# Patient Record
Sex: MALE | Race: WHITE | HISPANIC OR LATINO | URBAN - METROPOLITAN AREA
[De-identification: names, ages, dates, MRNs, and addresses within clinical notes are randomized per-mention and may not be internally consistent; named-entity substitution may affect disease eponyms.]

---

## 2018-01-26 ENCOUNTER — INPATIENT (INPATIENT)
Facility: HOSPITAL | Age: 68
LOS: 5 days | Discharge: HOME HEALTH | DRG: 25 | End: 2018-02-01
Attending: STUDENT IN AN ORGANIZED HEALTH CARE EDUCATION/TRAINING PROGRAM | Admitting: PSYCHIATRY & NEUROLOGY
Payer: MEDICAID

## 2018-01-26 VITALS
TEMPERATURE: 98 F | DIASTOLIC BLOOD PRESSURE: 140 MMHG | HEART RATE: 90 BPM | RESPIRATION RATE: 18 BRPM | SYSTOLIC BLOOD PRESSURE: 255 MMHG | OXYGEN SATURATION: 98 %

## 2018-01-26 DIAGNOSIS — Z90.49 ACQUIRED ABSENCE OF OTHER SPECIFIED PARTS OF DIGESTIVE TRACT: Chronic | ICD-10-CM

## 2018-01-26 LAB
ALBUMIN SERPL ELPH-MCNC: 4.6 G/DL — SIGNIFICANT CHANGE UP (ref 3.3–5)
ALP SERPL-CCNC: 77 U/L — SIGNIFICANT CHANGE UP (ref 40–120)
ALT FLD-CCNC: 12 U/L RC — SIGNIFICANT CHANGE UP (ref 10–45)
ANION GAP SERPL CALC-SCNC: 13 MMOL/L — SIGNIFICANT CHANGE UP (ref 5–17)
APTT BLD: 38.5 SEC — HIGH (ref 27.5–37.4)
AST SERPL-CCNC: 16 U/L — SIGNIFICANT CHANGE UP (ref 10–40)
BASE EXCESS BLDV CALC-SCNC: 2.2 MMOL/L — HIGH (ref -2–2)
BASOPHILS # BLD AUTO: 0 K/UL — SIGNIFICANT CHANGE UP (ref 0–0.2)
BASOPHILS NFR BLD AUTO: 0.2 % — SIGNIFICANT CHANGE UP (ref 0–2)
BILIRUB SERPL-MCNC: 0.7 MG/DL — SIGNIFICANT CHANGE UP (ref 0.2–1.2)
BUN SERPL-MCNC: 11 MG/DL — SIGNIFICANT CHANGE UP (ref 7–23)
CA-I SERPL-SCNC: 1.22 MMOL/L — SIGNIFICANT CHANGE UP (ref 1.12–1.3)
CALCIUM SERPL-MCNC: 9.9 MG/DL — SIGNIFICANT CHANGE UP (ref 8.4–10.5)
CHLORIDE BLDV-SCNC: 104 MMOL/L — SIGNIFICANT CHANGE UP (ref 96–108)
CHLORIDE SERPL-SCNC: 99 MMOL/L — SIGNIFICANT CHANGE UP (ref 96–108)
CO2 BLDV-SCNC: 29 MMOL/L — SIGNIFICANT CHANGE UP (ref 22–30)
CO2 SERPL-SCNC: 25 MMOL/L — SIGNIFICANT CHANGE UP (ref 22–31)
CREAT SERPL-MCNC: 1.03 MG/DL — SIGNIFICANT CHANGE UP (ref 0.5–1.3)
EOSINOPHIL # BLD AUTO: 0.1 K/UL — SIGNIFICANT CHANGE UP (ref 0–0.5)
EOSINOPHIL NFR BLD AUTO: 1 % — SIGNIFICANT CHANGE UP (ref 0–6)
GAS PNL BLDV: 138 MMOL/L — SIGNIFICANT CHANGE UP (ref 136–145)
GAS PNL BLDV: SIGNIFICANT CHANGE UP
GAS PNL BLDV: SIGNIFICANT CHANGE UP
GLUCOSE BLDV-MCNC: 123 MG/DL — HIGH (ref 70–99)
GLUCOSE SERPL-MCNC: 120 MG/DL — HIGH (ref 70–99)
HCO3 BLDV-SCNC: 28 MMOL/L — SIGNIFICANT CHANGE UP (ref 21–29)
HCT VFR BLD CALC: 44 % — SIGNIFICANT CHANGE UP (ref 39–50)
HCT VFR BLDA CALC: 49 % — SIGNIFICANT CHANGE UP (ref 39–50)
HGB BLD CALC-MCNC: 15.9 G/DL — SIGNIFICANT CHANGE UP (ref 13–17)
HGB BLD-MCNC: 15.4 G/DL — SIGNIFICANT CHANGE UP (ref 13–17)
INR BLD: 1.03 RATIO — SIGNIFICANT CHANGE UP (ref 0.88–1.16)
LACTATE BLDV-MCNC: 1.5 MMOL/L — SIGNIFICANT CHANGE UP (ref 0.7–2)
LYMPHOCYTES # BLD AUTO: 2.7 K/UL — SIGNIFICANT CHANGE UP (ref 1–3.3)
LYMPHOCYTES # BLD AUTO: 25.7 % — SIGNIFICANT CHANGE UP (ref 13–44)
MAGNESIUM SERPL-MCNC: 1.9 MG/DL — SIGNIFICANT CHANGE UP (ref 1.6–2.6)
MCHC RBC-ENTMCNC: 31.1 PG — SIGNIFICANT CHANGE UP (ref 27–34)
MCHC RBC-ENTMCNC: 35 GM/DL — SIGNIFICANT CHANGE UP (ref 32–36)
MCV RBC AUTO: 88.8 FL — SIGNIFICANT CHANGE UP (ref 80–100)
MONOCYTES # BLD AUTO: 0.8 K/UL — SIGNIFICANT CHANGE UP (ref 0–0.9)
MONOCYTES NFR BLD AUTO: 7.8 % — SIGNIFICANT CHANGE UP (ref 2–14)
NEUTROPHILS # BLD AUTO: 6.8 K/UL — SIGNIFICANT CHANGE UP (ref 1.8–7.4)
NEUTROPHILS NFR BLD AUTO: 65.2 % — SIGNIFICANT CHANGE UP (ref 43–77)
PCO2 BLDV: 49 MMHG — SIGNIFICANT CHANGE UP (ref 35–50)
PH BLDV: 7.37 — SIGNIFICANT CHANGE UP (ref 7.35–7.45)
PLATELET # BLD AUTO: 374 K/UL — SIGNIFICANT CHANGE UP (ref 150–400)
PO2 BLDV: 39 MMHG — SIGNIFICANT CHANGE UP (ref 25–45)
POTASSIUM BLDV-SCNC: 3.3 MMOL/L — LOW (ref 3.5–5)
POTASSIUM SERPL-MCNC: 3.4 MMOL/L — LOW (ref 3.5–5.3)
POTASSIUM SERPL-SCNC: 3.4 MMOL/L — LOW (ref 3.5–5.3)
PROT SERPL-MCNC: 8 G/DL — SIGNIFICANT CHANGE UP (ref 6–8.3)
PROTHROM AB SERPL-ACNC: 11.2 SEC — SIGNIFICANT CHANGE UP (ref 9.8–12.7)
RBC # BLD: 4.96 M/UL — SIGNIFICANT CHANGE UP (ref 4.2–5.8)
RBC # FLD: 12.6 % — SIGNIFICANT CHANGE UP (ref 10.3–14.5)
SAO2 % BLDV: 71 % — SIGNIFICANT CHANGE UP (ref 67–88)
SODIUM SERPL-SCNC: 137 MMOL/L — SIGNIFICANT CHANGE UP (ref 135–145)
TROPONIN T SERPL-MCNC: <0.01 NG/ML — SIGNIFICANT CHANGE UP (ref 0–0.06)
WBC # BLD: 10.5 K/UL — SIGNIFICANT CHANGE UP (ref 3.8–10.5)
WBC # FLD AUTO: 10.5 K/UL — SIGNIFICANT CHANGE UP (ref 3.8–10.5)

## 2018-01-26 PROCEDURE — 99291 CRITICAL CARE FIRST HOUR: CPT | Mod: 25

## 2018-01-26 PROCEDURE — 93010 ELECTROCARDIOGRAM REPORT: CPT

## 2018-01-26 PROCEDURE — 70450 CT HEAD/BRAIN W/O DYE: CPT | Mod: 26

## 2018-01-26 RX ORDER — NICARDIPINE HYDROCHLORIDE 30 MG/1
2.5 CAPSULE, EXTENDED RELEASE ORAL
Qty: 40 | Refills: 0 | Status: DISCONTINUED | OUTPATIENT
Start: 2018-01-26 | End: 2018-01-26

## 2018-01-26 RX ORDER — NICARDIPINE HYDROCHLORIDE 30 MG/1
2.5 CAPSULE, EXTENDED RELEASE ORAL
Qty: 40 | Refills: 0 | Status: DISCONTINUED | OUTPATIENT
Start: 2018-01-26 | End: 2018-01-27

## 2018-01-26 RX ORDER — ACETAMINOPHEN 500 MG
1000 TABLET ORAL ONCE
Qty: 0 | Refills: 0 | Status: COMPLETED | OUTPATIENT
Start: 2018-01-26 | End: 2018-01-26

## 2018-01-26 RX ADMIN — NICARDIPINE HYDROCHLORIDE 12.5 MG/HR: 30 CAPSULE, EXTENDED RELEASE ORAL at 22:16

## 2018-01-26 RX ADMIN — Medication 400 MILLIGRAM(S): at 23:41

## 2018-01-26 RX ADMIN — NICARDIPINE HYDROCHLORIDE 12.5 MG/HR: 30 CAPSULE, EXTENDED RELEASE ORAL at 22:29

## 2018-01-26 NOTE — ED PROVIDER NOTE - OBJECTIVE STATEMENT
67 year old male with pmhx of Hypertension and pshx of appendectomy presents with high blood pressure reading in /149. Associated symptoms of headache, double vision, and slight confusion. Per family, patient confuses words and seems forgetful at his workplace. Patient had self discontinued his BP medications x3 years ago. Denies chest pain. Denies numbness or tingling in LE. Denies back or abdominal pain. NKDA. Non smoker.

## 2018-01-26 NOTE — ED PROVIDER NOTE - CRITICAL CARE PROVIDED
consult w/ pt's family directly relating to pts condition/additional history taking/interpretation of diagnostic studies/consultation with other physicians/direct patient care (not related to procedure)/documentation

## 2018-01-26 NOTE — ED PROVIDER NOTE - PROGRESS NOTE DETAILS
CT head reviewed and discussed briefly with radiology. Mets with vasogenic edema vs. possible spontaneous hemorrhages with vasogenic edema. Resident still unclear. Possible neurosurgery consult for evaluation. Final radiology read: most likely a ischemic foci with hemorrhagic bleed. Radiology recommends consulting stroke neuro. Stroke neuro consulted and discussed and agrees with plan for more gradually BP control given degree of initial elevation. Final radiology read: most likely a ischemic foci with hemorrhagic bleed.  Neurosurgery evaluated patient, recommending consulting stroke neuro. Stroke neuro consulted and discussed and agrees with plan for more gradually BP control given degree of initial elevation, will admit patient to their service pending further workup.

## 2018-01-26 NOTE — CONSULT NOTE ADULT - SUBJECTIVE AND OBJECTIVE BOX
Pager: [phone]  CHIEF COMPLAINT/ REASON FOR CONSULTATION:    Headache     HPI:  67M PMH HTN, has been nonompliant with medication, had a headache today and noticed his BP to be >200 so came into the hospital. CTH was performed which showed L temporal vasogenic edema for which neurosurgery was called. Patient states his headache has improved, he is currently on a cardene drip for BP control. Otherwise no weakness/paresthesias. No other medical problems or oncologic history.     PAST MEDICAL HISTORY   HTN (hypertension)    PAST SURGICAL HISTORY   History of appendectomy        MEDICATIONS:  Antibiotics:    Neuro:    Anticoagulation:    Other:  niCARdipine Infusion 2.5 mG/Hr IV Continuous <Continuous>      SOCIAL HISTORY:   Occupation:   Marital Status:     FAMILY HISTORY:  No pertinent family history     REVIEW OF SYSTEMS:  Check here if all are normal other than Neurological []  Negative except as above.     PHYSICAL EXAMINATION:   T(C): 36.5 (01-26-18 @ 22:20), Max: 36.9 (01-26-18 @ 22:18)  HR: 74 (01-26-18 @ 22:30) (73 - 90)  BP: 211/122 (01-26-18 @ 22:30) (211/122 - 255/140)  RR: 18 (01-26-18 @ 22:30) (18 - 20)  SpO2: 98% (01-26-18 @ 22:30) (97% - 98%)  Wt(kg): --    Exam:  Awake, Alert, AOX3  PERRL, EOMI, Face equal, Tongue m/l  5/5 throughout, no drift  SILT      LABS:                        15.4   10.5  )-----------( 374      ( 26 Jan 2018 22:01 )             44.0     01-26    137  |  99  |  11  ----------------------------<  120<H>  3.4<L>   |  25  |  1.03    Ca    9.9      26 Jan 2018 22:01  Mg     1.9     01-26    TPro  8.0  /  Alb  4.6  /  TBili  0.7  /  DBili  x   /  AST  16  /  ALT  12  /  AlkPhos  77  01-26    PT/INR - ( 26 Jan 2018 22:01 )   PT: 11.2 sec;   INR: 1.03 ratio         PTT - ( 26 Jan 2018 22:01 )  PTT:38.5 sec      RADIOLOGY & ADDITIONAL STUDIES:    < from: CT Head No Cont (01.26.18 @ 22:06) >  Two small hemorrhagic foci in the left temporal lobe with extensive edema   involving the left temporal and posterior frontal lobes. Edema appears to   be predominantly vasogenic in nature. Although the findings most likely   reflect a somewhat atypical appearance of ischemic infarct with small   foci of hemorrhagic transformation, an underlying mass lesion is not   excluded. Recommend contrast enhanced brain MRI for further evaluation.     < end of copied text >

## 2018-01-26 NOTE — ED PROVIDER NOTE - CRITICAL CARE INDICATION, MLM
patient was critically ill... Patient was critically ill with a high probability of imminent or life threatening deterioration due to hypertensive emergency requiring emergent reduction as well as ischemic stroke with secondary hemorrhagic conversion and edema.

## 2018-01-26 NOTE — ED ADULT NURSE NOTE - OBJECTIVE STATEMENT
67y male with hx of htn presents to the ER c/o high blood pressure. pt is alert and oriented x 4 and speaking coherently. pt primarily Czech speaking- denies translation services, family translating. pt states he has had "memory problems" x 10 days. Pt denies cp, sob, fevers, chills, n/v/d, abdominal pain, back pain. Pt hypertensive in ER. EKG completed. pt on CM. NEuro and sensory exam intact. MD chow completed. will reassess.

## 2018-01-26 NOTE — ED PROVIDER NOTE - MEDICAL DECISION MAKING DETAILS
67 year old male presenting with intermittment memory issue and critical elevate BP, uncontrolled at home for 3 years. Symptoms concerning for bleed vs. hypertensive encephalopathy vs. PRES. Plan: labs, CT head, began to control BP no less than 25% drop, admission for BP, -/+ neuro or neurosurgery consult based off CT findings. 67 year old male presenting with intermittent memory issue and critical elevated BP, uncontrolled at home for 3 years. Symptoms concerning for bleed vs. hypertensive encephalopathy vs. PRES. Plan: labs, CT head, began to control BP no less than 25% drop, admission for BP, -/+ neuro or neurosurgery consult based off CT findings.

## 2018-01-26 NOTE — CONSULT NOTE ADULT - ASSESSMENT
67M with elevated SBP, and vasogenic edema on CTH.   -No neurosurgical intervention needed at this time, with BP control patients symptoms have resolved and he is currently neurologically intact.   - Possible stroke neuro consult, hemorrhage is likely hypertensive in nature  - MRI w/wo, if suspicious for underlying lesion, reconsult PRN.

## 2018-01-27 ENCOUNTER — TRANSCRIPTION ENCOUNTER (OUTPATIENT)
Age: 68
End: 2018-01-27

## 2018-01-27 DIAGNOSIS — I16.1 HYPERTENSIVE EMERGENCY: ICD-10-CM

## 2018-01-27 LAB
APPEARANCE UR: CLEAR — SIGNIFICANT CHANGE UP
BILIRUB UR-MCNC: NEGATIVE — SIGNIFICANT CHANGE UP
CHOLEST SERPL-MCNC: 215 MG/DL — HIGH (ref 10–199)
CK MB BLD-MCNC: 1.8 % — SIGNIFICANT CHANGE UP (ref 0–3.5)
CK MB CFR SERPL CALC: 2.1 NG/ML — SIGNIFICANT CHANGE UP (ref 0–6.7)
CK SERPL-CCNC: 119 U/L — SIGNIFICANT CHANGE UP (ref 30–200)
COLOR SPEC: COLORLESS — SIGNIFICANT CHANGE UP
DIFF PNL FLD: NEGATIVE — SIGNIFICANT CHANGE UP
GLUCOSE UR QL: NEGATIVE — SIGNIFICANT CHANGE UP
HBA1C BLD-MCNC: 5.7 % — HIGH (ref 4–5.6)
HCT VFR BLD CALC: 47.8 % — SIGNIFICANT CHANGE UP (ref 39–50)
HDLC SERPL-MCNC: 35 MG/DL — LOW (ref 40–125)
HGB BLD-MCNC: 16.2 G/DL — SIGNIFICANT CHANGE UP (ref 13–17)
KETONES UR-MCNC: NEGATIVE — SIGNIFICANT CHANGE UP
LEUKOCYTE ESTERASE UR-ACNC: NEGATIVE — SIGNIFICANT CHANGE UP
LIPID PNL WITH DIRECT LDL SERPL: 149 MG/DL — HIGH
MAGNESIUM SERPL-MCNC: 1.9 MG/DL — SIGNIFICANT CHANGE UP (ref 1.6–2.6)
MCHC RBC-ENTMCNC: 29.4 PG — SIGNIFICANT CHANGE UP (ref 27–34)
MCHC RBC-ENTMCNC: 33.9 GM/DL — SIGNIFICANT CHANGE UP (ref 32–36)
MCV RBC AUTO: 86.8 FL — SIGNIFICANT CHANGE UP (ref 80–100)
NITRITE UR-MCNC: NEGATIVE — SIGNIFICANT CHANGE UP
PH UR: 7 — SIGNIFICANT CHANGE UP (ref 5–8)
PHOSPHATE SERPL-MCNC: 2.5 MG/DL — SIGNIFICANT CHANGE UP (ref 2.5–4.5)
PLATELET # BLD AUTO: 341 K/UL — SIGNIFICANT CHANGE UP (ref 150–400)
PROT UR-MCNC: NEGATIVE — SIGNIFICANT CHANGE UP
RBC # BLD: 5.51 M/UL — SIGNIFICANT CHANGE UP (ref 4.2–5.8)
RBC # FLD: 13.6 % — SIGNIFICANT CHANGE UP (ref 10.3–14.5)
SP GR SPEC: 1.01 — LOW (ref 1.01–1.02)
TOTAL CHOLESTEROL/HDL RATIO MEASUREMENT: 6.1 RATIO — SIGNIFICANT CHANGE UP (ref 3.4–9.6)
TRIGL SERPL-MCNC: 157 MG/DL — HIGH (ref 10–149)
TROPONIN T SERPL-MCNC: <0.01 NG/ML — SIGNIFICANT CHANGE UP (ref 0–0.06)
UROBILINOGEN FLD QL: NEGATIVE — SIGNIFICANT CHANGE UP
WBC # BLD: 10.39 K/UL — SIGNIFICANT CHANGE UP (ref 3.8–10.5)
WBC # FLD AUTO: 10.39 K/UL — SIGNIFICANT CHANGE UP (ref 3.8–10.5)

## 2018-01-27 PROCEDURE — 70498 CT ANGIOGRAPHY NECK: CPT | Mod: 26

## 2018-01-27 PROCEDURE — 70544 MR ANGIOGRAPHY HEAD W/O DYE: CPT | Mod: 26,59

## 2018-01-27 PROCEDURE — 70450 CT HEAD/BRAIN W/O DYE: CPT | Mod: 26

## 2018-01-27 PROCEDURE — 70551 MRI BRAIN STEM W/O DYE: CPT | Mod: 26

## 2018-01-27 PROCEDURE — 93010 ELECTROCARDIOGRAM REPORT: CPT

## 2018-01-27 PROCEDURE — 70496 CT ANGIOGRAPHY HEAD: CPT | Mod: 26

## 2018-01-27 PROCEDURE — 99222 1ST HOSP IP/OBS MODERATE 55: CPT

## 2018-01-27 RX ORDER — SODIUM CHLORIDE 9 MG/ML
500 INJECTION INTRAMUSCULAR; INTRAVENOUS; SUBCUTANEOUS
Qty: 0 | Refills: 0 | Status: DISCONTINUED | OUTPATIENT
Start: 2018-01-27 | End: 2018-01-28

## 2018-01-27 RX ORDER — POTASSIUM CHLORIDE 20 MEQ
40 PACKET (EA) ORAL ONCE
Qty: 0 | Refills: 0 | Status: COMPLETED | OUTPATIENT
Start: 2018-01-27 | End: 2018-01-27

## 2018-01-27 RX ORDER — NICARDIPINE HYDROCHLORIDE 30 MG/1
5 CAPSULE, EXTENDED RELEASE ORAL
Qty: 40 | Refills: 0 | Status: DISCONTINUED | OUTPATIENT
Start: 2018-01-27 | End: 2018-01-27

## 2018-01-27 RX ORDER — ATROPINE SULFATE 0.1 MG/ML
0.5 SYRINGE (ML) INJECTION ONCE
Qty: 0 | Refills: 0 | Status: DISCONTINUED | OUTPATIENT
Start: 2018-01-27 | End: 2018-01-29

## 2018-01-27 RX ORDER — NICARDIPINE HYDROCHLORIDE 30 MG/1
7.5 CAPSULE, EXTENDED RELEASE ORAL
Qty: 40 | Refills: 0 | Status: DISCONTINUED | OUTPATIENT
Start: 2018-01-27 | End: 2018-01-27

## 2018-01-27 RX ORDER — HYDRALAZINE HCL 50 MG
10 TABLET ORAL ONCE
Qty: 0 | Refills: 0 | Status: COMPLETED | OUTPATIENT
Start: 2018-01-27 | End: 2018-01-27

## 2018-01-27 RX ORDER — HYDRALAZINE HCL 50 MG
10 TABLET ORAL EVERY 6 HOURS
Qty: 0 | Refills: 0 | Status: DISCONTINUED | OUTPATIENT
Start: 2018-01-27 | End: 2018-01-27

## 2018-01-27 RX ORDER — AMLODIPINE BESYLATE 2.5 MG/1
10 TABLET ORAL ONCE
Qty: 0 | Refills: 0 | Status: DISCONTINUED | OUTPATIENT
Start: 2018-01-27 | End: 2018-01-28

## 2018-01-27 RX ORDER — HYDRALAZINE HCL 50 MG
10 TABLET ORAL EVERY 6 HOURS
Qty: 0 | Refills: 0 | Status: DISCONTINUED | OUTPATIENT
Start: 2018-01-27 | End: 2018-01-29

## 2018-01-27 RX ORDER — NICARDIPINE HYDROCHLORIDE 30 MG/1
10 CAPSULE, EXTENDED RELEASE ORAL
Qty: 40 | Refills: 0 | Status: DISCONTINUED | OUTPATIENT
Start: 2018-01-27 | End: 2018-01-28

## 2018-01-27 RX ORDER — ACETAMINOPHEN 500 MG
650 TABLET ORAL EVERY 6 HOURS
Qty: 0 | Refills: 0 | Status: DISCONTINUED | OUTPATIENT
Start: 2018-01-27 | End: 2018-01-29

## 2018-01-27 RX ORDER — ACETAMINOPHEN 500 MG
1000 TABLET ORAL ONCE
Qty: 0 | Refills: 0 | Status: COMPLETED | OUTPATIENT
Start: 2018-01-27 | End: 2018-01-27

## 2018-01-27 RX ORDER — ENOXAPARIN SODIUM 100 MG/ML
40 INJECTION SUBCUTANEOUS EVERY 24 HOURS
Qty: 0 | Refills: 0 | Status: DISCONTINUED | OUTPATIENT
Start: 2018-01-27 | End: 2018-01-28

## 2018-01-27 RX ADMIN — Medication 1000 MILLIGRAM(S): at 01:49

## 2018-01-27 RX ADMIN — ENOXAPARIN SODIUM 40 MILLIGRAM(S): 100 INJECTION SUBCUTANEOUS at 08:24

## 2018-01-27 RX ADMIN — Medication 10 MILLIGRAM(S): at 16:49

## 2018-01-27 RX ADMIN — Medication 10 MILLIGRAM(S): at 08:19

## 2018-01-27 RX ADMIN — Medication 1000 MILLIGRAM(S): at 10:00

## 2018-01-27 RX ADMIN — Medication 650 MILLIGRAM(S): at 23:00

## 2018-01-27 RX ADMIN — Medication 400 MILLIGRAM(S): at 09:30

## 2018-01-27 RX ADMIN — NICARDIPINE HYDROCHLORIDE 12.5 MG/HR: 30 CAPSULE, EXTENDED RELEASE ORAL at 03:27

## 2018-01-27 RX ADMIN — Medication 10 MILLIGRAM(S): at 03:20

## 2018-01-27 RX ADMIN — Medication 40 MILLIEQUIVALENT(S): at 08:20

## 2018-01-27 NOTE — H&P ADULT - ASSESSMENT
68YO male with untreated hypertension presents with 3 days of bifrontal headache with normal neurological exam. CT Head reveals 2 areas of hemorrhagic foci in L temporal lobe w/ surrounding vasogenic edema. Unclear if these are primary hemorrhages due to hypertension vs primary ischemic infarcts with HT vs underlying mass lesion. NeuroSx consulted - no intervention recommended. Admit to stroke unit for further management and workup.      [] MRI Brain w/ w/o w/o underlying lesion  [] MRA Head/Neck r/o vascular malformation/vascular disease   [] BP goal SBP ~180, ok to allow some permissive HTN given this may be primary ischemic infarct, monitor closely  [] Rp CT Head in 6 hours --4am   [] lipid panel, A1C   [] DVT ppx

## 2018-01-27 NOTE — H&P ADULT - NSHPPHYSICALEXAM_GEN_ALL_CORE
Vital Signs Last 24 Hrs  T(C): 36.5 (26 Jan 2018 22:20), Max: 36.9 (26 Jan 2018 22:18)  T(F): 97.7 (26 Jan 2018 22:20), Max: 98.5 (26 Jan 2018 22:18)  HR: 68 (27 Jan 2018 00:37) (68 - 90)  BP: 186/102 (27 Jan 2018 00:37) (184/114 - 255/140)  BP(mean): 125 (27 Jan 2018 00:37) (125 - 135)  RR: 17 (27 Jan 2018 00:37) (16 - 20)  SpO2: 97% (27 Jan 2018 00:37) (96% - 99%)    PHYSICAL EXAM:   General appearance: No acute distress                 Mental Status: AAOx3, fluent speech, follows simple commands, able to name/repeat  Cranial Nerves: Injected conjunctiva R eye, EOMI, PERRL, VFF, V1-V3 intact, face symmetric,  tongue midline  Motor: strength 5/5 throughout. No drift x4  Sensation: Grossly intact to LT throughout  Coordination: FTN intact b/l, no dysmetria   Reflexes: 1+ bilateral biceps, brachioradialis, patellar and ankle  Gait: normal and stable.

## 2018-01-27 NOTE — DISCHARGE NOTE ADULT - PATIENT PORTAL LINK FT
“You can access the FollowHealth Patient Portal, offered by [facility], by registering with the following website: [email or web address]”

## 2018-01-27 NOTE — DISCHARGE NOTE ADULT - CARE PLAN
Principal Discharge DX:	Brain tumor, glioma  Goal:	s/p left craniotomy for brain tumor biopsy on 1/30/18. Frozen section  GBM.  Assessment and plan of treatment:	Keep Incision Clean and Dry. May shower and get incision wet 2/4/18.  pat dry after. no creams or lotions on incision. No immersion baths..  No strenous activity. No heavy lifting. Do not return to work until cleared by physician. No driving until cleared by physician.  Incision evaluation and staple removal in 1 week at Dr Herrera office. Follow up pathology results on follow up with Dr Tate  Secondary Diagnosis:	HTN (hypertension)  Assessment and plan of treatment:	Follow up at medical clinic on  Continue Labetalol and Norvasc Principal Discharge DX:	Brain tumor, glioma  Goal:	s/p left craniotomy for brain tumor biopsy on 1/30/18. Frozen section  GBM.  Assessment and plan of treatment:	Keep Incision Clean and Dry. May shower and get incision wet 2/4/18.  pat dry after. no creams or lotions on incision. No immersion baths..  No strenous activity. No heavy lifting. Do not return to work until cleared by physician. No driving until cleared by physician.  Incision evaluation and staple removal in 1 week at Dr Herrera office. Follow up pathology results on follow up with Dr Tate  Secondary Diagnosis:	HTN (hypertension)  Assessment and plan of treatment:	Follow up at medical clinic on 2/6/18 2 2pm at medical clinic [address] [place] [address]. [facility] [phone]  Continue Labetalol and Norvasc Principal Discharge DX:	Brain tumor, glioma  Goal:	s/p left craniotomy for brain tumor biopsy on 1/30/18. Frozen section  GBM.  Assessment and plan of treatment:	Keep Incision Clean and Dry. May shower and get incision wet 2/4/18.  pat dry after. no creams or lotions on incision. No immersion baths..  No strenous activity. No heavy lifting. Do not return to work until cleared by physician. No driving until cleared by physician.  Incision evaluation and staple removal in 1 week at Dr Herrera office. Follow up pathology results on follow up with Dr Tate. Dr Herrera  to call with appointment time.  Secondary Diagnosis:	HTN (hypertension)  Assessment and plan of treatment:	Follow up at medical clinic on 2/6/18 2 2pm at medical clinic [address] [place] [address]. [facility] [phone]  Continue Labetalol and Norvasc

## 2018-01-27 NOTE — H&P ADULT - HISTORY OF PRESENT ILLNESS
NEUROLOGY CONSULT     Patient is a 67y old male who presents with a chief complaint of headache.    HPI:  66YO  M with h/o untreated hypertension who presents with headache x3-4d and some minor memory lapses per family. Pt notes frontal HA, pressure like. He took his BP at home - 250s/150s so came to ED. In Ed no deficits found on exam but CT head revealed areas of hemorrhage in L temporal lobe. Neurosx called and no intervention recommended. Pt accompanied by daughter and wife at bedside who help to translate. Pt lives alone at baseline and performs all ADLs. Not working currently.   [place] is improving on its own. Denies any visual changes, no lightheadedness/dizziness, no unsteady gait or imbalance. Denies focal weakness/numbness. Denies dysarthria, dysphagia, diplopia. Denies N/V/D, fevers/chills or weight loss. No hx malignancy or AVM/hemorrhage in family.     NIHSS 0 ICH score 0

## 2018-01-27 NOTE — ED ADULT NURSE REASSESSMENT NOTE - NS ED NURSE REASSESS COMMENT FT1
Pt taken to CT scan with out notification to RN. Charge nurse notified.
drip paused due to MAP.
receiving RN aware nicardipine is paused. Pt in NAD. pending transport to floor. Receiving RN aware of BP.
Neuro at the bedside

## 2018-01-27 NOTE — DISCHARGE NOTE ADULT - NS AS DC STROKE ED MATERIALS
Stroke Education Booklet/Risk Factors for Stroke/Need for Followup After Discharge/Call 911 for Stroke/Prescribed Medications/Stroke Warning Signs and Symptoms Prescribed Medications/Need for Followup After Discharge/Stroke Education Booklet/Stroke Warning Signs and Symptoms/Call 911 for Stroke/Risk Factors for Stroke

## 2018-01-27 NOTE — DISCHARGE NOTE ADULT - HOSPITAL COURSE
67y old  Male with uncontrolled hypertension presents with a chief complaint of headaches found to have a left temporal lobe enhancing lesion/mass with mass effect and MLS, concerning for neoplasm . Taken to OR after medical and cardiology clearance s/p left craniotomy for brain tumor biopsy on 1/30/18. Frozen section  GBM.   Final pathology results pending. Evaluated by PT and recommended for Home PT. Discharged home in stable condition 67y old  Male with uncontrolled hypertension presents with a chief complaint of headaches found to have a left temporal lobe enhancing lesion/mass with mass effect and MLS, concerning for neoplasm . Taken to OR after medical and cardiology clearance s/p left craniotomy for brain tumor biopsy/ partial resection on 1/30/18.   Final pathology results pending. Evaluated by PT and recommended for Home PT. Discharged home in stable condition

## 2018-01-27 NOTE — DISCHARGE NOTE ADULT - CARE PROVIDER_API CALL
Jesus Tate), Neurosurgery  General  [address] [address]  Phone: [phone]  Fax: [phone]    Paco Omer), [facility] Medicine; Internal Medicine; Medical Oncology  [address] [place] [address]  Phone: [phone]  Fax: [phone]    Cecilio Curtis), Radiation Oncology  [address] [place] [address]  Phone: [phone]  Fax: [phone] Jesus Tate), Neurosurgery  General  [address] [place] [address]  Phone: [phone]  Fax: [phone]    Cecilio Curtis), Radiation Oncology  [address] [place] [address]  Phone: [phone]  Fax: [phone]    Zina Cuba), Neurology  [facility] [place] [facility]  [address] [place] [address]  Phone: [phone]  Fax: [phone] Jesus Tate), Neurosurgery  General  [address] [place] [address]  Phone: [phone]  Fax: [phone]    Cecilio Curtis), Radiation Oncology  [address] [place] [address]  Phone: [phone]  Fax: [phone]    Zina Cuba), Neurology  [facility]  [address] [place] [address]  Phone: [phone]  Fax: [phone]

## 2018-01-27 NOTE — DISCHARGE NOTE ADULT - NS AS ACTIVITY OBS
Do not drive or operate machinery/Walking-Outdoors allowed/Walking-Indoors allowed/No Heavy lifting/straining/Stairs allowed/Showering allowed

## 2018-01-27 NOTE — H&P ADULT - NSHPLABSRESULTS_GEN_ALL_CORE
LABS:                          15.4   10.5  )-----------( 374      ( 26 Jan 2018 22:01 )             44.0     01-26    137  |  99  |  11  ----------------------------<  120<H>  3.4<L>   |  25  |  1.03    Ca    9.9      26 Jan 2018 22:01  Mg     1.9     01-26    TPro  8.0  /  Alb  4.6  /  TBili  0.7  /  DBili  x   /  AST  16  /  ALT  12  /  AlkPhos  77  01-26      IMAGING:    CT Head:  Two small hemorrhagic foci in the left temporal lobe with extensive edema involving the left temporal and posterior frontal lobes. Edema appears to   be predominantly vasogenic in nature. Although the findings most likely reflect a somewhat atypical appearance of ischemic infarct with small   foci of hemorrhagic transformation, an underlying mass lesion is not excluded. There is less than 2 mm of rightward midline shift.

## 2018-01-27 NOTE — DISCHARGE NOTE ADULT - CARE PROVIDERS DIRECT ADDRESSES
[email or web address],DirectAddress_Unknown

## 2018-01-27 NOTE — H&P ADULT - NSHPREVIEWOFSYSTEMS_GEN_ALL_CORE
REVIEW OF SYSTEMS:  CONSTITUTIONAL:  No weight loss, fever, chills, weakness or fatigue.  HEENT:  Eyes:  No visual loss, blurred vision, double vision or yellow sclerae. Ears, Nose, Throat:  No hearing loss, sneezing, congestion, runny nose or sore throat.  SKIN:  No rash or itching.  CARDIOVASCULAR:  No chest pain, chest pressure or chest discomfort. No palpitations or edema.  RESPIRATORY:  No shortness of breath, cough or sputum.  GASTROINTESTINAL:  No anorexia, nausea, vomiting or diarrhea. No abdominal pain or blood.  GENITOURINARY:  denies incontinence/retention   NEUROLOGICAL:  see hpi   MUSCULOSKELETAL:  No muscle, back pain, joint pain or stiffness.  HEMATOLOGIC:  No anemia, bleeding or bruising.  LYMPHATICS:  No enlarged nodes. No history of splenectomy.  PSYCHIATRIC:  No history of depression or anxiety.  ENDOCRINOLOGIC:  No reports of sweating, cold or heat intolerance. No polyuria or polydipsia.

## 2018-01-27 NOTE — DISCHARGE NOTE ADULT - PLAN OF CARE
s/p left craniotomy for brain tumor biopsy on 1/30/18. Frozen section  GBM. Keep Incision Clean and Dry. May shower and get incision wet 2/4/18.  pat dry after. no creams or lotions on incision. No immersion baths..  No strenous activity. No heavy lifting. Do not return to work until cleared by physician. No driving until cleared by physician.  Incision evaluation and staple removal in 1 week at Dr Herrera office. Follow up pathology results on follow up with Dr Tate Follow up at medical clinic on  Continue Labetalol and Norvasc Follow up at medical clinic on 2/6/18 2 2pm at [address]. [facility] [phone]  Continue Labetalol and Norvasc Keep Incision Clean and Dry. May shower and get incision wet 2/4/18.  pat dry after. no creams or lotions on incision. No immersion baths..  No strenous activity. No heavy lifting. Do not return to work until cleared by physician. No driving until cleared by physician.  Incision evaluation and staple removal in 1 week at Dr Herrera office. Follow up pathology results on follow up with Dr Tate. Dr Herrera  to call with appointment time.

## 2018-01-27 NOTE — H&P ADULT - ATTENDING COMMENTS
Mr. Barber is a 68y/o  man with vascular risk factors of age and untreated hypertension who presents with headache x3-4d and some minor memory lapses per family. He took his BP at home - 250s/150s so came to ED. As per record reviewed no deficits found on exam at ED but CT head revealed areas of hemorrhage in L temporal lobe. MRS=0  Neurologic examination revealed patient awake, alert, oriented only to self, difficult to asses due to lack of fluency, impaired comprehension, naming, and repetition. Mild right facial droop, and subtle right upper extremity drift.  Neuroimaging head CT reviewed and area of hypodensity at left temporal lobe with focus of hemorrhage near hippocampal area.    History and neurological exam pertinent for acute onset of confusion and headaches with global aphasia localizes to dysfunction of left MCA territory. At the moment differential diagnosis remains ample stroke vs seizures and further assessment with Brain MRI for underlying brain lesion.    Patient will benefit from:  1. Brain MRI w/wo contrast  2. Brain and Neck MRA/ Head and Neck CTA  3. EEG  4. BP control <140/90   5. Lipid Panel and HgbA1C  6. Speech and swallowing evaluation  7. Patient complaining of chest pain cardiac enzymes negative, EKG negative  8. GI prophylaxis with PPI started  9. Start ASA 81mg  10. High dose statin  11. PT/OT eval  12. Upon reviewing MRI results will proceed with further work up

## 2018-01-27 NOTE — DISCHARGE NOTE ADULT - MEDICATION SUMMARY - MEDICATIONS TO TAKE
I will START or STAY ON the medications listed below when I get home from the hospital:    dexamethasone 4 mg oral tablet  -- 1 tab(s) by mouth q 12 hours x 2days then   1 tab am 1/2 tab pm x 3 days then   1/2 tab q 12 hrs until follow up  -- Indication: For decrease cerebral swelling    acetaminophen 325 mg oral tablet  -- 2 tab(s) by mouth every 6 hours, As needed, Mild Pain (1 - 3)  -- Indication: For Headaches    levETIRAcetam 500 mg oral tablet  -- 1 tab(s) by mouth 2 times a day  -- Indication: For seizure prevention    labetalol 200 mg oral tablet  -- 1 tab(s) by mouth every 8 hours  -- Indication: For BP control    amLODIPine 10 mg oral tablet  -- 1 tab(s) by mouth once a day  -- Indication: For BP control    Dulcolax Laxative 5 mg oral delayed release tablet  -- 1 tab(s) by mouth once a day, As Needed - for constipation  -- Indication: For Constipation I will START or STAY ON the medications listed below when I get home from the hospital:    dexamethasone 4 mg oral tablet  -- 1 tab(s) by mouth q 12 hours x 2days then   1 tab am 1/2 tab pm x 3 days then   1/2 tab q 12 hrs until follow up  -- Indication: For decrease cerebral swelling    acetaminophen 325 mg oral tablet  -- 2 tab(s) by mouth every 6 hours, As needed, Mild Pain (1 - 3)  -- Indication: For Headaches    levETIRAcetam 500 mg oral tablet  -- 1 tab(s) by mouth 2 times a day  -- Indication: For seizure prevention    labetalol 200 mg oral tablet  -- 1 tab(s) by mouth every 8 hours  -- Indication: For BP control    amLODIPine 10 mg oral tablet  -- 1 tab(s) by mouth once a day  -- Indication: For BP control    Pepcid 20 mg oral tablet  -- 1 tab(s) by mouth once a day while on steroids  -- Indication: For prevent stomach ulcers    Dulcolax Laxative 5 mg oral delayed release tablet  -- 1 tab(s) by mouth once a day, As Needed - for constipation  -- Indication: For Constipation

## 2018-01-27 NOTE — DISCHARGE NOTE ADULT - PROVIDER TOKENS
TOKEN:'[id number]',TOKEN:'[id number]',TOKEN:'[id number]' TOKEN:'[id number]:'[id number]',TOKEN:'[id number]'

## 2018-01-27 NOTE — DISCHARGE NOTE ADULT - ADDITIONAL INSTRUCTIONS
Radiation medicine appointment with Dr. Curtis on 02/07/18 at 12pm at [facility], [address] [place] [address], [address].  [phone].  Oncology appointment at [address], on 2/13/18 at 1pm. Call [phone]  Return to ER if develops seizures, fevers, bleeding , wound drainage, uncontrolled pain, weakness of extremities, lethargy or sluggishness.. Radiation medicine appointment with Dr. Curtis on 02/07/18 at 12pm at [facility], [address] [place] [address], [address].  [phone].  Oncology appointment at [address], on 2/8/18 at 1pm. with Dr Cuba Call [phone] if any changes  Return to ER if develops seizures, fevers, bleeding , wound drainage, uncontrolled pain, weakness of extremities, lethargy or sluggishness.. Radiation medicine appointment with Dr. Curtis on 02/07/18 at 12pm at [facility], [address] [place] [address], [address].  [phone].  Oncology appointment at [address], on 2/8/18 at 1pm. with Dr Cuba Call [phone] if any changes  Follow up at medical clinic on 2/6/18 2 2pm at medical clinic [address] [place] [address] [phone]. bring photo id. 2pieces of mail, and pay stub or letter of support notarized  check blood sugar daily prior to meals. Keep record and if consistently > 150 mg/dl , discuss with physician at medical clinic   Check blood pressure daily. Keep record and take to appointment at medical clinic    Return to ER if develops seizures, fevers, bleeding , wound drainage, uncontrolled pain, weakness of extremities, lethargy or sluggishness..

## 2018-01-28 DIAGNOSIS — R07.9 CHEST PAIN, UNSPECIFIED: ICD-10-CM

## 2018-01-28 DIAGNOSIS — I16.9 HYPERTENSIVE CRISIS, UNSPECIFIED: ICD-10-CM

## 2018-01-28 DIAGNOSIS — G93.5 COMPRESSION OF BRAIN: ICD-10-CM

## 2018-01-28 LAB
ANION GAP SERPL CALC-SCNC: 14 MMOL/L — SIGNIFICANT CHANGE UP (ref 5–17)
APTT BLD: 33.7 SEC — SIGNIFICANT CHANGE UP (ref 27.5–37.4)
BLD GP AB SCN SERPL QL: NEGATIVE — SIGNIFICANT CHANGE UP
BUN SERPL-MCNC: 16 MG/DL — SIGNIFICANT CHANGE UP (ref 7–23)
CALCIUM SERPL-MCNC: 9.5 MG/DL — SIGNIFICANT CHANGE UP (ref 8.4–10.5)
CHLORIDE SERPL-SCNC: 100 MMOL/L — SIGNIFICANT CHANGE UP (ref 96–108)
CK MB CFR SERPL CALC: 1.2 NG/ML — SIGNIFICANT CHANGE UP (ref 0–6.7)
CK MB CFR SERPL CALC: 1.5 NG/ML — SIGNIFICANT CHANGE UP (ref 0–6.7)
CK SERPL-CCNC: 60 U/L — SIGNIFICANT CHANGE UP (ref 30–200)
CK SERPL-CCNC: 65 U/L — SIGNIFICANT CHANGE UP (ref 30–200)
CK SERPL-CCNC: 70 U/L — SIGNIFICANT CHANGE UP (ref 30–200)
CO2 SERPL-SCNC: 24 MMOL/L — SIGNIFICANT CHANGE UP (ref 22–31)
CREAT SERPL-MCNC: 1.24 MG/DL — SIGNIFICANT CHANGE UP (ref 0.5–1.3)
GLUCOSE SERPL-MCNC: 126 MG/DL — HIGH (ref 70–99)
HCT VFR BLD CALC: 44.2 % — SIGNIFICANT CHANGE UP (ref 39–50)
HGB BLD-MCNC: 15.4 G/DL — SIGNIFICANT CHANGE UP (ref 13–17)
INR BLD: 1.04 RATIO — SIGNIFICANT CHANGE UP (ref 0.88–1.16)
MCHC RBC-ENTMCNC: 31.3 PG — SIGNIFICANT CHANGE UP (ref 27–34)
MCHC RBC-ENTMCNC: 34.9 GM/DL — SIGNIFICANT CHANGE UP (ref 32–36)
MCV RBC AUTO: 89.5 FL — SIGNIFICANT CHANGE UP (ref 80–100)
PLATELET # BLD AUTO: 370 K/UL — SIGNIFICANT CHANGE UP (ref 150–400)
POTASSIUM SERPL-MCNC: 3.4 MMOL/L — LOW (ref 3.5–5.3)
POTASSIUM SERPL-SCNC: 3.4 MMOL/L — LOW (ref 3.5–5.3)
PROTHROM AB SERPL-ACNC: 11.4 SEC — SIGNIFICANT CHANGE UP (ref 9.8–12.7)
RBC # BLD: 4.94 M/UL — SIGNIFICANT CHANGE UP (ref 4.2–5.8)
RBC # FLD: 12.8 % — SIGNIFICANT CHANGE UP (ref 10.3–14.5)
RH IG SCN BLD-IMP: POSITIVE — SIGNIFICANT CHANGE UP
SODIUM SERPL-SCNC: 138 MMOL/L — SIGNIFICANT CHANGE UP (ref 135–145)
TROPONIN T SERPL-MCNC: <0.01 NG/ML — SIGNIFICANT CHANGE UP (ref 0–0.06)
WBC # BLD: 11.9 K/UL — HIGH (ref 3.8–10.5)
WBC # FLD AUTO: 11.9 K/UL — HIGH (ref 3.8–10.5)

## 2018-01-28 PROCEDURE — 95819 EEG AWAKE AND ASLEEP: CPT | Mod: 26

## 2018-01-28 PROCEDURE — 99223 1ST HOSP IP/OBS HIGH 75: CPT

## 2018-01-28 PROCEDURE — 70553 MRI BRAIN STEM W/O & W/DYE: CPT | Mod: 26

## 2018-01-28 PROCEDURE — 93010 ELECTROCARDIOGRAM REPORT: CPT

## 2018-01-28 PROCEDURE — 71045 X-RAY EXAM CHEST 1 VIEW: CPT | Mod: 26

## 2018-01-28 RX ORDER — POTASSIUM CHLORIDE 20 MEQ
20 PACKET (EA) ORAL ONCE
Qty: 0 | Refills: 0 | Status: COMPLETED | OUTPATIENT
Start: 2018-01-28 | End: 2018-01-28

## 2018-01-28 RX ORDER — ACYCLOVIR SODIUM 500 MG
1000 VIAL (EA) INTRAVENOUS EVERY 8 HOURS
Qty: 0 | Refills: 0 | Status: DISCONTINUED | OUTPATIENT
Start: 2018-01-28 | End: 2018-01-28

## 2018-01-28 RX ORDER — OXYCODONE AND ACETAMINOPHEN 5; 325 MG/1; MG/1
2 TABLET ORAL EVERY 4 HOURS
Qty: 0 | Refills: 0 | Status: DISCONTINUED | OUTPATIENT
Start: 2018-01-28 | End: 2018-01-29

## 2018-01-28 RX ORDER — OXYCODONE AND ACETAMINOPHEN 5; 325 MG/1; MG/1
1 TABLET ORAL EVERY 4 HOURS
Qty: 0 | Refills: 0 | Status: DISCONTINUED | OUTPATIENT
Start: 2018-01-28 | End: 2018-01-29

## 2018-01-28 RX ORDER — ACYCLOVIR SODIUM 500 MG
1050 VIAL (EA) INTRAVENOUS EVERY 8 HOURS
Qty: 0 | Refills: 0 | Status: DISCONTINUED | OUTPATIENT
Start: 2018-01-28 | End: 2018-01-28

## 2018-01-28 RX ORDER — DIAZEPAM 5 MG
5 TABLET ORAL ONCE
Qty: 0 | Refills: 0 | Status: DISCONTINUED | OUTPATIENT
Start: 2018-01-28 | End: 2018-01-28

## 2018-01-28 RX ORDER — AMLODIPINE BESYLATE 2.5 MG/1
10 TABLET ORAL DAILY
Qty: 0 | Refills: 0 | Status: DISCONTINUED | OUTPATIENT
Start: 2018-01-28 | End: 2018-01-29

## 2018-01-28 RX ORDER — METOPROLOL TARTRATE 50 MG
25 TABLET ORAL
Qty: 0 | Refills: 0 | Status: DISCONTINUED | OUTPATIENT
Start: 2018-01-28 | End: 2018-01-29

## 2018-01-28 RX ADMIN — Medication 10 MILLIGRAM(S): at 16:20

## 2018-01-28 RX ADMIN — OXYCODONE AND ACETAMINOPHEN 2 TABLET(S): 5; 325 TABLET ORAL at 15:48

## 2018-01-28 RX ADMIN — Medication 20 MILLIEQUIVALENT(S): at 06:41

## 2018-01-28 RX ADMIN — AMLODIPINE BESYLATE 10 MILLIGRAM(S): 2.5 TABLET ORAL at 10:06

## 2018-01-28 RX ADMIN — Medication 5 MILLIGRAM(S): at 10:32

## 2018-01-28 RX ADMIN — AMLODIPINE BESYLATE 10 MILLIGRAM(S): 2.5 TABLET ORAL at 23:39

## 2018-01-28 RX ADMIN — OXYCODONE AND ACETAMINOPHEN 2 TABLET(S): 5; 325 TABLET ORAL at 15:46

## 2018-01-28 RX ADMIN — Medication 10 MILLIGRAM(S): at 08:29

## 2018-01-28 RX ADMIN — Medication 270 MILLIGRAM(S): at 14:40

## 2018-01-28 RX ADMIN — Medication 650 MILLIGRAM(S): at 00:00

## 2018-01-28 RX ADMIN — Medication 25 MILLIGRAM(S): at 15:51

## 2018-01-28 NOTE — CONSULT NOTE ADULT - ASSESSMENT
68yo Man with uncontrolled hypertension, presents with a headache for 3-4 days and some minor memory lapses per family, is found to have a left temporal lobe mass concerning for glioblastoma. Medicine consulted for pre-op medical optimization. 68yo Man with uncontrolled hypertension, presents with a headache for a week and some minor memory lapses per family, is found to have a left temporal lobe mass concerning for glioblastoma. Medicine consulted for pre-op medical optimization.

## 2018-01-28 NOTE — PHYSICAL THERAPY INITIAL EVALUATION ADULT - ADDITIONAL COMMENTS
Lives in an apartment by himself. Patient's family is close by and actively involved. Patient was independent in ADL's and ambulation prior to hospitalization. Lives in an apartment with elevator access by himself. Patient's family is close by and actively involved and states he can stay with family upon d/c. Patient was independent in ADL's and ambulation prior to hospitalization, did not use an AD.

## 2018-01-28 NOTE — PHYSICAL THERAPY INITIAL EVALUATION ADULT - PRECAUTIONS/LIMITATIONS, REHAB EVAL
fall precautions/MRI brain without contrast 1/27: Redemonstration of large area of L occipitotemporal vasogenic edema with associated small foci of L temporal parenchymal  hemorrhage. Findings are concerning for potential underlying neoplasm. Associated L MCA infarction is a consideration given given restricted diffusion in this region. In addition, on the susceptibility suspicion of potential clot in the peripheral L MCA at the level of the M2/M3 segment is questioned. Associated mass effect on the L lateral ventricle & minimal rightward midline shift.  MRI head 1/27 with contrast: Predominantly peripherally enhancing mass centered in the L temporal lobe with a few foci of associated hemorrhage, demonstrating heterogeneous mild restricted diffusion & moderate associated vasogenic edema. 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 This causes mass effect upon the L lateral ventricle & rightward midline shift.

## 2018-01-28 NOTE — PHYSICAL THERAPY INITIAL EVALUATION ADULT - PERTINENT HX OF CURRENT PROBLEM, REHAB EVAL
68YO with h/o untreated HTN who p/w frontal pressure like headache x3-4day & some minor memory lapses per family. Pt took his BP at home - 250s/150s & came to ED. CTH 1/26: 2 small hemorrhagic foci in the L temporal lobe with extensive edema involving the L temporal & posterior frontal lobes, edema appears to be predominantly vasogenic in nature. NIHSS 0 ICH score 0

## 2018-01-28 NOTE — CONSULT NOTE ADULT - PROBLEM SELECTOR RECOMMENDATION 3
BP currently 170s/80s which is reasonable for now given his presenting BP of 200s/100s on admission.  Currently off the nicardipine IV drip.  Continue Norvasc 10mg daily, Metoprolol 25mg PO BID.  Hydralazine 10mg IV q6h PRN SBP >180/105 BP currently 170s/80s which is reasonable for now given his presenting BP of 200s/100s on admission.  Given mild hypokalemia, Conn syndrome is on the differential. Would obtain renin and aldosterone levels.   Currently off the nicardipine IV drip.  Continue Norvasc 10mg daily, Metoprolol 25mg PO BID.  Hydralazine 10mg IV q6h PRN SBP >180/105

## 2018-01-28 NOTE — CHART NOTE - NSCHARTNOTEFT_GEN_A_CORE
Mr. Barber is a 68 y/o  man with vascular risk factors of age and untreated hypertension who presents with headache x3-4d and some minor memory lapses per family. He took his BP at home - 250s/150s so came to ED. As per record reviewed no deficits found on exam at ED but CT head revealed areas of hemorrhage in L temporal lobe. MRS=0  Neurologic examination revealed patient awake, alert, oriented only to self, difficult to asses due to lack of fluency, impaired comprehension, naming, and repetition. Mild right facial droop, and subtle right upper extremity drift.   Neuroimaging head CT reviewed and area of hypodensity at left temporal lobe with focus of hemorrhage near hippocampal area.    History and neurological exam pertinent for acute onset of confusion and headaches with global aphasia localizes to dysfunction of left MCA territory.     Hospital Course:   Patient was admitted to stroke service for further evaluation.  MRI brain w/o contrast and CTA head/neck performed. MRI reveals L occipitotemporal vasogenic edema with small foci L temporal parenchymal hemorrhage.   CTA head/neck reveals patent vasculature.   Patient's exam is improved compared to admission exam.     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 López. Mr. Barber is a 66 y/o  man with vascular risk factors of age and untreated hypertension who presents with headache x3-4d and some minor memory lapses per family. He took his BP at home - 250s/150s so came to ED. As per record reviewed no deficits found on exam at ED but CT head revealed areas of hemorrhage in L temporal lobe. MRS=0  Neurologic examination revealed patient awake, alert, oriented only to self, difficult to asses due to lack of fluency, impaired comprehension, naming, and repetition. Mild right facial droop, and subtle right upper extremity drift.   Neuroimaging head CT reviewed and area of hypodensity at left temporal lobe with focus of hemorrhage near hippocampal area.    History and neurological exam pertinent for acute onset of confusion and headaches with global aphasia localizes to dysfunction of left MCA territory.     Hospital Course:   Patient was admitted to stroke service for further evaluation.  MRI brain w/o contrast and CTA head/neck performed. MRI reveals L occipitotemporal vasogenic edema with small foci L temporal parenchymal hemorrhage.   CTA head/neck reveals patent vasculature.   Patient's exam is improved compared to admission exam.     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 López, who agrees with this management.

## 2018-01-28 NOTE — CONSULT NOTE ADULT - PROBLEM SELECTOR RECOMMENDATION 2
The epigastric/ lower sternum discomfort began sometime yesterday and is still present right now.   Concerning for acute coronary syndrome given hypertensive crisis and new T wave inversion in lateral leads.   Checking STAT cardiac enzymes now. Will also add CE to the 5am labs.   Cannot start heparin drip or other antiplatelet agents safely given brain mass with hemorrhagic areas.  Continue metoprolol 25mg PO BID  Neurosurgery resident made aware. Cardiology consult called. The epigastric/ lower sternum discomfort began sometime yesterday and is still present right now.   Concerning for acute coronary syndrome (unstable angina) given hypertensive crisis and new T wave inversion in lateral leads.   Checking STAT cardiac enzymes now. Trend CE q8h.   Will also add CE to the 5am labs.   Cannot start heparin drip or other antiplatelet agents safely given brain mass with hemorrhagic areas.  Continue metoprolol 25mg PO BID  Neurosurgery resident made aware. Cardiology consult called.

## 2018-01-28 NOTE — CONSULT NOTE ADULT - SUBJECTIVE AND OBJECTIVE BOX
Azael Baird MD  ([facility] Hospitalist)  Page [phone] ([id number])    HPI:  68yo Man with uncontrolled hypertension, presents with a headache for 3-4 days and some minor memory lapses per family. Pt notes frontal HA, pressure like. He took his BP at home - 250s/150s so came to ED. In Ed no deficits found on exam but CT head revealed areas of hemorrhage in L temporal lobe. Neurosx called and no intervention recommended. Pt accompanied by daughter and wife at bedside who help to translate. Pt lives alone at baseline and performs all ADLs. Not working currently.   [place] is improving on its own. Denies any visual changes, no lightheadedness/dizziness, no unsteady gait or imbalance. Denies focal weakness/numbness. Denies dysarthria, dysphagia, diplopia. Denies N/V/D, fevers/chills or weight loss. No hx malignancy or AVM/hemorrhage in family.     PAST MEDICAL & SURGICAL HISTORY:  HTN (hypertension)  History of appendectomy    REVIEW OF SYMPTOMS:  CONSTITUTIONAL: No fever, weight loss, or fatigue  EYES: No eye pain, visual disturbances, or discharge  ENMT:  No difficulty hearing, tinnitus, vertigo; No sinus or throat pain  NECK: No pain or stiffness  BREASTS: No pain, masses, or nipple discharge  RESPIRATORY: No cough, wheezing, chills or hemoptysis; No shortness of breath  CARDIOVASCULAR: No chest pain, palpitations, dizziness, or leg swelling  GASTROINTESTINAL: No abdominal or epigastric pain. No nausea, vomiting, or hematemesis; No diarrhea or constipation. No melena or hematochezia.  GENITOURINARY: No dysuria, frequency, hematuria, or incontinence  NEUROLOGICAL: No headaches, memory loss, loss of strength, numbness, or tremors  SKIN: No itching, burning, rashes, or lesions   LYMPH NODES: No enlarged glands  ENDOCRINE: No heat or cold intolerance; No hair loss  MUSCULOSKELETAL: No joint pain or swelling; No muscle, back, or extremity pain  PSYCHIATRIC: No depression, anxiety, mood swings, or difficulty sleeping  HEME/LYMPH: No easy bruising, or bleeding gums  ALLERY AND IMMUNOLOGIC: No hives or eczema    ALLERGIES: No Known Allergies    SOCIAL HISTORY: Remote tobacco use over 40 yrs ago. No alcohol or illicit drug use. Lives alone.    FAMILY HISTORY:    HOME MEDICATIONS:      INPATIENT MEDICATIONS  (STANDING):  acyclovir IVPB 1000 milliGRAM(s) IV Intermittent every 8 hours  amLODIPine   Tablet 10 milliGRAM(s) Oral daily  atropine Injectable 0.5 milliGRAM(s) IV Push once  metoprolol     tartrate 25 milliGRAM(s) Oral two times a day    MEDICATIONS  (PRN):  acetaminophen   Tablet. 650 milliGRAM(s) Oral every 6 hours PRN Mild Pain (1 - 3)  hydrALAZINE Injectable 10 milliGRAM(s) IV Push every 6 hours PRN BP > 180/105      Vital Signs Last 24 Hrs  T(C): 36.7 (2018 02:00), Max: 36.9 (2018 20:00)  T(F): 98.1 (2018 02:00), Max: 98.4 (2018 20:00)  HR: 69 (2018 10:00) (57 - 97)  BP: 158/81 (2018 10:00) (146/87 - 192/106)  BP(mean): 103 (2018 10:00) (103 - 129)  RR: 21 (2018 10:00) (13 - 25)  SpO2: 96% (2018 10:00) (95% - 98%)  CAPILLARY BLOOD GLUCOSE      I&O's Summary  2018 07:01  -  2018 07:00  --------------------------------------------------------  IN: 900 mL / OUT: 0 mL / NET: 900 mL      PHYSICAL EXAM:  GENERAL: NAD, well-developed  HEAD:  Atraumatic, Normocephalic  EYES: EOMI, PERRLA, conjunctiva and sclera clear  NECK: Supple, No JVD  CHEST/LUNG: Clear to auscultation bilaterally; No wheeze  HEART: Regular rate and rhythm; No murmurs, rubs, or gallops  ABDOMEN: Soft, Nontender, Nondistended; Bowel sounds present  EXTREMITIES:  2+ Peripheral Pulses, No clubbing, cyanosis, or edema  PSYCH: AAOx3  NEUROLOGY: non-focal  SKIN: No rashes or lesions    LABS:                        15.4   11.9  )-----------( 370      ( 2018 05:17 )             44.2         138  |  100  |  16  ----------------------------<  126<H>  3.4<L>   |  24  |  1.24    Ca    9.5      2018 05:17  Phos  2.5       Mg     1.9         TPro  8.0  /  Alb  4.6  /  TBili  0.7  /  DBili  x   /  AST  16  /  ALT  12  /  AlkPhos  77      PT/INR - ( 2018 13:25 )   PT: 11.4 sec;   INR: 1.04 ratio         PTT - ( 2018 13:25 )  PTT:33.7 sec  CARDIAC MARKERS ( 2018 06:44 )  x     / <0.01 ng/mL / 119 U/L / x     / 2.1 ng/mL  CARDIAC MARKERS ( 2018 22:01 )  x     / <0.01 ng/mL / x     / x     / x          Urinalysis Basic - ( 2018 00:30 )    Color: Colorless / Appearance: Clear / S.007 / pH: x  Gluc: x / Ketone: Negative  / Bili: Negative / Urobili: Negative   Blood: x / Protein: Negative / Nitrite: Negative   Leuk Esterase: Negative / RBC: x / WBC x   Sq Epi: x / Non Sq Epi: x / Bacteria: x        RADIOLOGY & ADDITIONAL TESTS:    Imaging Personally Reviewed:   CT Head : Small amount of hemorrhage in the left temporal lobe with surrounding vasogenic edema, unchanged. Appearance suggests that this is a neoplasm and not an infarct.  MRI Head : Predominantly peripherally enhancing 8.7 x 3.3 x 4.0 cm mass centered in the left temporal lobe with a few foci of associated hemorrhage, demonstrating heterogeneous mild restricted diffusion and moderate associated vasogenic edema. 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 This causes mass effect upon the left lateral ventricle and 2 mm rightward midline shift. No herniation or hydrocephalus.      ECG reviewed and interpreted: NSR at 72 bpm, bifascicular block, LVH, QTc 532 ms    Consultant(s) Notes Reviewed:  Neurosurgery    Care Discussed with Consultants/Other Providers: Neurosurgery re pre-op medical optimization Azael Baird MD  ([facility] Hospitalist)  Page [phone])    HPI:  68yo Man with known hypertension, no PMD follow up in over 3 years and has not been on any medications, presents with a headache for about 1 week. The patient was accompanied by his daughter who helped to provide some of the information. She states that he's been forgetful recently. The patient states that the headache can be severe, mainly frontal location, requiring him to take tylenol at home. The daughter states that she visited him on Friday (), saw him in distress from the headache, took a blood pressure which showed a systolic BP of 255. The patient denies nausea, vomiting, visual changes, lightheadedness, dizziness, unsteady gait, weakness, numbness, dysarthria, dysphagia, diplopia. Here, brain imaging revealed left temporal brain mass with vasogenic edema, 2mm midline shift, and small areas of hemorrhage. Medicine was consulted for pre-op medical optimization.  Upon my visit this afternoon, the patient states that since yesterday he's been having mild epigastric discomfort - almost pointing to the lower sternum. No SOB, N/V, or diaphoresis. STAT ECG revealed new T wave inversion in lateral leads.     PAST MEDICAL & SURGICAL HISTORY:  HTN (hypertension)  History of appendectomy    REVIEW OF SYMPTOMS:  CONSTITUTIONAL: No fever, weight loss, or fatigue  EYES: No eye pain, visual disturbances.  ENMT:  No difficulty hearing, tinnitus, vertigo; No sinus or throat pain  NECK: No pain or stiffness  RESPIRATORY: No cough, wheezing, chills or hemoptysis; No shortness of breath  CARDIOVASCULAR: No chest pain, palpitations, dizziness, or leg swelling  GASTROINTESTINAL:  +epigastric pain. No nausea, vomiting, or hematemesis; No diarrhea or constipation. No melena or hematochezia.  GENITOURINARY: No dysuria, frequency, hematuria, or incontinence  NEUROLOGICAL: + headaches, + memory loss. No loss of strength, numbness, or tremors  SKIN: No itching, burning, rashes, or lesions   MUSCULOSKELETAL: No joint pain or swelling; No muscle, back, or extremity pain  PSYCHIATRIC: No depression, anxiety, mood swings, or difficulty sleeping  HEME/LYMPH: No easy bruising, or bleeding gums  ALLERGY AND IMMUNOLOGIC: No hives or eczema    ALLERGIES:   No Known Allergies    SOCIAL HISTORY:   Remote tobacco use over 40 yrs ago. No alcohol or illicit drug use.  Pt lives alone at baseline and performs all ADLs. Not working currently.     FAMILY HISTORY:  Mother had hypertension.    HOME MEDICATIONS:  None    INPATIENT MEDICATIONS  (STANDING):  acyclovir IVPB 1000 milliGRAM(s) IV Intermittent every 8 hours  amLODIPine   Tablet 10 milliGRAM(s) Oral daily  atropine Injectable 0.5 milliGRAM(s) IV Push once  metoprolol     tartrate 25 milliGRAM(s) Oral two times a day    MEDICATIONS  (PRN):  acetaminophen   Tablet. 650 milliGRAM(s) Oral every 6 hours PRN Mild Pain (1 - 3)  hydrALAZINE Injectable 10 milliGRAM(s) IV Push every 6 hours PRN BP > 180/105      Vital Signs Last 24 Hrs  T(C): 36.7 (2018 02:00), Max: 36.9 (2018 20:00)  T(F): 98.1 (2018 02:00), Max: 98.4 (2018 20:00)  HR: 69 (2018 10:00) (57 - 97)  BP: 158/81 (2018 10:00) (146/87 - 192/106)  BP(mean): 103 (2018 10:00) (103 - 129)  RR: 21 (2018 10:00) (13 - 25)  SpO2: 96% (2018 10:00) (95% - 98%)  CAPILLARY BLOOD GLUCOSE    I&O's Summary  2018 07:01  -  2018 07:00  --------------------------------------------------------  IN: 900 mL / OUT: 0 mL / NET: 900 mL      PHYSICAL EXAM:  GENERAL: NAD, well-developed  HEAD:  Atraumatic, Normocephalic  EYES: EOMI, PERRLA, conjunctiva and sclera clear  NECK: Supple, No JVD  CHEST/LUNG: Clear to auscultation bilaterally; No wheeze  HEART: Regular rate and rhythm; No murmurs, rubs, or gallops  ABDOMEN: Soft, Nontender, Nondistended; Bowel sounds present  EXTREMITIES:  2+ Peripheral Pulses, No clubbing, cyanosis, or edema  PSYCH: AAOx3  NEUROLOGY: non-focal  SKIN: No rashes or lesions    LABS:             15.4   11.9  )-----------( 370      ( 2018 05:17 )             44.2         138  |  100  |  16  ----------------------------<  126<H>  3.4<L>   |  24  |  1.24    Ca    9.5      2018 05:17  Phos  2.5       Mg     1.9         TPro  8.0  /  Alb  4.6  /  TBili  0.7  /  DBili  x   /  AST  16  /  ALT  12  /  AlkPhos  77      PT/INR - ( 2018 13:25 )   PT: 11.4 sec;   INR: 1.04 ratio         PTT - ( 2018 13:25 )  PTT:33.7 sec  CARDIAC MARKERS ( 2018 06:44 )  x     / <0.01 ng/mL / 119 U/L / x     / 2.1 ng/mL  CARDIAC MARKERS ( 2018 22:01 )  x     / <0.01 ng/mL / x     / x     / x          Urinalysis Basic - ( 2018 00:30 )    Color: Colorless / Appearance: Clear / S.007 / pH: x  Gluc: x / Ketone: Negative  / Bili: Negative / Urobili: Negative   Blood: x / Protein: Negative / Nitrite: Negative   Leuk Esterase: Negative / RBC: x / WBC x   Sq Epi: x / Non Sq Epi: x / Bacteria: x    RADIOLOGY & ADDITIONAL TESTS:    Imaging Personally Reviewed:   CT Head : Small amount of hemorrhage in the left temporal lobe with surrounding vasogenic edema, unchanged. Appearance suggests that this is a neoplasm and not an infarct.  MRI Head : Predominantly peripherally enhancing 8.7 x 3.3 x 4.0 cm mass centered in the left temporal lobe with a few foci of associated hemorrhage, demonstrating heterogeneous mild restricted diffusion and moderate associated vasogenic edema. 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 This causes mass effect upon the left lateral ventricle and 2 mm rightward midline shift. No herniation or hydrocephalus.    ECG () reviewed and interpreted: NSR at 72 bpm, bifascicular block, LVH, QTc 532 ms  ECG () reviewed and interpreted: NSR at 72 bpm, RBBB, LVH, and T wave inversions (new) in leads I and aVL.     Consultant(s) Notes Reviewed:  Neurosurgery    Care Discussed with Consultants/Other Providers: Neurosurgery re pre-op medical optimization Azael Baird MD  ([facility] Hospitalist)  Page [phone])    HPI:  66yo Man with known hypertension, no PMD follow up in over 3 years and has not been on any medications, presents with a headache for about 1 week. The patient was accompanied by his daughter who helped to provide some of the information. She states that he's been forgetful recently. The patient states that the headache can be severe, mainly frontal location, requiring him to take tylenol at home. The daughter states that she visited him on Friday (), saw him in distress from the headache, took a blood pressure which showed a systolic BP of 255. The patient denies nausea, vomiting, visual changes, lightheadedness, dizziness, unsteady gait, weakness, numbness, dysarthria, dysphagia, diplopia. Here, brain imaging revealed left temporal brain mass with vasogenic edema, 2mm midline shift, and small areas of hemorrhage. Medicine was consulted for pre-op medical optimization.  Upon my visit this afternoon, the patient states that since yesterday he's been having mild epigastric discomfort - almost pointing to the lower sternum. No SOB, N/V, or diaphoresis. STAT ECG revealed new T wave inversion in lateral leads.     PAST MEDICAL & SURGICAL HISTORY:  HTN (hypertension)  History of appendectomy    REVIEW OF SYMPTOMS:  CONSTITUTIONAL: No fever, weight loss, or fatigue  EYES: No eye pain, visual disturbances.  ENMT:  No difficulty hearing, tinnitus, vertigo; No sinus or throat pain  NECK: No pain or stiffness  RESPIRATORY: No cough, wheezing, chills or hemoptysis; No shortness of breath  CARDIOVASCULAR: No chest pain, palpitations, dizziness, or leg swelling  GASTROINTESTINAL:  +epigastric pain. No nausea, vomiting, or hematemesis; No diarrhea or constipation. No melena or hematochezia.  GENITOURINARY: No dysuria, frequency, hematuria, or incontinence  NEUROLOGICAL: + headaches, + memory loss. No loss of strength, numbness, or tremors  SKIN: No itching, burning, rashes, or lesions   MUSCULOSKELETAL: No joint pain or swelling; No muscle, back, or extremity pain  PSYCHIATRIC: No depression, anxiety, mood swings, or difficulty sleeping  HEME/LYMPH: No easy bruising, or bleeding gums  ALLERGY AND IMMUNOLOGIC: No hives or eczema    ALLERGIES:   No Known Allergies    SOCIAL HISTORY:   Remote tobacco use over 40 yrs ago. No alcohol or illicit drug use.  Pt lives alone at baseline and performs all ADLs. Not working currently.     FAMILY HISTORY:  Mother had hypertension.    HOME MEDICATIONS:  None    INPATIENT MEDICATIONS  (STANDING):  acyclovir IVPB 1000 milliGRAM(s) IV Intermittent every 8 hours  amLODIPine   Tablet 10 milliGRAM(s) Oral daily  atropine Injectable 0.5 milliGRAM(s) IV Push once  metoprolol     tartrate 25 milliGRAM(s) Oral two times a day    MEDICATIONS  (PRN):  acetaminophen   Tablet. 650 milliGRAM(s) Oral every 6 hours PRN Mild Pain (1 - 3)  hydrALAZINE Injectable 10 milliGRAM(s) IV Push every 6 hours PRN BP > 180/105      Vital Signs Last 24 Hrs  T(C): 36.7 (2018 02:00), Max: 36.9 (2018 20:00)  T(F): 98.1 (2018 02:00), Max: 98.4 (2018 20:00)  HR: 69 (2018 10:00) (57 - 97)  BP: 158/81 (2018 10:00) (146/87 - 192/106)  BP(mean): 103 (2018 10:00) (103 - 129)  RR: 21 (2018 10:00) (13 - 25)  SpO2: 96% (2018 10:00) (95% - 98%)  CAPILLARY BLOOD GLUCOSE    I&O's Summary  2018 07:01  -  2018 07:00  --------------------------------------------------------  IN: 900 mL / OUT: 0 mL / NET: 900 mL      PHYSICAL EXAM:  GENERAL: distress due to headache, and epigastric discomfort.  HEAD:  Atraumatic, Normocephalic  EYES: EOMI, PERRLA, conjunctiva and sclera clear  NECK: Supple, No JVD  CHEST/LUNG: Clear to auscultation bilaterally; No wheeze  HEART: Regular rate and rhythm; No murmurs, rubs, or gallops  ABDOMEN: Soft, Nontender, Nondistended; Bowel sounds present  EXTREMITIES:  2+ Peripheral Pulses, No clubbing, cyanosis, or edema  PSYCH: calm  NEUROLOGY: non-focal, AOx3  SKIN: No rashes or lesions    LABS:             15.4   11.9  )-----------( 370      ( 2018 05:17 )             44.2         138  |  100  |  16  ----------------------------<  126<H>  3.4<L>   |  24  |  1.24    Ca    9.5      2018 05:17  Phos  2.5       Mg     1.9         TPro  8.0  /  Alb  4.6  /  TBili  0.7  /  DBili  x   /  AST  16  /  ALT  12  /  AlkPhos  77      PT/INR - ( 2018 13:25 )   PT: 11.4 sec;   INR: 1.04 ratio         PTT - ( 2018 13:25 )  PTT:33.7 sec  CARDIAC MARKERS ( 2018 06:44 )  x     / <0.01 ng/mL / 119 U/L / x     / 2.1 ng/mL  CARDIAC MARKERS ( 2018 22:01 )  x     / <0.01 ng/mL / x     / x     / x          Urinalysis Basic - ( 2018 00:30 )    Color: Colorless / Appearance: Clear / S.007 / pH: x  Gluc: x / Ketone: Negative  / Bili: Negative / Urobili: Negative   Blood: x / Protein: Negative / Nitrite: Negative   Leuk Esterase: Negative / RBC: x / WBC x   Sq Epi: x / Non Sq Epi: x / Bacteria: x    RADIOLOGY & ADDITIONAL TESTS:    Imaging Personally Reviewed:   CT Head : Small amount of hemorrhage in the left temporal lobe with surrounding vasogenic edema, unchanged. Appearance suggests that this is a neoplasm and not an infarct.  MRI Head : Predominantly peripherally enhancing 8.7 x 3.3 x 4.0 cm mass centered in the left temporal lobe with a few foci of associated hemorrhage, demonstrating heterogeneous mild restricted diffusion and moderate associated vasogenic edema. 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 This causes mass effect upon the left lateral ventricle and 2 mm rightward midline shift. No herniation or hydrocephalus.    ECG () reviewed and interpreted: NSR at 72 bpm, bifascicular block, LVH, QTc 532 ms  ECG () reviewed and interpreted: NSR at 72 bpm, RBBB, LVH, and T wave inversions (new) in leads I and aVL.     Consultant(s) Notes Reviewed:  Neurosurgery    Care Discussed with Consultants/Other Providers: Neurosurgery re pre-op medical optimization

## 2018-01-28 NOTE — PROGRESS NOTE ADULT - SUBJECTIVE AND OBJECTIVE BOX
THE PATIENT WAS SEEN AND EXAMINED BY ME WITH THE HOUSESTAFF AND STROKE TEAM DURING MORNING ROUNDS.   HPI:  NEUROLOGY CONSULT     HPI:  68YO  M with h/o untreated hypertension who presents with headache x3-4d and some minor memory lapses per family. Pt notes frontal HA, pressure like. He took his BP at home - 250s/150s so came to ED. In Ed no deficits found on exam but CT head revealed areas of hemorrhage in L temporal lobe. Neurosx called and no intervention recommended. Pt accompanied by daughter and wife at bedside who help to translate. Pt lives alone at baseline and performs all ADLs. Not working currently. [place] is improving on its own. Denies any visual changes, no lightheadedness/dizziness, no unsteady gait or imbalance. Denies focal weakness/numbness. Denies dysarthria, dysphagia, diplopia. Denies N/V/D, fevers/chills or weight loss. No hx malignancy or AVM/hemorrhage in family. NIHSS 0 ICH score 0    SUBJECTIVE: No events overnight.  No new neurologic complaints.      acetaminophen   Tablet. 650 milliGRAM(s) Oral every 6 hours PRN  acyclovir IVPB 1000 milliGRAM(s) IV Intermittent every 8 hours  amLODIPine   Tablet 10 milliGRAM(s) Oral daily  atropine Injectable 0.5 milliGRAM(s) IV Push once  hydrALAZINE Injectable 10 milliGRAM(s) IV Push every 6 hours PRN  metoprolol     tartrate 25 milliGRAM(s) Oral two times a day  oxyCODONE    5 mG/acetaminophen 325 mG 1 Tablet(s) Oral every 4 hours PRN  oxyCODONE    5 mG/acetaminophen 325 mG 2 Tablet(s) Oral every 4 hours PRN      PHYSICAL EXAM:   Vital Signs Last 24 Hrs  T(C): 36.7 (28 Jan 2018 13:00), Max: 36.9 (27 Jan 2018 20:00)  T(F): 98.1 (28 Jan 2018 13:00), Max: 98.4 (27 Jan 2018 20:00)  HR: 70 (28 Jan 2018 14:00) (57 - 97)  BP: 146/81 (28 Jan 2018 14:00) (146/81 - 192/106)  BP(mean): 99 (28 Jan 2018 14:00) (96 - 129)  RR: 23 (28 Jan 2018 14:00) (13 - 28)  SpO2: 95% (28 Jan 2018 14:00) (95% - 98%)    General: No acute distress  HEENT: EOM intact, visual fields full  Abdomen: Soft, nontender, nondistended   Extremities: No edema    NEUROLOGICAL EXAM:  Mental status: AAOx3, fluent speech in Sami follows simple commands, able to name/repeat  Cranial Nerves: No facial asymmetry, no nystagmus, no dysarthria,  tongue midline  Motor exam: Normal tone, no drift, 5/5 RUE, 5/5 RLE, 5/5 LUE, 5/5 LLE, normal fine finger movements.  Sensation: Intact to light touch   Coordination/ Gait: No dysmetria, gait not assessed    LABS:                        15.4   11.9  )-----------( 370      ( 28 Jan 2018 05:17 )             44.2    01-28    138  |  100  |  16  ----------------------------<  126<H>  3.4<L>   |  24  |  1.24    Ca    9.5      28 Jan 2018 05:17  Phos  2.5     01-27  Mg     1.9     01-27    TPro  8.0  /  Alb  4.6  /  TBili  0.7  /  DBili  x   /  AST  16  /  ALT  12  /  AlkPhos  77  01-26  PT/INR - ( 28 Jan 2018 13:25 )   PT: 11.4 sec;   INR: 1.04 ratio         PTT - ( 28 Jan 2018 13:25 )  PTT:33.7 sec  Hemoglobin A1C, Whole Blood: 5.7 % (01-27 @ 09:02)      IMAGING: Reviewed by me.     MR Head w/wo IV Cont (01.28.18 @ 11:29)  Predominantly peripherally enhancing 8.7 x 3.3 x 4.0 cm mass centered in   the left temporal lobe with a few foci of associated hemorrhage,   demonstrating heterogeneous mild restricted diffusion and moderate   associated vasogenic edema. 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 This causes mass effect upon the left lateral   ventricle and 2 mm rightward midline shift. No herniation or   hydrocephalus.    CT Head No Cont (01.27.18 @ 15:58)   There is questionable compromise to the temporal branch of theleft MCA.   Recommend correlation with MRA when the patient is able    MR Head No Cont (01.27.18 @ 11:30)   BRAIN MRI:  Redemonstration of large area of left occipitotemporal vasogenic edema   with associated small foci of left temporal parenchymal  hemorrhage.   Findings are concerning for potential underlying neoplasm. Associated   left middle cerebral artery infarction is a consideration given given   restricted diffusion in this region. In addition, on the susceptibility   suspicion of potential clot in the peripheral left MCA at the level of   the M2/M3 segment is questioned. This is not clearly identified on the   CTA though in retrospect on the CTA, some compromise to the temporal   branch of the left MCA is questioned. Associated mass effect on the left   lateral ventricle and minimal rightward midline shift. No herniation.   Recommend brain MRI with and without IV contrast for further   characterization.    BRAIN MRA:  Motion degraded nondiagnostic imaging.     CT Head No Cont (01.26.18 @ 22:06)  Two small hemorrhagic foci in the left temporal lobe with extensive edema   involving the left temporal and posterior frontal lobes. Edema appears to   be predominantly vasogenic in nature. Although the findings most likely   reflect a somewhat atypical appearance of ischemic infarct with small   foci of hemorrhagic transformation, an underlying mass lesion is not   excluded. Recommend contrast enhanced brain MRI for further evaluation.

## 2018-01-28 NOTE — EEG REPORT - NS EEG TEXT BOX
Study Date: 	1-28-18	    ROUTINE EEG    Technical Information:			  		  Placement and Labeling of Electrodes:  The EEG was performed utilizing 20 channels referential EEG connections (coronal over temporal over parasagittal montage) using all standard 10-20 electrode placements with EKG.  Recording was at a sampling rate of 256 samples per second per channel.  Time synchronized digital video recording was done simultaneously with EEG recording.  A low light infrared camera was used for low light recording.  Delio and seizure detection algorithms were utilized.    CSA Technical Component:  Quantitative EEG analysis using a separate Compressed Spectral Array (CSA) software package was conducted in real-time and run at bedside after set up by the technician, digitally displaying the power of electrographic frequencies included in the 1-30Hz band using a graded color map.  This data was reviewed and interpreted independently, and is reported in a separate section below.    --------------------------------------------------------------------------------------------------    --------------------------------------------------------------------------------------------------  Study Interpretation:    FINDINGS:  The background was continuous, spontaneously variable and reactive.  During wakefulness, the posteriorly dominant rhythm consisted of symmetric, well modulated 8-8.5 Hz activity, with an amplitude to 30 uV, that attenuated to eye opening.  Low amplitude central beta was noted in wakefulness.    Background Slowing:  Generalized slowing: diffuse intermittent irregular delta was present.  Focal slowing: irregular left temporal delta and associated relative attenuation of fast activity was present.    Sleep Background:  Stage II sleep transients were not recorded.    Epileptiform Activity:   No epileptiform discharges were present.    Events:  No clinical events were recorded.  No seizures were recorded.    Activation Procedures:   -Hyperventilation was not performed.    -Photic stimulation was not performed.    Artifacts:  Intermittent myogenic and movement artifacts were noted.    ECG:  The heart rate on single channel ECG was predominantly between 60-70BPM.  -----------------------------------------------------------------------------------------------------    EEG Classification / Summary:  Abnormal EEG Study   -Generalized slowing: diffuse intermittent irregular delta was present.  -Focal slowing: irregular left temporal delta and associated relative attenuation of fast activity was present.    Clinical Impression:  Mild to moderate multifocal cerebral dysfunction evident, more severe and persistent in the left temporal region (with evidence of both white and grey matter involvement).  There were no epileptiform abnormalities recorded.      -------------------------------------------------------------------------------------------------------  Yunior Corea M.D.   of Neurology, [facility] [place]

## 2018-01-28 NOTE — PROGRESS NOTE ADULT - ASSESSMENT
67M with elevated SBP, and vasogenic edema on CTH. CTH and MRI w wo show large left temporal lesion enhancing, possible lymphoma vs GBM.    -OR tomorrow  -requires medical clearance (called hospitalist)

## 2018-01-28 NOTE — PROGRESS NOTE ADULT - SUBJECTIVE AND OBJECTIVE BOX
Pager: [phone]  CHIEF COMPLAINT/ REASON FOR CONSULTATION:    Headache     HPI:  67M PMH HTN, has been nonompliant with medication, had a headache today and noticed his BP to be >200 so came into the hospital. CTH was performed which showed L temporal vasogenic edema for which neurosurgery was called. Patient states his headache has improved, he is currently on a cardene drip for BP control. Otherwise no weakness/paresthesias. No other medical problems or oncologic history.  CTH and MRI w wo show large left temporal lesion enhancing, possible lymphoma vs GBM.      PHYSICAL EXAMINATION:   Vital Signs Last 24 Hrs  T(C): 36.7 (28 Jan 2018 02:00), Max: 36.9 (27 Jan 2018 20:00)  T(F): 98.1 (28 Jan 2018 02:00), Max: 98.4 (27 Jan 2018 20:00)  HR: 69 (28 Jan 2018 10:00) (57 - 97)  BP: 158/81 (28 Jan 2018 10:00) (146/87 - 192/106)  BP(mean): 103 (28 Jan 2018 10:00) (103 - 129)  RR: 21 (28 Jan 2018 10:00) (13 - 25)  SpO2: 96% (28 Jan 2018 10:00) (95% - 98%)    Exam:  Awake, Alert, AOX3  PERRL, EOMI, Face equal, Tongue m/l  5/5 throughout, no drift  SILT

## 2018-01-28 NOTE — CONSULT NOTE ADULT - SUBJECTIVE AND OBJECTIVE BOX
Cardiology Attending Consult Note      CHIEF COMPLAINT/REASON FOR CONSULT: Chest Pain  Date of Admission: 01-26-18    Refused translation service daughter stacy provided translation    HISTORY OF PRESENT ILLNESS:    67y.o. Male with HTN (noncompliant with meds) admitted 01/26/2018 after he developed a headache, took BP at home and measured SBP >200, on admission CT-Head found to have left temporal brain mass with vasogenic edema, 2mm midline shift, and small areas of hemorrhage. Patient managed for hypertensive emergency, now planned for crantiotomy for brain mass. Today patient c/o chest pain, for which cardiology was consulted. Per his daughter, he would complain of chest pain when his blood pressure was high, and this pain would resolve once his BP improved. At the time of my visit () he denies any chest pain, SOB, palpitations, HA, dizziness.     Cardiac enzymes negative x 2. 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  ET: At baseline he is able to walk >4 blocks, walk up several flights of stairs without difficulty.    ALLERGIES:  NKDA    MEDICATIONS:  amLODIPine   Tablet 10 milliGRAM(s) Oral daily  metoprolol     tartrate 25 milliGRAM(s) Oral two times a day  atropine Injectable 0.5 milliGRAM(s) IV Push once  PAST MEDICAL & SURGICAL HISTORY:  HTN (hypertension)  History of appendectomy    FAMILY HISTORY:    SOCIAL HISTORY:    Remote former smoker, no ETOH, no IVDU    REVIEW OF SYSTEMS:    CONSTITUTIONAL: No weakness, fevers or chills  EYES/ENT: No visual changes;  No vertigo or throat pain   NECK: No pain or stiffness  RESPIRATORY: No cough, wheezing, hemoptysis; No shortness of breath  CARDIOVASCULAR: +chest pain or palpitations  GASTROINTESTINAL: No abdominal or epigastric pain. No nausea, vomiting, or hematemesis; No diarrhea or constipation. No melena or hematochezia.  GENITOURINARY: No dysuria, frequency or hematuria  NEUROLOGICAL: No numbness or weakness  SKIN: No itching, burning, rashes, or lesions   All other review of systems is negative unless indicated above.    PHYSICAL EXAM:  T(C): 36.7 (01-28-18 @ 13:00), Max: 36.7 (01-28-18 @ 02:00)  HR: 69 (01-28-18 @ 20:00) (57 - 87)  BP: 161/92 (01-28-18 @ 20:00) (146/81 - 192/106)  RR: 21 (01-28-18 @ 20:00) (9 - 28)  SpO2: 97% (01-28-18 @ 20:00) (95% - 99%)  Wt(kg): --    Drug Dosing Weight  Height (cm): 170.18 (27 Jan 2018 03:05)  Weight (kg): 103.3 (27 Jan 2018 03:05)  BMI (kg/m2): 35.7 (27 Jan 2018 03:05)  BSA (m2): 2.14 (27 Jan 2018 03:05)    I&O's Summary    27 Jan 2018 07:01  -  28 Jan 2018 07:00  --------------------------------------------------------  IN: 900 mL / OUT: 0 mL / NET: 900 mL        Appearance: Normal	  HEENT:   Normal oral mucosa, PERRL, EOMI	  Lymphatic: No lymphadenopathy  Cardiovascular: Normal S1 S2, No JVD, 1/6 [place]  Respiratory: Lungs clear to auscultation	  Psychiatry: A & O x 3, Mood & affect appropriate  Gastrointestinal:  Soft, Non-tender, + BS	  Skin: No rashes, No ecchymoses, No cyanosis	  Neurologic: Non-focal  Extremities: Normal range of motion, No clubbing, cyanosis or edema  Vascular: Peripheral pulses palpable 2+ bilaterally    LABS:	 	    CBC Full  -  ( 28 Jan 2018 05:17 )  WBC Count : 11.9 K/uL  Hemoglobin : 15.4 g/dL  Hematocrit : 44.2 %  Platelet Count - Automated : 370 K/uL  Mean Cell Volume : 89.5 fl  Mean Cell Hemoglobin : 31.3 pg  Mean Cell Hemoglobin Concentration : 34.9 gm/dL  Auto Neutrophil # : x  Auto Lymphocyte # : x  Auto Monocyte # : x  Auto Eosinophil # : x  Auto Basophil # : x  Auto Neutrophil % : x  Auto Lymphocyte % : x  Auto Monocyte % : x  Auto Eosinophil % : x  Auto Basophil % : x    01-28    138  |  100  |  16  ----------------------------<  126<H>  3.4<L>   |  24  |  1.24  01-27    136  |  99  |  10  ----------------------------<  126<H>  3.3<L>   |  25  |  0.91    Ca    9.5      28 Jan 2018 05:17  Ca    9.7      27 Jan 2018 06:44  Phos  2.5     01-27  Mg     1.9     01-27  Mg     1.9     01-26    TPro  8.0  /  Alb  4.6  /  TBili  0.7  /  DBili  x   /  AST  16  /  ALT  12  /  AlkPhos  77  01-26    PT/INR - ( 28 Jan 2018 13:25 )   PT: 11.4 sec;   INR: 1.04 ratio         PTT - ( 28 Jan 2018 13:25 )  PTT:33.7 sec  LIVER FUNCTIONS - ( 26 Jan 2018 22:01 )  Alb: 4.6 g/dL / Pro: 8.0 g/dL / ALK PHOS: 77 U/L / ALT: 12 U/L RC / AST: 16 U/L / GGT: x           EKG: NSR, 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 Male with HTN (noncompliant with meds) admitted 01/26/2018 after he developed a headache, took BP at home and measured SBP >200, on admission CT-Head found to have left temporal brain mass with vasogenic edema, 2mm midline shift, and small areas of hemorrhage. Patient managed for hypertensive emergency, now planned for craniotomy for brain mass. Today patient c/o chest pain, for which cardiology was consulted.    1. Chest Pain - Suspect Hypertensive heart syndrome in the setting of Hypertensive urgency/emergency.   -At the time of my visit he was chest pain free. Troponin <0.01 -> <0.01  -Severe hypertension can cause chest pain and HA, which is consistent with this patients symptoms.  -Cardiac enzymes negative x 2  -Cont to treat Hypertension with goal -160's for 24 hours, followed by normotensive goal  -TTE pending    2. HTN - /89 this AM, most recent 161/92  -Aim for -180's today, -160's tomorrow  -Cont Norvasc 10 mg po QD  -Tomorrow AM would Switch from Metoprolol to Labetalol 200 mg po Q8, uptitrate for goal SBP as noted above  -Agree with plan for secondary hypertension workup, although given his age I suspect essential HTN.   -Would check Renal Ultrasound to evaluate for PAPO.     3.. Cardiovascular Risk Stratification - RCRI = 1  (CVA).  -No prior cardiac history, denies prior MI. At baseline he has good exercise tolerance, can do >4 Mets without difficulty.  - Overall this patient is as low risk ( 0.9%) for cardiac death, nonfatal myocardial infarction, and nonfatal cardiac arrest perioperatively for this moderate risk procedure.  -No further diagnostic or interventional procedures are required prior to the planned procedure.    Thank you for this interesting consult. My team will continue to follow along with you.      Eliecer Bae MD  Cardiology Attending  [facility]   Cell: [phone]  (Cardiology Nocturnist cell number available 7 pm - 7 am every night; available daytime week days for follow-up only; daytime weekends covered by general cardiology consult service)

## 2018-01-28 NOTE — PROGRESS NOTE ADULT - ASSESSMENT
ASSESSMENT: Mr. Barber is a 68y/o  man with vascular risk factors of age and untreated hypertension who presents with headache x3-4d and some minor memory lapses per family. He took his BP at home - 250s/150s so came to ED. As per record reviewed no deficits found on exam at ED but CT head revealed areas of hemorrhage in L temporal lobe. MRS=0 Neurologic examination revealed patient awake, alert, oriented only to self, difficult to asses due to lack of fluency, impaired comprehension, naming, and repetition. Mild right facial droop, and subtle right upper extremity drift. Neuroimaging head CT reviewed and area of hypodensity at left temporal lobe with focus of hemorrhage near hippocampal area.  History and neurological exam pertinent for acute onset of confusion and headaches with global aphasia localizes to dysfunction of left MCA territory. At the moment differential diagnosis remains ample stroke vs seizures and further assessment with Brain MRI for underlying brain lesion.    NEURO: Continue close monitoring for neurologic deterioration, permissive HTN, titrate statin to LDL goal less than 70,  At the moment differential diagnosis is seizure 2/2 HSV encephalitis (given temporal lesion) vs other underlying lesion such as neoplasm( lymphoma vs. GBM ), Acyclovir started today given differential, MRI from this morning reviewed     ANTITHROMBOTIC THERAPY: not indicated at the moment given etiology, low suspicion for stroke     PULMONARY: CXR clear, protecting airway, saturating well     CARDIOVASCULAR: check TTE, cardiac monitoring, no longer on nicardipine drip, controlled on norvasc and metoprolol                               SBP goal: gradual to normotension <140/90    GASTROINTESTINAL:  dysphagia screen- pass     Diet: pureed    RENAL: BUN/Cr stable, good urine output, K was 3.4- supplemented, will continue to follow     Na Goal: Greater than 135     Mclean: no    HEMATOLOGY: H/H stable, Platelets normal      DVT ppx: intermittent pneumatic compression bilateral     ID: afebrile, WBC slightly elevated to 11.9- will continue to follow    OTHER: Patient transferred to neurosurgerical team, Dr. Tate, plan for surgery tomorrow for craniotomy and resection of lesion     DISPOSITION: Rehab or home once workup is complete      CORE MEASURES:        Admission NIHSS: 0     TPA: [] YES [x] NO      LDL/HDL: 149/35     Depression Screen: no     Statin Therapy: yes     Dysphagia Screen: [x] PASS [] FAIL     Smoking [] YES [x] NO      Afib [] YES [x] NO     Stroke Education [x] YES [] NO ASSESSMENT: Mr. Barber is a 68y/o  man with vascular risk factors of age and untreated hypertension who presents with headache x3-4d and some minor memory lapses per family. He took his BP at home - 250s/150s so came to ED. As per record reviewed no deficits found on exam at ED but CT head revealed areas of hemorrhage in L temporal lobe. MRS=0 Neurologic examination revealed patient awake, alert, oriented only to self, difficult to asses due to lack of fluency, impaired comprehension, naming, and repetition. Mild right facial droop, and subtle right upper extremity drift. Neuroimaging head CT reviewed and area of hypodensity at left temporal lobe with focus of hemorrhage near hippocampal area.  History and neurological exam pertinent for acute onset of confusion and headaches with global aphasia localizes to dysfunction of left MCA territory. At the moment differential diagnosis remains ample stroke vs seizures and further assessment with Brain MRI for underlying brain lesion.    NEURO: Continue close monitoring for neurologic deterioration, permissive HTN, titrate statin to LDL goal less than 70,  At the moment differential diagnosis is seizure 2/2 HSV encephalitis (given temporal lesion) vs other underlying lesion such as neoplasm( lymphoma vs. GBM ), Acyclovir started today given differential, MRI stereotatic ordered as per Nsx. Acyclovir therapy initiated prophylactically. Nsx re-evaluating patient for possible intervention.     ANTITHROMBOTIC THERAPY: Not indicated at the moment given etiology, low suspicion for stroke.    PULMONARY: CXR clear, protecting airway, saturating well     CARDIOVASCULAR: check TTE, cardiac monitoring, no longer on nicardipine drip, controlled on norvasc and metoprolol                               SBP goal: gradual to normotension <140/90    GASTROINTESTINAL:  dysphagia screen- pass     Diet: pureed    RENAL: BUN/Cr stable, good urine output, K was 3.4- supplemented, will continue to follow     Na Goal: Greater than 135     Mclean: no    HEMATOLOGY: H/H stable, Platelets normal      DVT ppx: intermittent pneumatic compression bilateral     ID: afebrile, WBC slightly elevated to 11.9- will continue to follow    OTHER: Patient transferred to neurosurgerical team, Dr. Tate, plan for surgery tomorrow for craniotomy and resection of lesion     DISPOSITION: Rehab or home once workup is complete      CORE MEASURES:        Admission NIHSS: 0     TPA: [] YES [x] NO      LDL/HDL: 149/35     Depression Screen: no     Statin Therapy: yes     Dysphagia Screen: [x] PASS [] FAIL     Smoking [] YES [x] NO      Afib [] YES [x] NO     Stroke Education [x] YES [] NO

## 2018-01-29 ENCOUNTER — RESULT REVIEW (OUTPATIENT)
Age: 68
End: 2018-01-29

## 2018-01-29 ENCOUNTER — TRANSCRIPTION ENCOUNTER (OUTPATIENT)
Age: 68
End: 2018-01-29

## 2018-01-29 DIAGNOSIS — K59.00 CONSTIPATION, UNSPECIFIED: ICD-10-CM

## 2018-01-29 LAB
ANION GAP SERPL CALC-SCNC: 15 MMOL/L — SIGNIFICANT CHANGE UP (ref 5–17)
ANION GAP SERPL CALC-SCNC: 16 MMOL/L — SIGNIFICANT CHANGE UP (ref 5–17)
BUN SERPL-MCNC: 22 MG/DL — SIGNIFICANT CHANGE UP (ref 7–23)
BUN SERPL-MCNC: 23 MG/DL — SIGNIFICANT CHANGE UP (ref 7–23)
CALCIUM SERPL-MCNC: 9.1 MG/DL — SIGNIFICANT CHANGE UP (ref 8.4–10.5)
CALCIUM SERPL-MCNC: 9.7 MG/DL — SIGNIFICANT CHANGE UP (ref 8.4–10.5)
CHLORIDE SERPL-SCNC: 100 MMOL/L — SIGNIFICANT CHANGE UP (ref 96–108)
CHLORIDE SERPL-SCNC: 98 MMOL/L — SIGNIFICANT CHANGE UP (ref 96–108)
CO2 SERPL-SCNC: 22 MMOL/L — SIGNIFICANT CHANGE UP (ref 22–31)
CO2 SERPL-SCNC: 23 MMOL/L — SIGNIFICANT CHANGE UP (ref 22–31)
CREAT SERPL-MCNC: 1.19 MG/DL — SIGNIFICANT CHANGE UP (ref 0.5–1.3)
CREAT SERPL-MCNC: 1.39 MG/DL — HIGH (ref 0.5–1.3)
GLUCOSE SERPL-MCNC: 114 MG/DL — HIGH (ref 70–99)
GLUCOSE SERPL-MCNC: 178 MG/DL — HIGH (ref 70–99)
HCT VFR BLD CALC: 41.9 % — SIGNIFICANT CHANGE UP (ref 39–50)
HCT VFR BLD CALC: 46.1 % — SIGNIFICANT CHANGE UP (ref 39–50)
HGB BLD-MCNC: 14.6 G/DL — SIGNIFICANT CHANGE UP (ref 13–17)
HGB BLD-MCNC: 15.1 G/DL — SIGNIFICANT CHANGE UP (ref 13–17)
HSV1 IGG SER-ACNC: 54.2 INDEX — HIGH
HSV1 IGG SERPL QL IA: POSITIVE
HSV2 IGG FLD-ACNC: 0.09 INDEX — SIGNIFICANT CHANGE UP
HSV2 IGG SERPL QL IA: NEGATIVE — SIGNIFICANT CHANGE UP
MAGNESIUM SERPL-MCNC: 1.8 MG/DL — SIGNIFICANT CHANGE UP (ref 1.6–2.6)
MCHC RBC-ENTMCNC: 29.3 PG — SIGNIFICANT CHANGE UP (ref 27–34)
MCHC RBC-ENTMCNC: 31.7 PG — SIGNIFICANT CHANGE UP (ref 27–34)
MCHC RBC-ENTMCNC: 32.8 GM/DL — SIGNIFICANT CHANGE UP (ref 32–36)
MCHC RBC-ENTMCNC: 34.9 GM/DL — SIGNIFICANT CHANGE UP (ref 32–36)
MCV RBC AUTO: 89.3 FL — SIGNIFICANT CHANGE UP (ref 80–100)
MCV RBC AUTO: 90.8 FL — SIGNIFICANT CHANGE UP (ref 80–100)
PHOSPHATE SERPL-MCNC: 3.4 MG/DL — SIGNIFICANT CHANGE UP (ref 2.5–4.5)
PLATELET # BLD AUTO: 375 K/UL — SIGNIFICANT CHANGE UP (ref 150–400)
PLATELET # BLD AUTO: 399 K/UL — SIGNIFICANT CHANGE UP (ref 150–400)
POTASSIUM SERPL-MCNC: 3.9 MMOL/L — SIGNIFICANT CHANGE UP (ref 3.5–5.3)
POTASSIUM SERPL-MCNC: 3.9 MMOL/L — SIGNIFICANT CHANGE UP (ref 3.5–5.3)
POTASSIUM SERPL-SCNC: 3.9 MMOL/L — SIGNIFICANT CHANGE UP (ref 3.5–5.3)
POTASSIUM SERPL-SCNC: 3.9 MMOL/L — SIGNIFICANT CHANGE UP (ref 3.5–5.3)
RBC # BLD: 4.62 M/UL — SIGNIFICANT CHANGE UP (ref 4.2–5.8)
RBC # BLD: 5.16 M/UL — SIGNIFICANT CHANGE UP (ref 4.2–5.8)
RBC # FLD: 12.9 % — SIGNIFICANT CHANGE UP (ref 10.3–14.5)
RBC # FLD: 14.1 % — SIGNIFICANT CHANGE UP (ref 10.3–14.5)
SODIUM SERPL-SCNC: 137 MMOL/L — SIGNIFICANT CHANGE UP (ref 135–145)
SODIUM SERPL-SCNC: 137 MMOL/L — SIGNIFICANT CHANGE UP (ref 135–145)
WBC # BLD: 12.11 K/UL — HIGH (ref 3.8–10.5)
WBC # BLD: 15.1 K/UL — HIGH (ref 3.8–10.5)
WBC # FLD AUTO: 12.11 K/UL — HIGH (ref 3.8–10.5)
WBC # FLD AUTO: 15.1 K/UL — HIGH (ref 3.8–10.5)

## 2018-01-29 PROCEDURE — 88342 IMHCHEM/IMCYTCHM 1ST ANTB: CPT | Mod: 26,59

## 2018-01-29 PROCEDURE — 99232 SBSQ HOSP IP/OBS MODERATE 35: CPT

## 2018-01-29 PROCEDURE — 88331 PATH CONSLTJ SURG 1 BLK 1SPC: CPT | Mod: 26

## 2018-01-29 PROCEDURE — 88341 IMHCHEM/IMCYTCHM EA ADD ANTB: CPT | Mod: 26,59

## 2018-01-29 PROCEDURE — 61510 CRNEC TREPH EXC BRN TUM STTL: CPT

## 2018-01-29 PROCEDURE — 99291 CRITICAL CARE FIRST HOUR: CPT

## 2018-01-29 PROCEDURE — 61781 SCAN PROC CRANIAL INTRA: CPT

## 2018-01-29 PROCEDURE — 88307 TISSUE EXAM BY PATHOLOGIST: CPT | Mod: 26

## 2018-01-29 PROCEDURE — 88360 TUMOR IMMUNOHISTOCHEM/MANUAL: CPT | Mod: 26

## 2018-01-29 RX ORDER — METOPROLOL TARTRATE 50 MG
25 TABLET ORAL
Qty: 0 | Refills: 0 | Status: DISCONTINUED | OUTPATIENT
Start: 2018-01-29 | End: 2018-01-29

## 2018-01-29 RX ORDER — MAGNESIUM SULFATE 500 MG/ML
1 VIAL (ML) INJECTION ONCE
Qty: 0 | Refills: 0 | Status: COMPLETED | OUTPATIENT
Start: 2018-01-29 | End: 2018-01-30

## 2018-01-29 RX ORDER — AMLODIPINE BESYLATE 2.5 MG/1
10 TABLET ORAL DAILY
Qty: 0 | Refills: 0 | Status: DISCONTINUED | OUTPATIENT
Start: 2018-01-29 | End: 2018-02-01

## 2018-01-29 RX ORDER — OXYCODONE AND ACETAMINOPHEN 5; 325 MG/1; MG/1
2 TABLET ORAL EVERY 4 HOURS
Qty: 0 | Refills: 0 | Status: DISCONTINUED | OUTPATIENT
Start: 2018-01-29 | End: 2018-02-01

## 2018-01-29 RX ORDER — DEXTROSE MONOHYDRATE, SODIUM CHLORIDE, AND POTASSIUM CHLORIDE 50; .745; 4.5 G/1000ML; G/1000ML; G/1000ML
1000 INJECTION, SOLUTION INTRAVENOUS
Qty: 0 | Refills: 0 | Status: DISCONTINUED | OUTPATIENT
Start: 2018-01-29 | End: 2018-01-30

## 2018-01-29 RX ORDER — HYDRALAZINE HCL 50 MG
10 TABLET ORAL EVERY 6 HOURS
Qty: 0 | Refills: 0 | Status: DISCONTINUED | OUTPATIENT
Start: 2018-01-29 | End: 2018-02-01

## 2018-01-29 RX ORDER — LABETALOL HCL 100 MG
200 TABLET ORAL EVERY 8 HOURS
Qty: 0 | Refills: 0 | Status: DISCONTINUED | OUTPATIENT
Start: 2018-01-29 | End: 2018-02-01

## 2018-01-29 RX ORDER — POLYETHYLENE GLYCOL 3350 17 G/17G
17 POWDER, FOR SOLUTION ORAL DAILY
Qty: 0 | Refills: 0 | Status: DISCONTINUED | OUTPATIENT
Start: 2018-01-29 | End: 2018-01-29

## 2018-01-29 RX ORDER — DOCUSATE SODIUM 100 MG
100 CAPSULE ORAL
Qty: 0 | Refills: 0 | Status: DISCONTINUED | OUTPATIENT
Start: 2018-01-29 | End: 2018-02-01

## 2018-01-29 RX ORDER — DEXTROSE MONOHYDRATE, SODIUM CHLORIDE, AND POTASSIUM CHLORIDE 50; .745; 4.5 G/1000ML; G/1000ML; G/1000ML
1000 INJECTION, SOLUTION INTRAVENOUS
Qty: 0 | Refills: 0 | Status: DISCONTINUED | OUTPATIENT
Start: 2018-01-29 | End: 2018-01-29

## 2018-01-29 RX ORDER — NICARDIPINE HYDROCHLORIDE 30 MG/1
3 CAPSULE, EXTENDED RELEASE ORAL
Qty: 40 | Refills: 0 | Status: DISCONTINUED | OUTPATIENT
Start: 2018-01-29 | End: 2018-01-30

## 2018-01-29 RX ORDER — INSULIN LISPRO 100/ML
VIAL (ML) SUBCUTANEOUS EVERY 6 HOURS
Qty: 0 | Refills: 0 | Status: DISCONTINUED | OUTPATIENT
Start: 2018-01-29 | End: 2018-02-01

## 2018-01-29 RX ORDER — CEFAZOLIN SODIUM 1 G
1000 VIAL (EA) INJECTION EVERY 8 HOURS
Qty: 0 | Refills: 0 | Status: COMPLETED | OUTPATIENT
Start: 2018-01-29 | End: 2018-01-30

## 2018-01-29 RX ORDER — POLYETHYLENE GLYCOL 3350 17 G/17G
17 POWDER, FOR SOLUTION ORAL DAILY
Qty: 0 | Refills: 0 | Status: DISCONTINUED | OUTPATIENT
Start: 2018-01-29 | End: 2018-02-01

## 2018-01-29 RX ORDER — DEXAMETHASONE 0.5 MG/5ML
4 ELIXIR ORAL EVERY 6 HOURS
Qty: 0 | Refills: 0 | Status: DISCONTINUED | OUTPATIENT
Start: 2018-01-29 | End: 2018-01-30

## 2018-01-29 RX ORDER — SENNA PLUS 8.6 MG/1
1 TABLET ORAL DAILY
Qty: 0 | Refills: 0 | Status: DISCONTINUED | OUTPATIENT
Start: 2018-01-29 | End: 2018-02-01

## 2018-01-29 RX ORDER — OXYCODONE AND ACETAMINOPHEN 5; 325 MG/1; MG/1
1 TABLET ORAL EVERY 4 HOURS
Qty: 0 | Refills: 0 | Status: DISCONTINUED | OUTPATIENT
Start: 2018-01-29 | End: 2018-02-01

## 2018-01-29 RX ORDER — ACETAMINOPHEN 500 MG
650 TABLET ORAL EVERY 6 HOURS
Qty: 0 | Refills: 0 | Status: DISCONTINUED | OUTPATIENT
Start: 2018-01-29 | End: 2018-02-01

## 2018-01-29 RX ORDER — POTASSIUM CHLORIDE 20 MEQ
20 PACKET (EA) ORAL
Qty: 0 | Refills: 0 | Status: COMPLETED | OUTPATIENT
Start: 2018-01-29 | End: 2018-01-29

## 2018-01-29 RX ADMIN — Medication 20 MILLIEQUIVALENT(S): at 09:11

## 2018-01-29 RX ADMIN — Medication 20 MILLIEQUIVALENT(S): at 06:52

## 2018-01-29 RX ADMIN — Medication 25 MILLIGRAM(S): at 22:11

## 2018-01-29 RX ADMIN — AMLODIPINE BESYLATE 10 MILLIGRAM(S): 2.5 TABLET ORAL at 22:48

## 2018-01-29 RX ADMIN — Medication 100 MILLIGRAM(S): at 22:11

## 2018-01-29 NOTE — PROGRESS NOTE ADULT - SUBJECTIVE AND OBJECTIVE BOX
SUBJ: no cp, no SOB,   for OR today    MEDICATIONS  this AM  MEDICATIONS  (STANDING):  amLODIPine   Tablet 10 milliGRAM(s) Oral daily  atropine Injectable 0.5 milliGRAM(s) IV Push once  metoprolol     tartrate 25 milliGRAM(s) Oral two times a day  sodium chloride 0.9% with potassium chloride 20 mEq/L 1000 milliLiter(s) (75 mL/Hr) IV Continuous <Continuous>    MEDICATIONS  (PRN):  acetaminophen   Tablet. 650 milliGRAM(s) Oral every 6 hours PRN Mild Pain (1 - 3)  hydrALAZINE Injectable 10 milliGRAM(s) IV Push every 6 hours PRN BP > 180/105  oxyCODONE    5 mG/acetaminophen 325 mG 1 Tablet(s) Oral every 4 hours PRN Moderate Pain (4 - 6)  oxyCODONE    5 mG/acetaminophen 325 mG 2 Tablet(s) Oral every 4 hours PRN Severe Pain (7 - 10)      Vital Signs Last 24 Hrs  T(C): 36.8 (29 Jan 2018 07:21), Max: 36.8 (29 Jan 2018 07:21)  T(F): 98.2 (29 Jan 2018 07:21), Max: 98.2 (29 Jan 2018 07:21)  HR: 62 (29 Jan 2018 12:00) (55 - 87)  BP: 152/89 (29 Jan 2018 12:00) (150/74 - 186/89)  BP(mean): 103 (29 Jan 2018 12:00) (98 - 120)  RR: 18 (29 Jan 2018 12:00) (0 - 22)  SpO2: 96% (29 Jan 2018 12:00) (95% - 98%)    REVIEW OF SYSTEMS:  CONSTITUTIONAL: No fever,   RESPIRATORY: No cough, wheezing, chills or hemoptysis; No shortness of breath  CARDIOVASCULAR: No chest pain, palpitations, dizziness, or leg swelling  GASTROINTESTINAL: No abdominal or epigastric pain. No nausea, vomiting,     PHYSICAL EXAM:  Appearance: M lying flat in BED, NAD	  HEENT:   Normal oral mucosa, no JVD	  Cardiovascular: Normal S1 S2, No JVD, 1/6 [place]  Respiratory: Lungs clear to auscultation	  Psychiatry: A & O x 3, Mood & affect appropriate  Gastrointestinal:  Soft, Non-tender, + BS	  Skin: No rashes, No ecchymoses, No cyanosis	  Neurologic: Non-focal  Extremities: Normal range of motion, No clubbing, cyanosis or edema  Vascular: Peripheral pulses palpable 2+ bilaterally      TELEMETRY: SR 60, no events    TTE:pending    LABS:   CBC Full  -  ( 29 Jan 2018 07:33 )  WBC Count : 12.11 K/uL  Hemoglobin : 15.1 g/dL  Hematocrit : 46.1 %  Platelet Count - Automated : 399 K/uL  Mean Cell Volume : 89.3 fl  Mean Cell Hemoglobin : 29.3 pg  Mean Cell Hemoglobin Concentration : 32.8 gm/dL  Auto Neutrophil # : x  Auto Lymphocyte # : x  Auto Monocyte # : x  Auto Eosinophil # : x  Auto Basophil # : x  Auto Neutrophil % : x  Auto Lymphocyte % : x  Auto Monocyte % : x  Auto Eosinophil % : x  Auto Basophil % : x    01-29    137  |  98  |  22  ----------------------------<  114<H>  3.9   |  23  |  1.19    Ca    9.7      29 Jan 2018 07:39      CARDIAC MARKERS ( 28 Jan 2018 21:56 )  x     / <0.01 ng/mL / 60 U/L / x     / 1.2 ng/mL  CARDIAC MARKERS ( 28 Jan 2018 15:45 )  x     / <0.01 ng/mL / 65 U/L / x     / 1.5 ng/mL  CARDIAC MARKERS ( 28 Jan 2018 05:17 )  x     / <0.01 ng/mL / 70 U/L / x     / x            IMPRESSION AND PLAN:    67y.o. Male with HTN (noncompliant with meds) admitted 01/26/2018 after he developed a headache, took BP at home and measured SBP >200, on admission CT-Head found to have left temporal brain mass with vasogenic edema, 2mm midline shift, and small areas of hemorrhage. Patient managed for hypertensive emergency, now planned for craniotomy for brain mass- for OR today. no current chest pain, BP improved    1. Chest Pain - Suspect Hypertensive heart syndrome in the setting of Hypertensive urgency/emergency.   -continues to be chest pain free. Troponin neg x2  -Severe hypertension can cause chest pain and HA, which is consistent with this patients symptoms.  -Cont to treat Hypertension with goal -160's for 24 hours, followed by normotensive goal  -TTE pending    2. HTN - /89 this AM, most recent 161/92  -Aim for -180's today, -160's tomorrow  -Cont Norvasc 10 mg po QD  -Switch from Metoprolol to Labetalol 200 mg po Q8, uptitrate for goal SBP as noted above  -Agree with plan for secondary hypertension workup, although given his age I suspect essential HTN.   -Would check Renal Ultrasound to evaluate for PAPO.     cardiology will follow    Javier Odell MD, PhD  Cardiology Attending  [phone]

## 2018-01-29 NOTE — PROGRESS NOTE ADULT - PROBLEM SELECTOR PLAN 1
left temporal lobe enhancing lesion/mass with mass effect and MLS, concerning for neoplasm  - plan for OR today per neurosurgery  - EEG negative left temporal lobe enhancing lesion/mass with mass effect and MLS, concerning for neoplasm  - plan for OR today per neurosurgery  - check LDH  - EEG negative

## 2018-01-29 NOTE — OCCUPATIONAL THERAPY INITIAL EVALUATION ADULT - PERTINENT HX OF CURRENT PROBLEM, REHAB EVAL
68YO M with h/o untreated hypertension who presents with headache x3-4d and some minor memory lapses per family. Pt notes frontal HA, pressure like. In Ed no deficits found on exam but CT head revealed areas of hemorrhage in L temporal lobe. HA is improving on its own. Denies any visual changes, no lightheadedness/dizziness, no unsteady gait or imbalance. Denies focal weakness/numbness. Denies dysarthria, dysphagia, diplopia. Denies N/V/D, fevers/chills or weight loss.

## 2018-01-29 NOTE — PROGRESS NOTE ADULT - ASSESSMENT
66yo Man with uncontrolled hypertension not on meds, presents with a headache for a week and some minor memory lapses per family, is found to have a left temporal lobe mass concerning for neoplasm 68yo Man with uncontrolled hypertension not on meds, presents with a headache for a week and some minor memory lapses per family, is found to have a left temporal lobe enhancing lesion/mass with mass effect and MLS, concerning for neoplasm.

## 2018-01-29 NOTE — PROGRESS NOTE ADULT - SUBJECTIVE AND OBJECTIVE BOX
HPI:  NEUROLOGY CONSULT     Patient is a 67y old male who presents with a chief complaint of headache.    HPI:  66YO  M with h/o untreated hypertension who presents with headache x3-4d and some minor memory lapses per family. Pt notes frontal HA, pressure like. He took his BP at home - 250s/150s so came to ED. In Ed no deficits found on exam but CT head revealed areas of hemorrhage in L temporal lobe. Neurosx called and no intervention recommended. Pt accompanied by daughter and wife at bedside who help to translate. Pt lives alone at baseline and performs all ADLs. Not working currently.   [place] is improving on its own. Denies any visual changes, no lightheadedness/dizziness, no unsteady gait or imbalance. Denies focal weakness/numbness. Denies dysarthria, dysphagia, diplopia. Denies N/V/D, fevers/chills or weight loss. No hx malignancy or AVM/hemorrhage in family.     NIHSS 0 ICH score 0 (27 Jan 2018 01:16)    SURGERY/events:  Left craniotomy for left temporal mass           ICU Vital Signs Last 24 Hrs  T(C): 36.8 (29 Jan 2018 07:21), Max: 36.8 (29 Jan 2018 07:21)  T(F): 98.2 (29 Jan 2018 07:21), Max: 98.2 (29 Jan 2018 07:21)  HR: 89 (29 Jan 2018 18:00) (55 - 96)  BP: 152/89 (29 Jan 2018 12:00) (150/74 - 166/89)  BP(mean): 103 (29 Jan 2018 12:00) (98 - 112)  ABP: 143/71 (29 Jan 2018 18:00) (143/71 - 202/89)  ABP(mean): 96 (29 Jan 2018 18:00) (96 - 130)  RR: 18 (29 Jan 2018 18:00) (0 - 24)  SpO2: 96% (29 Jan 2018 18:00) (95% - 98%)     01-28 @ 07:01  -  01-29 @ 07:00  --------------------------------------------------------  IN: 75 mL / OUT: 0 mL / NET: 75 mL    01-29 @ 07:01  -  01-29 @ 19:18  --------------------------------------------------------  IN: 375 mL / OUT: 0 mL / NET: 375 mL                             15.1   12.11 )-----------( 399      ( 29 Jan 2018 07:33 )             46.1    01-29    137  |  98  |  22  ----------------------------<  114<H>  3.9   |  23  |  1.19    Ca    9.7      29 Jan 2018 07:39              PHYSICAL EXAM:    General: No Acute Distress     Neurological: Awake, alert oriented to person, place and time, Following Commands, PERRL, EOMI, Face Symmetrical, Speech Fluent, Moving all extremities, Muscle Strength normal in all four extremities, No Drift, Sensation to Light Touch Intact    Pulmonary: Clear to Auscultation, No Rales, No Rhonchi, No Wheezes     Cardiovascular: S1, S2, Regular Rate and Rhythm     Gastrointestinal: Soft, Nontender, Nondistended     Extremities: No calf tenderness     Incision:       MEDICATIONS:  Antibiotics:   ceFAZolin   IVPB 1000 milliGRAM(s) IV Intermittent every 8 hours     Neurological:   acetaminophen   Tablet. 650 milliGRAM(s) Oral every 6 hours PRN  oxyCODONE    5 mG/acetaminophen 325 mG 1 Tablet(s) Oral every 4 hours PRN  oxyCODONE    5 mG/acetaminophen 325 mG 2 Tablet(s) Oral every 4 hours PRN    Cardiac:   amLODIPine   Tablet 10 milliGRAM(s) Oral daily  hydrALAZINE Injectable 10 milliGRAM(s) IV Push every 6 hours PRN BP > 180/105  metoprolol     tartrate 25 milliGRAM(s) Oral two times a day  niCARdipine Infusion 3 mG/Hr IV Continuous <Continuous>    Pulm:    Heme:     Other:   dexamethasone  Injectable 4 milliGRAM(s) IV Push every 6 hours  docusate sodium 100 milliGRAM(s) Oral two times a day  insulin lispro (HumaLOG) corrective regimen sliding scale   SubCutaneous every 6 hours  polyethylene glycol 3350 17 Gram(s) Oral daily  senna 1 Tablet(s) Oral daily  sodium chloride 0.9% with potassium chloride 20 mEq/L 1000 milliLiter(s) IV Continuous <Continuous>       DEVICES: [] Restraints [] YUKI/HMV []LD [] ET tube [] Trach [] Chest Tube [] A-line [] Mclean [] NGT [] Rectal Tube       A/P:  67y old male with 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     Neuro: neuro checsk q1hr, CT head tomorrow morning, check pathology report  Respiratory: RA, incentive spirometer  CV: Keep -150 mmhg, change metoprolol to labetolol per cardiology, continue amlodipine and wean nicardipine   Endocrine: finger sticks q 6 hrs, target sugras of 120-180   Heme/Onc:  get post-op cbc           DVT ppx: SCD, no lovenox as he is post-op day 0  Renal: NS 75 ml/hr  ID: periop ancef   GI: advance diet as tolerated  Social/Family: updated at bedside  Discharge planning: ICU    Code Status: [x] Full Code [] DNR [] DNI [] Goals of Care:   Disposition: [x] ICU [] Stroke Unit [] RCU []PCU []Floor [] Discharge Home     Patient at high risk for neurologic deterioration, critical care time, excluding procedures: 45 minutes HPI:    Patient is a 67y old male who presents with a chief complaint of headache.    HPI:  66YO  M with h/o untreated hypertension who presents with headache x3-4d and some minor memory lapses per family. Pt notes frontal HA, pressure like. He took his BP at home - 250s/150s so came to ED. In Ed no deficits found on exam but CT head revealed areas of hemorrhage in L temporal lobe. Neurosx called and no intervention recommended. Pt accompanied by daughter and wife at bedside who help to translate. Pt lives alone at baseline and performs all ADLs. Not working currently.   [place] is improving on its own. Denies any visual changes, no lightheadedness/dizziness, no unsteady gait or imbalance. Denies focal weakness/numbness. Denies dysarthria, dysphagia, diplopia. Denies N/V/D, fevers/chills or weight loss. No hx malignancy or AVM/hemorrhage in family.     NIHSS 0 ICH score 0 (27 Jan 2018 01:16)    SURGERY/events:  Left craniotomy for left temporal mass           ICU Vital Signs Last 24 Hrs  T(C): 36.8 (29 Jan 2018 07:21), Max: 36.8 (29 Jan 2018 07:21)  T(F): 98.2 (29 Jan 2018 07:21), Max: 98.2 (29 Jan 2018 07:21)  HR: 89 (29 Jan 2018 18:00) (55 - 96)  BP: 152/89 (29 Jan 2018 12:00) (150/74 - 166/89)  BP(mean): 103 (29 Jan 2018 12:00) (98 - 112)  ABP: 143/71 (29 Jan 2018 18:00) (143/71 - 202/89)  ABP(mean): 96 (29 Jan 2018 18:00) (96 - 130)  RR: 18 (29 Jan 2018 18:00) (0 - 24)  SpO2: 96% (29 Jan 2018 18:00) (95% - 98%)     01-28 @ 07:01  -  01-29 @ 07:00  --------------------------------------------------------  IN: 75 mL / OUT: 0 mL / NET: 75 mL    01-29 @ 07:01  -  01-29 @ 19:18  --------------------------------------------------------  IN: 375 mL / OUT: 0 mL / NET: 375 mL                             15.1   12.11 )-----------( 399      ( 29 Jan 2018 07:33 )             46.1    01-29    137  |  98  |  22  ----------------------------<  114<H>  3.9   |  23  |  1.19    Ca    9.7      29 Jan 2018 07:39              PHYSICAL EXAM:    General: No Acute Distress     Neurological: Awake, alert oriented to person, place  and time, Following Commands, PERRL, EOMI, Face Symmetrical, Speech slow,  Moving all extremities, Muscle Strength normal in all four extremities, No Drift, Sensation to Light Touch Intact    Pulmonary: Clear to Auscultation, No Rales, No Rhonchi, No Wheezes     Cardiovascular: S1, S2, Regular Rate and Rhythm     Gastrointestinal: Soft, Nontender, Nondistended     Extremities: No calf tenderness     Incision:       MEDICATIONS:  Antibiotics:   ceFAZolin   IVPB 1000 milliGRAM(s) IV Intermittent every 8 hours     Neurological:   acetaminophen   Tablet. 650 milliGRAM(s) Oral every 6 hours PRN  oxyCODONE    5 mG/acetaminophen 325 mG 1 Tablet(s) Oral every 4 hours PRN  oxyCODONE    5 mG/acetaminophen 325 mG 2 Tablet(s) Oral every 4 hours PRN    Cardiac:   amLODIPine   Tablet 10 milliGRAM(s) Oral daily  hydrALAZINE Injectable 10 milliGRAM(s) IV Push every 6 hours PRN BP > 180/105  metoprolol     tartrate 25 milliGRAM(s) Oral two times a day  niCARdipine Infusion 3 mG/Hr IV Continuous <Continuous>    Pulm:    Heme:     Other:   dexamethasone  Injectable 4 milliGRAM(s) IV Push every 6 hours  docusate sodium 100 milliGRAM(s) Oral two times a day  insulin lispro (HumaLOG) corrective regimen sliding scale   SubCutaneous every 6 hours  polyethylene glycol 3350 17 Gram(s) Oral daily  senna 1 Tablet(s) Oral daily  sodium chloride 0.9% with potassium chloride 20 mEq/L 1000 milliLiter(s) IV Continuous <Continuous>       DEVICES: [] Restraints [] YUKI/HMV []LD [] ET tube [] Trach [] Chest Tube [] A-line [] Mclean [] NGT [] Rectal Tube     Complete Blood Count (01.29.18 @ 21:00)    WBC Count: 15.1 K/uL    RBC Count: 4.62 M/uL    Hemoglobin: 14.6 g/dL    Hematocrit: 41.9 %    Mean Cell Volume: 90.8 fl    Mean Cell Hemoglobin: 31.7 pg    Mean Cell Hemoglobin Conc: 34.9 gm/dL    Red Cell Distrib Width: 12.9 %    Platelet Count - Automated: 375 K/uL      Basic Metabolic Panel - STAT (01.29.18 @ 21:00)    Sodium, Serum: 137 mmol/L    Potassium, Serum: 3.9 mmol/L    Chloride, Serum: 100 mmol/L    Carbon Dioxide, Serum: 22 mmol/L    Anion Gap, Serum: 15 mmol/L    Blood Urea Nitrogen, Serum: 23 mg/dL    Creatinine, Serum: 1.39 mg/dL    Glucose, Serum: 178 mg/dL    Calcium, Total Serum: 9.1 mg/dL    eGFR if Non : 51: Interpretative comment          A/P:  67y old male with 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     Neuro: neuro checsk q1hr, CT head tomorrow morning, check pathology report, decadron 4 mg q 6 hrs  Respiratory: RA, incentive spirometer  CV: Keep -150 mmhg, change metoprolol to labetolol 200 mg q 8 hrs per cardiology, continue amlodipine and wean nicardipine   Endocrine: finger sticks q 6 hrs, target sugars of 120-180   Heme/Onc:  get post-op cbc           DVT ppx: SCD, no lovenox as he is post-op day 0  Renal: NS 75 ml/hr, creat slightly elevated 1.39   ID: periop ancef   GI: advance diet as tolerated  Social/Family: updated at bedside  Discharge planning: ICU    Code Status: [x] Full Code [] DNR [] DNI [] Goals of Care:   Disposition: [x] ICU [] Stroke Unit [] RCU []PCU []Floor [] Discharge Home     Patient at high risk for neurologic deterioration, critical care time, excluding procedures: 45 minutes HPI:    Patient is a 67y old male who presents with a chief complaint of headache.    HPI:  66YO  M with h/o untreated hypertension who presents with headache x3-4d and some minor memory lapses per family. Pt notes frontal HA, pressure like. He took his BP at home - 250s/150s so came to ED. In Ed no deficits found on exam but CT head revealed areas of hemorrhage in L temporal lobe. Neurosx called and no intervention recommended. Pt accompanied by daughter and wife at bedside who help to translate. Pt lives alone at baseline and performs all ADLs. Not working currently.   [place] is improving on its own. Denies any visual changes, no lightheadedness/dizziness, no unsteady gait or imbalance. Denies focal weakness/numbness. Denies dysarthria, dysphagia, diplopia. Denies N/V/D, fevers/chills or weight loss. No hx malignancy or AVM/hemorrhage in family.     NIHSS 0 ICH score 0 (27 Jan 2018 01:16)    SURGERY/events:  Left craniotomy for left temporal mass           ICU Vital Signs Last 24 Hrs  T(C): 36.8 (29 Jan 2018 07:21), Max: 36.8 (29 Jan 2018 07:21)  T(F): 98.2 (29 Jan 2018 07:21), Max: 98.2 (29 Jan 2018 07:21)  HR: 89 (29 Jan 2018 18:00) (55 - 96)  BP: 152/89 (29 Jan 2018 12:00) (150/74 - 166/89)  BP(mean): 103 (29 Jan 2018 12:00) (98 - 112)  ABP: 143/71 (29 Jan 2018 18:00) (143/71 - 202/89)  ABP(mean): 96 (29 Jan 2018 18:00) (96 - 130)  RR: 18 (29 Jan 2018 18:00) (0 - 24)  SpO2: 96% (29 Jan 2018 18:00) (95% - 98%)     01-28 @ 07:01  -  01-29 @ 07:00  --------------------------------------------------------  IN: 75 mL / OUT: 0 mL / NET: 75 mL    01-29 @ 07:01  -  01-29 @ 19:18  --------------------------------------------------------  IN: 375 mL / OUT: 0 mL / NET: 375 mL                             15.1   12.11 )-----------( 399      ( 29 Jan 2018 07:33 )             46.1    01-29    137  |  98  |  22  ----------------------------<  114<H>  3.9   |  23  |  1.19    Ca    9.7      29 Jan 2018 07:39              PHYSICAL EXAM:    General: No Acute Distress     Neurological: Awake, alert oriented to person, place  and time, Following Commands, PERRL, EOMI, Face Symmetrical, Speech slow,  Moving all extremities, Muscle Strength normal in all four extremities, No Drift, Sensation to Light Touch Intact    Pulmonary: Clear to Auscultation, No Rales, No Rhonchi, No Wheezes     Cardiovascular: S1, S2, Regular Rate and Rhythm     Gastrointestinal: Soft, Nontender, Nondistended     Extremities: No calf tenderness     Incision:       MEDICATIONS:  Antibiotics:   ceFAZolin   IVPB 1000 milliGRAM(s) IV Intermittent every 8 hours     Neurological:   acetaminophen   Tablet. 650 milliGRAM(s) Oral every 6 hours PRN  oxyCODONE    5 mG/acetaminophen 325 mG 1 Tablet(s) Oral every 4 hours PRN  oxyCODONE    5 mG/acetaminophen 325 mG 2 Tablet(s) Oral every 4 hours PRN    Cardiac:   amLODIPine   Tablet 10 milliGRAM(s) Oral daily  hydrALAZINE Injectable 10 milliGRAM(s) IV Push every 6 hours PRN BP > 180/105  metoprolol     tartrate 25 milliGRAM(s) Oral two times a day  niCARdipine Infusion 3 mG/Hr IV Continuous <Continuous>    Pulm:    Heme:     Other:   dexamethasone  Injectable 4 milliGRAM(s) IV Push every 6 hours  docusate sodium 100 milliGRAM(s) Oral two times a day  insulin lispro (HumaLOG) corrective regimen sliding scale   SubCutaneous every 6 hours  polyethylene glycol 3350 17 Gram(s) Oral daily  senna 1 Tablet(s) Oral daily  sodium chloride 0.9% with potassium chloride 20 mEq/L 1000 milliLiter(s) IV Continuous <Continuous>       DEVICES: [] Restraints [] YUKI/HMV []LD [] ET tube [] Trach [] Chest Tube [] A-line [] Sainz [] NGT [] Rectal Tube     Complete Blood Count (01.29.18 @ 21:00)    WBC Count: 15.1 K/uL    RBC Count: 4.62 M/uL    Hemoglobin: 14.6 g/dL    Hematocrit: 41.9 %    Mean Cell Volume: 90.8 fl    Mean Cell Hemoglobin: 31.7 pg    Mean Cell Hemoglobin Conc: 34.9 gm/dL    Red Cell Distrib Width: 12.9 %    Platelet Count - Automated: 375 K/uL      Basic Metabolic Panel - STAT (01.29.18 @ 21:00)    Sodium, Serum: 137 mmol/L    Potassium, Serum: 3.9 mmol/L    Chloride, Serum: 100 mmol/L    Carbon Dioxide, Serum: 22 mmol/L    Anion Gap, Serum: 15 mmol/L    Blood Urea Nitrogen, Serum: 23 mg/dL    Creatinine, Serum: 1.39 mg/dL    Glucose, Serum: 178 mg/dL    Calcium, Total Serum: 9.1 mg/dL    eGFR if Non : 51: Interpretative comment          A/P:  67y old male with 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     Neuro: neuro checsk q1hr, CT head tomorrow morning, check pathology report, decadron 4 mg q 6 hrs  Respiratory: RA, incentive spirometer  CV: Keep -150 mmhg, change metoprolol to labetolol 200 mg q 8 hrs per cardiology, continue amlodipine and wean nicardipine , D/C migdalia   Endocrine: finger sticks q 6 hrs, target sugars of 120-180   Heme/Onc:  get post-op cbc           DVT ppx: SCD, no lovenox as he is post-op day 0  Renal: NS 75 ml/hr, creat slightly elevated 1.39 , could be due to drop in BP, will monitor carefully   ID: periop ancef   GI: advance diet as tolerated, D/C sainz  Social/Family: updated at bedside  Discharge planning: ICU    Code Status: [x] Full Code [] DNR [] DNI [] Goals of Care:   Disposition: [x] ICU [] Stroke Unit [] RCU []PCU []Floor [] Discharge Home     Patient at high risk for neurologic deterioration, critical care time, excluding procedures: 45 minutes

## 2018-01-29 NOTE — OCCUPATIONAL THERAPY INITIAL EVALUATION ADULT - DIAGNOSIS, OT EVAL
Patient is functionally independent, no skilled OT intervention is needed. Decreased balance impacting ability to perform ADLs and functional mobility.

## 2018-01-29 NOTE — PROGRESS NOTE ADULT - SUBJECTIVE AND OBJECTIVE BOX
Patient is a 67y old  Male who presents with a chief complaint of headache (27 Jan 2018 07:09)      SUBJECTIVE / OVERNIGHT EVENTS: no HA, blurry vision, chest pain, SOB. ? mid-abdominal discomfort. last BM was 3 days ago.     MEDICATIONS  (STANDING):  amLODIPine   Tablet 10 milliGRAM(s) Oral daily  atropine Injectable 0.5 milliGRAM(s) IV Push once  metoprolol     tartrate 25 milliGRAM(s) Oral two times a day  sodium chloride 0.9% with potassium chloride 20 mEq/L 1000 milliLiter(s) (75 mL/Hr) IV Continuous <Continuous>    MEDICATIONS  (PRN):  acetaminophen   Tablet. 650 milliGRAM(s) Oral every 6 hours PRN Mild Pain (1 - 3)  hydrALAZINE Injectable 10 milliGRAM(s) IV Push every 6 hours PRN BP > 180/105  oxyCODONE    5 mG/acetaminophen 325 mG 1 Tablet(s) Oral every 4 hours PRN Moderate Pain (4 - 6)  oxyCODONE    5 mG/acetaminophen 325 mG 2 Tablet(s) Oral every 4 hours PRN Severe Pain (7 - 10)      Vital Signs Last 24 Hrs  T(C): 36.8 (29 Jan 2018 07:21), Max: 36.8 (29 Jan 2018 07:21)  T(F): 98.2 (29 Jan 2018 07:21), Max: 98.2 (29 Jan 2018 07:21)  HR: 62 (29 Jan 2018 12:00) (55 - 87)  BP: 152/89 (29 Jan 2018 12:00) (146/81 - 186/89)  BP(mean): 103 (29 Jan 2018 12:00) (98 - 120)  RR: 18 (29 Jan 2018 12:00) (0 - 24)  SpO2: 96% (29 Jan 2018 12:00) (95% - 99%)  CAPILLARY BLOOD GLUCOSE        I&O's Summary    28 Jan 2018 07:01  -  29 Jan 2018 07:00  --------------------------------------------------------  IN: 75 mL / OUT: 0 mL / NET: 75 mL    29 Jan 2018 07:01  -  29 Jan 2018 12:42  --------------------------------------------------------  IN: 375 mL / OUT: 0 mL / NET: 375 mL        PHYSICAL EXAM:  GENERAL: NAD  HEENT: neck supple  LUNG: Clear to auscultation bilaterally; No wheeze  HEART: S1, S2  ABDOMEN: Soft, Nontender, Nondistended; Bowel sounds present  EXTREMITIES: No leg edema  PSYCH: normal affect, calm  NEUROLOGY: AAO x3,moves all extremities  SKIN: No rashes      LABS:                        15.1   12.11 )-----------( 399      ( 29 Jan 2018 07:33 )             46.1     01-29    137  |  98  |  22  ----------------------------<  114<H>  3.9   |  23  |  1.19    Ca    9.7      29 Jan 2018 07:39      PT/INR - ( 28 Jan 2018 13:25 )   PT: 11.4 sec;   INR: 1.04 ratio         PTT - ( 28 Jan 2018 13:25 )  PTT:33.7 sec  CARDIAC MARKERS ( 28 Jan 2018 21:56 )  x     / <0.01 ng/mL / 60 U/L / x     / 1.2 ng/mL  CARDIAC MARKERS ( 28 Jan 2018 15:45 )  x     / <0.01 ng/mL / 65 U/L / x     / 1.5 ng/mL  CARDIAC MARKERS ( 28 Jan 2018 05:17 )  x     / <0.01 ng/mL / 70 U/L / x     / x              RADIOLOGY & ADDITIONAL TESTS:    Imaging Personally Reviewed:    < from: MR Head w/wo IV Cont (01.28.18 @ 11:29) >  IMPRESSION:     Predominantly peripherally enhancing 8.7 x 3.3 x 4.0 cm mass centered in   the left temporal lobe with a few foci of associated hemorrhage,   demonstrating heterogeneous mild restricted diffusion and moderate   associated vasogenic edema. 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 This causes mass effect upon the left lateral   ventricle and 2 mm rightward midline shift. No herniation or   hydrocephalus.    < end of copied text >    < from: MR Angio Head No Cont (01.27.18 @ 11:31) >  IMPRESSION:    BRAIN MRI:  Redemonstration of large area of left occipitotemporal vasogenic edema   with associated small foci of left temporal parenchymal  hemorrhage.   Findings are concerning for potential underlying neoplasm. Associated   left middle cerebral artery infarction is a consideration given given   restricted diffusion in this region. In addition, on the susceptibility   suspicion of potential clot in the peripheral left MCA at the level of   the M2/M3 segment is questioned. This is not clearly identified on the   CTA though in retrospect on the CTA, some compromise to the temporal   branch of the left MCA is questioned. Associated mass effect on the left   lateral ventricle and minimal rightward midline shift. No herniation.   Recommend brain MRI with and without IV contrast for further   characterization.    < end of copied text >    Consultant(s) Notes Reviewed:  cardiology, neurology    Care Discussed with Consultants/Other Providers: neurosurgery PA Patient is a 67y old  Male who presents with a chief complaint of headache (27 Jan 2018 07:09)      SUBJECTIVE / OVERNIGHT EVENTS: no HA, blurry vision, chest pain, SOB. ? mid-abdominal discomfort. last BM was 3 days ago.     No PMD  No Home meds    MEDICATIONS  (STANDING):  amLODIPine   Tablet 10 milliGRAM(s) Oral daily  atropine Injectable 0.5 milliGRAM(s) IV Push once  metoprolol     tartrate 25 milliGRAM(s) Oral two times a day  sodium chloride 0.9% with potassium chloride 20 mEq/L 1000 milliLiter(s) (75 mL/Hr) IV Continuous <Continuous>    MEDICATIONS  (PRN):  acetaminophen   Tablet. 650 milliGRAM(s) Oral every 6 hours PRN Mild Pain (1 - 3)  hydrALAZINE Injectable 10 milliGRAM(s) IV Push every 6 hours PRN BP > 180/105  oxyCODONE    5 mG/acetaminophen 325 mG 1 Tablet(s) Oral every 4 hours PRN Moderate Pain (4 - 6)  oxyCODONE    5 mG/acetaminophen 325 mG 2 Tablet(s) Oral every 4 hours PRN Severe Pain (7 - 10)      Vital Signs Last 24 Hrs  T(C): 36.8 (29 Jan 2018 07:21), Max: 36.8 (29 Jan 2018 07:21)  T(F): 98.2 (29 Jan 2018 07:21), Max: 98.2 (29 Jan 2018 07:21)  HR: 62 (29 Jan 2018 12:00) (55 - 87)  BP: 152/89 (29 Jan 2018 12:00) (146/81 - 186/89)  BP(mean): 103 (29 Jan 2018 12:00) (98 - 120)  RR: 18 (29 Jan 2018 12:00) (0 - 24)  SpO2: 96% (29 Jan 2018 12:00) (95% - 99%)  CAPILLARY BLOOD GLUCOSE        I&O's Summary    28 Jan 2018 07:01  -  29 Jan 2018 07:00  --------------------------------------------------------  IN: 75 mL / OUT: 0 mL / NET: 75 mL    29 Jan 2018 07:01 - 29 Jan 2018 12:42  --------------------------------------------------------  IN: 375 mL / OUT: 0 mL / NET: 375 mL        PHYSICAL EXAM:  GENERAL: NAD  HEENT: neck supple  LUNG: Clear to auscultation bilaterally; No wheeze  HEART: S1, S2  ABDOMEN: Soft, Nontender, Nondistended; Bowel sounds present  EXTREMITIES: No leg edema  PSYCH: normal affect, calm  NEUROLOGY: AAO x3,moves all extremities  SKIN: No rashes      LABS:                        15.1   12.11 )-----------( 399      ( 29 Jan 2018 07:33 )             46.1     01-29    137  |  98  |  22  ----------------------------<  114<H>  3.9   |  23  |  1.19    Ca    9.7      29 Jan 2018 07:39      PT/INR - ( 28 Jan 2018 13:25 )   PT: 11.4 sec;   INR: 1.04 ratio         PTT - ( 28 Jan 2018 13:25 )  PTT:33.7 sec  CARDIAC MARKERS ( 28 Jan 2018 21:56 )  x     / <0.01 ng/mL / 60 U/L / x     / 1.2 ng/mL  CARDIAC MARKERS ( 28 Jan 2018 15:45 )  x     / <0.01 ng/mL / 65 U/L / x     / 1.5 ng/mL  CARDIAC MARKERS ( 28 Jan 2018 05:17 )  x     / <0.01 ng/mL / 70 U/L / x     / x              RADIOLOGY & ADDITIONAL TESTS:    Imaging Personally Reviewed:    < from: MR Head w/wo IV Cont (01.28.18 @ 11:29) >  IMPRESSION:     Predominantly peripherally enhancing 8.7 x 3.3 x 4.0 cm mass centered in   the left temporal lobe with a few foci of associated hemorrhage,   demonstrating heterogeneous mild restricted diffusion and moderate   associated vasogenic edema. 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 This causes mass effect upon the left lateral   ventricle and 2 mm rightward midline shift. No herniation or   hydrocephalus.    < end of copied text >    < from: MR Angio Head No Cont (01.27.18 @ 11:31) >  IMPRESSION:    BRAIN MRI:  Redemonstration of large area of left occipitotemporal vasogenic edema   with associated small foci of left temporal parenchymal  hemorrhage.   Findings are concerning for potential underlying neoplasm. Associated   left middle cerebral artery infarction is a consideration given given   restricted diffusion in this region. In addition, on the susceptibility   suspicion of potential clot in the peripheral left MCA at the level of   the M2/M3 segment is questioned. This is not clearly identified on the   CTA though in retrospect on the CTA, some compromise to the temporal   branch of the left MCA is questioned. Associated mass effect on the left   lateral ventricle and minimal rightward midline shift. No herniation.   Recommend brain MRI with and without IV contrast for further   characterization.    < end of copied text >    EEG Classification / Summary:  Abnormal EEG Study   -Generalized slowing: diffuse intermittent irregular delta was present.  -Focal slowing: irregular left temporal delta and associated relative attenuation of fast activity was present.    Clinical Impression:  Mild to moderate multifocal cerebral dysfunction evident, more severe and persistent in the left temporal region (with evidence of both white and grey matter involvement).  There were no epileptiform abnormalities recorded.    Consultant(s) Notes Reviewed:  cardiology, neurology    Care Discussed with Consultants/Other Providers: neurosurgery PA

## 2018-01-29 NOTE — OCCUPATIONAL THERAPY INITIAL EVALUATION ADULT - NS ASR FOLLOW COMMAND OT EVAL
100% of the time/able to follow single-step instructions/Daughter present at bedside to provide translation of commands.

## 2018-01-29 NOTE — OCCUPATIONAL THERAPY INITIAL EVALUATION ADULT - ADDITIONAL COMMENTS
1/28/18 MR head: Predominantly peripherally enhancing 8.7 x 3.3 x 4.0 cm mass centered in the left temporal lobe with a few foci of associated hemorrhage, demonstrating heterogeneous mild restricted diffusion and moderate associated vasogenic edema. 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 This causes mass effect upon the left lateral ventricle and 2 mm rightward midline shift. No herniation or hydrocephalus.

## 2018-01-30 LAB
ALDOST SERPL-MCNC: 11.4 NG/DL — SIGNIFICANT CHANGE UP
HSV1 AB FLD QL: SIGNIFICANT CHANGE UP TITER
HSV2 AB FLD-ACNC: SIGNIFICANT CHANGE UP TITER

## 2018-01-30 PROCEDURE — 93306 TTE W/DOPPLER COMPLETE: CPT | Mod: 26

## 2018-01-30 PROCEDURE — 99233 SBSQ HOSP IP/OBS HIGH 50: CPT

## 2018-01-30 PROCEDURE — 70450 CT HEAD/BRAIN W/O DYE: CPT | Mod: 26

## 2018-01-30 PROCEDURE — 99232 SBSQ HOSP IP/OBS MODERATE 35: CPT

## 2018-01-30 RX ORDER — ENOXAPARIN SODIUM 100 MG/ML
40 INJECTION SUBCUTANEOUS
Qty: 0 | Refills: 0 | Status: DISCONTINUED | OUTPATIENT
Start: 2018-01-30 | End: 2018-02-01

## 2018-01-30 RX ORDER — DEXAMETHASONE 0.5 MG/5ML
4 ELIXIR ORAL EVERY 8 HOURS
Qty: 0 | Refills: 0 | Status: DISCONTINUED | OUTPATIENT
Start: 2018-01-30 | End: 2018-02-01

## 2018-01-30 RX ORDER — DEXAMETHASONE 0.5 MG/5ML
3 ELIXIR ORAL EVERY 6 HOURS
Qty: 0 | Refills: 0 | Status: DISCONTINUED | OUTPATIENT
Start: 2018-01-30 | End: 2018-01-30

## 2018-01-30 RX ORDER — SODIUM CHLORIDE 9 MG/ML
1000 INJECTION INTRAMUSCULAR; INTRAVENOUS; SUBCUTANEOUS
Qty: 0 | Refills: 0 | Status: DISCONTINUED | OUTPATIENT
Start: 2018-01-30 | End: 2018-01-31

## 2018-01-30 RX ADMIN — Medication 650 MILLIGRAM(S): at 01:15

## 2018-01-30 RX ADMIN — Medication 200 MILLIGRAM(S): at 18:02

## 2018-01-30 RX ADMIN — Medication 4 MILLIGRAM(S): at 00:39

## 2018-01-30 RX ADMIN — Medication 650 MILLIGRAM(S): at 08:20

## 2018-01-30 RX ADMIN — Medication 3 MILLIGRAM(S): at 11:19

## 2018-01-30 RX ADMIN — Medication 100 MILLIGRAM(S): at 05:14

## 2018-01-30 RX ADMIN — Medication 200 MILLIGRAM(S): at 21:17

## 2018-01-30 RX ADMIN — Medication 650 MILLIGRAM(S): at 07:45

## 2018-01-30 RX ADMIN — Medication 100 GRAM(S): at 00:38

## 2018-01-30 RX ADMIN — ENOXAPARIN SODIUM 40 MILLIGRAM(S): 100 INJECTION SUBCUTANEOUS at 18:02

## 2018-01-30 RX ADMIN — Medication 650 MILLIGRAM(S): at 00:46

## 2018-01-30 RX ADMIN — AMLODIPINE BESYLATE 10 MILLIGRAM(S): 2.5 TABLET ORAL at 05:14

## 2018-01-30 RX ADMIN — Medication 4 MILLIGRAM(S): at 05:14

## 2018-01-30 RX ADMIN — POLYETHYLENE GLYCOL 3350 17 GRAM(S): 17 POWDER, FOR SOLUTION ORAL at 18:02

## 2018-01-30 RX ADMIN — Medication 1: at 17:56

## 2018-01-30 RX ADMIN — SENNA PLUS 1 TABLET(S): 8.6 TABLET ORAL at 21:17

## 2018-01-30 RX ADMIN — Medication 100 MILLIGRAM(S): at 18:02

## 2018-01-30 RX ADMIN — Medication 4 MILLIGRAM(S): at 21:17

## 2018-01-30 RX ADMIN — Medication 200 MILLIGRAM(S): at 11:20

## 2018-01-30 NOTE — PROGRESS NOTE ADULT - SUBJECTIVE AND OBJECTIVE BOX
Neurology Progress Note    Name  ARSENIO DARNELL    HPI:  NEUROLOGY CONSULT     Patient is a 67y old male who presents with a chief complaint of headache.    HPI:  68YO  M with h/o untreated hypertension who presents with headache x3-4d and some minor memory lapses per family. Pt notes frontal HA, pressure like. He took his BP at home - 250s/150s so came to ED. In Ed no deficits found on exam but CT head revealed areas of hemorrhage in L temporal lobe. Neurosx called and no intervention recommended. Pt accompanied by daughter and wife at bedside who help to translate. Pt lives alone at baseline and performs all ADLs. Not working currently.   [place] is improving on its own. Denies any visual changes, no lightheadedness/dizziness, no unsteady gait or imbalance. Denies focal weakness/numbness. Denies dysarthria, dysphagia, diplopia. Denies N/V/D, fevers/chills or weight loss. No hx malignancy or AVM/hemorrhage in family.     NIHSS 0 ICH score 0 (27 Jan 2018 01:16)          Subjective:      MEDICATIONS  (STANDING):  amLODIPine   Tablet 10 milliGRAM(s) Oral daily  dexamethasone  Injectable 4 milliGRAM(s) IV Push every 6 hours  docusate sodium 100 milliGRAM(s) Oral two times a day  insulin lispro (HumaLOG) corrective regimen sliding scale   SubCutaneous every 6 hours  labetalol 200 milliGRAM(s) Oral every 8 hours  niCARdipine Infusion 3 mG/Hr (15 mL/Hr) IV Continuous <Continuous>  polyethylene glycol 3350 17 Gram(s) Oral daily  senna 1 Tablet(s) Oral daily    MEDICATIONS  (PRN):  acetaminophen   Tablet. 650 milliGRAM(s) Oral every 6 hours PRN Mild Pain (1 - 3)  hydrALAZINE Injectable 10 milliGRAM(s) IV Push every 6 hours PRN BP > 180/105  oxyCODONE    5 mG/acetaminophen 325 mG 1 Tablet(s) Oral every 4 hours PRN Moderate Pain (4 - 6)  oxyCODONE    5 mG/acetaminophen 325 mG 2 Tablet(s) Oral every 4 hours PRN Severe Pain (7 - 10)      Objective:   Vital Signs Last 24 Hrs  T(C): 37.2 (30 Jan 2018 07:00), Max: 37.2 (30 Jan 2018 07:00)  T(F): 98.9 (30 Jan 2018 07:00), Max: 98.9 (30 Jan 2018 07:00)  HR: 82 (30 Jan 2018 08:00) (60 - 104)  BP: 156/91 (30 Jan 2018 08:00) (115/96 - 166/89)  BP(mean): 111 (30 Jan 2018 08:00) (84 - 111)  RR: 16 (30 Jan 2018 08:00) (13 - 25)  SpO2: 98% (30 Jan 2018 08:00) (91% - 98%)    General Exam:     Neurological Exam:  Mental Status: Orientated to self, date and place.  Attention intact.  No dysarthria, aphasia or neglect.  Knowledge intact.  Registration intact.  Short and long term memory grossly intact.      Cranial Nerves: CN I - not tested.  PERRL, EOMI, VFF, no nystagmus or diplopia.  No APD.  Fundi not visualized bilaterally.  CN V1-3 intact to light touch and pinprick.  No facial asymmetry.  Hearing intact to finger rub bilaterally.  Tongue, uvula and palate midline.  Sternocleidomastoid and Trapezius intact bilaterally.    Motor:   Tone: normal.                  Strength:     [] Upper extremity                      Delt       Bicep    Tricep                                                  R         5/5        5/5        5/5       5/5                                               L          5/5        5/5        5/5       5/5  [] Lower extremity                       HF          KE          KF        DF         PF                                               R        5/5        5/5        5/5       5/5       5/5                                               L         5/5        5/5       5/5       5/5        5/5  Pronator drift: none                 Dysmetria: None to finger-nose-finger or heel-shin-heel  No truncal ataxia.    Tremor: No resting, postural or action tremor.  No myoclonus.    Sensation: intact to light touch, pinprick, vibration and proprioception    Deep Tendon Reflexes: 1+ bilateral biceps, triceps, brachioradialis, knee and ankle  Toes flexor bilaterally    Gait: normal and stable.        Other:                        14.6   15.1  )-----------( 375      ( 29 Jan 2018 21:00 )             41.9     01-29    137  |  100  |  23  ----------------------------<  178<H>  3.9   |  22  |  1.39<H>    Ca    9.1      29 Jan 2018 21:00  Phos  3.4     01-29  Mg     1.8     01-29        PT/INR - ( 28 Jan 2018 13:25 )   PT: 11.4 sec;   INR: 1.04 ratio         PTT - ( 28 Jan 2018 13:25 )  PTT:33.7 sec    Radiology    MRI:  Predominantly peripherally enhancing 8.7 x 3.3 x 4.0 cm mass centered in   the left temporal lobe with a few foci of associated hemorrhage,   demonstrating heterogeneous mild restricted diffusion and moderate   associated vasogenic edema. 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 This causes mass effect upon the left lateral   ventricle and 2 mm rightward midline shift. No herniation or   hydrocephalus.

## 2018-01-30 NOTE — PROGRESS NOTE ADULT - SUBJECTIVE AND OBJECTIVE BOX
HPI:  68YO  M with h/o untreated hypertension who presents with headache x3-4d and some minor memory lapses per family. Pt notes frontal HA, pressure like. He took his BP at home - 250s/150s so came to ED. In Ed no deficits found on exam but CT head revealed areas of hemorrhage in L temporal lobe. Neurosx called and no intervention recommended. Pt accompanied by daughter and wife at bedside who help to translate. Pt lives alone at baseline and performs all ADLs. Not working currently.   [place] is improving on its own. Denies any visual changes, no lightheadedness/dizziness, no unsteady gait or imbalance. Denies focal weakness/numbness. Denies dysarthria, dysphagia, diplopia. Denies N/V/D, fevers/chills or weight loss. No hx malignancy or AVM/hemorrhage in family.     SURGERY/events:  Left craniotomy for left temporal mass     Overnight Events: none reported.    ROS: negative [x] unable to obtain as patient is comatose/intubated/aphasic []     VITALS:   T(C): 37.1 (01-30-18 @ 03:00), Max: 37.1 (01-30-18 @ 03:00)  HR: 73 (01-30-18 @ 06:00) (60 - 104)  BP: 155/87 (01-30-18 @ 06:00) (115/96 - 166/89)  RR: 21 (01-30-18 @ 06:00) (13 - 25)  SpO2: 98% (01-30-18 @ 06:00) (91% - 98%)    01-29-18 @ 07:01  -  01-30-18 @ 07:00  --------------------------------------------------------  IN: 1490 mL / OUT: 1250 mL / NET: 240 mL      LABS:                          14.6   15.1  )-----------( 375      ( 29 Jan 2018 21:00 )             41.9     01-29    137  |  100  |  23  ----------------------------<  178<H>  3.9   |  22  |  1.39<H>    Ca    9.1      29 Jan 2018 21:00  Phos  3.4     01-29  Mg     1.8     01-29      PT/INR - ( 28 Jan 2018 13:25 )   PT: 11.4 sec;   INR: 1.04 ratio         PTT - ( 28 Jan 2018 13:25 )  PTT:33.7 sec  MEDS:  MEDICATIONS  (STANDING):  amLODIPine   Tablet 10 milliGRAM(s) Oral daily  dexamethasone  Injectable 4 milliGRAM(s) IV Push every 6 hours  docusate sodium 100 milliGRAM(s) Oral two times a day  insulin lispro (HumaLOG) corrective regimen sliding scale   SubCutaneous every 6 hours  labetalol 200 milliGRAM(s) Oral every 8 hours  niCARdipine Infusion 3 mG/Hr (15 mL/Hr) IV Continuous <Continuous>  polyethylene glycol 3350 17 Gram(s) Oral daily  senna 1 Tablet(s) Oral daily      [All pertinent recent Imaging/Reports reviewed]    DEVICES: [] Restraints [] YUKI/HMV []LD [] ET tube [] Trach [] A-line [] Mclean [] NGT [] Rectal Tube       PHYSICAL EXAM:    General: No Acute Distress     Neurological: Awake, alert oriented to person, place  and time, Following Commands, PERRL, EOMI, Face Symmetrical, Speech slow,  Moving all extremities, Muscle Strength normal in all four extremities, No Drift, Sensation to Light Touch Intact    Pulmonary: Clear to Auscultation, No Rales, No Rhonchi, No Wheezes     Cardiovascular: S1, S2, Regular Rate and Rhythm     Gastrointestinal: Soft, Nontender, Nondistended     Extremities: No calf tenderness HPI:  68YO  M with h/o untreated hypertension who presents with headache x3-4d and some minor memory lapses per family. Pt notes frontal HA, pressure like. He took his BP at home - 250s/150s so came to ED. In Ed no deficits found on exam but CT head revealed areas of hemorrhage in L temporal lobe. Neurosx called and no intervention recommended. Pt accompanied by daughter and wife at bedside who help to translate. Pt lives alone at baseline and performs all ADLs. Not working currently.   [place] is improving on its own. Denies any visual changes, no lightheadedness/dizziness, no unsteady gait or imbalance. Denies focal weakness/numbness. Denies dysarthria, dysphagia, diplopia. Denies N/V/D, fevers/chills or weight loss. No hx malignancy or AVM/hemorrhage in family.     SURGERY/events:  Left craniotomy for left temporal mass     Overnight Events: none reported.    ROS: negative [x] unable to obtain as patient is comatose/intubated/aphasic []     VITALS:   T(C): 37.1 (01-30-18 @ 03:00), Max: 37.1 (01-30-18 @ 03:00)  HR: 73 (01-30-18 @ 06:00) (60 - 104)  BP: 155/87 (01-30-18 @ 06:00) (115/96 - 166/89)  RR: 21 (01-30-18 @ 06:00) (13 - 25)  SpO2: 98% (01-30-18 @ 06:00) (91% - 98%)    01-29-18 @ 07:01  -  01-30-18 @ 07:00  --------------------------------------------------------  IN: 1490 mL / OUT: 1250 mL / NET: 240 mL      LABS:                          14.6   15.1  )-----------( 375      ( 29 Jan 2018 21:00 )             41.9     01-29    137  |  100  |  23  ----------------------------<  178<H>  3.9   |  22  |  1.39<H>    Ca    9.1      29 Jan 2018 21:00  Phos  3.4     01-29  Mg     1.8     01-29      PT/INR - ( 28 Jan 2018 13:25 )   PT: 11.4 sec;   INR: 1.04 ratio         PTT - ( 28 Jan 2018 13:25 )  PTT:33.7 sec  MEDS:  MEDICATIONS  (STANDING):  amLODIPine   Tablet 10 milliGRAM(s) Oral daily  dexamethasone  Injectable 4 milliGRAM(s) IV Push every 6 hours  docusate sodium 100 milliGRAM(s) Oral two times a day  insulin lispro (HumaLOG) corrective regimen sliding scale   SubCutaneous every 6 hours  labetalol 200 milliGRAM(s) Oral every 8 hours  niCARdipine Infusion 3 mG/Hr (15 mL/Hr) IV Continuous <Continuous>  polyethylene glycol 3350 17 Gram(s) Oral daily  senna 1 Tablet(s) Oral daily      [All pertinent recent Imaging/Reports reviewed]    DEVICES: [] Restraints [] YUKI/HMV []LD [] ET tube [] Trach [] A-line [] Mclean [] NGT [] Rectal Tube       PHYSICAL EXAM:    General: No Acute Distress     Neurological: Awake, alert oriented to person, place  and time, Following Commands, PERRL, EOMI, Face Symmetrical, Moving all extremities, Muscle Strength normal in all four extremities, No Drift, Sensation to Light Touch Intact    Pulmonary: Clear to Auscultation, No Rales, No Rhonchi, No Wheezes     Cardiovascular: S1, S2, Regular Rate and Rhythm     Gastrointestinal: Soft, Nontender, Nondistended     Extremities: No calf tenderness

## 2018-01-30 NOTE — PROGRESS NOTE ADULT - ASSESSMENT
A/P:  67y old male with 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, POD1.     Neuro:   neuro checsk q1hr,   CT head AM, f/up pathology report,   decadron 4 mg q 6 hrs    Respiratory: RA, incentive spirometer    CV: Keep -150 mmhg,   On labetolol 200 mg q 8 hrs per cardiology, continue amlodipine     Endocrine: finger sticks q 6 hrs, target sugars of 120-180     Heme/Onc:    f/up path report  DVT ppx: SCD, start Lovenox if stable CTH    Renal: NS 75 ml/hr, continue as Cr slightly elevated 1.39 , monitor    ID: periop ancef completed    GI: advance diet as tolerated    Social/Family: updated at bedside  Discharge planning: possible transfer to floors after CTH.  Code Status: [x] Full Code [] DNR [] DNI [] Goals of Care:     Patient was at high risk for neurologic deterioration, critical care time, excluding procedures: 35 minutes A/P:  67y old male with 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, POD1.     Neuro:   neuro checsk q4hr,   CT head with normal post-op changes  decadron 3 mg q 6 hrs    Respiratory: RA, incentive spirometer    CV: Keep -160 mmhg,   On labetolol 200 mg q 8 hrs per cardiology, continue amlodipine     Endocrine: finger sticks q 6 hrs, target sugars of 120-180     Heme/Onc:    f/up path report  DVT ppx:  SQL tonight    Renal: NS 75 ml/hr, continue as Cr slightly elevated 1.39 , monitor    ID: periop ancef completed    GI: advance diet as tolerated    Social/Family: updated at bedside  Discharge planning: possible transfer to floors after CTH.  Code Status: [x] Full Code [] DNR [] DNI [] Goals of Care:     Patient was not critically ill, but was medically complex.  30 minutes spent.

## 2018-01-30 NOTE — PROGRESS NOTE ADULT - ASSESSMENT
66yo Man with uncontrolled hypertension not on meds, presents with a headache for a week and some minor memory lapses per family, is found to have a left temporal lobe enhancing lesion/mass with mass effect and MLS, concerning for neoplasm, s/p brain biopsy    Plan:  - f/u biopsy results  - c/w steroids as per neurosurgery team  - c/w supportive care as per neurosurgery team

## 2018-01-30 NOTE — PROGRESS NOTE ADULT - SUBJECTIVE AND OBJECTIVE BOX
SUBJ:  Feeling well this morning with no specific complaints. Denies chest pain and shortness of breath.  Pending biopsy results of brain mass. Blood pressure systolics ranging 130-145.     MEDICATIONS  (STANDING):  amLODIPine   Tablet 10 milliGRAM(s) Oral daily  dexamethasone     Tablet 4 milliGRAM(s) Oral every 8 hours  docusate sodium 100 milliGRAM(s) Oral two times a day  enoxaparin Injectable 40 milliGRAM(s) SubCutaneous <User Schedule>  insulin lispro (HumaLOG) corrective regimen sliding scale   SubCutaneous every 6 hours  labetalol 200 milliGRAM(s) Oral every 8 hours  polyethylene glycol 3350 17 Gram(s) Oral daily  senna 1 Tablet(s) Oral daily  sodium chloride 0.9%. 1000 milliLiter(s) (90 mL/Hr) IV Continuous <Continuous>    MEDICATIONS  (PRN):  acetaminophen   Tablet. 650 milliGRAM(s) Oral every 6 hours PRN Mild Pain (1 - 3)  hydrALAZINE Injectable 10 milliGRAM(s) IV Push every 6 hours PRN BP > 180/105  oxyCODONE    5 mG/acetaminophen 325 mG 1 Tablet(s) Oral every 4 hours PRN Moderate Pain (4 - 6)  oxyCODONE    5 mG/acetaminophen 325 mG 2 Tablet(s) Oral every 4 hours PRN Severe Pain (7 - 10)    Vital Signs Last 24 Hrs  T(C): 37 (30 Jan 2018 17:09), Max: 37.2 (30 Jan 2018 07:00)  T(F): 98.6 (30 Jan 2018 17:09), Max: 98.9 (30 Jan 2018 07:00)  HR: 66 (30 Jan 2018 17:09) (60 - 103)  BP: 142/68 (30 Jan 2018 17:09) (115/96 - 156/91)  BP(mean): 87 (30 Jan 2018 11:00) (81 - 111)  RR: 18 (30 Jan 2018 17:09) (14 - 25)  SpO2: 94% (30 Jan 2018 17:09) (91% - 98%)    REVIEW OF SYSTEMS:  As per HPI, otherwise unremarkable     PHYSICAL EXAM:  · CONSTITUTIONAL: Well-developed  · EYES: EOMI  · NECK: Supple  ·RESPIRATORY:   airway patent; breath sounds equal; good air movement; respirations non-labored; clear to auscultation bilaterally  · CARDIOVASCULAR: regular rate and rhythm   . GASTROINTESTINAL:  no distention; no masses palpable  · EXTREMITIES: No cyanosis, clubbing or edema  · VASCULAR:  Equal and normal pulses (radial)  ·NEUROLOGICAL:  Alert and oriented x 3  · SKIN: No lesions; no rash  . LYMPH NODES: No lymphadedenopathy  · MUSCULOSKELETAL:  No calf tenderness  no joint swelling	    TTE 1/30/2018  Conclusions:  1. Mitral annular calcification. Mild mitral regurgitation.  2. Calcified trileaflet aortic valve with normal opening.  No aortic valve regurgitation seen.  3. Moderately dilated left atrium.  LA volume index = 46  cc/m2.  4. Moderate concentric left ventricular hypertrophy.  5. Normal left ventricular systolic function. No segmental  wall motion abnormalities.  6. Mild diastolic dysfunction (Stage I).  7. Normal right ventricular size and function.  8. Color Doppler demonstrates no evidence of a patent  foramen ovale.  *** No previous Echo exam.    LABS:   CBC Full  -  ( 29 Jan 2018 21:00 )  WBC Count : 15.1 K/uL  Hemoglobin : 14.6 g/dL  Hematocrit : 41.9 %  Platelet Count - Automated : 375 K/uL  Mean Cell Volume : 90.8 fl  Mean Cell Hemoglobin : 31.7 pg  Mean Cell Hemoglobin Concentration : 34.9 gm/dL    01-29    137  |  100  |  23  ----------------------------<  178<H>  3.9   |  22  |  1.39<H>    Ca    9.1      29 Jan 2018 21:00  Phos  3.4     01-29  Mg     1.8     01-29      CARDIAC MARKERS ( 28 Jan 2018 21:56 )  x     / <0.01 ng/mL / 60 U/L / x     / 1.2 ng/mL    IMPRESSION AND PLAN:  67 y.o. Male with HTN (noncompliant with meds) admitted 01/26/2018 after he developed a headache, took BP at home and measured SBP >200, on admission CT-Head found to have left temporal brain mass with vasogenic edema, 2mm midline shift, and small areas of hemorrhage s/p craniotomy for brain mass 1/29/2018.     1) HTN  Suspect Hypertensive heart syndrome in the setting of Hypertensive urgency/emergency.   Continues to be chest pain free.   Troponin neg x2  ECHO with preserved EF and LVH    ·	Continue to treat Hypertension with normotensive goal systolic <140/90 at this time. Recommend starting any diuretic such as HCTZ 25 mg as well as an ACEi such as lisinopril 5 mg daily when his renal function improved.   ·	Repeat labs.  ·	Pending brain biopsy results, pending neurosurgery recommendations.     Will follow remotely, call with questions [phone].    Beatriz Henderson MD, MPH, JUANY  Cardiovascular Specialist Attending  [facility]  C: [phone]  E: [email or web address]  (Cardiology nocturnist available every night 7 pm - 7 am; available week days for follow-up; general cardiology consult coverage weekend days) SUBJ:  Feeling well this morning with no specific complaints. Denies chest pain and shortness of breath.  Pending biopsy results of brain mass. Blood pressure systolics ranging 130-145.     MEDICATIONS  (STANDING):  amLODIPine   Tablet 10 milliGRAM(s) Oral daily  dexamethasone     Tablet 4 milliGRAM(s) Oral every 8 hours  docusate sodium 100 milliGRAM(s) Oral two times a day  enoxaparin Injectable 40 milliGRAM(s) SubCutaneous <User Schedule>  insulin lispro (HumaLOG) corrective regimen sliding scale   SubCutaneous every 6 hours  labetalol 200 milliGRAM(s) Oral every 8 hours  polyethylene glycol 3350 17 Gram(s) Oral daily  senna 1 Tablet(s) Oral daily  sodium chloride 0.9%. 1000 milliLiter(s) (90 mL/Hr) IV Continuous <Continuous>    MEDICATIONS  (PRN):  acetaminophen   Tablet. 650 milliGRAM(s) Oral every 6 hours PRN Mild Pain (1 - 3)  hydrALAZINE Injectable 10 milliGRAM(s) IV Push every 6 hours PRN BP > 180/105  oxyCODONE    5 mG/acetaminophen 325 mG 1 Tablet(s) Oral every 4 hours PRN Moderate Pain (4 - 6)  oxyCODONE    5 mG/acetaminophen 325 mG 2 Tablet(s) Oral every 4 hours PRN Severe Pain (7 - 10)    Vital Signs Last 24 Hrs  T(C): 37 (30 Jan 2018 17:09), Max: 37.2 (30 Jan 2018 07:00)  T(F): 98.6 (30 Jan 2018 17:09), Max: 98.9 (30 Jan 2018 07:00)  HR: 66 (30 Jan 2018 17:09) (60 - 103)  BP: 142/68 (30 Jan 2018 17:09) (115/96 - 156/91)  BP(mean): 87 (30 Jan 2018 11:00) (81 - 111)  RR: 18 (30 Jan 2018 17:09) (14 - 25)  SpO2: 94% (30 Jan 2018 17:09) (91% - 98%)    REVIEW OF SYSTEMS:  As per HPI, otherwise unremarkable     PHYSICAL EXAM:  · CONSTITUTIONAL: Well-developed  · EYES: EOMI  · NECK: Supple  ·RESPIRATORY:   airway patent; breath sounds equal; good air movement; respirations non-labored; clear to auscultation bilaterally  · CARDIOVASCULAR: regular rate and rhythm   . GASTROINTESTINAL:  no distention; no masses palpable  · EXTREMITIES: No cyanosis, clubbing or edema  · VASCULAR:  Equal and normal pulses (radial)  ·NEUROLOGICAL:  Alert and oriented x 3  · SKIN: No lesions; no rash  . LYMPH NODES: No lymphadedenopathy  · MUSCULOSKELETAL:  No calf tenderness  no joint swelling	    TTE 1/30/2018  Conclusions:  1. Mitral annular calcification. Mild mitral regurgitation.  2. Calcified trileaflet aortic valve with normal opening.  No aortic valve regurgitation seen.  3. Moderately dilated left atrium.  LA volume index = 46  cc/m2.  4. Moderate concentric left ventricular hypertrophy.  5. Normal left ventricular systolic function. No segmental  wall motion abnormalities.  6. Mild diastolic dysfunction (Stage I).  7. Normal right ventricular size and function.  8. Color Doppler demonstrates no evidence of a patent  foramen ovale.  *** No previous Echo exam.    LABS:   CBC Full  -  ( 29 Jan 2018 21:00 )  WBC Count : 15.1 K/uL  Hemoglobin : 14.6 g/dL  Hematocrit : 41.9 %  Platelet Count - Automated : 375 K/uL  Mean Cell Volume : 90.8 fl  Mean Cell Hemoglobin : 31.7 pg  Mean Cell Hemoglobin Concentration : 34.9 gm/dL    01-29    137  |  100  |  23  ----------------------------<  178<H>  3.9   |  22  |  1.39<H>    Ca    9.1      29 Jan 2018 21:00  Phos  3.4     01-29  Mg     1.8     01-29      CARDIAC MARKERS ( 28 Jan 2018 21:56 )  x     / <0.01 ng/mL / 60 U/L / x     / 1.2 ng/mL    IMPRESSION AND PLAN:  67 y.o. Male with HTN (noncompliant with meds) admitted 01/26/2018 after he developed a headache, took BP at home and measured SBP >200, on admission CT-Head found to have left temporal brain mass with vasogenic edema, 2mm midline shift, and small areas of hemorrhage s/p craniotomy for brain mass 1/29/2018.     1) HTN  Suspect Hypertensive heart syndrome in the setting of Hypertensive urgency/emergency.   Continues to be chest pain free.   Troponin neg x2  ECHO with preserved EF and LVH    ·	Continue to treat Hypertension with normotensive goal systolic <140/90 at this time. Recommend starting any diuretic such as HCTZ 25 mg as well as an ACEi such as lisinopril 5 mg daily when his renal function improved. Continue labetalol 200 mg TID but can be downtrated outpatient and use other agents that are more effective such as CCB/ACEi/diuretics, etc.   ·	Repeat labs.  ·	Pending brain biopsy results, pending neurosurgery recommendations.     Will follow remotely, call with questions [phone].    Beatriz Henderson MD, MPH, JUANY  Cardiovascular Specialist Attending  [facility]  C: [phone]  E: [email or web address]  (Cardiology nocturnist available every night 7 pm - 7 am; available week days for follow-up; general cardiology consult coverage weekend days)

## 2018-01-31 DIAGNOSIS — D72.829 ELEVATED WHITE BLOOD CELL COUNT, UNSPECIFIED: ICD-10-CM

## 2018-01-31 DIAGNOSIS — G93.9 DISORDER OF BRAIN, UNSPECIFIED: ICD-10-CM

## 2018-01-31 PROBLEM — I10 ESSENTIAL (PRIMARY) HYPERTENSION: Chronic | Status: ACTIVE | Noted: 2018-01-26

## 2018-01-31 LAB
ANION GAP SERPL CALC-SCNC: 15 MMOL/L — SIGNIFICANT CHANGE UP (ref 5–17)
BUN SERPL-MCNC: 38 MG/DL — HIGH (ref 7–23)
CALCIUM SERPL-MCNC: 9.3 MG/DL — SIGNIFICANT CHANGE UP (ref 8.4–10.5)
CHLORIDE SERPL-SCNC: 102 MMOL/L — SIGNIFICANT CHANGE UP (ref 96–108)
CO2 SERPL-SCNC: 21 MMOL/L — LOW (ref 22–31)
CREAT SERPL-MCNC: 1 MG/DL — SIGNIFICANT CHANGE UP (ref 0.5–1.3)
GLUCOSE SERPL-MCNC: 146 MG/DL — HIGH (ref 70–99)
HCT VFR BLD CALC: 41.1 % — SIGNIFICANT CHANGE UP (ref 39–50)
HGB BLD-MCNC: 13.1 G/DL — SIGNIFICANT CHANGE UP (ref 13–17)
MCHC RBC-ENTMCNC: 29.2 PG — SIGNIFICANT CHANGE UP (ref 27–34)
MCHC RBC-ENTMCNC: 31.9 GM/DL — LOW (ref 32–36)
MCV RBC AUTO: 91.5 FL — SIGNIFICANT CHANGE UP (ref 80–100)
PLATELET # BLD AUTO: 333 K/UL — SIGNIFICANT CHANGE UP (ref 150–400)
POTASSIUM SERPL-MCNC: 4.4 MMOL/L — SIGNIFICANT CHANGE UP (ref 3.5–5.3)
POTASSIUM SERPL-SCNC: 4.4 MMOL/L — SIGNIFICANT CHANGE UP (ref 3.5–5.3)
RBC # BLD: 4.49 M/UL — SIGNIFICANT CHANGE UP (ref 4.2–5.8)
RBC # FLD: 14.4 % — SIGNIFICANT CHANGE UP (ref 10.3–14.5)
SODIUM SERPL-SCNC: 138 MMOL/L — SIGNIFICANT CHANGE UP (ref 135–145)
WBC # BLD: 17.39 K/UL — HIGH (ref 3.8–10.5)
WBC # FLD AUTO: 17.39 K/UL — HIGH (ref 3.8–10.5)

## 2018-01-31 PROCEDURE — 99233 SBSQ HOSP IP/OBS HIGH 50: CPT

## 2018-01-31 RX ORDER — MULTIVIT WITH MIN/MFOLATE/K2 340-15/3 G
300 POWDER (GRAM) ORAL ONCE
Qty: 0 | Refills: 0 | Status: DISCONTINUED | OUTPATIENT
Start: 2018-01-31 | End: 2018-02-01

## 2018-01-31 RX ORDER — LEVETIRACETAM 250 MG/1
500 TABLET, FILM COATED ORAL
Qty: 0 | Refills: 0 | Status: DISCONTINUED | OUTPATIENT
Start: 2018-01-31 | End: 2018-02-01

## 2018-01-31 RX ADMIN — Medication 200 MILLIGRAM(S): at 14:40

## 2018-01-31 RX ADMIN — SODIUM CHLORIDE 90 MILLILITER(S): 9 INJECTION INTRAMUSCULAR; INTRAVENOUS; SUBCUTANEOUS at 05:49

## 2018-01-31 RX ADMIN — POLYETHYLENE GLYCOL 3350 17 GRAM(S): 17 POWDER, FOR SOLUTION ORAL at 12:40

## 2018-01-31 RX ADMIN — Medication 4 MILLIGRAM(S): at 05:49

## 2018-01-31 RX ADMIN — Medication 10 MILLIGRAM(S): at 17:24

## 2018-01-31 RX ADMIN — Medication 200 MILLIGRAM(S): at 21:13

## 2018-01-31 RX ADMIN — AMLODIPINE BESYLATE 10 MILLIGRAM(S): 2.5 TABLET ORAL at 05:49

## 2018-01-31 RX ADMIN — Medication 200 MILLIGRAM(S): at 05:49

## 2018-01-31 RX ADMIN — ENOXAPARIN SODIUM 40 MILLIGRAM(S): 100 INJECTION SUBCUTANEOUS at 17:23

## 2018-01-31 RX ADMIN — SENNA PLUS 1 TABLET(S): 8.6 TABLET ORAL at 12:40

## 2018-01-31 RX ADMIN — Medication 4 MILLIGRAM(S): at 14:40

## 2018-01-31 RX ADMIN — Medication 100 MILLIGRAM(S): at 05:49

## 2018-01-31 RX ADMIN — Medication 4 MILLIGRAM(S): at 21:13

## 2018-01-31 NOTE — PROGRESS NOTE ADULT - SUBJECTIVE AND OBJECTIVE BOX
Patient is a 67y old  Male who presents with a chief complaint of headache (27 Jan 2018 07:09)      SUBJECTIVE / OVERNIGHT EVENTS: transferred out of NSCU. + mild HA, no BM yet, + flatus. no chest pain or SOB.     MEDICATIONS  (STANDING):  amLODIPine   Tablet 10 milliGRAM(s) Oral daily  dexamethasone     Tablet 4 milliGRAM(s) Oral every 8 hours  docusate sodium 100 milliGRAM(s) Oral two times a day  enoxaparin Injectable 40 milliGRAM(s) SubCutaneous <User Schedule>  insulin lispro (HumaLOG) corrective regimen sliding scale   SubCutaneous every 6 hours  labetalol 200 milliGRAM(s) Oral every 8 hours  magnesium citrate Solution 300 milliLiter(s) Oral once  polyethylene glycol 3350 17 Gram(s) Oral daily  senna 1 Tablet(s) Oral daily  sodium chloride 0.9%. 1000 milliLiter(s) (90 mL/Hr) IV Continuous <Continuous>    MEDICATIONS  (PRN):  acetaminophen   Tablet. 650 milliGRAM(s) Oral every 6 hours PRN Mild Pain (1 - 3)  hydrALAZINE Injectable 10 milliGRAM(s) IV Push every 6 hours PRN BP > 180/105  oxyCODONE    5 mG/acetaminophen 325 mG 1 Tablet(s) Oral every 4 hours PRN Moderate Pain (4 - 6)  oxyCODONE    5 mG/acetaminophen 325 mG 2 Tablet(s) Oral every 4 hours PRN Severe Pain (7 - 10)      Vital Signs Last 24 Hrs  T(C): 36.6 (31 Jan 2018 07:00), Max: 37 (30 Jan 2018 17:09)  T(F): 97.9 (31 Jan 2018 07:00), Max: 98.6 (30 Jan 2018 17:09)  HR: 64 (31 Jan 2018 07:00) (58 - 73)  BP: 140/83 (31 Jan 2018 07:00) (130/68 - 159/76)  BP(mean): --  RR: 18 (31 Jan 2018 07:00) (16 - 18)  SpO2: 97% (31 Jan 2018 07:00) (93% - 98%)  CAPILLARY BLOOD GLUCOSE      POCT Blood Glucose.: 131 mg/dL (31 Jan 2018 05:32)  POCT Blood Glucose.: 119 mg/dL (30 Jan 2018 23:34)  POCT Blood Glucose.: 190 mg/dL (30 Jan 2018 17:13)  POCT Blood Glucose.: 160 mg/dL (30 Jan 2018 12:38)    I&O's Summary    30 Jan 2018 07:01  -  31 Jan 2018 07:00  --------------------------------------------------------  IN: 1320 mL / OUT: 200 mL / NET: 1120 mL        PHYSICAL EXAM:  GENERAL: NAD  HEENT: neck supple  LUNG: Clear to auscultation bilaterally; No wheeze  HEART: S1, S2  ABDOMEN: Soft, Nontender, Bowel sounds present  EXTREMITIES: No leg edema  PSYCH: normal affect, calm  NEUROLOGY: AAO x 2-3, moves all extremities  SKIN: No rashes       LABS:                        13.1   17.39 )-----------( 333      ( 31 Jan 2018 07:18 )             41.1     01-31    138  |  102  |  38<H>  ----------------------------<  146<H>  4.4   |  21<L>  |  1.00    Ca    9.3      31 Jan 2018 07:20  Phos  3.4     01-29  Mg     1.8     01-29                RADIOLOGY & ADDITIONAL TESTS:    Imaging Personally Reviewed:    < from: Transthoracic Echocardiogram (01.30.18 @ 07:50) >  Conclusions:  1. Mitral annular calcification. Mild mitral regurgitation.    2. Calcified trileaflet aortic valve with normal opening.  No aortic valve regurgitation seen.  3. Moderately dilated left atrium.  LA volume index = 46  cc/m2.  4. Moderate concentric left ventricular hypertrophy.  5. Normal left ventricular systolic function. No segmental  wall motion abnormalities.  6. Mild diastolic dysfunction (Stage I).  7. Normal right ventricular size and function.  8. Color Doppler demonstrates no evidence of a patent  foramen ovale.    < end of copied text >    < from: CT Head No Cont (01.30.18 @ 08:15) >  Impression: Postoperative changes left temporal lobe. No significant   postoperative clot.    < end of copied text >    Consultant(s) Notes Reviewed:  neuro, cardiology    Care Discussed with Consultants/Other Providers: neurosurgery PA

## 2018-01-31 NOTE — PROGRESS NOTE ADULT - SUBJECTIVE AND OBJECTIVE BOX
We were consulted on Mr. Barber, a 67 year old male who presented with severe hypertension noted to be 249/149, has been noncompliant with medications, and his symptoms have also included forgetfulness and problems with word finding at times.   His brain MRI on 1/28/18 showed a peripherally enhancing 8.7x3.3x4cm mass centered in the left temporal lobe.  the findings were suspicious for the diagnosis of GBM.   He is now s/p left craniotomy on 1/30/18 with frozen section consistent with GBM while final pathology report is pending.      We have arranged for Mr. Barber to have an outpatient radiation medicine appointment with Dr. Curtis on 02/07/18 at 12pm at [facility], [address] [place] [address].   If you have any further inquiries please call us at: [phone].  We are also awaiting his final pathology report.

## 2018-01-31 NOTE — PROGRESS NOTE ADULT - ASSESSMENT
66yo Man with uncontrolled hypertension not on meds, presents with a headache for a week and some minor memory lapses per family, is found to have a left temporal lobe enhancing lesion/mass with mass effect and MLS, concerning for neoplasm s/p left craniotomy for brain tumor biopsy on 1/30/18.

## 2018-01-31 NOTE — PROGRESS NOTE ADULT - SUBJECTIVE AND OBJECTIVE BOX
SUBJECTIVE:     OVERNIGHT EVENTS: none    Vital Signs Last 24 Hrs  T(C): 36.6 (31 Jan 2018 07:00), Max: 37 (30 Jan 2018 17:09)  T(F): 97.9 (31 Jan 2018 07:00), Max: 98.6 (30 Jan 2018 17:09)  HR: 64 (31 Jan 2018 07:00) (58 - 73)  BP: 140/83 (31 Jan 2018 07:00) (131/73 - 159/76)  BP(mean): --  RR: 18 (31 Jan 2018 07:00) (18 - 18)  SpO2: 97% (31 Jan 2018 07:00) (94% - 98%)    PHYSICAL EXAM:    Constitutional: No Acute Distress     Neurological: AOx3, Following Commands, Moving all Extremities     Motor exam:          Upper extremity                         Delt     Bicep     Tricep    HG                                                 R         5/5        5/5        5/5       5/5                                               L          5/5        5/5        5/5       5/5          Lower extremity                        HF         KF        KE       DF         PF                                                  R        5/5        5/5        5/5       5/5         5/5                                               L         5/5        5/5       5/5       5/5          5/5                                                 Sensation: [] intact to light touch  [] decreased:     Pulmonary: Clear to Auscultation, No rales, No rhonchi, No wheezes     Cardiovascular: S1, S2, Regular rate and rhythm     Gastrointestinal: Soft, Non-tender, Non-distended     Extremities: No calf tenderness     Incision:   DRAINS:     LABS:                        13.1   17.39 )-----------( 333      ( 31 Jan 2018 07:18 )             41.1    01-31    138  |  102  |  38<H>  ----------------------------<  146<H>  4.4   |  21<L>  |  1.00    Ca    9.3      31 Jan 2018 07:20  Phos  3.4     01-29  Mg     1.8     01-29            IMAGING:         MEDICATIONS:    acetaminophen   Tablet. 650 milliGRAM(s) Oral every 6 hours PRN Mild Pain (1 - 3)  oxyCODONE    5 mG/acetaminophen 325 mG 1 Tablet(s) Oral every 4 hours PRN Moderate Pain (4 - 6)  oxyCODONE    5 mG/acetaminophen 325 mG 2 Tablet(s) Oral every 4 hours PRN Severe Pain (7 - 10)  amLODIPine   Tablet 10 milliGRAM(s) Oral daily  hydrALAZINE Injectable 10 milliGRAM(s) IV Push every 6 hours PRN BP > 180/105  labetalol 200 milliGRAM(s) Oral every 8 hours  docusate sodium 100 milliGRAM(s) Oral two times a day  magnesium citrate Solution 300 milliLiter(s) Oral once  polyethylene glycol 3350 17 Gram(s) Oral daily  senna 1 Tablet(s) Oral daily  dexamethasone     Tablet 4 milliGRAM(s) Oral every 8 hours  enoxaparin Injectable 40 milliGRAM(s) SubCutaneous <User Schedule>  insulin lispro (HumaLOG) corrective regimen sliding scale   SubCutaneous every 6 hours  sodium chloride 0.9%. 1000 milliLiter(s) IV Continuous <Continuous>      DIET: SUBJECTIVE:   No complaints.   OVERNIGHT EVENTS: none    Vital Signs Last 24 Hrs  T(C): 36.6 (31 Jan 2018 07:00), Max: 37 (30 Jan 2018 17:09)  T(F): 97.9 (31 Jan 2018 07:00), Max: 98.6 (30 Jan 2018 17:09)  HR: 64 (31 Jan 2018 07:00) (58 - 73)  BP: 140/83 (31 Jan 2018 07:00) (131/73 - 159/76)  RR: 18 (31 Jan 2018 07:00) (18 - 18)  SpO2: 97% (31 Jan 2018 07:00) (94% - 98%)    PHYSICAL EXAM:    Constitutional: No Acute Distress     Neurological: AOx3, Following Commands, Moving all Extremities 5/5. No drift ALEC EOMI. Left temporal incision staples C/D/I    Pulmonary: Clear to Auscultation,     Cardiovascular: S1, S2, Regular rate and rhythm     Gastrointestinal: Soft, Non-tender, Non-distended     Extremities: No calf tenderness       LABS:                        13.1   17.39 )-----------( 333      ( 31 Jan 2018 07:18 )             41.1    01-31    138  |  102  |  38<H>  ----------------------------<  146<H>  4.4   |  21<L>  |  1.00    Ca    9.3      31 Jan 2018 07:20  Phos  3.4     01-29  Mg     1.8     01-29            IMAGING:         MEDICATIONS:    acetaminophen   Tablet. 650 milliGRAM(s) Oral every 6 hours PRN Mild Pain (1 - 3)  oxyCODONE    5 mG/acetaminophen 325 mG 1 Tablet(s) Oral every 4 hours PRN Moderate Pain (4 - 6)  oxyCODONE    5 mG/acetaminophen 325 mG 2 Tablet(s) Oral every 4 hours PRN Severe Pain (7 - 10)  amLODIPine   Tablet 10 milliGRAM(s) Oral daily  hydrALAZINE Injectable 10 milliGRAM(s) IV Push every 6 hours PRN BP > 180/105  labetalol 200 milliGRAM(s) Oral every 8 hours  docusate sodium 100 milliGRAM(s) Oral two times a day  magnesium citrate Solution 300 milliLiter(s) Oral once  polyethylene glycol 3350 17 Gram(s) Oral daily  senna 1 Tablet(s) Oral daily  dexamethasone     Tablet 4 milliGRAM(s) Oral every 8 hours  enoxaparin Injectable 40 milliGRAM(s) SubCutaneous <User Schedule>  insulin lispro (HumaLOG) corrective regimen sliding scale   SubCutaneous every 6 hours  sodium chloride 0.9%. 1000 milliLiter(s) IV Continuous <Continuous>      DIET:

## 2018-01-31 NOTE — PROGRESS NOTE ADULT - ASSESSMENT
67y old  Male with uncontrolled hypertension presents with a chief complaint of headaches found to have a left temporal lobe enhancing lesion/mass with mass effect and MLS, concerning for neoplasm . Taken to OR after medical and cardiology clearance s/p left craniotomy for brain tumor biopsy on 1/30/18. Frozen GBM.     Plan:  Neuro stable. On decadron 4 q8. F/U final pathology. nOt on any AED  Vitals. BP controlled on Norvasc and Labetalol. Outpatient medical clinic follow up  Oncology/ rad onc consult- Uninsured and undocumented.   DVT ppx  Leucocytosis- Likely related to steroids.  Mild SHAWNA- Creatinine improving. Continue IVF.   DVT ppx.  Possible d/c home in am. PT - Home PT

## 2018-01-31 NOTE — CONSULT NOTE ADULT - PROBLEM SELECTOR RECOMMENDATION 9
prelim diagnosis shows GBM  d/w family no official path reported  will arrange for follow up at [facility]  consider CT chest/abd/pelvis to confirm no additional tumors
MRI imaging with left temporal lobe mass concerning for glioblastoma.  Neurosurgery plans on taking the patient to the OR tomorrow.  I recommended a cardiology consult for optimization recommendations.   Initial ECG shows bifascicular block with prolonged QTc and LVH. Today's ECG with new lateral T wave inversions.   TTE ordered, pending.

## 2018-01-31 NOTE — CONSULT NOTE ADULT - SUBJECTIVE AND OBJECTIVE BOX
HPI:  NEUROLOGY CONSULT     Patient is a 67y old male who presents with a chief complaint of headache.    HPI:  66YO  M with h/o untreated hypertension who presents with headache x3-4d and some minor memory lapses per family. Pt notes frontal HA, pressure like. He took his BP at home - 250s/150s so came to ED. In Ed no deficits found on exam but CT head revealed areas of hemorrhage in L temporal lobe. Neurosx called and no intervention recommended. Pt accompanied by daughter and wife at bedside who help to translate. Pt lives alone at baseline and performs all ADLs. Not working currently.   [place] is improving on its own. Denies any visual changes, no lightheadedness/dizziness, no unsteady gait or imbalance. Denies focal weakness/numbness. Denies dysarthria, dysphagia, diplopia. Denies N/V/D, fevers/chills or weight loss. No hx malignancy or AVM/hemorrhage in family.     NIHSS 0 ICH score 0 (27 Jan 2018 01:16)    Hospital course    Patient admitted to neurosurgery. Found to have 8cm left temporal lobe mass with hemorrhaage, vasogenic edema, and 2mm midline shift. Patient went to the OR on 1/30, frozen section shows GBM. No official path reported.     Allergies    No Known Allergies    Intolerances        MEDICATIONS  (STANDING):  amLODIPine   Tablet 10 milliGRAM(s) Oral daily  dexamethasone     Tablet 4 milliGRAM(s) Oral every 8 hours  docusate sodium 100 milliGRAM(s) Oral two times a day  enoxaparin Injectable 40 milliGRAM(s) SubCutaneous <User Schedule>  insulin lispro (HumaLOG) corrective regimen sliding scale   SubCutaneous every 6 hours  labetalol 200 milliGRAM(s) Oral every 8 hours  magnesium citrate Solution 300 milliLiter(s) Oral once  polyethylene glycol 3350 17 Gram(s) Oral daily  senna 1 Tablet(s) Oral daily  sodium chloride 0.9%. 1000 milliLiter(s) (90 mL/Hr) IV Continuous <Continuous>    MEDICATIONS  (PRN):  acetaminophen   Tablet. 650 milliGRAM(s) Oral every 6 hours PRN Mild Pain (1 - 3)  hydrALAZINE Injectable 10 milliGRAM(s) IV Push every 6 hours PRN BP > 180/105  oxyCODONE    5 mG/acetaminophen 325 mG 1 Tablet(s) Oral every 4 hours PRN Moderate Pain (4 - 6)  oxyCODONE    5 mG/acetaminophen 325 mG 2 Tablet(s) Oral every 4 hours PRN Severe Pain (7 - 10)      PAST MEDICAL & SURGICAL HISTORY:  HTN (hypertension)  History of appendectomy      FAMILY HISTORY:      SOCIAL HISTORY: No EtOH, no tobacco    REVIEW OF SYSTEMS:    CONSTITUTIONAL: No weakness, fevers or chills  EYES/ENT: No visual changes;  No vertigo or throat pain   NECK: No pain or stiffness  RESPIRATORY: No cough, wheezing, hemoptysis; No shortness of breath  CARDIOVASCULAR: No chest pain or palpitations  GASTROINTESTINAL: No abdominal or epigastric pain. No nausea, vomiting, or hematemesis; No diarrhea or constipation. No melena or hematochezia.  GENITOURINARY: No dysuria, frequency or hematuria  NEUROLOGICAL: No numbness or weakness  SKIN: No itching, burning, rashes, or lesions   All other review of systems is negative unless indicated above.        T(F): 98.4 (01-31-18 @ 11:00), Max: 98.6 (01-30-18 @ 17:09)  HR: 60 (01-31-18 @ 11:00)  BP: 160/63 (01-31-18 @ 11:00)  RR: 18 (01-31-18 @ 11:00)  SpO2: 97% (01-31-18 @ 11:00)  Wt(kg): --    GENERAL: NAD, well-developed  HEAD:  Atraumatic, Normocephalic  EYES: EOMI, PERRLA, conjunctiva and sclera clear  NECK: Supple, No JVD  CHEST/LUNG: Clear to auscultation bilaterally; No wheeze  HEART: Regular rate and rhythm; No murmurs, rubs, or gallops  ABDOMEN: Soft, Nontender, Nondistended; Bowel sounds present  EXTREMITIES:  2+ Peripheral Pulses, No clubbing, cyanosis, or edema  NEUROLOGY: non-focal  SKIN: No rashes or lesions                          13.1   17.39 )-----------( 333      ( 31 Jan 2018 07:18 )             41.1       01-31    138  |  102  |  38<H>  ----------------------------<  146<H>  4.4   |  21<L>  |  1.00    Ca    9.3      31 Jan 2018 07:20  Phos  3.4     01-29  Mg     1.8     01-29          < from: MR Head w/wo IV Cont (01.28.18 @ 11:29) >  IMPRESSION:     Predominantly peripherally enhancing 8.7 x 3.3 x 4.0 cm mass centered in   the left temporal lobe with a few foci of associated hemorrhage,   demonstrating heterogeneous mild restricted diffusion and moderate   associated vasogenic edema. 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 This causes mass effect upon the left lateral   ventricle and 2 mm rightward midline shift. No herniation or   hydrocephalus.    < end of copied text >

## 2018-01-31 NOTE — CONSULT NOTE ADULT - ATTENDING COMMENTS
Left temporal mass  MRI with contrast pending  May consider biopsy/resection if indicated
Pt seen and exmined.I agree w initial A/P POC.Most likle Dx is GBM which will require further Rx ( already had partial resection) usual Rx is RT and Temozolamide.Will expedite referral to Neuro-onc.Pt shouyld continie steroid Rx in the interim.
*** INCOMPLETE. NOTE IN PROGRESS. ***

## 2018-01-31 NOTE — PROGRESS NOTE ADULT - PROBLEM SELECTOR PLAN 1
left temporal lobe enhancing lesion/mass with mass effect and MLS, concerning for neoplasm s/p left crani with biopsy of tumor on 1/30/18  - continue decadron  - EEG negative  - follow up biopsy result  [facility] ? need for CT C/A/P for further evaluation.

## 2018-02-01 VITALS
SYSTOLIC BLOOD PRESSURE: 164 MMHG | OXYGEN SATURATION: 97 % | RESPIRATION RATE: 18 BRPM | TEMPERATURE: 98 F | DIASTOLIC BLOOD PRESSURE: 90 MMHG | HEART RATE: 72 BPM

## 2018-02-01 LAB
BASOPHILS # BLD AUTO: 0 K/UL — SIGNIFICANT CHANGE UP (ref 0–0.2)
BASOPHILS NFR BLD AUTO: 0 % — SIGNIFICANT CHANGE UP (ref 0–2)
BUN SERPL-MCNC: 36 MG/DL — HIGH (ref 7–23)
CALCIUM SERPL-MCNC: 9.5 MG/DL — SIGNIFICANT CHANGE UP (ref 8.4–10.5)
CHLORIDE SERPL-SCNC: 101 MMOL/L — SIGNIFICANT CHANGE UP (ref 96–108)
CO2 SERPL-SCNC: 21 MMOL/L — LOW (ref 22–31)
CREAT SERPL-MCNC: 1.1 MG/DL — SIGNIFICANT CHANGE UP (ref 0.5–1.3)
EOSINOPHIL # BLD AUTO: 0 K/UL — SIGNIFICANT CHANGE UP (ref 0–0.5)
EOSINOPHIL NFR BLD AUTO: 0 % — SIGNIFICANT CHANGE UP (ref 0–6)
GLUCOSE SERPL-MCNC: 146 MG/DL — HIGH (ref 70–99)
HCT VFR BLD CALC: 41.1 % — SIGNIFICANT CHANGE UP (ref 39–50)
HGB BLD-MCNC: 13.2 G/DL — SIGNIFICANT CHANGE UP (ref 13–17)
IMM GRANULOCYTES NFR BLD AUTO: 0.2 % — SIGNIFICANT CHANGE UP (ref 0–1.5)
LYMPHOCYTES # BLD AUTO: 0.87 K/UL — LOW (ref 1–3.3)
LYMPHOCYTES # BLD AUTO: 6.8 % — LOW (ref 13–44)
MCHC RBC-ENTMCNC: 29 PG — SIGNIFICANT CHANGE UP (ref 27–34)
MCHC RBC-ENTMCNC: 32.1 GM/DL — SIGNIFICANT CHANGE UP (ref 32–36)
MCV RBC AUTO: 90.3 FL — SIGNIFICANT CHANGE UP (ref 80–100)
MONOCYTES # BLD AUTO: 0.75 K/UL — SIGNIFICANT CHANGE UP (ref 0–0.9)
MONOCYTES NFR BLD AUTO: 5.8 % — SIGNIFICANT CHANGE UP (ref 2–14)
NEUTROPHILS # BLD AUTO: 11.19 K/UL — HIGH (ref 1.8–7.4)
NEUTROPHILS NFR BLD AUTO: 87.2 % — HIGH (ref 43–77)
PLATELET # BLD AUTO: 318 K/UL — SIGNIFICANT CHANGE UP (ref 150–400)
POTASSIUM SERPL-MCNC: 4.6 MMOL/L — SIGNIFICANT CHANGE UP (ref 3.5–5.3)
POTASSIUM SERPL-SCNC: 4.6 MMOL/L — SIGNIFICANT CHANGE UP (ref 3.5–5.3)
RBC # BLD: 4.55 M/UL — SIGNIFICANT CHANGE UP (ref 4.2–5.8)
RBC # FLD: 14.1 % — SIGNIFICANT CHANGE UP (ref 10.3–14.5)
SODIUM SERPL-SCNC: 137 MMOL/L — SIGNIFICANT CHANGE UP (ref 135–145)
WBC # BLD: 12.83 K/UL — HIGH (ref 3.8–10.5)
WBC # FLD AUTO: 12.83 K/UL — HIGH (ref 3.8–10.5)

## 2018-02-01 PROCEDURE — 82962 GLUCOSE BLOOD TEST: CPT

## 2018-02-01 PROCEDURE — 84244 ASSAY OF RENIN: CPT

## 2018-02-01 PROCEDURE — 83735 ASSAY OF MAGNESIUM: CPT

## 2018-02-01 PROCEDURE — 85730 THROMBOPLASTIN TIME PARTIAL: CPT

## 2018-02-01 PROCEDURE — 82330 ASSAY OF CALCIUM: CPT

## 2018-02-01 PROCEDURE — 70498 CT ANGIOGRAPHY NECK: CPT

## 2018-02-01 PROCEDURE — 70551 MRI BRAIN STEM W/O DYE: CPT

## 2018-02-01 PROCEDURE — C1769: CPT

## 2018-02-01 PROCEDURE — 96375 TX/PRO/DX INJ NEW DRUG ADDON: CPT

## 2018-02-01 PROCEDURE — 88360 TUMOR IMMUNOHISTOCHEM/MANUAL: CPT

## 2018-02-01 PROCEDURE — 88341 IMHCHEM/IMCYTCHM EA ADD ANTB: CPT

## 2018-02-01 PROCEDURE — 86900 BLOOD TYPING SEROLOGIC ABO: CPT

## 2018-02-01 PROCEDURE — 82550 ASSAY OF CK (CPK): CPT

## 2018-02-01 PROCEDURE — A9585: CPT

## 2018-02-01 PROCEDURE — 84484 ASSAY OF TROPONIN QUANT: CPT

## 2018-02-01 PROCEDURE — 99291 CRITICAL CARE FIRST HOUR: CPT | Mod: 25

## 2018-02-01 PROCEDURE — 95819 EEG AWAKE AND ASLEEP: CPT

## 2018-02-01 PROCEDURE — 82435 ASSAY OF BLOOD CHLORIDE: CPT

## 2018-02-01 PROCEDURE — 85610 PROTHROMBIN TIME: CPT

## 2018-02-01 PROCEDURE — 99233 SBSQ HOSP IP/OBS HIGH 50: CPT

## 2018-02-01 PROCEDURE — 70496 CT ANGIOGRAPHY HEAD: CPT

## 2018-02-01 PROCEDURE — 88331 PATH CONSLTJ SURG 1 BLK 1SPC: CPT

## 2018-02-01 PROCEDURE — 85014 HEMATOCRIT: CPT

## 2018-02-01 PROCEDURE — 82803 BLOOD GASES ANY COMBINATION: CPT

## 2018-02-01 PROCEDURE — 84132 ASSAY OF SERUM POTASSIUM: CPT

## 2018-02-01 PROCEDURE — 86901 BLOOD TYPING SEROLOGIC RH(D): CPT

## 2018-02-01 PROCEDURE — 70553 MRI BRAIN STEM W/O & W/DYE: CPT

## 2018-02-01 PROCEDURE — 88307 TISSUE EXAM BY PATHOLOGIST: CPT

## 2018-02-01 PROCEDURE — 97162 PT EVAL MOD COMPLEX 30 MIN: CPT

## 2018-02-01 PROCEDURE — 88342 IMHCHEM/IMCYTCHM 1ST ANTB: CPT

## 2018-02-01 PROCEDURE — 83605 ASSAY OF LACTIC ACID: CPT

## 2018-02-01 PROCEDURE — 86850 RBC ANTIBODY SCREEN: CPT

## 2018-02-01 PROCEDURE — 84295 ASSAY OF SERUM SODIUM: CPT

## 2018-02-01 PROCEDURE — 82088 ASSAY OF ALDOSTERONE: CPT

## 2018-02-01 PROCEDURE — 83036 HEMOGLOBIN GLYCOSYLATED A1C: CPT

## 2018-02-01 PROCEDURE — 70544 MR ANGIOGRAPHY HEAD W/O DYE: CPT

## 2018-02-01 PROCEDURE — 93306 TTE W/DOPPLER COMPLETE: CPT

## 2018-02-01 PROCEDURE — 70450 CT HEAD/BRAIN W/O DYE: CPT

## 2018-02-01 PROCEDURE — 86695 HERPES SIMPLEX TYPE 1 TEST: CPT

## 2018-02-01 PROCEDURE — 86696 HERPES SIMPLEX TYPE 2 TEST: CPT

## 2018-02-01 PROCEDURE — 83615 LACTATE (LD) (LDH) ENZYME: CPT

## 2018-02-01 PROCEDURE — 84100 ASSAY OF PHOSPHORUS: CPT

## 2018-02-01 PROCEDURE — 80048 BASIC METABOLIC PNL TOTAL CA: CPT

## 2018-02-01 PROCEDURE — 71045 X-RAY EXAM CHEST 1 VIEW: CPT

## 2018-02-01 PROCEDURE — 82553 CREATINE MB FRACTION: CPT

## 2018-02-01 PROCEDURE — 93005 ELECTROCARDIOGRAM TRACING: CPT

## 2018-02-01 PROCEDURE — C1889: CPT

## 2018-02-01 PROCEDURE — C1713: CPT

## 2018-02-01 PROCEDURE — 85027 COMPLETE CBC AUTOMATED: CPT

## 2018-02-01 PROCEDURE — 81003 URINALYSIS AUTO W/O SCOPE: CPT

## 2018-02-01 PROCEDURE — 80053 COMPREHEN METABOLIC PANEL: CPT

## 2018-02-01 PROCEDURE — 97165 OT EVAL LOW COMPLEX 30 MIN: CPT

## 2018-02-01 PROCEDURE — 80061 LIPID PANEL: CPT

## 2018-02-01 PROCEDURE — 96374 THER/PROPH/DIAG INJ IV PUSH: CPT

## 2018-02-01 PROCEDURE — 82947 ASSAY GLUCOSE BLOOD QUANT: CPT

## 2018-02-01 RX ORDER — DEXAMETHASONE 0.5 MG/5ML
1 ELIXIR ORAL
Qty: 30 | Refills: 0
Start: 2018-02-01

## 2018-02-01 RX ORDER — AMLODIPINE BESYLATE 2.5 MG/1
1 TABLET ORAL
Qty: 0 | Refills: 0 | COMMUNITY
Start: 2018-02-01

## 2018-02-01 RX ORDER — PANTOPRAZOLE SODIUM 20 MG/1
40 TABLET, DELAYED RELEASE ORAL DAILY
Qty: 0 | Refills: 0 | Status: DISCONTINUED | OUTPATIENT
Start: 2018-02-01 | End: 2018-02-01

## 2018-02-01 RX ORDER — AMLODIPINE BESYLATE 2.5 MG/1
1 TABLET ORAL
Qty: 30 | Refills: 0
Start: 2018-02-01 | End: 2018-03-02

## 2018-02-01 RX ORDER — LABETALOL HCL 100 MG
1 TABLET ORAL
Qty: 90 | Refills: 0
Start: 2018-02-01 | End: 2018-03-02

## 2018-02-01 RX ORDER — LEVETIRACETAM 250 MG/1
1 TABLET, FILM COATED ORAL
Qty: 60 | Refills: 0 | OUTPATIENT
Start: 2018-02-01

## 2018-02-01 RX ORDER — ACETAMINOPHEN 500 MG
2 TABLET ORAL
Qty: 0 | Refills: 0 | DISCHARGE
Start: 2018-02-01

## 2018-02-01 RX ORDER — DEXAMETHASONE 0.5 MG/5ML
4 ELIXIR ORAL EVERY 12 HOURS
Qty: 0 | Refills: 0 | Status: DISCONTINUED | OUTPATIENT
Start: 2018-02-01 | End: 2018-02-01

## 2018-02-01 RX ADMIN — Medication 200 MILLIGRAM(S): at 05:18

## 2018-02-01 RX ADMIN — Medication 200 MILLIGRAM(S): at 12:51

## 2018-02-01 RX ADMIN — Medication 1: at 12:47

## 2018-02-01 RX ADMIN — LEVETIRACETAM 500 MILLIGRAM(S): 250 TABLET, FILM COATED ORAL at 05:18

## 2018-02-01 RX ADMIN — Medication 30 MILLILITER(S): at 11:55

## 2018-02-01 RX ADMIN — AMLODIPINE BESYLATE 10 MILLIGRAM(S): 2.5 TABLET ORAL at 05:18

## 2018-02-01 RX ADMIN — PANTOPRAZOLE SODIUM 40 MILLIGRAM(S): 20 TABLET, DELAYED RELEASE ORAL at 12:48

## 2018-02-01 RX ADMIN — Medication 1: at 01:08

## 2018-02-01 RX ADMIN — Medication 100 MILLIGRAM(S): at 05:18

## 2018-02-01 RX ADMIN — Medication 4 MILLIGRAM(S): at 05:18

## 2018-02-01 NOTE — PROGRESS NOTE ADULT - PROBLEM SELECTOR PLAN 3
- BP overall improved  - continue with norvasc 10mg QD, labetalol 200mg TID. Can eventually consider starting HCTZ and/or ACI when renal function remains stable but will hold off at this time.  - suspect underlying long standing uncontrolled HTN.
- BP overall improved  - continue with norvasc 10mg QD, labetalol 200mg TID. Can eventually consider starting HCTZ and/or ACI when renal function remains stable but will hold off at this time.  - suspect underlying long standing uncontrolled HTN.   - check FLP
- BP overall improved with SBP in 150's now  - continue with norvasc 10mg QD, metoprolol 25mg BID. consider switching metoprolol to labetalol 200mg PO TID if needed to optimize BP.   - if BP difficult to control on multiple meds, would consider secondary HTN work up. suspect underlying long standing uncontrolled HTN.   - check FLP

## 2018-02-01 NOTE — PROGRESS NOTE ADULT - PROVIDER SPECIALTY LIST ADULT
Cardiology
Internal Medicine
Internal Medicine
NSICU
Neurology
Neurology
Neurosurgery
Rad Onc
Cardiology
Internal Medicine
NSICU

## 2018-02-01 NOTE — PROGRESS NOTE ADULT - ASSESSMENT
67y old  Male with uncontrolled hypertension presents with a chief complaint of headaches found to have a left temporal lobe enhancing lesion/mass with mass effect and MLS, concerning for neoplasm . Taken to OR after medical and cardiology clearance s/p left craniotomy for brain tumor biopsy on 1/30/18. Frozen GBM.     Plan:  Neuro stable. On decadron 4 q8. F/U final pathology.Kedeshawn d/c as per Dr Tate. Outpatient neuroonc and radonc appointments set.  Vitals. BP controlled on Norvasc and Labetalol. Outpatient medical clinic follow up  DVT ppx  Leucocytosis resolving. likely steroids  Mild SHAWNA- resolving   DVT ppx.   d/c home. PT - Home PT

## 2018-02-01 NOTE — PROGRESS NOTE ADULT - PROBLEM SELECTOR PLAN 5
- suspect stress induced from recent surgery and steroid  - monitor CBC - suspect stress induced from recent surgery and steroid, overall improving

## 2018-02-01 NOTE — PROGRESS NOTE ADULT - SUBJECTIVE AND OBJECTIVE BOX
SUBJECTIVE:     OVERNIGHT EVENTS: none    Vital Signs Last 24 Hrs  T(C): 36.3 (01 Feb 2018 08:00), Max: 36.7 (31 Jan 2018 21:00)  T(F): 97.3 (01 Feb 2018 08:00), Max: 98.1 (01 Feb 2018 05:13)  HR: 63 (01 Feb 2018 08:00) (63 - 73)  BP: 139/72 (01 Feb 2018 08:00) (123/70 - 170/86)  BP(mean): --  RR: 18 (01 Feb 2018 08:00) (17 - 18)  SpO2: 96% (01 Feb 2018 08:00) (94% - 97%)    PHYSICAL EXAM:  Constitutional: No Acute Distress     Neurological: AOx3, Following Commands, Moving all Extremities 5/5. No drift ALEC EOMI. Left temporal incision staples C/D/I    Pulmonary: Clear to Auscultation,     Cardiovascular: S1, S2, Regular rate and rhythm     Gastrointestinal: Soft, Non-tender, Non-distended     Extremities: No calf tenderness       LABS:                        13.2   12.83 )-----------( 318      ( 01 Feb 2018 07:37 )             41.1    02-01    137  |  101  |  36<H>  ----------------------------<  146<H>  4.6   |  21<L>  |  1.10    Ca    9.5      01 Feb 2018 06:21          IMAGING:         MEDICATIONS:    acetaminophen   Tablet. 650 milliGRAM(s) Oral every 6 hours PRN Mild Pain (1 - 3)  levETIRAcetam 500 milliGRAM(s) Oral two times a day  amLODIPine   Tablet 10 milliGRAM(s) Oral daily  hydrALAZINE Injectable 10 milliGRAM(s) IV Push every 6 hours PRN BP > 180/105  labetalol 200 milliGRAM(s) Oral every 8 hours  aluminum hydroxide/magnesium hydroxide/simethicone Suspension 30 milliLiter(s) Oral once  docusate sodium 100 milliGRAM(s) Oral two times a day  magnesium citrate Solution 300 milliLiter(s) Oral once  pantoprazole    Tablet 40 milliGRAM(s) Oral daily  polyethylene glycol 3350 17 Gram(s) Oral daily  senna 1 Tablet(s) Oral daily  dexamethasone     Tablet 4 milliGRAM(s) Oral every 12 hours  enoxaparin Injectable 40 milliGRAM(s) SubCutaneous <User Schedule>  insulin lispro (HumaLOG) corrective regimen sliding scale   SubCutaneous every 6 hours      DIET:

## 2018-02-01 NOTE — PROGRESS NOTE ADULT - PROBLEM SELECTOR PLAN 4
- continue miralax, colace, senna  - trial of mg citrate
- resolved  - continue with bowel regimen
- start miralax

## 2018-02-01 NOTE — PROGRESS NOTE ADULT - SUBJECTIVE AND OBJECTIVE BOX
Patient is a 67y old  Male who presents with a chief complaint of headache (27 Jan 2018 07:09)      SUBJECTIVE / OVERNIGHT EVENTS: no HA, + BM yesterday, tolerating diet    MEDICATIONS  (STANDING):  amLODIPine   Tablet 10 milliGRAM(s) Oral daily  dexamethasone     Tablet 4 milliGRAM(s) Oral every 12 hours  docusate sodium 100 milliGRAM(s) Oral two times a day  enoxaparin Injectable 40 milliGRAM(s) SubCutaneous <User Schedule>  insulin lispro (HumaLOG) corrective regimen sliding scale   SubCutaneous every 6 hours  labetalol 200 milliGRAM(s) Oral every 8 hours  levETIRAcetam 500 milliGRAM(s) Oral two times a day  magnesium citrate Solution 300 milliLiter(s) Oral once  polyethylene glycol 3350 17 Gram(s) Oral daily  senna 1 Tablet(s) Oral daily    MEDICATIONS  (PRN):  acetaminophen   Tablet. 650 milliGRAM(s) Oral every 6 hours PRN Mild Pain (1 - 3)  hydrALAZINE Injectable 10 milliGRAM(s) IV Push every 6 hours PRN BP > 180/105  oxyCODONE    5 mG/acetaminophen 325 mG 1 Tablet(s) Oral every 4 hours PRN Moderate Pain (4 - 6)  oxyCODONE    5 mG/acetaminophen 325 mG 2 Tablet(s) Oral every 4 hours PRN Severe Pain (7 - 10)      Vital Signs Last 24 Hrs  T(C): 36.3 (01 Feb 2018 08:00), Max: 36.9 (31 Jan 2018 11:00)  T(F): 97.3 (01 Feb 2018 08:00), Max: 98.4 (31 Jan 2018 11:00)  HR: 63 (01 Feb 2018 08:00) (60 - 73)  BP: 139/72 (01 Feb 2018 08:00) (123/70 - 170/86)  BP(mean): --  RR: 18 (01 Feb 2018 08:00) (17 - 18)  SpO2: 96% (01 Feb 2018 08:00) (94% - 97%)  CAPILLARY BLOOD GLUCOSE      POCT Blood Glucose.: 209 mg/dL (01 Feb 2018 08:27)  POCT Blood Glucose.: 131 mg/dL (01 Feb 2018 05:08)  POCT Blood Glucose.: 171 mg/dL (01 Feb 2018 00:56)  POCT Blood Glucose.: 141 mg/dL (31 Jan 2018 17:59)  POCT Blood Glucose.: 142 mg/dL (31 Jan 2018 12:36)    I&O's Summary    31 Jan 2018 07:01  -  01 Feb 2018 07:00  --------------------------------------------------------  IN: 1380 mL / OUT: 0 mL / NET: 1380 mL        PHYSICAL EXAM:  GENERAL: NAD  HEENT: neck supple  LUNG: Clear to auscultation bilaterally; No wheeze  HEART: S1, S2  ABDOMEN: Soft, Nontender, Nondistended; Bowel sounds present  EXTREMITIES: No leg edema  PSYCH: normal affect, calm  NEUROLOGY: AAO x3, moves all extremities  SKIN: No rashes or lesions      LABS:                        13.2   12.83 )-----------( 318      ( 01 Feb 2018 07:37 )             41.1     02-01    137  |  101  |  36<H>  ----------------------------<  146<H>  4.6   |  21<L>  |  1.10    Ca    9.5      01 Feb 2018 06:21                RADIOLOGY & ADDITIONAL TESTS:    Imaging Personally Reviewed:    Consultant(s) Notes Reviewed:  ara yuen    Care Discussed with Consultants/Other Providers: neurosurgery PA

## 2018-02-01 NOTE — PROGRESS NOTE ADULT - PROBLEM SELECTOR PLAN 2
- patient denies any chest pain  - serial CE negative  - cardiology input appreciated  - TTE with normal LVSF, LVH, mild diastolic dysfunction
- patient denies any chest pain  - serial CE negative  - cardiology input appreciated  - TTE with normal LVSF, LVH, mild diastolic dysfunction
- patient denies any chest pain  - serial CE negative  - cardiology input appreciated  - follow up TTE

## 2018-02-01 NOTE — PROGRESS NOTE ADULT - PROBLEM SELECTOR PLAN 1
left temporal lobe enhancing lesion/mass with mass effect and MLS, concerning for neoplasm(frozen c/w GBM) s/p left crani with biopsy of tumor on 1/30/18  - continue decadron  - EEG negative  - follow up official biopsy result  - appreciate [facility] and [facility] input. patient has follow up appointment with [facility](Dr. Curtis) on 2/7/18 at 12pm and [facility] to arrange for follow up appointment. will also try to make appointment at medicine clinic at [address] [place] [address] [phone]. left temporal lobe enhancing lesion/mass with mass effect and MLS, concerning for neoplasm(frozen c/w GBM) s/p left crani with biopsy of tumor on 1/30/18  - continue decadron  - EEG negative  - follow up official biopsy result  - appreciate [facility] and [facility] input.   - patient has follow up appointment with [facility](Dr. Curtis) on 2/7/18 at 12pm and [facility] to arrange for follow up appointment. will also try to make appointment at medicine clinic at [address] [place] [address] [phone]. left temporal lobe enhancing lesion/mass with mass effect and MLS, concerning for neoplasm(frozen c/w GBM) s/p left crani with biopsy of tumor on 1/30/18  - continue decadron  - EEG negative  - follow up official biopsy result  - appreciate [facility] and [facility] input.   - spoke to oncology fellow, CT c/a/p can be done as outpatient.  - patient has follow up appointment with [facility](Dr. Curtis) on 2/7/18 at 12pm and [facility] appointment on 2/8/18 at 1pm and appointment at medicine clinic on 2/6/18 at 2pm at [address] [place] [address] [phone].

## 2018-02-01 NOTE — PROGRESS NOTE ADULT - ATTENDING COMMENTS
As above.  Post-op from left craniotomy and doing well.  Awake and alert and conversing.  No overt motor deficits.  Will follow as necessary.
d/c planning home per neurosurgery

## 2018-02-02 ENCOUNTER — TRANSCRIPTION ENCOUNTER (OUTPATIENT)
Age: 68
End: 2018-02-02

## 2018-02-06 ENCOUNTER — TRANSCRIPTION ENCOUNTER (OUTPATIENT)
Age: 68
End: 2018-02-06

## 2018-02-06 ENCOUNTER — APPOINTMENT (OUTPATIENT)
Dept: INTERNAL MEDICINE | Facility: CLINIC | Age: 68
End: 2018-02-06

## 2018-02-06 ENCOUNTER — OUTPATIENT (OUTPATIENT)
Dept: OUTPATIENT SERVICES | Facility: HOSPITAL | Age: 68
LOS: 1 days | End: 2018-02-06
Payer: SELF-PAY

## 2018-02-06 ENCOUNTER — APPOINTMENT (OUTPATIENT)
Dept: NEUROSURGERY | Facility: CLINIC | Age: 68
End: 2018-02-06
Payer: MEDICAID

## 2018-02-06 VITALS
HEART RATE: 61 BPM | BODY MASS INDEX: 36.68 KG/M2 | OXYGEN SATURATION: 98 % | DIASTOLIC BLOOD PRESSURE: 86 MMHG | WEIGHT: 242 LBS | SYSTOLIC BLOOD PRESSURE: 148 MMHG | HEIGHT: 68 IN

## 2018-02-06 VITALS
DIASTOLIC BLOOD PRESSURE: 76 MMHG | HEIGHT: 68 IN | SYSTOLIC BLOOD PRESSURE: 162 MMHG | HEART RATE: 59 BPM | WEIGHT: 236 LBS | BODY MASS INDEX: 35.77 KG/M2

## 2018-02-06 DIAGNOSIS — I10 ESSENTIAL (PRIMARY) HYPERTENSION: ICD-10-CM

## 2018-02-06 DIAGNOSIS — Z78.9 OTHER SPECIFIED HEALTH STATUS: ICD-10-CM

## 2018-02-06 DIAGNOSIS — Z80.1 FAMILY HISTORY OF MALIGNANT NEOPLASM OF TRACHEA, BRONCHUS AND LUNG: ICD-10-CM

## 2018-02-06 DIAGNOSIS — Z90.49 ACQUIRED ABSENCE OF OTHER SPECIFIED PARTS OF DIGESTIVE TRACT: Chronic | ICD-10-CM

## 2018-02-06 PROCEDURE — 99024 POSTOP FOLLOW-UP VISIT: CPT

## 2018-02-06 PROCEDURE — 90688 IIV4 VACCINE SPLT 0.5 ML IM: CPT

## 2018-02-06 PROCEDURE — G0008: CPT

## 2018-02-06 PROCEDURE — G0463: CPT

## 2018-02-07 ENCOUNTER — APPOINTMENT (OUTPATIENT)
Dept: RADIATION ONCOLOGY | Facility: CLINIC | Age: 68
End: 2018-02-07
Payer: MEDICAID

## 2018-02-07 ENCOUNTER — OUTPATIENT (OUTPATIENT)
Dept: OUTPATIENT SERVICES | Facility: HOSPITAL | Age: 68
LOS: 1 days | Discharge: HOME | End: 2018-02-07

## 2018-02-07 VITALS
WEIGHT: 237.43 LBS | HEART RATE: 53 BPM | TEMPERATURE: 98.2 F | BODY MASS INDEX: 35.99 KG/M2 | HEIGHT: 68 IN | DIASTOLIC BLOOD PRESSURE: 71 MMHG | RESPIRATION RATE: 16 BRPM | SYSTOLIC BLOOD PRESSURE: 150 MMHG | OXYGEN SATURATION: 100 %

## 2018-02-07 DIAGNOSIS — Z90.49 ACQUIRED ABSENCE OF OTHER SPECIFIED PARTS OF DIGESTIVE TRACT: Chronic | ICD-10-CM

## 2018-02-07 PROBLEM — Z78.9 NON-SMOKER: Status: ACTIVE | Noted: 2018-02-06

## 2018-02-07 PROBLEM — Z80.1 FAMILY HISTORY OF LUNG CANCER: Status: ACTIVE | Noted: 2018-02-06

## 2018-02-07 PROCEDURE — 77263 THER RADIOLOGY TX PLNG CPLX: CPT

## 2018-02-07 PROCEDURE — 99205 OFFICE O/P NEW HI 60 MIN: CPT | Mod: 25,GC

## 2018-02-08 ENCOUNTER — RESULT REVIEW (OUTPATIENT)
Age: 68
End: 2018-02-08

## 2018-02-08 ENCOUNTER — APPOINTMENT (OUTPATIENT)
Dept: NEUROSURGERY | Facility: CLINIC | Age: 68
End: 2018-02-08
Payer: MEDICAID

## 2018-02-08 ENCOUNTER — APPOINTMENT (OUTPATIENT)
Dept: HEMATOLOGY ONCOLOGY | Facility: CLINIC | Age: 68
End: 2018-02-08

## 2018-02-08 ENCOUNTER — OUTPATIENT (OUTPATIENT)
Dept: OUTPATIENT SERVICES | Facility: HOSPITAL | Age: 68
LOS: 1 days | Discharge: HOME | End: 2018-02-08

## 2018-02-08 ENCOUNTER — APPOINTMENT (OUTPATIENT)
Dept: NEUROLOGY | Facility: CLINIC | Age: 68
End: 2018-02-08
Payer: MEDICAID

## 2018-02-08 VITALS
HEART RATE: 54 BPM | WEIGHT: 237 LBS | BODY MASS INDEX: 35.92 KG/M2 | RESPIRATION RATE: 16 BRPM | DIASTOLIC BLOOD PRESSURE: 81 MMHG | OXYGEN SATURATION: 97 % | HEIGHT: 68 IN | SYSTOLIC BLOOD PRESSURE: 147 MMHG

## 2018-02-08 VITALS
HEART RATE: 52 BPM | BODY MASS INDEX: 35.61 KG/M2 | HEIGHT: 68 IN | DIASTOLIC BLOOD PRESSURE: 89 MMHG | WEIGHT: 235 LBS | SYSTOLIC BLOOD PRESSURE: 157 MMHG

## 2018-02-08 DIAGNOSIS — Z90.49 ACQUIRED ABSENCE OF OTHER SPECIFIED PARTS OF DIGESTIVE TRACT: Chronic | ICD-10-CM

## 2018-02-08 DIAGNOSIS — C71.9 MALIGNANT NEOPLASM OF BRAIN, UNSPECIFIED: ICD-10-CM

## 2018-02-08 PROCEDURE — 99024 POSTOP FOLLOW-UP VISIT: CPT

## 2018-02-08 PROCEDURE — 99204 OFFICE O/P NEW MOD 45 MIN: CPT

## 2018-02-09 LAB
ALBUMIN SERPL ELPH-MCNC: 3.7 G/DL
ALP BLD-CCNC: 65 U/L
ALT SERPL-CCNC: 30 U/L
ANION GAP SERPL CALC-SCNC: 13 MMOL/L
AST SERPL-CCNC: 17 U/L
BILIRUB SERPL-MCNC: 0.3 MG/DL
BUN SERPL-MCNC: 27 MG/DL
CALCIUM SERPL-MCNC: 9.2 MG/DL
CHLORIDE SERPL-SCNC: 98 MMOL/L
CO2 SERPL-SCNC: 27 MMOL/L
CREAT SERPL-MCNC: 1 MG/DL
GLUCOSE SERPL-MCNC: 161 MG/DL
POTASSIUM SERPL-SCNC: 5 MMOL/L
PROT SERPL-MCNC: 6 G/DL
SODIUM SERPL-SCNC: 138 MMOL/L

## 2018-02-15 ENCOUNTER — APPOINTMENT (OUTPATIENT)
Dept: NEUROSURGERY | Facility: CLINIC | Age: 68
End: 2018-02-15

## 2018-02-16 ENCOUNTER — FORM ENCOUNTER (OUTPATIENT)
Age: 68
End: 2018-02-16

## 2018-02-17 ENCOUNTER — APPOINTMENT (OUTPATIENT)
Dept: MRI IMAGING | Facility: IMAGING CENTER | Age: 68
End: 2018-02-17
Payer: COMMERCIAL

## 2018-02-17 ENCOUNTER — OUTPATIENT (OUTPATIENT)
Dept: OUTPATIENT SERVICES | Facility: HOSPITAL | Age: 68
LOS: 1 days | End: 2018-02-17
Payer: SELF-PAY

## 2018-02-17 DIAGNOSIS — C71.9 MALIGNANT NEOPLASM OF BRAIN, UNSPECIFIED: ICD-10-CM

## 2018-02-17 DIAGNOSIS — Z90.49 ACQUIRED ABSENCE OF OTHER SPECIFIED PARTS OF DIGESTIVE TRACT: Chronic | ICD-10-CM

## 2018-02-17 PROCEDURE — A9585: CPT

## 2018-02-17 PROCEDURE — 70553 MRI BRAIN STEM W/O & W/DYE: CPT

## 2018-02-17 PROCEDURE — 70553 MRI BRAIN STEM W/O & W/DYE: CPT | Mod: 26

## 2018-02-20 DIAGNOSIS — Z23 ENCOUNTER FOR IMMUNIZATION: ICD-10-CM

## 2018-02-20 DIAGNOSIS — K59.00 CONSTIPATION, UNSPECIFIED: ICD-10-CM

## 2018-02-20 DIAGNOSIS — C71.9 MALIGNANT NEOPLASM OF BRAIN, UNSPECIFIED: ICD-10-CM

## 2018-02-21 ENCOUNTER — APPOINTMENT (OUTPATIENT)
Dept: INTERNAL MEDICINE | Facility: CLINIC | Age: 68
End: 2018-02-21
Payer: MEDICAID

## 2018-02-21 ENCOUNTER — OUTPATIENT (OUTPATIENT)
Dept: OUTPATIENT SERVICES | Facility: HOSPITAL | Age: 68
LOS: 1 days | End: 2018-02-21
Payer: SELF-PAY

## 2018-02-21 VITALS
OXYGEN SATURATION: 98 % | WEIGHT: 227 LBS | HEIGHT: 68 IN | HEART RATE: 56 BPM | DIASTOLIC BLOOD PRESSURE: 76 MMHG | BODY MASS INDEX: 34.4 KG/M2 | SYSTOLIC BLOOD PRESSURE: 142 MMHG

## 2018-02-21 DIAGNOSIS — I10 ESSENTIAL (PRIMARY) HYPERTENSION: ICD-10-CM

## 2018-02-21 DIAGNOSIS — R73.09 OTHER ABNORMAL GLUCOSE: ICD-10-CM

## 2018-02-21 DIAGNOSIS — C71.9 MALIGNANT NEOPLASM OF BRAIN, UNSPECIFIED: ICD-10-CM

## 2018-02-21 DIAGNOSIS — Z98.890 OTHER SPECIFIED POSTPROCEDURAL STATES: ICD-10-CM

## 2018-02-21 DIAGNOSIS — Z90.49 ACQUIRED ABSENCE OF OTHER SPECIFIED PARTS OF DIGESTIVE TRACT: Chronic | ICD-10-CM

## 2018-02-21 DIAGNOSIS — Z86.79 PERSONAL HISTORY OF OTHER DISEASES OF THE CIRCULATORY SYSTEM: ICD-10-CM

## 2018-02-21 DIAGNOSIS — Z87.19 PERSONAL HISTORY OF OTHER DISEASES OF THE DIGESTIVE SYSTEM: ICD-10-CM

## 2018-02-21 PROCEDURE — G0463: CPT

## 2018-02-21 PROCEDURE — 99213 OFFICE O/P EST LOW 20 MIN: CPT | Mod: GE

## 2018-02-23 PROCEDURE — 77301 RADIOTHERAPY DOSE PLAN IMRT: CPT | Mod: 26

## 2018-02-23 PROCEDURE — 77300 RADIATION THERAPY DOSE PLAN: CPT | Mod: 26

## 2018-02-23 PROCEDURE — 77338 DESIGN MLC DEVICE FOR IMRT: CPT | Mod: 26

## 2018-03-02 ENCOUNTER — MEDICATION RENEWAL (OUTPATIENT)
Age: 68
End: 2018-03-02

## 2018-03-05 ENCOUNTER — MEDICATION RENEWAL (OUTPATIENT)
Age: 68
End: 2018-03-05

## 2018-03-05 PROCEDURE — 77387B: CUSTOM | Mod: 26

## 2018-03-06 ENCOUNTER — RX RENEWAL (OUTPATIENT)
Age: 68
End: 2018-03-06

## 2018-03-06 PROCEDURE — 77387B: CUSTOM | Mod: 26

## 2018-03-08 PROCEDURE — 77387B: CUSTOM | Mod: 26

## 2018-03-09 ENCOUNTER — APPOINTMENT (OUTPATIENT)
Dept: HEMATOLOGY ONCOLOGY | Facility: CLINIC | Age: 68
End: 2018-03-09

## 2018-03-09 ENCOUNTER — RESULT REVIEW (OUTPATIENT)
Age: 68
End: 2018-03-09

## 2018-03-09 LAB
HCT VFR BLD CALC: 34.9 % — LOW (ref 39–50)
HGB BLD-MCNC: 12.1 G/DL — LOW (ref 13–17)
MCHC RBC-ENTMCNC: 31 PG — SIGNIFICANT CHANGE UP (ref 27–34)
MCHC RBC-ENTMCNC: 34.7 G/DL — SIGNIFICANT CHANGE UP (ref 32–36)
MCV RBC AUTO: 89.3 FL — SIGNIFICANT CHANGE UP (ref 80–100)
PLATELET # BLD AUTO: 272 K/UL — SIGNIFICANT CHANGE UP (ref 150–400)
RBC # BLD: 3.91 M/UL — LOW (ref 4.2–5.8)
RBC # FLD: 12.9 % — SIGNIFICANT CHANGE UP (ref 10.3–14.5)
WBC # BLD: 10.9 K/UL — HIGH (ref 3.8–10.5)
WBC # FLD AUTO: 10.9 K/UL — HIGH (ref 3.8–10.5)

## 2018-03-09 PROCEDURE — 77387B: CUSTOM | Mod: 26

## 2018-03-10 PROCEDURE — 77427 RADIATION TX MANAGEMENT X5: CPT

## 2018-03-10 PROCEDURE — 77387B: CUSTOM | Mod: 26

## 2018-03-12 ENCOUNTER — TRANSCRIPTION ENCOUNTER (OUTPATIENT)
Age: 68
End: 2018-03-12

## 2018-03-12 PROCEDURE — 77387B: CUSTOM | Mod: 26

## 2018-03-13 VITALS
SYSTOLIC BLOOD PRESSURE: 152 MMHG | WEIGHT: 220.35 LBS | DIASTOLIC BLOOD PRESSURE: 80 MMHG | HEART RATE: 94 BPM | TEMPERATURE: 97.8 F | OXYGEN SATURATION: 94 % | RESPIRATION RATE: 16 BRPM | HEIGHT: 68 IN | BODY MASS INDEX: 33.4 KG/M2

## 2018-03-13 PROCEDURE — 77387B: CUSTOM | Mod: 26

## 2018-03-14 ENCOUNTER — OUTPATIENT (OUTPATIENT)
Dept: OUTPATIENT SERVICES | Facility: HOSPITAL | Age: 68
LOS: 1 days | Discharge: HOME | End: 2018-03-14

## 2018-03-14 ENCOUNTER — RESULT REVIEW (OUTPATIENT)
Age: 68
End: 2018-03-14

## 2018-03-14 ENCOUNTER — APPOINTMENT (OUTPATIENT)
Dept: HEMATOLOGY ONCOLOGY | Facility: CLINIC | Age: 68
End: 2018-03-14

## 2018-03-14 DIAGNOSIS — Z90.49 ACQUIRED ABSENCE OF OTHER SPECIFIED PARTS OF DIGESTIVE TRACT: Chronic | ICD-10-CM

## 2018-03-14 DIAGNOSIS — C71.9 MALIGNANT NEOPLASM OF BRAIN, UNSPECIFIED: ICD-10-CM

## 2018-03-14 LAB
HCT VFR BLD CALC: 34.7 % — LOW (ref 39–50)
HGB BLD-MCNC: 11.9 G/DL — LOW (ref 13–17)
MCHC RBC-ENTMCNC: 30.6 PG — SIGNIFICANT CHANGE UP (ref 27–34)
MCHC RBC-ENTMCNC: 34.4 G/DL — SIGNIFICANT CHANGE UP (ref 32–36)
MCV RBC AUTO: 89 FL — SIGNIFICANT CHANGE UP (ref 80–100)
PLATELET # BLD AUTO: 373 K/UL — SIGNIFICANT CHANGE UP (ref 150–400)
RBC # BLD: 3.89 M/UL — LOW (ref 4.2–5.8)
RBC # FLD: 12.9 % — SIGNIFICANT CHANGE UP (ref 10.3–14.5)
WBC # BLD: 10.6 K/UL — HIGH (ref 3.8–10.5)
WBC # FLD AUTO: 10.6 K/UL — HIGH (ref 3.8–10.5)

## 2018-03-14 PROCEDURE — 77387B: CUSTOM | Mod: 26

## 2018-03-15 ENCOUNTER — APPOINTMENT (OUTPATIENT)
Dept: NEUROLOGY | Facility: CLINIC | Age: 68
End: 2018-03-15

## 2018-03-15 ENCOUNTER — APPOINTMENT (OUTPATIENT)
Dept: NEUROLOGY | Facility: CLINIC | Age: 68
End: 2018-03-15
Payer: MEDICAID

## 2018-03-15 VITALS
OXYGEN SATURATION: 98 % | DIASTOLIC BLOOD PRESSURE: 74 MMHG | HEART RATE: 60 BPM | HEIGHT: 68 IN | TEMPERATURE: 97.8 F | SYSTOLIC BLOOD PRESSURE: 126 MMHG

## 2018-03-15 LAB
BASOPHILS # BLD AUTO: 0.1 K/UL — SIGNIFICANT CHANGE UP (ref 0–0.2)
BASOPHILS NFR BLD AUTO: 0.7 % — SIGNIFICANT CHANGE UP (ref 0–2)
EOSINOPHIL # BLD AUTO: 0.2 K/UL — SIGNIFICANT CHANGE UP (ref 0–0.5)
EOSINOPHIL NFR BLD AUTO: 1.6 % — SIGNIFICANT CHANGE UP (ref 0–6)
LYMPHOCYTES # BLD AUTO: 1.2 K/UL — SIGNIFICANT CHANGE UP (ref 1–3.3)
LYMPHOCYTES # BLD AUTO: 11.6 % — LOW (ref 13–44)
MONOCYTES # BLD AUTO: 1 K/UL — HIGH (ref 0–0.9)
MONOCYTES NFR BLD AUTO: 9.5 % — SIGNIFICANT CHANGE UP (ref 2–14)
NEUTROPHILS # BLD AUTO: 8.1 K/UL — HIGH (ref 1.8–7.4)
NEUTROPHILS NFR BLD AUTO: 76.6 % — SIGNIFICANT CHANGE UP (ref 43–77)

## 2018-03-15 PROCEDURE — 99214 OFFICE O/P EST MOD 30 MIN: CPT

## 2018-03-15 PROCEDURE — 77387B: CUSTOM | Mod: 26

## 2018-03-15 RX ORDER — DEXAMETHASONE 4 MG/1
4 TABLET ORAL
Refills: 0 | Status: DISCONTINUED | COMMUNITY
End: 2018-03-15

## 2018-03-15 RX ORDER — LEVETIRACETAM 500 MG/1
500 TABLET, FILM COATED ORAL TWICE DAILY
Qty: 60 | Refills: 0 | Status: DISCONTINUED | OUTPATIENT
End: 2018-03-15

## 2018-03-16 PROCEDURE — 77387B: CUSTOM | Mod: 26

## 2018-03-16 PROCEDURE — 77427 RADIATION TX MANAGEMENT X5: CPT

## 2018-03-19 ENCOUNTER — RESULT REVIEW (OUTPATIENT)
Age: 68
End: 2018-03-19

## 2018-03-19 ENCOUNTER — RX RENEWAL (OUTPATIENT)
Age: 68
End: 2018-03-19

## 2018-03-19 LAB
HCT VFR BLD CALC: 34.8 % — LOW (ref 39–50)
HGB BLD-MCNC: 12.1 G/DL — LOW (ref 13–17)
MCHC RBC-ENTMCNC: 31.1 PG — SIGNIFICANT CHANGE UP (ref 27–34)
MCHC RBC-ENTMCNC: 34.8 G/DL — SIGNIFICANT CHANGE UP (ref 32–36)
MCV RBC AUTO: 89.3 FL — SIGNIFICANT CHANGE UP (ref 80–100)
PLATELET # BLD AUTO: 506 K/UL — HIGH (ref 150–400)
RBC # BLD: 3.9 M/UL — LOW (ref 4.2–5.8)
RBC # FLD: 13.3 % — SIGNIFICANT CHANGE UP (ref 10.3–14.5)
WBC # BLD: 16.9 K/UL — HIGH (ref 3.8–10.5)
WBC # FLD AUTO: 16.9 K/UL — HIGH (ref 3.8–10.5)

## 2018-03-19 PROCEDURE — 77387B: CUSTOM | Mod: 26

## 2018-03-20 LAB
BASOPHILS # BLD AUTO: 0.1 K/UL — SIGNIFICANT CHANGE UP (ref 0–0.2)
EOSINOPHIL # BLD AUTO: 0.9 K/UL — HIGH (ref 0–0.5)
LG PLATELETS BLD QL AUTO: SLIGHT — SIGNIFICANT CHANGE UP
LYMPHOCYTES # BLD AUTO: 17 % — SIGNIFICANT CHANGE UP (ref 13–44)
LYMPHOCYTES # BLD AUTO: 2.7 K/UL — SIGNIFICANT CHANGE UP (ref 1–3.3)
METAMYELOCYTES # FLD: 1 % — HIGH (ref 0–0)
MONOCYTES # BLD AUTO: 1.3 K/UL — HIGH (ref 0–0.9)
MONOCYTES NFR BLD AUTO: 6 % — SIGNIFICANT CHANGE UP (ref 2–14)
MYELOCYTES NFR BLD: 1 % — HIGH (ref 0–0)
NEUTROPHILS # BLD AUTO: 12 K/UL — HIGH (ref 1.8–7.4)
NEUTROPHILS NFR BLD AUTO: 73 % — SIGNIFICANT CHANGE UP (ref 43–77)
NEUTS BAND # BLD: 2 % — SIGNIFICANT CHANGE UP (ref 0–8)
PLAT MORPH BLD: NORMAL — SIGNIFICANT CHANGE UP
RBC BLD AUTO: SIGNIFICANT CHANGE UP

## 2018-03-20 PROCEDURE — 77387B: CUSTOM | Mod: 26

## 2018-03-21 VITALS
WEIGHT: 220.68 LBS | HEART RATE: 59 BPM | HEIGHT: 68 IN | DIASTOLIC BLOOD PRESSURE: 94 MMHG | SYSTOLIC BLOOD PRESSURE: 167 MMHG | RESPIRATION RATE: 16 BRPM | OXYGEN SATURATION: 97 % | TEMPERATURE: 98.2 F | BODY MASS INDEX: 33.45 KG/M2

## 2018-03-21 PROCEDURE — 77387B: CUSTOM | Mod: 26

## 2018-03-22 PROCEDURE — 77387B: CUSTOM | Mod: 26

## 2018-03-23 PROCEDURE — 77427 RADIATION TX MANAGEMENT X5: CPT

## 2018-03-23 PROCEDURE — 77387B: CUSTOM | Mod: 26

## 2018-03-28 ENCOUNTER — OUTPATIENT (OUTPATIENT)
Dept: OUTPATIENT SERVICES | Facility: HOSPITAL | Age: 68
LOS: 1 days | End: 2018-03-28
Payer: SELF-PAY

## 2018-03-28 ENCOUNTER — APPOINTMENT (OUTPATIENT)
Dept: INTERNAL MEDICINE | Facility: CLINIC | Age: 68
End: 2018-03-28
Payer: MEDICAID

## 2018-03-28 VITALS
HEART RATE: 64 BPM | SYSTOLIC BLOOD PRESSURE: 142 MMHG | HEIGHT: 68 IN | DIASTOLIC BLOOD PRESSURE: 80 MMHG | BODY MASS INDEX: 33.49 KG/M2 | OXYGEN SATURATION: 97 % | WEIGHT: 221 LBS

## 2018-03-28 DIAGNOSIS — I10 ESSENTIAL (PRIMARY) HYPERTENSION: ICD-10-CM

## 2018-03-28 DIAGNOSIS — R73.09 OTHER ABNORMAL GLUCOSE: ICD-10-CM

## 2018-03-28 DIAGNOSIS — Z86.79 PERSONAL HISTORY OF OTHER DISEASES OF THE CIRCULATORY SYSTEM: ICD-10-CM

## 2018-03-28 DIAGNOSIS — Z98.890 OTHER SPECIFIED POSTPROCEDURAL STATES: ICD-10-CM

## 2018-03-28 DIAGNOSIS — Z90.49 ACQUIRED ABSENCE OF OTHER SPECIFIED PARTS OF DIGESTIVE TRACT: Chronic | ICD-10-CM

## 2018-03-28 DIAGNOSIS — C71.9 MALIGNANT NEOPLASM OF BRAIN, UNSPECIFIED: ICD-10-CM

## 2018-03-28 PROCEDURE — 90670 PCV13 VACCINE IM: CPT

## 2018-03-28 PROCEDURE — G0463: CPT

## 2018-03-28 PROCEDURE — G0009: CPT

## 2018-03-28 PROCEDURE — 99213 OFFICE O/P EST LOW 20 MIN: CPT | Mod: GE

## 2018-03-28 RX ORDER — TEMOZOLOMIDE 20 MG/1
20 CAPSULE ORAL
Qty: 21 | Refills: 0 | Status: DISCONTINUED | COMMUNITY
Start: 2018-02-08 | End: 2018-03-28

## 2018-03-28 RX ORDER — ONDANSETRON 8 MG/1
8 TABLET ORAL
Qty: 30 | Refills: 1 | Status: DISCONTINUED | COMMUNITY
Start: 2018-02-08 | End: 2018-03-28

## 2018-03-28 RX ORDER — TEMOZOLOMIDE 140 MG/1
140 CAPSULE ORAL AT BEDTIME
Qty: 21 | Refills: 0 | Status: DISCONTINUED | COMMUNITY
Start: 2018-03-12 | End: 2018-03-28

## 2018-03-28 RX ORDER — TEMOZOLOMIDE 20 MG/1
20 CAPSULE ORAL AT BEDTIME
Qty: 21 | Refills: 0 | Status: DISCONTINUED | COMMUNITY
Start: 2018-03-12 | End: 2018-03-28

## 2018-03-28 RX ORDER — ACETAMINOPHEN 325 MG/1
325 TABLET ORAL
Qty: 40 | Refills: 0 | Status: DISCONTINUED | COMMUNITY
End: 2018-03-28

## 2018-03-28 RX ORDER — TEMOZOLOMIDE 140 MG/1
140 CAPSULE ORAL
Qty: 21 | Refills: 0 | Status: DISCONTINUED | COMMUNITY
Start: 2018-02-08 | End: 2018-03-28

## 2018-03-28 RX ORDER — LABETALOL HYDROCHLORIDE 100 MG/1
100 TABLET, FILM COATED ORAL 3 TIMES DAILY
Qty: 90 | Refills: 0 | Status: DISCONTINUED | COMMUNITY
End: 2018-03-28

## 2018-03-28 RX ORDER — TEMOZOLOMIDE 5 MG/1
5 CAPSULE ORAL AT BEDTIME
Qty: 21 | Refills: 0 | Status: DISCONTINUED | COMMUNITY
Start: 2018-03-12 | End: 2018-03-28

## 2018-03-28 RX ORDER — TEMOZOLOMIDE 5 MG/1
5 CAPSULE ORAL
Qty: 21 | Refills: 0 | Status: DISCONTINUED | COMMUNITY
Start: 2018-02-08 | End: 2018-03-28

## 2018-03-29 ENCOUNTER — APPOINTMENT (OUTPATIENT)
Dept: NEUROLOGY | Facility: CLINIC | Age: 68
End: 2018-03-29
Payer: MEDICAID

## 2018-04-04 ENCOUNTER — APPOINTMENT (OUTPATIENT)
Dept: NEUROLOGY | Facility: CLINIC | Age: 68
End: 2018-04-04
Payer: MEDICAID

## 2018-04-04 VITALS
TEMPERATURE: 98.5 F | RESPIRATION RATE: 16 BRPM | HEART RATE: 58 BPM | DIASTOLIC BLOOD PRESSURE: 84 MMHG | SYSTOLIC BLOOD PRESSURE: 158 MMHG

## 2018-04-04 DIAGNOSIS — Z23 ENCOUNTER FOR IMMUNIZATION: ICD-10-CM

## 2018-04-04 PROCEDURE — 99214 OFFICE O/P EST MOD 30 MIN: CPT

## 2018-04-18 ENCOUNTER — FORM ENCOUNTER (OUTPATIENT)
Age: 68
End: 2018-04-18

## 2018-04-19 ENCOUNTER — OUTPATIENT (OUTPATIENT)
Dept: OUTPATIENT SERVICES | Facility: HOSPITAL | Age: 68
LOS: 1 days | End: 2018-04-19
Payer: MEDICAID

## 2018-04-19 ENCOUNTER — APPOINTMENT (OUTPATIENT)
Dept: MRI IMAGING | Facility: IMAGING CENTER | Age: 68
End: 2018-04-19
Payer: COMMERCIAL

## 2018-04-19 DIAGNOSIS — C71.9 MALIGNANT NEOPLASM OF BRAIN, UNSPECIFIED: ICD-10-CM

## 2018-04-19 DIAGNOSIS — Z90.49 ACQUIRED ABSENCE OF OTHER SPECIFIED PARTS OF DIGESTIVE TRACT: Chronic | ICD-10-CM

## 2018-04-19 PROCEDURE — 70553 MRI BRAIN STEM W/O & W/DYE: CPT | Mod: 26

## 2018-04-19 PROCEDURE — 82565 ASSAY OF CREATININE: CPT

## 2018-04-19 PROCEDURE — 70553 MRI BRAIN STEM W/O & W/DYE: CPT

## 2018-04-19 PROCEDURE — A9585: CPT

## 2018-04-24 ENCOUNTER — RESULT REVIEW (OUTPATIENT)
Age: 68
End: 2018-04-24

## 2018-04-24 ENCOUNTER — OUTPATIENT (OUTPATIENT)
Dept: OUTPATIENT SERVICES | Facility: HOSPITAL | Age: 68
LOS: 1 days | End: 2018-04-24
Payer: COMMERCIAL

## 2018-04-24 ENCOUNTER — APPOINTMENT (OUTPATIENT)
Dept: HEMATOLOGY ONCOLOGY | Facility: CLINIC | Age: 68
End: 2018-04-24

## 2018-04-24 ENCOUNTER — APPOINTMENT (OUTPATIENT)
Dept: RADIATION ONCOLOGY | Facility: CLINIC | Age: 68
End: 2018-04-24
Payer: MEDICAID

## 2018-04-24 ENCOUNTER — APPOINTMENT (OUTPATIENT)
Dept: NEUROLOGY | Facility: CLINIC | Age: 68
End: 2018-04-24
Payer: MEDICAID

## 2018-04-24 ENCOUNTER — APPOINTMENT (OUTPATIENT)
Dept: NEUROLOGY | Facility: CLINIC | Age: 68
End: 2018-04-24

## 2018-04-24 ENCOUNTER — APPOINTMENT (OUTPATIENT)
Dept: ULTRASOUND IMAGING | Facility: IMAGING CENTER | Age: 68
End: 2018-04-24
Payer: COMMERCIAL

## 2018-04-24 ENCOUNTER — OUTPATIENT (OUTPATIENT)
Dept: OUTPATIENT SERVICES | Facility: HOSPITAL | Age: 68
LOS: 1 days | Discharge: HOME | End: 2018-04-24

## 2018-04-24 VITALS
RESPIRATION RATE: 16 BRPM | DIASTOLIC BLOOD PRESSURE: 103 MMHG | HEIGHT: 68 IN | BODY MASS INDEX: 35.97 KG/M2 | HEART RATE: 72 BPM | TEMPERATURE: 98.2 F | WEIGHT: 237.32 LBS | OXYGEN SATURATION: 97 % | SYSTOLIC BLOOD PRESSURE: 194 MMHG

## 2018-04-24 VITALS — WEIGHT: 236 LBS | HEIGHT: 68 IN | BODY MASS INDEX: 35.77 KG/M2

## 2018-04-24 DIAGNOSIS — M79.89 OTHER SPECIFIED SOFT TISSUE DISORDERS: ICD-10-CM

## 2018-04-24 DIAGNOSIS — Z90.49 ACQUIRED ABSENCE OF OTHER SPECIFIED PARTS OF DIGESTIVE TRACT: Chronic | ICD-10-CM

## 2018-04-24 DIAGNOSIS — C71.9 MALIGNANT NEOPLASM OF BRAIN, UNSPECIFIED: ICD-10-CM

## 2018-04-24 LAB
BASOPHILS # BLD AUTO: 0 K/UL — SIGNIFICANT CHANGE UP (ref 0–0.2)
BASOPHILS NFR BLD AUTO: 0.1 % — SIGNIFICANT CHANGE UP (ref 0–2)
EOSINOPHIL # BLD AUTO: 0.2 K/UL — SIGNIFICANT CHANGE UP (ref 0–0.5)
EOSINOPHIL NFR BLD AUTO: 1.7 % — SIGNIFICANT CHANGE UP (ref 0–6)
HCT VFR BLD CALC: 37.3 % — LOW (ref 39–50)
HGB BLD-MCNC: 13.3 G/DL — SIGNIFICANT CHANGE UP (ref 13–17)
LYMPHOCYTES # BLD AUTO: 1.2 K/UL — SIGNIFICANT CHANGE UP (ref 1–3.3)
LYMPHOCYTES # BLD AUTO: 11.6 % — LOW (ref 13–44)
MCHC RBC-ENTMCNC: 32.6 PG — SIGNIFICANT CHANGE UP (ref 27–34)
MCHC RBC-ENTMCNC: 35.6 G/DL — SIGNIFICANT CHANGE UP (ref 32–36)
MCV RBC AUTO: 91.6 FL — SIGNIFICANT CHANGE UP (ref 80–100)
MONOCYTES # BLD AUTO: 0.7 K/UL — SIGNIFICANT CHANGE UP (ref 0–0.9)
MONOCYTES NFR BLD AUTO: 6.7 % — SIGNIFICANT CHANGE UP (ref 2–14)
NEUTROPHILS # BLD AUTO: 8.3 K/UL — HIGH (ref 1.8–7.4)
NEUTROPHILS NFR BLD AUTO: 79.8 % — HIGH (ref 43–77)
PLATELET # BLD AUTO: 189 K/UL — SIGNIFICANT CHANGE UP (ref 150–400)
RBC # BLD: 4.07 M/UL — LOW (ref 4.2–5.8)
RBC # FLD: 15.2 % — HIGH (ref 10.3–14.5)
WBC # BLD: 10.4 K/UL — SIGNIFICANT CHANGE UP (ref 3.8–10.5)
WBC # FLD AUTO: 10.4 K/UL — SIGNIFICANT CHANGE UP (ref 3.8–10.5)

## 2018-04-24 PROCEDURE — 99214 OFFICE O/P EST MOD 30 MIN: CPT

## 2018-04-24 PROCEDURE — 99024 POSTOP FOLLOW-UP VISIT: CPT | Mod: GC

## 2018-04-24 PROCEDURE — 93970 EXTREMITY STUDY: CPT

## 2018-04-24 PROCEDURE — 93970 EXTREMITY STUDY: CPT | Mod: 26

## 2018-04-24 RX ORDER — DEXAMETHASONE 2 MG/1
2 TABLET ORAL TWICE DAILY
Qty: 60 | Refills: 1 | Status: DISCONTINUED | COMMUNITY
Start: 2018-03-19 | End: 2018-04-24

## 2018-04-25 LAB
ALBUMIN SERPL ELPH-MCNC: 4 G/DL
ALP BLD-CCNC: 57 U/L
ALT SERPL-CCNC: 33 U/L
ANION GAP SERPL CALC-SCNC: 14 MMOL/L
AST SERPL-CCNC: 15 U/L
BILIRUB SERPL-MCNC: 0.5 MG/DL
BUN SERPL-MCNC: 29 MG/DL
CALCIUM SERPL-MCNC: 9.6 MG/DL
CHLORIDE SERPL-SCNC: 95 MMOL/L
CO2 SERPL-SCNC: 27 MMOL/L
CREAT SERPL-MCNC: 1.07 MG/DL
GLUCOSE SERPL-MCNC: 322 MG/DL
POTASSIUM SERPL-SCNC: 5 MMOL/L
PROT SERPL-MCNC: 6.3 G/DL
SODIUM SERPL-SCNC: 136 MMOL/L

## 2018-04-27 ENCOUNTER — APPOINTMENT (OUTPATIENT)
Dept: INTERNAL MEDICINE | Facility: CLINIC | Age: 68
End: 2018-04-27
Payer: MEDICAID

## 2018-04-27 ENCOUNTER — OUTPATIENT (OUTPATIENT)
Dept: OUTPATIENT SERVICES | Facility: HOSPITAL | Age: 68
LOS: 1 days | End: 2018-04-27
Payer: SELF-PAY

## 2018-04-27 VITALS
HEIGHT: 68 IN | WEIGHT: 236 LBS | BODY MASS INDEX: 35.77 KG/M2 | OXYGEN SATURATION: 98 % | HEART RATE: 91 BPM | SYSTOLIC BLOOD PRESSURE: 190 MMHG | DIASTOLIC BLOOD PRESSURE: 98 MMHG

## 2018-04-27 DIAGNOSIS — Z23 ENCOUNTER FOR IMMUNIZATION: ICD-10-CM

## 2018-04-27 DIAGNOSIS — I10 ESSENTIAL (PRIMARY) HYPERTENSION: ICD-10-CM

## 2018-04-27 DIAGNOSIS — Z90.49 ACQUIRED ABSENCE OF OTHER SPECIFIED PARTS OF DIGESTIVE TRACT: Chronic | ICD-10-CM

## 2018-04-27 LAB
GLUCOSE BLDC GLUCOMTR-MCNC: 358
HBA1C MFR BLD HPLC: 7.5

## 2018-04-27 PROCEDURE — G0463: CPT

## 2018-04-27 PROCEDURE — 99213 OFFICE O/P EST LOW 20 MIN: CPT | Mod: GE

## 2018-05-01 ENCOUNTER — APPOINTMENT (OUTPATIENT)
Dept: HEMATOLOGY ONCOLOGY | Facility: CLINIC | Age: 68
End: 2018-05-01

## 2018-05-01 ENCOUNTER — RESULT REVIEW (OUTPATIENT)
Age: 68
End: 2018-05-01

## 2018-05-01 ENCOUNTER — RX RENEWAL (OUTPATIENT)
Age: 68
End: 2018-05-01

## 2018-05-01 LAB
BASOPHILS # BLD AUTO: 0 K/UL — SIGNIFICANT CHANGE UP (ref 0–0.2)
BASOPHILS NFR BLD AUTO: 0.2 % — SIGNIFICANT CHANGE UP (ref 0–2)
EOSINOPHIL # BLD AUTO: 0.4 K/UL — SIGNIFICANT CHANGE UP (ref 0–0.5)
EOSINOPHIL NFR BLD AUTO: 3.3 % — SIGNIFICANT CHANGE UP (ref 0–6)
HCT VFR BLD CALC: 37.3 % — LOW (ref 39–50)
HGB BLD-MCNC: 13.3 G/DL — SIGNIFICANT CHANGE UP (ref 13–17)
LYMPHOCYTES # BLD AUTO: 2.4 K/UL — SIGNIFICANT CHANGE UP (ref 1–3.3)
LYMPHOCYTES # BLD AUTO: 22.5 % — SIGNIFICANT CHANGE UP (ref 13–44)
MCHC RBC-ENTMCNC: 32.5 PG — SIGNIFICANT CHANGE UP (ref 27–34)
MCHC RBC-ENTMCNC: 35.7 G/DL — SIGNIFICANT CHANGE UP (ref 32–36)
MCV RBC AUTO: 91.1 FL — SIGNIFICANT CHANGE UP (ref 80–100)
MONOCYTES # BLD AUTO: 0.9 K/UL — SIGNIFICANT CHANGE UP (ref 0–0.9)
MONOCYTES NFR BLD AUTO: 8.7 % — SIGNIFICANT CHANGE UP (ref 2–14)
NEUTROPHILS # BLD AUTO: 7.1 K/UL — SIGNIFICANT CHANGE UP (ref 1.8–7.4)
NEUTROPHILS NFR BLD AUTO: 65.3 % — SIGNIFICANT CHANGE UP (ref 43–77)
PLATELET # BLD AUTO: 206 K/UL — SIGNIFICANT CHANGE UP (ref 150–400)
RBC # BLD: 4.09 M/UL — LOW (ref 4.2–5.8)
RBC # FLD: 15 % — HIGH (ref 10.3–14.5)
WBC # BLD: 10.9 K/UL — HIGH (ref 3.8–10.5)
WBC # FLD AUTO: 10.9 K/UL — HIGH (ref 3.8–10.5)

## 2018-05-02 ENCOUNTER — CHART COPY (OUTPATIENT)
Age: 68
End: 2018-05-02

## 2018-05-04 ENCOUNTER — OUTPATIENT (OUTPATIENT)
Dept: OUTPATIENT SERVICES | Facility: HOSPITAL | Age: 68
LOS: 1 days | End: 2018-05-04
Payer: SELF-PAY

## 2018-05-04 ENCOUNTER — APPOINTMENT (OUTPATIENT)
Dept: INTERNAL MEDICINE | Facility: CLINIC | Age: 68
End: 2018-05-04
Payer: MEDICAID

## 2018-05-04 VITALS
HEIGHT: 68 IN | WEIGHT: 233 LBS | SYSTOLIC BLOOD PRESSURE: 170 MMHG | BODY MASS INDEX: 35.31 KG/M2 | HEART RATE: 86 BPM | OXYGEN SATURATION: 98 % | DIASTOLIC BLOOD PRESSURE: 94 MMHG

## 2018-05-04 DIAGNOSIS — R73.09 OTHER ABNORMAL GLUCOSE: ICD-10-CM

## 2018-05-04 DIAGNOSIS — I10 ESSENTIAL (PRIMARY) HYPERTENSION: ICD-10-CM

## 2018-05-04 DIAGNOSIS — Z90.49 ACQUIRED ABSENCE OF OTHER SPECIFIED PARTS OF DIGESTIVE TRACT: Chronic | ICD-10-CM

## 2018-05-04 PROCEDURE — 99213 OFFICE O/P EST LOW 20 MIN: CPT | Mod: GE

## 2018-05-04 PROCEDURE — G0463: CPT

## 2018-05-07 DIAGNOSIS — T38.0X5A ADVERSE EFFECT OF GLUCOCORTICOIDS AND SYNTHETIC ANALOGUES, INITIAL ENCOUNTER: ICD-10-CM

## 2018-05-07 DIAGNOSIS — R73.09 OTHER ABNORMAL GLUCOSE: ICD-10-CM

## 2018-05-07 DIAGNOSIS — E09.9 DRUG OR CHEMICAL INDUCED DIABETES MELLITUS WITHOUT COMPLICATIONS: ICD-10-CM

## 2018-05-11 ENCOUNTER — RX RENEWAL (OUTPATIENT)
Age: 68
End: 2018-05-11

## 2018-05-11 ENCOUNTER — TRANSCRIPTION ENCOUNTER (OUTPATIENT)
Age: 68
End: 2018-05-11

## 2018-05-11 ENCOUNTER — APPOINTMENT (OUTPATIENT)
Dept: INTERNAL MEDICINE | Facility: CLINIC | Age: 68
End: 2018-05-11

## 2018-05-11 DIAGNOSIS — H91.90 UNSPECIFIED HEARING LOSS, UNSPECIFIED EAR: ICD-10-CM

## 2018-05-11 DIAGNOSIS — T38.0X5A ADVERSE EFFECT OF GLUCOCORTICOIDS AND SYNTHETIC ANALOGUES, INITIAL ENCOUNTER: ICD-10-CM

## 2018-05-11 DIAGNOSIS — E09.9 DRUG OR CHEMICAL INDUCED DIABETES MELLITUS WITHOUT COMPLICATIONS: ICD-10-CM

## 2018-05-11 DIAGNOSIS — E11.9 TYPE 2 DIABETES MELLITUS WITHOUT COMPLICATIONS: ICD-10-CM

## 2018-05-11 DIAGNOSIS — R73.9 HYPERGLYCEMIA, UNSPECIFIED: ICD-10-CM

## 2018-05-12 ENCOUNTER — FORM ENCOUNTER (OUTPATIENT)
Age: 68
End: 2018-05-12

## 2018-05-13 ENCOUNTER — OUTPATIENT (OUTPATIENT)
Dept: OUTPATIENT SERVICES | Facility: HOSPITAL | Age: 68
LOS: 1 days | End: 2018-05-13
Payer: MEDICAID

## 2018-05-13 ENCOUNTER — APPOINTMENT (OUTPATIENT)
Dept: MRI IMAGING | Facility: IMAGING CENTER | Age: 68
End: 2018-05-13
Payer: MEDICAID

## 2018-05-13 DIAGNOSIS — Z90.49 ACQUIRED ABSENCE OF OTHER SPECIFIED PARTS OF DIGESTIVE TRACT: Chronic | ICD-10-CM

## 2018-05-13 DIAGNOSIS — Z00.8 ENCOUNTER FOR OTHER GENERAL EXAMINATION: ICD-10-CM

## 2018-05-13 PROCEDURE — A9585: CPT

## 2018-05-13 PROCEDURE — 70553 MRI BRAIN STEM W/O & W/DYE: CPT

## 2018-05-13 PROCEDURE — 70553 MRI BRAIN STEM W/O & W/DYE: CPT | Mod: 26

## 2018-05-17 ENCOUNTER — RX RENEWAL (OUTPATIENT)
Age: 68
End: 2018-05-17

## 2018-05-17 ENCOUNTER — TRANSCRIPTION ENCOUNTER (OUTPATIENT)
Age: 68
End: 2018-05-17

## 2018-05-30 ENCOUNTER — OUTPATIENT (OUTPATIENT)
Dept: OUTPATIENT SERVICES | Facility: HOSPITAL | Age: 68
LOS: 1 days | Discharge: HOME | End: 2018-05-30

## 2018-05-30 ENCOUNTER — APPOINTMENT (OUTPATIENT)
Dept: INTERNAL MEDICINE | Facility: CLINIC | Age: 68
End: 2018-05-30

## 2018-05-30 DIAGNOSIS — C71.9 MALIGNANT NEOPLASM OF BRAIN, UNSPECIFIED: ICD-10-CM

## 2018-05-30 DIAGNOSIS — Z90.49 ACQUIRED ABSENCE OF OTHER SPECIFIED PARTS OF DIGESTIVE TRACT: Chronic | ICD-10-CM

## 2018-05-31 ENCOUNTER — APPOINTMENT (OUTPATIENT)
Dept: HEMATOLOGY ONCOLOGY | Facility: CLINIC | Age: 68
End: 2018-05-31

## 2018-05-31 VITALS
HEART RATE: 80 BPM | SYSTOLIC BLOOD PRESSURE: 168 MMHG | RESPIRATION RATE: 16 BRPM | OXYGEN SATURATION: 98 % | DIASTOLIC BLOOD PRESSURE: 63 MMHG | TEMPERATURE: 99.1 F | WEIGHT: 233.25 LBS | BODY MASS INDEX: 35.47 KG/M2

## 2018-06-08 ENCOUNTER — TRANSCRIPTION ENCOUNTER (OUTPATIENT)
Age: 68
End: 2018-06-08

## 2018-06-11 ENCOUNTER — RX RENEWAL (OUTPATIENT)
Age: 68
End: 2018-06-11

## 2018-06-12 RX ORDER — TEMOZOLOMIDE 5 MG/1
5 CAPSULE ORAL
Qty: 5 | Refills: 0 | Status: DISCONTINUED | OUTPATIENT
Start: 2018-04-24 | End: 2018-06-12

## 2018-06-12 RX ORDER — TEMOZOLOMIDE 20 MG/1
20 CAPSULE ORAL
Qty: 5 | Refills: 0 | Status: DISCONTINUED | OUTPATIENT
Start: 2018-04-24 | End: 2018-06-12

## 2018-06-12 RX ORDER — TEMOZOLOMIDE 100 MG/1
100 CAPSULE ORAL
Qty: 15 | Refills: 0 | Status: DISCONTINUED | OUTPATIENT
Start: 2018-04-24 | End: 2018-06-12

## 2018-08-03 ENCOUNTER — RX RENEWAL (OUTPATIENT)
Age: 68
End: 2018-08-03

## 2018-08-06 ENCOUNTER — APPOINTMENT (OUTPATIENT)
Dept: HEMATOLOGY ONCOLOGY | Facility: CLINIC | Age: 68
End: 2018-08-06

## 2018-08-06 ENCOUNTER — RESULT REVIEW (OUTPATIENT)
Age: 68
End: 2018-08-06

## 2018-08-06 ENCOUNTER — OUTPATIENT (OUTPATIENT)
Dept: OUTPATIENT SERVICES | Facility: HOSPITAL | Age: 68
LOS: 1 days | Discharge: HOME | End: 2018-08-06

## 2018-08-06 VITALS
TEMPERATURE: 98.1 F | WEIGHT: 242.5 LBS | RESPIRATION RATE: 16 BRPM | BODY MASS INDEX: 36.87 KG/M2 | SYSTOLIC BLOOD PRESSURE: 163 MMHG | HEART RATE: 82 BPM | DIASTOLIC BLOOD PRESSURE: 92 MMHG | OXYGEN SATURATION: 96 %

## 2018-08-06 DIAGNOSIS — C71.9 MALIGNANT NEOPLASM OF BRAIN, UNSPECIFIED: ICD-10-CM

## 2018-08-06 DIAGNOSIS — Z90.49 ACQUIRED ABSENCE OF OTHER SPECIFIED PARTS OF DIGESTIVE TRACT: Chronic | ICD-10-CM

## 2018-08-06 LAB
ALBUMIN SERPL ELPH-MCNC: 4.3 G/DL — SIGNIFICANT CHANGE UP (ref 3.3–5)
ALP SERPL-CCNC: 56 U/L — SIGNIFICANT CHANGE UP (ref 30–120)
ALT FLD-CCNC: 23 U/L — SIGNIFICANT CHANGE UP (ref 10–45)
ANION GAP SERPL CALC-SCNC: 14 MMOL/L — SIGNIFICANT CHANGE UP (ref 5–17)
AST SERPL-CCNC: 14 U/L — SIGNIFICANT CHANGE UP (ref 10–40)
BASOPHILS # BLD AUTO: 0 K/UL — SIGNIFICANT CHANGE UP (ref 0–0.2)
BASOPHILS NFR BLD AUTO: 0.2 % — SIGNIFICANT CHANGE UP (ref 0–2)
BILIRUB SERPL-MCNC: 0.3 MG/DL — SIGNIFICANT CHANGE UP (ref 0.2–1.2)
BUN SERPL-MCNC: 22 MG/DL — SIGNIFICANT CHANGE UP (ref 7–23)
CALCIUM SERPL-MCNC: 9.5 MG/DL — SIGNIFICANT CHANGE UP (ref 8.4–10.5)
CHLORIDE SERPL-SCNC: 103 MMOL/L — SIGNIFICANT CHANGE UP (ref 96–108)
CO2 SERPL-SCNC: 27 MMOL/L — SIGNIFICANT CHANGE UP (ref 22–31)
CREAT SERPL-MCNC: 1.01 MG/DL — SIGNIFICANT CHANGE UP (ref 0.5–1.3)
EOSINOPHIL # BLD AUTO: 0.3 K/UL — SIGNIFICANT CHANGE UP (ref 0–0.5)
EOSINOPHIL NFR BLD AUTO: 2 % — SIGNIFICANT CHANGE UP (ref 0–6)
GLUCOSE SERPL-MCNC: 132 MG/DL — HIGH (ref 70–99)
HCT VFR BLD CALC: 40.5 % — SIGNIFICANT CHANGE UP (ref 39–50)
HGB BLD-MCNC: 13.9 G/DL — SIGNIFICANT CHANGE UP (ref 13–17)
LYMPHOCYTES # BLD AUTO: 14.1 % — SIGNIFICANT CHANGE UP (ref 13–44)
LYMPHOCYTES # BLD AUTO: 2 K/UL — SIGNIFICANT CHANGE UP (ref 1–3.3)
MCHC RBC-ENTMCNC: 32.3 PG — SIGNIFICANT CHANGE UP (ref 27–34)
MCHC RBC-ENTMCNC: 34.4 G/DL — SIGNIFICANT CHANGE UP (ref 32–36)
MCV RBC AUTO: 93.8 FL — SIGNIFICANT CHANGE UP (ref 80–100)
MONOCYTES # BLD AUTO: 1.2 K/UL — HIGH (ref 0–0.9)
MONOCYTES NFR BLD AUTO: 8.5 % — SIGNIFICANT CHANGE UP (ref 2–14)
NEUTROPHILS # BLD AUTO: 10.7 K/UL — HIGH (ref 1.8–7.4)
NEUTROPHILS NFR BLD AUTO: 75.2 % — SIGNIFICANT CHANGE UP (ref 43–77)
PLATELET # BLD AUTO: 275 K/UL — SIGNIFICANT CHANGE UP (ref 150–400)
POTASSIUM SERPL-MCNC: 4 MMOL/L — SIGNIFICANT CHANGE UP (ref 3.5–5.3)
POTASSIUM SERPL-SCNC: 4 MMOL/L — SIGNIFICANT CHANGE UP (ref 3.5–5.3)
PROT SERPL-MCNC: 6 G/DL — SIGNIFICANT CHANGE UP (ref 6–8.3)
RBC # BLD: 4.31 M/UL — SIGNIFICANT CHANGE UP (ref 4.2–5.8)
RBC # FLD: 12.9 % — SIGNIFICANT CHANGE UP (ref 10.3–14.5)
SODIUM SERPL-SCNC: 144 MMOL/L — SIGNIFICANT CHANGE UP (ref 135–145)
WBC # BLD: 14.2 K/UL — HIGH (ref 3.8–10.5)
WBC # FLD AUTO: 14.2 K/UL — HIGH (ref 3.8–10.5)

## 2018-08-07 ENCOUNTER — APPOINTMENT (OUTPATIENT)
Dept: INTERNAL MEDICINE | Facility: CLINIC | Age: 68
End: 2018-08-07

## 2018-08-25 ENCOUNTER — OUTPATIENT (OUTPATIENT)
Dept: OUTPATIENT SERVICES | Facility: HOSPITAL | Age: 68
LOS: 1 days | End: 2018-08-25
Payer: COMMERCIAL

## 2018-08-25 ENCOUNTER — APPOINTMENT (OUTPATIENT)
Dept: MRI IMAGING | Facility: CLINIC | Age: 68
End: 2018-08-25
Payer: COMMERCIAL

## 2018-08-25 DIAGNOSIS — C71.9 MALIGNANT NEOPLASM OF BRAIN, UNSPECIFIED: ICD-10-CM

## 2018-08-25 DIAGNOSIS — Z90.49 ACQUIRED ABSENCE OF OTHER SPECIFIED PARTS OF DIGESTIVE TRACT: Chronic | ICD-10-CM

## 2018-08-25 PROCEDURE — A9585: CPT

## 2018-08-25 PROCEDURE — 70553 MRI BRAIN STEM W/O & W/DYE: CPT | Mod: 26

## 2018-08-25 PROCEDURE — 70553 MRI BRAIN STEM W/O & W/DYE: CPT

## 2018-08-31 ENCOUNTER — OUTPATIENT (OUTPATIENT)
Dept: OUTPATIENT SERVICES | Facility: HOSPITAL | Age: 68
LOS: 1 days | Discharge: HOME | End: 2018-08-31

## 2018-08-31 DIAGNOSIS — Z90.49 ACQUIRED ABSENCE OF OTHER SPECIFIED PARTS OF DIGESTIVE TRACT: Chronic | ICD-10-CM

## 2018-08-31 DIAGNOSIS — C71.9 MALIGNANT NEOPLASM OF BRAIN, UNSPECIFIED: ICD-10-CM

## 2018-09-06 ENCOUNTER — RESULT REVIEW (OUTPATIENT)
Age: 68
End: 2018-09-06

## 2018-09-06 ENCOUNTER — APPOINTMENT (OUTPATIENT)
Dept: NEUROLOGY | Facility: CLINIC | Age: 68
End: 2018-09-06
Payer: MEDICAID

## 2018-09-06 ENCOUNTER — APPOINTMENT (OUTPATIENT)
Dept: HEMATOLOGY ONCOLOGY | Facility: CLINIC | Age: 68
End: 2018-09-06

## 2018-09-06 ENCOUNTER — APPOINTMENT (OUTPATIENT)
Age: 68
End: 2018-09-06

## 2018-09-06 ENCOUNTER — LABORATORY RESULT (OUTPATIENT)
Age: 68
End: 2018-09-06

## 2018-09-06 VITALS
DIASTOLIC BLOOD PRESSURE: 93 MMHG | HEIGHT: 68 IN | RESPIRATION RATE: 18 BRPM | WEIGHT: 247 LBS | SYSTOLIC BLOOD PRESSURE: 168 MMHG | TEMPERATURE: 98 F | OXYGEN SATURATION: 96 % | HEART RATE: 84 BPM | BODY MASS INDEX: 37.44 KG/M2

## 2018-09-06 LAB
BASOPHILS # BLD AUTO: 0 K/UL — SIGNIFICANT CHANGE UP (ref 0–0.2)
BASOPHILS NFR BLD AUTO: 0.1 % — SIGNIFICANT CHANGE UP (ref 0–2)
EOSINOPHIL # BLD AUTO: 0.1 K/UL — SIGNIFICANT CHANGE UP (ref 0–0.5)
EOSINOPHIL NFR BLD AUTO: 1 % — SIGNIFICANT CHANGE UP (ref 0–6)
HCT VFR BLD CALC: 40.7 % — SIGNIFICANT CHANGE UP (ref 39–50)
HGB BLD-MCNC: 13.8 G/DL — SIGNIFICANT CHANGE UP (ref 13–17)
LYMPHOCYTES # BLD AUTO: 1.6 K/UL — SIGNIFICANT CHANGE UP (ref 1–3.3)
LYMPHOCYTES # BLD AUTO: 12.8 % — LOW (ref 13–44)
MCHC RBC-ENTMCNC: 31.4 PG — SIGNIFICANT CHANGE UP (ref 27–34)
MCHC RBC-ENTMCNC: 33.9 G/DL — SIGNIFICANT CHANGE UP (ref 32–36)
MCV RBC AUTO: 92.6 FL — SIGNIFICANT CHANGE UP (ref 80–100)
MONOCYTES # BLD AUTO: 0.6 K/UL — SIGNIFICANT CHANGE UP (ref 0–0.9)
MONOCYTES NFR BLD AUTO: 4.9 % — SIGNIFICANT CHANGE UP (ref 2–14)
NEUTROPHILS # BLD AUTO: 10 K/UL — HIGH (ref 1.8–7.4)
NEUTROPHILS NFR BLD AUTO: 81.2 % — HIGH (ref 43–77)
PLATELET # BLD AUTO: 288 K/UL — SIGNIFICANT CHANGE UP (ref 150–400)
RBC # BLD: 4.4 M/UL — SIGNIFICANT CHANGE UP (ref 4.2–5.8)
RBC # FLD: 13.3 % — SIGNIFICANT CHANGE UP (ref 10.3–14.5)
WBC # BLD: 12.3 K/UL — HIGH (ref 3.8–10.5)
WBC # FLD AUTO: 12.3 K/UL — HIGH (ref 3.8–10.5)

## 2018-09-06 PROCEDURE — 99214 OFFICE O/P EST MOD 30 MIN: CPT

## 2018-09-11 ENCOUNTER — APPOINTMENT (OUTPATIENT)
Dept: INTERNAL MEDICINE | Facility: CLINIC | Age: 68
End: 2018-09-11

## 2018-09-12 ENCOUNTER — APPOINTMENT (OUTPATIENT)
Dept: INTERNAL MEDICINE | Facility: CLINIC | Age: 68
End: 2018-09-12

## 2018-09-13 ENCOUNTER — CHART COPY (OUTPATIENT)
Age: 68
End: 2018-09-13

## 2018-09-21 ENCOUNTER — RX RENEWAL (OUTPATIENT)
Age: 68
End: 2018-09-21

## 2018-09-26 ENCOUNTER — OTHER (OUTPATIENT)
Age: 68
End: 2018-09-26

## 2018-09-27 ENCOUNTER — LABORATORY RESULT (OUTPATIENT)
Age: 68
End: 2018-09-27

## 2018-09-28 LAB
BASOPHILS # BLD AUTO: 0 K/UL
BASOPHILS NFR BLD AUTO: 0 %
EOSINOPHIL # BLD AUTO: 0 K/UL
EOSINOPHIL NFR BLD AUTO: 0 %
HCT VFR BLD CALC: 40.4 %
HGB BLD-MCNC: 13.3 G/DL
LYMPHOCYTES # BLD AUTO: 1.16 K/UL
LYMPHOCYTES NFR BLD AUTO: 12 %
MAN DIFF?: NORMAL
MCHC RBC-ENTMCNC: 31.3 PG
MCHC RBC-ENTMCNC: 32.9 GM/DL
MCV RBC AUTO: 95.1 FL
MONOCYTES # BLD AUTO: 0.77 K/UL
MONOCYTES NFR BLD AUTO: 8 %
NEUTROPHILS # BLD AUTO: 7.45 K/UL
NEUTROPHILS NFR BLD AUTO: 76 %
PLATELET # BLD AUTO: 298 K/UL
RBC # BLD: 4.25 M/UL
RBC # FLD: 14.6 %
WBC # FLD AUTO: 9.68 K/UL

## 2018-09-29 ENCOUNTER — FORM ENCOUNTER (OUTPATIENT)
Age: 68
End: 2018-09-29

## 2018-09-30 ENCOUNTER — APPOINTMENT (OUTPATIENT)
Dept: MRI IMAGING | Facility: IMAGING CENTER | Age: 68
End: 2018-09-30
Payer: MEDICAID

## 2018-09-30 ENCOUNTER — OUTPATIENT (OUTPATIENT)
Dept: OUTPATIENT SERVICES | Facility: HOSPITAL | Age: 68
LOS: 1 days | End: 2018-09-30
Payer: MEDICAID

## 2018-09-30 DIAGNOSIS — C71.9 MALIGNANT NEOPLASM OF BRAIN, UNSPECIFIED: ICD-10-CM

## 2018-09-30 DIAGNOSIS — Z90.49 ACQUIRED ABSENCE OF OTHER SPECIFIED PARTS OF DIGESTIVE TRACT: Chronic | ICD-10-CM

## 2018-09-30 PROCEDURE — 70553 MRI BRAIN STEM W/O & W/DYE: CPT

## 2018-09-30 PROCEDURE — 70553 MRI BRAIN STEM W/O & W/DYE: CPT | Mod: 26

## 2018-09-30 PROCEDURE — 82565 ASSAY OF CREATININE: CPT

## 2018-09-30 PROCEDURE — A9585: CPT

## 2018-10-02 ENCOUNTER — OUTPATIENT (OUTPATIENT)
Dept: OUTPATIENT SERVICES | Facility: HOSPITAL | Age: 68
LOS: 1 days | Discharge: HOME | End: 2018-10-02

## 2018-10-02 ENCOUNTER — RESULT REVIEW (OUTPATIENT)
Age: 68
End: 2018-10-02

## 2018-10-02 ENCOUNTER — APPOINTMENT (OUTPATIENT)
Dept: NEUROLOGY | Facility: CLINIC | Age: 68
End: 2018-10-02
Payer: MEDICAID

## 2018-10-02 ENCOUNTER — APPOINTMENT (OUTPATIENT)
Dept: NEUROLOGY | Facility: CLINIC | Age: 68
End: 2018-10-02

## 2018-10-02 ENCOUNTER — APPOINTMENT (OUTPATIENT)
Dept: HEMATOLOGY ONCOLOGY | Facility: CLINIC | Age: 68
End: 2018-10-02

## 2018-10-02 VITALS
HEIGHT: 68 IN | WEIGHT: 250 LBS | RESPIRATION RATE: 18 BRPM | BODY MASS INDEX: 37.89 KG/M2 | TEMPERATURE: 98 F | SYSTOLIC BLOOD PRESSURE: 182 MMHG | HEART RATE: 82 BPM | OXYGEN SATURATION: 98 % | DIASTOLIC BLOOD PRESSURE: 106 MMHG

## 2018-10-02 DIAGNOSIS — Z90.49 ACQUIRED ABSENCE OF OTHER SPECIFIED PARTS OF DIGESTIVE TRACT: Chronic | ICD-10-CM

## 2018-10-02 DIAGNOSIS — C71.9 MALIGNANT NEOPLASM OF BRAIN, UNSPECIFIED: ICD-10-CM

## 2018-10-02 LAB
BASOPHILS # BLD AUTO: 0 K/UL — SIGNIFICANT CHANGE UP (ref 0–0.2)
BASOPHILS NFR BLD AUTO: 0.2 % — SIGNIFICANT CHANGE UP (ref 0–2)
EOSINOPHIL # BLD AUTO: 0.3 K/UL — SIGNIFICANT CHANGE UP (ref 0–0.5)
EOSINOPHIL NFR BLD AUTO: 2.3 % — SIGNIFICANT CHANGE UP (ref 0–6)
HCT VFR BLD CALC: 39.6 % — SIGNIFICANT CHANGE UP (ref 39–50)
HGB BLD-MCNC: 13.4 G/DL — SIGNIFICANT CHANGE UP (ref 13–17)
LYMPHOCYTES # BLD AUTO: 1.3 K/UL — SIGNIFICANT CHANGE UP (ref 1–3.3)
LYMPHOCYTES # BLD AUTO: 11.8 % — LOW (ref 13–44)
MCHC RBC-ENTMCNC: 31.2 PG — SIGNIFICANT CHANGE UP (ref 27–34)
MCHC RBC-ENTMCNC: 33.9 G/DL — SIGNIFICANT CHANGE UP (ref 32–36)
MCV RBC AUTO: 92.1 FL — SIGNIFICANT CHANGE UP (ref 80–100)
MONOCYTES # BLD AUTO: 1 K/UL — HIGH (ref 0–0.9)
MONOCYTES NFR BLD AUTO: 9.2 % — SIGNIFICANT CHANGE UP (ref 2–14)
NEUTROPHILS # BLD AUTO: 8.4 K/UL — HIGH (ref 1.8–7.4)
NEUTROPHILS NFR BLD AUTO: 76.5 % — SIGNIFICANT CHANGE UP (ref 43–77)
PLATELET # BLD AUTO: 279 K/UL — SIGNIFICANT CHANGE UP (ref 150–400)
RBC # BLD: 4.3 M/UL — SIGNIFICANT CHANGE UP (ref 4.2–5.8)
RBC # FLD: 13.4 % — SIGNIFICANT CHANGE UP (ref 10.3–14.5)
WBC # BLD: 10.9 K/UL — HIGH (ref 3.8–10.5)
WBC # FLD AUTO: 10.9 K/UL — HIGH (ref 3.8–10.5)

## 2018-10-02 PROCEDURE — 99214 OFFICE O/P EST MOD 30 MIN: CPT

## 2018-10-02 RX ORDER — SENNOSIDES 8.6 MG
8.6 TABLET ORAL
Qty: 30 | Refills: 3 | Status: DISCONTINUED | COMMUNITY
End: 2018-10-02

## 2018-10-02 RX ORDER — ATORVASTATIN CALCIUM 40 MG/1
40 TABLET, FILM COATED ORAL
Qty: 1 | Refills: 3 | Status: DISCONTINUED | COMMUNITY
Start: 2018-05-04 | End: 2018-10-02

## 2018-10-02 RX ORDER — LANCETS 33 GAUGE
EACH MISCELLANEOUS
Qty: 1 | Refills: 2 | Status: DISCONTINUED | COMMUNITY
Start: 2018-04-27 | End: 2018-10-02

## 2018-10-02 RX ORDER — SENNOSIDES 8.6 MG TABLETS 8.6 MG/1
TABLET ORAL
Refills: 0 | Status: DISCONTINUED | COMMUNITY
End: 2018-10-02

## 2018-10-02 RX ORDER — DOCUSATE SODIUM 100 MG/1
100 CAPSULE ORAL TWICE DAILY
Qty: 90 | Refills: 0 | Status: DISCONTINUED | COMMUNITY
End: 2018-10-02

## 2018-10-03 LAB
ALBUMIN SERPL ELPH-MCNC: 4 G/DL
ALP BLD-CCNC: 72 U/L
ALT SERPL-CCNC: 28 U/L
ANION GAP SERPL CALC-SCNC: 16 MMOL/L
AST SERPL-CCNC: 12 U/L
BILIRUB SERPL-MCNC: 0.4 MG/DL
BUN SERPL-MCNC: 21 MG/DL
CALCIUM SERPL-MCNC: 9.6 MG/DL
CHLORIDE SERPL-SCNC: 102 MMOL/L
CO2 SERPL-SCNC: 26 MMOL/L
CREAT SERPL-MCNC: 0.95 MG/DL
GLUCOSE SERPL-MCNC: 185 MG/DL
POTASSIUM SERPL-SCNC: 4.2 MMOL/L
PROT SERPL-MCNC: 6.4 G/DL
SODIUM SERPL-SCNC: 144 MMOL/L

## 2018-10-04 ENCOUNTER — CHART COPY (OUTPATIENT)
Age: 68
End: 2018-10-04

## 2018-10-09 ENCOUNTER — APPOINTMENT (OUTPATIENT)
Dept: NEUROLOGY | Facility: CLINIC | Age: 68
End: 2018-10-09
Payer: MEDICAID

## 2018-10-12 LAB
BASOPHILS # BLD AUTO: 0.03 K/UL
BASOPHILS NFR BLD AUTO: 0.3 %
EOSINOPHIL # BLD AUTO: 0.03 K/UL
EOSINOPHIL NFR BLD AUTO: 0.3 %
HCT VFR BLD CALC: 38.8 %
HGB BLD-MCNC: 12.5 G/DL
IMM GRANULOCYTES NFR BLD AUTO: 2 %
LYMPHOCYTES # BLD AUTO: 1.48 K/UL
LYMPHOCYTES NFR BLD AUTO: 14 %
MAN DIFF?: NORMAL
MCHC RBC-ENTMCNC: 30.7 PG
MCHC RBC-ENTMCNC: 32.2 GM/DL
MCV RBC AUTO: 95.3 FL
MONOCYTES # BLD AUTO: 0.81 K/UL
MONOCYTES NFR BLD AUTO: 7.7 %
NEUTROPHILS # BLD AUTO: 7.98 K/UL
NEUTROPHILS NFR BLD AUTO: 75.7 %
PLATELET # BLD AUTO: 228 K/UL
RBC # BLD: 4.07 M/UL
RBC # FLD: 14.6 %
WBC # FLD AUTO: 10.54 K/UL

## 2018-10-16 ENCOUNTER — RESULT CHARGE (OUTPATIENT)
Age: 68
End: 2018-10-16

## 2018-10-17 ENCOUNTER — APPOINTMENT (OUTPATIENT)
Dept: INTERNAL MEDICINE | Facility: CLINIC | Age: 68
End: 2018-10-17
Payer: MEDICAID

## 2018-10-17 ENCOUNTER — OUTPATIENT (OUTPATIENT)
Dept: OUTPATIENT SERVICES | Facility: HOSPITAL | Age: 68
LOS: 1 days | End: 2018-10-17
Payer: MEDICAID

## 2018-10-17 VITALS
HEIGHT: 68 IN | SYSTOLIC BLOOD PRESSURE: 180 MMHG | WEIGHT: 250 LBS | BODY MASS INDEX: 37.89 KG/M2 | DIASTOLIC BLOOD PRESSURE: 100 MMHG

## 2018-10-17 DIAGNOSIS — C71.9 MALIGNANT NEOPLASM OF BRAIN, UNSPECIFIED: ICD-10-CM

## 2018-10-17 DIAGNOSIS — Z90.49 ACQUIRED ABSENCE OF OTHER SPECIFIED PARTS OF DIGESTIVE TRACT: Chronic | ICD-10-CM

## 2018-10-17 PROCEDURE — G0463: CPT

## 2018-10-17 PROCEDURE — 99213 OFFICE O/P EST LOW 20 MIN: CPT | Mod: GE

## 2018-10-18 ENCOUNTER — OUTPATIENT (OUTPATIENT)
Dept: OUTPATIENT SERVICES | Facility: HOSPITAL | Age: 68
LOS: 1 days | End: 2018-10-18
Payer: MEDICAID

## 2018-10-18 ENCOUNTER — RESULT REVIEW (OUTPATIENT)
Age: 68
End: 2018-10-18

## 2018-10-18 ENCOUNTER — APPOINTMENT (OUTPATIENT)
Age: 68
End: 2018-10-18

## 2018-10-18 ENCOUNTER — APPOINTMENT (OUTPATIENT)
Dept: ULTRASOUND IMAGING | Facility: IMAGING CENTER | Age: 68
End: 2018-10-18
Payer: MEDICAID

## 2018-10-18 ENCOUNTER — APPOINTMENT (OUTPATIENT)
Dept: NEUROLOGY | Facility: CLINIC | Age: 68
End: 2018-10-18
Payer: MEDICAID

## 2018-10-18 VITALS
DIASTOLIC BLOOD PRESSURE: 100 MMHG | RESPIRATION RATE: 18 BRPM | SYSTOLIC BLOOD PRESSURE: 179 MMHG | WEIGHT: 250 LBS | BODY MASS INDEX: 37.89 KG/M2 | HEIGHT: 68 IN | OXYGEN SATURATION: 97 % | HEART RATE: 84 BPM | TEMPERATURE: 97.5 F

## 2018-10-18 DIAGNOSIS — I10 ESSENTIAL (PRIMARY) HYPERTENSION: ICD-10-CM

## 2018-10-18 DIAGNOSIS — Z90.49 ACQUIRED ABSENCE OF OTHER SPECIFIED PARTS OF DIGESTIVE TRACT: Chronic | ICD-10-CM

## 2018-10-18 DIAGNOSIS — C71.9 MALIGNANT NEOPLASM OF BRAIN, UNSPECIFIED: ICD-10-CM

## 2018-10-18 LAB
BASOPHILS # BLD AUTO: 0 K/UL — SIGNIFICANT CHANGE UP (ref 0–0.2)
BASOPHILS NFR BLD AUTO: 0.1 % — SIGNIFICANT CHANGE UP (ref 0–2)
EOSINOPHIL # BLD AUTO: 0.2 K/UL — SIGNIFICANT CHANGE UP (ref 0–0.5)
EOSINOPHIL NFR BLD AUTO: 1.3 % — SIGNIFICANT CHANGE UP (ref 0–6)
HCT VFR BLD CALC: 40.5 % — SIGNIFICANT CHANGE UP (ref 39–50)
HGB BLD-MCNC: 14.1 G/DL — SIGNIFICANT CHANGE UP (ref 13–17)
LYMPHOCYTES # BLD AUTO: 1.6 K/UL — SIGNIFICANT CHANGE UP (ref 1–3.3)
LYMPHOCYTES # BLD AUTO: 13.5 % — SIGNIFICANT CHANGE UP (ref 13–44)
MCHC RBC-ENTMCNC: 31.7 PG — SIGNIFICANT CHANGE UP (ref 27–34)
MCHC RBC-ENTMCNC: 34.8 G/DL — SIGNIFICANT CHANGE UP (ref 32–36)
MCV RBC AUTO: 91.1 FL — SIGNIFICANT CHANGE UP (ref 80–100)
MONOCYTES # BLD AUTO: 0.9 K/UL — SIGNIFICANT CHANGE UP (ref 0–0.9)
MONOCYTES NFR BLD AUTO: 7.2 % — SIGNIFICANT CHANGE UP (ref 2–14)
NEUTROPHILS # BLD AUTO: 9.3 K/UL — HIGH (ref 1.8–7.4)
NEUTROPHILS NFR BLD AUTO: 77.9 % — HIGH (ref 43–77)
PLATELET # BLD AUTO: 260 K/UL — SIGNIFICANT CHANGE UP (ref 150–400)
RBC # BLD: 4.45 M/UL — SIGNIFICANT CHANGE UP (ref 4.2–5.8)
RBC # FLD: 13.4 % — SIGNIFICANT CHANGE UP (ref 10.3–14.5)
WBC # BLD: 11.9 K/UL — HIGH (ref 3.8–10.5)
WBC # FLD AUTO: 11.9 K/UL — HIGH (ref 3.8–10.5)

## 2018-10-18 PROCEDURE — 99214 OFFICE O/P EST MOD 30 MIN: CPT

## 2018-10-18 PROCEDURE — 93970 EXTREMITY STUDY: CPT | Mod: 26

## 2018-10-18 PROCEDURE — 93970 EXTREMITY STUDY: CPT

## 2018-10-23 ENCOUNTER — APPOINTMENT (OUTPATIENT)
Dept: INTERNAL MEDICINE | Facility: CLINIC | Age: 68
End: 2018-10-23

## 2018-10-24 ENCOUNTER — APPOINTMENT (OUTPATIENT)
Dept: INTERNAL MEDICINE | Facility: CLINIC | Age: 68
End: 2018-10-24

## 2018-10-25 ENCOUNTER — LABORATORY RESULT (OUTPATIENT)
Age: 68
End: 2018-10-25

## 2018-10-25 DIAGNOSIS — R21 RASH AND OTHER NONSPECIFIC SKIN ERUPTION: ICD-10-CM

## 2018-10-26 LAB
BASOPHILS # BLD AUTO: 0.02 K/UL
BASOPHILS NFR BLD AUTO: 0.2 %
EOSINOPHIL # BLD AUTO: 0.07 K/UL
EOSINOPHIL NFR BLD AUTO: 0.8 %
HCT VFR BLD CALC: 38.7 %
HGB BLD-MCNC: 12.4 G/DL
IMM GRANULOCYTES NFR BLD AUTO: 1.2 %
LYMPHOCYTES # BLD AUTO: 1.48 K/UL
LYMPHOCYTES NFR BLD AUTO: 16.6 %
MAN DIFF?: NORMAL
MCHC RBC-ENTMCNC: 30.8 PG
MCHC RBC-ENTMCNC: 32 GM/DL
MCV RBC AUTO: 96.3 FL
MONOCYTES # BLD AUTO: 0.72 K/UL
MONOCYTES NFR BLD AUTO: 8.1 %
NEUTROPHILS # BLD AUTO: 6.53 K/UL
NEUTROPHILS NFR BLD AUTO: 73.1 %
PLATELET # BLD AUTO: 244 K/UL
RBC # BLD: 4.02 M/UL
RBC # FLD: 14.3 %
WBC # FLD AUTO: 8.93 K/UL

## 2018-11-06 ENCOUNTER — RX RENEWAL (OUTPATIENT)
Age: 68
End: 2018-11-06

## 2018-11-07 PROBLEM — C71.9 GLIOBLASTOMA: Status: ACTIVE | Noted: 2018-02-06

## 2018-11-07 NOTE — REVIEW OF SYSTEMS
[Skin Rash] : skin rash [Fever] : no fever [Vision Problems] : no vision problems [Chest Pain] : no chest pain [Palpitations] : no palpitations [Shortness Of Breath] : no shortness of breath [Wheezing] : no wheezing [Abdominal Pain] : no abdominal pain [Nausea] : no nausea [Vomiting] : no vomiting [Dysuria] : no dysuria [Headache] : no headache [Dizziness] : no dizziness

## 2018-11-07 NOTE — HISTORY OF PRESENT ILLNESS
[FreeTextEntry1] : HTN  [de-identified] : 69 yo male w/ PMHx of left frontal lobe glioblastoma  who underwent subtotal resection followed by hypofractionated radiation therapy now with concurrent temozolamide who is presenting for persistent elevated BP.  As per son pt's BP has been ranging around 180/110 over the past two weeks.  Pt. denies any headache, vision changes, nausea or vomiting.  Pt. adherent to medication and is taking lisinopril 20 BID and Amlodopine 10mg daily.  Pt. also has new onset circumferential rashes that appear like psoriasis; rashes are not itchy.

## 2018-11-07 NOTE — PHYSICAL EXAM
[No Acute Distress] : no acute distress [EOMI] : extraocular movements intact [No Respiratory Distress] : no respiratory distress  [Clear to Auscultation] : lungs were clear to auscultation bilaterally [Normal Rate] : normal rate  [Regular Rhythm] : with a regular rhythm [Normal S1, S2] : normal S1 and S2 [No Murmur] : no murmur heard [Soft] : abdomen soft [Normal Bowel Sounds] : normal bowel sounds [de-identified] : b/l 2+ LE edema  [de-identified] : large circumferential rash on L arm w/ multiple excoriations on b/l forearms

## 2018-11-07 NOTE — ASSESSMENT
[FreeTextEntry1] : 67 yo male w/ PMHx of left frontal lobe glioblastoma who underwent subtotal resection followed by hypofractionated radiation therapy now with concurrent temozolamide who is presenting for persistent elevated BP.

## 2018-11-07 NOTE — PLAN
[FreeTextEntry1] : #HTN\par - Pt. has elevated /110 but not in hypertensive urgency as he is asymptomatic.  Currently adherent w/ lisinopril 20 BID and Amlodipine 10 daily.  Added HCTZ 25mg qday to regimen; pt. to return next week to make assess BP.  SBP goal b/w 140-160.\par \par #Rash\par - Pt. has multiple rashes.  On left elbow region pt. has large circumferential rash resembling psoriasis.  Also has multiple excoriations on b/l lower arms.  Gave pt. referral for dermatology\par \par #Diabetes\par - Pt. is on 1g Decadron daily b/c of glioblastoma.  Currently on 500mg Metformin daily; needs repeat HbA1C on repeat visit.  \par \par Roro Lou, PGY1

## 2018-11-09 ENCOUNTER — RESULT CHARGE (OUTPATIENT)
Age: 68
End: 2018-11-09

## 2018-11-09 ENCOUNTER — OUTPATIENT (OUTPATIENT)
Dept: OUTPATIENT SERVICES | Facility: HOSPITAL | Age: 68
LOS: 1 days | End: 2018-11-09
Payer: MEDICAID

## 2018-11-09 ENCOUNTER — APPOINTMENT (OUTPATIENT)
Dept: INTERNAL MEDICINE | Facility: CLINIC | Age: 68
End: 2018-11-09

## 2018-11-09 VITALS
BODY MASS INDEX: 37.89 KG/M2 | WEIGHT: 250 LBS | SYSTOLIC BLOOD PRESSURE: 170 MMHG | HEIGHT: 68 IN | DIASTOLIC BLOOD PRESSURE: 80 MMHG

## 2018-11-09 DIAGNOSIS — I10 ESSENTIAL (PRIMARY) HYPERTENSION: ICD-10-CM

## 2018-11-09 DIAGNOSIS — R21 RASH AND OTHER NONSPECIFIC SKIN ERUPTION: ICD-10-CM

## 2018-11-09 DIAGNOSIS — Z90.49 ACQUIRED ABSENCE OF OTHER SPECIFIED PARTS OF DIGESTIVE TRACT: Chronic | ICD-10-CM

## 2018-11-09 LAB — HBA1C MFR BLD HPLC: 6.8

## 2018-11-09 PROCEDURE — G0463: CPT

## 2018-11-09 PROCEDURE — 83036 HEMOGLOBIN GLYCOSYLATED A1C: CPT

## 2018-11-09 NOTE — ASSESSMENT
[FreeTextEntry1] : 68 YOM with GBM on TMZ, HTN, and steroid induced diabetes presents for BP follow up

## 2018-11-09 NOTE — HISTORY OF PRESENT ILLNESS
[FreeTextEntry1] : BP follow up [de-identified] : 67 yo male w/ PMHx of left frontal lobe glioblastoma (underwent subtotal resection followed by hypofractionated radiation therapy now with concurrent temozolamide), HTN, and steroid induced diabetes here for BP management. BP elevated to 170/80 in the office today. Spoke with daughter over the phone. They need to ask neuro-onc whether decadron can be further reduced as it is most likely driving the BP.

## 2018-11-09 NOTE — PHYSICAL EXAM
[Clear to Auscultation] : lungs were clear to auscultation bilaterally [Regular Rhythm] : with a regular rhythm [Normal S1, S2] : normal S1 and S2 [de-identified] : Expressive aphasia, word finding difficulties

## 2018-11-09 NOTE — PLAN
[FreeTextEntry1] : #HTN\par - most likely due to decadron. Informed patient to ask neuro-onc whether this can be reduced or if it will be d/c'ed\par - c/w lisinopril/amlodipine \par -HCTZ 25 mg 1.5 tablets daily\par \par #DM\par - steroid induced\par - A1c 6.8 today\par \par Case d/w Dr. Armstrong

## 2018-11-11 ENCOUNTER — LABORATORY RESULT (OUTPATIENT)
Age: 68
End: 2018-11-11

## 2018-11-12 ENCOUNTER — RX RENEWAL (OUTPATIENT)
Age: 68
End: 2018-11-12

## 2018-11-13 DIAGNOSIS — T38.0X5A ADVERSE EFFECT OF GLUCOCORTICOIDS AND SYNTHETIC ANALOGUES, INITIAL ENCOUNTER: ICD-10-CM

## 2018-11-13 DIAGNOSIS — E09.9 DRUG OR CHEMICAL INDUCED DIABETES MELLITUS WITHOUT COMPLICATIONS: ICD-10-CM

## 2018-11-14 ENCOUNTER — MOBILE ON CALL (OUTPATIENT)
Age: 68
End: 2018-11-14

## 2018-11-15 ENCOUNTER — MOBILE ON CALL (OUTPATIENT)
Age: 68
End: 2018-11-15

## 2018-11-18 ENCOUNTER — OUTPATIENT (OUTPATIENT)
Dept: OUTPATIENT SERVICES | Facility: HOSPITAL | Age: 68
LOS: 1 days | End: 2018-11-18
Payer: MEDICAID

## 2018-11-18 ENCOUNTER — APPOINTMENT (OUTPATIENT)
Dept: MRI IMAGING | Facility: IMAGING CENTER | Age: 68
End: 2018-11-18
Payer: MEDICAID

## 2018-11-18 DIAGNOSIS — Z90.49 ACQUIRED ABSENCE OF OTHER SPECIFIED PARTS OF DIGESTIVE TRACT: Chronic | ICD-10-CM

## 2018-11-18 DIAGNOSIS — Z00.8 ENCOUNTER FOR OTHER GENERAL EXAMINATION: ICD-10-CM

## 2018-11-18 PROCEDURE — 70553 MRI BRAIN STEM W/O & W/DYE: CPT

## 2018-11-18 PROCEDURE — A9585: CPT

## 2018-11-18 PROCEDURE — 70553 MRI BRAIN STEM W/O & W/DYE: CPT | Mod: 26

## 2018-11-19 ENCOUNTER — OUTPATIENT (OUTPATIENT)
Dept: OUTPATIENT SERVICES | Facility: HOSPITAL | Age: 68
LOS: 1 days | Discharge: HOME | End: 2018-11-19

## 2018-11-19 ENCOUNTER — RESULT REVIEW (OUTPATIENT)
Age: 68
End: 2018-11-19

## 2018-11-19 ENCOUNTER — APPOINTMENT (OUTPATIENT)
Dept: HEMATOLOGY ONCOLOGY | Facility: CLINIC | Age: 68
End: 2018-11-19

## 2018-11-19 DIAGNOSIS — C71.9 MALIGNANT NEOPLASM OF BRAIN, UNSPECIFIED: ICD-10-CM

## 2018-11-19 DIAGNOSIS — Z90.49 ACQUIRED ABSENCE OF OTHER SPECIFIED PARTS OF DIGESTIVE TRACT: Chronic | ICD-10-CM

## 2018-11-19 LAB
BASOPHILS # BLD AUTO: 0 K/UL
BASOPHILS NFR BLD AUTO: 0 %
EOSINOPHIL # BLD AUTO: 0.14 K/UL
EOSINOPHIL NFR BLD AUTO: 1.7 %
HCT VFR BLD CALC: 39.9 %
HCT VFR BLD CALC: 41.1 % — SIGNIFICANT CHANGE UP (ref 39–50)
HGB BLD-MCNC: 12.3 G/DL
HGB BLD-MCNC: 14 G/DL — SIGNIFICANT CHANGE UP (ref 13–17)
LYMPHOCYTES # BLD AUTO: 2.82 K/UL
LYMPHOCYTES NFR BLD AUTO: 34.7 %
MAN DIFF?: NORMAL
MCHC RBC-ENTMCNC: 29.8 PG
MCHC RBC-ENTMCNC: 30.8 GM/DL
MCHC RBC-ENTMCNC: 30.8 PG — SIGNIFICANT CHANGE UP (ref 27–34)
MCHC RBC-ENTMCNC: 34.1 G/DL — SIGNIFICANT CHANGE UP (ref 32–36)
MCV RBC AUTO: 90.2 FL — SIGNIFICANT CHANGE UP (ref 80–100)
MCV RBC AUTO: 96.6 FL
MONOCYTES # BLD AUTO: 0.55 K/UL
MONOCYTES NFR BLD AUTO: 6.8 %
NEUTROPHILS # BLD AUTO: 3.93 K/UL
NEUTROPHILS NFR BLD AUTO: 45.8 %
PLATELET # BLD AUTO: 263 K/UL — SIGNIFICANT CHANGE UP (ref 150–400)
PLATELET # BLD AUTO: 354 K/UL
RBC # BLD: 4.13 M/UL
RBC # BLD: 4.55 M/UL — SIGNIFICANT CHANGE UP (ref 4.2–5.8)
RBC # FLD: 13.3 % — SIGNIFICANT CHANGE UP (ref 10.3–14.5)
RBC # FLD: 15 %
WBC # BLD: 11.1 K/UL — HIGH (ref 3.8–10.5)
WBC # FLD AUTO: 11.1 K/UL — HIGH (ref 3.8–10.5)
WBC # FLD AUTO: 8.14 K/UL

## 2018-11-20 ENCOUNTER — APPOINTMENT (OUTPATIENT)
Dept: NEUROLOGY | Facility: CLINIC | Age: 68
End: 2018-11-20
Payer: MEDICAID

## 2018-11-20 VITALS
SYSTOLIC BLOOD PRESSURE: 219 MMHG | WEIGHT: 252 LBS | DIASTOLIC BLOOD PRESSURE: 115 MMHG | BODY MASS INDEX: 38.19 KG/M2 | HEIGHT: 68 IN | OXYGEN SATURATION: 98 % | HEART RATE: 88 BPM | TEMPERATURE: 97.6 F | RESPIRATION RATE: 18 BRPM

## 2018-11-20 DIAGNOSIS — R47.01 APHASIA: ICD-10-CM

## 2018-11-20 PROCEDURE — 99214 OFFICE O/P EST MOD 30 MIN: CPT

## 2018-11-21 ENCOUNTER — LABORATORY RESULT (OUTPATIENT)
Age: 68
End: 2018-11-21

## 2018-11-21 ENCOUNTER — OUTPATIENT (OUTPATIENT)
Dept: OUTPATIENT SERVICES | Facility: HOSPITAL | Age: 68
LOS: 1 days | End: 2018-11-21
Payer: MEDICAID

## 2018-11-21 ENCOUNTER — APPOINTMENT (OUTPATIENT)
Dept: INTERNAL MEDICINE | Facility: CLINIC | Age: 68
End: 2018-11-21
Payer: MEDICAID

## 2018-11-21 VITALS
SYSTOLIC BLOOD PRESSURE: 218 MMHG | HEIGHT: 68 IN | WEIGHT: 250 LBS | DIASTOLIC BLOOD PRESSURE: 134 MMHG | BODY MASS INDEX: 37.89 KG/M2

## 2018-11-21 VITALS
HEART RATE: 108 BPM | DIASTOLIC BLOOD PRESSURE: 128 MMHG | HEIGHT: 68 IN | WEIGHT: 250 LBS | BODY MASS INDEX: 37.89 KG/M2 | SYSTOLIC BLOOD PRESSURE: 189 MMHG

## 2018-11-21 DIAGNOSIS — I10 ESSENTIAL (PRIMARY) HYPERTENSION: ICD-10-CM

## 2018-11-21 DIAGNOSIS — Z90.49 ACQUIRED ABSENCE OF OTHER SPECIFIED PARTS OF DIGESTIVE TRACT: Chronic | ICD-10-CM

## 2018-11-21 PROCEDURE — G0463: CPT

## 2018-11-21 PROCEDURE — 99214 OFFICE O/P EST MOD 30 MIN: CPT | Mod: GC

## 2018-11-21 RX ORDER — TEMOZOLOMIDE 250 MG/1
250 CAPSULE ORAL
Qty: 5 | Refills: 0 | Status: DISCONTINUED | COMMUNITY
Start: 2018-09-06 | End: 2018-11-21

## 2018-11-21 RX ORDER — ONDANSETRON 8 MG/1
8 TABLET ORAL
Qty: 30 | Refills: 0 | Status: DISCONTINUED | COMMUNITY
Start: 2018-04-24 | End: 2018-11-21

## 2018-11-21 RX ORDER — AMLODIPINE BESYLATE 10 MG/1
10 TABLET ORAL DAILY
Qty: 90 | Refills: 2 | Status: DISCONTINUED | COMMUNITY
End: 2018-11-21

## 2018-11-23 PROBLEM — R47.01 EXPRESSIVE APHASIA: Status: ACTIVE | Noted: 2018-11-23

## 2018-11-25 ENCOUNTER — LABORATORY RESULT (OUTPATIENT)
Age: 68
End: 2018-11-25

## 2018-11-26 ENCOUNTER — OUTPATIENT (OUTPATIENT)
Dept: OUTPATIENT SERVICES | Facility: HOSPITAL | Age: 68
LOS: 1 days | End: 2018-11-26
Payer: MEDICAID

## 2018-11-26 ENCOUNTER — APPOINTMENT (OUTPATIENT)
Dept: INTERNAL MEDICINE | Facility: CLINIC | Age: 68
End: 2018-11-26
Payer: MEDICAID

## 2018-11-26 VITALS
DIASTOLIC BLOOD PRESSURE: 98 MMHG | OXYGEN SATURATION: 97 % | HEART RATE: 90 BPM | SYSTOLIC BLOOD PRESSURE: 160 MMHG | BODY MASS INDEX: 38.04 KG/M2 | HEIGHT: 68 IN | WEIGHT: 251 LBS

## 2018-11-26 VITALS — DIASTOLIC BLOOD PRESSURE: 100 MMHG | SYSTOLIC BLOOD PRESSURE: 160 MMHG

## 2018-11-26 DIAGNOSIS — T38.0X5A DRUG OR CHEMICAL INDUCED DIABETES MELLITUS W/OUT COMPLICATIONS: ICD-10-CM

## 2018-11-26 DIAGNOSIS — E09.9 DRUG OR CHEMICAL INDUCED DIABETES MELLITUS W/OUT COMPLICATIONS: ICD-10-CM

## 2018-11-26 DIAGNOSIS — I10 ESSENTIAL (PRIMARY) HYPERTENSION: ICD-10-CM

## 2018-11-26 DIAGNOSIS — Z29.9 ENCOUNTER FOR PROPHYLACTIC MEASURES, UNSPECIFIED: ICD-10-CM

## 2018-11-26 DIAGNOSIS — Z90.49 ACQUIRED ABSENCE OF OTHER SPECIFIED PARTS OF DIGESTIVE TRACT: Chronic | ICD-10-CM

## 2018-11-26 PROCEDURE — 99214 OFFICE O/P EST MOD 30 MIN: CPT | Mod: GC

## 2018-11-26 PROCEDURE — G0463: CPT

## 2018-11-26 NOTE — HISTORY OF PRESENT ILLNESS
[FreeTextEntry1] : bp check [de-identified] : Patient is a 68 y.o M w/ PMH left frontal glioblastoma s/p subtotal resection followed by hypofractionated radiation now w/ concurrent temozolamide, HTN, and steroid induced diabetes who presents for a follow up appointment for a blood pressure check. Patient is English speaking and sister was present to translate.  Patient was last seen on 11/21/2018 with elevated blood pressure of 220/130, recheck was 180/128. His blood pressure medication at the time was lisinopril 40 mg PO daily and hydrochlorothiazide 37.5 mg PO daily. Of note, he had also been on amlodipine 10 mg PO daily in the past but was taken off for lower extremity swelling. After the patient's last appointment, he was restarted on amlodipine 5 mg PO daily. Reported to be compliant with medications.  Today, patient's bp was 160/85 with recheck of 170/120. He denies CP, SOB, blurry vision, headaches, weakness, numbness/tingling, anxiety, difficulty urinating, or abdominal pain. He is AOx2 but according to his sister, that is his baseline. Patient does get check of his blood pressure at home but did not bring log today, sister states thinks that in the morning it is usually around 180 systolic but not certain. Regarding, the patient's diet, he tends to eat a lot of meat and salty foods.

## 2018-11-26 NOTE — PHYSICAL EXAM
[No Acute Distress] : no acute distress [Well Nourished] : well nourished [Well Developed] : well developed [Well-Appearing] : well-appearing [Normal Sclera/Conjunctiva] : normal sclera/conjunctiva [PERRL] : pupils equal round and reactive to light [EOMI] : extraocular movements intact [Normal Outer Ear/Nose] : the outer ears and nose were normal in appearance [Normal Oropharynx] : the oropharynx was normal [Supple] : supple [No Lymphadenopathy] : no lymphadenopathy [Thyroid Normal, No Nodules] : the thyroid was normal and there were no nodules present [No Respiratory Distress] : no respiratory distress  [Clear to Auscultation] : lungs were clear to auscultation bilaterally [No Accessory Muscle Use] : no accessory muscle use [Normal Rate] : normal rate  [Regular Rhythm] : with a regular rhythm [Normal S1, S2] : normal S1 and S2 [No Murmur] : no murmur heard [No Varicosities] : no varicosities [Pedal Pulses Present] : the pedal pulses are present [No Extremity Clubbing/Cyanosis] : no extremity clubbing/cyanosis [Soft] : abdomen soft [Non Tender] : non-tender [Non-distended] : non-distended [No Masses] : no abdominal mass palpated [Normal Bowel Sounds] : normal bowel sounds [Normal Anterior Cervical Nodes] : no anterior cervical lymphadenopathy [No CVA Tenderness] : no CVA  tenderness [No Joint Swelling] : no joint swelling [Grossly Normal Strength/Tone] : grossly normal strength/tone [No Rash] : no rash [Normal Gait] : normal gait [Coordination Grossly Intact] : coordination grossly intact [No Focal Deficits] : no focal deficits [de-identified] : CN II-XII intact, AOx2 (at baseline)

## 2018-11-29 ENCOUNTER — RESULT REVIEW (OUTPATIENT)
Age: 68
End: 2018-11-29

## 2018-11-29 ENCOUNTER — APPOINTMENT (OUTPATIENT)
Age: 68
End: 2018-11-29

## 2018-11-29 ENCOUNTER — CHART COPY (OUTPATIENT)
Age: 68
End: 2018-11-29

## 2018-11-29 ENCOUNTER — LABORATORY RESULT (OUTPATIENT)
Age: 68
End: 2018-11-29

## 2018-11-29 LAB
BASOPHILS # BLD AUTO: 0 K/UL — SIGNIFICANT CHANGE UP (ref 0–0.2)
BASOPHILS NFR BLD AUTO: 0.4 % — SIGNIFICANT CHANGE UP (ref 0–2)
EOSINOPHIL # BLD AUTO: 0.1 K/UL — SIGNIFICANT CHANGE UP (ref 0–0.5)
EOSINOPHIL NFR BLD AUTO: 1.1 % — SIGNIFICANT CHANGE UP (ref 0–6)
HCT VFR BLD CALC: 39.9 % — SIGNIFICANT CHANGE UP (ref 39–50)
HGB BLD-MCNC: 13.5 G/DL — SIGNIFICANT CHANGE UP (ref 13–17)
LYMPHOCYTES # BLD AUTO: 1.7 K/UL — SIGNIFICANT CHANGE UP (ref 1–3.3)
LYMPHOCYTES # BLD AUTO: 17.6 % — SIGNIFICANT CHANGE UP (ref 13–44)
MCHC RBC-ENTMCNC: 30.3 PG — SIGNIFICANT CHANGE UP (ref 27–34)
MCHC RBC-ENTMCNC: 33.9 G/DL — SIGNIFICANT CHANGE UP (ref 32–36)
MCV RBC AUTO: 89.5 FL — SIGNIFICANT CHANGE UP (ref 80–100)
MONOCYTES # BLD AUTO: 1 K/UL — HIGH (ref 0–0.9)
MONOCYTES NFR BLD AUTO: 10.3 % — SIGNIFICANT CHANGE UP (ref 2–14)
NEUTROPHILS # BLD AUTO: 6.8 K/UL — SIGNIFICANT CHANGE UP (ref 1.8–7.4)
NEUTROPHILS NFR BLD AUTO: 70.5 % — SIGNIFICANT CHANGE UP (ref 43–77)
PLATELET # BLD AUTO: 307 K/UL — SIGNIFICANT CHANGE UP (ref 150–400)
RBC # BLD: 4.46 M/UL — SIGNIFICANT CHANGE UP (ref 4.2–5.8)
RBC # FLD: 13 % — SIGNIFICANT CHANGE UP (ref 10.3–14.5)
WBC # BLD: 9.6 K/UL — SIGNIFICANT CHANGE UP (ref 3.8–10.5)
WBC # FLD AUTO: 9.6 K/UL — SIGNIFICANT CHANGE UP (ref 3.8–10.5)

## 2018-11-30 ENCOUNTER — RX RENEWAL (OUTPATIENT)
Age: 68
End: 2018-11-30

## 2018-11-30 ENCOUNTER — APPOINTMENT (OUTPATIENT)
Dept: INTERNAL MEDICINE | Facility: CLINIC | Age: 68
End: 2018-11-30
Payer: MEDICAID

## 2018-11-30 ENCOUNTER — RX CHANGE (OUTPATIENT)
Age: 68
End: 2018-11-30

## 2018-11-30 ENCOUNTER — OUTPATIENT (OUTPATIENT)
Dept: OUTPATIENT SERVICES | Facility: HOSPITAL | Age: 68
LOS: 1 days | End: 2018-11-30
Payer: MEDICAID

## 2018-11-30 ENCOUNTER — MOBILE ON CALL (OUTPATIENT)
Age: 68
End: 2018-11-30

## 2018-11-30 VITALS
BODY MASS INDEX: 37.59 KG/M2 | HEIGHT: 68 IN | WEIGHT: 248 LBS | HEART RATE: 87 BPM | DIASTOLIC BLOOD PRESSURE: 100 MMHG | OXYGEN SATURATION: 98 % | SYSTOLIC BLOOD PRESSURE: 170 MMHG

## 2018-11-30 DIAGNOSIS — Z90.49 ACQUIRED ABSENCE OF OTHER SPECIFIED PARTS OF DIGESTIVE TRACT: Chronic | ICD-10-CM

## 2018-11-30 DIAGNOSIS — I10 ESSENTIAL (PRIMARY) HYPERTENSION: ICD-10-CM

## 2018-11-30 PROCEDURE — 99213 OFFICE O/P EST LOW 20 MIN: CPT | Mod: GE

## 2018-11-30 PROCEDURE — G0463: CPT

## 2018-11-30 RX ORDER — LABETALOL HYDROCHLORIDE 100 MG/1
100 TABLET, FILM COATED ORAL
Qty: 60 | Refills: 0 | Status: DISCONTINUED | COMMUNITY
Start: 2018-11-30 | End: 2018-11-30

## 2018-11-30 RX ORDER — LISINOPRIL 20 MG/1
20 TABLET ORAL
Qty: 3 | Refills: 3 | Status: DISCONTINUED | COMMUNITY
Start: 2018-02-06 | End: 2018-11-30

## 2018-12-03 ENCOUNTER — RESULT REVIEW (OUTPATIENT)
Age: 68
End: 2018-12-03

## 2018-12-03 ENCOUNTER — APPOINTMENT (OUTPATIENT)
Dept: HEMATOLOGY ONCOLOGY | Facility: CLINIC | Age: 68
End: 2018-12-03

## 2018-12-03 LAB
BASOPHILS # BLD AUTO: 0.1 K/UL — SIGNIFICANT CHANGE UP (ref 0–0.2)
BASOPHILS NFR BLD AUTO: 0.7 % — SIGNIFICANT CHANGE UP (ref 0–2)
EOSINOPHIL # BLD AUTO: 0.2 K/UL — SIGNIFICANT CHANGE UP (ref 0–0.5)
EOSINOPHIL NFR BLD AUTO: 2.4 % — SIGNIFICANT CHANGE UP (ref 0–6)
HCT VFR BLD CALC: 38.6 % — LOW (ref 39–50)
HGB BLD-MCNC: 13 G/DL — SIGNIFICANT CHANGE UP (ref 13–17)
LYMPHOCYTES # BLD AUTO: 1.9 K/UL — SIGNIFICANT CHANGE UP (ref 1–3.3)
LYMPHOCYTES # BLD AUTO: 19.2 % — SIGNIFICANT CHANGE UP (ref 13–44)
MCHC RBC-ENTMCNC: 29.9 PG — SIGNIFICANT CHANGE UP (ref 27–34)
MCHC RBC-ENTMCNC: 33.6 G/DL — SIGNIFICANT CHANGE UP (ref 32–36)
MCV RBC AUTO: 88.8 FL — SIGNIFICANT CHANGE UP (ref 80–100)
MONOCYTES # BLD AUTO: 0.8 K/UL — SIGNIFICANT CHANGE UP (ref 0–0.9)
MONOCYTES NFR BLD AUTO: 8.1 % — SIGNIFICANT CHANGE UP (ref 2–14)
NEUTROPHILS # BLD AUTO: 6.9 K/UL — SIGNIFICANT CHANGE UP (ref 1.8–7.4)
NEUTROPHILS NFR BLD AUTO: 69.6 % — SIGNIFICANT CHANGE UP (ref 43–77)
PLATELET # BLD AUTO: 317 K/UL — SIGNIFICANT CHANGE UP (ref 150–400)
RBC # BLD: 4.34 M/UL — SIGNIFICANT CHANGE UP (ref 4.2–5.8)
RBC # FLD: 12.8 % — SIGNIFICANT CHANGE UP (ref 10.3–14.5)
WBC # BLD: 10 K/UL — SIGNIFICANT CHANGE UP (ref 3.8–10.5)
WBC # FLD AUTO: 10 K/UL — SIGNIFICANT CHANGE UP (ref 3.8–10.5)

## 2018-12-03 NOTE — ASSESSMENT
[FreeTextEntry1] : Mr. Barber is a 69 y/o M w/ L frontal glioblastoma s/p resection/RT on steroid, HTN, and DM, presenting to clinic for HTN follow up.\par \par # HTN\par - uncontrolled HTN in setting of steroid use for neurologic condition. He did not take lisinopril this AM due to running out of the med yesterday.\par * patient's daughter is coming to make sure luli is taking medicaiton, which is good, but this means if family members are occupied, patient tends to forget his BP meds.\par - developed MADELAINE in setting of increasing norvasc to 10mg during last visit, which has happened before. Will redue the amlodipine back to 5mg.\par - Given hypokalemia in setting of HCTZ use, will repeat BMP today. continue with HCTZ 37.5mg. if BMP shows continuous hypokalemia, go down HCTZ to 25mg.\par - Patient ran out of lisinopril. Switch to losratan 100mg qD.\par - Added labetolol 100mg BID for better BP control - spoke with pharmacy. Labetolol is not covered by her insurance. Switched to Coreg 6.25mg BID and prescribed for a month. \par - f/u next week for BP monitoring and optimizing medical management..\par \par \par

## 2018-12-03 NOTE — REVIEW OF SYSTEMS
[Joint Pain] : joint pain [Back Pain] : back pain [Fever] : no fever [Night Sweats] : no night sweats [Discharge] : no discharge [Vision Problems] : no vision problems [Earache] : no earache [Nasal Discharge] : no nasal discharge [Chest Pain] : no chest pain [Palpitations] : no palpitations [Orthopena] : no orthopnea [Shortness Of Breath] : no shortness of breath [Wheezing] : no wheezing [Cough] : no cough [Abdominal Pain] : no abdominal pain [Nausea] : no nausea [Vomiting] : no vomiting [Heartburn] : no heartburn [Dysuria] : no dysuria [Hematuria] : no hematuria [Muscle Pain] : no muscle pain [Itching] : no itching [Skin Rash] : no skin rash [Headache] : no headache [Memory Loss] : no memory loss [Suicidal] : not suicidal [FreeTextEntry9] : bilateral leg swelling

## 2018-12-03 NOTE — PHYSICAL EXAM
[No Acute Distress] : no acute distress [Well Nourished] : well nourished [Well Developed] : well developed [Well-Appearing] : well-appearing [Normal Sclera/Conjunctiva] : normal sclera/conjunctiva [PERRL] : pupils equal round and reactive to light [EOMI] : extraocular movements intact [Normal Outer Ear/Nose] : the outer ears and nose were normal in appearance [Normal Oropharynx] : the oropharynx was normal [No JVD] : no jugular venous distention [Supple] : supple [No Lymphadenopathy] : no lymphadenopathy [Thyroid Normal, No Nodules] : the thyroid was normal and there were no nodules present [No Respiratory Distress] : no respiratory distress  [Clear to Auscultation] : lungs were clear to auscultation bilaterally [No Accessory Muscle Use] : no accessory muscle use [Normal Rate] : normal rate  [Regular Rhythm] : with a regular rhythm [Normal S1, S2] : normal S1 and S2 [No Carotid Bruits] : no carotid bruits [No Abdominal Bruit] : a ~M bruit was not heard ~T in the abdomen [Pedal Pulses Present] : the pedal pulses are present [No Edema] : there was no peripheral edema [Soft] : abdomen soft [Non Tender] : non-tender [Non-distended] : non-distended [Normal Bowel Sounds] : normal bowel sounds [Normal Posterior Cervical Nodes] : no posterior cervical lymphadenopathy [Normal Anterior Cervical Nodes] : no anterior cervical lymphadenopathy [No CVA Tenderness] : no CVA  tenderness [No Spinal Tenderness] : no spinal tenderness [No Joint Swelling] : no joint swelling [Grossly Normal Strength/Tone] : grossly normal strength/tone [Normal Gait] : normal gait [Coordination Grossly Intact] : coordination grossly intact [No Focal Deficits] : no focal deficits [Deep Tendon Reflexes (DTR)] : deep tendon reflexes were 2+ and symmetric [Normal Affect] : the affect was normal [Normal Insight/Judgement] : insight and judgment were intact [de-identified] : Grade I/VI systolic murmur on RUSB [de-identified] : Central obesity [de-identified] : Bilateral MADELAINE 2+ (Pitting edema.) [de-identified] : Erythematous skin discoloration of bialteral shin.

## 2018-12-03 NOTE — HISTORY OF PRESENT ILLNESS
[FreeTextEntry1] : HTN follow up [de-identified] : Mr. 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 He had norvasc of 10 in the past, which caused MADELAINE. He feels heavy on the feet and is maintaining sedentary lifestyle. He does not use cane or walker as he feels there is no need to, yet reports that his legs are feeling heavier recently.\par \par Patient is measuring BP everyday at home, and SBP was 150. yesterday. He ran out of the medication and was unable to take the lisinopril today. Otherwise, he denies lightheadedness, chest pain, palpitation, dizziness, visual disturbance, headache, paresthesia, numbness.\par \par He has left ankle pain yet this is not new but more of a chronic problem for him.\par His Right vision is decreased compared to left, yet this is also a chronic issue with no recent acute changes.\par

## 2018-12-04 NOTE — PLAN
[FreeTextEntry1] : #HTN urgency\par - Asymptomatic, offered calling ambulance to refer to ED vs restarting amlodipine today and coming back to office for repeat bp check Friday -- family did not want to go to the ED and hesitant about amlodipine due to the side effects. Agreed to restart 5mg amlodipine\par - educated about alarm symptoms to visit ED\par - Cw lisinopril, HCTZ \par - RTC in 2 days for repeat BP check \par - Likely would benefit from chlorthalidone instead of HCTZ, can also try hydralazine/isordil if persistently elevated bp \par \par D/w Dr Sheth

## 2018-12-04 NOTE — HISTORY OF PRESENT ILLNESS
[FreeTextEntry1] : elevated bp  [de-identified] : 69 yo Mohawk speaking male w/ PMHx of left frontal lobe glioblastoma (underwent subtotal resection followed by hypofractionated radiation therapy now with concurrent temozolamide), HTN, and steroid induced diabetes sent by his neurologist for elevated bp. BP elevated to 220/130 in the office today, repeat 180/128.  Patient asymptomatic, denying vision changes, CP, SOB, headache, abdominal pain, nausea, vomiting, difficulty or dec urination, back pain. Per sister who is also translating, pt started on HCTZ recently and had stopped amlodipine because he was having LE edema which has resolved since he discontinued the amlodipine.

## 2018-12-04 NOTE — REVIEW OF SYSTEMS
[Fever] : no fever [Chills] : no chills [Fatigue] : no fatigue [Vision Problems] : no vision problems [Hearing Loss] : no hearing loss [Sore Throat] : no sore throat [Chest Pain] : no chest pain [Palpitations] : no palpitations [Claudication] : no  leg claudication [Shortness Of Breath] : no shortness of breath [Cough] : no cough [Dyspnea on Exertion] : not dyspnea on exertion [Abdominal Pain] : no abdominal pain [Nausea] : no nausea [Vomiting] : no vomiting [Dysuria] : no dysuria [Back Pain] : no back pain [Headache] : no headache [Dizziness] : no dizziness

## 2018-12-04 NOTE — PHYSICAL EXAM
[No Acute Distress] : no acute distress [Well-Appearing] : well-appearing [Normal Sclera/Conjunctiva] : normal sclera/conjunctiva [PERRL] : pupils equal round and reactive to light [EOMI] : extraocular movements intact [Normal Oropharynx] : the oropharynx was normal [Supple] : supple [No Respiratory Distress] : no respiratory distress  [Clear to Auscultation] : lungs were clear to auscultation bilaterally [Regular Rhythm] : with a regular rhythm [Normal S1, S2] : normal S1 and S2 [No Murmur] : no murmur heard [No Edema] : there was no peripheral edema [Soft] : abdomen soft [Non Tender] : non-tender [Non-distended] : non-distended [No HSM] : no HSM [Normal Bowel Sounds] : normal bowel sounds [No CVA Tenderness] : no CVA  tenderness [Grossly Normal Strength/Tone] : grossly normal strength/tone [de-identified] : no papilledema  [de-identified] : tachycardic

## 2018-12-05 ENCOUNTER — APPOINTMENT (OUTPATIENT)
Dept: INTERNAL MEDICINE | Facility: CLINIC | Age: 68
End: 2018-12-05
Payer: MEDICAID

## 2018-12-05 ENCOUNTER — OUTPATIENT (OUTPATIENT)
Dept: OUTPATIENT SERVICES | Facility: HOSPITAL | Age: 68
LOS: 1 days | End: 2018-12-05
Payer: MEDICAID

## 2018-12-05 VITALS
HEIGHT: 68 IN | SYSTOLIC BLOOD PRESSURE: 154 MMHG | WEIGHT: 249 LBS | DIASTOLIC BLOOD PRESSURE: 96 MMHG | HEART RATE: 86 BPM | BODY MASS INDEX: 37.74 KG/M2 | OXYGEN SATURATION: 97 %

## 2018-12-05 DIAGNOSIS — E11.9 TYPE 2 DIABETES MELLITUS WITHOUT COMPLICATIONS: ICD-10-CM

## 2018-12-05 DIAGNOSIS — Z90.49 ACQUIRED ABSENCE OF OTHER SPECIFIED PARTS OF DIGESTIVE TRACT: Chronic | ICD-10-CM

## 2018-12-05 DIAGNOSIS — I10 ESSENTIAL (PRIMARY) HYPERTENSION: ICD-10-CM

## 2018-12-05 PROCEDURE — G0463: CPT

## 2018-12-05 PROCEDURE — 99213 OFFICE O/P EST LOW 20 MIN: CPT | Mod: GE

## 2018-12-05 RX ORDER — BLOOD-GLUCOSE METER
KIT MISCELLANEOUS
Qty: 1 | Refills: 5 | Status: ACTIVE | COMMUNITY
Start: 2018-12-05 | End: 1900-01-01

## 2018-12-05 RX ORDER — BLOOD-GLUCOSE METER
W/DEVICE KIT MISCELLANEOUS
Qty: 1 | Refills: 3 | Status: ACTIVE | COMMUNITY
Start: 2018-12-05 | End: 1900-01-01

## 2018-12-05 RX ORDER — LANCETS 33 GAUGE
EACH MISCELLANEOUS
Qty: 1 | Refills: 0 | Status: ACTIVE | COMMUNITY
Start: 2018-12-05 | End: 1900-01-01

## 2018-12-05 NOTE — ASSESSMENT
[FreeTextEntry1] : 69 y/o M w/ L frontal glioblastoma s/p resection/RT on steroid, HTN, and DM2 here for BP check\par \par #HTN\par - c/w current regimen- too early to assess if any changes to be made. BP improved this visit will have pt RTC in 3-4 weeks and log his BP's at home\par - Advised regarding red flag symptoms of hypertensive urgency/emergency\par \par #DM2\par - Diabetes kit sent to pharmacy \par \par Case d/w Dr. Kennedy

## 2018-12-05 NOTE — HISTORY OF PRESENT ILLNESS
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 Pt last seen in clinic one week ago for uncontrolled HTN in the setting of his chronic steroid use. He was advised to c/w HCTZ 25mg PO QD, Losartan 100mg PO QD, Amlodipine 5mg PO QD, and Coreg 6.25mg PO BID. Today, his BP is 154/96 and HR 86. He endorses good adherence to the current regimen. He has been recording his BP's daily at home. He denies any fevers,chills, n/v, CP, palpitations, SOB, abdominal pain, dysuria, melena or hematochezia

## 2018-12-06 ENCOUNTER — RX RENEWAL (OUTPATIENT)
Age: 68
End: 2018-12-06

## 2018-12-06 DIAGNOSIS — C71.9 MALIGNANT NEOPLASM OF BRAIN, UNSPECIFIED: ICD-10-CM

## 2018-12-10 ENCOUNTER — MOBILE ON CALL (OUTPATIENT)
Age: 68
End: 2018-12-10

## 2018-12-11 ENCOUNTER — RESULT REVIEW (OUTPATIENT)
Age: 68
End: 2018-12-11

## 2018-12-11 ENCOUNTER — APPOINTMENT (OUTPATIENT)
Age: 68
End: 2018-12-11

## 2018-12-11 ENCOUNTER — APPOINTMENT (OUTPATIENT)
Dept: NEUROLOGY | Facility: CLINIC | Age: 68
End: 2018-12-11

## 2018-12-12 ENCOUNTER — OTHER (OUTPATIENT)
Age: 68
End: 2018-12-12

## 2018-12-19 ENCOUNTER — APPOINTMENT (OUTPATIENT)
Dept: INTERNAL MEDICINE | Facility: CLINIC | Age: 68
End: 2018-12-19

## 2018-12-19 LAB
BASOPHILS # BLD AUTO: 0.02 K/UL
BASOPHILS NFR BLD AUTO: 0.2 %
EOSINOPHIL # BLD AUTO: 0.13 K/UL
EOSINOPHIL NFR BLD AUTO: 1.1 %
HCT VFR BLD CALC: 41.4 %
HGB BLD-MCNC: 13.2 G/DL
IMM GRANULOCYTES NFR BLD AUTO: 1.5 %
LYMPHOCYTES # BLD AUTO: 1.91 K/UL
LYMPHOCYTES NFR BLD AUTO: 16.1 %
MAN DIFF?: NORMAL
MCHC RBC-ENTMCNC: 29.7 PG
MCHC RBC-ENTMCNC: 31.9 GM/DL
MCV RBC AUTO: 93.2 FL
MONOCYTES # BLD AUTO: 1.1 K/UL
MONOCYTES NFR BLD AUTO: 9.3 %
NEUTROPHILS # BLD AUTO: 8.49 K/UL
NEUTROPHILS NFR BLD AUTO: 71.8 %
PLATELET # BLD AUTO: 359 K/UL
RBC # BLD: 4.44 M/UL
RBC # FLD: 13.6 %
WBC # FLD AUTO: 11.83 K/UL

## 2018-12-24 ENCOUNTER — LABORATORY RESULT (OUTPATIENT)
Age: 68
End: 2018-12-24

## 2018-12-26 LAB
BASOPHILS # BLD AUTO: 0.04 K/UL
BASOPHILS NFR BLD AUTO: 0.3 %
EOSINOPHIL # BLD AUTO: 0.14 K/UL
EOSINOPHIL NFR BLD AUTO: 1.2 %
HCT VFR BLD CALC: 40 %
HGB BLD-MCNC: 13.1 G/DL
IMM GRANULOCYTES NFR BLD AUTO: 1 %
LYMPHOCYTES # BLD AUTO: 1.91 K/UL
LYMPHOCYTES NFR BLD AUTO: 16.4 %
MAN DIFF?: NORMAL
MCHC RBC-ENTMCNC: 29.8 PG
MCHC RBC-ENTMCNC: 32.8 GM/DL
MCV RBC AUTO: 90.9 FL
MONOCYTES # BLD AUTO: 1.1 K/UL
MONOCYTES NFR BLD AUTO: 9.5 %
NEUTROPHILS # BLD AUTO: 8.33 K/UL
NEUTROPHILS NFR BLD AUTO: 71.6 %
PLATELET # BLD AUTO: 303 K/UL
RBC # BLD: 4.4 M/UL
RBC # FLD: 13.8 %
WBC # FLD AUTO: 11.64 K/UL

## 2018-12-28 ENCOUNTER — RX RENEWAL (OUTPATIENT)
Age: 68
End: 2018-12-28

## 2019-01-02 ENCOUNTER — RESULT REVIEW (OUTPATIENT)
Age: 69
End: 2019-01-02

## 2019-01-02 LAB
ANION GAP SERPL CALC-SCNC: 16 MMOL/L
BASOPHILS # BLD AUTO: 0.03 K/UL
BASOPHILS NFR BLD AUTO: 0.3 %
BUN SERPL-MCNC: 23 MG/DL
CALCIUM SERPL-MCNC: 10 MG/DL
CHLORIDE SERPL-SCNC: 97 MMOL/L
CO2 SERPL-SCNC: 26 MMOL/L
CREAT SERPL-MCNC: 1.12 MG/DL
EOSINOPHIL # BLD AUTO: 0.17 K/UL
EOSINOPHIL NFR BLD AUTO: 1.6 %
GLUCOSE SERPL-MCNC: 140 MG/DL
HCT VFR BLD CALC: 41.1 %
HGB BLD-MCNC: 13.1 G/DL
IMM GRANULOCYTES NFR BLD AUTO: 1.3 %
LYMPHOCYTES # BLD AUTO: 2.31 K/UL
LYMPHOCYTES NFR BLD AUTO: 21.5 %
MAN DIFF?: NORMAL
MCHC RBC-ENTMCNC: 29.6 PG
MCHC RBC-ENTMCNC: 31.9 GM/DL
MCV RBC AUTO: 92.8 FL
MONOCYTES # BLD AUTO: 0.92 K/UL
MONOCYTES NFR BLD AUTO: 8.6 %
NEUTROPHILS # BLD AUTO: 7.16 K/UL
NEUTROPHILS NFR BLD AUTO: 66.7 %
PLATELET # BLD AUTO: 330 K/UL
POTASSIUM SERPL-SCNC: 3.5 MMOL/L
RBC # BLD: 4.43 M/UL
RBC # FLD: 13.8 %
SODIUM SERPL-SCNC: 139 MMOL/L
WBC # FLD AUTO: 10.73 K/UL

## 2019-01-03 ENCOUNTER — RX RENEWAL (OUTPATIENT)
Age: 69
End: 2019-01-03

## 2019-01-07 ENCOUNTER — LABORATORY RESULT (OUTPATIENT)
Age: 69
End: 2019-01-07

## 2019-01-14 ENCOUNTER — LABORATORY RESULT (OUTPATIENT)
Age: 69
End: 2019-01-14

## 2019-01-15 ENCOUNTER — APPOINTMENT (OUTPATIENT)
Dept: NEUROLOGY | Facility: CLINIC | Age: 69
End: 2019-01-15
Payer: MEDICAID

## 2019-01-15 VITALS
BODY MASS INDEX: 38.19 KG/M2 | WEIGHT: 252 LBS | HEIGHT: 68 IN | HEART RATE: 77 BPM | TEMPERATURE: 98.2 F | RESPIRATION RATE: 20 BRPM | DIASTOLIC BLOOD PRESSURE: 108 MMHG | SYSTOLIC BLOOD PRESSURE: 204 MMHG | OXYGEN SATURATION: 98 %

## 2019-01-15 LAB
BASOPHILS # BLD AUTO: 0.12 K/UL
BASOPHILS NFR BLD AUTO: 0.9 %
EOSINOPHIL # BLD AUTO: 0 K/UL
EOSINOPHIL NFR BLD AUTO: 0 %
HCT VFR BLD CALC: 42.3 %
HGB BLD-MCNC: 13.6 G/DL
LYMPHOCYTES # BLD AUTO: 1.24 K/UL
LYMPHOCYTES NFR BLD AUTO: 9.6 %
MAN DIFF?: NORMAL
MCHC RBC-ENTMCNC: 29.2 PG
MCHC RBC-ENTMCNC: 32.2 GM/DL
MCV RBC AUTO: 91 FL
MONOCYTES # BLD AUTO: 1.01 K/UL
MONOCYTES NFR BLD AUTO: 7.8 %
NEUTROPHILS # BLD AUTO: 9.78 K/UL
NEUTROPHILS NFR BLD AUTO: 75.6 %
PLATELET # BLD AUTO: 323 K/UL
RBC # BLD: 4.65 M/UL
RBC # FLD: 13.8 %
WBC # FLD AUTO: 12.93 K/UL

## 2019-01-15 PROCEDURE — 99214 OFFICE O/P EST MOD 30 MIN: CPT

## 2019-01-15 RX ORDER — TEMOZOLOMIDE 100 MG/1
100 CAPSULE ORAL
Qty: 10 | Refills: 0 | Status: DISCONTINUED | COMMUNITY
Start: 2018-09-06 | End: 2019-01-15

## 2019-01-15 RX ORDER — TEMOZOLOMIDE 100 MG/1
100 CAPSULE ORAL DAILY
Qty: 20 | Refills: 0 | Status: DISCONTINUED | COMMUNITY
Start: 2018-06-05 | End: 2019-01-15

## 2019-01-18 ENCOUNTER — APPOINTMENT (OUTPATIENT)
Dept: INTERNAL MEDICINE | Facility: CLINIC | Age: 69
End: 2019-01-18
Payer: MEDICAID

## 2019-01-18 ENCOUNTER — OUTPATIENT (OUTPATIENT)
Dept: OUTPATIENT SERVICES | Facility: HOSPITAL | Age: 69
LOS: 1 days | End: 2019-01-18
Payer: MEDICAID

## 2019-01-18 DIAGNOSIS — C71.9 MALIGNANT NEOPLASM OF BRAIN, UNSPECIFIED: ICD-10-CM

## 2019-01-18 DIAGNOSIS — Z90.49 ACQUIRED ABSENCE OF OTHER SPECIFIED PARTS OF DIGESTIVE TRACT: Chronic | ICD-10-CM

## 2019-01-18 DIAGNOSIS — I10 ESSENTIAL (PRIMARY) HYPERTENSION: ICD-10-CM

## 2019-01-18 PROCEDURE — 99213 OFFICE O/P EST LOW 20 MIN: CPT | Mod: GE

## 2019-01-18 PROCEDURE — G0463: CPT

## 2019-01-18 NOTE — PHYSICAL EXAM
[No Acute Distress] : no acute distress [Well Nourished] : well nourished [Well Developed] : well developed [PERRL] : pupils equal round and reactive to light [EOMI] : extraocular movements intact [Normal Oropharynx] : the oropharynx was normal [Supple] : supple [No Respiratory Distress] : no respiratory distress  [Clear to Auscultation] : lungs were clear to auscultation bilaterally [No Accessory Muscle Use] : no accessory muscle use [Regular Rhythm] : with a regular rhythm [Normal S1, S2] : normal S1 and S2 [No Edema] : there was no peripheral edema [Soft] : abdomen soft [Non Tender] : non-tender [No HSM] : no HSM [Normal Bowel Sounds] : normal bowel sounds [Normal Posterior Cervical Nodes] : no posterior cervical lymphadenopathy [Normal Anterior Cervical Nodes] : no anterior cervical lymphadenopathy [No CVA Tenderness] : no CVA  tenderness [No Rash] : no rash [Normal Gait] : normal gait

## 2019-01-18 NOTE — PLAN
[FreeTextEntry1] : 68 year old man history of left frontal glioblastoma s/p resection and chemoRT, T2DM (A1C 6.8 462784),  HTN presents to the clinic for BP check. \par \par Uncontrolled HTN\par Current BP regimen:  Carvedilol 6.25mg BID, HCTZ 37.5mg QD,  Losartan 100mg QD. Increased Amlodipine 10mg QD. \par Counseling on proper use of BP cuff given.\par \par RTC 2-5 weeks for r/p check.\par \par Discussed with Dr. Kennedy

## 2019-01-18 NOTE — HISTORY OF PRESENT ILLNESS
[FreeTextEntry8] : 68 year old man history of left frontal glioblastoma s/p resection and chemoRT, T2DM (A1C 6.8 074569),  HTN presents to the clinic for BP check. \par Current BP regimen:  Carvedilol 6.25mg BID, HCTZ 37.5mg QD,  Losartan 100mg QD, Amlodipine 5mg QD. \par Reports that he takes the medications as prescribed.\par Takes blood pressure at home daily(except for today).  Usual BPs 160-200s. \par Patient got very frustrated during the interview stating that the team did not understand him.\par Son called during interview and states patient takes medications as prescribed. \par

## 2019-01-21 ENCOUNTER — LABORATORY RESULT (OUTPATIENT)
Age: 69
End: 2019-01-21

## 2019-01-21 ENCOUNTER — FORM ENCOUNTER (OUTPATIENT)
Age: 69
End: 2019-01-21

## 2019-01-22 ENCOUNTER — OUTPATIENT (OUTPATIENT)
Dept: OUTPATIENT SERVICES | Facility: HOSPITAL | Age: 69
LOS: 1 days | End: 2019-01-22
Payer: MEDICAID

## 2019-01-22 ENCOUNTER — APPOINTMENT (OUTPATIENT)
Dept: MRI IMAGING | Facility: IMAGING CENTER | Age: 69
End: 2019-01-22
Payer: MEDICAID

## 2019-01-22 ENCOUNTER — APPOINTMENT (OUTPATIENT)
Dept: NEUROLOGY | Facility: CLINIC | Age: 69
End: 2019-01-22
Payer: MEDICAID

## 2019-01-22 VITALS
HEART RATE: 62 BPM | SYSTOLIC BLOOD PRESSURE: 183 MMHG | TEMPERATURE: 98 F | DIASTOLIC BLOOD PRESSURE: 93 MMHG | RESPIRATION RATE: 18 BRPM | OXYGEN SATURATION: 98 %

## 2019-01-22 DIAGNOSIS — Z00.8 ENCOUNTER FOR OTHER GENERAL EXAMINATION: ICD-10-CM

## 2019-01-22 DIAGNOSIS — Z90.49 ACQUIRED ABSENCE OF OTHER SPECIFIED PARTS OF DIGESTIVE TRACT: Chronic | ICD-10-CM

## 2019-01-22 LAB
BASOPHILS # BLD AUTO: 0 K/UL
BASOPHILS NFR BLD AUTO: 0 %
EOSINOPHIL # BLD AUTO: 0.19 K/UL
EOSINOPHIL NFR BLD AUTO: 1.8 %
HCT VFR BLD CALC: 39.3 %
HGB BLD-MCNC: 12.8 G/DL
LYMPHOCYTES # BLD AUTO: 2.44 K/UL
LYMPHOCYTES NFR BLD AUTO: 22.9 %
MAN DIFF?: NORMAL
MCHC RBC-ENTMCNC: 28.8 PG
MCHC RBC-ENTMCNC: 32.6 GM/DL
MCV RBC AUTO: 88.3 FL
MONOCYTES # BLD AUTO: 0.68 K/UL
MONOCYTES NFR BLD AUTO: 6.4 %
NEUTROPHILS # BLD AUTO: 6.95 K/UL
NEUTROPHILS NFR BLD AUTO: 65.2 %
PLATELET # BLD AUTO: 290 K/UL
RBC # BLD: 4.45 M/UL
RBC # FLD: 13.8 %
WBC # FLD AUTO: 10.66 K/UL

## 2019-01-22 PROCEDURE — 70553 MRI BRAIN STEM W/O & W/DYE: CPT

## 2019-01-22 PROCEDURE — 70553 MRI BRAIN STEM W/O & W/DYE: CPT | Mod: 26

## 2019-01-22 PROCEDURE — 99214 OFFICE O/P EST MOD 30 MIN: CPT

## 2019-01-22 NOTE — PHYSICAL EXAM
[General Appearance - Alert] : alert [General Appearance - In No Acute Distress] : in no acute distress [General Appearance - Well-Appearing] : healthy appearing [] : no respiratory distress [Respiration, Rhythm And Depth] : normal respiratory rhythm and effort [Exaggerated Use Of Accessory Muscles For Inspiration] : no accessory muscle use [FreeTextEntry1] : Patient seen and examined\par Alert, awake , Oriented X 3. Expressive aphasia\par Able to name objects when choices given ( Pen, Tissues)\par PERRLA, EOMI, VFF\par Face symmetrical. Tongue protrudes midline\par NOWAK x 4 with good and equal strength\par FFM, coordination equal bilaterally\par Sensation intact bilaterally\par Gait slow , Ambulating with a cane

## 2019-01-22 NOTE — REVIEW OF SYSTEMS
[Feeling Tired] : feeling tired [As Noted in HPI] : as noted in HPI [Negative] : Heme/Lymph [FreeTextEntry2] : Patient coming from MRI and feels tired

## 2019-01-22 NOTE — DISCUSSION/SUMMARY
[FreeTextEntry1] : Patient seen and examined\par MRI reviewed, appears stable to me. Official report to follow\par BP elevated at this visit as well (183/93)\par Recommended to keep a BP log and bring it to his PCP for reviewing as patient states that his BP is normal at home. Also recommended to get a new machine if possible\par Will do a clinical follow up in a month\par Will do another MRI in 2 months

## 2019-01-22 NOTE — HISTORY OF PRESENT ILLNESS
[FreeTextEntry1] : Julio Tyler is a 69 yo male who presented at this office for a neuro oncologic follow up along with his son. Patient Sami speaking and is ok for son being the . \par In brief\par Jan/ 2018 - p/w headaches and expressive speech difficulty\par MRI - large left sided lesion \par Resection on 1/29 - pathology was GBM, IDH wt.\par MRI 2/17 - mass still large - measuring 8.6 x 3.1 cm - mass effect was reduced.\par Chemoradiation completed on 3/23/2018.\par 4/24/2018- MRI reviewed and first TMZ cycle was ordered ( Dose :325 mg). However future visits was transferred to emergency clinic due to insurance restrictions\par Patient only got 2 more maintenance cycle of TMZ since then. Third dose of TMZ 8/13-8/17.\par 9/6/2018- Patient transferred care to Dr Cuba. MRI done. \par 9/10-9/14- 4th cycle of TMZ\par 10/2/2018- MRI done. No TMZ ordered as patient BP was elevated. Patient sent to PCP.\par Patient cancelled the appointment for 10/9 as they never followed up with her PCP\par 10/17/18- Patient followed up with Dr Lou ( PCP). Started him on HCTZ along with Lisinopril 20 mg bid and Amlodipine 10 mg\par 5th cycle : Oct 29-Nov3)\par 11/20/2018 - MRI showed nodular enhancement . However no Rx was initiated as patient had hypertensive urgency. Patient since then following up with PCP and is currently on HCTZ 25 mg PO,Losartan 100 mg PO QD, Amlodipine 5 mg PO QD, Coreg 6.25 mg bid.\par 1/15/2019- Patient is here for a follow up. Reports that he feels his gait is getting affected and at times he feels his legs are getting weak. Admits compliance to all the medications . Plan was made to repeat MRI and also recommended to follow up with PCP as BP was elevated at the time of visit\par 1/18/19- Follow up with PCP and Amlodipine was increased to 10 mg daily\par 1/22/2019- Patient is here for a follow up along with an MRI. Offers no new complaints\par Denies headaches, seizures, weakness, numbness, tingling \par \par  \par

## 2019-02-05 ENCOUNTER — RX RENEWAL (OUTPATIENT)
Age: 69
End: 2019-02-05

## 2019-02-15 ENCOUNTER — APPOINTMENT (OUTPATIENT)
Dept: INTERNAL MEDICINE | Facility: CLINIC | Age: 69
End: 2019-02-15

## 2019-02-20 ENCOUNTER — RX RENEWAL (OUTPATIENT)
Age: 69
End: 2019-02-20

## 2019-02-20 ENCOUNTER — TRANSCRIPTION ENCOUNTER (OUTPATIENT)
Age: 69
End: 2019-02-20

## 2019-02-26 ENCOUNTER — OTHER (OUTPATIENT)
Age: 69
End: 2019-02-26

## 2019-02-26 ENCOUNTER — APPOINTMENT (OUTPATIENT)
Dept: NEUROLOGY | Facility: CLINIC | Age: 69
End: 2019-02-26
Payer: MEDICAID

## 2019-02-26 VITALS
WEIGHT: 254 LBS | OXYGEN SATURATION: 98 % | SYSTOLIC BLOOD PRESSURE: 153 MMHG | HEART RATE: 86 BPM | BODY MASS INDEX: 38.49 KG/M2 | HEIGHT: 68 IN | DIASTOLIC BLOOD PRESSURE: 84 MMHG | RESPIRATION RATE: 18 BRPM | TEMPERATURE: 98 F

## 2019-02-26 PROCEDURE — 99214 OFFICE O/P EST MOD 30 MIN: CPT

## 2019-02-26 NOTE — HISTORY OF PRESENT ILLNESS
[FreeTextEntry1] : Julio Tyler is a 69 yo male who presented at this office for a neuro oncologic follow up along with his son. Patient Azeri speaking and is ok for son being the . \par In brief\par Jan/ 2018 - p/w headaches and expressive speech difficulty\par MRI - large left sided lesion \par Resection on 1/29 - pathology was GBM, IDH wt.\par MRI 2/17 - mass still large - measuring 8.6 x 3.1 cm - mass effect was reduced.\par Chemoradiation completed on 3/23/2018.\par 4/24/2018- MRI reviewed and first TMZ cycle was ordered ( Dose :325 mg). However future visits was transferred to emergency clinic due to insurance restrictions\par Patient only got 2 more maintenance cycle of TMZ since then. Third dose of TMZ 8/13-8/17.\par 9/6/2018- Patient transferred care to Dr Cuba. MRI done. \par 9/10-9/14- 4th cycle of TMZ\par 10/2/2018- MRI done. No TMZ ordered as patient BP was elevated. Patient sent to PCP.\par Patient cancelled the appointment for 10/9 as they never followed up with her PCP\par 10/17/18- Patient followed up with Dr Lou ( PCP). Started him on HCTZ along with Lisinopril 20 mg bid and Amlodipine 10 mg\par 5th cycle : Oct 29-Nov3)\par 11/20/2018 - MRI showed nodular enhancement . However no Rx was initiated as patient had hypertensive urgency. Patient since then following up with PCP and is currently on HCTZ 25 mg PO,Losartan 100 mg PO QD, Amlodipine 5 mg PO QD, Coreg 6.25 mg bid.\par 1/15/2019- Patient is here for a follow up. Reports that he feels his gait is getting affected and at times he feels his legs are getting weak. Admits compliance to all the medications . Plan was made to repeat MRI and also recommended to follow up with PCP as BP was elevated at the time of visit\par 1/18/19- Follow up with PCP and Amlodipine was increased to 10 mg daily\par 1/22/2019- MRI stable. Will reimage in 2 months. Recommended to follow up with PCP in the meantime for BP management\par 2/26/2019- Here for a follow up. Patient BP continues to be high. PCP managing the BP regimen and is currently on HCTZ 37.5 MG od, Coreg 6.25 mg bid, Amlodipine 10 mg od, Losartan 100 mg od.\par Denies headaches, seizures, weakness, numbness, tingling.\par \par

## 2019-02-26 NOTE — PHYSICAL EXAM
[] : no respiratory distress [Respiration, Rhythm And Depth] : normal respiratory rhythm and effort [Exaggerated Use Of Accessory Muscles For Inspiration] : no accessory muscle use [Abnormal Walk] : normal gait [FreeTextEntry1] : Patient seen and examined\par Alert, awake , Oriented X 3. Do not know the President. Expressive aphasia\par Able to name some objects when choices given\par Able to follow simple commands\par PERRLA, EOMI, VFF\par Face symmetrical. Tongue protrudes midline\par NOWAK x 4 with good and equal strength\par FFM, coordination equal bilaterally\par Sensation intact bilaterally\par Gait slow , Ambulating with a cane.

## 2019-02-26 NOTE — REASON FOR VISIT
[Follow-Up: _____] : a [unfilled] follow-up visit [Family Member] : family member [Formal Caregiver] : formal caregiver

## 2019-02-26 NOTE — DISCUSSION/SUMMARY
[FreeTextEntry1] : Patient seen and examined.\par Will repeat MRI next week along with a follow up.\par Recommended to follow up with PCP for BP management.\par In the interim, if any questions patient knows to call the office.\par

## 2019-02-27 ENCOUNTER — APPOINTMENT (OUTPATIENT)
Dept: MRI IMAGING | Facility: IMAGING CENTER | Age: 69
End: 2019-02-27

## 2019-02-28 ENCOUNTER — TRANSCRIPTION ENCOUNTER (OUTPATIENT)
Age: 69
End: 2019-02-28

## 2019-03-01 ENCOUNTER — OUTPATIENT (OUTPATIENT)
Dept: OUTPATIENT SERVICES | Facility: HOSPITAL | Age: 69
LOS: 1 days | End: 2019-03-01
Payer: MEDICAID

## 2019-03-01 ENCOUNTER — APPOINTMENT (OUTPATIENT)
Dept: INTERNAL MEDICINE | Facility: CLINIC | Age: 69
End: 2019-03-01
Payer: MEDICAID

## 2019-03-01 VITALS
SYSTOLIC BLOOD PRESSURE: 130 MMHG | BODY MASS INDEX: 38.8 KG/M2 | HEIGHT: 68 IN | WEIGHT: 256 LBS | DIASTOLIC BLOOD PRESSURE: 80 MMHG

## 2019-03-01 DIAGNOSIS — C71.9 MALIGNANT NEOPLASM OF BRAIN, UNSPECIFIED: ICD-10-CM

## 2019-03-01 DIAGNOSIS — I10 ESSENTIAL (PRIMARY) HYPERTENSION: ICD-10-CM

## 2019-03-01 DIAGNOSIS — Z90.49 ACQUIRED ABSENCE OF OTHER SPECIFIED PARTS OF DIGESTIVE TRACT: Chronic | ICD-10-CM

## 2019-03-01 LAB — HBA1C MFR BLD HPLC: 7.8

## 2019-03-01 PROCEDURE — 99213 OFFICE O/P EST LOW 20 MIN: CPT | Mod: GE

## 2019-03-01 PROCEDURE — G0463: CPT

## 2019-03-04 PROBLEM — C71.9 GBM (GLIOBLASTOMA MULTIFORME): Status: ACTIVE | Noted: 2018-02-07

## 2019-03-04 NOTE — REVIEW OF SYSTEMS
[Negative] : Psychiatric [Fever] : no fever [Chills] : no chills [Discharge] : no discharge [Vision Problems] : no vision problems [Chest Pain] : no chest pain [Palpitations] : no palpitations [Shortness Of Breath] : no shortness of breath [Wheezing] : no wheezing [Abdominal Pain] : no abdominal pain [Vomiting] : no vomiting [Headache] : no headache [Memory Loss] : no memory loss

## 2019-03-04 NOTE — PHYSICAL EXAM
[No Acute Distress] : no acute distress [Well Nourished] : well nourished [Well Developed] : well developed [PERRL] : pupils equal round and reactive to light [EOMI] : extraocular movements intact [Normal Oropharynx] : the oropharynx was normal [Supple] : supple [No Respiratory Distress] : no respiratory distress  [Clear to Auscultation] : lungs were clear to auscultation bilaterally [No Accessory Muscle Use] : no accessory muscle use [Regular Rhythm] : with a regular rhythm [Normal S1, S2] : normal S1 and S2 [No Edema] : there was no peripheral edema [Soft] : abdomen soft [Non Tender] : non-tender [No HSM] : no HSM [Normal Bowel Sounds] : normal bowel sounds [Normal Posterior Cervical Nodes] : no posterior cervical lymphadenopathy [Normal Anterior Cervical Nodes] : no anterior cervical lymphadenopathy [No CVA Tenderness] : no CVA  tenderness [No Rash] : no rash [Normal Gait] : normal gait [Speech Grossly Normal] : speech grossly normal [Normal Mood] : the mood was normal [de-identified] : CN II- XII in tact; strength 5/5 in upper and lower extremities on flexion and extension

## 2019-03-04 NOTE — ASSESSMENT
[FreeTextEntry1] : 68M with PMHx of HTN, T2DM and GBM presents to the clinic for BP check.\par \par 1. HTN\par - currently controlled at 130/80\par - continue with current regimen\par - will follow up in 10 weeks for repeat check \par \par 2. GBM\par - due for MRI on 13th of march - communicated to patient and patient's son\par - no neurlogic deficits on examination\par \par 3. DM\par - A1c elevated at 7.8\par - discussed lifestyle changes to decrease A1c- if persistently elevated at next visit- will increase metformin\par \par RTC in 10 weeks for CPE. Deferring PPSV today- will administer at next visit. \par \par \par

## 2019-03-04 NOTE — HISTORY OF PRESENT ILLNESS
[FreeTextEntry1] : [id number] [FreeTextEntry2] : Shraddha [de-identified] : 68M with PMHx of HTN, T2DM and GBM presents to the clinic for CP check.\par \par Patient followed up with neuro onc on 2/26, and was advised to obtain an MRI for evaluation of brain lesion. Patient has radiation and chemotherapy in the past. Patient has MRI appointment on the 13th and neuro appointment on the 19th. \par \par Patient likely takes Carvedilol 6.25mg BID, HCTZ 37.5mg QD, Losartan 100mg QD, Amlodipine 5mg QD- did not bring list with him. \par \par Patient does not know anything about his medical care. He does not know what the neurologist told him.  Spoke with patient's son Dave on phone during interview. Explained neurology's plan of care and PCP plan of care.

## 2019-03-05 ENCOUNTER — RX RENEWAL (OUTPATIENT)
Age: 69
End: 2019-03-05

## 2019-03-06 ENCOUNTER — APPOINTMENT (OUTPATIENT)
Dept: INTERNAL MEDICINE | Facility: CLINIC | Age: 69
End: 2019-03-06

## 2019-03-07 DIAGNOSIS — C71.9 MALIGNANT NEOPLASM OF BRAIN, UNSPECIFIED: ICD-10-CM

## 2019-03-07 DIAGNOSIS — E11.9 TYPE 2 DIABETES MELLITUS WITHOUT COMPLICATIONS: ICD-10-CM

## 2019-03-19 ENCOUNTER — APPOINTMENT (OUTPATIENT)
Dept: NEUROLOGY | Facility: CLINIC | Age: 69
End: 2019-03-19
Payer: MEDICAID

## 2019-03-19 ENCOUNTER — APPOINTMENT (OUTPATIENT)
Dept: MRI IMAGING | Facility: CLINIC | Age: 69
End: 2019-03-19
Payer: MEDICAID

## 2019-03-19 ENCOUNTER — OUTPATIENT (OUTPATIENT)
Dept: OUTPATIENT SERVICES | Facility: HOSPITAL | Age: 69
LOS: 1 days | End: 2019-03-19
Payer: MEDICAID

## 2019-03-19 VITALS
RESPIRATION RATE: 18 BRPM | HEART RATE: 82 BPM | OXYGEN SATURATION: 97 % | BODY MASS INDEX: 37.59 KG/M2 | DIASTOLIC BLOOD PRESSURE: 76 MMHG | TEMPERATURE: 98.2 F | WEIGHT: 248 LBS | SYSTOLIC BLOOD PRESSURE: 115 MMHG | HEIGHT: 68 IN

## 2019-03-19 DIAGNOSIS — Z90.49 ACQUIRED ABSENCE OF OTHER SPECIFIED PARTS OF DIGESTIVE TRACT: Chronic | ICD-10-CM

## 2019-03-19 DIAGNOSIS — Z00.8 ENCOUNTER FOR OTHER GENERAL EXAMINATION: ICD-10-CM

## 2019-03-19 PROCEDURE — 70553 MRI BRAIN STEM W/O & W/DYE: CPT

## 2019-03-19 PROCEDURE — 70553 MRI BRAIN STEM W/O & W/DYE: CPT | Mod: 26

## 2019-03-19 PROCEDURE — A9585: CPT

## 2019-03-19 PROCEDURE — 99214 OFFICE O/P EST MOD 30 MIN: CPT

## 2019-03-19 NOTE — HISTORY OF PRESENT ILLNESS
[FreeTextEntry1] : Julio Tyler is a 67 yo male who presented at this office for a neuro oncologic follow up \par In brief\par Jan/ 2018 - p/w headaches and expressive speech difficulty\par MRI - large left sided lesion \par Resection on 1/29 - pathology was GBM, IDH wt.\par MRI 2/17 - mass still large - measuring 8.6 x 3.1 cm - mass effect was reduced.\par Chemoradiation completed on 3/23/2018.\par 4/24/2018- MRI reviewed and first TMZ cycle was ordered ( Dose :325 mg). However future visits was transferred to emergency clinic due to insurance restrictions\par Patient only got 2 more maintenance cycle of TMZ since then. Third dose of TMZ 8/13-8/17.\par 9/6/2018- Patient transferred care to Dr Cuba. MRI done. \par 9/10-9/14- 4th cycle of TMZ\par 10/2/2018- MRI done. No TMZ ordered as patient BP was elevated. Patient sent to PCP.\par Patient cancelled the appointment for 10/9 as they never followed up with her PCP\par 10/17/18- Patient followed up with Dr Lou ( PCP). Started him on HCTZ along with Lisinopril 20 mg bid and Amlodipine 10 mg\par 5th cycle : Oct 29-Nov3)\par 11/20/2018 - MRI showed nodular enhancement . However no Rx was initiated as patient had hypertensive urgency. Patient since then following up with PCP and is currently on HCTZ 25 mg PO,Losartan 100 mg PO QD, Amlodipine 5 mg PO QD, Coreg 6.25 mg bid.\par 1/15/2019- Patient is here for a follow up. Reports that he feels his gait is getting affected and at times he feels his legs are getting weak. Admits compliance to all the medications . Plan was made to repeat MRI and also recommended to follow up with PCP as BP was elevated at the time of visit\par 1/18/19- Follow up with PCP and Amlodipine was increased to 10 mg daily\par 1/22/2019- MRI stable. Will reimage in 2 months. Recommended to follow up with PCP in the meantime for BP management\par 2/26/2019- Here for a follow up. Patient BP continues to be high. PCP managing the BP regimen and is currently on HCTZ 37.5 MG od, Coreg 6.25 mg bid, Amlodipine 10 mg od, Losartan 100 mg od. Plan was made with Dr Cuba to repeat MRI in a week , However family decided to postpone the MRI as there is noone to take him for appointments\par 3/19/2019- Patient here with a new MRI. Reports that for the past two - three days patient been sleeping a lot, poor appetite and is tired. Patient stopped taking dexamethasone  2 days ago\par Denies headaches, seizures, weakness, numbness, tingling.\par \par

## 2019-03-19 NOTE — DISCUSSION/SUMMARY
[FreeTextEntry1] : Patient seen and examined\par Fatigue - Will restart Dexamethasone 2 mg daily. Patient to take 4 mg today\par MRI reviewed and appears stable to me. Official report to follow\par Patient to call back with an update on how he is feeling on steroids\par Will do a clinical follow up in a month\par Will do MRI in 2 months\par In the interim, if any questions patient knows to call me

## 2019-03-19 NOTE — PHYSICAL EXAM
[General Appearance - Alert] : alert [General Appearance - In No Acute Distress] : in no acute distress [General Appearance - Well Nourished] : well nourished [] : no respiratory distress [Respiration, Rhythm And Depth] : normal respiratory rhythm and effort [Exaggerated Use Of Accessory Muscles For Inspiration] : no accessory muscle use [FreeTextEntry1] : Patient seen and examined. Patient ok with son being the \par Alert, awake , Oriented X 3. Expressive aphasia\par Able to name some objects with cues\par Able to follow simple commands\par PERRLA, EOMI, VFF\par Face symmetrical. Tongue protrudes midline\par NOWAK x 4 with good and equal strength\par FFM, coordination equal bilaterally\par Sensation intact bilaterally\par Gait slow, Ambulating with a cane.

## 2019-04-05 ENCOUNTER — APPOINTMENT (OUTPATIENT)
Dept: INTERNAL MEDICINE | Facility: CLINIC | Age: 69
End: 2019-04-05

## 2019-04-06 ENCOUNTER — MOBILE ON CALL (OUTPATIENT)
Age: 69
End: 2019-04-06

## 2019-04-08 RX ORDER — LEVETIRACETAM 500 MG/1
500 TABLET, FILM COATED ORAL TWICE DAILY
Qty: 60 | Refills: 6 | Status: ACTIVE | COMMUNITY
Start: 2019-04-08 | End: 1900-01-01

## 2019-04-09 ENCOUNTER — RX RENEWAL (OUTPATIENT)
Age: 69
End: 2019-04-09

## 2019-04-15 ENCOUNTER — FORM ENCOUNTER (OUTPATIENT)
Age: 69
End: 2019-04-15

## 2019-04-16 ENCOUNTER — OUTPATIENT (OUTPATIENT)
Dept: OUTPATIENT SERVICES | Facility: HOSPITAL | Age: 69
LOS: 1 days | End: 2019-04-16
Payer: MEDICAID

## 2019-04-16 ENCOUNTER — APPOINTMENT (OUTPATIENT)
Dept: ULTRASOUND IMAGING | Facility: IMAGING CENTER | Age: 69
End: 2019-04-16
Payer: MEDICAID

## 2019-04-16 ENCOUNTER — APPOINTMENT (OUTPATIENT)
Dept: NEUROLOGY | Facility: CLINIC | Age: 69
End: 2019-04-16
Payer: MEDICAID

## 2019-04-16 VITALS
TEMPERATURE: 98.1 F | SYSTOLIC BLOOD PRESSURE: 168 MMHG | HEART RATE: 89 BPM | WEIGHT: 254 LBS | RESPIRATION RATE: 20 BRPM | OXYGEN SATURATION: 98 % | DIASTOLIC BLOOD PRESSURE: 88 MMHG | BODY MASS INDEX: 38.49 KG/M2 | HEIGHT: 68 IN

## 2019-04-16 DIAGNOSIS — Z90.49 ACQUIRED ABSENCE OF OTHER SPECIFIED PARTS OF DIGESTIVE TRACT: Chronic | ICD-10-CM

## 2019-04-16 DIAGNOSIS — C71.9 MALIGNANT NEOPLASM OF BRAIN, UNSPECIFIED: ICD-10-CM

## 2019-04-16 PROCEDURE — 93970 EXTREMITY STUDY: CPT | Mod: 26

## 2019-04-16 PROCEDURE — 99214 OFFICE O/P EST MOD 30 MIN: CPT

## 2019-04-16 PROCEDURE — 93970 EXTREMITY STUDY: CPT

## 2019-04-16 NOTE — PHYSICAL EXAM
[General Appearance - Alert] : alert [General Appearance - Well-Appearing] : healthy appearing [General Appearance - In No Acute Distress] : in no acute distress [] : no respiratory distress [Respiration, Rhythm And Depth] : normal respiratory rhythm and effort [Exaggerated Use Of Accessory Muscles For Inspiration] : no accessory muscle use [Edema] : there was no peripheral edema [FreeTextEntry1] : Patient seen and examined. \par Alert, awake , Oriented X 3. Expressive aphasia\par Able to name some objects when choices given ( Pen, watch, hat)\par Able to follow simple commands\par PERRLA, EOMI, VFF\par Face symmetrical. Tongue protrudes midline\par NOWAK x 4 with good and equal strength\par FFM, coordination equal bilaterally\par Sensation intact bilaterally\par Gait slow, Ambulating with a cane.

## 2019-04-16 NOTE — HISTORY OF PRESENT ILLNESS
[FreeTextEntry1] : Julio Tyler is a 67 yo male who presented at this office for a neuro oncologic follow up \par In brief\par Jan/ 2018 - p/w headaches and expressive speech difficulty\par MRI - large left sided lesion \par Resection on 1/29 - pathology was GBM, IDH wt.\par MRI 2/17 - mass still large - measuring 8.6 x 3.1 cm - mass effect was reduced.\par Chemoradiation completed on 3/23/2018.\par 4/24/2018- MRI reviewed and first TMZ cycle was ordered ( Dose :325 mg). However future visits was transferred to emergency clinic due to insurance restrictions\par Patient only got 2 more maintenance cycle of TMZ since then. Third dose of TMZ 8/13-8/17.\par 9/6/2018- Patient transferred care to Dr Cuba. MRI done. \par 9/10-9/14- 4th cycle of TMZ\par 10/2/2018- MRI done. No TMZ ordered as patient BP was elevated. Patient sent to PCP.\par Patient cancelled the appointment for 10/9 as they never followed up with her PCP\par 10/17/18- Patient followed up with Dr Lou ( PCP). Started him on HCTZ along with Lisinopril 20 mg bid and Amlodipine 10 mg\par 5th cycle : Oct 29-Nov3)\par 11/20/2018 - MRI showed nodular enhancement . However no Rx was initiated as patient had hypertensive urgency. Patient since then following up with PCP and is currently on HCTZ 25 mg PO,Losartan 100 mg PO QD, Amlodipine 5 mg PO QD, Coreg 6.25 mg bid.\par 1/15/2019- Patient is here for a follow up. Reports that he feels his gait is getting affected and at times he feels his legs are getting weak. Admits compliance to all the medications . Plan was made to repeat MRI and also recommended to follow up with PCP as BP was elevated at the time of visit\par 1/18/19- Follow up with PCP and Amlodipine was increased to 10 mg daily\par 1/22/2019- MRI stable. Will reimage in 2 months. Recommended to follow up with PCP in the meantime for BP management\par 2/26/2019- Here for a follow up. Patient BP continues to be high. PCP managing the BP regimen and is currently on HCTZ 37.5 MG od, Coreg 6.25 mg bid, Amlodipine 10 mg od, Losartan 100 mg od. Plan was made with Dr Cuba to repeat MRI in a week , However family decided to postpone the MRI as there is noone to take him for appointments\par 3/19/2019- Patient here with a new MRI. Reports that for the past two - three days patient been sleeping a lot, poor appetite and is tired. Restarted dexamethasone 2 mg daily\par 4/16/2019- Here for a follow up . Offers no new complaints\par Denies headaches, seizures, weakness, numbness, tingling.\par \par \par

## 2019-04-16 NOTE — DISCUSSION/SUMMARY
[FreeTextEntry1] : Patient seen and examined\par Clinically stable\par Will do a LE ultrasound secondary to LE swelling\par BP noted to be elevated - Recommended to follow up with PCP\par Will do a clinical follow up along with an MRI in a month\par In the interim, if any questions patient knows to call me

## 2019-04-22 ENCOUNTER — RX RENEWAL (OUTPATIENT)
Age: 69
End: 2019-04-22

## 2019-05-03 ENCOUNTER — LABORATORY RESULT (OUTPATIENT)
Age: 69
End: 2019-05-03

## 2019-05-03 ENCOUNTER — APPOINTMENT (OUTPATIENT)
Dept: INTERNAL MEDICINE | Facility: CLINIC | Age: 69
End: 2019-05-03
Payer: MEDICAID

## 2019-05-03 ENCOUNTER — OUTPATIENT (OUTPATIENT)
Dept: OUTPATIENT SERVICES | Facility: HOSPITAL | Age: 69
LOS: 1 days | End: 2019-05-03
Payer: MEDICAID

## 2019-05-03 VITALS
HEIGHT: 68 IN | HEART RATE: 66 BPM | SYSTOLIC BLOOD PRESSURE: 152 MMHG | BODY MASS INDEX: 38.34 KG/M2 | DIASTOLIC BLOOD PRESSURE: 90 MMHG | OXYGEN SATURATION: 96 % | WEIGHT: 253 LBS

## 2019-05-03 DIAGNOSIS — E11.9 TYPE 2 DIABETES MELLITUS W/OUT COMPLICATIONS: ICD-10-CM

## 2019-05-03 DIAGNOSIS — Z90.49 ACQUIRED ABSENCE OF OTHER SPECIFIED PARTS OF DIGESTIVE TRACT: Chronic | ICD-10-CM

## 2019-05-03 DIAGNOSIS — Z00.00 ENCOUNTER FOR GENERAL ADULT MEDICAL EXAMINATION W/OUT ABNORMAL FINDINGS: ICD-10-CM

## 2019-05-03 PROCEDURE — 36415 COLL VENOUS BLD VENIPUNCTURE: CPT

## 2019-05-03 PROCEDURE — 83036 HEMOGLOBIN GLYCOSYLATED A1C: CPT

## 2019-05-03 PROCEDURE — G0463: CPT

## 2019-05-03 PROCEDURE — 99213 OFFICE O/P EST LOW 20 MIN: CPT | Mod: GE

## 2019-05-03 PROCEDURE — 83735 ASSAY OF MAGNESIUM: CPT

## 2019-05-03 PROCEDURE — 80053 COMPREHEN METABOLIC PANEL: CPT

## 2019-05-03 RX ORDER — FAMOTIDINE 20 MG/1
20 TABLET, FILM COATED ORAL DAILY
Qty: 60 | Refills: 3 | Status: ACTIVE | COMMUNITY
Start: 2018-02-06 | End: 1900-01-01

## 2019-05-03 RX ORDER — LEVETIRACETAM 500 MG/1
500 TABLET, FILM COATED ORAL TWICE DAILY
Qty: 60 | Refills: 6 | Status: DISCONTINUED | COMMUNITY
Start: 2018-03-12 | End: 2019-05-03

## 2019-05-06 ENCOUNTER — RX RENEWAL (OUTPATIENT)
Age: 69
End: 2019-05-06

## 2019-05-06 DIAGNOSIS — I10 ESSENTIAL (PRIMARY) HYPERTENSION: ICD-10-CM

## 2019-05-06 LAB
ALBUMIN SERPL ELPH-MCNC: 4.6 G/DL — SIGNIFICANT CHANGE UP (ref 3.3–5)
ALP SERPL-CCNC: 64 U/L — SIGNIFICANT CHANGE UP (ref 40–120)
ALT FLD-CCNC: 25 U/L — SIGNIFICANT CHANGE UP (ref 10–45)
ANION GAP SERPL CALC-SCNC: 22 MMOL/L — HIGH (ref 5–17)
AST SERPL-CCNC: 13 U/L — SIGNIFICANT CHANGE UP (ref 10–40)
BILIRUB SERPL-MCNC: 0.3 MG/DL — SIGNIFICANT CHANGE UP (ref 0.2–1.2)
BUN SERPL-MCNC: 32 MG/DL — HIGH (ref 7–23)
CALCIUM SERPL-MCNC: 10.2 MG/DL — SIGNIFICANT CHANGE UP (ref 8.4–10.5)
CHLORIDE SERPL-SCNC: 99 MMOL/L — SIGNIFICANT CHANGE UP (ref 96–108)
CHOLEST SERPL-MCNC: 230 MG/DL — HIGH (ref 10–199)
CO2 SERPL-SCNC: 22 MMOL/L — SIGNIFICANT CHANGE UP (ref 22–31)
CREAT SERPL-MCNC: 1.14 MG/DL — SIGNIFICANT CHANGE UP (ref 0.5–1.3)
ESTIMATED AVERAGE GLUCOSE: 197 MG/DL — HIGH (ref 68–114)
GLUCOSE SERPL-MCNC: 208 MG/DL — HIGH (ref 70–99)
HBA1C BLD-MCNC: 8.5 % — HIGH (ref 4–5.6)
HDLC SERPL-MCNC: 43 MG/DL — SIGNIFICANT CHANGE UP
LIPID PNL WITH DIRECT LDL SERPL: 125 MG/DL — HIGH
MAGNESIUM SERPL-MCNC: 1.8 MG/DL — SIGNIFICANT CHANGE UP (ref 1.6–2.6)
POTASSIUM SERPL-MCNC: 3.5 MMOL/L — SIGNIFICANT CHANGE UP (ref 3.5–5.3)
POTASSIUM SERPL-SCNC: 3.5 MMOL/L — SIGNIFICANT CHANGE UP (ref 3.5–5.3)
PROT SERPL-MCNC: 6.8 G/DL — SIGNIFICANT CHANGE UP (ref 6–8.3)
SODIUM SERPL-SCNC: 143 MMOL/L — SIGNIFICANT CHANGE UP (ref 135–145)
TOTAL CHOLESTEROL/HDL RATIO MEASUREMENT: 5.4 RATIO — SIGNIFICANT CHANGE UP (ref 3.4–9.6)
TRIGL SERPL-MCNC: 309 MG/DL — HIGH (ref 10–149)

## 2019-05-06 NOTE — PHYSICAL EXAM
[No Acute Distress] : no acute distress [Well-Appearing] : well-appearing [Normal Sclera/Conjunctiva] : normal sclera/conjunctiva [PERRL] : pupils equal round and reactive to light [EOMI] : extraocular movements intact [Normal Oropharynx] : the oropharynx was normal [No JVD] : no jugular venous distention [Supple] : supple [Clear to Auscultation] : lungs were clear to auscultation bilaterally [Normal Rate] : normal rate  [Regular Rhythm] : with a regular rhythm [Normal S1, S2] : normal S1 and S2 [Pedal Pulses Present] : the pedal pulses are present [No Murmur] : no murmur heard [No Edema] : there was no peripheral edema

## 2019-05-07 ENCOUNTER — RX RENEWAL (OUTPATIENT)
Age: 69
End: 2019-05-07

## 2019-05-09 ENCOUNTER — CHART COPY (OUTPATIENT)
Age: 69
End: 2019-05-09

## 2019-05-13 DIAGNOSIS — E11.9 TYPE 2 DIABETES MELLITUS WITHOUT COMPLICATIONS: ICD-10-CM

## 2019-05-13 NOTE — HEALTH RISK ASSESSMENT
[No falls in past year] : Patient reported no falls in the past year [0] : 2) Feeling down, depressed, or hopeless: Not at all (0) [] : No [THG2Halar] : 0

## 2019-05-13 NOTE — REVIEW OF SYSTEMS
[Negative] : Genitourinary [Fever] : no fever [Recent Change In Weight] : ~T no recent weight change [Vision Problems] : no vision problems [Chest Pain] : no chest pain [Palpitations] : no palpitations [Lower Ext Edema] : no lower extremity edema [Wheezing] : no wheezing [Cough] : no cough [Dyspnea on Exertion] : not dyspnea on exertion [Joint Pain] : no joint pain [Joint Swelling] : no joint swelling [Headache] : no headache [Dizziness] : no dizziness

## 2019-05-13 NOTE — HISTORY OF PRESENT ILLNESS
[FreeTextEntry1] : Blood pressure [de-identified] : Patient reports feeling well today. Accompanied by health aide who states he needs M11Q form filled out, however, does not have form with her. Aide is primarily there to do light housekeeping and keep an eye on patient. \par \par Medications are managed by his son. Aide checks BP every day and it is usually in 150s. States this is improved from before when measurements were in 180s-190s.

## 2019-05-14 ENCOUNTER — APPOINTMENT (OUTPATIENT)
Dept: MRI IMAGING | Facility: IMAGING CENTER | Age: 69
End: 2019-05-14
Payer: MEDICAID

## 2019-05-14 ENCOUNTER — OUTPATIENT (OUTPATIENT)
Dept: OUTPATIENT SERVICES | Facility: HOSPITAL | Age: 69
LOS: 1 days | End: 2019-05-14
Payer: MEDICAID

## 2019-05-14 ENCOUNTER — APPOINTMENT (OUTPATIENT)
Dept: NEUROLOGY | Facility: CLINIC | Age: 69
End: 2019-05-14
Payer: MEDICAID

## 2019-05-14 VITALS
DIASTOLIC BLOOD PRESSURE: 112 MMHG | WEIGHT: 250 LBS | SYSTOLIC BLOOD PRESSURE: 192 MMHG | TEMPERATURE: 98.1 F | OXYGEN SATURATION: 98 % | BODY MASS INDEX: 37.89 KG/M2 | RESPIRATION RATE: 20 BRPM | HEART RATE: 84 BPM | HEIGHT: 68 IN

## 2019-05-14 VITALS — DIASTOLIC BLOOD PRESSURE: 104 MMHG | SYSTOLIC BLOOD PRESSURE: 183 MMHG

## 2019-05-14 DIAGNOSIS — Z90.49 ACQUIRED ABSENCE OF OTHER SPECIFIED PARTS OF DIGESTIVE TRACT: Chronic | ICD-10-CM

## 2019-05-14 DIAGNOSIS — C71.9 MALIGNANT NEOPLASM OF BRAIN, UNSPECIFIED: ICD-10-CM

## 2019-05-14 PROCEDURE — 99214 OFFICE O/P EST MOD 30 MIN: CPT

## 2019-05-14 PROCEDURE — A9585: CPT

## 2019-05-14 PROCEDURE — 70553 MRI BRAIN STEM W/O & W/DYE: CPT | Mod: 26

## 2019-05-14 PROCEDURE — 70553 MRI BRAIN STEM W/O & W/DYE: CPT

## 2019-05-14 NOTE — DISCUSSION/SUMMARY
[FreeTextEntry1] : Patient seen and examined\par Clinically stable\par MRI reviewed and explained. Official report to follow\par BP noted to be high, LE swelling noted - Follow up with PCP\par Decrease dexamethasone to 1 mg daily. Recommended family to call the office in 2 weeks with an update.\par Will do a clinical follow up along with an MRI in 2 months\par In the interim, if any questions patient knows to call me

## 2019-05-14 NOTE — PHYSICAL EXAM
[General Appearance - In No Acute Distress] : in no acute distress [General Appearance - Alert] : alert [] : no respiratory distress [General Appearance - Well Nourished] : well nourished [General Appearance - Well-Appearing] : healthy appearing [Exaggerated Use Of Accessory Muscles For Inspiration] : no accessory muscle use [Respiration, Rhythm And Depth] : normal respiratory rhythm and effort [FreeTextEntry1] : \par Patient seen and examined. \par Alert, awake , Oriented X 3. Expressive aphasia\par Able to name objects correctly ( pen,button)\par Able to follow simple commands\par PERRLA, EOMI, VFF\par Face symmetrical. Tongue protrudes midline\par NOWAK x 4 with good and equal strength\par FFM, coordination equal bilaterally\par Sensation intact bilaterally\par Gait slow, Ambulating with a cane.

## 2019-05-14 NOTE — HISTORY OF PRESENT ILLNESS
[FreeTextEntry1] : Julio Tyler is a 67 yo male who presented at this office for a neuro oncologic follow up \par In brief\par Jan/ 2018 - p/w headaches and expressive speech difficulty\par MRI - large left sided lesion \par Resection on 1/29 - pathology was GBM, IDH wt.\par MRI 2/17 - mass still large - measuring 8.6 x 3.1 cm - mass effect was reduced.\par Chemoradiation completed on 3/23/2018.\par 4/24/2018- MRI reviewed and first TMZ cycle was ordered ( Dose :325 mg). However future visits was transferred to emergency clinic due to insurance restrictions\par Patient only got 2 more maintenance cycle of TMZ since then. Third dose of TMZ 8/13-8/17.\par 9/6/2018- Patient transferred care to Dr Cuba. MRI done. \par 9/10-9/14- 4th cycle of TMZ\par 10/2/2018- MRI done. No TMZ ordered as patient BP was elevated. Patient sent to PCP.\par Patient cancelled the appointment for 10/9 as they never followed up with her PCP\par 10/17/18- Patient followed up with Dr Lou ( PCP). Started him on HCTZ along with Lisinopril 20 mg bid and Amlodipine 10 mg\par 5th cycle : Oct 29-Nov3)\par 11/20/2018 - MRI showed nodular enhancement . However no Rx was initiated as patient had hypertensive urgency. Patient since then following up with PCP and is currently on HCTZ 25 mg PO,Losartan 100 mg PO QD, Amlodipine 5 mg PO QD, Coreg 6.25 mg bid.\par 1/15/2019- Patient is here for a follow up. Reports that he feels his gait is getting affected and at times he feels his legs are getting weak. Admits compliance to all the medications . Plan was made to repeat MRI and also recommended to follow up with PCP as BP was elevated at the time of visit\par 1/18/19- Follow up with PCP and Amlodipine was increased to 10 mg daily\par 1/22/2019- MRI stable. Will reimage in 2 months. Recommended to follow up with PCP in the meantime for BP management\par 2/26/2019- Here for a follow up. Patient BP continues to be high. PCP managing the BP regimen and is currently on HCTZ 37.5 MG od, Coreg 6.25 mg bid, Amlodipine 10 mg od, Losartan 100 mg od. Plan was made with Dr Cuba to repeat MRI in a week , However family decided to postpone the MRI as there is noone to take him for appointments\par 3/19/2019- Patient here with a new MRI. Reports that for the past two - three days patient been sleeping a lot, poor appetite and is tired. Restarted dexamethasone 2 mg daily\par 4/16/2019- Clinically stable\par 5/14/2019- Patient here for a follow up along with a new MRI. Offers no new complaints except swelling to bilateral LE .\par Denies headaches, seizures, weakness, numbness, tingling.\par \par \par

## 2019-05-16 ENCOUNTER — OTHER (OUTPATIENT)
Age: 69
End: 2019-05-16

## 2019-05-17 ENCOUNTER — APPOINTMENT (OUTPATIENT)
Dept: INTERNAL MEDICINE | Facility: CLINIC | Age: 69
End: 2019-05-17
Payer: MEDICAID

## 2019-05-17 ENCOUNTER — OUTPATIENT (OUTPATIENT)
Dept: OUTPATIENT SERVICES | Facility: HOSPITAL | Age: 69
LOS: 1 days | End: 2019-05-17
Payer: MEDICAID

## 2019-05-17 VITALS
WEIGHT: 250 LBS | DIASTOLIC BLOOD PRESSURE: 90 MMHG | HEIGHT: 68 IN | SYSTOLIC BLOOD PRESSURE: 152 MMHG | BODY MASS INDEX: 37.89 KG/M2

## 2019-05-17 DIAGNOSIS — H91.90 UNSPECIFIED HEARING LOSS, UNSPECIFIED EAR: ICD-10-CM

## 2019-05-17 DIAGNOSIS — Z90.49 ACQUIRED ABSENCE OF OTHER SPECIFIED PARTS OF DIGESTIVE TRACT: Chronic | ICD-10-CM

## 2019-05-17 DIAGNOSIS — R21 RASH AND OTHER NONSPECIFIC SKIN ERUPTION: ICD-10-CM

## 2019-05-17 DIAGNOSIS — I10 ESSENTIAL (PRIMARY) HYPERTENSION: ICD-10-CM

## 2019-05-17 PROCEDURE — 99213 OFFICE O/P EST LOW 20 MIN: CPT | Mod: GE

## 2019-05-17 PROCEDURE — G0463: CPT

## 2019-05-17 NOTE — REVIEW OF SYSTEMS
[Lower Ext Edema] : lower extremity edema [Skin Rash] : skin rash [Nail Changes] : nail changes [Negative] : Heme/Lymph

## 2019-05-23 PROBLEM — R21 SKIN RASH: Status: ACTIVE | Noted: 2018-10-17

## 2019-05-23 PROBLEM — I10 HYPERTENSION: Status: ACTIVE | Noted: 2018-02-06

## 2019-05-23 PROBLEM — H91.90 HEARING LOSS: Status: ACTIVE | Noted: 2018-03-28

## 2019-05-23 NOTE — ASSESSMENT
[FreeTextEntry1] : This is a 69M with PMH glioblastoma multiforme s/p resection, on dexamethasone, HTN, obesity, DMT2, presenting for BP check.\par \par #hcm\par podiatry referral provided\par ophtho referal for annual eye exam\par gastro for screening colonoscopy\par derm for rash/onychodystrophy\par audiology for chronic hearing loss

## 2019-05-23 NOTE — HISTORY OF PRESENT ILLNESS
[Formal Caregiver] : formal caregiver [FreeTextEntry1] : follow up of blood pressure [de-identified] : This is a 69M with PMH glioblastoma multiforme s/p resection, on dexamethasone, HTN, obesity, DMT2, presenting for BP check.  [id number], [id number]. Also spoke with patient's son Gualberto over the phone to reinforce plan.\par \par #htn\par patient is already on multiple BP medications, HCTZ was increased form 25 to 50 at the last visit\par today, pt BP is 160/90 in room, has continued to be above goal\par patient does not take his BP at home\par hx of LVH notde on last echo\par \par no other complaints.

## 2019-05-23 NOTE — PHYSICAL EXAM
[No Acute Distress] : no acute distress [Well Nourished] : well nourished [Well Developed] : well developed [Normal Sclera/Conjunctiva] : normal sclera/conjunctiva [PERRL] : pupils equal round and reactive to light [Fundoscopic Exam Performed] : fundoscopic ~T exam ~C was performed [EOMI] : extraocular movements intact [Normal Oropharynx] : the oropharynx was normal [Normal Outer Ear/Nose] : the outer ears and nose were normal in appearance [Normal TMs] : both tympanic membranes were normal [No JVD] : no jugular venous distention [No Lymphadenopathy] : no lymphadenopathy [Supple] : supple [Clear to Auscultation] : lungs were clear to auscultation bilaterally [No Respiratory Distress] : no respiratory distress  [Normal Rate] : normal rate  [Regular Rhythm] : with a regular rhythm [No Accessory Muscle Use] : no accessory muscle use [Normal S1, S2] : normal S1 and S2 [No Varicosities] : no varicosities [No Extremity Clubbing/Cyanosis] : no extremity clubbing/cyanosis [No Palpable Aorta] : no palpable aorta [Non-distended] : non-distended [Non Tender] : non-tender [Soft] : abdomen soft [Normal Bowel Sounds] : normal bowel sounds [Normal Anterior Cervical Nodes] : no anterior cervical lymphadenopathy [Normal Posterior Cervical Nodes] : no posterior cervical lymphadenopathy [No CVA Tenderness] : no CVA  tenderness [No Joint Swelling] : no joint swelling [No Spinal Tenderness] : no spinal tenderness [No Rash] : no rash [Grossly Normal Strength/Tone] : grossly normal strength/tone [Normal Gait] : normal gait [Coordination Grossly Intact] : coordination grossly intact [Normal Affect] : the affect was normal [Normal Insight/Judgement] : insight and judgment were intact [de-identified] : obese man [de-identified] : systolic murmur in the aortic area [de-identified] : 1+ pitting peripheral edema, pedal pulses difficult to palpate due to body habitus [de-identified] : protuberant

## 2019-05-24 DIAGNOSIS — H91.90 UNSPECIFIED HEARING LOSS, UNSPECIFIED EAR: ICD-10-CM

## 2019-05-24 DIAGNOSIS — R21 RASH AND OTHER NONSPECIFIC SKIN ERUPTION: ICD-10-CM

## 2019-06-10 ENCOUNTER — RX RENEWAL (OUTPATIENT)
Age: 69
End: 2019-06-10

## 2019-06-18 ENCOUNTER — RX RENEWAL (OUTPATIENT)
Age: 69
End: 2019-06-18

## 2019-06-18 RX ORDER — AMLODIPINE BESYLATE 10 MG/1
10 TABLET ORAL DAILY
Qty: 90 | Refills: 1 | Status: ACTIVE | COMMUNITY
Start: 2018-11-21 | End: 1900-01-01

## 2019-06-21 NOTE — HISTORY OF PRESENT ILLNESS
[FreeTextEntry1] : Julio Tyler is a 67 yo male who presented at this office for a neuro oncologic follow up \par In brief\par Jan/ 2018 - p/w headaches and expressive speech difficulty\par MRI - large left sided lesion \par Resection on 1/29 - pathology was GBM, IDH wt.\par MRI 2/17 - mass still large - measuring 8.6 x 3.1 cm - mass effect was reduced.\par Chemoradiation completed on 3/23/2018.\par 4/24/2018- MRI reviewed and first TMZ cycle was ordered ( Dose :325 mg). However future visits was transferred to emergency clinic due to insurance restrictions\par Patient only got 2 more maintenance cycle of TMZ since then. Third dose of TMZ 8/13-8/17.\par 9/6/2018- Patient transferred care to Dr Cuba. MRI done. \par 9/10-9/14- 4th cycle of TMZ\par 10/2/2018- MRI done. No TMZ ordered as patient BP was elevated. Patient sent to PCP.\par Patient cancelled the appointment for 10/9 as they never followed up with her PCP\par 10/17/18- Patient followed up with Dr Luo ( PCP). Started him on HCTZ along with Lisinopril 20 mg bid and Amlodipine 10 mg\par 5th cycle : Oct 29-Nov3)\par 11/20/2018 - MRI showed nodular enhancement . However no Rx was initiated as patient had hypertensive urgency. Patient since then following up with PCP and is currently on HCTZ 25 mg PO,Losartan 100 mg PO QD, Amlodipine 5 mg PO QD, Coreg 6.25 mg bid.\par 1/15/2019- Patient is here for a follow up. Reports that he feels his gait is getting affected and at times he feels his legs are getting weak. Admits compliance to all the medications . Plan was made to repeat MRI and also recommended to follow up with PCP as BP was elevated at the time of visit\par 1/18/19- Follow up with PCP and Amlodipine was increased to 10 mg daily\par 1/22/2019- MRI stable. Will reimage in 2 months. Recommended to follow up with PCP in the meantime for BP management\par 2/26/2019- Here for a follow up. Patient BP continues to be high. PCP managing the BP regimen and is currently on HCTZ 37.5 MG od, Coreg 6.25 mg bid, Amlodipine 10 mg od, Losartan 100 mg od. Plan was made with Dr Cuba to repeat MRI in a week , However family decided to postpone the MRI as there is noone to take him for appointments\par 3/19/2019- Patient here with a new MRI. Reports that for the past two - three days patient been sleeping a lot, poor appetite and is tired. Restarted dexamethasone 2 mg daily\par 4/16/2019- Clinically stable\par 5/14/2019- MRI stable. Plan was made to repeat MRI in 2 months\par 7/16/2019- Patient here for a follow up along with an MRI\par Denies headaches, seizures, weakness, numbness, tingling.\par \par

## 2019-07-01 ENCOUNTER — OUTPATIENT (OUTPATIENT)
Dept: OUTPATIENT SERVICES | Facility: HOSPITAL | Age: 69
LOS: 1 days | End: 2019-07-01
Payer: MEDICAID

## 2019-07-01 DIAGNOSIS — Z90.49 ACQUIRED ABSENCE OF OTHER SPECIFIED PARTS OF DIGESTIVE TRACT: Chronic | ICD-10-CM

## 2019-07-01 PROCEDURE — G9001: CPT

## 2019-07-09 ENCOUNTER — RX RENEWAL (OUTPATIENT)
Age: 69
End: 2019-07-09

## 2019-07-09 RX ORDER — HYDROCHLOROTHIAZIDE 25 MG/1
25 TABLET ORAL DAILY
Qty: 120 | Refills: 1 | Status: ACTIVE | COMMUNITY
Start: 2018-10-17 | End: 1900-01-01

## 2019-07-09 RX ORDER — METFORMIN HYDROCHLORIDE 850 MG/1
850 TABLET, COATED ORAL TWICE DAILY
Qty: 120 | Refills: 2 | Status: ACTIVE | COMMUNITY
Start: 2018-04-27 | End: 1900-01-01

## 2019-07-16 ENCOUNTER — OTHER (OUTPATIENT)
Age: 69
End: 2019-07-16

## 2019-07-16 ENCOUNTER — APPOINTMENT (OUTPATIENT)
Dept: NEUROLOGY | Facility: CLINIC | Age: 69
End: 2019-07-16

## 2019-07-16 ENCOUNTER — INPATIENT (INPATIENT)
Facility: HOSPITAL | Age: 69
LOS: 5 days | Discharge: HOME | DRG: 660 | End: 2019-07-22
Attending: STUDENT IN AN ORGANIZED HEALTH CARE EDUCATION/TRAINING PROGRAM | Admitting: HOSPITALIST
Payer: MEDICAID

## 2019-07-16 VITALS
SYSTOLIC BLOOD PRESSURE: 198 MMHG | WEIGHT: 250 LBS | RESPIRATION RATE: 18 BRPM | OXYGEN SATURATION: 97 % | HEIGHT: 66 IN | HEART RATE: 80 BPM | TEMPERATURE: 98 F | DIASTOLIC BLOOD PRESSURE: 108 MMHG

## 2019-07-16 DIAGNOSIS — E11.29 TYPE 2 DIABETES MELLITUS WITH OTHER DIABETIC KIDNEY COMPLICATION: ICD-10-CM

## 2019-07-16 DIAGNOSIS — N17.9 ACUTE KIDNEY FAILURE, UNSPECIFIED: ICD-10-CM

## 2019-07-16 DIAGNOSIS — R94.31 ABNORMAL ELECTROCARDIOGRAM [ECG] [EKG]: ICD-10-CM

## 2019-07-16 DIAGNOSIS — Z90.49 ACQUIRED ABSENCE OF OTHER SPECIFIED PARTS OF DIGESTIVE TRACT: Chronic | ICD-10-CM

## 2019-07-16 DIAGNOSIS — N13.2 HYDRONEPHROSIS WITH RENAL AND URETERAL CALCULOUS OBSTRUCTION: ICD-10-CM

## 2019-07-16 DIAGNOSIS — C71.9 MALIGNANT NEOPLASM OF BRAIN, UNSPECIFIED: ICD-10-CM

## 2019-07-16 DIAGNOSIS — C71.9 MALIGNANT NEOPLASM OF BRAIN, UNSPECIFIED: Chronic | ICD-10-CM

## 2019-07-16 LAB
ALBUMIN SERPL ELPH-MCNC: 4.4 G/DL — SIGNIFICANT CHANGE UP (ref 3.3–5)
ALP SERPL-CCNC: 85 U/L — SIGNIFICANT CHANGE UP (ref 40–120)
ALT FLD-CCNC: 26 U/L — SIGNIFICANT CHANGE UP (ref 10–45)
ANION GAP SERPL CALC-SCNC: 17 MMOL/L — SIGNIFICANT CHANGE UP (ref 5–17)
ANION GAP SERPL CALC-SCNC: 18 MMOL/L — HIGH (ref 5–17)
APPEARANCE UR: CLEAR — SIGNIFICANT CHANGE UP
APTT BLD: 29.2 SEC — SIGNIFICANT CHANGE UP (ref 27.5–36.3)
AST SERPL-CCNC: 15 U/L — SIGNIFICANT CHANGE UP (ref 10–40)
BACTERIA # UR AUTO: NEGATIVE — SIGNIFICANT CHANGE UP
BASOPHILS # BLD AUTO: 0.1 K/UL — SIGNIFICANT CHANGE UP (ref 0–0.2)
BASOPHILS NFR BLD AUTO: 0.4 % — SIGNIFICANT CHANGE UP (ref 0–2)
BILIRUB SERPL-MCNC: 0.5 MG/DL — SIGNIFICANT CHANGE UP (ref 0.2–1.2)
BILIRUB UR-MCNC: NEGATIVE — SIGNIFICANT CHANGE UP
BUN SERPL-MCNC: 28 MG/DL — HIGH (ref 7–23)
BUN SERPL-MCNC: 32 MG/DL — HIGH (ref 7–23)
CALCIUM SERPL-MCNC: 8 MG/DL — LOW (ref 8.4–10.5)
CALCIUM SERPL-MCNC: 9.3 MG/DL — SIGNIFICANT CHANGE UP (ref 8.4–10.5)
CHLORIDE SERPL-SCNC: 101 MMOL/L — SIGNIFICANT CHANGE UP (ref 96–108)
CHLORIDE SERPL-SCNC: 99 MMOL/L — SIGNIFICANT CHANGE UP (ref 96–108)
CO2 SERPL-SCNC: 23 MMOL/L — SIGNIFICANT CHANGE UP (ref 22–31)
CO2 SERPL-SCNC: 25 MMOL/L — SIGNIFICANT CHANGE UP (ref 22–31)
COLOR SPEC: SIGNIFICANT CHANGE UP
CREAT SERPL-MCNC: 1.95 MG/DL — HIGH (ref 0.5–1.3)
CREAT SERPL-MCNC: 1.96 MG/DL — HIGH (ref 0.5–1.3)
DIFF PNL FLD: NEGATIVE — SIGNIFICANT CHANGE UP
EOSINOPHIL # BLD AUTO: 0.1 K/UL — SIGNIFICANT CHANGE UP (ref 0–0.5)
EOSINOPHIL NFR BLD AUTO: 0.8 % — SIGNIFICANT CHANGE UP (ref 0–6)
EPI CELLS # UR: 1 /HPF — SIGNIFICANT CHANGE UP
GAS PNL BLDV: SIGNIFICANT CHANGE UP
GLUCOSE BLDC GLUCOMTR-MCNC: 191 MG/DL — HIGH (ref 70–99)
GLUCOSE SERPL-MCNC: 153 MG/DL — HIGH (ref 70–99)
GLUCOSE SERPL-MCNC: 193 MG/DL — HIGH (ref 70–99)
GLUCOSE UR QL: NEGATIVE — SIGNIFICANT CHANGE UP
HCT VFR BLD CALC: 38.9 % — LOW (ref 39–50)
HGB BLD-MCNC: 13.3 G/DL — SIGNIFICANT CHANGE UP (ref 13–17)
HYALINE CASTS # UR AUTO: 1 /LPF — SIGNIFICANT CHANGE UP (ref 0–2)
INR BLD: 1.05 RATIO — SIGNIFICANT CHANGE UP (ref 0.88–1.16)
KETONES UR-MCNC: NEGATIVE — SIGNIFICANT CHANGE UP
LACTATE SERPL-SCNC: 1.9 MMOL/L — SIGNIFICANT CHANGE UP (ref 0.7–2)
LEUKOCYTE ESTERASE UR-ACNC: NEGATIVE — SIGNIFICANT CHANGE UP
LIDOCAIN IGE QN: 46 U/L — SIGNIFICANT CHANGE UP (ref 7–60)
LYMPHOCYTES # BLD AUTO: 15 % — SIGNIFICANT CHANGE UP (ref 13–44)
LYMPHOCYTES # BLD AUTO: 2 K/UL — SIGNIFICANT CHANGE UP (ref 1–3.3)
MAGNESIUM SERPL-MCNC: 1.3 MG/DL — LOW (ref 1.6–2.6)
MCHC RBC-ENTMCNC: 31 PG — SIGNIFICANT CHANGE UP (ref 27–34)
MCHC RBC-ENTMCNC: 34.2 GM/DL — SIGNIFICANT CHANGE UP (ref 32–36)
MCV RBC AUTO: 90.7 FL — SIGNIFICANT CHANGE UP (ref 80–100)
MONOCYTES # BLD AUTO: 1.2 K/UL — HIGH (ref 0–0.9)
MONOCYTES NFR BLD AUTO: 8.8 % — SIGNIFICANT CHANGE UP (ref 2–14)
NEUTROPHILS # BLD AUTO: 10 K/UL — HIGH (ref 1.8–7.4)
NEUTROPHILS NFR BLD AUTO: 75 % — SIGNIFICANT CHANGE UP (ref 43–77)
NITRITE UR-MCNC: NEGATIVE — SIGNIFICANT CHANGE UP
PH UR: 5.5 — SIGNIFICANT CHANGE UP (ref 5–8)
PLATELET # BLD AUTO: 311 K/UL — SIGNIFICANT CHANGE UP (ref 150–400)
POTASSIUM SERPL-MCNC: 3 MMOL/L — LOW (ref 3.5–5.3)
POTASSIUM SERPL-MCNC: 3 MMOL/L — LOW (ref 3.5–5.3)
POTASSIUM SERPL-MCNC: 3.3 MMOL/L — LOW (ref 3.5–5.3)
POTASSIUM SERPL-SCNC: 3 MMOL/L — LOW (ref 3.5–5.3)
POTASSIUM SERPL-SCNC: 3 MMOL/L — LOW (ref 3.5–5.3)
POTASSIUM SERPL-SCNC: 3.3 MMOL/L — LOW (ref 3.5–5.3)
PROT SERPL-MCNC: 7.2 G/DL — SIGNIFICANT CHANGE UP (ref 6–8.3)
PROT UR-MCNC: NEGATIVE — SIGNIFICANT CHANGE UP
PROTHROM AB SERPL-ACNC: 12.1 SEC — SIGNIFICANT CHANGE UP (ref 10–12.9)
RBC # BLD: 4.29 M/UL — SIGNIFICANT CHANGE UP (ref 4.2–5.8)
RBC # FLD: 13.3 % — SIGNIFICANT CHANGE UP (ref 10.3–14.5)
RBC CASTS # UR COMP ASSIST: 3 /HPF — SIGNIFICANT CHANGE UP (ref 0–4)
SODIUM SERPL-SCNC: 141 MMOL/L — SIGNIFICANT CHANGE UP (ref 135–145)
SODIUM SERPL-SCNC: 142 MMOL/L — SIGNIFICANT CHANGE UP (ref 135–145)
SP GR SPEC: 1.02 — SIGNIFICANT CHANGE UP (ref 1.01–1.02)
TROPONIN T, HIGH SENSITIVITY RESULT: 124 NG/L — HIGH (ref 0–51)
UROBILINOGEN FLD QL: NEGATIVE — SIGNIFICANT CHANGE UP
WBC # BLD: 13.4 K/UL — HIGH (ref 3.8–10.5)
WBC # FLD AUTO: 13.4 K/UL — HIGH (ref 3.8–10.5)
WBC UR QL: 2 /HPF — SIGNIFICANT CHANGE UP (ref 0–5)

## 2019-07-16 PROCEDURE — 99285 EMERGENCY DEPT VISIT HI MDM: CPT

## 2019-07-16 PROCEDURE — 99223 1ST HOSP IP/OBS HIGH 75: CPT

## 2019-07-16 PROCEDURE — 71045 X-RAY EXAM CHEST 1 VIEW: CPT | Mod: 26

## 2019-07-16 PROCEDURE — 74177 CT ABD & PELVIS W/CONTRAST: CPT | Mod: 26

## 2019-07-16 RX ORDER — FAMOTIDINE 10 MG/ML
1 INJECTION INTRAVENOUS
Qty: 0 | Refills: 0 | DISCHARGE

## 2019-07-16 RX ORDER — POTASSIUM CHLORIDE 20 MEQ
20 PACKET (EA) ORAL ONCE
Refills: 0 | Status: COMPLETED | OUTPATIENT
Start: 2019-07-16 | End: 2019-07-16

## 2019-07-16 RX ORDER — SODIUM CHLORIDE 9 MG/ML
1000 INJECTION, SOLUTION INTRAVENOUS
Refills: 0 | Status: DISCONTINUED | OUTPATIENT
Start: 2019-07-16 | End: 2019-07-19

## 2019-07-16 RX ORDER — LEVETIRACETAM 250 MG/1
500 TABLET, FILM COATED ORAL
Refills: 0 | Status: DISCONTINUED | OUTPATIENT
Start: 2019-07-16 | End: 2019-07-19

## 2019-07-16 RX ORDER — MAGNESIUM SULFATE 500 MG/ML
1 VIAL (ML) INJECTION ONCE
Refills: 0 | Status: COMPLETED | OUTPATIENT
Start: 2019-07-16 | End: 2019-07-16

## 2019-07-16 RX ORDER — DEXTROSE 50 % IN WATER 50 %
15 SYRINGE (ML) INTRAVENOUS ONCE
Refills: 0 | Status: DISCONTINUED | OUTPATIENT
Start: 2019-07-16 | End: 2019-07-19

## 2019-07-16 RX ORDER — INSULIN LISPRO 100/ML
VIAL (ML) SUBCUTANEOUS AT BEDTIME
Refills: 0 | Status: DISCONTINUED | OUTPATIENT
Start: 2019-07-16 | End: 2019-07-18

## 2019-07-16 RX ORDER — SODIUM CHLORIDE 9 MG/ML
1000 INJECTION INTRAMUSCULAR; INTRAVENOUS; SUBCUTANEOUS ONCE
Refills: 0 | Status: COMPLETED | OUTPATIENT
Start: 2019-07-16 | End: 2019-07-16

## 2019-07-16 RX ORDER — DEXAMETHASONE 0.5 MG/5ML
1 ELIXIR ORAL DAILY
Refills: 0 | Status: DISCONTINUED | OUTPATIENT
Start: 2019-07-16 | End: 2019-07-19

## 2019-07-16 RX ORDER — GLUCAGON INJECTION, SOLUTION 0.5 MG/.1ML
1 INJECTION, SOLUTION SUBCUTANEOUS ONCE
Refills: 0 | Status: DISCONTINUED | OUTPATIENT
Start: 2019-07-16 | End: 2019-07-19

## 2019-07-16 RX ORDER — DEXTROSE 50 % IN WATER 50 %
25 SYRINGE (ML) INTRAVENOUS ONCE
Refills: 0 | Status: DISCONTINUED | OUTPATIENT
Start: 2019-07-16 | End: 2019-07-19

## 2019-07-16 RX ORDER — INSULIN LISPRO 100/ML
VIAL (ML) SUBCUTANEOUS
Refills: 0 | Status: DISCONTINUED | OUTPATIENT
Start: 2019-07-16 | End: 2019-07-18

## 2019-07-16 RX ORDER — INSULIN GLARGINE 100 [IU]/ML
10 INJECTION, SOLUTION SUBCUTANEOUS AT BEDTIME
Refills: 0 | Status: DISCONTINUED | OUTPATIENT
Start: 2019-07-16 | End: 2019-07-18

## 2019-07-16 RX ORDER — POTASSIUM CHLORIDE 20 MEQ
40 PACKET (EA) ORAL ONCE
Refills: 0 | Status: DISCONTINUED | OUTPATIENT
Start: 2019-07-16 | End: 2019-07-16

## 2019-07-16 RX ORDER — DEXTROSE 50 % IN WATER 50 %
12.5 SYRINGE (ML) INTRAVENOUS ONCE
Refills: 0 | Status: DISCONTINUED | OUTPATIENT
Start: 2019-07-16 | End: 2019-07-19

## 2019-07-16 RX ORDER — CARVEDILOL PHOSPHATE 80 MG/1
12.5 CAPSULE, EXTENDED RELEASE ORAL EVERY 12 HOURS
Refills: 0 | Status: DISCONTINUED | OUTPATIENT
Start: 2019-07-16 | End: 2019-07-17

## 2019-07-16 RX ORDER — POTASSIUM CHLORIDE 20 MEQ
40 PACKET (EA) ORAL ONCE
Refills: 0 | Status: COMPLETED | OUTPATIENT
Start: 2019-07-16 | End: 2019-07-16

## 2019-07-16 RX ORDER — TAMSULOSIN HYDROCHLORIDE 0.4 MG/1
0.4 CAPSULE ORAL AT BEDTIME
Refills: 0 | Status: DISCONTINUED | OUTPATIENT
Start: 2019-07-16 | End: 2019-07-19

## 2019-07-16 RX ORDER — AMLODIPINE BESYLATE 2.5 MG/1
10 TABLET ORAL DAILY
Refills: 0 | Status: DISCONTINUED | OUTPATIENT
Start: 2019-07-16 | End: 2019-07-19

## 2019-07-16 RX ADMIN — Medication 40 MILLIEQUIVALENT(S): at 22:50

## 2019-07-16 RX ADMIN — TAMSULOSIN HYDROCHLORIDE 0.4 MILLIGRAM(S): 0.4 CAPSULE ORAL at 23:19

## 2019-07-16 RX ADMIN — SODIUM CHLORIDE 2000 MILLILITER(S): 9 INJECTION INTRAMUSCULAR; INTRAVENOUS; SUBCUTANEOUS at 09:44

## 2019-07-16 RX ADMIN — CARVEDILOL PHOSPHATE 12.5 MILLIGRAM(S): 80 CAPSULE, EXTENDED RELEASE ORAL at 19:02

## 2019-07-16 RX ADMIN — SODIUM CHLORIDE 2000 MILLILITER(S): 9 INJECTION INTRAMUSCULAR; INTRAVENOUS; SUBCUTANEOUS at 12:49

## 2019-07-16 RX ADMIN — Medication 20 MILLIEQUIVALENT(S): at 12:49

## 2019-07-16 RX ADMIN — Medication 20 MILLIEQUIVALENT(S): at 12:17

## 2019-07-16 RX ADMIN — INSULIN GLARGINE 10 UNIT(S): 100 INJECTION, SOLUTION SUBCUTANEOUS at 22:55

## 2019-07-16 RX ADMIN — LEVETIRACETAM 500 MILLIGRAM(S): 250 TABLET, FILM COATED ORAL at 19:02

## 2019-07-16 RX ADMIN — Medication 100 GRAM(S): at 22:50

## 2019-07-16 NOTE — ED PROVIDER NOTE - ATTENDING CONTRIBUTION TO CARE
hx of GBM, DM (on metformin, no insulin)  acute onset R abdominal pain  Has more protuberant abdomen decreased PO   exam, consistent w/ fungal infection no focal scrotal or testicular tenderness (just noticed prior to arriving in the ED)    Pt will have labs, CT.   +stone + shawna  will admit for rehydration    Urology c/s for obstructive uropathy as etiology of SHAWNA vs dehydration

## 2019-07-16 NOTE — H&P ADULT - NSICDXPASTSURGICALHX_GEN_ALL_CORE_FT
PAST SURGICAL HISTORY:  History of appendectomy PAST SURGICAL HISTORY:  Glioblastoma multiforme     History of appendectomy

## 2019-07-16 NOTE — H&P ADULT - PROBLEM SELECTOR PLAN 4
Head CTT noncontrast.   Would consider formal neurooncology evaluation in the AM>>unclear if the iliac findings and 4 mm pulmonary nodule represent metastatic disease.

## 2019-07-16 NOTE — H&P ADULT - NSHPREVIEWOFSYSTEMS_GEN_ALL_CORE
NO red blood per rectum or melena.  Patient with loose but formed BM  NO chest pain/pressure.   NO palpitations.  No HA< no focal weakness.  NO dysuria, no hematuria.  Patient with weight gain as above.  NO dyspnoea, no cough.  Denies coughing with meals.    NO rash.  No thyroid symptoms.  NO node swelling.  NO SI/HI.  Daughter admits that patient occasionally needs redirection by family during conversations.

## 2019-07-16 NOTE — H&P ADULT - ATTENDING COMMENTS
NIGHT HOSPITALIST:  Patient's adult daughter and patient's sister aware of course and agree with plan/care as above.   Given patient's comorbidities, patient's long term prognosis is guarded.   Emotional support provided to patient/ family in attendance.  Family is not yet ready to discuss advance directives.  Care reviewed with covering NP/PA.  Care assumed by the DAY HOSPITALIST.    Ayan Herrera MD  [phone]

## 2019-07-16 NOTE — H&P ADULT - NSHPSOCIALHISTORY_GEN_ALL_CORE
NO ethanol.   Quit tobacco 40 years ago.  No street drug use.  Supportive sister and adult daughter.

## 2019-07-16 NOTE — ED ADULT NURSE NOTE - CHIEF COMPLAINT QUOTE
Pt has lower abdominal pain on the RLQ radiating across lower abdomen.   Family reports Hx of brain tumor treated with radiation, as per family Pt is confused at baseline.

## 2019-07-16 NOTE — CONSULT NOTE ADULT - SUBJECTIVE AND OBJECTIVE BOX
Urology PA Consult Note    HPI:  69 year old male with PMHx of glioblastoma s/p resection and chemoradiation tx, HTN, DM presents c/o intermittent lower abdominal pain since yesterday. Per daughter, patient with recurrent brain tumor so poor historian. Patient not feeling well with pain yesterday and not being able to have a BM, given laxtaives. Overnight, pain worsened and radiating to right flank. Denies fevers, chills, nausea, vomiting, SOB, chest pain, hematuria, dysuria. Patient currently having a HA, feeling cold and urinating frequently s/p 2L bolus. Denies hx of kidney stones. CT scan with 1mm right distal ureteral stone with mild hydroureteronephrosis. Initially Cr 1.14, although repeat noted worsening SHAWNA to 1.95 after hydration. UA negative. Urology consulted. Currently, patient denies pain. No pain meds given    PAST MEDICAL & SURGICAL HISTORY:  HTN (hypertension)  DM  Glioblastoma s/p resection and chemoradiation   History of appendectomy    MEDICATIONS: See List    FAMILY HISTORY: n/c    Allergies  No Known Allergies    SOCIAL HISTORY:   ETOH: denies  Tobacco: denies  Drugs: denies    REVIEW OF SYSTEMS: Pertinent positives and negatives as stated in HPI, otherwise negative    VITAL SIGNS  T(C): 36.9 (19 @ 16:49), Max: 36.9 (19 @ 16:49)  HR: 83 (19 @ 16:49)  BP: 160/97 (19 @ 16:49)  SpO2: 95% (19 @ 16:49)  Wt(kg): --    I/O's: voiding    PHYSICAL EXAM  Gen: NAD. Awake and alert  HEENT: scalp surgical scars healed  Pulm: Nonlabored breathing  CV: RRR  Abd: obese, soft, NT/ND  : No CVAT b/l    LABS:   @ 14:33    WBC --    / Hct --    / SCr 1.95      @ 09:14    WBC 13.4  / Hct 38.9  / SCr 1.96         141  |  101  |  28<H>  ----------------------------<  193<H>  3.0<L>   |  23  |  1.95<H>    Ca    8.0<L>      2019 14:33    TPro  7.2  /  Alb  4.4  /  TBili  0.5  /  DBili  x   /  AST  15  /  ALT  26  /  AlkPhos  85  07-16      Urinalysis Basic - ( 2019 12:02 )    Color: Light Yellow / Appearance: Clear / S.019 / pH: x  Gluc: x / Ketone: Negative  / Bili: Negative / Urobili: Negative   Blood: x / Protein: Negative / Nitrite: Negative   Leuk Esterase: Negative / RBC: 3 /hpf / WBC 2 /HPF   Sq Epi: x / Non Sq Epi: 1 /hpf / Bacteria: Negative    RADIOLOGY:  CT Abd/Pelvis:  RIGHT-sided obstructive uropathy to the level of a 0.1 cm stone in the   distal right ureter.    An ill-defined 1.7 cm sclerotic lesion in the RIGHT iliac bone,   indeterminate. Recommend contrast enhanced MR bony pelvis on a nonurgent   outpatient basis for further evaluation.    A 0.4 cm RIGHT lower lobe pulmonary nodule, indeterminate. Recommend CT   chest for further evaluation.    Diffuse hepatic steatosis.

## 2019-07-16 NOTE — H&P ADULT - NSHPPHYSICALEXAM_GEN_ALL_CORE
Physical exam with an elderly, obese, chronically ill appearing M  Afebrile.   HR  73   RR 14.  BP  165/85     95% on RA  HEENT< PERRL< EOMI, oropharynx clear  Neck supple  No thyromegaly.  Chest clear  Cor s1 s2  Abdomen soft obese, normal bowel sounds, nontender, no rebound.  No CVA tenderness.  Skin dry.  Ext with poor nail hygiene with dry skin.   No alice DM ulcers noted. 1+ B/L LE oedema.  Neurologic exam alert to self/place.   Speech grossly fluent.  Limited cognitive assessment with patient reliant upon sister and patient's daughter in attendance during interview.  UE/LE 5/5.   Gait grossly without ataxia.  No SI/HI.

## 2019-07-16 NOTE — H&P ADULT - ASSESSMENT
NIGHT HOSPITALIST:   Complex medical history as above in the setting of patient with glioblastoma multiforme from January of last year with chemo/RT--apparently still with residual disease from recent MRI brain from May of this year, type 2 DM, essential HTN with  abdominal pain syndrome with suspicion of RIGHT kidney stone as etiology of the pain.   Unclear if this would explain family's concern for increasing weight for the past year>>unclear to patient's recent outpatient followup since discharge from [facility] from last year>>unclear to ER's contrast study of the abdomen in the ER in patient already with acute kidney injury, presumably from either the ARB or HCTZ or both>>the nephrolithiasis on the RIGHT should not cause SHAWNA unless patient has already preexisting disease in the other kidney.    Will temporarily HOLD the ARB and the HCTZ for now>>would follow patient's BP but would consider formal evaluation by nephrology in the AM.  Will obtain a renal US.    Upgraded to telemetry due to the QTc prolongation, likely from the patient's hypokalemia.   Will check serum Mg++.   Will obtain HS troponin to exclude a cardiac equivalent.  Echo.    Will obtain a noncontrast head CTT to exclude interval CNS event that would explain patient's subtle cognitive impairment.  Will continue with Keppra but would consider formal evaluation by neurooncology in the AM.    Will assume aspiration risk for now.  Will provide a conservative 0.1 unit/kg/24H Lantus for now for better glycaemic control.

## 2019-07-16 NOTE — ED PROVIDER NOTE - PROGRESS NOTE DETAILS
Dr. Tre Pittman, PGY-2: spoke w/ Dr. Cuba to inform her of pt's pending admission. She or one of her team Dr. Tre Pittman, PGY-2: spoke w/ Dr. Cuba to inform her of pt's pending admission. She or one of her team members will see pt tomorrow. Will consult uro to see pt while he is admitted for stone causing hydro and sydnie. Informed pt and family of lung nodule on CT and sclerotic pelvic lesion on MRI. Informed them that further testing should occur for additional evaluation and they understood this. Admitted to hospitalist. Repeat BMP showed Cr did not improve yet despite 2 L of IVF.

## 2019-07-16 NOTE — DISCUSSION/SUMMARY
[PCP: _____] : PCP: [unfilled] [ED Visit] : a visit to ED [Patient] : a call from patient [FreeTextEntry1] : Not having any BMs, concern for obstruction (SBO, volvulus). Will follow up after discharge.

## 2019-07-16 NOTE — H&P ADULT - NSHPOUTPATIENTPROVIDERS_GEN_ALL_CORE
[address] [place] [address].  [facility].  Zina Cuba MD (Neuro-oncology)  [phone]

## 2019-07-16 NOTE — H&P ADULT - PROBLEM SELECTOR PLAN 3
Likely related to the hypokalemia.  Check K+ and Mg++ as above.  Will check troponin to exclude cardiac equivalent.

## 2019-07-16 NOTE — ED PROVIDER NOTE - CARE PLAN
Principal Discharge DX:	SHAWNA (acute kidney injury)  Secondary Diagnosis:	Kidney stone  Secondary Diagnosis:	Hydronephrosis with urinary obstruction due to renal calculus

## 2019-07-16 NOTE — ED PROVIDER NOTE - NS ED ROS FT
GENERAL: No fever or chills  EYES: No change in vision  HEENT: No trouble swallowing or speaking  CARDIAC: No chest pain  PULMONARY: No cough or SOB  GI: per HPI  : No changes in urination  SKIN: lower abdominal/groin rash  NEURO: No headache, no numbness  MSK: No joint pain  Otherwise as HPI or negative.

## 2019-07-16 NOTE — H&P ADULT - PROBLEM SELECTOR PLAN 2
See above.  Renal US.   Acute renal failure likely from preexisting kidney disease but will HOLD patient's ARB and HCTZ.   Would consider formal renal evaluation in the AM.

## 2019-07-16 NOTE — ED ADULT TRIAGE NOTE - CHIEF COMPLAINT QUOTE
Pt has lower abdominal pain on the RLQ radiating across lower abdomen. Pt has lower abdominal pain on the RLQ radiating across lower abdomen.   Family reports Hx of brain tumor treated with radiation, as per family Pt is confused at baseline.

## 2019-07-16 NOTE — CONSULT NOTE ADULT - ASSESSMENT
69 year old male with obstructing right 1mm distal ureteral stone and SHAWNA     - Repeat BMP  - Pain control prn  - Encourage PO hydration  - Strain urine  - Flomax 69 year old male with pain and SHAWNA 2/2 obstructing right 1mm distal ureteral stone    SHAWNA likely to respond to hydration    - Encourage po hydration  - Trend Cr  - Pain control as needed  - Initiate flomax 0.4mg qday to promote passage of stone (high likelihood of passing without intervention due to size and location)  - Strain urine for calculi

## 2019-07-16 NOTE — H&P ADULT - NSHPLABSRESULTS_GEN_ALL_CORE
WBC 13.4.  Hgb 13.3   Platelets of 311K>    K+ 3.0>>supplemented in the ER.   Random glucose of 193.      Cr 1.1 (May 2019)>> 1.9    HCO3 23.    Alb 4.4.    Lactate 2.9>>repeated.   ADEQUATE CAPILLARY REFILL.    EKG tracing reviewed with NSR at 72 with RBBB and LAFB.   QTc 501 (likely from hypokalemia).    U/A with 2 WBC with NO leukocyte esterase.    Chest radiograph reviewed with AP film with poor inspiratory effort, no infiltrate or effusion. WBC 13.4.  Hgb 13.3   Platelets of 311K>    K+ 3.0>>supplemented in the ER.   Random glucose of 193.      Cr 1.1 (May 2019)>> 1.9    HCO3 23.    Alb 4.4.    Lactate 2.9>>repeated.   ADEQUATE CAPILLARY REFILL.    EKG tracing reviewed with NSR at 72 with RBBB and LAFB.   QTc 501 (likely from hypokalemia).    U/A with 2 WBC with NO leukocyte esterase.    Chest radiograph reviewed with AP film with poor inspiratory effort, no infiltrate or effusion.    MRI head from May 2019 with previously noted postop bed and adjacent nodular area of enhancement appears to have decreased in size    CTT abdomen/pelvis (apparently ordered by the ER attending with IV contrast with coronary artery calcifications, no bowel obstruction, RIGHT obstructive uropathy with 0.1 cm stone distal RIGHT ureter, ill defined 1.7 cm RIGHT iliac bone sclerotic lesion and 4 mm RIGHT lower lobe nodule.  Diffuse hepatic steatosis.

## 2019-07-16 NOTE — ED PROVIDER NOTE - PHYSICAL EXAMINATION
Gen: NAD, AOx3, able to make needs known, non-toxic  Head: NCAT  HEENT: EOMI, oral mucosa moist, normal conjunctiva  Lung: CTAB, no respiratory distress, no wheezes/rhonchi/rales B/L, speaking in full sentences  CV: RRR, no murmurs  Abd: soft, distended, mildly tender to palpation across lower abdomen  MSK: no visible deformities  Neuro: No focal sensory or motor deficits  Skin: Warm, well perfused, lower abdominal erythema that is mildly tender to touch  Psych: normal affect      exam chaperoned by Dr. Bay: Testicle non tender, normal appearing uncircumsized penis, b/l groin erythematous, confluent macular rash that is non tender, no crepitus appreciated on exam

## 2019-07-16 NOTE — H&P ADULT - HISTORY OF PRESENT ILLNESS
NIGHT HOSPITALIST:   Patient UNKNOWN to me previously, assigned to me at this point via the ER to admit this 70 y/o M--patient seen with adult daughter and patient's sister in attendance with family declining telephone  phone--patient interviewed by examiner in patient's native Bulgarian--patient with a history of glioblastoma multiforme diagnosed January 2018 with resection and RT,  chemotherapy (unclear to present disease status), essential HTN, type 2 DM, on seizure prophylaxis, with family referring patient to the [facility] ER following one day of lower abdominal pain, worst in the RLQ, but apparently one year of progressive increase in patient's abdominal girth.  No pain at present.

## 2019-07-16 NOTE — ED PROVIDER NOTE - CLINICAL SUMMARY MEDICAL DECISION MAKING FREE TEXT BOX
68 y/o M w/ PMH of GMB (s/p resection 1/2018 and chemoradiation), HTN, DM presenting w/ abdominal pain. Initial evaluation of pt was concerning for obstructive pathology such as SBO vs. cecal volvulus vs. sigmoid volvulus w/ leak around stool given protuberant abdomen, however since abdomen has been steadily increasing in size over the course of a year this is less likely. W/ hx of appendectomy, stump appendicitis is additionally on the differential however that suspicion is low. Low suspicion for urinary source of symptoms as pt denies urinary symptoms. R sided diverticulitis vs. colitis is additionally on the differential although low suspicion as this is atypical. Higher on the differential is colonic irritation 2/2 to laxative use given overall abdominal exam was relatively benign. However given pt's age and comorbidities will check labs and CT a/p to rule out emergent processes. Additionally lower abdomen and groin rash consistent w/ superficial fungal infection (no crepitus or exquisite tenderness to be concerned for fourniere's). Will need topical tx. Reassess.

## 2019-07-16 NOTE — H&P ADULT - NSICDXPASTMEDICALHX_GEN_ALL_CORE_FT
PAST MEDICAL HISTORY:  HTN (hypertension) PAST MEDICAL HISTORY:  Glioblastoma multiforme     HTN (hypertension)     Type 2 diabetes mellitus

## 2019-07-16 NOTE — H&P ADULT - PROBLEM SELECTOR PLAN 1
See above.   Abdominal pain presumably from the kidney stone.   Appreciate urology evaluation.  Flomax as above.  Renal US>

## 2019-07-16 NOTE — ED ADULT NURSE NOTE - OBJECTIVE STATEMENT
Patient   is  alert   and  oriented  x3.   Color   is  good  and  skin   warm  to touch.   He  is  c/o  lower  abdominal pain. Patient   denies  dysuria,  nausea  or  vomiting.

## 2019-07-16 NOTE — CHART NOTE - NSCHARTNOTEFT_GEN_A_CORE
Notified by RN patient with troponin 124, patient admitted with abdominal pain, found to have obstructive kidney stone with SHAWNA, no complaints of CP, VSS. EKG reviewed NSR, will send for 3 hr f/u troponin (11:30pm) RN aware, d/w Dr. Herrera , will reach out to cardiology attending overnight.     MG and K repleted.     Alexy Bryant PA-C   Department of Medicine Notified by RN patient with troponin 124, patient admitted with abdominal pain, found to have obstructive kidney stone with SHAWNA, no complaints of CP, VSS. EKG reviewed NSR, will send for 3 hr f/u troponin (11:30pm) RN aware, d/w Dr. Herrera , will reach out to cardiology attending overnight Dr. rollins, awaiting recommendations.     MG and K repleted.     Alexy Bryant PA-C   Department of Medicine

## 2019-07-16 NOTE — ED PROVIDER NOTE - OBJECTIVE STATEMENT
68 y/o M w/ PMH of GMB (s/p resection 1/2018 and chemoradiation), HTN, DM presenting w/ abdominal pain. Pt presents with sister and daughter. Pt primarily Georgian speaking and requests daughter and sister to translate, translation services were refused. Yesterday pt complained to his daughter about not being able to have a bowel movement yesterday so his daughter gave him some laxatives. After that he had 2 loose, non bloody bowel movements. Overnight he developed lower abdominal pain, worse in RLQ. Reports not being able to sleep due to the pain. Did not take anything at home for the pain. Has had decreased appetite, but per family that happens to him from time to time. Has been having normal BMs prior to yesterday and normally has BMs daily. Additionally over the past approx 1 year pt's abdomen has been getting larger and he has been gaining weight and family is unsure as to the cause. Pt has not had pain like this in the past. Pt noted to have lower abdominal rash which neither he nor the family had noticed before. Denies urinary complaints. No fevers or chills. No additional complaints.    PCP: [facility]  Neurologist: Dr. Cuba

## 2019-07-17 ENCOUNTER — TRANSCRIPTION ENCOUNTER (OUTPATIENT)
Age: 69
End: 2019-07-17

## 2019-07-17 DIAGNOSIS — R74.8 ABNORMAL LEVELS OF OTHER SERUM ENZYMES: ICD-10-CM

## 2019-07-17 DIAGNOSIS — Z71.89 OTHER SPECIFIED COUNSELING: ICD-10-CM

## 2019-07-17 DIAGNOSIS — N20.0 CALCULUS OF KIDNEY: ICD-10-CM

## 2019-07-17 DIAGNOSIS — I10 ESSENTIAL (PRIMARY) HYPERTENSION: ICD-10-CM

## 2019-07-17 DIAGNOSIS — Z29.9 ENCOUNTER FOR PROPHYLACTIC MEASURES, UNSPECIFIED: ICD-10-CM

## 2019-07-17 DIAGNOSIS — E78.5 HYPERLIPIDEMIA, UNSPECIFIED: ICD-10-CM

## 2019-07-17 LAB
ANION GAP SERPL CALC-SCNC: 15 MMOL/L — SIGNIFICANT CHANGE UP (ref 5–17)
BASOPHILS # BLD AUTO: 0.03 K/UL — SIGNIFICANT CHANGE UP (ref 0–0.2)
BASOPHILS NFR BLD AUTO: 0.2 % — SIGNIFICANT CHANGE UP (ref 0–2)
BUN SERPL-MCNC: 22 MG/DL — SIGNIFICANT CHANGE UP (ref 7–23)
CALCIUM SERPL-MCNC: 8.5 MG/DL — SIGNIFICANT CHANGE UP (ref 8.4–10.5)
CHLORIDE SERPL-SCNC: 100 MMOL/L — SIGNIFICANT CHANGE UP (ref 96–108)
CO2 SERPL-SCNC: 23 MMOL/L — SIGNIFICANT CHANGE UP (ref 22–31)
CREAT ?TM UR-MCNC: 101 MG/DL — SIGNIFICANT CHANGE UP
CREAT ?TM UR-MCNC: 102 MG/DL — SIGNIFICANT CHANGE UP
CREAT SERPL-MCNC: 1.84 MG/DL — HIGH (ref 0.5–1.3)
CULTURE RESULTS: SIGNIFICANT CHANGE UP
EOSINOPHIL # BLD AUTO: 0.04 K/UL — SIGNIFICANT CHANGE UP (ref 0–0.5)
EOSINOPHIL NFR BLD AUTO: 0.3 % — SIGNIFICANT CHANGE UP (ref 0–6)
GLUCOSE BLDC GLUCOMTR-MCNC: 199 MG/DL — HIGH (ref 70–99)
GLUCOSE BLDC GLUCOMTR-MCNC: 201 MG/DL — HIGH (ref 70–99)
GLUCOSE BLDC GLUCOMTR-MCNC: 213 MG/DL — HIGH (ref 70–99)
GLUCOSE BLDC GLUCOMTR-MCNC: 254 MG/DL — HIGH (ref 70–99)
GLUCOSE SERPL-MCNC: 155 MG/DL — HIGH (ref 70–99)
HBA1C BLD-MCNC: 9 % — HIGH (ref 4–5.6)
HCT VFR BLD CALC: 39 % — SIGNIFICANT CHANGE UP (ref 39–50)
HCV AB S/CO SERPL IA: 0.1 S/CO — SIGNIFICANT CHANGE UP (ref 0–0.99)
HCV AB SERPL-IMP: SIGNIFICANT CHANGE UP
HGB BLD-MCNC: 13.2 G/DL — SIGNIFICANT CHANGE UP (ref 13–17)
IMM GRANULOCYTES NFR BLD AUTO: 1.2 % — SIGNIFICANT CHANGE UP (ref 0–1.5)
LYMPHOCYTES # BLD AUTO: 1.47 K/UL — SIGNIFICANT CHANGE UP (ref 1–3.3)
LYMPHOCYTES # BLD AUTO: 11.9 % — LOW (ref 13–44)
MAGNESIUM SERPL-MCNC: 1.5 MG/DL — LOW (ref 1.6–2.6)
MCHC RBC-ENTMCNC: 30.6 PG — SIGNIFICANT CHANGE UP (ref 27–34)
MCHC RBC-ENTMCNC: 33.8 GM/DL — SIGNIFICANT CHANGE UP (ref 32–36)
MCV RBC AUTO: 90.3 FL — SIGNIFICANT CHANGE UP (ref 80–100)
MICROALBUMIN UR-MCNC: 2.7 MG/DL — SIGNIFICANT CHANGE UP
MICROALBUMIN/CREAT UR-RTO: 27 MG/G — SIGNIFICANT CHANGE UP (ref 0–30)
MONOCYTES # BLD AUTO: 1.32 K/UL — HIGH (ref 0–0.9)
MONOCYTES NFR BLD AUTO: 10.7 % — SIGNIFICANT CHANGE UP (ref 2–14)
NEUTROPHILS # BLD AUTO: 9.32 K/UL — HIGH (ref 1.8–7.4)
NEUTROPHILS NFR BLD AUTO: 75.7 % — SIGNIFICANT CHANGE UP (ref 43–77)
PLATELET # BLD AUTO: 320 K/UL — SIGNIFICANT CHANGE UP (ref 150–400)
POTASSIUM SERPL-MCNC: 3.2 MMOL/L — LOW (ref 3.5–5.3)
POTASSIUM SERPL-SCNC: 3.2 MMOL/L — LOW (ref 3.5–5.3)
PROT ?TM UR-MCNC: 14 MG/DL — HIGH (ref 0–12)
PROT/CREAT UR-RTO: 0.1 RATIO — SIGNIFICANT CHANGE UP (ref 0–0.2)
RBC # BLD: 4.32 M/UL — SIGNIFICANT CHANGE UP (ref 4.2–5.8)
RBC # FLD: 13.5 % — SIGNIFICANT CHANGE UP (ref 10.3–14.5)
SODIUM SERPL-SCNC: 138 MMOL/L — SIGNIFICANT CHANGE UP (ref 135–145)
SPECIMEN SOURCE: SIGNIFICANT CHANGE UP
TROPONIN T, HIGH SENSITIVITY RESULT: 119 NG/L — HIGH (ref 0–51)
WBC # BLD: 12.33 K/UL — HIGH (ref 3.8–10.5)
WBC # FLD AUTO: 12.33 K/UL — HIGH (ref 3.8–10.5)

## 2019-07-17 PROCEDURE — 93306 TTE W/DOPPLER COMPLETE: CPT | Mod: 26

## 2019-07-17 PROCEDURE — 70450 CT HEAD/BRAIN W/O DYE: CPT | Mod: 26

## 2019-07-17 PROCEDURE — 76770 US EXAM ABDO BACK WALL COMP: CPT | Mod: 26

## 2019-07-17 PROCEDURE — 99233 SBSQ HOSP IP/OBS HIGH 50: CPT | Mod: GC

## 2019-07-17 RX ORDER — CARVEDILOL PHOSPHATE 80 MG/1
1 CAPSULE, EXTENDED RELEASE ORAL
Qty: 0 | Refills: 0 | DISCHARGE

## 2019-07-17 RX ORDER — AMLODIPINE BESYLATE 2.5 MG/1
1 TABLET ORAL
Qty: 0 | Refills: 0 | DISCHARGE
Start: 2019-07-17

## 2019-07-17 RX ORDER — LEVETIRACETAM 250 MG/1
1 TABLET, FILM COATED ORAL
Qty: 0 | Refills: 0 | DISCHARGE
Start: 2019-07-17

## 2019-07-17 RX ORDER — LEVETIRACETAM 250 MG/1
1 TABLET, FILM COATED ORAL
Qty: 0 | Refills: 0 | DISCHARGE

## 2019-07-17 RX ORDER — DEXAMETHASONE 0.5 MG/5ML
1 ELIXIR ORAL
Qty: 0 | Refills: 0 | DISCHARGE
Start: 2019-07-17

## 2019-07-17 RX ORDER — CARVEDILOL PHOSPHATE 80 MG/1
25 CAPSULE, EXTENDED RELEASE ORAL EVERY 12 HOURS
Refills: 0 | Status: DISCONTINUED | OUTPATIENT
Start: 2019-07-17 | End: 2019-07-18

## 2019-07-17 RX ORDER — POTASSIUM CHLORIDE 20 MEQ
40 PACKET (EA) ORAL EVERY 4 HOURS
Refills: 0 | Status: COMPLETED | OUTPATIENT
Start: 2019-07-17 | End: 2019-07-17

## 2019-07-17 RX ORDER — SODIUM CHLORIDE 9 MG/ML
1000 INJECTION, SOLUTION INTRAVENOUS
Refills: 0 | Status: DISCONTINUED | OUTPATIENT
Start: 2019-07-17 | End: 2019-07-18

## 2019-07-17 RX ORDER — CARVEDILOL PHOSPHATE 80 MG/1
1 CAPSULE, EXTENDED RELEASE ORAL
Qty: 0 | Refills: 0 | DISCHARGE
Start: 2019-07-17

## 2019-07-17 RX ORDER — DEXAMETHASONE 0.5 MG/5ML
0 ELIXIR ORAL
Qty: 0 | Refills: 0 | DISCHARGE

## 2019-07-17 RX ORDER — ACETAMINOPHEN 500 MG
650 TABLET ORAL EVERY 6 HOURS
Refills: 0 | Status: DISCONTINUED | OUTPATIENT
Start: 2019-07-17 | End: 2019-07-19

## 2019-07-17 RX ORDER — MAGNESIUM SULFATE 500 MG/ML
2 VIAL (ML) INJECTION ONCE
Refills: 0 | Status: COMPLETED | OUTPATIENT
Start: 2019-07-17 | End: 2019-07-17

## 2019-07-17 RX ADMIN — SODIUM CHLORIDE 75 MILLILITER(S): 9 INJECTION, SOLUTION INTRAVENOUS at 13:00

## 2019-07-17 RX ADMIN — INSULIN GLARGINE 10 UNIT(S): 100 INJECTION, SOLUTION SUBCUTANEOUS at 22:00

## 2019-07-17 RX ADMIN — Medication 40 MILLIEQUIVALENT(S): at 18:45

## 2019-07-17 RX ADMIN — Medication 650 MILLIGRAM(S): at 18:42

## 2019-07-17 RX ADMIN — Medication 650 MILLIGRAM(S): at 19:02

## 2019-07-17 RX ADMIN — Medication 50 GRAM(S): at 12:59

## 2019-07-17 RX ADMIN — Medication 1: at 13:26

## 2019-07-17 RX ADMIN — TAMSULOSIN HYDROCHLORIDE 0.4 MILLIGRAM(S): 0.4 CAPSULE ORAL at 22:00

## 2019-07-17 RX ADMIN — Medication 3: at 10:19

## 2019-07-17 RX ADMIN — Medication 650 MILLIGRAM(S): at 14:09

## 2019-07-17 RX ADMIN — AMLODIPINE BESYLATE 10 MILLIGRAM(S): 2.5 TABLET ORAL at 06:00

## 2019-07-17 RX ADMIN — Medication 40 MILLIEQUIVALENT(S): at 12:59

## 2019-07-17 RX ADMIN — Medication 1 MILLIGRAM(S): at 06:00

## 2019-07-17 RX ADMIN — Medication 650 MILLIGRAM(S): at 12:59

## 2019-07-17 RX ADMIN — LEVETIRACETAM 500 MILLIGRAM(S): 250 TABLET, FILM COATED ORAL at 18:43

## 2019-07-17 RX ADMIN — CARVEDILOL PHOSPHATE 25 MILLIGRAM(S): 80 CAPSULE, EXTENDED RELEASE ORAL at 18:45

## 2019-07-17 RX ADMIN — CARVEDILOL PHOSPHATE 12.5 MILLIGRAM(S): 80 CAPSULE, EXTENDED RELEASE ORAL at 06:00

## 2019-07-17 RX ADMIN — Medication 2: at 20:01

## 2019-07-17 RX ADMIN — LEVETIRACETAM 500 MILLIGRAM(S): 250 TABLET, FILM COATED ORAL at 06:00

## 2019-07-17 NOTE — CONSULT NOTE ADULT - ASSESSMENT
69yr old male pmhx HTN, DM, hyperlipidemia, glioblastoma multiforme diagnosed January 2018 with resection and RT, chemotherapy presents c/o lower abdominal pain, found to have elevated troponin level.

## 2019-07-17 NOTE — SWALLOW BEDSIDE ASSESSMENT ADULT - COMMENTS
Please see results tab for imaging. Please see results tab for imaging. Per RN, Head CT pending at time of this evaluation.  CT Abdomen 7/16/19 Impressions: RIGHT-sided obstructive uropathy to the level of a 0.1 cm stone in the   distal right ureter. An ill-defined 1.7 cm sclerotic lesion in the RIGHT iliac bone, indeterminate. Recommend contrast enhanced MR bony pelvis on a nonurgent outpatient basis for further evaluation. A 0.4 cm RIGHT lower lobe pulmonary nodule, indeterminate. Recommend CT chest for further evaluation. Diffuse hepatic steatosis.

## 2019-07-17 NOTE — DISCHARGE NOTE PROVIDER - PROVIDER TOKENS
PROVIDER:[TOKEN:[[id number]]],FREE:[LAST:[Bereket],FIRST:[Zina],PHONE:[(   )    -],FAX:[(   )    -],ADDRESS:[[address] [place] [address]]]

## 2019-07-17 NOTE — DISCHARGE NOTE PROVIDER - HOSPITAL COURSE
68 yo M w/ h/o GM s/p chemo, RT, resection, T2DM, HTN presenting with 1d RLQ pain. Pt reports pain nonelicited, episode of constipation prior to pain, pt took laxatives at home and had 2 large bowel movements and subsequently developed severe acute, unbearable RLQ pain. At ED, pt's vitals were wnl aside from uncontrolled htn, systolic to 160s, no significant findings in physical exam. Pt received CT A/P with IV contrast which revealed a 0.1cm stone in the distal right ureter. Labs were significant for Crt 1.94, above baseline of 1.14 in May 2019. Pt received IV fluids and started on tamsulosin and acetaminophen PRN. Pt admitted for management of SHAWNA in setting of dehydration and iv contrast administration. Pt given LR for fluids and crt dropped to ____. Pt discharged to home with all of his home medications. 68 yo M w/ h/o GM s/p chemo, RT, resection, T2DM, HTN presenting with 1d RLQ pain. Pt reports pain nonelicited, episode of constipation prior to pain, pt took laxatives at home and had 2 large bowel movements and subsequently developed severe acute, unbearable RLQ pain. At ED, pt's vitals were wnl aside from uncontrolled htn, systolic to 160s, no significant findings in physical exam. Pt received CT A/P with IV contrast which revealed a 0.1cm stone in the distal right ureter. Renal ultrasound showed mild hydronephrosis in the R kidney. Labs were significant for Crt 1.94, above baseline of 1.14 in May 2019. Pt received IV fluids and started on tamsulosin and acetaminophen PRN. Pt admitted for management of SHAWNA in setting of dehydration and iv contrast administration. Pt given distal ureter stent for mild hydronephrosis. Pt given LR for fluids and crt dropped to 1.55 and downtrending. Ureter stent dc'ed. Pt discharged to home with all of his home medications. 70 yo M w/ h/o GM s/p chemo, RT, resection, T2DM, HTN presenting with 1d RLQ pain. Pt reports pain nonelicited, episode of constipation prior to pain, pt took laxatives at home and had 2 large bowel movements and subsequently developed severe acute, unbearable RLQ pain. At ED, pt's vitals were wnl aside from uncontrolled htn, systolic to 160s, no significant findings in physical exam. Pt received CT A/P with IV contrast which revealed a 0.1cm stone in the distal right ureter. Renal ultrasound showed mild hydronephrosis in the R kidney. Labs were significant for Crt 1.94, above baseline of 1.14 in May 2019. Pt received IV fluids and started on tamsulosin and acetaminophen PRN. Pt admitted for management of SHAWNA in setting of dehydration and iv contrast administration. Pt given distal ureter stent for mild hydronephrosis. Pt given LR for fluids and crt dropped to 1.55 and downtrending. Ureter stent dc'ed. Pt discharged to home with all of his home medications, except the losartan and hydrochlorothiazide.

## 2019-07-17 NOTE — SWALLOW BEDSIDE ASSESSMENT ADULT - SLP PERTINENT HISTORY OF CURRENT PROBLEM
68 y/o Sinhala speaking M with PMH: glioblastoma multiforme diagnosed January 2018 with resection and RT, chemotherapy (unclear to present disease status), essential HTN, type 2 DM, on seizure prophylaxis. Brought to ED 7/16/19 by family s/p one day of lower abdominal pain, worst in the RLQ, but apparently one year of progressive increase in patient's abdominal girth.  No pain at present. Internal Medicine following for hydronephrosis with urinary obstruction 2/2 renal calculus/ abdominal pain presumed from kidney stone, SHAWNA, QT prolongation, glioblastoma, DM2. Urology consulted/following 7/16/19 for SHAWNA. Cardiology consulted/following 7/17/19 for elevated troponin level, unregulated HTN, HLD. WBC elevated 13.4. No recent Xray Chest available. Bedside swallow evaluation ordered 2/2 Hx GBM

## 2019-07-17 NOTE — SWALLOW BEDSIDE ASSESSMENT ADULT - SLP GENERAL OBSERVATIONS
Patient seen sitting at bedside in fair spirits with c/o headache; RN Kenzie informed and addressed. Patient's sister present to interpret in Telugu; clinician offered  phone, Pt/Pt's daughter politely declined. Patient able to follow simple directions utilized for evaluation given repetition of direction and verbal cues. Patient seen sitting at bedside in fair spirits with c/o headache; RN Kenzie informed and addressed. Patient's sister present to interpret in Czech; clinician offered  phone, Pt/Pt's sister politely declined 2/2 reported baseline confusion. Patient able to follow simple directions utilized for evaluation given repetition of direction and visual cues. Patient appears dyspneic at times and is reportedly fatigued as Pt. just returning from ultrasound.

## 2019-07-17 NOTE — PROGRESS NOTE ADULT - PROBLEM SELECTOR PLAN 2
See above.  Renal US.   Acute renal failure likely from preexisting kidney disease but will HOLD patient's ARB and HCTZ.   Would consider formal renal evaluation in the AM. See above.  Renal US.   Acute renal failure likely from preexisting kidney disease but will HOLD patient's ARB and HCTZ.   Would consider formal renal evaluation in the AM.  - Crt 1.84 today Acute renal failure likely from preexisting kidney disease   - hold  ARB and HCTZ  - f/u renal ultrasound   - trend crt 1.84 today Acute renal failure likely from preexisting kidney disease   - hold ARB and HCTZ  - f/u renal ultrasound   - trend crt 1.84 today

## 2019-07-17 NOTE — PROGRESS NOTE ADULT - PROBLEM SELECTOR PLAN 5
Low dose Lantus as above.  Urine protein/Cr and microalbumin. HbA1C in May was 8.5.   Low dose Lantus as above.  Urine protein/Cr and microalbumin. HbA1C in May was 8.5.   Low dose Lantus as above.  Urine protein/Cr and microalbumin.    Hyeokchan Kwon , PGY1  Pager: [phone] - HbA1C in May was 8.5.   - ISS  - Urine protein/Cr 0.1  - microalbumin 2.7    Hyeokchan Kwon , PGY1  Pager: [phone]

## 2019-07-17 NOTE — DISCHARGE NOTE PROVIDER - CARE PROVIDERS DIRECT ADDRESSES
[email or web address]

## 2019-07-17 NOTE — SWALLOW BEDSIDE ASSESSMENT ADULT - ASR SWALLOW ASPIRATION MONITOR
change of breathing pattern/throat clearing/gurgly voice/pneumonia/upper respiratory infection/fever/cough

## 2019-07-17 NOTE — DISCHARGE NOTE PROVIDER - NSDCFUADDAPPT_GEN_ALL_CORE_FT
You will follow-up with your primary care doctor as scheduled. You will follow-up with your primary care doctor as scheduled.   Please hold losartan and hydrochlorothiazide for now, and discuss with your primary care doctor before restarting.

## 2019-07-17 NOTE — SWALLOW BEDSIDE ASSESSMENT ADULT - SWALLOW EVAL: DIAGNOSIS
Patient presents with oropharyngeal dysphagia characterized by reduced lingual/labial/buccal ROM/strength, reduced hyolaryngeal elevation/excursion via manual palpation, and intermittent multiple swallows (2) and intermittent throat clearing during PO intake of puree and soft solids clinically suggestive of pharyngeal residue.

## 2019-07-17 NOTE — CONSULT NOTE ADULT - PROBLEM SELECTOR RECOMMENDATION 9
Hypertension not well controlled. EKG nonischemic, no cardiac complaints. Start ASA 81mg po daily. Patient should be risk stratified with stress testing or invasive testing as an outpatient. Patient may have underlying CAD. At this time we will treat conservatively with medical management. Start ASA 81mg po daily, increase coreg 25 mg po bid, norvasc 10mg po daily. Check LFT's prior to starting statin. Hepatic steatosis found on CT scan. Check TTE for evaluation of LV function/diastology/IVC size/intracardiac pressures. Check BNP level. Keep Mg>2.0, K>4.0.

## 2019-07-17 NOTE — DISCHARGE NOTE PROVIDER - NSDCCPTREATMENT_GEN_ALL_CORE_FT
PRINCIPAL PROCEDURE  Procedure: Insertion of ureteral stent  Findings and Treatment: you were given a stent in your right ureter because there was some back up of fluid seen in the ultrasound of your right kidney. This stent allows the ureter to open up and allow the urine to flow more easily. This back up also may have had some damage to your kidneys as indicated by creatinine, which measures your kidney function. This stent was pulled out when your kidney function started to become normal again. Please follow up with your urologist regarding your care for the stent.

## 2019-07-17 NOTE — PROGRESS NOTE ADULT - ASSESSMENT
69 year old male with pain and SHAWNA 2/2 obstructing right 1mm distal ureteral stone.  SCr improving. Nursing staff alerted regarding the wife's concerns, though he does not appear to be in respiratory distress.     SHAWNA likely to respond to hydration    - Encourage po hydration  - Trend Cr  - Pain control as needed  - Continue flomax 0.4mg qday to promote passage of stone (high likelihood of passing without intervention due to size and location)  - Strain urine for calculi  - Bowel regimen

## 2019-07-17 NOTE — SWALLOW BEDSIDE ASSESSMENT ADULT - SWALLOW EVAL: RECOMMENDED FEEDING/EATING TECHNIQUES
maintain upright posture during/after eating for 30 mins/oral hygiene/position upright (90 degrees)/small sips/bites/allow for swallow between intakes

## 2019-07-17 NOTE — PROGRESS NOTE ADULT - SUBJECTIVE AND OBJECTIVE BOX
Urology Daily Progress Note    SUBJECTIVE:  Pt seen and examined, and is resting comfortably in bed. No acute events overnight. Pain is adequately controlled on current regimen. Pt's wife is states that he is working harder to breathe.     OBJECTIVE:  Vital Signs Last 24 Hrs  T(C): 36.8 (17 Jul 2019 10:05), Max: 37 (17 Jul 2019 04:41)  T(F): 98.3 (17 Jul 2019 10:05), Max: 98.6 (17 Jul 2019 04:41)  HR: 86 (17 Jul 2019 10:05) (72 - 87)  BP: 142/84 (17 Jul 2019 10:05) (142/84 - 184/90)  BP(mean): --  RR: 18 (17 Jul 2019 10:05) (18 - 19)  SpO2: 97% (17 Jul 2019 04:41) (93% - 97%)    I&O's Detail    17 Jul 2019 07:01  -  17 Jul 2019 12:40  --------------------------------------------------------  IN:    Oral Fluid: 360 mL  Total IN: 360 mL    OUT:    Voided: 200 mL  Total OUT: 200 mL    Total NET: 160 mL        Exam:  GEN: A&Ox3, NAD  HEENT: atraumatic, normocephalic  RESP: no increased work of breathing, no use of accessory muscles  GI/ABD: soft, NT. ND  EXTREMITIES: warm, pink, well-perfused                        13.2   12.33 )-----------( 320      ( 17 Jul 2019 09:28 )             39.0       07-17    138  |  100  |  22  ----------------------------<  155<H>  3.2<L>   |  23  |  1.84<H>    Ca    8.5      17 Jul 2019 05:24  Mg     1.5     07-17    TPro  7.2  /  Alb  4.4  /  TBili  0.5  /  DBili  x   /  AST  15  /  ALT  26  /  AlkPhos  85  07-16      PT/INR - ( 16 Jul 2019 20:31 )   PT: 12.1 sec;   INR: 1.05 ratio         PTT - ( 16 Jul 2019 20:31 )  PTT:29.2 sec

## 2019-07-17 NOTE — SWALLOW BEDSIDE ASSESSMENT ADULT - ASR SWALLOW RECOMMEND DIAG
please consider objective swallow study if change in status, MD concern for aspiration, or if Pt endorses s/s suggestive of dysphagia. As per discussion with MD Carrie Medicine Team 2 [phone]) team not concerned for aspiration at this time, however will consider recommending outpatient f/u. MD also stated possible Chest Xray to be completed.

## 2019-07-17 NOTE — DISCHARGE NOTE PROVIDER - NSDCCPCAREPLAN_GEN_ALL_CORE_FT
PRINCIPAL DISCHARGE DIAGNOSIS  Diagnosis: SHAWNA (acute kidney injury)  Assessment and Plan of Treatment: You had an injury to your kidney as indicated by one of the lab values called creatinine, which was elevated when you came to the hospital. This may have been due to dehydration or the contrast study which you received during the CT scan of your stomach. You were given fluids to prevent further dehydration and allow kidney to recover.      SECONDARY DISCHARGE DIAGNOSES  Diagnosis: Hydronephrosis with urinary obstruction due to renal calculus  Assessment and Plan of Treatment: You were found to have a 1mm stone in your kidneys. This may have been the cause of the pain in your stomach. You were given fluids, some pain medication, and a medication called tamsulosin, which were administered to help you pass the stone. Please remember to stay hydrated and avoid salty foods that may cause the stone to reoccur.    Diagnosis: Kidney stone  Assessment and Plan of Treatment: PRINCIPAL DISCHARGE DIAGNOSIS  Diagnosis: SHAWNA (acute kidney injury)  Assessment and Plan of Treatment: # SHAWNA 2/2 obstructing right 1mm distal ureteral stone s/p URS  - patient had a stent removal    - kidney function getting better with discharge kidney function 1.55  - flomax 0.4mg qday for renal colic  - Follow up as outpatient with Dr. Marie - Urologist  - [facility] [phone]      SECONDARY DISCHARGE DIAGNOSES  Diagnosis: Hydronephrosis with urinary obstruction due to renal calculus  Assessment and Plan of Treatment: You were found to have a 1mm stone in your kidneys. This may have been the cause of the pain in your stomach. You were given fluids, some pain medication, and a medication called tamsulosin, which were administered to help you pass the stone. Please remember to stay hydrated and avoid salty foods that may cause the stone to reoccur.    Diagnosis: Kidney stone  Assessment and Plan of Treatment:

## 2019-07-17 NOTE — CONSULT NOTE ADULT - ATTENDING COMMENTS
Thank you. My team will follow.    lEvia Merida M.D, F.A.C.C  [facility]   [phone]

## 2019-07-17 NOTE — PROGRESS NOTE ADULT - SUBJECTIVE AND OBJECTIVE BOX
ARSENIO DARNELL  69y  Male    Complaints:  Subjective:         Vital Signs Last 24 Hrs  T(C): 37 (17 Jul 2019 04:41), Max: 37 (17 Jul 2019 04:41)  T(F): 98.6 (17 Jul 2019 04:41), Max: 98.6 (17 Jul 2019 04:41)  HR: 87 (17 Jul 2019 04:41) (71 - 87)  BP: 171/88 (17 Jul 2019 04:41) (160/97 - 184/90)  BP(mean): --  RR: 18 (17 Jul 2019 04:41) (18 - 19)  SpO2: 97% (17 Jul 2019 04:41) (93% - 98%)    PHYSICAL EXAM:  	HEENT: HEENT< PERRL< EOMI, oropharynx clear  	Neck supple  	No thyromegaly.  	Chest clear  	Cor s1 s2  	Abdomen soft obese, normal bowel sounds, nontender, no rebound.  No CVA tenderness.  	Skin dry.  	Ext with poor nail hygiene with dry skin.   No alice DM ulcers noted. 1+ B/L LE oedema.  	Neurologic exam alert to self/place.   Speech grossly fluent.  Limited cognitive assessment with patient reliant upon sister and patient's daughter in attendance during interview.  UE/LE 5/5.   Gait grossly without ataxia.  No SI/HI.    Consultant(s) Notes Reviewed:  [x ] YES  [ ] NO  Care Discussed with Consultants/Other Providers [ x] YES  [ ] NO    LABS:        RADIOLOGY & ADDITIONAL TESTS:    Imaging Personally Reviewed:  [ ] YES  [ ] NO  MedsMEDICATIONS  (STANDING):  amLODIPine   Tablet 10 milliGRAM(s) Oral daily  carvedilol 25 milliGRAM(s) Oral every 12 hours  dexamethasone     Tablet 1 milliGRAM(s) Oral daily  dextrose 5%. 1000 milliLiter(s) (50 mL/Hr) IV Continuous <Continuous>  dextrose 50% Injectable 12.5 Gram(s) IV Push once  dextrose 50% Injectable 25 Gram(s) IV Push once  dextrose 50% Injectable 25 Gram(s) IV Push once  insulin glargine Injectable (LANTUS) 10 Unit(s) SubCutaneous at bedtime  insulin lispro (HumaLOG) corrective regimen sliding scale   SubCutaneous three times a day before meals  insulin lispro (HumaLOG) corrective regimen sliding scale   SubCutaneous at bedtime  levETIRAcetam 500 milliGRAM(s) Oral two times a day  tamsulosin 0.4 milliGRAM(s) Oral at bedtime    MEDICATIONS  (PRN):  dextrose 40% Gel 15 Gram(s) Oral once PRN Blood Glucose LESS THAN 70 milliGRAM(s)/deciliter  glucagon  Injectable 1 milliGRAM(s) IntraMuscular once PRN Glucose LESS THAN 70 milligrams/deciliter      HEALTH ISSUES - PROBLEM Dx:  Prophylactic measure: Prophylactic measure  Hyperlipidemia: Hyperlipidemia  HTN (hypertension): HTN (hypertension)  Kidney stone: Kidney stone  Elevated troponin level: Elevated troponin level  Type 2 diabetes mellitus with other diabetic kidney complication, without long-term current use of insulin: Type 2 diabetes mellitus with other diabetic kidney complication, without long-term current use of insulin  Glioblastoma multiforme: Glioblastoma multiforme  QT prolongation: QT prolongation  SHAWNA (acute kidney injury): SHAWNA (acute kidney injury)  Hydronephrosis with urinary obstruction due to renal calculus: Hydronephrosis with urinary obstruction due to renal calculus SUSAN DARNELLMO  69y  Male    Complaints:  Subjective:     Pt resting comfortably at night, denies fevers, chills, chest p/p, sob, n/v/d. Pt had 1 bowel movement last night, consistency and color was normal.     Vital Signs Last 24 Hrs  T(C): 37 (17 Jul 2019 04:41), Max: 37 (17 Jul 2019 04:41)  T(F): 98.6 (17 Jul 2019 04:41), Max: 98.6 (17 Jul 2019 04:41)  HR: 87 (17 Jul 2019 04:41) (71 - 87)  BP: 171/88 (17 Jul 2019 04:41) (160/97 - 184/90)  BP(mean): --  RR: 18 (17 Jul 2019 04:41) (18 - 19)  SpO2: 97% (17 Jul 2019 04:41) (93% - 98%)    PHYSICAL EXAM:  	HEENT: HEENT< PERRL< EOMI, oropharynx clear  	Neck supple  	No thyromegaly.  	Chest clear  	Cor s1 s2  	Abdomen soft obese, normal bowel sounds, nontender, no rebound.  No CVA tenderness.  	Skin dry.  	Ext with poor nail hygiene with dry skin.   No alice DM ulcers noted. 1+ B/L LE oedema.  	Neurologic exam alert to self/place.   Speech grossly fluent.  Limited cognitive assessment with patient reliant upon sister and patient's daughter in attendance during interview.  UE/LE 5/5.   Gait grossly without ataxia.  No SI/HI.    Consultant(s) Notes Reviewed:  [x ] YES  [ ] NO  Care Discussed with Consultants/Other Providers [ x] YES  [ ] NO    LABS:                          13.3   13.4  )-----------( 311      ( 16 Jul 2019 09:14 )             38.9     07-17    138  |  100  |  22  ----------------------------<  155<H>  3.2<L>   |  23  |  1.84<H>    Ca    8.5      17 Jul 2019 05:24  Mg     1.3     07-16    TPro  7.2  /  Alb  4.4  /  TBili  0.5  /  DBili  x   /  AST  15  /  ALT  26  /  AlkPhos  85  07-16    RADIOLOGY & ADDITIONAL TESTS:    Imaging Personally Reviewed:  [ ] YES  [ ] NO  MedsMEDICATIONS  (STANDING):  amLODIPine   Tablet 10 milliGRAM(s) Oral daily  carvedilol 25 milliGRAM(s) Oral every 12 hours  dexamethasone     Tablet 1 milliGRAM(s) Oral daily  dextrose 5%. 1000 milliLiter(s) (50 mL/Hr) IV Continuous <Continuous>  dextrose 50% Injectable 12.5 Gram(s) IV Push once  dextrose 50% Injectable 25 Gram(s) IV Push once  dextrose 50% Injectable 25 Gram(s) IV Push once  insulin glargine Injectable (LANTUS) 10 Unit(s) SubCutaneous at bedtime  insulin lispro (HumaLOG) corrective regimen sliding scale   SubCutaneous three times a day before meals  insulin lispro (HumaLOG) corrective regimen sliding scale   SubCutaneous at bedtime  levETIRAcetam 500 milliGRAM(s) Oral two times a day  tamsulosin 0.4 milliGRAM(s) Oral at bedtime    MEDICATIONS  (PRN):  dextrose 40% Gel 15 Gram(s) Oral once PRN Blood Glucose LESS THAN 70 milliGRAM(s)/deciliter  glucagon  Injectable 1 milliGRAM(s) IntraMuscular once PRN Glucose LESS THAN 70 milligrams/deciliter      HEALTH ISSUES - PROBLEM Dx:  Prophylactic measure: Prophylactic measure  Hyperlipidemia: Hyperlipidemia  HTN (hypertension): HTN (hypertension)  Kidney stone: Kidney stone  Elevated troponin level: Elevated troponin level  Type 2 diabetes mellitus with other diabetic kidney complication, without long-term current use of insulin: Type 2 diabetes mellitus with other diabetic kidney complication, without long-term current use of insulin  Glioblastoma multiforme: Glioblastoma multiforme  QT prolongation: QT prolongation  SHAWNA (acute kidney injury): SHAWNA (acute kidney injury)  Hydronephrosis with urinary obstruction due to renal calculus: Hydronephrosis with urinary obstruction due to renal calculus SUSAN DARNELLMO  69y  Male    Complaints:  Subjective:     Pt resting comfortably at night, denies fevers, chills, chest p/p, sob, n/v/d. Pt had 1 bowel movement last night, consistency and color was normal.     Vital Signs Last 24 Hrs  T(C): 37 (17 Jul 2019 04:41), Max: 37 (17 Jul 2019 04:41)  T(F): 98.6 (17 Jul 2019 04:41), Max: 98.6 (17 Jul 2019 04:41)  HR: 87 (17 Jul 2019 04:41) (71 - 87)  BP: 171/88 (17 Jul 2019 04:41) (160/97 - 184/90)  BP(mean): --  RR: 18 (17 Jul 2019 04:41) (18 - 19)  SpO2: 97% (17 Jul 2019 04:41) (93% - 98%)    PHYSICAL EXAM:  	HEENT: HEENT< PERRL< EOMI, oropharynx clear  	Neck supple  	No thyromegaly.  	Chest clear  	Cor s1 s2  	Abdomen soft obese, normal bowel sounds, nontender, no rebound.  No CVA tenderness.  	Skin dry.  	Ext with poor nail hygiene with dry skin.   No alice DM ulcers noted. 1+ B/L LE oedema.  	Neurologic exam alert to self/place.   Speech grossly fluent.  Limited cognitive assessment with patient reliant upon sister and patient's daughter in attendance during interview.  UE/LE 5/5.   Gait grossly without ataxia.  No SI/HI.    Consultant(s) Notes Reviewed:  [x ] YES  [ ] NO  Care Discussed with Consultants/Other Providers [ x] YES  [ ] NO    LABS:                        13.2   12.33 )-----------( 320      ( 17 Jul 2019 09:28 )             39.0     07-17    138  |  100  |  22  ----------------------------<  155<H>  3.2<L>   |  23  |  1.84<H>    Ca    8.5      17 Jul 2019 05:24  Mg     1.5     07-17    TPro  7.2  /  Alb  4.4  /  TBili  0.5  /  DBili  x   /  AST  15  /  ALT  26  /  AlkPhos  85  07-16    RADIOLOGY & ADDITIONAL TESTS:    Imaging Personally Reviewed:  [ ] YES  [ ] NO  MedsMEDICATIONS  (STANDING):  amLODIPine   Tablet 10 milliGRAM(s) Oral daily  carvedilol 25 milliGRAM(s) Oral every 12 hours  dexamethasone     Tablet 1 milliGRAM(s) Oral daily  dextrose 5%. 1000 milliLiter(s) (50 mL/Hr) IV Continuous <Continuous>  dextrose 50% Injectable 12.5 Gram(s) IV Push once  dextrose 50% Injectable 25 Gram(s) IV Push once  dextrose 50% Injectable 25 Gram(s) IV Push once  insulin glargine Injectable (LANTUS) 10 Unit(s) SubCutaneous at bedtime  insulin lispro (HumaLOG) corrective regimen sliding scale   SubCutaneous three times a day before meals  insulin lispro (HumaLOG) corrective regimen sliding scale   SubCutaneous at bedtime  levETIRAcetam 500 milliGRAM(s) Oral two times a day  tamsulosin 0.4 milliGRAM(s) Oral at bedtime    MEDICATIONS  (PRN):  dextrose 40% Gel 15 Gram(s) Oral once PRN Blood Glucose LESS THAN 70 milliGRAM(s)/deciliter  glucagon  Injectable 1 milliGRAM(s) IntraMuscular once PRN Glucose LESS THAN 70 milligrams/deciliter      HEALTH ISSUES - PROBLEM Dx:  Prophylactic measure: Prophylactic measure  Hyperlipidemia: Hyperlipidemia  HTN (hypertension): HTN (hypertension)  Kidney stone: Kidney stone  Elevated troponin level: Elevated troponin level  Type 2 diabetes mellitus with other diabetic kidney complication, without long-term current use of insulin: Type 2 diabetes mellitus with other diabetic kidney complication, without long-term current use of insulin  Glioblastoma multiforme: Glioblastoma multiforme  QT prolongation: QT prolongation  SHAWNA (acute kidney injury): SHAWNA (acute kidney injury)  Hydronephrosis with urinary obstruction due to renal calculus: Hydronephrosis with urinary obstruction due to renal calculus

## 2019-07-17 NOTE — PROGRESS NOTE ADULT - PROBLEM SELECTOR PLAN 4
Head CTT noncontrast.   Would consider formal neurooncology evaluation in the AM>>unclear if the iliac findings and 4 mm pulmonary nodule represent metastatic disease. Diagnosed since Jan 2001, s/p resection, RT, chemo, stable tumor from MRI in may  - CT head shows calcified hyperdense mass in the left temporal lobe consistent with existing GM

## 2019-07-17 NOTE — PROGRESS NOTE ADULT - ASSESSMENT
NIGHT HOSPITALIST:   Complex medical history as above in the setting of patient with glioblastoma multiforme from January of last year with chemo/RT--apparently still with residual disease from recent MRI brain from May of this year, type 2 DM, essential HTN with  abdominal pain syndrome with suspicion of RIGHT kidney stone as etiology of the pain.   Unclear if this would explain family's concern for increasing weight for the past year>>unclear to patient's recent outpatient followup since discharge from [facility] from last year>>unclear to ER's contrast study of the abdomen in the ER in patient already with acute kidney injury, presumably from either the ARB or HCTZ or both>>the nephrolithiasis on the RIGHT should not cause SHAWNA unless patient has already preexisting disease in the other kidney.    Will temporarily HOLD the ARB and the HCTZ for now>>would follow patient's BP but would consider formal evaluation by nephrology in the AM.  Will obtain a renal US.    Upgraded to telemetry due to the QTc prolongation, likely from the patient's hypokalemia.   Will check serum Mg++.   Will obtain HS troponin to exclude a cardiac equivalent.  Echo.    Will obtain a noncontrast head CTT to exclude interval CNS event that would explain patient's subtle cognitive impairment.  Will continue with Keppra but would consider formal evaluation by neurooncology in the AM.    Will assume aspiration risk for now.  Will provide a conservative 0.1 unit/kg/24H Lantus for now for better glycaemic control. Pt is a 68 yo M w/  h/o GM treated with chemo, RT, and resection, stable as per MRI in may, T2DM, HTN presenting with abd pain 1x in RLQ, CT IV contrast showed 0.1cm stone in the distal R ureter. Pt also has SHAWNA, crt 1.46 on admission, baseline crt 1.14 in May 2019, pt off HTN meds. Pt is a 68 yo M w/ h/o GBM treated with chemo, RT, and resection, stable as per MRI in may, T2DM, HTN presenting with abd pain 1x in RLQ, CT IV contrast showed 0.1cm stone in the distal R ureter. Pt also has SHAWNA, crt 1.46 on admission, baseline crt 1.14 in May 2019, pt off HTN meds.

## 2019-07-17 NOTE — DISCHARGE NOTE PROVIDER - CARE PROVIDER_API CALL
Rosi Marie)  Urology  [address], [place] [address]  Phone: [phone]  Fax: [phone]  Follow Up Time:     Zina Cuba  [address] [place] [address]  Phone: (   )    -  Fax: (   )    -  Follow Up Time:

## 2019-07-17 NOTE — CONSULT NOTE ADULT - SUBJECTIVE AND OBJECTIVE BOX
CARDIOLOGY CONSULTATION NOTE                                                                                             Consult requested by:  Dr. Galvan    Reason for Consultation: elevated troponin level    History obtained by: Patient and medical record     obtained: No    Chief complaint:    Patient is a 69y old  Male who presents with a chief complaint of One day of lower abdominal pain, increased abdominal girth (2019 17:51)      HPI:  69yr old male pmhx HTN, DM, hyperlipidemia, glioblastoma multiforme diagnosed 2018 with resection and RT, chemotherapy presents c/o lower abdominal pain. Cardiology consulted for elevated troponin level. No c/o chest pain, palpitations, dizziness, nausea, vomiting, fever or chills. As per son, patients has had decreased exercise tolerance over the last 6 months and has noticed that he shortness of breath after climbing one flight of stairs. Currently, patient is complaining of lower abdominal pain. CT scan with 1mm right distal ureteral stone with mild hydroureteronephrosis.      REVIEW OF SYMPTOMS: Cardiovascular:  See HPI. No chest pain,  No dyspnea,  No syncope,  No palpitations, No dizziness, No Orthopnea,      No Paroxsymal nocturnal dyspnea;  Respiratory:  No Dyspnea, No cough,     Genitourinary:  No dysuria, no hematuria; Gastrointestinal:  No nausea, no vomiting. No diarrhea.  + abdominal pain. No dark color stool, no melena ; Neurological: No headache, no dizziness, no slurred speech;  Psychiatric: No agitation, no anxiety.  ALL OTHER REVIEW OF SYSTEMS ARE NEGATIVE.    ALLERGIES: Allergies          CURRENT MEDICATIONS:  amLODIPine   Tablet 10 milliGRAM(s) Oral daily  carvedilol 12.5 milliGRAM(s) Oral every 12 hours  tamsulosin 0.4 milliGRAM(s) Oral at bedtime     levETIRAcetam  dexamethasone     Tablet  dextrose 5%.  dextrose 50% Injectable  dextrose 50% Injectable  dextrose 50% Injectable  insulin glargine Injectable (LANTUS)  insulin lispro (HumaLOG) corrective regimen sliding scale  insulin lispro (HumaLOG) corrective regimen sliding scale      HOME MEDICATIONS:  as per med rec    PAST MEDICAL HISTORY:  Type 2 diabetes mellitus  Glioblastoma multiforme  HTN (hypertension)      PAST SURGICAL HISTORY:  Glioblastoma multiforme  History of appendectomy      FAMILY HISTORY:  No pertinent family history in first degree relatives      SOCIAL HISTORY:  Denies smoking/alcohol/drugs        Vital Signs Last 24 Hrs  T(C): 36.7 (2019 00:06), Max: 36.9 (2019 16:49)  T(F): 98 (2019 00:06), Max: 98.4 (2019 16:49)  HR: 72 (2019 00:06) (71 - 83)  BP: 168/82 (2019 00:49) (160/97 - 198/108)  BP(mean): --  RR: 18 (2019 00:06) (18 - 19)  SpO2: 95% (2019 00:06) (93% - 98%)      PHYSICAL EXAM:  Constitutional: Comfortable . No acute distress.   HEENT: Atraumatic and normcephalic , neck is supple . no JVD. No carotid bruit. PEERL   CNS: A&Ox3. No focal deficits. EOMI.   Lymph Nodes: Cervical : Not palpable.  Respiratory: CTAB  Cardiovascular: S1S2 RRR. No murmur/rubs or gallop.  Gastrointestinal: Soft non-tender and non distended . +Bowel sounds.   Extremities: 1+ edema.   Psychiatric: Calm . no agitation.  Skin: No skin rash/ulcers visualized to face, hands or feet.    Intake and output:     LABS:                        13.3   13.4  )-----------( 311      ( 2019 09:14 )             38.9     07-16    x   |  x   |  x   ----------------------------<  x   3.3<L>   |  x   |  x     Ca    8.0<L>      2019 14:33  Mg     1.3     07-16    TPro  7.2  /  Alb  4.4  /  TBili  0.5  /  DBili  x   /  AST  15  /  ALT  26  /  AlkPhos  85  07-16      ;p-BNP=  PT/INR - ( 2019 20:31 )   PT: 12.1 sec;   INR: 1.05 ratio         PTT - ( 2019 20:31 )  PTT:29.2 sec  Urinalysis Basic - ( 2019 12:02 )    Color: Light Yellow / Appearance: Clear / S.019 / pH: x  Gluc: x / Ketone: Negative  / Bili: Negative / Urobili: Negative   Blood: x / Protein: Negative / Nitrite: Negative   Leuk Esterase: Negative / RBC: 3 /hpf / WBC 2 /HPF   Sq Epi: x / Non Sq Epi: 1 /hpf / Bacteria: Negative        INTERPRETATION OF TELEMETRY: Reviewed by me.   ECG: Reviewed by me. sinus rhythm, RBBB, LAFB    RADIOLOGY & ADDITIONAL STUDIES:    X-ray:  reviewed by me. no vascular congestion  CT scan: < from: CT Abdomen and Pelvis w/ IV Cont (19 @ 10:20) >    RIGHT-sided obstructive uropathy to the level of a 0.1 cm stone in the   distal right ureter.    An ill-defined 1.7 cm sclerotic lesion in the RIGHT iliac bone,   indeterminate. Recommend contrast enhanced MR bony pelvis on a nonurgent   outpatient basis for further evaluation.    A 0.4 cm RIGHT lower lobe pulmonary nodule, indeterminate. Recommend CT   chest for further evaluation.    Diffuse hepatic steatosis.    Findings were discussed with Dr. ZANDER WHEELER [phone] 2019   11:52 AM by Dr. Parra with read back confirmation.      < end of copied text >    < from: Transthoracic Echocardiogram (18 @ 07:50) >  1. Mitral annular calcification. Mild mitral regurgitation.    2. Calcified trileaflet aortic valve with normal opening.  No aortic valve regurgitation seen.  3. Moderately dilated left atrium.  LA volume index = 46  cc/m2.  4. Moderate concentric left ventricular hypertrophy.  5. Normal left ventricular systolic function. No segmental  wall motion abnormalities.  6. Mild diastolic dysfunction (Stage I).  7. Normal right ventricular size and function.  8. Color Doppler demonstrates no evidence of a patent  foramen ovale.  *** No previous Echo exam.    < end of copied text >

## 2019-07-18 ENCOUNTER — TRANSCRIPTION ENCOUNTER (OUTPATIENT)
Age: 69
End: 2019-07-18

## 2019-07-18 LAB
ALBUMIN SERPL ELPH-MCNC: 3.9 G/DL — SIGNIFICANT CHANGE UP (ref 3.3–5)
ALP SERPL-CCNC: 82 U/L — SIGNIFICANT CHANGE UP (ref 40–120)
ALT FLD-CCNC: 26 U/L — SIGNIFICANT CHANGE UP (ref 10–45)
ANION GAP SERPL CALC-SCNC: 16 MMOL/L — SIGNIFICANT CHANGE UP (ref 5–17)
AST SERPL-CCNC: 18 U/L — SIGNIFICANT CHANGE UP (ref 10–40)
BILIRUB SERPL-MCNC: 0.8 MG/DL — SIGNIFICANT CHANGE UP (ref 0.2–1.2)
BUN SERPL-MCNC: 23 MG/DL — SIGNIFICANT CHANGE UP (ref 7–23)
CALCIUM SERPL-MCNC: 8.9 MG/DL — SIGNIFICANT CHANGE UP (ref 8.4–10.5)
CHLORIDE SERPL-SCNC: 99 MMOL/L — SIGNIFICANT CHANGE UP (ref 96–108)
CO2 SERPL-SCNC: 23 MMOL/L — SIGNIFICANT CHANGE UP (ref 22–31)
CREAT SERPL-MCNC: 2.07 MG/DL — HIGH (ref 0.5–1.3)
GLUCOSE BLDC GLUCOMTR-MCNC: 152 MG/DL — HIGH (ref 70–99)
GLUCOSE BLDC GLUCOMTR-MCNC: 172 MG/DL — HIGH (ref 70–99)
GLUCOSE BLDC GLUCOMTR-MCNC: 187 MG/DL — HIGH (ref 70–99)
GLUCOSE BLDC GLUCOMTR-MCNC: 205 MG/DL — HIGH (ref 70–99)
GLUCOSE BLDC GLUCOMTR-MCNC: 206 MG/DL — HIGH (ref 70–99)
GLUCOSE SERPL-MCNC: 169 MG/DL — HIGH (ref 70–99)
HCT VFR BLD CALC: 39.5 % — SIGNIFICANT CHANGE UP (ref 39–50)
HGB BLD-MCNC: 12.8 G/DL — LOW (ref 13–17)
LACTATE SERPL-SCNC: 1.5 MMOL/L — SIGNIFICANT CHANGE UP (ref 0.7–2)
MAGNESIUM SERPL-MCNC: 1.8 MG/DL — SIGNIFICANT CHANGE UP (ref 1.6–2.6)
MCHC RBC-ENTMCNC: 29.4 PG — SIGNIFICANT CHANGE UP (ref 27–34)
MCHC RBC-ENTMCNC: 32.4 GM/DL — SIGNIFICANT CHANGE UP (ref 32–36)
MCV RBC AUTO: 90.8 FL — SIGNIFICANT CHANGE UP (ref 80–100)
PHOSPHATE SERPL-MCNC: 3.1 MG/DL — SIGNIFICANT CHANGE UP (ref 2.5–4.5)
PLATELET # BLD AUTO: 324 K/UL — SIGNIFICANT CHANGE UP (ref 150–400)
POTASSIUM SERPL-MCNC: 3.3 MMOL/L — LOW (ref 3.5–5.3)
POTASSIUM SERPL-SCNC: 3.3 MMOL/L — LOW (ref 3.5–5.3)
PROT SERPL-MCNC: 7.1 G/DL — SIGNIFICANT CHANGE UP (ref 6–8.3)
RBC # BLD: 4.35 M/UL — SIGNIFICANT CHANGE UP (ref 4.2–5.8)
RBC # FLD: 13.4 % — SIGNIFICANT CHANGE UP (ref 10.3–14.5)
SODIUM SERPL-SCNC: 138 MMOL/L — SIGNIFICANT CHANGE UP (ref 135–145)
WBC # BLD: 12.89 K/UL — HIGH (ref 3.8–10.5)
WBC # FLD AUTO: 12.89 K/UL — HIGH (ref 3.8–10.5)

## 2019-07-18 PROCEDURE — 99232 SBSQ HOSP IP/OBS MODERATE 35: CPT

## 2019-07-18 PROCEDURE — 99233 SBSQ HOSP IP/OBS HIGH 50: CPT | Mod: GC

## 2019-07-18 RX ORDER — INSULIN GLARGINE 100 [IU]/ML
6.5 INJECTION, SOLUTION SUBCUTANEOUS AT BEDTIME
Refills: 0 | Status: DISCONTINUED | OUTPATIENT
Start: 2019-07-18 | End: 2019-07-19

## 2019-07-18 RX ORDER — POTASSIUM CHLORIDE 20 MEQ
40 PACKET (EA) ORAL EVERY 4 HOURS
Refills: 0 | Status: COMPLETED | OUTPATIENT
Start: 2019-07-18 | End: 2019-07-18

## 2019-07-18 RX ORDER — DOCUSATE SODIUM 100 MG
100 CAPSULE ORAL
Refills: 0 | Status: DISCONTINUED | OUTPATIENT
Start: 2019-07-18 | End: 2019-07-19

## 2019-07-18 RX ORDER — INSULIN LISPRO 100/ML
VIAL (ML) SUBCUTANEOUS EVERY 6 HOURS
Refills: 0 | Status: DISCONTINUED | OUTPATIENT
Start: 2019-07-18 | End: 2019-07-19

## 2019-07-18 RX ORDER — SENNA PLUS 8.6 MG/1
2 TABLET ORAL AT BEDTIME
Refills: 0 | Status: DISCONTINUED | OUTPATIENT
Start: 2019-07-18 | End: 2019-07-19

## 2019-07-18 RX ORDER — INSULIN GLARGINE 100 [IU]/ML
13 INJECTION, SOLUTION SUBCUTANEOUS AT BEDTIME
Refills: 0 | Status: DISCONTINUED | OUTPATIENT
Start: 2019-07-18 | End: 2019-07-18

## 2019-07-18 RX ORDER — SODIUM CHLORIDE 9 MG/ML
1000 INJECTION, SOLUTION INTRAVENOUS
Refills: 0 | Status: DISCONTINUED | OUTPATIENT
Start: 2019-07-18 | End: 2019-07-19

## 2019-07-18 RX ORDER — POLYETHYLENE GLYCOL 3350 17 G/17G
17 POWDER, FOR SOLUTION ORAL DAILY
Refills: 0 | Status: DISCONTINUED | OUTPATIENT
Start: 2019-07-18 | End: 2019-07-19

## 2019-07-18 RX ORDER — LANOLIN ALCOHOL/MO/W.PET/CERES
5 CREAM (GRAM) TOPICAL AT BEDTIME
Refills: 0 | Status: DISCONTINUED | OUTPATIENT
Start: 2019-07-18 | End: 2019-07-19

## 2019-07-18 RX ORDER — CARVEDILOL PHOSPHATE 80 MG/1
37.5 CAPSULE, EXTENDED RELEASE ORAL EVERY 12 HOURS
Refills: 0 | Status: DISCONTINUED | OUTPATIENT
Start: 2019-07-18 | End: 2019-07-19

## 2019-07-18 RX ADMIN — Medication 5 MILLIGRAM(S): at 23:12

## 2019-07-18 RX ADMIN — LEVETIRACETAM 500 MILLIGRAM(S): 250 TABLET, FILM COATED ORAL at 18:22

## 2019-07-18 RX ADMIN — SENNA PLUS 2 TABLET(S): 8.6 TABLET ORAL at 23:12

## 2019-07-18 RX ADMIN — SODIUM CHLORIDE 75 MILLILITER(S): 9 INJECTION, SOLUTION INTRAVENOUS at 12:17

## 2019-07-18 RX ADMIN — INSULIN GLARGINE 6.5 UNIT(S): 100 INJECTION, SOLUTION SUBCUTANEOUS at 23:11

## 2019-07-18 RX ADMIN — TAMSULOSIN HYDROCHLORIDE 0.4 MILLIGRAM(S): 0.4 CAPSULE ORAL at 23:12

## 2019-07-18 RX ADMIN — Medication 1: at 19:45

## 2019-07-18 RX ADMIN — Medication 40 MILLIEQUIVALENT(S): at 12:18

## 2019-07-18 RX ADMIN — Medication 2: at 13:24

## 2019-07-18 RX ADMIN — Medication 650 MILLIGRAM(S): at 00:24

## 2019-07-18 RX ADMIN — Medication 650 MILLIGRAM(S): at 05:10

## 2019-07-18 RX ADMIN — Medication 650 MILLIGRAM(S): at 10:57

## 2019-07-18 RX ADMIN — Medication 650 MILLIGRAM(S): at 11:50

## 2019-07-18 RX ADMIN — Medication 2: at 09:23

## 2019-07-18 RX ADMIN — Medication 650 MILLIGRAM(S): at 19:47

## 2019-07-18 RX ADMIN — LEVETIRACETAM 500 MILLIGRAM(S): 250 TABLET, FILM COATED ORAL at 05:10

## 2019-07-18 RX ADMIN — Medication 1: at 23:11

## 2019-07-18 RX ADMIN — Medication 100 MILLIGRAM(S): at 18:22

## 2019-07-18 RX ADMIN — CARVEDILOL PHOSPHATE 25 MILLIGRAM(S): 80 CAPSULE, EXTENDED RELEASE ORAL at 05:10

## 2019-07-18 RX ADMIN — Medication 1 MILLIGRAM(S): at 05:10

## 2019-07-18 RX ADMIN — Medication 40 MILLIEQUIVALENT(S): at 09:23

## 2019-07-18 RX ADMIN — Medication 650 MILLIGRAM(S): at 18:22

## 2019-07-18 RX ADMIN — AMLODIPINE BESYLATE 10 MILLIGRAM(S): 2.5 TABLET ORAL at 05:10

## 2019-07-18 RX ADMIN — Medication 650 MILLIGRAM(S): at 05:53

## 2019-07-18 RX ADMIN — Medication 650 MILLIGRAM(S): at 00:55

## 2019-07-18 RX ADMIN — POLYETHYLENE GLYCOL 3350 17 GRAM(S): 17 POWDER, FOR SOLUTION ORAL at 12:17

## 2019-07-18 RX ADMIN — CARVEDILOL PHOSPHATE 37.5 MILLIGRAM(S): 80 CAPSULE, EXTENDED RELEASE ORAL at 18:22

## 2019-07-18 NOTE — PHARMACOTHERAPY INTERVENTION NOTE - COMMENTS
Patient with DM (A1C 9%) on Metformin at home, currently on Lantus 10 units at bedtime, sliding scale insulin, and Dexamethasone PO 1mg daily. Blood glucose level remain elevated in the 200's - blood glucose 206 this morning and 205 in the afternoon. Patient received total of 6 units of sliding scale insulin yesterday. Recommended increasing basal insulin doses to 13 units at bedtime.     Ghazala Snider, PharmD  PGY1 Pharmacy Resident  [phone] Patient with DM (A1C 9%) on Metformin at home, currently on Lantus 10 units at bedtime, sliding scale insulin, and Dexamethasone PO 1mg daily. Blood glucose levels elevated in the 200's - blood glucose 206 this morning and 205 in the afternoon. Patient received total of 6 units of sliding scale insulin yesterday. Recommended increasing basal insulin dose to 13 units at bedtime.     Ghazala Snider, PharmD  PGY1 Pharmacy Resident  [phone]

## 2019-07-18 NOTE — PHYSICAL THERAPY INITIAL EVALUATION ADULT - ADDITIONAL COMMENTS
PTA pt was independent with functional mobility, required some assistance for ADL's as per case management note, Pt with HHA 7days/6hrs per day. Pt lives in an apartment alone with +elevator

## 2019-07-18 NOTE — PROGRESS NOTE ADULT - ASSESSMENT
Pt is a 68 yo M w/ h/o GBM treated with chemo, RT, and resection, stable as per MRI in may, T2DM, HTN presenting with abd pain 1x in RLQ, CT IV contrast 7/16 showed 0.1cm stone in the distal R ureter, US 7/17 showed mild R hydronephrosis and no obstructing stone. Pt also has SHAWNA, crt 1.46 on admission, baseline crt 1.14 in May 2019, pt off HTN meds.

## 2019-07-18 NOTE — PROGRESS NOTE ADULT - ASSESSMENT
69 year old male with pain and SHAWNA 2/2 obstructing right 1mm distal ureteral stone.  SCr increased this AM, pain not well controlled.     - Trend SCr  - Pain control as needed  - Continue flomax 0.4mg qday to promote passage of stone - Strain urine for calculi  - Bowel regimen  - NPO @ [facility]  - OR for R ureteral stent, possible R URS with lithotripsy Friday  - Please obtain and document cardiac & medical clearance

## 2019-07-18 NOTE — PHYSICAL THERAPY INITIAL EVALUATION ADULT - PRECAUTIONS/LIMITATIONS, REHAB EVAL
HISTORY AND RESULTS CONTINUED: CT Pelvis: ill-defined 1.7 cm mildly sclerotic lesion in R iliac bone, indetermina. Additionally scattered subcentimeter sclerotic foci. CT head: 5.7 cm partially calcified hyperdense mass in L temporal lobe c/w pt's known glioblastoma multiforme.

## 2019-07-18 NOTE — PROGRESS NOTE ADULT - PROBLEM SELECTOR PLAN 5
- HbA1C in May was 8.5.   - ISS  - Urine protein/Cr 0.1  - microalbumin 2.7    Hyeokchan Kwon , PGY1  Pager: [phone]

## 2019-07-18 NOTE — PROGRESS NOTE ADULT - SUBJECTIVE AND OBJECTIVE BOX
Urology Daily Progress Note    SUBJECTIVE:  Pt seen and examined, and is resting comfortably in bed. No acute events overnight. Per pt's sister, he has a bad headache and continues to have RLQ pain.  Denies fevers     OBJECTIVE:  Vital Signs Last 24 Hrs  T(C): 36.6 (18 Jul 2019 04:36), Max: 37.3 (18 Jul 2019 00:21)  T(F): 97.8 (18 Jul 2019 04:36), Max: 99.2 (18 Jul 2019 00:21)  HR: 78 (18 Jul 2019 04:36) (77 - 84)  BP: 173/91 (18 Jul 2019 04:36) (145/69 - 182/93)  BP(mean): --  RR: 18 (18 Jul 2019 04:36) (18 - 18)  SpO2: 95% (18 Jul 2019 04:36) (93% - 97%)    I&O's Detail    17 Jul 2019 07:01  -  18 Jul 2019 07:00  --------------------------------------------------------  IN:    lactated ringers.: 300 mL    Oral Fluid: 960 mL  Total IN: 1260 mL    OUT:    Voided: 1300 mL  Total OUT: 1300 mL    Total NET: -40 mL      18 Jul 2019 07:01  -  18 Jul 2019 11:45  --------------------------------------------------------  IN:    Oral Fluid: 240 mL  Total IN: 240 mL    OUT:    Voided: 550 mL  Total OUT: 550 mL    Total NET: -310 mL        Exam:  GEN: A&Ox3, appears uncomfortable   HEENT: atraumatic, normocephalic  RESP: no increased work of breathing, no use of accessory muscles  GI/ABD: soft, tender around mid and R lower abdomen, mildly distended, no rebound or guarding  EXTREMITIES: warm, pink, well-perfused                        12.8   12.89 )-----------( 324      ( 18 Jul 2019 08:31 )             39.5       07-18    138  |  99  |  23  ----------------------------<  169<H>  3.3<L>   |  23  |  2.07<H>    Ca    8.9      18 Jul 2019 05:18  Phos  3.1     07-18  Mg     1.8     07-18    TPro  7.1  /  Alb  3.9  /  TBili  0.8  /  DBili  x   /  AST  18  /  ALT  26  /  AlkPhos  82  07-18      PT/INR - ( 16 Jul 2019 20:31 )   PT: 12.1 sec;   INR: 1.05 ratio         PTT - ( 16 Jul 2019 20:31 )  PTT:29.2 sec    < from: US Kidney and Bladder (07.17.19 @ 18:37) >  FINDINGS:  Right kidney:  13.4 cm. Mild hydronephrosis. Questionable tiny intrarenal calculi. No obstructing stone is identified.  Left kidney:  11.7 cm. No renal mass, hydronephrosis or calculi. Multiple simple cysts.  Urinary bladder: Mild bladder wall thickening may be artifactual secondary to underdistention.    IMPRESSION:   Mild right hydronephrosis without obstructing stone identified.

## 2019-07-18 NOTE — PROGRESS NOTE ADULT - SUBJECTIVE AND OBJECTIVE BOX
SUBJ: improved BP control yesterday but elevated this AM, troponin downtrended    MEDICATIONS  (STANDING):  amLODIPine   Tablet 10 milliGRAM(s) Oral daily  carvedilol 25 milliGRAM(s) Oral every 12 hours  dexamethasone     Tablet 1 milliGRAM(s) Oral daily  dextrose 5%. 1000 milliLiter(s) (50 mL/Hr) IV Continuous <Continuous>  dextrose 50% Injectable 12.5 Gram(s) IV Push once  dextrose 50% Injectable 25 Gram(s) IV Push once  dextrose 50% Injectable 25 Gram(s) IV Push once  insulin glargine Injectable (LANTUS) 10 Unit(s) SubCutaneous at bedtime  insulin lispro (HumaLOG) corrective regimen sliding scale   SubCutaneous three times a day before meals  insulin lispro (HumaLOG) corrective regimen sliding scale   SubCutaneous at bedtime  lactated ringers. 1000 milliLiter(s) (75 mL/Hr) IV Continuous <Continuous>  levETIRAcetam 500 milliGRAM(s) Oral two times a day  potassium chloride    Tablet ER 40 milliEquivalent(s) Oral every 4 hours  tamsulosin 0.4 milliGRAM(s) Oral at bedtime    MEDICATIONS  (PRN):  acetaminophen    Suspension .. 650 milliGRAM(s) Oral every 6 hours PRN Temp greater or equal to 38C (100.4F), Moderate Pain (4 - 6)  dextrose 40% Gel 15 Gram(s) Oral once PRN Blood Glucose LESS THAN 70 milliGRAM(s)/deciliter  glucagon  Injectable 1 milliGRAM(s) IntraMuscular once PRN Glucose LESS THAN 70 milligrams/deciliter      Vital Signs Last 24 Hrs  T(C): 36.6 (18 Jul 2019 04:36), Max: 37.3 (18 Jul 2019 00:21)  T(F): 97.8 (18 Jul 2019 04:36), Max: 99.2 (18 Jul 2019 00:21)  HR: 78 (18 Jul 2019 04:36) (77 - 86)  BP: 173/91 (18 Jul 2019 04:36) (142/84 - 182/93)  BP(mean): --  RR: 18 (18 Jul 2019 04:36) (18 - 18)  SpO2: 95% (18 Jul 2019 04:36) (93% - 97%)    REVIEW OF SYSTEMS:  CONSTITUTIONAL: No fever, weight loss, or fatigue  EYES: No eye pain, visual disturbances, or discharge  ENMT:  No difficulty hearing, tinnitus, vertigo; No sinus or throat pain  NECK: No pain or stiffness  RESPIRATORY: No cough, wheezing, chills or hemoptysis; No shortness of breath  CARDIOVASCULAR: No chest pain, palpitations, dizziness, or leg swelling  GASTROINTESTINAL: No abdominal or epigastric pain. No nausea, vomiting, or hematemesis; No diarrhea or constipation. No melena or hematochezia.  GENITOURINARY: No dysuria, frequency, hematuria, or incontinence  NEUROLOGICAL: No headaches, memory loss, loss of strength, numbness, or tremors  SKIN: No itching, burning, rashes, or lesions   LYMPH NODES: No enlarged glands  ENDOCRINE: No heat or cold intolerance; No hair loss  MUSCULOSKELETAL: No joint pain or swelling; No muscle, back, or extremity pain  PSYCHIATRIC: No depression, anxiety, mood swings, or difficulty sleeping  HEME/LYMPH: No easy bruising, or bleeding gums  ALLERY AND IMMUNOLOGIC: No hives or eczema          PHYSICAL EXAM:  · CONSTITUTIONAL: Well-developed, well nourished      · EYES: EOMI; PERRL; no drainage or redness  · NECK: No bruits; no thyromegaly or nodules  ·RESPIRATORY:   airway patent; breath sounds equal; good air movement; respirations non-labored; clear to auscultation bilaterally; no chest wall tenderness; no intercostal retractions; no rales,rhonchi or wheeze  · CARDIOVASCULAR: regular rate and rhythm  no rub  no murmur  normal PMI  . GASTROINTESTINAL:  no distention; no masses palpable; bowel sounds normal  · EXTREMITIES: No cyanosis, clubbing or edema  · VASCULAR:  Equal and normal pulses (radial, femoral)  ·NEUROLOGICAL:  Alert and oriented x 3; sensation intact; deep reflexes intact; cranial nerves intact; no spontaneous movement; superficial reflexes intact; normal strength  · SKIN: No lesions; no rash  . LYMPH NODES: No lymphadedenopathy  · MUSCULOSKELETAL:  No calf tenderness  no joint swelling	    TELEMETRY:    ECG:    TTE:    LABS:   CBC Full  -  ( 17 Jul 2019 09:28 )  WBC Count : 12.33 K/uL  RBC Count : 4.32 M/uL  Hemoglobin : 13.2 g/dL  Hematocrit : 39.0 %  Platelet Count - Automated : 320 K/uL  Mean Cell Volume : 90.3 fl  Mean Cell Hemoglobin : 30.6 pg  Mean Cell Hemoglobin Concentration : 33.8 gm/dL  Auto Neutrophil # : 9.32 K/uL  Auto Lymphocyte # : 1.47 K/uL  Auto Monocyte # : 1.32 K/uL  Auto Eosinophil # : 0.04 K/uL  Auto Basophil # : 0.03 K/uL  Auto Neutrophil % : 75.7 %  Auto Lymphocyte % : 11.9 %  Auto Monocyte % : 10.7 %  Auto Eosinophil % : 0.3 %  Auto Basophil % : 0.2 %      07-18    138  |  99  |  23  ----------------------------<  169<H>  3.3<L>   |  23  |  2.07<H>    Ca    8.9      18 Jul 2019 05:18  Phos  3.1     07-18  Mg     1.8     07-18    TPro  7.1  /  Alb  3.9  /  TBili  0.8  /  DBili  x   /  AST  18  /  ALT  26  /  AlkPhos  82  07-18          RADIOLOGY & ADDITIONAL STUDIES:    IMPRESSION AND PLAN:    ARSENIO DARNELL is a 69yr old male pmhx HTN, DM, hyperlipidemia, glioblastoma multiforme diagnosed January 2018 with resection and RT, chemotherapy presents c/o lower abdominal pain, found to have elevated troponin level.   His BP was improved yesterday but BP is high this AM, troponin treneded down.  Will plan for improving BP control and outpatient ishcemic eval with stress testing       Elevated troponin level.   troponin down trended,  Hypertension not well controlled. EKG nonischemic, no cardiac complaints.   ASA 81mg po daily.   increase coreg to 37.5mg bid  continue norvasc 10mg  replete K,  Keep Mg>2.0, K>4.0.  BP control  Patient should be risk stratified with stress testing as an outpatient. He may have underlying CAD.       Javier Odell MD, PhD  Cardiology Attending  [facility]/ [facility]  [phone]    (Cardiology Nocturnist cell number available 7 pm - 7 am every night; available daytime week days for follow-up only; daytime weekends covered by general cardiology consult service) SUBJ: improved BP control yesterday but elevated this AM, troponin downtrended    MEDICATIONS  (STANDING):  amLODIPine   Tablet 10 milliGRAM(s) Oral daily  carvedilol 25 milliGRAM(s) Oral every 12 hours  dexamethasone     Tablet 1 milliGRAM(s) Oral daily  dextrose 5%. 1000 milliLiter(s) (50 mL/Hr) IV Continuous <Continuous>  dextrose 50% Injectable 12.5 Gram(s) IV Push once  dextrose 50% Injectable 25 Gram(s) IV Push once  dextrose 50% Injectable 25 Gram(s) IV Push once  insulin glargine Injectable (LANTUS) 10 Unit(s) SubCutaneous at bedtime  insulin lispro (HumaLOG) corrective regimen sliding scale   SubCutaneous three times a day before meals  insulin lispro (HumaLOG) corrective regimen sliding scale   SubCutaneous at bedtime  lactated ringers. 1000 milliLiter(s) (75 mL/Hr) IV Continuous <Continuous>  levETIRAcetam 500 milliGRAM(s) Oral two times a day  potassium chloride    Tablet ER 40 milliEquivalent(s) Oral every 4 hours  tamsulosin 0.4 milliGRAM(s) Oral at bedtime    MEDICATIONS  (PRN):  acetaminophen    Suspension .. 650 milliGRAM(s) Oral every 6 hours PRN Temp greater or equal to 38C (100.4F), Moderate Pain (4 - 6)  dextrose 40% Gel 15 Gram(s) Oral once PRN Blood Glucose LESS THAN 70 milliGRAM(s)/deciliter  glucagon  Injectable 1 milliGRAM(s) IntraMuscular once PRN Glucose LESS THAN 70 milligrams/deciliter      Vital Signs Last 24 Hrs  T(C): 36.6 (18 Jul 2019 04:36), Max: 37.3 (18 Jul 2019 00:21)  T(F): 97.8 (18 Jul 2019 04:36), Max: 99.2 (18 Jul 2019 00:21)  HR: 78 (18 Jul 2019 04:36) (77 - 86)  BP: 173/91 (18 Jul 2019 04:36) (142/84 - 182/93)  BP(mean): --  RR: 18 (18 Jul 2019 04:36) (18 - 18)  SpO2: 95% (18 Jul 2019 04:36) (93% - 97%)    REVIEW OF SYSTEMS:  CONSTITUTIONAL: No fever, weight loss, or fatigue  EYES: No eye pain, visual disturbances, or discharge  ENMT:  No difficulty hearing, tinnitus, vertigo; No sinus or throat pain  NECK: No pain or stiffness  RESPIRATORY: No cough, wheezing, chills or hemoptysis; No shortness of breath  CARDIOVASCULAR: No chest pain, palpitations, dizziness, or leg swelling  GASTROINTESTINAL: continued abd pain, poor appetite, no MD, + abd bloating,   GENITOURINARY: No dysuria, frequency, hematuria, or incontinence  NEUROLOGICAL: No headaches, memory loss, loss of strength, numbness, or tremors  SKIN: No itching, burning, rashes, or lesions   LYMPH NODES: No enlarged glands  ENDOCRINE: No heat or cold intolerance; No hair loss  MUSCULOSKELETAL: No joint pain or swelling; No muscle, back, or extremity pain  PSYCHIATRIC: No depression, anxiety, mood swings, or difficulty sleeping  HEME/LYMPH: No easy bruising, or bleeding gums  ALLERY AND IMMUNOLOGIC: No hives or eczema          PHYSICAL EXAM:  · CONSTITUTIONAL: M fatigued, drowsy, just got pain medication  · EYES: EOMI; PERRL; no drainage or redness  · NECK: No bruits; no thyromegaly or nodules  ·RESPIRATORY:   airway patent; breath sounds equal; good air movement; respirations non-labored; clear to auscultation bilaterally; no chest wall tenderness; no intercostal retractions; no rales,rhonchi or wheeze  · CARDIOVASCULAR: regular rate and rhythm  no rub  no murmur  normal PMI  . GASTROINTESTINAL:  Soft, + tenderness, + distension  · EXTREMITIES: No cyanosis, clubbing or edema  · VASCULAR:  Equal and normal pulses (radial, femoral)  ·NEUROLOGICAL:  Alert and oriented x 3; sensation intact; deep reflexes intact; cranial nerves intact; no spontaneous movement; superficial reflexes intact; normal strength  · SKIN: No lesions; no rash  . LYMPH NODES: No lymphadedenopathy  · MUSCULOSKELETAL:  No calf tenderness  no joint swelling	    TELEMETRY: SR 60-80s, PACs    ECG: SR LAE, RBBB, LAFB    TTE:------------------------------------------------------------------------  Conclusions:   1. Mitral annular calcification. Mild mitral regurgitation.    2. Calcified trileaflet aortic valve with normal opening.  No aortic valve regurgitation seen.  3. Moderately dilated left atrium.  LA volume index = 46  cc/m2.  4. Moderate concentric left ventricular hypertrophy.  5. Normal left ventricular systolic function. No segmental  wall motion abnormalities.  6. Mild diastolic dysfunction (Stage I).  7. Normal right ventricular size and function.  8. Color Doppler demonstrates no evidence of a patent  foramen ovale.  *** No previous Echo exam.  ------------------------------------------------------------------------  Confirmed on  1/30/2018 - 12:19:00 by Javier Odell M.D.    LABS:   CBC Full  -  ( 17 Jul 2019 09:28 )  WBC Count : 12.33 K/uL  RBC Count : 4.32 M/uL  Hemoglobin : 13.2 g/dL  Hematocrit : 39.0 %  Platelet Count - Automated : 320 K/uL  Mean Cell Volume : 90.3 fl  Mean Cell Hemoglobin : 30.6 pg  Mean Cell Hemoglobin Concentration : 33.8 gm/dL  Auto Neutrophil # : 9.32 K/uL  Auto Lymphocyte # : 1.47 K/uL  Auto Monocyte # : 1.32 K/uL  Auto Eosinophil # : 0.04 K/uL  Auto Basophil # : 0.03 K/uL  Auto Neutrophil % : 75.7 %  Auto Lymphocyte % : 11.9 %  Auto Monocyte % : 10.7 %  Auto Eosinophil % : 0.3 %  Auto Basophil % : 0.2 %      07-18    138  |  99  |  23  ----------------------------<  169<H>  3.3<L>   |  23  |  2.07<H>    Ca    8.9      18 Jul 2019 05:18  Phos  3.1     07-18  Mg     1.8     07-18    TPro  7.1  /  Alb  3.9  /  TBili  0.8  /  DBili  x   /  AST  18  /  ALT  26  /  AlkPhos  82  07-18          RADIOLOGY & ADDITIONAL STUDIES:    IMPRESSION AND PLAN:    ARSENIO DARNELL is a 69yr old male pmhx HTN, DM, hyperlipidemia, glioblastoma multiforme diagnosed January 2018 with resection and RT, chemotherapy presents c/o lower abdominal pain, found to have elevated troponin level.   His BP was improved yesterday but BP is high this AM, troponin treneded down.  Will plan for improving BP control and outpatient ishcemic eval with stress testing. He continues to have abd pain and is planned for OR for urology procedure tomorrow (stent placement).  He denies CP or SOB with ambulation. As well as current CP. He is euvolemic on exam    preop eval  1. Cardiovascular Risk Stratification - RCRI =  ( 1).    1 predictor = 0.9%,   - Overall this patient is as _0.9%_ risk  for cardiac death, nonfatal myocardial infarction, and nonfatal cardiac arrest perioperatively for this _low to moderate_ risk procedure.  -No further diagnostic or interventional procedures are required prior to the planned procedure.         Elevated troponin level.   troponin down trended,  Hypertension improved but still not well controlled. EKG nonischemic, no cardiac complaints.   ASA 81mg po daily.   increase coreg to 37.5mg bid  continue norvasc 10mg  replete K,  Keep Mg>2.0, K>4.0.  BP control  Patient should be risk stratified with stress testing as an outpatient. He may have underlying CAD.   The urology procedure should help with his pain control and ultimately with his BP    Please arrange outpatient cardiology fu in 2 weeks following discharge [phone] for an appointment    Javier Odell MD, PhD  Cardiology Attending  [facility]/ [facility]  [phone]    (Cardiology Nocturnist cell number available 7 pm - 7 am every night; available daytime week days for follow-up only; daytime weekends covered by general cardiology consult service)

## 2019-07-18 NOTE — PROGRESS NOTE ADULT - PROBLEM SELECTOR PLAN 1
Abdominal pain presumably from the kidney stone.     - Appreciate urology evaluation.    - c/w Flomax as above.    - f/u Renal U

## 2019-07-18 NOTE — PROGRESS NOTE ADULT - PROBLEM SELECTOR PLAN 4
Diagnosed since Jan 2001, s/p resection, RT, chemo, stable tumor from MRI in may  - CT head 7/17 shows calcified hyperdense mass in the left temporal lobe consistent with existing GM

## 2019-07-18 NOTE — PROGRESS NOTE ADULT - SUBJECTIVE AND OBJECTIVE BOX
Patient is a 69y old  Male who presents with a chief complaint of One day of lower abdominal pain, increased abdominal girth (18 Jul 2019 11:44)      SUBJECTIVE / OVERNIGHT EVENTS: worsening abdominal pain this AM. No BM's last 48h.     MEDICATIONS  (STANDING):  amLODIPine   Tablet 10 milliGRAM(s) Oral daily  carvedilol 37.5 milliGRAM(s) Oral every 12 hours  dexamethasone     Tablet 1 milliGRAM(s) Oral daily  dextrose 5%. 1000 milliLiter(s) (50 mL/Hr) IV Continuous <Continuous>  dextrose 50% Injectable 12.5 Gram(s) IV Push once  dextrose 50% Injectable 25 Gram(s) IV Push once  dextrose 50% Injectable 25 Gram(s) IV Push once  docusate sodium 100 milliGRAM(s) Oral two times a day  insulin glargine Injectable (LANTUS) 10 Unit(s) SubCutaneous at bedtime  insulin lispro (HumaLOG) corrective regimen sliding scale   SubCutaneous three times a day before meals  insulin lispro (HumaLOG) corrective regimen sliding scale   SubCutaneous at bedtime  lactated ringers. 1000 milliLiter(s) (75 mL/Hr) IV Continuous <Continuous>  levETIRAcetam 500 milliGRAM(s) Oral two times a day  polyethylene glycol 3350 17 Gram(s) Oral daily  senna 2 Tablet(s) Oral at bedtime  tamsulosin 0.4 milliGRAM(s) Oral at bedtime    MEDICATIONS  (PRN):  acetaminophen    Suspension .. 650 milliGRAM(s) Oral every 6 hours PRN Temp greater or equal to 38C (100.4F), Moderate Pain (4 - 6)  dextrose 40% Gel 15 Gram(s) Oral once PRN Blood Glucose LESS THAN 70 milliGRAM(s)/deciliter  glucagon  Injectable 1 milliGRAM(s) IntraMuscular once PRN Glucose LESS THAN 70 milligrams/deciliter      CAPILLARY BLOOD GLUCOSE      POCT Blood Glucose.: 205 mg/dL (18 Jul 2019 13:16)  POCT Blood Glucose.: 206 mg/dL (18 Jul 2019 09:13)  POCT Blood Glucose.: 213 mg/dL (17 Jul 2019 21:33)  POCT Blood Glucose.: 201 mg/dL (17 Jul 2019 19:41)    I&O's Summary    17 Jul 2019 07:01  -  18 Jul 2019 07:00  --------------------------------------------------------  IN: 1260 mL / OUT: 1300 mL / NET: -40 mL    18 Jul 2019 07:01  -  18 Jul 2019 15:10  --------------------------------------------------------  IN: 240 mL / OUT: 550 mL / NET: -310 mL        T(C): 37.7 (07-18-19 @ 13:27), Max: 37.7 (07-18-19 @ 13:27)  HR: 72 (07-18-19 @ 13:27) (72 - 78)  BP: 160/81 (07-18-19 @ 13:27) (145/69 - 182/93)  RR: 16 (07-18-19 @ 13:27) (16 - 18)  SpO2: 95% (07-18-19 @ 13:27) (93% - 95%)    PHYSICAL EXAM:  GENERAL: NAD, well-developed  HEAD:  Atraumatic, Normocephalic  CHEST/LUNG: Clear to auscultation bilaterally; No wheeze  HEART: Regular rate and rhythm; No murmurs, rubs, or gallops  ABDOMEN: Soft, Nontender, distended   EXTREMITIES:  2+ Peripheral Pulses, No clubbing, cyanosis, or edema  PSYCH: AAOx3  NEUROLOGY: non-focal  SKIN: No rashes or lesions    LABS:                        12.8   12.89 )-----------( 324      ( 18 Jul 2019 08:31 )             39.5     WBC Trend: 12.89<--, 12.33<--, 13.4<--  07-18    138  |  99  |  23  ----------------------------<  169<H>  3.3<L>   |  23  |  2.07<H>    Ca    8.9      18 Jul 2019 05:18  Phos  3.1     07-18  Mg     1.8     07-18    TPro  7.1  /  Alb  3.9  /  TBili  0.8  /  DBili  x   /  AST  18  /  ALT  26  /  AlkPhos  82  07-18    Creatinine Trend: 2.07<--, 1.84<--, 1.95<--, 1.96<--  PT/INR - ( 16 Jul 2019 20:31 )   PT: 12.1 sec;   INR: 1.05 ratio         PTT - ( 16 Jul 2019 20:31 )  PTT:29.2 sec            RADIOLOGY & ADDITIONAL TESTS:    Imaging Personally Reviewed:    Consultant(s) Notes Reviewed:      Care Discussed with Consultants/Other Providers:

## 2019-07-18 NOTE — PROGRESS NOTE ADULT - PROBLEM SELECTOR PLAN 2
Acute renal failure likely from preexisting kidney disease   - hold ARB and HCTZ  - consider renal consult  - trend crt 1.84 today Acute renal failure likely from preexisting kidney disease   - hold ARB and HCTZ  - consider renal consult  - trend crt 2.07 today, yesterday 1.84  - continue fluids

## 2019-07-18 NOTE — PROGRESS NOTE ADULT - PROBLEM SELECTOR PLAN 2
Acute renal failure likely from preexisting kidney disease   - hold ARB and HCTZ  - Renal US w/ R hydronephrosis w/ no obvious stone   - c/w IVF.

## 2019-07-18 NOTE — PROGRESS NOTE ADULT - PROBLEM SELECTOR PLAN 3
Likely related to the hypokalemia.    - trend K+ and Mg++   - troponin downtrending  - cardiology following

## 2019-07-18 NOTE — PROGRESS NOTE ADULT - ASSESSMENT
Pt is a 70 yo M w/ h/o GBM treated with chemo, RT, and resection, T2DM, HTN a/w R hydronephrosis 2/2 distal ureteral stone c/b SHAWNA

## 2019-07-18 NOTE — PHYSICAL THERAPY INITIAL EVALUATION ADULT - PERTINENT HX OF CURRENT PROBLEM, REHAB EVAL
68 y/o Japanese speaking M with PMH glioblastoma multiforme (diagnosed January 2018) with resection, RT, chemotherapy, essential HTN, T2DM, on seizure prophylaxis, p/w abdominal pain. Obstructing 1 mm R distal ureteral stone with mild hydroureteronephrosis on CT scan. Pt pending OR for R ureteral stent, possible R URS with lithotripsy 7/19.

## 2019-07-18 NOTE — PROGRESS NOTE ADULT - PROBLEM SELECTOR PLAN 4
Diagnosed since Jan 2001, s/p resection, RT, chemo, stable tumor from MRI in may  - CT head shows calcified hyperdense mass in the left temporal lobe consistent with existing GM

## 2019-07-18 NOTE — PROGRESS NOTE ADULT - PROBLEM SELECTOR PLAN 1
Abdominal pain presumably from kidney stone possibly obstructing R ureter  - Bowel regimen for constipation   - Appreciate urology evaluation.    - c/w Flomax as above.    - OR tmw w/ Urology for ureteral stent  - NPO after midnight Abdominal pain presumably from kidney stone possibly obstructing R ureter  - Bowel regimen for constipation   - Appreciate urology evaluation.    - c/w Flomax as above.    - OR tomorrow w/ Urology for ureteral stent  - NPO after midnight

## 2019-07-18 NOTE — PROGRESS NOTE ADULT - SUBJECTIVE AND OBJECTIVE BOX
improved SUSAN DARNELLMO  69y  Male    Subjective:     Pt uncomfortable, complains of poor sleep and worsened abdominal pain in all 4 quadrants, worst in RUQ and periumbilical area. Has not had a BM since yesterday, poor appetite. Denies fevers, chills, chest p/p, sob, n/v/d.     Vital Signs Last 24 Hrs  T(C): 36.6 (18 Jul 2019 04:36), Max: 37.3 (18 Jul 2019 00:21)  T(F): 97.8 (18 Jul 2019 04:36), Max: 99.2 (18 Jul 2019 00:21)  HR: 78 (18 Jul 2019 04:36) (77 - 84)  BP: 173/91 (18 Jul 2019 04:36) (145/69 - 182/93)  BP(mean): --  RR: 18 (18 Jul 2019 04:36) (18 - 18)  SpO2: 95% (18 Jul 2019 04:36) (93% - 97%)    PHYSICAL EXAM:  	HEENT: PERRL, EOMI, MMM, oropharynx clear  	Neck: supple, no thyromegaly, no LAD  	Chest: CTAB, no wheezes/rales/rhonchi  	CV: RRR, S1S2, no m/r/g  	Abdomen: soft obese, distended, hypoactive bowel sounds, tender to palpation in RUQ and periumbilical area.  	Skin: dry, no rashes.  	Ext: with poor nail hygiene with dry skin.   No alice DM ulcers noted. 1+ B/L LE nonpitting edema.  	Neurologic: exam alert to self/place.   Speech grossly fluent.  Limited cognitive assessment with patient reliant upon sister in attendance during interview.  UE/LE 5/5.       Consultant(s) Notes Reviewed:  [x ] YES  [ ] NO  Care Discussed with Consultants/Other Providers [ x] YES  [ ] NO    LABS:                                 12.8   12.89 )-----------( 324      ( 18 Jul 2019 08:31 )             39.5   07-18    138  |  99  |  23  ----------------------------<  169<H>  3.3<L>   |  23  |  2.07<H>    Ca    8.9      18 Jul 2019 05:18  Phos  3.1     07-18  Mg     1.8     07-18    TPro  7.1  /  Alb  3.9  /  TBili  0.8  /  DBili  x   /  AST  18  /  ALT  26  /  AlkPhos  82  07-18    Hepatitis C Antibody Test (07.17.19 @ 09:07)    Hepatitis C Virus S/CO Ratio: 0.10 S/CO    Hepatitis C Virus Interpretation: Nonreact: Hepatitis C AB    RADIOLOGY & ADDITIONAL TESTS:    Imaging Personally Reviewed:  [X ] YES  [ ] NO  < from: US Kidney and Bladder (07.17.19 @ 18:37) >  EXAM:  US KIDNEYS AND BLADDER                        PROCEDURE DATE:  07/17/2019      INTERPRETATION:  CLINICAL INFORMATION: SHAWNA and renal stones on CT abdomen   and pelvis.    COMPARISON: CT abdomen and pelvis dated 7/16/2019..    TECHNIQUE: Sonography of the kidneys and bladder.     FINDINGS:  Right kidney:  13.4 cm. Mild hydronephrosis. Questionable tiny intrarenal   calculi. No obstructing stone is identified.    Left kidney:  11.7 cm. No renal mass, hydronephrosis or calculi. Multiple   simple cysts.    Urinary bladder: Mild bladder wall thickening may be artifactual   secondary to underdistention.    IMPRESSION:   Mild right hydronephrosis without obstructing stone identified.    < end of copied text >    EXAM:  CT BRAIN                        PROCEDURE DATE:  07/17/2019      INTERPRETATION:  CLINICAL INFORMATION: History of glioblastoma multiforme   status post resection and chemoradiation. Subtle cognitive impairment.    TECHNIQUE: Noncontrast axial CT images were acquired through the head.   Two-dimensional sagittal and coronal reformats were generated.    COMPARISON STUDY: MR brain 5/14/2019    FINDINGS:   Status post left pterional craniotomy. A partially calcified hyperdense   mass in the left temporal lobe measuring at least 5.7 x 2.3 cm consistent   with patient's known glioblastoma multiforme. Superimposed hemorrhage is   not entirely excluded. Localized mass effect and edema is not significant   change from prior given differences in technique. No midline shift.   Ventricles appear unchanged in size. The basal cisterns are patent.    The soft tissues of the scalp are unremarkable. Chronic right maxillary   sinusitis. The mastoid air cells and middle ear cavities appear free of   acute disease.    IMPRESSION:   A 5.7 cm partially calcified hyperdense mass in the left temporal lobe   consistent with patient's known glioblastoma multiforme. Superimposed   hemorrhage is not entirely excluded.   < end of copied text >        MEDICATIONS  (STANDING):  amLODIPine   Tablet 10 milliGRAM(s) Oral daily  carvedilol 37.5 milliGRAM(s) Oral every 12 hours  dexamethasone     Tablet 1 milliGRAM(s) Oral daily  dextrose 5%. 1000 milliLiter(s) (50 mL/Hr) IV Continuous <Continuous>  dextrose 50% Injectable 12.5 Gram(s) IV Push once  < from: CT Head No Cont (07.17.19 @ 09:05) >  dextrose 50% Injectable 25 Gram(s) IV Push once  dextrose 50% Injectable 25 Gram(s) IV Push once  docusate sodium 100 milliGRAM(s) Oral two times a day  insulin glargine Injectable (LANTUS) 10 Unit(s) SubCutaneous at bedtime  insulin lispro (HumaLOG) corrective regimen sliding scale   SubCutaneous three times a day before meals  insulin lispro (HumaLOG) corrective regimen sliding scale   SubCutaneous at bedtime  lactated ringers. 1000 milliLiter(s) (75 mL/Hr) IV Continuous <Continuous>  levETIRAcetam 500 milliGRAM(s) Oral two times a day  polyethylene glycol 3350 17 Gram(s) Oral daily  potassium chloride    Tablet ER 40 milliEquivalent(s) Oral every 4 hours  senna 2 Tablet(s) Oral at bedtime  tamsulosin 0.4 milliGRAM(s) Oral at bedtime    MEDICATIONS  (PRN):  acetaminophen    Suspension .. 650 milliGRAM(s) Oral every 6 hours PRN Temp greater or equal to 38C (100.4F), Moderate Pain (4 - 6)  dextrose 40% Gel 15 Gram(s) Oral once PRN Blood Glucose LESS THAN 70 milliGRAM(s)/deciliter  glucagon  Injectable 1 milliGRAM(s) IntraMuscular once PRN Glucose LESS THAN 70 milligrams/deciliter      HEALTH ISSUES - PROBLEM Dx:  Prophylactic measure: Prophylactic measure  Hyperlipidemia: Hyperlipidemia  HTN (hypertension): HTN (hypertension)  Kidney stone: Kidney stone  Elevated troponin level: Elevated troponin level  Type 2 diabetes mellitus with other diabetic kidney complication, without long-term current use of insulin: Type 2 diabetes mellitus with other diabetic kidney complication, without long-term current use of insulin  Glioblastoma multiforme: Glioblastoma multiforme  QT prolongation: QT prolongation  SHAWNA (acute kidney injury): SHAWNA (acute kidney injury)  Hydronephrosis with urinary obstruction due to renal calculus: Hydronephrosis with urinary obstruction due to renal calculus

## 2019-07-18 NOTE — PROGRESS NOTE ADULT - PROBLEM SELECTOR PLAN 5
- HbA1C in May was 8.5.   - ISS  - Urine protein/Cr 0.1  - microalbumin 2.7    Hyeokchan Kwon , PGY1  Pager: [phone]

## 2019-07-19 ENCOUNTER — RESULT REVIEW (OUTPATIENT)
Age: 69
End: 2019-07-19

## 2019-07-19 LAB
ANION GAP SERPL CALC-SCNC: 14 MMOL/L — SIGNIFICANT CHANGE UP (ref 5–17)
BLD GP AB SCN SERPL QL: NEGATIVE — SIGNIFICANT CHANGE UP
BUN SERPL-MCNC: 19 MG/DL — SIGNIFICANT CHANGE UP (ref 7–23)
CALCIUM SERPL-MCNC: 8.8 MG/DL — SIGNIFICANT CHANGE UP (ref 8.4–10.5)
CHLORIDE SERPL-SCNC: 99 MMOL/L — SIGNIFICANT CHANGE UP (ref 96–108)
CO2 SERPL-SCNC: 22 MMOL/L — SIGNIFICANT CHANGE UP (ref 22–31)
CREAT SERPL-MCNC: 1.88 MG/DL — HIGH (ref 0.5–1.3)
GLUCOSE BLDC GLUCOMTR-MCNC: 150 MG/DL — HIGH (ref 70–99)
GLUCOSE BLDC GLUCOMTR-MCNC: 224 MG/DL — HIGH (ref 70–99)
GLUCOSE BLDC GLUCOMTR-MCNC: 231 MG/DL — HIGH (ref 70–99)
GLUCOSE BLDC GLUCOMTR-MCNC: 241 MG/DL — HIGH (ref 70–99)
GLUCOSE SERPL-MCNC: 151 MG/DL — HIGH (ref 70–99)
HCT VFR BLD CALC: 36.8 % — LOW (ref 39–50)
HGB BLD-MCNC: 12 G/DL — LOW (ref 13–17)
INR BLD: 1.13 RATIO — SIGNIFICANT CHANGE UP (ref 0.88–1.16)
MCHC RBC-ENTMCNC: 30 PG — SIGNIFICANT CHANGE UP (ref 27–34)
MCHC RBC-ENTMCNC: 32.6 GM/DL — SIGNIFICANT CHANGE UP (ref 32–36)
MCV RBC AUTO: 92 FL — SIGNIFICANT CHANGE UP (ref 80–100)
PLATELET # BLD AUTO: 283 K/UL — SIGNIFICANT CHANGE UP (ref 150–400)
POTASSIUM SERPL-MCNC: 3.4 MMOL/L — LOW (ref 3.5–5.3)
POTASSIUM SERPL-SCNC: 3.4 MMOL/L — LOW (ref 3.5–5.3)
PROTHROM AB SERPL-ACNC: 12.8 SEC — SIGNIFICANT CHANGE UP (ref 10–13.1)
RBC # BLD: 4 M/UL — LOW (ref 4.2–5.8)
RBC # FLD: 13.3 % — SIGNIFICANT CHANGE UP (ref 10.3–14.5)
RH IG SCN BLD-IMP: POSITIVE — SIGNIFICANT CHANGE UP
SODIUM SERPL-SCNC: 135 MMOL/L — SIGNIFICANT CHANGE UP (ref 135–145)
WBC # BLD: 12.47 K/UL — HIGH (ref 3.8–10.5)
WBC # FLD AUTO: 12.47 K/UL — HIGH (ref 3.8–10.5)

## 2019-07-19 PROCEDURE — 99233 SBSQ HOSP IP/OBS HIGH 50: CPT | Mod: GC

## 2019-07-19 PROCEDURE — 88300 SURGICAL PATH GROSS: CPT | Mod: 26

## 2019-07-19 PROCEDURE — 52352 CYSTOURETERO W/STONE REMOVE: CPT | Mod: RT

## 2019-07-19 PROCEDURE — 74420 UROGRAPHY RTRGR +-KUB: CPT | Mod: 26

## 2019-07-19 PROCEDURE — 52332 CYSTOSCOPY AND TREATMENT: CPT | Mod: RT

## 2019-07-19 RX ORDER — INSULIN LISPRO 100/ML
VIAL (ML) SUBCUTANEOUS
Refills: 0 | Status: DISCONTINUED | OUTPATIENT
Start: 2019-07-19 | End: 2019-07-19

## 2019-07-19 RX ORDER — DEXAMETHASONE 0.5 MG/5ML
1 ELIXIR ORAL DAILY
Refills: 0 | Status: DISCONTINUED | OUTPATIENT
Start: 2019-07-19 | End: 2019-07-22

## 2019-07-19 RX ORDER — SODIUM CHLORIDE 9 MG/ML
1000 INJECTION, SOLUTION INTRAVENOUS
Refills: 0 | Status: DISCONTINUED | OUTPATIENT
Start: 2019-07-19 | End: 2019-07-22

## 2019-07-19 RX ORDER — INSULIN GLARGINE 100 [IU]/ML
13.5 INJECTION, SOLUTION SUBCUTANEOUS AT BEDTIME
Refills: 0 | Status: DISCONTINUED | OUTPATIENT
Start: 2019-07-19 | End: 2019-07-19

## 2019-07-19 RX ORDER — LEVETIRACETAM 250 MG/1
500 TABLET, FILM COATED ORAL
Refills: 0 | Status: DISCONTINUED | OUTPATIENT
Start: 2019-07-19 | End: 2019-07-22

## 2019-07-19 RX ORDER — ACETAMINOPHEN 500 MG
650 TABLET ORAL EVERY 6 HOURS
Refills: 0 | Status: DISCONTINUED | OUTPATIENT
Start: 2019-07-19 | End: 2019-07-22

## 2019-07-19 RX ORDER — CEPHALEXIN 500 MG
500 CAPSULE ORAL THREE TIMES A DAY
Refills: 0 | Status: DISCONTINUED | OUTPATIENT
Start: 2019-07-19 | End: 2019-07-22

## 2019-07-19 RX ORDER — SODIUM CHLORIDE 9 MG/ML
1000 INJECTION, SOLUTION INTRAVENOUS
Refills: 0 | Status: DISCONTINUED | OUTPATIENT
Start: 2019-07-19 | End: 2019-07-19

## 2019-07-19 RX ORDER — GLUCAGON INJECTION, SOLUTION 0.5 MG/.1ML
1 INJECTION, SOLUTION SUBCUTANEOUS ONCE
Refills: 0 | Status: DISCONTINUED | OUTPATIENT
Start: 2019-07-19 | End: 2019-07-22

## 2019-07-19 RX ORDER — TAMSULOSIN HYDROCHLORIDE 0.4 MG/1
0.4 CAPSULE ORAL AT BEDTIME
Refills: 0 | Status: DISCONTINUED | OUTPATIENT
Start: 2019-07-19 | End: 2019-07-22

## 2019-07-19 RX ORDER — HEPARIN SODIUM 5000 [USP'U]/ML
5000 INJECTION INTRAVENOUS; SUBCUTANEOUS EVERY 8 HOURS
Refills: 0 | Status: DISCONTINUED | OUTPATIENT
Start: 2019-07-19 | End: 2019-07-22

## 2019-07-19 RX ORDER — POTASSIUM CHLORIDE 20 MEQ
40 PACKET (EA) ORAL EVERY 4 HOURS
Refills: 0 | Status: COMPLETED | OUTPATIENT
Start: 2019-07-19 | End: 2019-07-19

## 2019-07-19 RX ORDER — DOCUSATE SODIUM 100 MG
100 CAPSULE ORAL
Refills: 0 | Status: DISCONTINUED | OUTPATIENT
Start: 2019-07-19 | End: 2019-07-22

## 2019-07-19 RX ORDER — POTASSIUM CHLORIDE 20 MEQ
40 PACKET (EA) ORAL EVERY 4 HOURS
Refills: 0 | Status: DISCONTINUED | OUTPATIENT
Start: 2019-07-19 | End: 2019-07-19

## 2019-07-19 RX ORDER — POLYETHYLENE GLYCOL 3350 17 G/17G
17 POWDER, FOR SOLUTION ORAL DAILY
Refills: 0 | Status: DISCONTINUED | OUTPATIENT
Start: 2019-07-19 | End: 2019-07-22

## 2019-07-19 RX ORDER — DEXTROSE 50 % IN WATER 50 %
15 SYRINGE (ML) INTRAVENOUS ONCE
Refills: 0 | Status: DISCONTINUED | OUTPATIENT
Start: 2019-07-19 | End: 2019-07-22

## 2019-07-19 RX ORDER — INSULIN GLARGINE 100 [IU]/ML
13 INJECTION, SOLUTION SUBCUTANEOUS AT BEDTIME
Refills: 0 | Status: DISCONTINUED | OUTPATIENT
Start: 2019-07-19 | End: 2019-07-22

## 2019-07-19 RX ORDER — INSULIN LISPRO 100/ML
VIAL (ML) SUBCUTANEOUS
Refills: 0 | Status: DISCONTINUED | OUTPATIENT
Start: 2019-07-19 | End: 2019-07-22

## 2019-07-19 RX ORDER — HYDROMORPHONE HYDROCHLORIDE 2 MG/ML
0.25 INJECTION INTRAMUSCULAR; INTRAVENOUS; SUBCUTANEOUS
Refills: 0 | Status: DISCONTINUED | OUTPATIENT
Start: 2019-07-19 | End: 2019-07-19

## 2019-07-19 RX ORDER — ONDANSETRON 8 MG/1
4 TABLET, FILM COATED ORAL ONCE
Refills: 0 | Status: DISCONTINUED | OUTPATIENT
Start: 2019-07-19 | End: 2019-07-19

## 2019-07-19 RX ORDER — DEXTROSE 50 % IN WATER 50 %
25 SYRINGE (ML) INTRAVENOUS ONCE
Refills: 0 | Status: DISCONTINUED | OUTPATIENT
Start: 2019-07-19 | End: 2019-07-22

## 2019-07-19 RX ORDER — SENNA PLUS 8.6 MG/1
2 TABLET ORAL AT BEDTIME
Refills: 0 | Status: DISCONTINUED | OUTPATIENT
Start: 2019-07-19 | End: 2019-07-22

## 2019-07-19 RX ORDER — CARVEDILOL PHOSPHATE 80 MG/1
37.5 CAPSULE, EXTENDED RELEASE ORAL EVERY 12 HOURS
Refills: 0 | Status: DISCONTINUED | OUTPATIENT
Start: 2019-07-19 | End: 2019-07-22

## 2019-07-19 RX ORDER — AMLODIPINE BESYLATE 2.5 MG/1
10 TABLET ORAL DAILY
Refills: 0 | Status: DISCONTINUED | OUTPATIENT
Start: 2019-07-19 | End: 2019-07-22

## 2019-07-19 RX ORDER — LANOLIN ALCOHOL/MO/W.PET/CERES
5 CREAM (GRAM) TOPICAL AT BEDTIME
Refills: 0 | Status: DISCONTINUED | OUTPATIENT
Start: 2019-07-19 | End: 2019-07-22

## 2019-07-19 RX ADMIN — Medication 40 MILLIEQUIVALENT(S): at 13:31

## 2019-07-19 RX ADMIN — CARVEDILOL PHOSPHATE 37.5 MILLIGRAM(S): 80 CAPSULE, EXTENDED RELEASE ORAL at 05:02

## 2019-07-19 RX ADMIN — Medication 2: at 19:39

## 2019-07-19 RX ADMIN — Medication 1 MILLIGRAM(S): at 05:02

## 2019-07-19 RX ADMIN — INSULIN GLARGINE 13 UNIT(S): 100 INJECTION, SOLUTION SUBCUTANEOUS at 22:29

## 2019-07-19 RX ADMIN — Medication 100 MILLIGRAM(S): at 17:36

## 2019-07-19 RX ADMIN — AMLODIPINE BESYLATE 10 MILLIGRAM(S): 2.5 TABLET ORAL at 05:02

## 2019-07-19 RX ADMIN — TAMSULOSIN HYDROCHLORIDE 0.4 MILLIGRAM(S): 0.4 CAPSULE ORAL at 22:30

## 2019-07-19 RX ADMIN — SODIUM CHLORIDE 75 MILLILITER(S): 9 INJECTION, SOLUTION INTRAVENOUS at 12:39

## 2019-07-19 RX ADMIN — Medication 650 MILLIGRAM(S): at 05:40

## 2019-07-19 RX ADMIN — AMLODIPINE BESYLATE 10 MILLIGRAM(S): 2.5 TABLET ORAL at 12:37

## 2019-07-19 RX ADMIN — HEPARIN SODIUM 5000 UNIT(S): 5000 INJECTION INTRAVENOUS; SUBCUTANEOUS at 22:30

## 2019-07-19 RX ADMIN — Medication 100 MILLIGRAM(S): at 05:02

## 2019-07-19 RX ADMIN — CARVEDILOL PHOSPHATE 37.5 MILLIGRAM(S): 80 CAPSULE, EXTENDED RELEASE ORAL at 17:36

## 2019-07-19 RX ADMIN — SODIUM CHLORIDE 75 MILLILITER(S): 9 INJECTION, SOLUTION INTRAVENOUS at 11:25

## 2019-07-19 RX ADMIN — Medication 500 MILLIGRAM(S): at 22:30

## 2019-07-19 RX ADMIN — POLYETHYLENE GLYCOL 3350 17 GRAM(S): 17 POWDER, FOR SOLUTION ORAL at 12:37

## 2019-07-19 RX ADMIN — HEPARIN SODIUM 5000 UNIT(S): 5000 INJECTION INTRAVENOUS; SUBCUTANEOUS at 13:32

## 2019-07-19 RX ADMIN — SENNA PLUS 2 TABLET(S): 8.6 TABLET ORAL at 22:30

## 2019-07-19 RX ADMIN — LEVETIRACETAM 500 MILLIGRAM(S): 250 TABLET, FILM COATED ORAL at 17:36

## 2019-07-19 RX ADMIN — Medication 5 MILLIGRAM(S): at 22:30

## 2019-07-19 RX ADMIN — Medication 650 MILLIGRAM(S): at 05:02

## 2019-07-19 RX ADMIN — SODIUM CHLORIDE 75 MILLILITER(S): 9 INJECTION, SOLUTION INTRAVENOUS at 13:30

## 2019-07-19 RX ADMIN — LEVETIRACETAM 500 MILLIGRAM(S): 250 TABLET, FILM COATED ORAL at 05:02

## 2019-07-19 RX ADMIN — Medication 500 MILLIGRAM(S): at 13:31

## 2019-07-19 RX ADMIN — Medication 1 MILLIGRAM(S): at 12:37

## 2019-07-19 NOTE — BRIEF OPERATIVE NOTE - OPERATION/FINDINGS
Stone noted in distal ureter, extracted via ureteroscopy  Urine culture obtained from right kidney  7F x 26 cm stent placed on string    Dictation# [id number]
two small stones in distal R ureter

## 2019-07-19 NOTE — BRIEF OPERATIVE NOTE - NSICDXBRIEFPROCEDURE_GEN_ALL_CORE_FT
PROCEDURES:  Cystoscopic extraction of ureteral stone 19-Jul-2019 08:47:18  Neftaly Hyde
PROCEDURES:  Cystoscopy with right ureteroscopy with extraction of calculus using stone retrieval basket 19-Jul-2019 09:09:51  Efraín Mccarthy

## 2019-07-19 NOTE — PROGRESS NOTE ADULT - PROBLEM SELECTOR PLAN 5
- HbA1C in May was 8.5.   - ISS  - Urine protein/Cr 0.1  - microalbumin 2.7    Hyeokchan Kwon , PGY1  Pager: [phone]

## 2019-07-19 NOTE — PROGRESS NOTE ADULT - ASSESSMENT
Pt is a 68 yo M w/ h/o GBM treated with chemo, RT, and resection, T2DM, HTN a/w R hydronephrosis 2/2 distal ureteral stone c/b SHAWNA

## 2019-07-19 NOTE — PROGRESS NOTE ADULT - SUBJECTIVE AND OBJECTIVE BOX
Post op Check    Pt seen and examined without complaints. Pain is controlled. Denies SOB/CP/N/V.   Admits to some hematuria, dysuria, and frequency.    Vital Signs Last 24 Hrs  T(C): 36.7 (19 Jul 2019 12:34), Max: 37.7 (18 Jul 2019 13:27)  T(F): 98 (19 Jul 2019 12:34), Max: 99.8 (18 Jul 2019 13:27)  HR: 69 (19 Jul 2019 12:34) (62 - 90)  BP: 135/78 (19 Jul 2019 12:34) (113/67 - 197/103)  BP(mean): 97 (19 Jul 2019 11:30) (77 - 97)  RR: 16 (19 Jul 2019 12:34) (14 - 20)  SpO2: 94% (19 Jul 2019 12:34) (91% - 100%)    I&O's Summary    18 Jul 2019 07:01  -  19 Jul 2019 07:00  --------------------------------------------------------  IN: 1310 mL / OUT: 800 mL / NET: 510 mL    19 Jul 2019 07:01  -  19 Jul 2019 12:49  --------------------------------------------------------  IN: 175 mL / OUT: 0 mL / NET: 175 mL        Physical Exam  Gen: NAD  Abd: Soft, NT, ND  Back: No CVAT b/l  : stent string in place                          12.0   12.47 )-----------( 283      ( 19 Jul 2019 08:28 )             36.8       07-19    135  |  99  |  19  ----------------------------<  151<H>  3.4<L>   |  22  |  1.88<H>    Ca    8.8      19 Jul 2019 05:11  Phos  3.1     07-18  Mg     1.8     07-18    TPro  7.1  /  Alb  3.9  /  TBili  0.8  /  DBili  x   /  AST  18  /  ALT  26  /  AlkPhos  82  07-18      A/P: 69y Male s/p cysto right ureteroscopy stone extraction, right ureteral stent placement    -hydration  -trend Cr  -pt will need outpatient follow up for stent removal with Dr Rosi Marie [phone]

## 2019-07-19 NOTE — PROGRESS NOTE ADULT - SUBJECTIVE AND OBJECTIVE BOX
ARSENIO DARNELL  69y  Male    Complaints:  Subjective:         Vital Signs Last 24 Hrs  T(C): 37.2 (19 Jul 2019 04:55), Max: 37.7 (18 Jul 2019 13:27)  T(F): 99 (19 Jul 2019 04:55), Max: 99.8 (18 Jul 2019 13:27)  HR: 78 (19 Jul 2019 04:55) (71 - 90)  BP: 168/92 (19 Jul 2019 04:55) (125/79 - 197/103)  BP(mean): --  RR: 16 (19 Jul 2019 04:55) (16 - 18)  SpO2: 92% (19 Jul 2019 04:55) (92% - 95%)    PHYSICAL EXAM:  GENERAL: NAD, well-developed  HEAD:  Atraumatic, Normocephalic  CHEST/LUNG: Clear to auscultation bilaterally; No wheeze  HEART: Regular rate and rhythm; No murmurs, rubs, or gallops  ABDOMEN: Soft, Nontender, distended   EXTREMITIES:  2+ Peripheral Pulses, No clubbing, cyanosis, or edema  PSYCH: AAOx3  NEUROLOGY: non-focal  SKIN: No rashes or lesions    Consultant(s) Notes Reviewed:  [x ] YES  [ ] NO  Care Discussed with Consultants/Other Providers [ x] YES  [ ] NO    LABS:        RADIOLOGY & ADDITIONAL TESTS:    Imaging Personally Reviewed:  [ ] YES  [ ] NO  MedsMEDICATIONS  (STANDING):  amLODIPine   Tablet 10 milliGRAM(s) Oral daily  carvedilol 37.5 milliGRAM(s) Oral every 12 hours  dexamethasone     Tablet 1 milliGRAM(s) Oral daily  dextrose 5%. 1000 milliLiter(s) (50 mL/Hr) IV Continuous <Continuous>  dextrose 50% Injectable 12.5 Gram(s) IV Push once  dextrose 50% Injectable 25 Gram(s) IV Push once  dextrose 50% Injectable 25 Gram(s) IV Push once  docusate sodium 100 milliGRAM(s) Oral two times a day  insulin glargine Injectable (LANTUS) 6.5 Unit(s) SubCutaneous at bedtime  insulin lispro (HumaLOG) corrective regimen sliding scale   SubCutaneous every 6 hours  lactated ringers. 1000 milliLiter(s) (75 mL/Hr) IV Continuous <Continuous>  levETIRAcetam 500 milliGRAM(s) Oral two times a day  melatonin 5 milliGRAM(s) Oral at bedtime  polyethylene glycol 3350 17 Gram(s) Oral daily  potassium chloride    Tablet ER 40 milliEquivalent(s) Oral every 4 hours  senna 2 Tablet(s) Oral at bedtime  tamsulosin 0.4 milliGRAM(s) Oral at bedtime    MEDICATIONS  (PRN):  acetaminophen    Suspension .. 650 milliGRAM(s) Oral every 6 hours PRN Temp greater or equal to 38C (100.4F), Moderate Pain (4 - 6)  dextrose 40% Gel 15 Gram(s) Oral once PRN Blood Glucose LESS THAN 70 milliGRAM(s)/deciliter  glucagon  Injectable 1 milliGRAM(s) IntraMuscular once PRN Glucose LESS THAN 70 milligrams/deciliter      HEALTH ISSUES - PROBLEM Dx:  DVT prophylaxis: DVT prophylaxis  Prophylactic measure: Prophylactic measure  Hyperlipidemia: Hyperlipidemia  HTN (hypertension): HTN (hypertension)  Kidney stone: Kidney stone  Elevated troponin level: Elevated troponin level  Type 2 diabetes mellitus with other diabetic kidney complication, without long-term current use of insulin: Type 2 diabetes mellitus with other diabetic kidney complication, without long-term current use of insulin  Glioblastoma multiforme: Glioblastoma multiforme  QT prolongation: QT prolongation  SHAWNA (acute kidney injury): SHAWNA (acute kidney injury)  Hydronephrosis with urinary obstruction due to renal calculus: Hydronephrosis with urinary obstruction due to renal calculus ARSENIO DARNELL  69y  Male    Complaints:  Subjective:     Pt taken to OR today for stent placement.     Vital Signs Last 24 Hrs  T(C): 37.2 (19 Jul 2019 04:55), Max: 37.7 (18 Jul 2019 13:27)  T(F): 99 (19 Jul 2019 04:55), Max: 99.8 (18 Jul 2019 13:27)  HR: 78 (19 Jul 2019 04:55) (71 - 90)  BP: 168/92 (19 Jul 2019 04:55) (125/79 - 197/103)  BP(mean): --  RR: 16 (19 Jul 2019 04:55) (16 - 18)  SpO2: 92% (19 Jul 2019 04:55) (92% - 95%)    PHYSICAL EXAM:  GENERAL: NAD, well-developed  HEAD:  Atraumatic, Normocephalic  CHEST/LUNG: Clear to auscultation bilaterally; No wheeze  HEART: Regular rate and rhythm; No murmurs, rubs, or gallops  ABDOMEN: Soft, Nontender, distended   EXTREMITIES:  2+ Peripheral Pulses, No clubbing, cyanosis, or edema  PSYCH: AAOx3  NEUROLOGY: non-focal  SKIN: No rashes or lesions    Consultant(s) Notes Reviewed:  [x ] YES  [ ] NO  Care Discussed with Consultants/Other Providers [ x] YES  [ ] NO    LABS:           07-19    135  |  99  |  19  ----------------------------<  151<H>  3.4<L>   |  22  |  1.88<H>    Ca    8.8      19 Jul 2019 05:11  Phos  3.1     07-18  Mg     1.8     07-18    TPro  7.1  /  Alb  3.9  /  TBili  0.8  /  DBili  x   /  AST  18  /  ALT  26  /  AlkPhos  82  07-18      RADIOLOGY & ADDITIONAL TESTS:    Imaging Personally Reviewed:  [ ] YES  [ ] NO  MedsMEDICATIONS  (STANDING):  amLODIPine   Tablet 10 milliGRAM(s) Oral daily  carvedilol 37.5 milliGRAM(s) Oral every 12 hours  dexamethasone     Tablet 1 milliGRAM(s) Oral daily  dextrose 5%. 1000 milliLiter(s) (50 mL/Hr) IV Continuous <Continuous>  dextrose 50% Injectable 12.5 Gram(s) IV Push once  dextrose 50% Injectable 25 Gram(s) IV Push once  dextrose 50% Injectable 25 Gram(s) IV Push once  docusate sodium 100 milliGRAM(s) Oral two times a day  insulin glargine Injectable (LANTUS) 6.5 Unit(s) SubCutaneous at bedtime  insulin lispro (HumaLOG) corrective regimen sliding scale   SubCutaneous every 6 hours  lactated ringers. 1000 milliLiter(s) (75 mL/Hr) IV Continuous <Continuous>  levETIRAcetam 500 milliGRAM(s) Oral two times a day  melatonin 5 milliGRAM(s) Oral at bedtime  polyethylene glycol 3350 17 Gram(s) Oral daily  potassium chloride    Tablet ER 40 milliEquivalent(s) Oral every 4 hours  senna 2 Tablet(s) Oral at bedtime  tamsulosin 0.4 milliGRAM(s) Oral at bedtime    MEDICATIONS  (PRN):  acetaminophen    Suspension .. 650 milliGRAM(s) Oral every 6 hours PRN Temp greater or equal to 38C (100.4F), Moderate Pain (4 - 6)  dextrose 40% Gel 15 Gram(s) Oral once PRN Blood Glucose LESS THAN 70 milliGRAM(s)/deciliter  glucagon  Injectable 1 milliGRAM(s) IntraMuscular once PRN Glucose LESS THAN 70 milligrams/deciliter      HEALTH ISSUES - PROBLEM Dx:  DVT prophylaxis: DVT prophylaxis  Prophylactic measure: Prophylactic measure  Hyperlipidemia: Hyperlipidemia  HTN (hypertension): HTN (hypertension)  Kidney stone: Kidney stone  Elevated troponin level: Elevated troponin level  Type 2 diabetes mellitus with other diabetic kidney complication, without long-term current use of insulin: Type 2 diabetes mellitus with other diabetic kidney complication, without long-term current use of insulin  Glioblastoma multiforme: Glioblastoma multiforme  QT prolongation: QT prolongation  SHAWNA (acute kidney injury): SHAWNA (acute kidney injury)  Hydronephrosis with urinary obstruction due to renal calculus: Hydronephrosis with urinary obstruction due to renal calculus ARSENIO DARNELL  69y  Male    Complaints:  Subjective:     Pt taken to OR today for stent placement.     Vital Signs Last 24 Hrs  T(C): 37.2 (19 Jul 2019 04:55), Max: 37.7 (18 Jul 2019 13:27)  T(F): 99 (19 Jul 2019 04:55), Max: 99.8 (18 Jul 2019 13:27)  HR: 78 (19 Jul 2019 04:55) (71 - 90)  BP: 168/92 (19 Jul 2019 04:55) (125/79 - 197/103)  BP(mean): --  RR: 16 (19 Jul 2019 04:55) (16 - 18)  SpO2: 92% (19 Jul 2019 04:55) (92% - 95%)    PHYSICAL EXAM:  GENERAL: NAD, well-developed  HEAD:  Atraumatic, Normocephalic  CHEST/LUNG: Clear to auscultation bilaterally; No wheeze  HEART: Regular rate and rhythm; No murmurs, rubs, or gallops  ABDOMEN: Soft, Nontender, distended   EXTREMITIES:  2+ Peripheral Pulses, No clubbing, cyanosis, or edema  PSYCH: AAOx3  NEUROLOGY: non-focal  SKIN: No rashes or lesions    Consultant(s) Notes Reviewed:  [x ] YES  [ ] NO  Care Discussed with Consultants/Other Providers [ x] YES  [ ] NO    LABS:                        12.0   12.47 )-----------( 283      ( 19 Jul 2019 08:28 )             36.8     07-19    135  |  99  |  19  ----------------------------<  151<H>  3.4<L>   |  22  |  1.88<H>    Ca    8.8      19 Jul 2019 05:11  Phos  3.1     07-18  Mg     1.8     07-18    TPro  7.1  /  Alb  3.9  /  TBili  0.8  /  DBili  x   /  AST  18  /  ALT  26  /  AlkPhos  82  07-18    proBNP:   Lipid Profile:   HgA1c:   TSH:         RADIOLOGY & ADDITIONAL TESTS:    Imaging Personally Reviewed:  [ ] YES  [ ] NO  MedsMEDICATIONS  (STANDING):  amLODIPine   Tablet 10 milliGRAM(s) Oral daily  carvedilol 37.5 milliGRAM(s) Oral every 12 hours  dexamethasone     Tablet 1 milliGRAM(s) Oral daily  dextrose 5%. 1000 milliLiter(s) (50 mL/Hr) IV Continuous <Continuous>  dextrose 50% Injectable 12.5 Gram(s) IV Push once  dextrose 50% Injectable 25 Gram(s) IV Push once  dextrose 50% Injectable 25 Gram(s) IV Push once  docusate sodium 100 milliGRAM(s) Oral two times a day  insulin glargine Injectable (LANTUS) 6.5 Unit(s) SubCutaneous at bedtime  insulin lispro (HumaLOG) corrective regimen sliding scale   SubCutaneous every 6 hours  lactated ringers. 1000 milliLiter(s) (75 mL/Hr) IV Continuous <Continuous>  levETIRAcetam 500 milliGRAM(s) Oral two times a day  melatonin 5 milliGRAM(s) Oral at bedtime  polyethylene glycol 3350 17 Gram(s) Oral daily  potassium chloride    Tablet ER 40 milliEquivalent(s) Oral every 4 hours  senna 2 Tablet(s) Oral at bedtime  tamsulosin 0.4 milliGRAM(s) Oral at bedtime    MEDICATIONS  (PRN):  acetaminophen    Suspension .. 650 milliGRAM(s) Oral every 6 hours PRN Temp greater or equal to 38C (100.4F), Moderate Pain (4 - 6)  dextrose 40% Gel 15 Gram(s) Oral once PRN Blood Glucose LESS THAN 70 milliGRAM(s)/deciliter  glucagon  Injectable 1 milliGRAM(s) IntraMuscular once PRN Glucose LESS THAN 70 milligrams/deciliter      HEALTH ISSUES - PROBLEM Dx:  DVT prophylaxis: DVT prophylaxis  Prophylactic measure: Prophylactic measure  Hyperlipidemia: Hyperlipidemia  HTN (hypertension): HTN (hypertension)  Kidney stone: Kidney stone  Elevated troponin level: Elevated troponin level  Type 2 diabetes mellitus with other diabetic kidney complication, without long-term current use of insulin: Type 2 diabetes mellitus with other diabetic kidney complication, without long-term current use of insulin  Glioblastoma multiforme: Glioblastoma multiforme  QT prolongation: QT prolongation  SHAWNA (acute kidney injury): SHAWNA (acute kidney injury)  Hydronephrosis with urinary obstruction due to renal calculus: Hydronephrosis with urinary obstruction due to renal calculus

## 2019-07-19 NOTE — BRIEF OPERATIVE NOTE - NSICDXBRIEFPOSTOP_GEN_ALL_CORE_FT
POST-OP DIAGNOSIS:  Ureteral stone 19-Jul-2019 08:47:30  Neftaly Hyde
POST-OP DIAGNOSIS:  Right ureteral stone 19-Jul-2019 09:11:01  Efraín Mccarthy

## 2019-07-19 NOTE — PROGRESS NOTE ADULT - PROBLEM SELECTOR PLAN 1
Abdominal pain presumably from kidney stone possibly obstructing R ureter  - Bowel regimen for constipation   - Appreciate urology evaluation.    - c/w Flomax as above.    - OR tomorrow w/ Urology for ureteral stent  - NPO after midnight Abdominal pain presumably from kidney stone possibly obstructing R ureter  - Bowel regimen for constipation   - Appreciate urology evaluation.    - c/w Flomax as above.    - OR today for stent placement Abdominal pain presumably from kidney stone possibly obstructing R ureter  - Bowel regimen for constipation   - s/p stent placement  - c/w Flomax as above.

## 2019-07-19 NOTE — BRIEF OPERATIVE NOTE - NSICDXBRIEFPREOP_GEN_ALL_CORE_FT
PRE-OP DIAGNOSIS:  Ureteral stone 19-Jul-2019 08:47:25  Neftaly Hyde
PRE-OP DIAGNOSIS:  Right ureteral stone 19-Jul-2019 09:10:30  Efraín Mccarthy

## 2019-07-20 LAB
ANION GAP SERPL CALC-SCNC: 16 MMOL/L — SIGNIFICANT CHANGE UP (ref 5–17)
BUN SERPL-MCNC: 28 MG/DL — HIGH (ref 7–23)
CALCIUM SERPL-MCNC: 8.1 MG/DL — LOW (ref 8.4–10.5)
CHLORIDE SERPL-SCNC: 102 MMOL/L — SIGNIFICANT CHANGE UP (ref 96–108)
CO2 SERPL-SCNC: 20 MMOL/L — LOW (ref 22–31)
CREAT SERPL-MCNC: 1.62 MG/DL — HIGH (ref 0.5–1.3)
CULTURE RESULTS: NO GROWTH — SIGNIFICANT CHANGE UP
GLUCOSE BLDC GLUCOMTR-MCNC: 187 MG/DL — HIGH (ref 70–99)
GLUCOSE BLDC GLUCOMTR-MCNC: 199 MG/DL — HIGH (ref 70–99)
GLUCOSE BLDC GLUCOMTR-MCNC: 214 MG/DL — HIGH (ref 70–99)
GLUCOSE BLDC GLUCOMTR-MCNC: 230 MG/DL — HIGH (ref 70–99)
GLUCOSE SERPL-MCNC: 211 MG/DL — HIGH (ref 70–99)
HCT VFR BLD CALC: 34.8 % — LOW (ref 39–50)
HGB BLD-MCNC: 11.3 G/DL — LOW (ref 13–17)
MAGNESIUM SERPL-MCNC: 1.7 MG/DL — SIGNIFICANT CHANGE UP (ref 1.6–2.6)
MCHC RBC-ENTMCNC: 30.1 PG — SIGNIFICANT CHANGE UP (ref 27–34)
MCHC RBC-ENTMCNC: 32.5 GM/DL — SIGNIFICANT CHANGE UP (ref 32–36)
MCV RBC AUTO: 92.8 FL — SIGNIFICANT CHANGE UP (ref 80–100)
PHOSPHATE SERPL-MCNC: 3.1 MG/DL — SIGNIFICANT CHANGE UP (ref 2.5–4.5)
PLATELET # BLD AUTO: 302 K/UL — SIGNIFICANT CHANGE UP (ref 150–400)
POTASSIUM SERPL-MCNC: 4.2 MMOL/L — SIGNIFICANT CHANGE UP (ref 3.5–5.3)
POTASSIUM SERPL-SCNC: 4.2 MMOL/L — SIGNIFICANT CHANGE UP (ref 3.5–5.3)
RBC # BLD: 3.75 M/UL — LOW (ref 4.2–5.8)
RBC # FLD: 13.3 % — SIGNIFICANT CHANGE UP (ref 10.3–14.5)
SODIUM SERPL-SCNC: 138 MMOL/L — SIGNIFICANT CHANGE UP (ref 135–145)
SPECIMEN SOURCE: SIGNIFICANT CHANGE UP
WBC # BLD: 9.71 K/UL — SIGNIFICANT CHANGE UP (ref 3.8–10.5)
WBC # FLD AUTO: 9.71 K/UL — SIGNIFICANT CHANGE UP (ref 3.8–10.5)

## 2019-07-20 PROCEDURE — 99233 SBSQ HOSP IP/OBS HIGH 50: CPT | Mod: GC

## 2019-07-20 PROCEDURE — 74018 RADEX ABDOMEN 1 VIEW: CPT | Mod: 26

## 2019-07-20 RX ORDER — INSULIN LISPRO 100/ML
3 VIAL (ML) SUBCUTANEOUS
Refills: 0 | Status: DISCONTINUED | OUTPATIENT
Start: 2019-07-20 | End: 2019-07-22

## 2019-07-20 RX ADMIN — CARVEDILOL PHOSPHATE 37.5 MILLIGRAM(S): 80 CAPSULE, EXTENDED RELEASE ORAL at 06:01

## 2019-07-20 RX ADMIN — Medication 3 UNIT(S): at 13:26

## 2019-07-20 RX ADMIN — Medication 5 MILLIGRAM(S): at 21:29

## 2019-07-20 RX ADMIN — Medication 500 MILLIGRAM(S): at 06:01

## 2019-07-20 RX ADMIN — Medication 100 MILLIGRAM(S): at 06:01

## 2019-07-20 RX ADMIN — HEPARIN SODIUM 5000 UNIT(S): 5000 INJECTION INTRAVENOUS; SUBCUTANEOUS at 13:27

## 2019-07-20 RX ADMIN — Medication 2: at 09:11

## 2019-07-20 RX ADMIN — HEPARIN SODIUM 5000 UNIT(S): 5000 INJECTION INTRAVENOUS; SUBCUTANEOUS at 06:01

## 2019-07-20 RX ADMIN — Medication 500 MILLIGRAM(S): at 21:29

## 2019-07-20 RX ADMIN — Medication 3 UNIT(S): at 18:56

## 2019-07-20 RX ADMIN — INSULIN GLARGINE 13 UNIT(S): 100 INJECTION, SOLUTION SUBCUTANEOUS at 21:30

## 2019-07-20 RX ADMIN — LEVETIRACETAM 500 MILLIGRAM(S): 250 TABLET, FILM COATED ORAL at 18:09

## 2019-07-20 RX ADMIN — LEVETIRACETAM 500 MILLIGRAM(S): 250 TABLET, FILM COATED ORAL at 06:01

## 2019-07-20 RX ADMIN — TAMSULOSIN HYDROCHLORIDE 0.4 MILLIGRAM(S): 0.4 CAPSULE ORAL at 21:29

## 2019-07-20 RX ADMIN — Medication 1: at 13:26

## 2019-07-20 RX ADMIN — CARVEDILOL PHOSPHATE 37.5 MILLIGRAM(S): 80 CAPSULE, EXTENDED RELEASE ORAL at 18:11

## 2019-07-20 RX ADMIN — HEPARIN SODIUM 5000 UNIT(S): 5000 INJECTION INTRAVENOUS; SUBCUTANEOUS at 21:29

## 2019-07-20 RX ADMIN — Medication 1: at 18:56

## 2019-07-20 RX ADMIN — Medication 500 MILLIGRAM(S): at 13:27

## 2019-07-20 RX ADMIN — Medication 100 MILLIGRAM(S): at 18:10

## 2019-07-20 RX ADMIN — Medication 5 MILLIGRAM(S): at 12:41

## 2019-07-20 RX ADMIN — SENNA PLUS 2 TABLET(S): 8.6 TABLET ORAL at 21:29

## 2019-07-20 RX ADMIN — AMLODIPINE BESYLATE 10 MILLIGRAM(S): 2.5 TABLET ORAL at 06:01

## 2019-07-20 RX ADMIN — POLYETHYLENE GLYCOL 3350 17 GRAM(S): 17 POWDER, FOR SOLUTION ORAL at 12:41

## 2019-07-20 RX ADMIN — Medication 1 MILLIGRAM(S): at 06:01

## 2019-07-20 NOTE — CHART NOTE - NSCHARTNOTEFT_GEN_A_CORE
Distal stent noted to be in urethra on KUB.  Proximal curl still in renal pelvis.  Will leave stent in place for now and pull stent Sunday AM.  Plan communicated with Medicine Team 2.

## 2019-07-20 NOTE — PROGRESS NOTE ADULT - PROBLEM SELECTOR PLAN 5
- HbA1C in May was 8.5.   - ISS  - Urine protein/Cr 0.1  - microalbumin 2.7    Hyeokchan Kwon , PGY1  Pager: [phone]

## 2019-07-20 NOTE — PROGRESS NOTE ADULT - SUBJECTIVE AND OBJECTIVE BOX
Patient seen and examined.  Minimal urine tracking along stent string, patient reassured this is normal.    T(F): 98.1, Max: 98.5 (07-19-19 @ 20:32)  HR: 67  BP: 135/74  SpO2: 91%    OUT:    Voided: 700 mL    Gen NAD   Abd Soft, NT   String in place, secured with Tegaderm, no CVAT    07-19 @ 08:28  WBC 12.47 / Hct 36.8  / SCr 1.62       .Urine  07-16 <10,000 CFU/mL Normal Urogenital [place]

## 2019-07-20 NOTE — PROGRESS NOTE ADULT - SUBJECTIVE AND OBJECTIVE BOX
ARSENIO DARNELL  69y  Male    Complaints:  Subjective:     Pt s/p stent placement. Reports sleeping well overnight, passing gas, resolved bloody urine. Pt denies fevers, chills, chest p/p, sob, n/v/d.     Vital Signs Last 24 Hrs  T(C): 36.7 (20 Jul 2019 05:04), Max: 37.2 (19 Jul 2019 09:15)  T(F): 98.1 (20 Jul 2019 05:04), Max: 98.5 (19 Jul 2019 20:32)  HR: 67 (20 Jul 2019 05:04) (62 - 89)  BP: 135/74 (20 Jul 2019 05:04) (113/67 - 146/83)  BP(mean): 97 (19 Jul 2019 11:30) (77 - 97)  RR: 18 (20 Jul 2019 05:04) (14 - 20)  SpO2: 91% (20 Jul 2019 05:04) (91% - 100%)    PHYSICAL EXAM:  GENERAL: NAD, well-developed  HEAD:  Atraumatic, Normocephalic  CHEST/LUNG: Clear to auscultation bilaterally; No wheeze  HEART: Regular rate and rhythm; No murmurs, rubs, or gallops  ABDOMEN: Soft, Nontender, distended   EXTREMITIES:  2+ Peripheral Pulses, No clubbing, cyanosis, or edema  PSYCH: AAOx3  NEUROLOGY: non-focal  SKIN: No rashes or lesions    Consultant(s) Notes Reviewed:  [x ] YES  [ ] NO  Care Discussed with Consultants/Other Providers [ x] YES  [ ] NO    LABS:        RADIOLOGY & ADDITIONAL TESTS:    Imaging Personally Reviewed:  [ ] YES  [ ] NO  MedsMEDICATIONS  (STANDING):  amLODIPine   Tablet 10 milliGRAM(s) Oral daily  carvedilol 37.5 milliGRAM(s) Oral every 12 hours  cephalexin 500 milliGRAM(s) Oral three times a day  dexamethasone     Tablet 1 milliGRAM(s) Oral daily  dextrose 5%. 1000 milliLiter(s) (50 mL/Hr) IV Continuous <Continuous>  dextrose 50% Injectable 25 Gram(s) IV Push once  dextrose 50% Injectable 25 Gram(s) IV Push once  docusate sodium 100 milliGRAM(s) Oral two times a day  heparin  Injectable 5000 Unit(s) SubCutaneous every 8 hours  insulin glargine Injectable (LANTUS) 13 Unit(s) SubCutaneous at bedtime  insulin lispro (HumaLOG) corrective regimen sliding scale   SubCutaneous three times a day before meals  lactated ringers. 1000 milliLiter(s) (75 mL/Hr) IV Continuous <Continuous>  levETIRAcetam 500 milliGRAM(s) Oral two times a day  melatonin 5 milliGRAM(s) Oral at bedtime  polyethylene glycol 3350 17 Gram(s) Oral daily  senna 2 Tablet(s) Oral at bedtime  tamsulosin 0.4 milliGRAM(s) Oral at bedtime    MEDICATIONS  (PRN):  acetaminophen    Suspension .. 650 milliGRAM(s) Oral every 6 hours PRN Temp greater or equal to 38C (100.4F), Moderate Pain (4 - 6)  acetaminophen   Tablet .. 650 milliGRAM(s) Oral every 6 hours PRN Temp greater or equal to 38C (100.4F), Moderate Pain (4 - 6)  dextrose 40% Gel 15 Gram(s) Oral once PRN Blood Glucose LESS THAN 70 milliGRAM(s)/deciliter  glucagon  Injectable 1 milliGRAM(s) IntraMuscular once PRN Glucose LESS THAN 70 milligrams/deciliter      HEALTH ISSUES - PROBLEM Dx:  DVT prophylaxis: DVT prophylaxis  Prophylactic measure: Prophylactic measure  Hyperlipidemia: Hyperlipidemia  HTN (hypertension): HTN (hypertension)  Kidney stone: Kidney stone  Elevated troponin level: Elevated troponin level  Type 2 diabetes mellitus with other diabetic kidney complication, without long-term current use of insulin: Type 2 diabetes mellitus with other diabetic kidney complication, without long-term current use of insulin  Glioblastoma multiforme: Glioblastoma multiforme  QT prolongation: QT prolongation  SHAWNA (acute kidney injury): SHAWNA (acute kidney injury)  Hydronephrosis with urinary obstruction due to renal calculus: Hydronephrosis with urinary obstruction due to renal calculus ARSENIO DARNELL  69y  Male    Complaints:  Subjective:     Pt s/p stent placement. Reports sleeping well overnight, passing gas, resolved bloody urine. Pt denies fevers, chills, chest p/p, sob, n/v/d.     Vital Signs Last 24 Hrs  T(C): 36.7 (20 Jul 2019 05:04), Max: 37.2 (19 Jul 2019 09:15)  T(F): 98.1 (20 Jul 2019 05:04), Max: 98.5 (19 Jul 2019 20:32)  HR: 67 (20 Jul 2019 05:04) (62 - 89)  BP: 135/74 (20 Jul 2019 05:04) (113/67 - 146/83)  BP(mean): 97 (19 Jul 2019 11:30) (77 - 97)  RR: 18 (20 Jul 2019 05:04) (14 - 20)  SpO2: 91% (20 Jul 2019 05:04) (91% - 100%)    PHYSICAL EXAM:  GENERAL: NAD, well-developed  HEAD:  Atraumatic, Normocephalic  CHEST/LUNG: Clear to auscultation bilaterally; No wheeze  HEART: Regular rate and rhythm; No murmurs, rubs, or gallops  ABDOMEN: Soft, Nontender, distended   EXTREMITIES:  2+ Peripheral Pulses, No clubbing, cyanosis, or edema  PSYCH: AAOx3  NEUROLOGY: non-focal  SKIN: No rashes or lesions    Consultant(s) Notes Reviewed:  [x ] YES  [ ] NO  Care Discussed with Consultants/Other Providers [ x] YES  [ ] NO    LABS:                          11.3   9.71  )-----------( 302      ( 20 Jul 2019 10:52 )             34.8     07-20    138  |  102  |  28<H>  ----------------------------<  211<H>  4.2   |  20<L>  |  1.62<H>    Ca    8.1<L>      20 Jul 2019 06:35  Phos  3.1     07-20  Mg     1.7     07-20      RADIOLOGY & ADDITIONAL TESTS:    Imaging Personally Reviewed:  [ ] YES  [ ] NO  MedsMEDICATIONS  (STANDING):  amLODIPine   Tablet 10 milliGRAM(s) Oral daily  carvedilol 37.5 milliGRAM(s) Oral every 12 hours  cephalexin 500 milliGRAM(s) Oral three times a day  dexamethasone     Tablet 1 milliGRAM(s) Oral daily  dextrose 5%. 1000 milliLiter(s) (50 mL/Hr) IV Continuous <Continuous>  dextrose 50% Injectable 25 Gram(s) IV Push once  dextrose 50% Injectable 25 Gram(s) IV Push once  docusate sodium 100 milliGRAM(s) Oral two times a day  heparin  Injectable 5000 Unit(s) SubCutaneous every 8 hours  insulin glargine Injectable (LANTUS) 13 Unit(s) SubCutaneous at bedtime  insulin lispro (HumaLOG) corrective regimen sliding scale   SubCutaneous three times a day before meals  lactated ringers. 1000 milliLiter(s) (75 mL/Hr) IV Continuous <Continuous>  levETIRAcetam 500 milliGRAM(s) Oral two times a day  melatonin 5 milliGRAM(s) Oral at bedtime  polyethylene glycol 3350 17 Gram(s) Oral daily  senna 2 Tablet(s) Oral at bedtime  tamsulosin 0.4 milliGRAM(s) Oral at bedtime    MEDICATIONS  (PRN):  acetaminophen    Suspension .. 650 milliGRAM(s) Oral every 6 hours PRN Temp greater or equal to 38C (100.4F), Moderate Pain (4 - 6)  acetaminophen   Tablet .. 650 milliGRAM(s) Oral every 6 hours PRN Temp greater or equal to 38C (100.4F), Moderate Pain (4 - 6)  dextrose 40% Gel 15 Gram(s) Oral once PRN Blood Glucose LESS THAN 70 milliGRAM(s)/deciliter  glucagon  Injectable 1 milliGRAM(s) IntraMuscular once PRN Glucose LESS THAN 70 milligrams/deciliter      HEALTH ISSUES - PROBLEM Dx:  DVT prophylaxis: DVT prophylaxis  Prophylactic measure: Prophylactic measure  Hyperlipidemia: Hyperlipidemia  HTN (hypertension): HTN (hypertension)  Kidney stone: Kidney stone  Elevated troponin level: Elevated troponin level  Type 2 diabetes mellitus with other diabetic kidney complication, without long-term current use of insulin: Type 2 diabetes mellitus with other diabetic kidney complication, without long-term current use of insulin  Glioblastoma multiforme: Glioblastoma multiforme  QT prolongation: QT prolongation  SHAWNA (acute kidney injury): SHAWNA (acute kidney injury)  Hydronephrosis with urinary obstruction due to renal calculus: Hydronephrosis with urinary obstruction due to renal calculus

## 2019-07-20 NOTE — PROGRESS NOTE ADULT - ASSESSMENT
69 year old male with pain and SHAWNA 2/2 obstructing right 1mm distal ureteral stone s/p URS    - Trend SCr  - Pain control as needed  - Continue flomax 0.4mg qday for renal colic  - Follow up as outpatient with Dr. Frank Diop [place] for U[facility] [phone] 69 year old male with pain and SHAWNA 2/2 obstructing right 1mm distal ureteral stone s/p URS    - Trend SCr  - Pain control as needed  - Continue flomax 0.4mg qday for renal colic  - Will get KUB to eval stent placement  - Follow up as outpatient with Dr. Frank Abbott [facility] for Urology [phone]

## 2019-07-20 NOTE — PROGRESS NOTE ADULT - PROBLEM SELECTOR PLAN 1
Abdominal pain presumably from kidney stone possibly obstructing R ureter  - Bowel regimen for constipation   - s/p stent placement  - c/w Flomax as above.

## 2019-07-21 LAB
ANION GAP SERPL CALC-SCNC: 15 MMOL/L — SIGNIFICANT CHANGE UP (ref 5–17)
BUN SERPL-MCNC: 37 MG/DL — HIGH (ref 7–23)
CALCIUM SERPL-MCNC: 8.6 MG/DL — SIGNIFICANT CHANGE UP (ref 8.4–10.5)
CHLORIDE SERPL-SCNC: 102 MMOL/L — SIGNIFICANT CHANGE UP (ref 96–108)
CO2 SERPL-SCNC: 21 MMOL/L — LOW (ref 22–31)
CREAT SERPL-MCNC: 1.55 MG/DL — HIGH (ref 0.5–1.3)
GLUCOSE BLDC GLUCOMTR-MCNC: 140 MG/DL — HIGH (ref 70–99)
GLUCOSE BLDC GLUCOMTR-MCNC: 188 MG/DL — HIGH (ref 70–99)
GLUCOSE BLDC GLUCOMTR-MCNC: 210 MG/DL — HIGH (ref 70–99)
GLUCOSE BLDC GLUCOMTR-MCNC: 224 MG/DL — HIGH (ref 70–99)
GLUCOSE SERPL-MCNC: 162 MG/DL — HIGH (ref 70–99)
HCT VFR BLD CALC: 34.9 % — LOW (ref 39–50)
HGB BLD-MCNC: 11.4 G/DL — LOW (ref 13–17)
MAGNESIUM SERPL-MCNC: 1.9 MG/DL — SIGNIFICANT CHANGE UP (ref 1.6–2.6)
MCHC RBC-ENTMCNC: 30.2 PG — SIGNIFICANT CHANGE UP (ref 27–34)
MCHC RBC-ENTMCNC: 32.7 GM/DL — SIGNIFICANT CHANGE UP (ref 32–36)
MCV RBC AUTO: 92.6 FL — SIGNIFICANT CHANGE UP (ref 80–100)
PHOSPHATE SERPL-MCNC: 3.1 MG/DL — SIGNIFICANT CHANGE UP (ref 2.5–4.5)
PLATELET # BLD AUTO: 300 K/UL — SIGNIFICANT CHANGE UP (ref 150–400)
POTASSIUM SERPL-MCNC: 3.6 MMOL/L — SIGNIFICANT CHANGE UP (ref 3.5–5.3)
POTASSIUM SERPL-SCNC: 3.6 MMOL/L — SIGNIFICANT CHANGE UP (ref 3.5–5.3)
RBC # BLD: 3.77 M/UL — LOW (ref 4.2–5.8)
RBC # FLD: 13.3 % — SIGNIFICANT CHANGE UP (ref 10.3–14.5)
SODIUM SERPL-SCNC: 138 MMOL/L — SIGNIFICANT CHANGE UP (ref 135–145)
WBC # BLD: 9.39 K/UL — SIGNIFICANT CHANGE UP (ref 3.8–10.5)
WBC # FLD AUTO: 9.39 K/UL — SIGNIFICANT CHANGE UP (ref 3.8–10.5)

## 2019-07-21 PROCEDURE — 99233 SBSQ HOSP IP/OBS HIGH 50: CPT | Mod: GC

## 2019-07-21 RX ADMIN — POLYETHYLENE GLYCOL 3350 17 GRAM(S): 17 POWDER, FOR SOLUTION ORAL at 11:42

## 2019-07-21 RX ADMIN — TAMSULOSIN HYDROCHLORIDE 0.4 MILLIGRAM(S): 0.4 CAPSULE ORAL at 21:45

## 2019-07-21 RX ADMIN — Medication 3 UNIT(S): at 18:47

## 2019-07-21 RX ADMIN — Medication 100 MILLIGRAM(S): at 05:43

## 2019-07-21 RX ADMIN — Medication 500 MILLIGRAM(S): at 05:43

## 2019-07-21 RX ADMIN — SENNA PLUS 2 TABLET(S): 8.6 TABLET ORAL at 21:46

## 2019-07-21 RX ADMIN — Medication 5 MILLIGRAM(S): at 21:45

## 2019-07-21 RX ADMIN — Medication 650 MILLIGRAM(S): at 02:49

## 2019-07-21 RX ADMIN — CARVEDILOL PHOSPHATE 37.5 MILLIGRAM(S): 80 CAPSULE, EXTENDED RELEASE ORAL at 05:43

## 2019-07-21 RX ADMIN — HEPARIN SODIUM 5000 UNIT(S): 5000 INJECTION INTRAVENOUS; SUBCUTANEOUS at 21:46

## 2019-07-21 RX ADMIN — HEPARIN SODIUM 5000 UNIT(S): 5000 INJECTION INTRAVENOUS; SUBCUTANEOUS at 05:43

## 2019-07-21 RX ADMIN — AMLODIPINE BESYLATE 10 MILLIGRAM(S): 2.5 TABLET ORAL at 05:43

## 2019-07-21 RX ADMIN — Medication 1: at 08:56

## 2019-07-21 RX ADMIN — INSULIN GLARGINE 13 UNIT(S): 100 INJECTION, SOLUTION SUBCUTANEOUS at 21:46

## 2019-07-21 RX ADMIN — CARVEDILOL PHOSPHATE 37.5 MILLIGRAM(S): 80 CAPSULE, EXTENDED RELEASE ORAL at 17:42

## 2019-07-21 RX ADMIN — LEVETIRACETAM 500 MILLIGRAM(S): 250 TABLET, FILM COATED ORAL at 17:42

## 2019-07-21 RX ADMIN — HEPARIN SODIUM 5000 UNIT(S): 5000 INJECTION INTRAVENOUS; SUBCUTANEOUS at 13:08

## 2019-07-21 RX ADMIN — Medication 650 MILLIGRAM(S): at 02:19

## 2019-07-21 RX ADMIN — Medication 3 UNIT(S): at 13:07

## 2019-07-21 RX ADMIN — Medication 1 MILLIGRAM(S): at 05:43

## 2019-07-21 RX ADMIN — LEVETIRACETAM 500 MILLIGRAM(S): 250 TABLET, FILM COATED ORAL at 05:43

## 2019-07-21 RX ADMIN — Medication 100 MILLIGRAM(S): at 17:42

## 2019-07-21 RX ADMIN — Medication 500 MILLIGRAM(S): at 21:45

## 2019-07-21 RX ADMIN — Medication 500 MILLIGRAM(S): at 13:08

## 2019-07-21 RX ADMIN — Medication 3 UNIT(S): at 08:55

## 2019-07-21 RX ADMIN — Medication 2: at 18:48

## 2019-07-21 NOTE — PROGRESS NOTE ADULT - ATTENDING COMMENTS
Patient seen and examined, agree with above, Dr. Klein aware.
Patient seen and examined, agree with above. Pain worsening, creat trending up, renal US shows hydro. Scheduled for OR tomorrow with Dr. Klein for URS with stone extraction, possible stent.
Patient seen and examined. Will trend creat. F/u renal US tonight to assess hydro, check for jets.
Patient seen and examined, agree with plan.
KUB to evaluate stent placement  Add humalog 3u tid   C/w bowel regimen

## 2019-07-21 NOTE — PROGRESS NOTE ADULT - PROBLEM SELECTOR PLAN 5
- HbA1C in May was 8.5.   - ISS  - Urine protein/Cr 0.1  - microalbumin 2.7    Hyeokchan Kwon , PGY1  Pager: [phone] - HbA1C in May was 8.5.   - ISS  - Urine protein/Cr 0.1  - microalbumin 2.7 - HbA1C in May was 8.5.   - ISS  - Urine protein/Cr 0.1  - microalbumin 2.7  - lantus 13uqhs, humalog 3utid

## 2019-07-21 NOTE — PROGRESS NOTE ADULT - ASSESSMENT
Pt is a 68 yo M w/ h/o GBM treated with chemo, RT, and resection, T2DM, HTN a/w R hydronephrosis 2/2 distal ureteral stone c/b SHAWNA. Crt downtrending.

## 2019-07-21 NOTE — PROGRESS NOTE ADULT - PROBLEM SELECTOR PLAN 1
Abdominal pain presumably from kidney stone possibly obstructing R ureter  - Bowel regimen for constipation   - s/p stent placement  - c/w Flomax as above. Abdominal pain presumably from kidney stone possibly obstructing R ureter  - Bowel regimen for constipation   - s/p stent placement, stent in inadequate position as per urology, will d/c today  - c/w Flomax as above.

## 2019-07-21 NOTE — PROGRESS NOTE ADULT - SUBJECTIVE AND OBJECTIVE BOX
Patient seen and examined.  Stent removed at bedside, mild discomfort, otherwise uncomplicated.    Denies f/c/n/v.     Vital Signs Last 24 Hrs  T(C): 36.4 (21 Jul 2019 13:09), Max: 36.8 (20 Jul 2019 20:42)  T(F): 97.6 (21 Jul 2019 13:09), Max: 98.2 (20 Jul 2019 20:42)  HR: 56 (21 Jul 2019 13:09) (55 - 70)  BP: 174/94 (21 Jul 2019 13:09) (129/70 - 174/94)  BP(mean): --  RR: 18 (21 Jul 2019 13:09) (16 - 18)  SpO2: 95% (21 Jul 2019 13:09) (95% - 96%)    07-20-19 @ 07:01  -  07-21-19 @ 07:00  --------------------------------------------------------  IN: 500 mL / OUT: 400 mL / NET: 100 mL    07-21-19 @ 07:01  -  07-21-19 @ 13:42  --------------------------------------------------------  IN: 300 mL / OUT: 200 mL / NET: 100 mL          Gen NAD   Abd Soft, NT   Stent removed at bedside    CBC (07-21 @ 10:11)                              11.4<L>                         9.39    )----------------(  300        --    % Neutrophils, --    % Lymphocytes, ANC: --                                  34.9<L>              CBC (07-20 @ 10:52)                              11.3<L>                         9.71    )----------------(  302        --    % Neutrophils, --    % Lymphocytes, ANC: --                                  34.8<L>                BMP (07-21 @ 05:34)             138     |  102     |  37<H> 		Ca++ --      Ca 8.6                ---------------------------------( 162<H>		Mg 1.9                3.6     |  21<L>   |  1.55<H>			Ph 3.1     BMP (07-20 @ 06:35)             138     |  102     |  28<H> 		Ca++ --      Ca 8.1<L>             ---------------------------------( 211<H>		Mg 1.7                4.2     |  20<L>   |  1.62<H>			Ph 3.1               -> .Urine Culture (07-19 @ 18:37)     NG    NG    No growth    -> .Urine Culture (07-16 @ 14:34)     NG    NG    <10,000 CFU/mL Normal Urogenital Kar[place]

## 2019-07-21 NOTE — PROGRESS NOTE ADULT - SUBJECTIVE AND OBJECTIVE BOX
ARSENIO DARNELL  69y  Male    Complaints:  Subjective:         Vital Signs Last 24 Hrs  T(C): 36.5 (21 Jul 2019 05:21), Max: 36.8 (20 Jul 2019 20:42)  T(F): 97.7 (21 Jul 2019 05:21), Max: 98.2 (20 Jul 2019 20:42)  HR: 55 (21 Jul 2019 05:21) (55 - 70)  BP: 152/80 (21 Jul 2019 05:21) (129/70 - 165/89)  BP(mean): --  RR: 16 (21 Jul 2019 05:21) (16 - 18)  SpO2: 96% (21 Jul 2019 05:21) (93% - 96%)    PHYSICAL EXAM:  GENERAL: NAD, well-developed  HEAD:  Atraumatic, Normocephalic  CHEST/LUNG: Clear to auscultation bilaterally; No wheeze  HEART: Regular rate and rhythm; No murmurs, rubs, or gallops  ABDOMEN: Soft, Nontender, distended   EXTREMITIES:  2+ Peripheral Pulses, No clubbing, cyanosis, or edema  PSYCH: AAOx3  NEUROLOGY: non-focal  SKIN: No rashes or lesions    Consultant(s) Notes Reviewed:  [x ] YES  [ ] NO  Care Discussed with Consultants/Other Providers [ x] YES  [ ] NO    LABS:          RADIOLOGY & ADDITIONAL TESTS:    Imaging Personally Reviewed:  [ ] YES  [ ] NO  MedsMEDICATIONS  (STANDING):  amLODIPine   Tablet 10 milliGRAM(s) Oral daily  carvedilol 37.5 milliGRAM(s) Oral every 12 hours  cephalexin 500 milliGRAM(s) Oral three times a day  dexamethasone     Tablet 1 milliGRAM(s) Oral daily  dextrose 5%. 1000 milliLiter(s) (50 mL/Hr) IV Continuous <Continuous>  dextrose 50% Injectable 25 Gram(s) IV Push once  dextrose 50% Injectable 25 Gram(s) IV Push once  docusate sodium 100 milliGRAM(s) Oral two times a day  heparin  Injectable 5000 Unit(s) SubCutaneous every 8 hours  insulin glargine Injectable (LANTUS) 13 Unit(s) SubCutaneous at bedtime  insulin lispro (HumaLOG) corrective regimen sliding scale   SubCutaneous three times a day before meals  insulin lispro Injectable (HumaLOG) 3 Unit(s) SubCutaneous three times a day before meals  lactated ringers. 1000 milliLiter(s) (75 mL/Hr) IV Continuous <Continuous>  levETIRAcetam 500 milliGRAM(s) Oral two times a day  melatonin 5 milliGRAM(s) Oral at bedtime  polyethylene glycol 3350 17 Gram(s) Oral daily  senna 2 Tablet(s) Oral at bedtime  tamsulosin 0.4 milliGRAM(s) Oral at bedtime    MEDICATIONS  (PRN):  acetaminophen    Suspension .. 650 milliGRAM(s) Oral every 6 hours PRN Temp greater or equal to 38C (100.4F), Moderate Pain (4 - 6)  acetaminophen   Tablet .. 650 milliGRAM(s) Oral every 6 hours PRN Temp greater or equal to 38C (100.4F), Moderate Pain (4 - 6)  bisacodyl 5 milliGRAM(s) Oral every 12 hours PRN Constipation  dextrose 40% Gel 15 Gram(s) Oral once PRN Blood Glucose LESS THAN 70 milliGRAM(s)/deciliter  glucagon  Injectable 1 milliGRAM(s) IntraMuscular once PRN Glucose LESS THAN 70 milligrams/deciliter      HEALTH ISSUES - PROBLEM Dx:  DVT prophylaxis: DVT prophylaxis  Prophylactic measure: Prophylactic measure  Hyperlipidemia: Hyperlipidemia  HTN (hypertension): HTN (hypertension)  Kidney stone: Kidney stone  Elevated troponin level: Elevated troponin level  Type 2 diabetes mellitus with other diabetic kidney complication, without long-term current use of insulin: Type 2 diabetes mellitus with other diabetic kidney complication, without long-term current use of insulin  Glioblastoma multiforme: Glioblastoma multiforme  QT prolongation: QT prolongation  SHAWNA (acute kidney injury): SHAWNA (acute kidney injury)  Hydronephrosis with urinary obstruction due to renal calculus: Hydronephrosis with urinary obstruction due to renal calculus ARSENIO DARNELL  69y  Male    Complaints:  Subjective:     Pt reports discomfort from the stent. Otherwise denies abdominal pain. Pt had 2 normal bowel movements last night. Pt denies fevers, chills, chest p/p, sob, n.v.d,    Vital Signs Last 24 Hrs  T(C): 36.5 (21 Jul 2019 05:21), Max: 36.8 (20 Jul 2019 20:42)  T(F): 97.7 (21 Jul 2019 05:21), Max: 98.2 (20 Jul 2019 20:42)  HR: 55 (21 Jul 2019 05:21) (55 - 70)  BP: 152/80 (21 Jul 2019 05:21) (129/70 - 165/89)  BP(mean): --  RR: 16 (21 Jul 2019 05:21) (16 - 18)  SpO2: 96% (21 Jul 2019 05:21) (93% - 96%)    PHYSICAL EXAM:  GENERAL: mildly distressed from discomfort from stent placement.   HEAD:  Atraumatic, Normocephalic  CHEST/LUNG: Clear to auscultation bilaterally; No wheeze  HEART: Regular rate and rhythm; No murmurs, rubs, or gallops  ABDOMEN: Soft, Nontender, distended   EXTREMITIES:  2+ Peripheral Pulses, No clubbing, cyanosis, or edema  PSYCH: AAOx3  NEUROLOGY: non-focal  SKIN: No rashes or lesions    Consultant(s) Notes Reviewed:  [x ] YES  [ ] NO  Care Discussed with Consultants/Other Providers [ x] YES  [ ] NO    LABS:    07-21    138  |  102  |  37<H>  ----------------------------<  162<H>  3.6   |  21<L>  |  1.55<H>    Ca    8.6      21 Jul 2019 05:34  Phos  3.1     07-21  Mg     1.9     07-21      RADIOLOGY & ADDITIONAL TESTS:    Imaging Personally Reviewed:  [ ] YES  [ ] NO  MedsMEDICATIONS  (STANDING):  amLODIPine   Tablet 10 milliGRAM(s) Oral daily  carvedilol 37.5 milliGRAM(s) Oral every 12 hours  cephalexin 500 milliGRAM(s) Oral three times a day  dexamethasone     Tablet 1 milliGRAM(s) Oral daily  dextrose 5%. 1000 milliLiter(s) (50 mL/Hr) IV Continuous <Continuous>  dextrose 50% Injectable 25 Gram(s) IV Push once  dextrose 50% Injectable 25 Gram(s) IV Push once  docusate sodium 100 milliGRAM(s) Oral two times a day  heparin  Injectable 5000 Unit(s) SubCutaneous every 8 hours  insulin glargine Injectable (LANTUS) 13 Unit(s) SubCutaneous at bedtime  insulin lispro (HumaLOG) corrective regimen sliding scale   SubCutaneous three times a day before meals  insulin lispro Injectable (HumaLOG) 3 Unit(s) SubCutaneous three times a day before meals  lactated ringers. 1000 milliLiter(s) (75 mL/Hr) IV Continuous <Continuous>  levETIRAcetam 500 milliGRAM(s) Oral two times a day  melatonin 5 milliGRAM(s) Oral at bedtime  polyethylene glycol 3350 17 Gram(s) Oral daily  senna 2 Tablet(s) Oral at bedtime  tamsulosin 0.4 milliGRAM(s) Oral at bedtime    MEDICATIONS  (PRN):  acetaminophen    Suspension .. 650 milliGRAM(s) Oral every 6 hours PRN Temp greater or equal to 38C (100.4F), Moderate Pain (4 - 6)  acetaminophen   Tablet .. 650 milliGRAM(s) Oral every 6 hours PRN Temp greater or equal to 38C (100.4F), Moderate Pain (4 - 6)  bisacodyl 5 milliGRAM(s) Oral every 12 hours PRN Constipation  dextrose 40% Gel 15 Gram(s) Oral once PRN Blood Glucose LESS THAN 70 milliGRAM(s)/deciliter  glucagon  Injectable 1 milliGRAM(s) IntraMuscular once PRN Glucose LESS THAN 70 milligrams/deciliter      HEALTH ISSUES - PROBLEM Dx:  DVT prophylaxis: DVT prophylaxis  Prophylactic measure: Prophylactic measure  Hyperlipidemia: Hyperlipidemia  HTN (hypertension): HTN (hypertension)  Kidney stone: Kidney stone  Elevated troponin level: Elevated troponin level  Type 2 diabetes mellitus with other diabetic kidney complication, without long-term current use of insulin: Type 2 diabetes mellitus with other diabetic kidney complication, without long-term current use of insulin  Glioblastoma multiforme: Glioblastoma multiforme  QT prolongation: QT prolongation  SHAWNA (acute kidney injury): SHAWNA (acute kidney injury)  Hydronephrosis with urinary obstruction due to renal calculus: Hydronephrosis with urinary obstruction due to renal calculus

## 2019-07-21 NOTE — PROGRESS NOTE ADULT - PROBLEM SELECTOR PLAN 2
Acute renal failure likely from preexisting kidney disease   - hold ARB and HCTZ  - Renal US w/ R hydronephrosis w/ no obvious stone   - c/w IVF. Acute renal failure likely from preexisting kidney disease   - hold ARB and HCTZ  - Renal US w/ R hydronephrosis w/ no obvious stone   - c/w IVF.  - crt downtrending, 1.55

## 2019-07-21 NOTE — PROGRESS NOTE ADULT - ASSESSMENT
69 year old male with pain and SHAWNA 2/2 obstructing right 1mm distal ureteral stone s/p URS  - Stent removed at bedside, monitor for f/c, hematuria  - Trend SCr  - Pain control as needed  - Continue flomax 0.4mg qday for renal colic  - Follow up as outpatient with Dr. Frank Diop [place] for Urology [phone]

## 2019-07-22 ENCOUNTER — TRANSCRIPTION ENCOUNTER (OUTPATIENT)
Age: 69
End: 2019-07-22

## 2019-07-22 VITALS
OXYGEN SATURATION: 98 % | DIASTOLIC BLOOD PRESSURE: 97 MMHG | HEART RATE: 67 BPM | TEMPERATURE: 98 F | RESPIRATION RATE: 18 BRPM | SYSTOLIC BLOOD PRESSURE: 169 MMHG

## 2019-07-22 LAB
GLUCOSE BLDC GLUCOMTR-MCNC: 158 MG/DL — HIGH (ref 70–99)
GLUCOSE BLDC GLUCOMTR-MCNC: 174 MG/DL — HIGH (ref 70–99)
GLUCOSE BLDC GLUCOMTR-MCNC: 190 MG/DL — HIGH (ref 70–99)

## 2019-07-22 PROCEDURE — 84132 ASSAY OF SERUM POTASSIUM: CPT

## 2019-07-22 PROCEDURE — 88300 SURGICAL PATH GROSS: CPT

## 2019-07-22 PROCEDURE — C8929: CPT

## 2019-07-22 PROCEDURE — 84295 ASSAY OF SERUM SODIUM: CPT

## 2019-07-22 PROCEDURE — 76000 FLUOROSCOPY <1 HR PHYS/QHP: CPT

## 2019-07-22 PROCEDURE — 76770 US EXAM ABDO BACK WALL COMP: CPT

## 2019-07-22 PROCEDURE — 81001 URINALYSIS AUTO W/SCOPE: CPT

## 2019-07-22 PROCEDURE — 82570 ASSAY OF URINE CREATININE: CPT

## 2019-07-22 PROCEDURE — C1769: CPT

## 2019-07-22 PROCEDURE — 82043 UR ALBUMIN QUANTITATIVE: CPT

## 2019-07-22 PROCEDURE — 85014 HEMATOCRIT: CPT

## 2019-07-22 PROCEDURE — 85027 COMPLETE CBC AUTOMATED: CPT

## 2019-07-22 PROCEDURE — C1889: CPT

## 2019-07-22 PROCEDURE — 74177 CT ABD & PELVIS W/CONTRAST: CPT

## 2019-07-22 PROCEDURE — 83036 HEMOGLOBIN GLYCOSYLATED A1C: CPT

## 2019-07-22 PROCEDURE — 87086 URINE CULTURE/COLONY COUNT: CPT

## 2019-07-22 PROCEDURE — 86900 BLOOD TYPING SEROLOGIC ABO: CPT

## 2019-07-22 PROCEDURE — 86901 BLOOD TYPING SEROLOGIC RH(D): CPT

## 2019-07-22 PROCEDURE — 82365 CALCULUS SPECTROSCOPY: CPT

## 2019-07-22 PROCEDURE — 92610 EVALUATE SWALLOWING FUNCTION: CPT

## 2019-07-22 PROCEDURE — 83605 ASSAY OF LACTIC ACID: CPT

## 2019-07-22 PROCEDURE — 80053 COMPREHEN METABOLIC PANEL: CPT

## 2019-07-22 PROCEDURE — 84100 ASSAY OF PHOSPHORUS: CPT

## 2019-07-22 PROCEDURE — 84484 ASSAY OF TROPONIN QUANT: CPT

## 2019-07-22 PROCEDURE — 74018 RADEX ABDOMEN 1 VIEW: CPT

## 2019-07-22 PROCEDURE — 82962 GLUCOSE BLOOD TEST: CPT

## 2019-07-22 PROCEDURE — 99285 EMERGENCY DEPT VISIT HI MDM: CPT

## 2019-07-22 PROCEDURE — 97161 PT EVAL LOW COMPLEX 20 MIN: CPT

## 2019-07-22 PROCEDURE — 70450 CT HEAD/BRAIN W/O DYE: CPT

## 2019-07-22 PROCEDURE — 84156 ASSAY OF PROTEIN URINE: CPT

## 2019-07-22 PROCEDURE — 86850 RBC ANTIBODY SCREEN: CPT

## 2019-07-22 PROCEDURE — 82803 BLOOD GASES ANY COMBINATION: CPT

## 2019-07-22 PROCEDURE — 83690 ASSAY OF LIPASE: CPT

## 2019-07-22 PROCEDURE — 85610 PROTHROMBIN TIME: CPT

## 2019-07-22 PROCEDURE — 82435 ASSAY OF BLOOD CHLORIDE: CPT

## 2019-07-22 PROCEDURE — C1758: CPT

## 2019-07-22 PROCEDURE — 83735 ASSAY OF MAGNESIUM: CPT

## 2019-07-22 PROCEDURE — 85730 THROMBOPLASTIN TIME PARTIAL: CPT

## 2019-07-22 PROCEDURE — 82947 ASSAY GLUCOSE BLOOD QUANT: CPT

## 2019-07-22 PROCEDURE — 80048 BASIC METABOLIC PNL TOTAL CA: CPT

## 2019-07-22 PROCEDURE — 71045 X-RAY EXAM CHEST 1 VIEW: CPT

## 2019-07-22 PROCEDURE — 99239 HOSP IP/OBS DSCHRG MGMT >30: CPT

## 2019-07-22 PROCEDURE — 86803 HEPATITIS C AB TEST: CPT

## 2019-07-22 PROCEDURE — C2617: CPT

## 2019-07-22 PROCEDURE — 82330 ASSAY OF CALCIUM: CPT

## 2019-07-22 RX ORDER — CEPHALEXIN 500 MG
1 CAPSULE ORAL
Qty: 3 | Refills: 0
Start: 2019-07-22 | End: 2019-07-22

## 2019-07-22 RX ORDER — TAMSULOSIN HYDROCHLORIDE 0.4 MG/1
1 CAPSULE ORAL
Qty: 30 | Refills: 1
Start: 2019-07-22 | End: 2019-09-19

## 2019-07-22 RX ORDER — LOSARTAN POTASSIUM 100 MG/1
1 TABLET, FILM COATED ORAL
Qty: 0 | Refills: 0 | DISCHARGE

## 2019-07-22 RX ADMIN — POLYETHYLENE GLYCOL 3350 17 GRAM(S): 17 POWDER, FOR SOLUTION ORAL at 11:19

## 2019-07-22 RX ADMIN — HEPARIN SODIUM 5000 UNIT(S): 5000 INJECTION INTRAVENOUS; SUBCUTANEOUS at 13:29

## 2019-07-22 RX ADMIN — HEPARIN SODIUM 5000 UNIT(S): 5000 INJECTION INTRAVENOUS; SUBCUTANEOUS at 05:15

## 2019-07-22 RX ADMIN — Medication 1 MILLIGRAM(S): at 05:15

## 2019-07-22 RX ADMIN — Medication 500 MILLIGRAM(S): at 13:30

## 2019-07-22 RX ADMIN — Medication 1: at 08:06

## 2019-07-22 RX ADMIN — Medication 1: at 13:28

## 2019-07-22 RX ADMIN — CARVEDILOL PHOSPHATE 37.5 MILLIGRAM(S): 80 CAPSULE, EXTENDED RELEASE ORAL at 05:15

## 2019-07-22 RX ADMIN — Medication 100 MILLIGRAM(S): at 05:15

## 2019-07-22 RX ADMIN — AMLODIPINE BESYLATE 10 MILLIGRAM(S): 2.5 TABLET ORAL at 05:15

## 2019-07-22 RX ADMIN — LEVETIRACETAM 500 MILLIGRAM(S): 250 TABLET, FILM COATED ORAL at 05:15

## 2019-07-22 RX ADMIN — Medication 500 MILLIGRAM(S): at 05:15

## 2019-07-22 RX ADMIN — Medication 3 UNIT(S): at 08:06

## 2019-07-22 RX ADMIN — Medication 3 UNIT(S): at 13:28

## 2019-07-22 NOTE — PROGRESS NOTE ADULT - PROBLEM SELECTOR PLAN 2
Acute renal failure likely from preexisting kidney disease   - hold ARB and HCTZ  - Renal US w/ R hydronephrosis w/ no obvious stone  - crt downtrending Acute renal failure likely from preexisting kidney disease   - hold ARB and HCTZ  - Renal US w/ R hydronephrosis w/ no obvious stone  - cr downtrending

## 2019-07-22 NOTE — PROGRESS NOTE ADULT - PROBLEM SELECTOR PLAN 1
Abdominal pain presumably from kidney stone possibly obstructing R ureter  - Bowel regimen for constipation   - s/p ureteral stent, d/c'ed due to not in optimal placement.  - c/w Flomax as above. Abdominal pain presumably from kidney stone possibly obstructing R ureter  - Bowel regimen for constipation   - s/p ureteral stent, d/c'ed due to not in optimal placement.  - c/w Flomax as above.  - F/u outpatient urology

## 2019-07-22 NOTE — PROGRESS NOTE ADULT - PROVIDER SPECIALTY LIST ADULT
Cardiology
Internal Medicine
Urology
Internal Medicine

## 2019-07-22 NOTE — PROGRESS NOTE ADULT - PROBLEM SELECTOR PROBLEM 4
Glioblastoma multiforme

## 2019-07-22 NOTE — PROGRESS NOTE ADULT - SUBJECTIVE AND OBJECTIVE BOX
Dr. Pau Porras  Internal Medicine PGY-1  Pager: [phone]      Patient is a 69y old  Male who presents with a chief complaint of One day of lower abdominal pain, increased abdominal girth (21 Jul 2019 13:41)      SUBJECTIVE / OVERNIGHT EVENTS:  No acute events overnight.    Patient seen and examined in AM. Reported    Patient denied fevers, CP, SOB, lower extremity pain.     MEDICATIONS  (STANDING):  amLODIPine   Tablet 10 milliGRAM(s) Oral daily  carvedilol 37.5 milliGRAM(s) Oral every 12 hours  cephalexin 500 milliGRAM(s) Oral three times a day  dexamethasone     Tablet 1 milliGRAM(s) Oral daily  dextrose 5%. 1000 milliLiter(s) (50 mL/Hr) IV Continuous <Continuous>  dextrose 50% Injectable 25 Gram(s) IV Push once  dextrose 50% Injectable 25 Gram(s) IV Push once  docusate sodium 100 milliGRAM(s) Oral two times a day  heparin  Injectable 5000 Unit(s) SubCutaneous every 8 hours  insulin glargine Injectable (LANTUS) 13 Unit(s) SubCutaneous at bedtime  insulin lispro (HumaLOG) corrective regimen sliding scale   SubCutaneous three times a day before meals  insulin lispro Injectable (HumaLOG) 3 Unit(s) SubCutaneous three times a day before meals  lactated ringers. 1000 milliLiter(s) (75 mL/Hr) IV Continuous <Continuous>  levETIRAcetam 500 milliGRAM(s) Oral two times a day  melatonin 5 milliGRAM(s) Oral at bedtime  polyethylene glycol 3350 17 Gram(s) Oral daily  senna 2 Tablet(s) Oral at bedtime  tamsulosin 0.4 milliGRAM(s) Oral at bedtime    MEDICATIONS  (PRN):  acetaminophen    Suspension .. 650 milliGRAM(s) Oral every 6 hours PRN Temp greater or equal to 38C (100.4F), Moderate Pain (4 - 6)  acetaminophen   Tablet .. 650 milliGRAM(s) Oral every 6 hours PRN Temp greater or equal to 38C (100.4F), Moderate Pain (4 - 6)  bisacodyl 5 milliGRAM(s) Oral every 12 hours PRN Constipation  dextrose 40% Gel 15 Gram(s) Oral once PRN Blood Glucose LESS THAN 70 milliGRAM(s)/deciliter  glucagon  Injectable 1 milliGRAM(s) IntraMuscular once PRN Glucose LESS THAN 70 milligrams/deciliter      I&O's Summary    20 Jul 2019 07:01  -  21 Jul 2019 07:00  --------------------------------------------------------  IN: 500 mL / OUT: 400 mL / NET: 100 mL    21 Jul 2019 07:01  -  22 Jul 2019 06:41  --------------------------------------------------------  IN: 600 mL / OUT: 650 mL / NET: -50 mL        Vital Signs Last 24 Hrs  T(C): 36.8 (22 Jul 2019 04:25), Max: 36.8 (22 Jul 2019 04:25)  T(F): 98.2 (22 Jul 2019 04:25), Max: 98.2 (22 Jul 2019 04:25)  HR: 58 (22 Jul 2019 04:25) (56 - 70)  BP: 159/90 (22 Jul 2019 04:25) (147/73 - 174/94)  BP(mean): --  RR: 16 (22 Jul 2019 04:25) (16 - 18)  SpO2: 96% (22 Jul 2019 04:25) (95% - 96%)    PHYSICAL EXAM:  GENERAL: No acute distress   HEENT:  EOMI, PERRL, and normal oropharynx; neck supple, no JVD  CHEST/LUNG: Clear to auscultation bilaterally; No wheeze  HEART: Regular rate and rhythm; No murmurs, rubs, or gallops  ABDOMEN: Soft, Nontender, distended; (-) CV tenderness  EXTREMITIES:  2+ Peripheral Pulses, No clubbing, cyanosis, or edema  PSYCH: AAOx3  NEUROLOGY: non-focal  SKIN: No rashes or lesions      LABS:                        11.4   9.39  )-----------( 300      ( 21 Jul 2019 10:11 )             34.9       07-21    138  |  102  |  37<H>  ----------------------------<  162<H>  3.6   |  21<L>  |  1.55<H>    Ca    8.6      21 Jul 2019 05:34  Phos  3.1     07-21  Mg     1.9     07-21      CAPILLARY BLOOD GLUCOSE      POCT Blood Glucose.: 210 mg/dL (21 Jul 2019 21:39)  POCT Blood Glucose.: 224 mg/dL (21 Jul 2019 18:40)  POCT Blood Glucose.: 140 mg/dL (21 Jul 2019 13:03)  POCT Blood Glucose.: 188 mg/dL (21 Jul 2019 08:49)      RADIOLOGY & ADDITIONAL TESTS: Dr. Pau Porras  Internal Medicine PGY-1  Pager: [phone]      Patient is a 69y old  Male who presents with a chief complaint of One day of lower abdominal pain, increased abdominal girth (21 Jul 2019 13:41)    : [id number]    SUBJECTIVE / OVERNIGHT EVENTS:  No acute events overnight. Tele revealed 3 second atrial pulse. Otherwise sinus rate at 50-70's.     Patient seen and examined in AM. Reported no abdominal or back pain today, and able to urinate normally without no symptoms. Able to tolerate regular diet without n/v.     Patient denied fevers, CP, SOB, or lower extremity pain.     MEDICATIONS  (STANDING):  amLODIPine   Tablet 10 milliGRAM(s) Oral daily  carvedilol 37.5 milliGRAM(s) Oral every 12 hours  cephalexin 500 milliGRAM(s) Oral three times a day  dexamethasone     Tablet 1 milliGRAM(s) Oral daily  dextrose 5%. 1000 milliLiter(s) (50 mL/Hr) IV Continuous <Continuous>  dextrose 50% Injectable 25 Gram(s) IV Push once  dextrose 50% Injectable 25 Gram(s) IV Push once  docusate sodium 100 milliGRAM(s) Oral two times a day  heparin  Injectable 5000 Unit(s) SubCutaneous every 8 hours  insulin glargine Injectable (LANTUS) 13 Unit(s) SubCutaneous at bedtime  insulin lispro (HumaLOG) corrective regimen sliding scale   SubCutaneous three times a day before meals  insulin lispro Injectable (HumaLOG) 3 Unit(s) SubCutaneous three times a day before meals  lactated ringers. 1000 milliLiter(s) (75 mL/Hr) IV Continuous <Continuous>  levETIRAcetam 500 milliGRAM(s) Oral two times a day  melatonin 5 milliGRAM(s) Oral at bedtime  polyethylene glycol 3350 17 Gram(s) Oral daily  senna 2 Tablet(s) Oral at bedtime  tamsulosin 0.4 milliGRAM(s) Oral at bedtime    MEDICATIONS  (PRN):  acetaminophen    Suspension .. 650 milliGRAM(s) Oral every 6 hours PRN Temp greater or equal to 38C (100.4F), Moderate Pain (4 - 6)  acetaminophen   Tablet .. 650 milliGRAM(s) Oral every 6 hours PRN Temp greater or equal to 38C (100.4F), Moderate Pain (4 - 6)  bisacodyl 5 milliGRAM(s) Oral every 12 hours PRN Constipation  dextrose 40% Gel 15 Gram(s) Oral once PRN Blood Glucose LESS THAN 70 milliGRAM(s)/deciliter  glucagon  Injectable 1 milliGRAM(s) IntraMuscular once PRN Glucose LESS THAN 70 milligrams/deciliter      I&O's Summary    20 Jul 2019 07:01  -  21 Jul 2019 07:00  --------------------------------------------------------  IN: 500 mL / OUT: 400 mL / NET: 100 mL    21 Jul 2019 07:01  -  22 Jul 2019 06:41  --------------------------------------------------------  IN: 600 mL / OUT: 650 mL / NET: -50 mL        Vital Signs Last 24 Hrs  T(C): 36.8 (22 Jul 2019 04:25), Max: 36.8 (22 Jul 2019 04:25)  T(F): 98.2 (22 Jul 2019 04:25), Max: 98.2 (22 Jul 2019 04:25)  HR: 58 (22 Jul 2019 04:25) (56 - 70)  BP: 159/90 (22 Jul 2019 04:25) (147/73 - 174/94)  BP(mean): --  RR: 16 (22 Jul 2019 04:25) (16 - 18)  SpO2: 96% (22 Jul 2019 04:25) (95% - 96%)    PHYSICAL EXAM:  GENERAL: No acute distress   HEENT:  EOMI, PERRL, and normal oropharynx; neck supple, no JVD  CHEST/LUNG: Clear to auscultation bilaterally; No wheeze  HEART: Regular rate and rhythm; No murmurs, rubs, or gallops  ABDOMEN: Soft, Nontender, distended; (-) CVA tenderness  EXTREMITIES:  2+ Peripheral Pulses, No clubbing, cyanosis. (+) BLE edema  PSYCH: AAOx3  NEUROLOGY: non-focal  SKIN: No rashes or lesions      LABS:                        11.4   9.39  )-----------( 300      ( 21 Jul 2019 10:11 )             34.9       07-21    138  |  102  |  37<H>  ----------------------------<  162<H>  3.6   |  21<L>  |  1.55<H>    Ca    8.6      21 Jul 2019 05:34  Phos  3.1     07-21  Mg     1.9     07-21      CAPILLARY BLOOD GLUCOSE      POCT Blood Glucose.: 210 mg/dL (21 Jul 2019 21:39)  POCT Blood Glucose.: 224 mg/dL (21 Jul 2019 18:40)  POCT Blood Glucose.: 140 mg/dL (21 Jul 2019 13:03)  POCT Blood Glucose.: 188 mg/dL (21 Jul 2019 08:49)      RADIOLOGY & ADDITIONAL TESTS: Dr. Pau Porras  Internal Medicine PGY-1  Pager: [phone]      Patient is a 69y old  Male who presents with a chief complaint of One day of lower abdominal pain, increased abdominal girth (21 Jul 2019 13:41)    : [id number]    SUBJECTIVE / OVERNIGHT EVENTS:  No acute events overnight. Tele revealed 3 second atrial pulse. Otherwise sinus rate at 50-70's.     Patient seen and examined in AM. Reported no abdominal or back pain today, and able to urinate normally without no symptoms. Able to tolerate regular diet without n/v.     Patient denied fevers, CP, SOB, or lower extremity pain.     MEDICATIONS  (STANDING):  amLODIPine   Tablet 10 milliGRAM(s) Oral daily  carvedilol 37.5 milliGRAM(s) Oral every 12 hours  cephalexin 500 milliGRAM(s) Oral three times a day  dexamethasone     Tablet 1 milliGRAM(s) Oral daily  dextrose 5%. 1000 milliLiter(s) (50 mL/Hr) IV Continuous <Continuous>  dextrose 50% Injectable 25 Gram(s) IV Push once  dextrose 50% Injectable 25 Gram(s) IV Push once  docusate sodium 100 milliGRAM(s) Oral two times a day  heparin  Injectable 5000 Unit(s) SubCutaneous every 8 hours  insulin glargine Injectable (LANTUS) 13 Unit(s) SubCutaneous at bedtime  insulin lispro (HumaLOG) corrective regimen sliding scale   SubCutaneous three times a day before meals  insulin lispro Injectable (HumaLOG) 3 Unit(s) SubCutaneous three times a day before meals  lactated ringers. 1000 milliLiter(s) (75 mL/Hr) IV Continuous <Continuous>  levETIRAcetam 500 milliGRAM(s) Oral two times a day  melatonin 5 milliGRAM(s) Oral at bedtime  polyethylene glycol 3350 17 Gram(s) Oral daily  senna 2 Tablet(s) Oral at bedtime  tamsulosin 0.4 milliGRAM(s) Oral at bedtime    MEDICATIONS  (PRN):  acetaminophen    Suspension .. 650 milliGRAM(s) Oral every 6 hours PRN Temp greater or equal to 38C (100.4F), Moderate Pain (4 - 6)  acetaminophen   Tablet .. 650 milliGRAM(s) Oral every 6 hours PRN Temp greater or equal to 38C (100.4F), Moderate Pain (4 - 6)  bisacodyl 5 milliGRAM(s) Oral every 12 hours PRN Constipation  dextrose 40% Gel 15 Gram(s) Oral once PRN Blood Glucose LESS THAN 70 milliGRAM(s)/deciliter  glucagon  Injectable 1 milliGRAM(s) IntraMuscular once PRN Glucose LESS THAN 70 milligrams/deciliter      I&O's Summary    20 Jul 2019 07:01  -  21 Jul 2019 07:00  --------------------------------------------------------  IN: 500 mL / OUT: 400 mL / NET: 100 mL    21 Jul 2019 07:01  -  22 Jul 2019 06:41  --------------------------------------------------------  IN: 600 mL / OUT: 650 mL / NET: -50 mL        Vital Signs Last 24 Hrs  T(C): 36.8 (22 Jul 2019 04:25), Max: 36.8 (22 Jul 2019 04:25)  T(F): 98.2 (22 Jul 2019 04:25), Max: 98.2 (22 Jul 2019 04:25)  HR: 58 (22 Jul 2019 04:25) (56 - 70)  BP: 159/90 (22 Jul 2019 04:25) (147/73 - 174/94)  BP(mean): --  RR: 16 (22 Jul 2019 04:25) (16 - 18)  SpO2: 96% (22 Jul 2019 04:25) (95% - 96%)    PHYSICAL EXAM:  GENERAL: No acute distress   HEENT:  EOMI and normal oropharynx; neck supple, no JVD  CHEST/LUNG: Clear to auscultation bilaterally; No wheeze  HEART: Regular rate and rhythm; No murmurs, rubs, or gallops  ABDOMEN: Soft, Nontender, distended; (-) CVA tenderness  EXTREMITIES:  2+ Peripheral Pulses, No clubbing, cyanosis. (+) BLE edema  PSYCH: AAOx3  NEUROLOGY: non-focal  SKIN: No rashes or lesions      LABS:                        11.4   9.39  )-----------( 300      ( 21 Jul 2019 10:11 )             34.9       07-21    138  |  102  |  37<H>  ----------------------------<  162<H>  3.6   |  21<L>  |  1.55<H>    Ca    8.6      21 Jul 2019 05:34  Phos  3.1     07-21  Mg     1.9     07-21      CAPILLARY BLOOD GLUCOSE      POCT Blood Glucose.: 210 mg/dL (21 Jul 2019 21:39)  POCT Blood Glucose.: 224 mg/dL (21 Jul 2019 18:40)  POCT Blood Glucose.: 140 mg/dL (21 Jul 2019 13:03)  POCT Blood Glucose.: 188 mg/dL (21 Jul 2019 08:49)      RADIOLOGY & ADDITIONAL TESTS:

## 2019-07-22 NOTE — PROVIDER CONTACT NOTE (OTHER) - ASSESSMENT
Pt took 0600 medications this morning. Carvedilol given this morning. Pt is asymptomatic.
Pt VSS. Asymptomatic. Pt experienced atrial pause of 3 seconds on tele.

## 2019-07-22 NOTE — PROGRESS NOTE ADULT - PROBLEM SELECTOR PROBLEM 6
DVT prophylaxis
Prophylactic measure
DVT prophylaxis

## 2019-07-22 NOTE — PROGRESS NOTE ADULT - PROBLEM SELECTOR PROBLEM 2
SHAWNA (acute kidney injury)

## 2019-07-22 NOTE — PROGRESS NOTE ADULT - PROBLEM SELECTOR PLAN 4
Diagnosed since Jan 2001, s/p resection, RT, chemo, stable tumor from MRI in may  - CT head shows calcified hyperdense mass in the left temporal lobe consistent with existing GM  - No active intervention inpatient

## 2019-07-22 NOTE — PROGRESS NOTE ADULT - PROBLEM SELECTOR PROBLEM 1
Hydronephrosis with urinary obstruction due to renal calculus

## 2019-07-22 NOTE — PROGRESS NOTE ADULT - REASON FOR ADMISSION
One day of lower abdominal pain, increased abdominal girth

## 2019-07-22 NOTE — DISCHARGE NOTE NURSING/CASE MANAGEMENT/SOCIAL WORK - NSDCDPATPORTLINK_GEN_ALL_CORE
You can access the [facility] Patient Portal, offered by [facility], by registering with the following website: [email or web address]

## 2019-07-22 NOTE — PROGRESS NOTE ADULT - PROBLEM SELECTOR PROBLEM 3
QT prolongation

## 2019-07-22 NOTE — PROGRESS NOTE ADULT - PROBLEM SELECTOR PROBLEM 5
Type 2 diabetes mellitus with other diabetic kidney complication, without long-term current use of insulin

## 2019-07-22 NOTE — PROGRESS NOTE ADULT - ASSESSMENT
Pt is a 70 yo M w/ h/o GBM treated with chemo, RT, and resection, T2DM, HTN a/w R hydronephrosis 2/2 distal ureteral stone c/b SHAWNA. Crt downtrending. Pt is a 70 yo M w/ h/o GBM treated with chemo, RT, and resection, T2DM, HTN a/w R hydronephrosis 2/2 distal ureteral stone c/b SHAWNA. S/p ureteral stent placement and removal; Cr downtrending.

## 2019-07-22 NOTE — PROGRESS NOTE ADULT - PROBLEM SELECTOR PLAN 3
Likely related to the hypokalemia.    - trend K+ and Mg++   - troponin downtrending Likely related to the hypokalemia.  QTc 490's on Tele (7/22)  - trend K+ and Mg++   - troponin downtrending

## 2019-07-22 NOTE — PROGRESS NOTE ADULT - PROBLEM SELECTOR PLAN 5
- HbA1C 9.0  - Lantus 13U at bedtime and premeal 3U TID;  - AM FS wnl, premeal 1-2U correction required (total 3U over 24 hours)  - Urine protein/Cr 0.1 & microalbumin 2.7

## 2019-07-22 NOTE — PROGRESS NOTE ADULT - PROBLEM SELECTOR PLAN 6
- DVT ppx - SQH 5000 Q8h  - Diet: Soft DASH diet  - PT rec: outpatient PT  - Dispo - Home with outpatient urology f/u

## 2019-07-23 ENCOUNTER — CHART COPY (OUTPATIENT)
Age: 69
End: 2019-07-23

## 2019-07-23 LAB — COMPN STONE: SIGNIFICANT CHANGE UP

## 2019-07-24 PROBLEM — C71.9 MALIGNANT NEOPLASM OF BRAIN, UNSPECIFIED: Chronic | Status: ACTIVE | Noted: 2019-07-16

## 2019-07-24 PROBLEM — E11.9 TYPE 2 DIABETES MELLITUS WITHOUT COMPLICATIONS: Chronic | Status: ACTIVE | Noted: 2019-07-16

## 2019-07-24 RX ORDER — LOSARTAN POTASSIUM 100 MG/1
100 TABLET, FILM COATED ORAL DAILY
Qty: 30 | Refills: 2 | Status: ACTIVE | COMMUNITY
Start: 2018-11-30 | End: 1900-01-01

## 2019-07-30 ENCOUNTER — CHART COPY (OUTPATIENT)
Age: 69
End: 2019-07-30

## 2019-07-30 ENCOUNTER — INPATIENT (INPATIENT)
Facility: HOSPITAL | Age: 69
LOS: 15 days | Discharge: NURSING, REHAB OR LONG-TERM CARE | DRG: 65 | End: 2019-08-15
Attending: HOSPITALIST | Admitting: STUDENT IN AN ORGANIZED HEALTH CARE EDUCATION/TRAINING PROGRAM
Payer: MEDICAID

## 2019-07-30 ENCOUNTER — APPOINTMENT (OUTPATIENT)
Dept: UROLOGY | Facility: CLINIC | Age: 69
End: 2019-07-30
Payer: MEDICAID

## 2019-07-30 VITALS
SYSTOLIC BLOOD PRESSURE: 130 MMHG | DIASTOLIC BLOOD PRESSURE: 72 MMHG | WEIGHT: 243 LBS | HEIGHT: 68 IN | HEART RATE: 77 BPM | BODY MASS INDEX: 36.83 KG/M2 | TEMPERATURE: 98 F | RESPIRATION RATE: 17 BRPM

## 2019-07-30 VITALS
DIASTOLIC BLOOD PRESSURE: 85 MMHG | TEMPERATURE: 98 F | OXYGEN SATURATION: 97 % | SYSTOLIC BLOOD PRESSURE: 139 MMHG | RESPIRATION RATE: 18 BRPM | HEART RATE: 63 BPM | HEIGHT: 70 IN | WEIGHT: 240.08 LBS

## 2019-07-30 DIAGNOSIS — Z90.49 ACQUIRED ABSENCE OF OTHER SPECIFIED PARTS OF DIGESTIVE TRACT: Chronic | ICD-10-CM

## 2019-07-30 DIAGNOSIS — Z29.9 ENCOUNTER FOR PROPHYLACTIC MEASURES, UNSPECIFIED: ICD-10-CM

## 2019-07-30 DIAGNOSIS — C71.9 MALIGNANT NEOPLASM OF BRAIN, UNSPECIFIED: ICD-10-CM

## 2019-07-30 DIAGNOSIS — E11.9 TYPE 2 DIABETES MELLITUS WITHOUT COMPLICATIONS: ICD-10-CM

## 2019-07-30 DIAGNOSIS — C71.9 MALIGNANT NEOPLASM OF BRAIN, UNSPECIFIED: Chronic | ICD-10-CM

## 2019-07-30 DIAGNOSIS — N17.9 ACUTE KIDNEY FAILURE, UNSPECIFIED: ICD-10-CM

## 2019-07-30 DIAGNOSIS — N20.0 CALCULUS OF KIDNEY: ICD-10-CM

## 2019-07-30 DIAGNOSIS — R41.82 ALTERED MENTAL STATUS, UNSPECIFIED: ICD-10-CM

## 2019-07-30 LAB
ALBUMIN SERPL ELPH-MCNC: 4.3 G/DL — SIGNIFICANT CHANGE UP (ref 3.3–5)
ALP SERPL-CCNC: 87 U/L — SIGNIFICANT CHANGE UP (ref 40–120)
ALT FLD-CCNC: 40 U/L — SIGNIFICANT CHANGE UP (ref 10–45)
ANION GAP SERPL CALC-SCNC: 18 MMOL/L — HIGH (ref 5–17)
APPEARANCE UR: CLEAR — SIGNIFICANT CHANGE UP
AST SERPL-CCNC: 17 U/L — SIGNIFICANT CHANGE UP (ref 10–40)
BACTERIA # UR AUTO: NEGATIVE — SIGNIFICANT CHANGE UP
BASOPHILS # BLD AUTO: 0 K/UL — SIGNIFICANT CHANGE UP (ref 0–0.2)
BASOPHILS NFR BLD AUTO: 0.1 % — SIGNIFICANT CHANGE UP (ref 0–2)
BILIRUB SERPL-MCNC: 0.5 MG/DL — SIGNIFICANT CHANGE UP (ref 0.2–1.2)
BILIRUB UR-MCNC: NEGATIVE — SIGNIFICANT CHANGE UP
BUN SERPL-MCNC: 34 MG/DL — HIGH (ref 7–23)
CALCIUM SERPL-MCNC: 9.5 MG/DL — SIGNIFICANT CHANGE UP (ref 8.4–10.5)
CHLORIDE SERPL-SCNC: 98 MMOL/L — SIGNIFICANT CHANGE UP (ref 96–108)
CO2 SERPL-SCNC: 23 MMOL/L — SIGNIFICANT CHANGE UP (ref 22–31)
COLOR SPEC: SIGNIFICANT CHANGE UP
CREAT ?TM UR-MCNC: 201 MG/DL — SIGNIFICANT CHANGE UP
CREAT SERPL-MCNC: 2.4 MG/DL — HIGH (ref 0.5–1.3)
DIFF PNL FLD: NEGATIVE — SIGNIFICANT CHANGE UP
EOSINOPHIL # BLD AUTO: 0.1 K/UL — SIGNIFICANT CHANGE UP (ref 0–0.5)
EOSINOPHIL NFR BLD AUTO: 0.6 % — SIGNIFICANT CHANGE UP (ref 0–6)
EPI CELLS # UR: 1 /HPF — SIGNIFICANT CHANGE UP
GLUCOSE SERPL-MCNC: 193 MG/DL — HIGH (ref 70–99)
GLUCOSE UR QL: NEGATIVE — SIGNIFICANT CHANGE UP
HCT VFR BLD CALC: 38.3 % — LOW (ref 39–50)
HGB BLD-MCNC: 13.3 G/DL — SIGNIFICANT CHANGE UP (ref 13–17)
HYALINE CASTS # UR AUTO: 2 /LPF — SIGNIFICANT CHANGE UP (ref 0–2)
KETONES UR-MCNC: NEGATIVE — SIGNIFICANT CHANGE UP
LEUKOCYTE ESTERASE UR-ACNC: NEGATIVE — SIGNIFICANT CHANGE UP
LYMPHOCYTES # BLD AUTO: 1.6 K/UL — SIGNIFICANT CHANGE UP (ref 1–3.3)
LYMPHOCYTES # BLD AUTO: 13.1 % — SIGNIFICANT CHANGE UP (ref 13–44)
MCHC RBC-ENTMCNC: 31.6 PG — SIGNIFICANT CHANGE UP (ref 27–34)
MCHC RBC-ENTMCNC: 34.6 GM/DL — SIGNIFICANT CHANGE UP (ref 32–36)
MCV RBC AUTO: 91.4 FL — SIGNIFICANT CHANGE UP (ref 80–100)
MONOCYTES # BLD AUTO: 0.6 K/UL — SIGNIFICANT CHANGE UP (ref 0–0.9)
MONOCYTES NFR BLD AUTO: 4.7 % — SIGNIFICANT CHANGE UP (ref 2–14)
NEUTROPHILS # BLD AUTO: 10.2 K/UL — HIGH (ref 1.8–7.4)
NEUTROPHILS NFR BLD AUTO: 81.4 % — HIGH (ref 43–77)
NITRITE UR-MCNC: NEGATIVE — SIGNIFICANT CHANGE UP
PH UR: 5.5 — SIGNIFICANT CHANGE UP (ref 5–8)
PLATELET # BLD AUTO: 344 K/UL — SIGNIFICANT CHANGE UP (ref 150–400)
POTASSIUM SERPL-MCNC: 3.6 MMOL/L — SIGNIFICANT CHANGE UP (ref 3.5–5.3)
POTASSIUM SERPL-SCNC: 3.6 MMOL/L — SIGNIFICANT CHANGE UP (ref 3.5–5.3)
PROT SERPL-MCNC: 7.4 G/DL — SIGNIFICANT CHANGE UP (ref 6–8.3)
PROT UR-MCNC: ABNORMAL
RBC # BLD: 4.19 M/UL — LOW (ref 4.2–5.8)
RBC # FLD: 13 % — SIGNIFICANT CHANGE UP (ref 10.3–14.5)
RBC CASTS # UR COMP ASSIST: 3 /HPF — SIGNIFICANT CHANGE UP (ref 0–4)
SODIUM SERPL-SCNC: 139 MMOL/L — SIGNIFICANT CHANGE UP (ref 135–145)
SODIUM UR-SCNC: 36 MMOL/L — SIGNIFICANT CHANGE UP
SP GR SPEC: 1.01 — SIGNIFICANT CHANGE UP (ref 1.01–1.02)
UROBILINOGEN FLD QL: NEGATIVE — SIGNIFICANT CHANGE UP
WBC # BLD: 12.5 K/UL — HIGH (ref 3.8–10.5)
WBC # FLD AUTO: 12.5 K/UL — HIGH (ref 3.8–10.5)
WBC UR QL: 5 /HPF — SIGNIFICANT CHANGE UP (ref 0–5)

## 2019-07-30 PROCEDURE — 70450 CT HEAD/BRAIN W/O DYE: CPT | Mod: 26

## 2019-07-30 PROCEDURE — 99213 OFFICE O/P EST LOW 20 MIN: CPT

## 2019-07-30 PROCEDURE — 99285 EMERGENCY DEPT VISIT HI MDM: CPT

## 2019-07-30 PROCEDURE — 74176 CT ABD & PELVIS W/O CONTRAST: CPT | Mod: 26

## 2019-07-30 RX ORDER — LEVETIRACETAM 250 MG/1
500 TABLET, FILM COATED ORAL
Refills: 0 | Status: DISCONTINUED | OUTPATIENT
Start: 2019-07-30 | End: 2019-08-15

## 2019-07-30 RX ORDER — CARVEDILOL PHOSPHATE 80 MG/1
25 CAPSULE, EXTENDED RELEASE ORAL EVERY 12 HOURS
Refills: 0 | Status: DISCONTINUED | OUTPATIENT
Start: 2019-07-30 | End: 2019-08-15

## 2019-07-30 RX ORDER — AMLODIPINE BESYLATE 2.5 MG/1
10 TABLET ORAL DAILY
Refills: 0 | Status: DISCONTINUED | OUTPATIENT
Start: 2019-07-30 | End: 2019-07-31

## 2019-07-30 RX ORDER — SODIUM CHLORIDE 9 MG/ML
1000 INJECTION INTRAMUSCULAR; INTRAVENOUS; SUBCUTANEOUS ONCE
Refills: 0 | Status: COMPLETED | OUTPATIENT
Start: 2019-07-30 | End: 2019-07-30

## 2019-07-30 RX ORDER — DEXAMETHASONE 0.5 MG/5ML
1 ELIXIR ORAL DAILY
Refills: 0 | Status: DISCONTINUED | OUTPATIENT
Start: 2019-07-30 | End: 2019-08-15

## 2019-07-30 RX ORDER — FAMOTIDINE 10 MG/ML
20 INJECTION INTRAVENOUS DAILY
Refills: 0 | Status: DISCONTINUED | OUTPATIENT
Start: 2019-07-30 | End: 2019-08-15

## 2019-07-30 RX ORDER — ATORVASTATIN CALCIUM 80 MG/1
40 TABLET, FILM COATED ORAL AT BEDTIME
Refills: 0 | Status: DISCONTINUED | OUTPATIENT
Start: 2019-07-30 | End: 2019-08-02

## 2019-07-30 RX ADMIN — SODIUM CHLORIDE 2000 MILLILITER(S): 9 INJECTION INTRAMUSCULAR; INTRAVENOUS; SUBCUTANEOUS at 18:29

## 2019-07-30 NOTE — H&P ADULT - HISTORY OF PRESENT ILLNESS
Patient is a 69 year old male with glioblastoma multiforme diagnosed 1.5 years ago, s/p radiation 1.5 years ago, and s/p last round of chemotherapy Dec 2018, recent hospitalization for lithotripsy of nephrolithiasis, DM, HTN, who is presenting with worsening memory loss, confusion,  and change in mood with increased agitation over the past two weeks, according to his two children. The patient was discharged from the hospital two weeks ago and since then has been increasingly agitated, he has been not making sense when speaking to his children over the phone, and has been refusing to let the home health aid into the house. His children deny any recent infections or sick contacts, no fevers, chills, nausea, vomiting, diarrhea. His daughter has noticed decreased appetite and very little PO intake over the past week. He has a chronic cough and chronic shortness of breath when tired, which has not worsened to their knowledge. He fell outside 2 months ago but did not hit his head. Patient is a 69 year old male with glioblastoma multiforme diagnosed 1.5 years ago, s/p radiation 1.5 years ago, and s/p last round of chemotherapy Dec 2018, recent hospitalization for lithotripsy of nephrolithiasis, DM, HTN, who is presenting with worsening memory loss, confusion,  and change in mood with increased agitation over the past two weeks, according to his two children. The patient was discharged from the hospital two weeks ago and since then has been increasingly agitated, he has been not making sense when speaking to his children over the phone, and has been refusing to let the home health aid into the house. His children deny any recent infections or sick contacts, no fevers, chills, nausea, vomiting, diarrhea, complaints of CP, palpitations. His daughter has noticed decreased PO intake over the past week. He has a chronic cough and chronic shortness of breath w/ exertion. He fell outside 2 months ago but did not hit his head, but has not had recent falls or loss of balance as per family.

## 2019-07-30 NOTE — H&P ADULT - PROBLEM SELECTOR PLAN 1
Secondary to worsening glioblastoma vs dementia  UA negative, no active source of infection -Secondary to worsening glioblastoma vs dementia  -UA negative, no active source of infection  -send B12, TSH, folate -Secondary to worsening glioblastoma vs dementia  -UA negative, no active source of infection  -f/u CXR in the setting of cough/SOB  -send B12, TSH, folate -Secondary to worsening glioblastoma vs delirium vs dementia  -UA negative, no active source of infection  -f/u CXR in the setting of cough/SOB  -send B12, TSH, folate -Secondary to worsening glioblastoma vs delirium vs dementia  -UA negative, no active source of infection  -f/u CXR in the setting of cough/SOB  -send B12, TSH, folate  -f/u w/ neuro-oncologist Dr.Deborah Hernandez for collateral/further eval for AMS -Secondary to worsening glioblastoma vs delirium vs dementia  -UA negative, no active source of infection  -f/u CXR in the setting of cough/SOB  -send B12, TSH, folate  -f/u w/ neuro-oncologist Dr.Deborah Hernandez for collateral/further eval for AMS  -enhanced supervision for now -Secondary to worsening glioblastoma vs delirium vs dementia.   -UA negative, no active source of infection  -f/u CXR in the setting of cough/SOB  -send B12, TSH, folate  -f/u w/ neuro-oncologist Dr.Deborah Hernandez for collateral/further eval for AMS   -consider MRI as patient reported to be obtained outpatient  -enhanced supervision for now

## 2019-07-30 NOTE — MEDICAL STUDENT ADULT H&P (EDUCATION) - NS MD HP STUD RESULTS LAB FT
13.3   12.5  )-----------( 344      ( 30 Jul 2019 15:28 )             38.3   07-30  139  |  98  |  34<H>  ----------------------------<  193<H>  3.6   |  23  |  2.40<H>    Ca    9.5      30 Jul 2019 15:28  TPro  7.4  /  Alb  4.3  /  TBili  0.5  /  DBili  x   /  AST  17  /  ALT  40  /  AlkPhos  87  07-30  Sodium, Random Urine: 36:   Creatinine, Random Urine: 201:

## 2019-07-30 NOTE — H&P ADULT - NSHPPHYSICALEXAM_GEN_ALL_CORE
PHYSICAL EXAM:    GENERAL: Pt somewhat confused but cooperative, responds to questions  HEAD:  Normocephalic, atraumatic  EYES: EOMI, PERRLA  HEENT: Moist mucous membranes  NECK: Supple, No JVD  NERVOUS SYSTEM:  Alert & Oriented X0-1, knows months but not date, not oriented to person or place Motor Strength 5/5 B/L upper and lower extremities  CHEST/LUNG: Clear to auscultation bilaterally  HEART: Regular rate and rhythm, no murmur   ABDOMEN: Soft, Nontender, Nondistended, Bowel sounds present  EXTREMITIES:   No clubbing, cyanosis, or edema  MUSCULOSKELTAL- No muscle tenderness, no joint tenderness  SKIN- warm and dry, no rash Vital Signs Last 24 Hrs  T(C): 36.4 (30 Jul 2019 23:46), Max: 36.6 (30 Jul 2019 14:57)  T(F): 97.6 (30 Jul 2019 23:46), Max: 97.9 (30 Jul 2019 21:53)  HR: 74 (30 Jul 2019 23:46) (61 - 74)  BP: 144/77 (30 Jul 2019 23:46) (113/67 - 155/86)  BP(mean): --  RR: 18 (30 Jul 2019 23:46) (17 - 20)  SpO2: 96% (30 Jul 2019 23:46) (95% - 100%)  PHYSICAL EXAM:    GENERAL: Pt somewhat confused but cooperative, responds to questions  HEAD:  Normocephalic, atraumatic  EYES: EOMI, PERRLA  HEENT: Moist mucous membranes  NECK: Supple, No JVD  NERVOUS SYSTEM:  Alert & Oriented X0-1, knows months but not date, not oriented to person or place Motor Strength 5/5 B/L upper and lower extremities  CHEST/LUNG: Clear to auscultation bilaterally  HEART: Regular rate and rhythm, no murmur   ABDOMEN: Soft, Nontender, Nondistended, Bowel sounds present  EXTREMITIES:   No clubbing, cyanosis, or edema  MUSCULOSKELTAL: No muscle tenderness, no joint tenderness  SKIN- warm and dry, no rash

## 2019-07-30 NOTE — ED ADULT TRIAGE NOTE - CHIEF COMPLAINT QUOTE
AMS. Pt lives alone . Family asking for social work intervention   pt has brain tumor  Glioblastoma stage 4

## 2019-07-30 NOTE — H&P ADULT - ASSESSMENT
69 year old M hx Grade IV glioblastoma, recent admission for lithotripsy of nephrolithiasis, brought in by family for altered mental status. 69 year old M hx Grade IV glioblastoma, recent admission for lithotripsy of nephrolithiasis, brought in by family for altered mental status secondary to worsening malignancy vs delirium vs dementia

## 2019-07-30 NOTE — ED ADULT NURSE REASSESSMENT NOTE - NS ED NURSE REASSESS COMMENT FT1
Pt received 1900 from [facility]. VSS/ NAD. Family at bedside. Ambulatory to toilet independently. Steady gait. Assisted back to bed with side rails up. Family would like to give pt PM meds, MAR contacted, patient advised to wait for admitting MD to al before taking meds. Pt and family agreeable.

## 2019-07-30 NOTE — H&P ADULT - PROBLEM SELECTOR PLAN 5
DVT ppx: hep subq  Diet: Carb consistent DVT ppx: hep subq  Diet: Carb consistent  f/u w/ SW in AM regarding nursing home placement as per family request since family unable to take care of pt Likely pre-renal  gentle diuresis. trend creat, monitor I/Os May be pre-renal in setting of reported decreased PO intake  However FeNA suggests obstruction: prelim of CT without reports of hydronephrosis  Check bladder scan  gentle diuresis. trend creat, monitor I/Os

## 2019-07-30 NOTE — MEDICAL STUDENT ADULT H&P (EDUCATION) - NS MD HP STUD RESULTS RAD FT
HEAD CT NON CON: IMPRESSION:   A 5.9 cm partially calcified hyperdense mass in the left temporal lobe is   visualized consistent with patient's known glioblastoma multiforme. There   is no evidence of additional acute intracranial pathology.    CT Abdomen and Pelvis   INTERPRETATION:  No nephrolithiasis or hydronephrosis. No emergent   findings. Follow-up official report.

## 2019-07-30 NOTE — ED PROVIDER NOTE - OBJECTIVE STATEMENT
69 year old M hx Grade IV glioblastoma, recent admission for lithotripsy of nephrolithiasis, brought in by family for altered mental status. per family member, patient has been more confused since leaving the hospital. Patient lives alone and has a HHA, but has not been opening the door for them. Is forgetful. Denies chest pain, headache, shortness of breath, dysuria, hematuria, nausea, vomiting, diarrhea.

## 2019-07-30 NOTE — ED PROVIDER NOTE - PHYSICAL EXAMINATION
General appearance: NAD, conversant   Eyes: anicteric sclerae, moist conjunctivae; no lid-lag; Pupils equal, round and reactive to light; Extraocular muscles intact   HENT: Atraumatic; oropharynx clear with moist mucous membranes and no mucosal ulcerations; normal hard and soft palate; no pharyngeal erythema or exudate   Neck: Trachea midline;    Pulm: Lungs clear to auscultation bilaterally, with normal respiratory effort and no intercostal retractions; normal work of breathing   CV: Regular Rhythm and Rate; Normal S1, S2; No murmurs, rubs, or gallops. 2+ peripheral pulses.   Abdomen: Soft, non-tender, non-distended; no masses or hepatosplenomegaly.   Extremities: No peripheral edema or extremity lymphadenopathy. 5/5 strength in all four extremities.   Skin: Normal temperature, turgor and texture; no rash, ulcers or subcutaneous nodules   Psych: Appropriate affect, cooperative

## 2019-07-30 NOTE — MEDICAL STUDENT ADULT H&P (EDUCATION) - NS MD HP STUD ASPLAN ASSES FT
Patient is a 69 year old male with glioblastoma multiforme diagnosed 1.5 years ago, s/p radiation 1.5 years ago, and s/p last round of chemotherapy Dec 2018, recent hospitalization for lithotripsy of nephrolithiasis, DM, HTN, who was brought in by family for 2 weeks of acute on chronic worsening of memory loss, confusion, and change in mood with increased agitation.

## 2019-07-30 NOTE — MEDICAL STUDENT ADULT H&P (EDUCATION) - NS MD HP STUD MEDICATIONS FT
-amLODIPine Besylate 10 MG Oral Tablet  TAKE 1 TABLET DAILY.   Quantity: 90 Refills: 1    Ordered: 18-Jun-2019   ONDINA BONILLA MD   Start : 21-Nov-2018 Active       -Atorvastatin Calcium 40 MG Oral Tablet  TAKE 1 TABLET DAILY AT BEDTIME.   Quantity: 1 Refills: 2    Ordered: 9-Jul-2019   DEBORA WEBER MD   Start : 8-May-2019 Active       -Carvedilol 12.5 MG Oral Tablet  TAKE 1 TABLET TWICE DAILY,  WITH MORNING AND EVENING MEAL   Quantity: 30 Refills: 3    Ordered: 17-May-2019   CURT WAYNE MD   Start : 30-Nov-2018 Active       -Contour Blood Glucose Test Strips  check finger sticks twice daily   Quantity: 100 Refills: 3    Ordered: 21-Sep-2018   GRETA FISCHER MD   Start : 27-Apr-2018 Active       -Dexamethasone 1 MG Oral Tablet  TAKE 1 TABLET BY MOUTH EVERY DAY   Quantity: 30 Refills: 0    Ordered: 9-Jul-2019   WILLI CASON MD   Start : 13-Mar-2018 Active       -Famotidine 20 MG Oral Tablet  TAKE 1 TABLET DAILY AS DIRECTED.   Quantity: 60 Refills: 3    Ordered: 3-May-2019   MATI BENNETT MD   Start : 6-Feb-2018 Active       -hydroCHLOROthiazide 25 MG Oral Tablet  TAKE 2 TABLETS DAILY.   Quantity: 120 Refills: 1    Ordered: 9-Jul-2019   DEBORA WEBER MD   Start : 17-Oct-2018 Active       -levETIRAcetam 500 MG Oral Tablet  TAKE 1 TABLET TWICE DAILY.   Quantity: 60 Refills: 6    Ordered: 8-Apr-2019   WILLI CASON MD   Start : 8-Apr-2019 Active       -Losartan Potassium 100 MG Oral Tablet  TAKE 1 TABLET DAILY.   Quantity: 30 Refills: 2    Ordered: 24-Jul-2019   GERRY MAY   Start : 30-Nov-2018 Active       -metFORMIN HCl - 850 MG Oral Tablet  TAKE 1 TABLET TWICE DAILY WITH MEALS.   Quantity: 120 Refills: 2    Ordered: 9-Jul-2019   DEBORA WEBER MD   Start : 27-Apr-2018 Active

## 2019-07-30 NOTE — H&P ADULT - NSHPLABSRESULTS_GEN_ALL_CORE
13.3   12.5  )-----------( 344      ( 2019 15:28 )             38.3           139  |  98  |  34<H>  ----------------------------<  193<H>  3.6   |  23  |  2.40<H>    Ca    9.5      2019 15:28    TPro  7.4  /  Alb  4.3  /  TBili  0.5  /  DBili  x   /  AST  17  /  ALT  40  /  AlkPhos  87                Urinalysis Basic - ( 2019 16:29 )    Color: Light Yellow / Appearance: Clear / S.014 / pH: x  Gluc: x / Ketone: Negative  / Bili: Negative / Urobili: Negative   Blood: x / Protein: 30 mg/dL / Nitrite: Negative   Leuk Esterase: Negative / RBC: 3 /hpf / WBC 5 /HPF   Sq Epi: x / Non Sq Epi: 1 /hpf / Bacteria: Negative            Lactate Trend            CAPILLARY BLOOD GLUCOSE      POCT Blood Glucose.: 197 mg/dL (2019 14:59)        Culture Results:   No growth ( @ 18:37)  Culture Results:   <10,000 CFU/mL Normal Urogenital Karuna ( @ 14:34)    < from: CT Head No Cont (19 @ 15:44) >    A 5.9 cm partially calcified hyperdense mass in the left temporal lobe is   visualized consistent with patient's known glioblastoma multiforme. There   is no evidence of additional acute intracranial pathology.    < end of copied text >    < from: CT Abdomen and Pelvis No Cont (19 @ 18:23) >    No nephrolithiasis or hydronephrosis. No emergent   findings.     < end of copied text > Personally reviewed labs and noted in detail below: notable for leukocytosis              13.3   12.5  )-----------( 344      ( 2019 15:28 )             38.3           139  |  98  |  34<H>  ----------------------------<  193<H>  3.6   |  23  |  2.40<H>    Ca    9.5      2019 15:28    TPro  7.4  /  Alb  4.3  /  TBili  0.5  /  DBili  x   /  AST  17  /  ALT  40  /  AlkPhos  87          Urinalysis Basic - ( 2019 16:29 )    Color: Light Yellow / Appearance: Clear / S.014 / pH: x  Gluc: x / Ketone: Negative  / Bili: Negative / Urobili: Negative   Blood: x / Protein: 30 mg/dL / Nitrite: Negative   Leuk Esterase: Negative / RBC: 3 /hpf / WBC 5 /HPF   Sq Epi: x / Non Sq Epi: 1 /hpf / Bacteria: Negative    Lactate Trend    CAPILLARY BLOOD GLUCOSE      POCT Blood Glucose.: 197 mg/dL (2019 14:59)        Culture Results:   No growth ( @ 18:37)  Culture Results:   <10,000 CFU/mL Normal Urogenital Karuna ( @ 14:34)    Reviewed imaging  < from: CT Head No Cont (19 @ 15:44) >    A 5.9 cm partially calcified hyperdense mass in the left temporal lobe is   visualized consistent with patient's known glioblastoma multiforme. There   is no evidence of additional acute intracranial pathology.    < end of copied text >    < from: CT Abdomen and Pelvis No Cont (19 @ 18:23) >    No nephrolithiasis or hydronephrosis. No emergent   findings.     < end of copied text >    Personally reviewed EKG SR 60 bpm QTc 172 LAD, RBBB

## 2019-07-30 NOTE — ED PROVIDER NOTE - CLINICAL SUMMARY MEDICAL DECISION MAKING FREE TEXT BOX
69 year old M hx Grade IV glioblastoma, recent admission for lithotripsy of nephrolithiasis, brought in by family for altered mental status. Will check UA, basic labs, EKG, CTH. Likely admit as there is also concern for social welfare in addition to workup of altered mental status.

## 2019-07-30 NOTE — PATIENT PROFILE ADULT - NSPROMUTANXFEARFT_GEN_A_NUR

## 2019-07-30 NOTE — MEDICAL STUDENT ADULT H&P (EDUCATION) - NS MD HP STUD SOCIAL HX FT
Lives alone with home health aide, and children come to check on him often. Denied alcohol, tobacco and drug use.

## 2019-07-30 NOTE — H&P ADULT - ATTENDING COMMENTS
69 year old man with GBM diagnosed 1.5 years ago, s/p radiation 1.5 years ago, and s/p last round of chemotherapy Dec 2018, recent hospitalization for lithotripsy of nephrolithiasis, DM, HTN, who is presenting with worsening memory loss, confusion,  and change in mood with increased agitation over the past two weeks, according to his two children. No reported new medications with exception of Flomax.   Vitals reviewed physically examined patient.   Labs, imaging and EKG personally reviewed  AMS patient with known GBM. Etiology includes but not limited to GBM related disease vs delirium in setting of recent hospitalization vs dehydration in setting of worsening SHAWNA vs infection in setting of cough. Currently CT head without acute intracranial findings. No appreciable electrolyte disturbances. Patient with mild elevated BUN and SHAWNA. Check folate, B12, TSH. F/U with outpatient Neuro/Neuro consult in AM: regarding need for other workup. Continue Keppra for seizure ppx.  Remainder of plan as edited above.  Patient previously unknown to me and I was assigned to precept this case with housestaff resident, Dr. Quezada. My involvement in this case consisted only of the initial history, physical and management plan. Primary medicine day team to assume care in AM. 69 year old man with GBM diagnosed 1.5 years ago, s/p radiation 1.5 years ago, and s/p last round of chemotherapy Dec 2018, recent hospitalization for lithotripsy of nephrolithiasis, DM, HTN, who is presenting with worsening memory loss, confusion,  and change in mood with increased agitation over the past two weeks, according to his two children. No reported new medications with exception of Flomax.    #[id number]. Patient without acute complaints  Vitals reviewed physically examined patient. Agree with resident's findings. On repeat exam Patient states name, however does not know where he is. unable to state time. Face symmetrical. UE and LE 5/5 strength bilaterally.   Labs, imaging and EKG personally reviewed  AMS patient with known GBM. Etiology includes but not limited to GBM related disease vs delirium in setting of recent hospitalization vs dehydration in setting of worsening SHAWNA vs infection in setting of cough. Currently CT head without acute intracranial findings. No appreciable electrolyte disturbances. Patient with mild elevated BUN and SHAWNA. Check folate, B12, TSH. F/U with outpatient Neuro/Neuro consult in AM: regarding need for other workup. Continue Keppra for seizure ppx.  Remainder of plan as edited above.  Patient previously unknown to me and I was assigned to precept this case with housestaff resident, Dr. Quezada. My involvement in this case consisted only of the initial history, physical and management plan. Primary medicine day team to assume care in AM.

## 2019-07-30 NOTE — H&P ADULT - PROBLEM SELECTOR PLAN 4
DVT ppx:   Diet: DVT ppx:   Diet: Carb consistent ISS, titrate as needed ISS, titrate as needed  Hypoglycemia protocol

## 2019-07-30 NOTE — H&P ADULT - NSHPREVIEWOFSYSTEMS_GEN_ALL_CORE
REVIEW OF SYSTEMS:    CONSTITUTIONAL: No weakness, fevers or chills  NECK: No pain or stiffness  RESPIRATORY: as above  CARDIOVASCULAR: No chest pain or palpitations  GASTROINTESTINAL: No abdominal or epigastric pain. No nausea, vomiting, or hematemesis; No diarrhea or constipation. No melena or hematochezia.  GENITOURINARY: No dysuria, frequency or hematuria  NEUROLOGICAL: No numbness or weakness  SKIN: No itching, burning, rashes, or lesions   All other review of systems is negative unless indicated above. Limited ROS as patient with AMS AAOx0-1. ROS obtained per family  REVIEW OF SYSTEMS:    CONSTITUTIONAL: No weakness, fevers or chills  NECK: No pain or stiffness  RESPIRATORY: as above  CARDIOVASCULAR: No chest pain or palpitations  GASTROINTESTINAL: No abdominal or epigastric pain. No nausea, vomiting, or hematemesis; No diarrhea or constipation. No melena or hematochezia.  GENITOURINARY: No dysuria, frequency or hematuria  NEUROLOGICAL: +Increased confusion. No numbness or weakness  SKIN: No rashes or lesions

## 2019-07-30 NOTE — MEDICAL STUDENT ADULT H&P (EDUCATION) - NS MD HP STUD HX OF PRESENT ILLNESS FT
Patient is a 69 year old male with glioblastoma multiforme diagnosed 1.5 years ago, s/p radiation 1.5 years ago, and s/p last round of chemotherapy Dec 2018, recent hospitalization for lithotripsy of nephrolithiasis, DM, HTN, who is presenting with worsening memory loss, confusion,  and change in mood with increased agitation over the past two weeks, according to his two children. The patient was discharged from the hospital two weeks ago and since then has been increasingly agitated, he has been not making sense when speaking to his children over the phone, and has been refusing to let the home health aid into the house. His children deny any recent infections or sick contacts, no fevers, chills, nausea, vomiting, diarrhea. His daughter has noticed decreased appetite and very little PO intake over the past week. He has a chronic cough and chronic shortness of breath when tired, which has not worsened to their knowledge. He fell outside 2 months ago but did not hit his head.

## 2019-07-30 NOTE — ED PROVIDER NOTE - NS ED ROS FT
General: denies fever, chills, weight loss/weight gain.  HENT: denies nasal congestion, sore throat, rhinorrhea, ear pain  Eyes: denies visual changes, blurred vision, eye discharge, eye redness  Neck: denies neck pain, neck swelling  CV: denies chest pain, palpitations  Resp: denies difficulty breathing, cough  Abdominal: denies nausea, vomiting, diarrhea, abdominal pain, blood in stool, dark stool  MSK: denies muscle aches, bony pain, leg pain, leg swelling  Neuro: see HPI  Skin: denies rashes, cuts, bruises  Hematologic: denies unexplained bruises

## 2019-07-30 NOTE — MEDICAL STUDENT ADULT H&P (EDUCATION) - NS MD HP STUD RESULTS OTHER FT
Urinalysis Basic - ( 2019 16:29 )    Color: Light Yellow / Appearance: Clear / S.014 / pH: x  Gluc: x / Ketone: Negative  / Bili: Negative / Urobili: Negative   Blood: x / Protein: 30 mg/dL / Nitrite: Negative   Leuk Esterase: Negative / RBC: 3 /hpf / WBC 5 /HPF   Sq Epi: x / Non Sq Epi: 1 /hpf / Bacteria: Negative    Urine Microscopic-Add On (NC) (19 @ 16:29)    Bacteria: Negative    Epithelial Cells: 1 /hpf    Red Blood Cell - Urine: 3 /hpf    Hyaline Casts: 2 /lpf    White Blood Cell - Urine: 5 /HPF

## 2019-07-30 NOTE — MEDICAL STUDENT ADULT H&P (EDUCATION) - NS MD HP STUD PE NEURO FT
alert but not oriented to person, date or place. CNII-XII intact, no focal dificits, coordination intact, strength 4/4 in all extremities, sensation intact, no ataxia

## 2019-07-30 NOTE — H&P ADULT - PROBLEM SELECTOR PLAN 3
ISS, titrate as needed Likely reactive the setting of dexamethasone use  No focal evidence of infection, no fevers or infectious symptoms  Monitor off Abx Likely reactive the setting of dexamethasone use  No focal evidence of infection, no fevers or infectious symptoms  Check CXR  Monitor off Abx

## 2019-07-30 NOTE — ED PROVIDER NOTE - CARE PLAN
Principal Discharge DX:	SHAWNA (acute kidney injury)  Secondary Diagnosis:	Altered mental status, unspecified

## 2019-07-30 NOTE — MEDICAL STUDENT ADULT H&P (EDUCATION) - NS MD HP STUD ASPLAN PLAN FT
AMS  - worsening glioblastoma vs dementia vs infection vs poor PO intake vs CVA  -no localizing symptoms, UA is negative, WBC 12  - Head CT shows no acute change  -consult neurology, contact Dr. Cuba     GBM  -last MRI in May showed stable tumor, patient due for follow up MRI this week according to family  -non head ct showed no acute intracranial pathology  - discuss with Dr. Cuba    T2DM  -insulin sliding scale    Prophylactic measures  -Diabetic Diet  -DVT prophylaxis     Discharge  -social work/case management to discuss options ofr home health aide vs nursing home

## 2019-07-30 NOTE — ED ADULT NURSE NOTE - OBJECTIVE STATEMENT
69 y.o. Male presents to the ED accompanied by sister c/o AMS. Divehi speaking with sister at bedside to translate. Hx Glioblastoma multiforme, HTN, DM type 2. As per sister at bedside, pt was d/c from hospital for lithotripsy last week and has not been the same. Sister reports pt is not acting like himself - pt lives alone and "doesn't want his aide." A&Ox1- disoriented to time and place. Denies CP, SOB, N/V/D, urinary/bowel complications, fever/chills. Pt is in no current distress. Comfort and safety provided. Will continue to monitor. Dr. Hyde at bedside for assessment.

## 2019-07-30 NOTE — H&P ADULT - PROBLEM SELECTOR PLAN 2
CT head: A 5.9 cm partially calcified hyperdense mass in the left temporal lobe is   visualized consistent with patient's known glioblastoma multiforme. There   is no evidence of additional acute intracranial pathology. -CT head: A 5.9 cm partially calcified hyperdense mass in the left temporal lobe is   visualized consistent with patient's known glioblastoma multiforme. There   is no evidence of additional acute intracranial pathology.  - -Diagnosed since Jan 2001, s/p resection, RT, chemo, stable tumor from MRI in may  - CT head shows calcified hyperdense mass in the left temporal lobe consistent with existing GM  -CT head: A 5.9 cm partially calcified hyperdense mass in the left temporal lobe is   visualized consistent with patient's known glioblastoma multiforme. There   is no evidence of additional acute intracranial pathology. -Diagnosed since Jan 2001, s/p resection, RT, chemo(last in Nov 2018), stable tumor from MRI in may  - CT head shows calcified hyperdense mass in the left temporal lobe consistent with existing GM  -CT head: A 5.9 cm partially calcified hyperdense mass in the left temporal lobe is   visualized consistent with patient's known glioblastoma multiforme. There   is no evidence of additional acute intracranial pathology.

## 2019-07-30 NOTE — ED PROVIDER NOTE - ATTENDING CONTRIBUTION TO CARE
I, Darryl Mclean, performed a history and physical exam of the patient and discussed their management with the resident and /or advanced care provider. I reviewed the resident and /or ACP's note and agree with the documented findings and plan of care. My medical decison making and observations are found above.  Patient. appears alert and oriented but pt. spouse reports ams for 2 weeks.  Will evaluate labs, including u/a, ekg, and reassess.  Possible UTI s/p kidney stone.  Will also evaluate CT for change in brain mass.  Patient. signed out to Dr. Peter to f/u labs and imaging at 1600.

## 2019-07-30 NOTE — H&P ADULT - NSHPSOCIALHISTORY_GEN_ALL_CORE
No hx of smoking, drinking or illicit drug use. Lives by himself but recently has been refusing help of home nursing aides, for which he lost services as a result.

## 2019-07-30 NOTE — MEDICAL STUDENT ADULT H&P (EDUCATION) - NS MD HP STUD PE VITALS FT
Vital Signs Last 24 Hrs  T(C): 36.6 (30 Jul 2019 21:53), Max: 36.6 (30 Jul 2019 14:57)  T(F): 97.9 (30 Jul 2019 21:53), Max: 97.9 (30 Jul 2019 21:53)  HR: 72 (30 Jul 2019 21:53) (61 - 72)  BP: 113/67 (30 Jul 2019 21:53) (113/67 - 155/86)  BP(mean): --  RR: 18 (30 Jul 2019 21:53) (17 - 20)  SpO2: 95% (30 Jul 2019 21:53) (95% - 100%)

## 2019-07-31 DIAGNOSIS — D72.829 ELEVATED WHITE BLOOD CELL COUNT, UNSPECIFIED: ICD-10-CM

## 2019-07-31 DIAGNOSIS — N17.9 ACUTE KIDNEY FAILURE, UNSPECIFIED: ICD-10-CM

## 2019-07-31 LAB
ALBUMIN SERPL ELPH-MCNC: 3.5 G/DL — SIGNIFICANT CHANGE UP (ref 3.3–5)
ALP SERPL-CCNC: 74 U/L — SIGNIFICANT CHANGE UP (ref 40–120)
ALT FLD-CCNC: 29 U/L — SIGNIFICANT CHANGE UP (ref 10–45)
ANION GAP SERPL CALC-SCNC: 17 MMOL/L — SIGNIFICANT CHANGE UP (ref 5–17)
ANION GAP SERPL CALC-SCNC: 18 MMOL/L — HIGH (ref 5–17)
APTT BLD: 27.9 SEC — SIGNIFICANT CHANGE UP (ref 27.5–36.3)
AST SERPL-CCNC: 11 U/L — SIGNIFICANT CHANGE UP (ref 10–40)
BASOPHILS # BLD AUTO: 0.1 K/UL — SIGNIFICANT CHANGE UP (ref 0–0.2)
BASOPHILS NFR BLD AUTO: 0.6 % — SIGNIFICANT CHANGE UP (ref 0–2)
BILIRUB SERPL-MCNC: 0.4 MG/DL — SIGNIFICANT CHANGE UP (ref 0.2–1.2)
BUN SERPL-MCNC: 37 MG/DL — HIGH (ref 7–23)
BUN SERPL-MCNC: 38 MG/DL — HIGH (ref 7–23)
CALCIUM SERPL-MCNC: 8.7 MG/DL — SIGNIFICANT CHANGE UP (ref 8.4–10.5)
CALCIUM SERPL-MCNC: 8.9 MG/DL — SIGNIFICANT CHANGE UP (ref 8.4–10.5)
CHLORIDE SERPL-SCNC: 104 MMOL/L — SIGNIFICANT CHANGE UP (ref 96–108)
CHLORIDE SERPL-SCNC: 105 MMOL/L — SIGNIFICANT CHANGE UP (ref 96–108)
CO2 SERPL-SCNC: 20 MMOL/L — LOW (ref 22–31)
CO2 SERPL-SCNC: 21 MMOL/L — LOW (ref 22–31)
CREAT SERPL-MCNC: 2.49 MG/DL — HIGH (ref 0.5–1.3)
CREAT SERPL-MCNC: 2.62 MG/DL — HIGH (ref 0.5–1.3)
EOSINOPHIL # BLD AUTO: 0.1 K/UL — SIGNIFICANT CHANGE UP (ref 0–0.5)
EOSINOPHIL NFR BLD AUTO: 0.7 % — SIGNIFICANT CHANGE UP (ref 0–6)
GLUCOSE SERPL-MCNC: 126 MG/DL — HIGH (ref 70–99)
GLUCOSE SERPL-MCNC: 195 MG/DL — HIGH (ref 70–99)
HCT VFR BLD CALC: 33.8 % — LOW (ref 39–50)
HGB BLD-MCNC: 11.6 G/DL — LOW (ref 13–17)
INR BLD: 1.03 RATIO — SIGNIFICANT CHANGE UP (ref 0.88–1.16)
LYMPHOCYTES # BLD AUTO: 1.6 K/UL — SIGNIFICANT CHANGE UP (ref 1–3.3)
LYMPHOCYTES # BLD AUTO: 14.5 % — SIGNIFICANT CHANGE UP (ref 13–44)
MAGNESIUM SERPL-MCNC: 1.4 MG/DL — LOW (ref 1.6–2.6)
MCHC RBC-ENTMCNC: 31.5 PG — SIGNIFICANT CHANGE UP (ref 27–34)
MCHC RBC-ENTMCNC: 34.3 GM/DL — SIGNIFICANT CHANGE UP (ref 32–36)
MCV RBC AUTO: 91.7 FL — SIGNIFICANT CHANGE UP (ref 80–100)
MONOCYTES # BLD AUTO: 0.9 K/UL — SIGNIFICANT CHANGE UP (ref 0–0.9)
MONOCYTES NFR BLD AUTO: 8 % — SIGNIFICANT CHANGE UP (ref 2–14)
NEUTROPHILS # BLD AUTO: 8.3 K/UL — HIGH (ref 1.8–7.4)
NEUTROPHILS NFR BLD AUTO: 76.2 % — SIGNIFICANT CHANGE UP (ref 43–77)
PHOSPHATE SERPL-MCNC: 4.1 MG/DL — SIGNIFICANT CHANGE UP (ref 2.5–4.5)
PLATELET # BLD AUTO: 313 K/UL — SIGNIFICANT CHANGE UP (ref 150–400)
POTASSIUM SERPL-MCNC: 3 MMOL/L — LOW (ref 3.5–5.3)
POTASSIUM SERPL-MCNC: 3.9 MMOL/L — SIGNIFICANT CHANGE UP (ref 3.5–5.3)
POTASSIUM SERPL-SCNC: 3 MMOL/L — LOW (ref 3.5–5.3)
POTASSIUM SERPL-SCNC: 3.9 MMOL/L — SIGNIFICANT CHANGE UP (ref 3.5–5.3)
PROT SERPL-MCNC: 6 G/DL — SIGNIFICANT CHANGE UP (ref 6–8.3)
PROTHROM AB SERPL-ACNC: 11.8 SEC — SIGNIFICANT CHANGE UP (ref 10–12.9)
RBC # BLD: 3.69 M/UL — LOW (ref 4.2–5.8)
RBC # FLD: 12.9 % — SIGNIFICANT CHANGE UP (ref 10.3–14.5)
SODIUM SERPL-SCNC: 142 MMOL/L — SIGNIFICANT CHANGE UP (ref 135–145)
SODIUM SERPL-SCNC: 143 MMOL/L — SIGNIFICANT CHANGE UP (ref 135–145)
WBC # BLD: 10.9 K/UL — HIGH (ref 3.8–10.5)
WBC # FLD AUTO: 10.9 K/UL — HIGH (ref 3.8–10.5)

## 2019-07-31 PROCEDURE — 99223 1ST HOSP IP/OBS HIGH 75: CPT | Mod: GC

## 2019-07-31 PROCEDURE — 12345: CPT | Mod: NC,GC

## 2019-07-31 PROCEDURE — 71045 X-RAY EXAM CHEST 1 VIEW: CPT | Mod: 26

## 2019-07-31 RX ORDER — SODIUM CHLORIDE 9 MG/ML
1000 INJECTION, SOLUTION INTRAVENOUS
Refills: 0 | Status: DISCONTINUED | OUTPATIENT
Start: 2019-07-31 | End: 2019-08-15

## 2019-07-31 RX ORDER — HEPARIN SODIUM 5000 [USP'U]/ML
5000 INJECTION INTRAVENOUS; SUBCUTANEOUS EVERY 8 HOURS
Refills: 0 | Status: DISCONTINUED | OUTPATIENT
Start: 2019-07-31 | End: 2019-08-15

## 2019-07-31 RX ORDER — TAMSULOSIN HYDROCHLORIDE 0.4 MG/1
0.4 CAPSULE ORAL AT BEDTIME
Refills: 0 | Status: DISCONTINUED | OUTPATIENT
Start: 2019-07-31 | End: 2019-08-15

## 2019-07-31 RX ORDER — DEXTROSE 50 % IN WATER 50 %
15 SYRINGE (ML) INTRAVENOUS ONCE
Refills: 0 | Status: DISCONTINUED | OUTPATIENT
Start: 2019-07-31 | End: 2019-08-15

## 2019-07-31 RX ORDER — GLUCAGON INJECTION, SOLUTION 0.5 MG/.1ML
1 INJECTION, SOLUTION SUBCUTANEOUS ONCE
Refills: 0 | Status: DISCONTINUED | OUTPATIENT
Start: 2019-07-31 | End: 2019-08-15

## 2019-07-31 RX ORDER — POTASSIUM CHLORIDE 20 MEQ
40 PACKET (EA) ORAL EVERY 4 HOURS
Refills: 0 | Status: COMPLETED | OUTPATIENT
Start: 2019-07-31 | End: 2019-07-31

## 2019-07-31 RX ORDER — DEXTROSE 50 % IN WATER 50 %
25 SYRINGE (ML) INTRAVENOUS ONCE
Refills: 0 | Status: DISCONTINUED | OUTPATIENT
Start: 2019-07-31 | End: 2019-08-15

## 2019-07-31 RX ORDER — FAMOTIDINE 10 MG/ML
1 INJECTION INTRAVENOUS
Qty: 0 | Refills: 0 | DISCHARGE

## 2019-07-31 RX ORDER — SODIUM CHLORIDE 9 MG/ML
1000 INJECTION INTRAMUSCULAR; INTRAVENOUS; SUBCUTANEOUS
Refills: 0 | Status: DISCONTINUED | OUTPATIENT
Start: 2019-07-31 | End: 2019-08-04

## 2019-07-31 RX ORDER — MAGNESIUM SULFATE 500 MG/ML
1 VIAL (ML) INJECTION ONCE
Refills: 0 | Status: COMPLETED | OUTPATIENT
Start: 2019-07-31 | End: 2019-07-31

## 2019-07-31 RX ORDER — AMLODIPINE BESYLATE 2.5 MG/1
10 TABLET ORAL DAILY
Refills: 0 | Status: DISCONTINUED | OUTPATIENT
Start: 2019-07-31 | End: 2019-08-15

## 2019-07-31 RX ORDER — DEXTROSE 50 % IN WATER 50 %
12.5 SYRINGE (ML) INTRAVENOUS ONCE
Refills: 0 | Status: DISCONTINUED | OUTPATIENT
Start: 2019-07-31 | End: 2019-08-15

## 2019-07-31 RX ORDER — INSULIN LISPRO 100/ML
VIAL (ML) SUBCUTANEOUS AT BEDTIME
Refills: 0 | Status: DISCONTINUED | OUTPATIENT
Start: 2019-07-31 | End: 2019-08-15

## 2019-07-31 RX ORDER — INSULIN LISPRO 100/ML
VIAL (ML) SUBCUTANEOUS
Refills: 0 | Status: DISCONTINUED | OUTPATIENT
Start: 2019-07-31 | End: 2019-08-15

## 2019-07-31 RX ADMIN — HEPARIN SODIUM 5000 UNIT(S): 5000 INJECTION INTRAVENOUS; SUBCUTANEOUS at 21:00

## 2019-07-31 RX ADMIN — AMLODIPINE BESYLATE 10 MILLIGRAM(S): 2.5 TABLET ORAL at 05:56

## 2019-07-31 RX ADMIN — LEVETIRACETAM 500 MILLIGRAM(S): 250 TABLET, FILM COATED ORAL at 05:56

## 2019-07-31 RX ADMIN — FAMOTIDINE 20 MILLIGRAM(S): 10 INJECTION INTRAVENOUS at 11:42

## 2019-07-31 RX ADMIN — SODIUM CHLORIDE 75 MILLILITER(S): 9 INJECTION INTRAMUSCULAR; INTRAVENOUS; SUBCUTANEOUS at 08:28

## 2019-07-31 RX ADMIN — ATORVASTATIN CALCIUM 40 MILLIGRAM(S): 80 TABLET, FILM COATED ORAL at 21:00

## 2019-07-31 RX ADMIN — HEPARIN SODIUM 5000 UNIT(S): 5000 INJECTION INTRAVENOUS; SUBCUTANEOUS at 13:44

## 2019-07-31 RX ADMIN — CARVEDILOL PHOSPHATE 25 MILLIGRAM(S): 80 CAPSULE, EXTENDED RELEASE ORAL at 17:18

## 2019-07-31 RX ADMIN — TAMSULOSIN HYDROCHLORIDE 0.4 MILLIGRAM(S): 0.4 CAPSULE ORAL at 20:59

## 2019-07-31 RX ADMIN — LEVETIRACETAM 500 MILLIGRAM(S): 250 TABLET, FILM COATED ORAL at 17:18

## 2019-07-31 RX ADMIN — Medication 40 MILLIEQUIVALENT(S): at 13:44

## 2019-07-31 RX ADMIN — CARVEDILOL PHOSPHATE 25 MILLIGRAM(S): 80 CAPSULE, EXTENDED RELEASE ORAL at 05:56

## 2019-07-31 RX ADMIN — HEPARIN SODIUM 5000 UNIT(S): 5000 INJECTION INTRAVENOUS; SUBCUTANEOUS at 05:56

## 2019-07-31 RX ADMIN — Medication 1: at 11:41

## 2019-07-31 RX ADMIN — Medication 1 MILLIGRAM(S): at 05:56

## 2019-07-31 RX ADMIN — Medication 100 GRAM(S): at 08:25

## 2019-07-31 RX ADMIN — Medication 40 MILLIEQUIVALENT(S): at 08:26

## 2019-07-31 RX ADMIN — SODIUM CHLORIDE 75 MILLILITER(S): 9 INJECTION INTRAMUSCULAR; INTRAVENOUS; SUBCUTANEOUS at 02:17

## 2019-07-31 RX ADMIN — Medication 1: at 17:19

## 2019-07-31 NOTE — PROGRESS NOTE ADULT - PROBLEM SELECTOR PLAN 4
ISS, titrate as needed  Hypoglycemia protocol - fs controlled  - fs achs  - c/w ISS, titrate as needed

## 2019-07-31 NOTE — PROGRESS NOTE ADULT - SUBJECTIVE AND OBJECTIVE BOX
***************************************************************  Garry Burton, PGY1  Internal Medicine   pager: [phone]  ***************************************************************    ARSENIO DARNELL  69y  MRN: [id number]    Patient is a 69y old  Male who presents with a chief complaint of AMS (2019 22:01)      Subjective: Gu[place]an speaking patient -  used (ID# [id number]). no events ON. The patient states that he is confused as to why he is in the hospital. He has weakness in his legs. PCA says that the patient was able to walk to the bathroom without any balance problems. Denies fever, CP, SOB, abn pain, N/V, dysuria. Tolerating diet.      MEDICATIONS  (STANDING):  amLODIPine   Tablet 10 milliGRAM(s) Oral daily  atorvastatin 40 milliGRAM(s) Oral at bedtime  carvedilol 25 milliGRAM(s) Oral every 12 hours  dexamethasone     Tablet 1 milliGRAM(s) Oral daily  dextrose 5%. 1000 milliLiter(s) (50 mL/Hr) IV Continuous <Continuous>  dextrose 50% Injectable 12.5 Gram(s) IV Push once  dextrose 50% Injectable 25 Gram(s) IV Push once  dextrose 50% Injectable 25 Gram(s) IV Push once  famotidine    Tablet 20 milliGRAM(s) Oral daily  heparin  Injectable 5000 Unit(s) SubCutaneous every 8 hours  insulin lispro (HumaLOG) corrective regimen sliding scale   SubCutaneous at bedtime  insulin lispro (HumaLOG) corrective regimen sliding scale   SubCutaneous three times a day before meals  levETIRAcetam 500 milliGRAM(s) Oral two times a day  potassium chloride    Tablet ER 40 milliEquivalent(s) Oral every 4 hours  sodium chloride 0.9%. 1000 milliLiter(s) (75 mL/Hr) IV Continuous <Continuous>  tamsulosin 0.4 milliGRAM(s) Oral at bedtime    MEDICATIONS  (PRN):  dextrose 40% Gel 15 Gram(s) Oral once PRN Blood Glucose LESS THAN 70 milliGRAM(s)/deciliter  glucagon  Injectable 1 milliGRAM(s) IntraMuscular once PRN Glucose LESS THAN 70 milligrams/deciliter      Objective:    Vitals: Vital Signs Last 24 Hrs  T(C): 36.4 (19 @ 04:55), Max: 36.6 (19 @ 14:57)  T(F): 97.5 (19 @ 04:55), Max: 97.9 (19 @ 21:53)  HR: 65 (19 @ 05:50) (61 - 74)  BP: 137/74 (19 @ 05:50) (113/67 - 155/86)  BP(mean): --  RR: 18 (19 @ 04:55) (17 - 20)  SpO2: 96% (19 @ 04:55) (95% - 100%)            I&O's Summary      PHYSICAL EXAM:  GENERAL: Lethargic appearing male in NAD, patient open and closed his eyes throughout the duration of the exam. Speech is slow with latency and occasional lip smacking. Preservation of speech noted with one of his answers.   HEAD:  Atraumatic, Normocephalic  EYES: EOMI, conjunctiva and sclera clear, PERRLA  CHEST/LUNG: Clear to percussion bilaterally; No rales, rhonchi, wheezing, or rubs  HEART: Regular rate and rhythm; No murmurs, rubs, or gallops  ABDOMEN: Soft, Nontender, Moderately distended abdomen  SKIN: No rashes or lesions  NERVOUS SYSTEM:  Alert & Oriented X3, Patient has impaired long term memory. He is unable to tell me who the  is but tells me that he can "see his face". He also endorses being unable to name people. He is noted to have speech preservation with one of his responses. Motor strength 5/5 in the upper and lower extremities b/l. Sensation intact in all extremities. No dysmetria. CN intact. Left visual fields intact in all 4 quadrants. Right visual field impaired in all 4 quadrants.     LABS:      143  |  104  |  37<H>  ----------------------------<  126<H>  3.0<L>   |  21<L>  |  2.49<H>      139  |  98  |  34<H>  ----------------------------<  193<H>  3.6   |  23  |  2.40<H>    Ca    8.7      2019 07:06  Ca    9.5      2019 15:28  Phos  4.1       Mg     1.4         TPro  6.0  /  Alb  3.5  /  TBili  0.4  /  DBili  x   /  AST  11  /  ALT  29  /  AlkPhos  74    TPro  7.4  /  Alb  4.3  /  TBili  0.5  /  DBili  x   /  AST  17  /  ALT  40  /  AlkPhos  87        PT/INR - ( 2019 07:08 )   PT: 11.8 sec;   INR: 1.03 ratio         PTT - ( 2019 07:08 )  PTT:27.9 sec              Urinalysis Basic - ( 2019 16:29 )    Color: Light Yellow / Appearance: Clear / S.014 / pH: x  Gluc: x / Ketone: Negative  / Bili: Negative / Urobili: Negative   Blood: x / Protein: 30 mg/dL / Nitrite: Negative   Leuk Esterase: Negative / RBC: 3 /hpf / WBC 5 /HPF   Sq Epi: x / Non Sq Epi: 1 /hpf / Bacteria: Negative                              11.6   10.9  )-----------( 313      ( 2019 07:08 )             33.8                         13.3   12.5  )-----------( 344      ( 2019 15:28 )             38.3     CAPILLARY BLOOD GLUCOSE      POCT Blood Glucose.: 150 mg/dL (2019 08:27)  POCT Blood Glucose.: 196 mg/dL (2019 00:35)  POCT Blood Glucose.: 197 mg/dL (2019 14:59)      RADIOLOGY & ADDITIONAL TESTS:    Imaging Personally Reviewed:  [x ] YES  [ ] NO    Consultants involved in case:   Consultant(s) Notes Reviewed:  [ x] YES  [ ] NO:   Care Discussed with Consultants/Other Providers [x ] YES  [ ] NO

## 2019-07-31 NOTE — PROGRESS NOTE ADULT - ASSESSMENT
69 year old M hx Grade IV glioblastoma, recent admission for lithotripsy of nephrolithiasis, brought in by family for altered mental status secondary to worsening malignancy vs delirium vs dementia 69 year old M hx Grade IV glioblastoma, recent admission for lithotripsy of nephrolithiasis, brought in by family for altered mental status admitted for acute encephalopathy possibly a sequela of underlying GBM vs. breakthrough seizures vs. acute delirium in the setting of underlying dementia?

## 2019-07-31 NOTE — CONSULT NOTE ADULT - SUBJECTIVE AND OBJECTIVE BOX
Neurology Consult Note    HPI: 68 y/o Korean speaking male with past medical history of DM2, HTN, recent admission for SHAWNA 2/2 obstructing Right 1mm distal ureteral stone s/p URS, GBM Grade IV s/p resection on 2018 s/p chemoradiation (completed 3/23/2018) s/p 4 cycles of Temodar (completed 2018) presents to [facility] formerly [facility] for   worsening memory loss, confusion, and change in mood with increased agitation over the past two weeks, according to his two children. Patient has not been making sense when speaking to his children over the phone and has been refusing to let the home health aid into the house.   Patient, himself, states he is in the hospital because of difficulty expressing himself. Patient states that this is intermittent and has been happening for the past year. Patient also reports that because he is not able to express himself, it makes him irritated and frustrated. Patient reports he is aware that he is unable to express himself without loss of consciousness.     (Stroke only)  NIHSS:   MRS:   ICH:     REVIEW OF SYSTEMS  A 10-system ROS was performed and is negative except for those items noted above and/or in the HPI.    PAST MEDICAL & SURGICAL HISTORY:  Type 2 diabetes mellitus  Glioblastoma multiforme  HTN (hypertension)  Glioblastoma multiforme  History of appendectomy    FAMILY HISTORY:  No pertinent family history in first degree relatives    SOCIAL HISTORY:     MEDICATIONS (HOME):  Home Medications:  amLODIPine 10 mg oral tablet: 1 tab(s) orally once a day (:51)  atorvastatin 40 mg oral tablet: 1 tab(s) orally once a day (:51)  carvedilol 25 mg oral tablet: 1 tab(s) orally every 12 hours (:)  dexamethasone 1 mg oral tablet: 1 tab(s) orally once a day (:)  famotidine 20 mg oral tablet: 1 tab(s) orally once a day (:)  hydroCHLOROthiazide 25 mg oral tablet: 1 tab(s) orally 2 times a day (:51)  levETIRAcetam 500 mg oral tablet: 1 tab(s) orally 2 times a day (:)  losartan 100 mg oral tablet: 1 tab(s) orally once a day (2019 00:51)  metFORMIN 850 mg oral tablet: 1 tab(s) orally 2 times a day (2019 00:51)    MEDICATIONS  (STANDING):  amLODIPine   Tablet 10 milliGRAM(s) Oral daily  atorvastatin 40 milliGRAM(s) Oral at bedtime  carvedilol 25 milliGRAM(s) Oral every 12 hours  dexamethasone     Tablet 1 milliGRAM(s) Oral daily  dextrose 5%. 1000 milliLiter(s) (50 mL/Hr) IV Continuous <Continuous>  dextrose 50% Injectable 12.5 Gram(s) IV Push once  dextrose 50% Injectable 25 Gram(s) IV Push once  dextrose 50% Injectable 25 Gram(s) IV Push once  famotidine    Tablet 20 milliGRAM(s) Oral daily  heparin  Injectable 5000 Unit(s) SubCutaneous every 8 hours  insulin lispro (HumaLOG) corrective regimen sliding scale   SubCutaneous at bedtime  insulin lispro (HumaLOG) corrective regimen sliding scale   SubCutaneous three times a day before meals  levETIRAcetam 500 milliGRAM(s) Oral two times a day  sodium chloride 0.9%. 1000 milliLiter(s) (75 mL/Hr) IV Continuous <Continuous>  tamsulosin 0.4 milliGRAM(s) Oral at bedtime    MEDICATIONS  (PRN):  dextrose 40% Gel 15 Gram(s) Oral once PRN Blood Glucose LESS THAN 70 milliGRAM(s)/deciliter  glucagon  Injectable 1 milliGRAM(s) IntraMuscular once PRN Glucose LESS THAN 70 milligrams/deciliter    ALLERGIES/INTOLERANCES:  Allergies  No Known Allergies    Intolerances    VITALS & EXAMINATION:  Vital Signs Last 24 Hrs  T(C): 36.4 (2019 14:08), Max: 36.6 (2019 21:53)  T(F): 97.5 (2019 14:08), Max: 97.9 (2019 21:53)  HR: 60 (2019 14:08) (56 - 74)  BP: 121/70 (2019 14:08) (113/67 - 144/77)  BP(mean): --  RR: 18 (2019 14:08) (18 - 18)  SpO2: 95% (2019 14:08) (95% - 100%)    General:  Constitutional: Male, appears stated age, in no apparent distress including pain  Head: Normocephalic & atraumatic.  Extremities: No cyanosis, clubbing, or edema.  Skin: No rashes, bruising, or discoloration.     Neurological (>12):  MS: AAOx1 (knows hes in the hospital; knows hes 68 y/o; knows the year is 2019. Irritable affect. Follows all simple commands.    Language: Speech is clear, fluent with intact repetition. Unable to name objects.     CNs: PERRLA (R = 3mm, L = 3mm). VFF. EOMI no nystagmus, no diplopia. V1-3 intact to LT, well developed masseter muscles b/l. No facial asymmetry b/l, full eye closure strength b/l. Head turning & shoulder shrug intact b/l. Tongue midline, normal movements, no atrophy.    Fundoscopic: Not visualized.     Motor: Normal muscle bulk & tone. No noticeable tremor. No pronator drift.              Deltoid	Biceps	Triceps	Wrist	Finger ABd	   R	5	5	5	5	5		5 	  L	5	5	5	5	5		5    	H-Flex	H-Ext	H-ABd	H-ADd	K-Flex	K-Ext	D-Flex	P-Flex  R	5	5	5	5	5	5	5	5 	   L	5	5	5	5	5	5	5	5	     Sensation: Intact to LT/PP b/l throughout.     Cortical: Extinction on DSS (neglect): none    Reflexes:                 Plantar Resp  R		Down   L		Down     Coordination: No dysmetria to FTN/HTS    Gait: Deferred    LABORATORY:  CBC                       11.6   10.9  )-----------( 313      ( 2019 07:08 )             33.8     Chem -    142  |  105  |  38<H>  ----------------------------<  195<H>  3.9   |  20<L>  |  2.62<H>    Ca    8.9      2019 14:40  Phos  4.1       Mg     1.4         TPro  6.0  /  Alb  3.5  /  TBili  0.4  /  DBili  x   /  AST  11  /  ALT  29  /  AlkPhos  74      LFTs LIVER FUNCTIONS - ( 2019 07:06 )  Alb: 3.5 g/dL / Pro: 6.0 g/dL / ALK PHOS: 74 U/L / ALT: 29 U/L / AST: 11 U/L / GGT: x           Coagulopathy PT/INR - ( 2019 07:08 )   PT: 11.8 sec;   INR: 1.03 ratio         PTT - ( 2019 07:08 )  PTT:27.9 sec  Lipid Panel   A1c 07-17 [id number] 9.0    Cardiac enzymes     U/A Urinalysis Basic - ( 2019 16:29 )    Color: Light Yellow / Appearance: Clear / S.014 / pH: x  Gluc: x / Ketone: Negative  / Bili: Negative / Urobili: Negative   Blood: x / Protein: 30 mg/dL / Nitrite: Negative   Leuk Esterase: Negative / RBC: 3 /hpf / WBC 5 /HPF   Sq Epi: x / Non Sq Epi: 1 /hpf / Bacteria: Negative      CSF  Immunological  Other    STUDIES & IMAGING:  Studies (EKG, EEG, EMG, etc):     Radiology (XR, CT, MR, U/S, TTE/LEODAN):  < from: CT Head No Cont (19 @ 15:44) >  IMPRESSION:   A 5.9 cm partially calcified hyperdense mass in the left temporal lobe is   visualized consistent with patient's known glioblastoma multiforme. There   is no evidence of additional acute intracranial pathology.    < end of copied text >    < from: MR Head w/wo IV Cont (19 @ 11:37) >  INTERPRETATION:  Clinical indications: Follow-up brain tumor.    MRI of the brain was performed using sagittal T1, axial T1 T2 T2 FLAIR   diffusion and susceptibility weighted sequence. The patient was injected   with approximately 10 cc of Gadavist IV and sagittal coronal and axial   T1-weighted sequences were performed.    Postop changes compatible with a left pterional craniotomy is again seen.   There is thick irregular peripheral enhancement around the postop bed   with evidence of a nodular area of enhancement again seen involving the   left temporal region. The postop bed measures approximately 5.6 x 2.9 cm   and previously measured approximately 6.2 x 2.7 cm. The nodular area of   enhancement measures approximately 2.4 x 1.7 cm and previously measured   approximately 3.5 x 2.0. Surrounding T2 prolongation is again seen and   unchanged.    No new areas of abnormal signal signal enhancement seen.    Evaluation of the diffusion weighted sequence demonstrates no abnormal   areas of restricted diffusion to suggest acute infarct.    Parenchymal volume loss and chronic microvascular changes are identified.    The large vessels demonstrate normal flow voids from    Complete opacification of the right maxillary sinus is again seen.    Both mastoid and middle ear regions appear clear.    Impression: Previously noted postop bed as well as adjacent nodular area   of enhancement appears have slightly decreased in size. This could be   compatible with response to therapy though clinical correlation and   continued close interval follow-up is recommended.      < end of copied text >

## 2019-07-31 NOTE — PROGRESS NOTE ADULT - PROBLEM SELECTOR PLAN 6
DVT ppx: hep subq  Diet: Carb consistent  f/u w/ SW in AM regarding nursing home placement as per family request since family unable to take care of pt DVT ppx: hep subq  Diet: Carb consistent  f/u w/ SW regarding nursing home placement as per family request since family unable to take care of pt

## 2019-07-31 NOTE — PROGRESS NOTE ADULT - PROBLEM SELECTOR PLAN 3
Infection vs dexamethasone use. No focal evidence of infection, no fevers or infectious symptoms. Check CXR  Monitor off Abx Infection vs dexamethasone use. No focal evidence of infection, no fevers or infectious symptoms.   -Check CXR  -Monitor off Abx  -f/u CT scan for signs of pyelonephritis - resolved; likely 2' dexamethasone use. No focal evidence of infection, no fevers or infectious symptoms.

## 2019-07-31 NOTE — PROGRESS NOTE ADULT - PROBLEM SELECTOR PLAN 1
Patient has focal neurological findings consistent with his brain tumor in the left temporal lobe. CT scan w/o contrast of the head shows partially calcified hyperdense mass in the left temporal lobe measuring approximately 5.9 x 2.2 cm, grossly unchanged from previous study representing known history of glioblastoma multiforme.   No concern for increased ICP at this time. CT scan - no midline shift. Ventricles appear unchanged in size. There is no hydrocephalus.   Patient is currently AAO x 3 therefore AMS has resolved. AMS could be related to infection. Leukocytosis on arrival. UA negative. Patient has chronic cough. CXR pending. AMS could be secondary to vitamin deficiency or thyroid dysfunction therefore folate, B12 and TSH ordered.   -f/u CXR in the setting of cough/SOB  -F/u B12, TSH, folate  -f/u w/ neuro-oncologist Dr.Deborah Hernandez for collateral/further eval for AMS   -consider MRI as patient reported to be obtained outpatient  -inpatient neuro consult - possibly a sequela of underlying GBM vs. breakthrough seizures vs. acute delirium in the setting of underlying dementia?  - Patient has focal neurological findings consistent with his brain tumor in the left temporal lobe. CT scan w/o contrast of the head shows partially calcified hyperdense mass in the left temporal lobe measuring approximately 5.9 x 2.2 cm, grossly unchanged from previous study representing known history of glioblastoma multiforme.   - No concern for increased ICP at this time. CT scan - no midline shift. Ventricles appear unchanged in size. There is no hydrocephalus.   - Patient is currently AAO x 3 therefore AMS has resolved. AMS could be related to infection. Leukocytosis on arrival. UA negative. Patient has chronic cough. CXR relatively unchanged. AMS could be secondary to vitamin deficiency or thyroid dysfunction therefore folate, B12 and TSH ordered.   -F/u B12, TSH, folate  -f/u w/ neuro-oncologist Dr. Zina Hernandez--> to see patient in the am  - MRI brain without contrast (given renal dysfunction) ordered  - VEEG ordered  - c/w decadron daily  - appreciate neuro consult

## 2019-07-31 NOTE — CONSULT NOTE ADULT - ATTENDING COMMENTS
Patient seen and examined - agree with exam and plan as documented.  EEG and MRI brain with and without contrast.

## 2019-07-31 NOTE — PHYSICAL THERAPY INITIAL EVALUATION ADULT - PERTINENT HX OF CURRENT PROBLEM, REHAB EVAL
Pt is a 70 y/o male brought in by family for altered mental status. Family reports increased agitation over the past 2 weeks, stating he was not making sense over the phone and has been refusing to let the home health aide into the house.

## 2019-07-31 NOTE — PROGRESS NOTE ADULT - PROBLEM SELECTOR PLAN 2
-Diagnosed since Jan 2001, s/p resection, RT, chemo(last in Nov 2018), stable tumor from MRI in may. CT imaging and neurological exam as above.

## 2019-07-31 NOTE — CONSULT NOTE ADULT - ASSESSMENT
68 y/o Mongolian speaking male with past medical history of DM2, HTN, recent admission for SHAWNA 2/2 obstructing Right 1mm distal ureteral stone s/p URS, GBM Grade IV s/p resection on 1/29/2018 s/p chemoradiation (completed 3/23/2018) s/p 4 cycles of Temodar (completed 9/2018) admitted for worsening agitation and difficulty expressing himself which he reports is episodic for the past 1 year however, patient's family says this is a more recent change. Neurologic exam remarkable for notable periods of frustration when answering questions and impaired ability to name objects. CT head on admission 5.9cm partially calcified hyperdense mass in the left temporal lobe. Last MRI brain was in 5/14/2019 demonstrated stable postop bed with adjacent nodular area of enhancement which slightly decreased in size. 70 y/o [place] speaking male with past medical history of DM2, HTN, recent admission for SHAWNA 2/2 obstructing Right 1mm distal ureteral stone s/p URS, GBM Grade IV s/p resection on 1/29/2018 s/p chemoradiation (completed 3/23/2018) s/p 4 cycles of Temodar (completed 9/2018) admitted for worsening agitation and difficulty expressing himself which he reports is episodic for the past 1 year however, patient's family says this is a more recent change. Neurologic exam remarkable for notable periods of frustration when answering questions and impaired ability to name objects. CT head on admission 5.9cm partially calcified hyperdense mass in the left temporal lobe. Last MRI brain was in 5/14/2019 demonstrated stable postop bed with adjacent nodular area of enhancement which slightly decreased in size.     Impression: Episodic mixed aphasia possibly 2/2 focal seizure w/ preserved awareness in the setting of L temporal GBM; possibly increasing tumor burden; would rule out toxic/metabolic etiology as well.     Recommendations:   [] Continuous vEEG  [] MRI Brain w/ & w/o contrast if renal status permits  [] Continue Decadron   [] Continue Keppra 500mg PO BID  [] Neuro-oncology to follow      Plan discussed with Dr. Sage.

## 2019-07-31 NOTE — PHYSICAL THERAPY INITIAL EVALUATION ADULT - ADDITIONAL COMMENTS
History provided via  Skyla #[id number]. Pt lives alone in an apartment with no steps to enter. Pt was previously independent in ambulation within the home with no assistive device and would only leave the home with assistance and a cane. Pt states his daughter takes care of all cooking, cleaning, and shopping. Pt states he used to have a home health aide but has recently stopped the services due to "too many people in the house".

## 2019-08-01 ENCOUNTER — CHART COPY (OUTPATIENT)
Age: 69
End: 2019-08-01

## 2019-08-01 LAB
-  AMIKACIN: SIGNIFICANT CHANGE UP
-  AMPICILLIN/SULBACTAM: SIGNIFICANT CHANGE UP
-  AMPICILLIN: SIGNIFICANT CHANGE UP
-  AZTREONAM: SIGNIFICANT CHANGE UP
-  CEFAZOLIN: SIGNIFICANT CHANGE UP
-  CEFEPIME: SIGNIFICANT CHANGE UP
-  CEFOXITIN: SIGNIFICANT CHANGE UP
-  CEFTRIAXONE: SIGNIFICANT CHANGE UP
-  CIPROFLOXACIN: SIGNIFICANT CHANGE UP
-  ERTAPENEM: SIGNIFICANT CHANGE UP
-  GENTAMICIN: SIGNIFICANT CHANGE UP
-  IMIPENEM: SIGNIFICANT CHANGE UP
-  LEVOFLOXACIN: SIGNIFICANT CHANGE UP
-  MEROPENEM: SIGNIFICANT CHANGE UP
-  NITROFURANTOIN: SIGNIFICANT CHANGE UP
-  PIPERACILLIN/TAZOBACTAM: SIGNIFICANT CHANGE UP
-  TIGECYCLINE: SIGNIFICANT CHANGE UP
-  TOBRAMYCIN: SIGNIFICANT CHANGE UP
-  TRIMETHOPRIM/SULFAMETHOXAZOLE: SIGNIFICANT CHANGE UP
ANION GAP SERPL CALC-SCNC: 14 MMOL/L — SIGNIFICANT CHANGE UP (ref 5–17)
BASE EXCESS BLDA CALC-SCNC: -2.3 MMOL/L — LOW (ref -2–2)
BASOPHILS # BLD AUTO: 0 K/UL — SIGNIFICANT CHANGE UP (ref 0–0.2)
BASOPHILS NFR BLD AUTO: 0.1 % — SIGNIFICANT CHANGE UP (ref 0–2)
BUN SERPL-MCNC: 36 MG/DL — HIGH (ref 7–23)
CALCIUM SERPL-MCNC: 8.8 MG/DL — SIGNIFICANT CHANGE UP (ref 8.4–10.5)
CHLORIDE SERPL-SCNC: 106 MMOL/L — SIGNIFICANT CHANGE UP (ref 96–108)
CO2 BLDA-SCNC: 24 MMOL/L — SIGNIFICANT CHANGE UP (ref 22–30)
CO2 SERPL-SCNC: 22 MMOL/L — SIGNIFICANT CHANGE UP (ref 22–31)
CREAT SERPL-MCNC: 1.94 MG/DL — HIGH (ref 0.5–1.3)
CULTURE RESULTS: SIGNIFICANT CHANGE UP
EOSINOPHIL # BLD AUTO: 0.1 K/UL — SIGNIFICANT CHANGE UP (ref 0–0.5)
EOSINOPHIL NFR BLD AUTO: 1.2 % — SIGNIFICANT CHANGE UP (ref 0–6)
FOLATE SERPL-MCNC: 9 NG/ML — SIGNIFICANT CHANGE UP
GLUCOSE SERPL-MCNC: 168 MG/DL — HIGH (ref 70–99)
HCO3 BLDA-SCNC: 22 MMOL/L — SIGNIFICANT CHANGE UP (ref 21–29)
HCT VFR BLD CALC: 34.8 % — LOW (ref 39–50)
HGB BLD-MCNC: 11.7 G/DL — LOW (ref 13–17)
LEVETIRACETAM SERPL-MCNC: 15.8 MCG/ML — SIGNIFICANT CHANGE UP (ref 12–46)
LYMPHOCYTES # BLD AUTO: 1.8 K/UL — SIGNIFICANT CHANGE UP (ref 1–3.3)
LYMPHOCYTES # BLD AUTO: 19.2 % — SIGNIFICANT CHANGE UP (ref 13–44)
MAGNESIUM SERPL-MCNC: 1.6 MG/DL — SIGNIFICANT CHANGE UP (ref 1.6–2.6)
MCHC RBC-ENTMCNC: 30.8 PG — SIGNIFICANT CHANGE UP (ref 27–34)
MCHC RBC-ENTMCNC: 33.7 GM/DL — SIGNIFICANT CHANGE UP (ref 32–36)
MCV RBC AUTO: 91.6 FL — SIGNIFICANT CHANGE UP (ref 80–100)
METHOD TYPE: SIGNIFICANT CHANGE UP
MONOCYTES # BLD AUTO: 0.8 K/UL — SIGNIFICANT CHANGE UP (ref 0–0.9)
MONOCYTES NFR BLD AUTO: 7.9 % — SIGNIFICANT CHANGE UP (ref 2–14)
NEUTROPHILS # BLD AUTO: 6.9 K/UL — SIGNIFICANT CHANGE UP (ref 1.8–7.4)
NEUTROPHILS NFR BLD AUTO: 71.6 % — SIGNIFICANT CHANGE UP (ref 43–77)
ORGANISM # SPEC MICROSCOPIC CNT: SIGNIFICANT CHANGE UP
ORGANISM # SPEC MICROSCOPIC CNT: SIGNIFICANT CHANGE UP
PCO2 BLDA: 40 MMHG — SIGNIFICANT CHANGE UP (ref 32–46)
PH BLDA: 7.37 — SIGNIFICANT CHANGE UP (ref 7.35–7.45)
PHOSPHATE SERPL-MCNC: 3.2 MG/DL — SIGNIFICANT CHANGE UP (ref 2.5–4.5)
PLATELET # BLD AUTO: 305 K/UL — SIGNIFICANT CHANGE UP (ref 150–400)
PO2 BLDA: 47 MMHG — CRITICAL LOW (ref 74–108)
POTASSIUM SERPL-MCNC: 3.4 MMOL/L — LOW (ref 3.5–5.3)
POTASSIUM SERPL-SCNC: 3.4 MMOL/L — LOW (ref 3.5–5.3)
RBC # BLD: 3.8 M/UL — LOW (ref 4.2–5.8)
RBC # FLD: 12.9 % — SIGNIFICANT CHANGE UP (ref 10.3–14.5)
SAO2 % BLDA: 79 % — LOW (ref 92–96)
SODIUM SERPL-SCNC: 142 MMOL/L — SIGNIFICANT CHANGE UP (ref 135–145)
SPECIMEN SOURCE: SIGNIFICANT CHANGE UP
SURGICAL PATHOLOGY STUDY: SIGNIFICANT CHANGE UP
TSH SERPL-MCNC: 1.13 UIU/ML — SIGNIFICANT CHANGE UP (ref 0.27–4.2)
VIT B12 SERPL-MCNC: 298 PG/ML — SIGNIFICANT CHANGE UP (ref 232–1245)
WBC # BLD: 9.6 K/UL — SIGNIFICANT CHANGE UP (ref 3.8–10.5)
WBC # FLD AUTO: 9.6 K/UL — SIGNIFICANT CHANGE UP (ref 3.8–10.5)

## 2019-08-01 PROCEDURE — 70551 MRI BRAIN STEM W/O DYE: CPT | Mod: 26

## 2019-08-01 PROCEDURE — 99232 SBSQ HOSP IP/OBS MODERATE 35: CPT | Mod: GC

## 2019-08-01 PROCEDURE — 99222 1ST HOSP IP/OBS MODERATE 55: CPT

## 2019-08-01 RX ADMIN — LEVETIRACETAM 500 MILLIGRAM(S): 250 TABLET, FILM COATED ORAL at 18:13

## 2019-08-01 RX ADMIN — HEPARIN SODIUM 5000 UNIT(S): 5000 INJECTION INTRAVENOUS; SUBCUTANEOUS at 05:12

## 2019-08-01 RX ADMIN — HEPARIN SODIUM 5000 UNIT(S): 5000 INJECTION INTRAVENOUS; SUBCUTANEOUS at 22:29

## 2019-08-01 RX ADMIN — TAMSULOSIN HYDROCHLORIDE 0.4 MILLIGRAM(S): 0.4 CAPSULE ORAL at 22:29

## 2019-08-01 RX ADMIN — ATORVASTATIN CALCIUM 40 MILLIGRAM(S): 80 TABLET, FILM COATED ORAL at 22:29

## 2019-08-01 RX ADMIN — AMLODIPINE BESYLATE 10 MILLIGRAM(S): 2.5 TABLET ORAL at 05:12

## 2019-08-01 RX ADMIN — LEVETIRACETAM 500 MILLIGRAM(S): 250 TABLET, FILM COATED ORAL at 05:12

## 2019-08-01 RX ADMIN — CARVEDILOL PHOSPHATE 25 MILLIGRAM(S): 80 CAPSULE, EXTENDED RELEASE ORAL at 05:12

## 2019-08-01 RX ADMIN — CARVEDILOL PHOSPHATE 25 MILLIGRAM(S): 80 CAPSULE, EXTENDED RELEASE ORAL at 18:13

## 2019-08-01 RX ADMIN — Medication 1: at 18:19

## 2019-08-01 RX ADMIN — Medication 1 MILLIGRAM(S): at 05:12

## 2019-08-01 RX ADMIN — HEPARIN SODIUM 5000 UNIT(S): 5000 INJECTION INTRAVENOUS; SUBCUTANEOUS at 12:45

## 2019-08-01 RX ADMIN — SODIUM CHLORIDE 75 MILLILITER(S): 9 INJECTION INTRAMUSCULAR; INTRAVENOUS; SUBCUTANEOUS at 22:29

## 2019-08-01 RX ADMIN — Medication 2: at 10:23

## 2019-08-01 RX ADMIN — FAMOTIDINE 20 MILLIGRAM(S): 10 INJECTION INTRAVENOUS at 12:45

## 2019-08-01 RX ADMIN — SODIUM CHLORIDE 75 MILLILITER(S): 9 INJECTION INTRAMUSCULAR; INTRAVENOUS; SUBCUTANEOUS at 12:46

## 2019-08-01 RX ADMIN — Medication 1: at 13:57

## 2019-08-01 RX ADMIN — SODIUM CHLORIDE 75 MILLILITER(S): 9 INJECTION INTRAMUSCULAR; INTRAVENOUS; SUBCUTANEOUS at 05:12

## 2019-08-01 NOTE — PROGRESS NOTE ADULT - PROBLEM SELECTOR PLAN 5
May be pre-renal in setting of reported decreased PO intake but BUN/Cr ratio is 15 . Patient had SHAWNA 2/2 nephrolithiasis 2 weeks ago s/p stent and flomax. CT scan prelim reading shows no repeat hydronephrosis. Patient is taking metformin - AG was elevated on arrival possibly in the setting of lactic acidosis.   - hold losartan, hctz and metformin  -Making good urine.   -gentle hydration, trend creat, monitor I/Os--- repeat bmp in the am - resolved; likely pre-renal in setting of reported decreased PO intake. Patient had EVELIN 2/2 nephrolithiasis 2 weeks ago s/p stent and flomax. CT scan prelim reading shows no repeat hydronephrosis. Patient is taking metformin.  - hold losartan, hctz and metformin  - Evelin resolved with IVF

## 2019-08-01 NOTE — PROGRESS NOTE ADULT - PROBLEM SELECTOR PLAN 3
- resolved; likely 2' dexamethasone use. No focal evidence of infection, no fevers or infectious symptoms.

## 2019-08-01 NOTE — PROGRESS NOTE ADULT - SUBJECTIVE AND OBJECTIVE BOX
***************************************************************  Garry Burton, PGY1  Internal Medicine   pager: [phone]  ***************************************************************    ARSENIO DARNELL  69y  MRN: [id number]    Patient is a 69y old  Male who presents with a chief complaint of AMS (2019 15:18)      Subjective: no events ON. Patient still confused as to why he is here. No headache, no changes in vision, no new weakness. Denies fever, CP, SOB, abn pain, N/V, dysuria. Tolerating diet.      MEDICATIONS  (STANDING):  amLODIPine   Tablet 10 milliGRAM(s) Oral daily  atorvastatin 40 milliGRAM(s) Oral at bedtime  carvedilol 25 milliGRAM(s) Oral every 12 hours  dexamethasone     Tablet 1 milliGRAM(s) Oral daily  dextrose 5%. 1000 milliLiter(s) (50 mL/Hr) IV Continuous <Continuous>  dextrose 50% Injectable 12.5 Gram(s) IV Push once  dextrose 50% Injectable 25 Gram(s) IV Push once  dextrose 50% Injectable 25 Gram(s) IV Push once  famotidine    Tablet 20 milliGRAM(s) Oral daily  heparin  Injectable 5000 Unit(s) SubCutaneous every 8 hours  insulin lispro (HumaLOG) corrective regimen sliding scale   SubCutaneous at bedtime  insulin lispro (HumaLOG) corrective regimen sliding scale   SubCutaneous three times a day before meals  levETIRAcetam 500 milliGRAM(s) Oral two times a day  sodium chloride 0.9%. 1000 milliLiter(s) (75 mL/Hr) IV Continuous <Continuous>  tamsulosin 0.4 milliGRAM(s) Oral at bedtime    MEDICATIONS  (PRN):  dextrose 40% Gel 15 Gram(s) Oral once PRN Blood Glucose LESS THAN 70 milliGRAM(s)/deciliter  glucagon  Injectable 1 milliGRAM(s) IntraMuscular once PRN Glucose LESS THAN 70 milligrams/deciliter      Objective:    Vitals: Vital Signs Last 24 Hrs  T(C): 36.5 (19 @ 04:24), Max: 36.6 (19 @ 17:15)  T(F): 97.7 (19 @ 04:24), Max: 97.8 (19 @ 17:15)  HR: 64 (19 @ 04:24) (56 - 64)  BP: 156/82 (19 @ 04:24) (121/70 - 156/82)  BP(mean): --  RR: 18 (19 @ 04:24) (17 - 18)  SpO2: 95% (19 @ 04:24) (95% - 97%)            I&O's Summary    2019 07:01  -  01 Aug 2019 07:00  --------------------------------------------------------  IN: 480 mL / OUT: 700 mL / NET: -220 mL        PHYSICAL EXAM:  GENERAL: NAD  HEAD:  Atraumatic, Normocephalic  EYES: EOMI, conjunctiva and sclera clear  CHEST/LUNG: Clear to percussion bilaterally; No rales, rhonchi, wheezing, or rubs  HEART: Regular rate and rhythm; No murmurs, rubs, or gallops  ABDOMEN: Soft, Nontender, distended abdomen - no fluid  SKIN: No rashes or lesions  NERVOUS SYSTEM:  Alert & Oriented X1 - patient stated that he was at [employer],  impaired long term memory. Short term memory 0/3 recall. Neurological exam unchanged from yesterday.    LABS:      142  |  106  |  36<H>  ----------------------------<  168<H>  3.4<L>   |  22  |  1.94<H>      142  |  105  |  38<H>  ----------------------------<  195<H>  3.9   |  20<L>  |  2.62<H>      143  |  104  |  37<H>  ----------------------------<  126<H>  3.0<L>   |  21<L>  |  2.49<H>    Ca    8.8      01 Aug 2019 06:30  Ca    8.9      2019 14:40  Ca    8.7      2019 07:06  Phos  3.2       Mg     1.6         TPro  6.0  /  Alb  3.5  /  TBili  0.4  /  DBili  x   /  AST  11  /  ALT  29  /  AlkPhos  74    TPro  7.4  /  Alb  4.3  /  TBili  0.5  /  DBili  x   /  AST  17  /  ALT  40  /  AlkPhos  87  07      PT/INR - ( 2019 07:08 )   PT: 11.8 sec;   INR: 1.03 ratio         PTT - ( 2019 07:08 )  PTT:27.9 sec              Urinalysis Basic - ( 2019 16:29 )    Color: Light Yellow / Appearance: Clear / S.014 / pH: x  Gluc: x / Ketone: Negative  / Bili: Negative / Urobili: Negative   Blood: x / Protein: 30 mg/dL / Nitrite: Negative   Leuk Esterase: Negative / RBC: 3 /hpf / WBC 5 /HPF   Sq Epi: x / Non Sq Epi: 1 /hpf / Bacteria: Negative      ABG - ( 01 Aug 2019 06:33 )  pH, Arterial: 7.37  pH, Blood: x     /  pCO2: 40    /  pO2: 47    / HCO3: 22    / Base Excess: -2.3  /  SaO2: 79                                      11.7   9.6   )-----------( 305      ( 01 Aug 2019 06:30 )             34.8                         11.6   10.9  )-----------( 313      ( 2019 07:08 )             33.8                         13.3   12.5  )-----------( 344      ( 2019 15:28 )             38.3     CAPILLARY BLOOD GLUCOSE      POCT Blood Glucose.: 161 mg/dL (2019 21:19)  POCT Blood Glucose.: 175 mg/dL (2019 17:15)  POCT Blood Glucose.: 179 mg/dL (2019 11:38)      RADIOLOGY & ADDITIONAL TESTS:    Imaging Personally Reviewed:  [x ] YES  [ ] NO    Consultants involved in case:   Consultant(s) Notes Reviewed:  [ x] YES  [ ] NO:   Care Discussed with Consultants/Other Providers [x ] YES  [ ] NO

## 2019-08-01 NOTE — PROGRESS NOTE ADULT - ASSESSMENT
69 year old M hx Grade IV glioblastoma, recent admission for lithotripsy of nephrolithiasis, brought in by family for altered mental status admitted for acute encephalopathy possibly a sequela of underlying GBM vs. breakthrough seizures vs. acute delirium in the setting of underlying dementia?

## 2019-08-01 NOTE — PROGRESS NOTE ADULT - PROBLEM SELECTOR PLAN 1
Likely 2/2 to GBM progression. Patient has focal neurological findings consistent with his brain tumor in the left temporal lobe. CT scan w/o contrast of the head shows partially calcified hyperdense mass in the left temporal lobe measuring approximately 5.9 x 2.2 cm, grossly unchanged from previous study representing known history of glioblastoma multiforme.   - No concern for increased ICP at this time. CT scan - no midline shift. Ventricles appear unchanged in size. There is no hydrocephalus.   - Patient is currently AAO x 1 - most likely worsening confusion 2/2 GBM. UA negative. Patient has chronic cough. CXR relatively unchanged. TSH, folate, and B12 wnl. Neuro consult recs -  Continuous vEEG, MRI Brain w/ & w/o contrast if renal status permits, Continue Decadron, Continue Keppra 500mg PO BID  -f/u w/ neuro-oncologist Dr. Zina Hernandez recs today  - MRI brain without contrast (given renal dysfunction) ordered  - VEEG ordered  - c/w decadron daily

## 2019-08-01 NOTE — PROVIDER CONTACT NOTE (CRITICAL VALUE NOTIFICATION) - ACTION/TREATMENT ORDERED:
MD to bedside. No further interventions at this time. MD Ledbetter will notify team. Will continue to monitor for pt safety.

## 2019-08-02 LAB
ANION GAP SERPL CALC-SCNC: 16 MMOL/L — SIGNIFICANT CHANGE UP (ref 5–17)
BUN SERPL-MCNC: 31 MG/DL — HIGH (ref 7–23)
CALCIUM SERPL-MCNC: 9 MG/DL — SIGNIFICANT CHANGE UP (ref 8.4–10.5)
CHLORIDE SERPL-SCNC: 107 MMOL/L — SIGNIFICANT CHANGE UP (ref 96–108)
CO2 SERPL-SCNC: 21 MMOL/L — LOW (ref 22–31)
CREAT SERPL-MCNC: 1.64 MG/DL — HIGH (ref 0.5–1.3)
GLUCOSE SERPL-MCNC: 172 MG/DL — HIGH (ref 70–99)
HCT VFR BLD CALC: 33.4 % — LOW (ref 39–50)
HGB BLD-MCNC: 11.4 G/DL — LOW (ref 13–17)
MAGNESIUM SERPL-MCNC: 1.4 MG/DL — LOW (ref 1.6–2.6)
MCHC RBC-ENTMCNC: 31.1 PG — SIGNIFICANT CHANGE UP (ref 27–34)
MCHC RBC-ENTMCNC: 34 GM/DL — SIGNIFICANT CHANGE UP (ref 32–36)
MCV RBC AUTO: 91.4 FL — SIGNIFICANT CHANGE UP (ref 80–100)
PHOSPHATE SERPL-MCNC: 3.1 MG/DL — SIGNIFICANT CHANGE UP (ref 2.5–4.5)
PLATELET # BLD AUTO: 320 K/UL — SIGNIFICANT CHANGE UP (ref 150–400)
POTASSIUM SERPL-MCNC: 3.2 MMOL/L — LOW (ref 3.5–5.3)
POTASSIUM SERPL-SCNC: 3.2 MMOL/L — LOW (ref 3.5–5.3)
RBC # BLD: 3.66 M/UL — LOW (ref 4.2–5.8)
RBC # FLD: 12.7 % — SIGNIFICANT CHANGE UP (ref 10.3–14.5)
SODIUM SERPL-SCNC: 144 MMOL/L — SIGNIFICANT CHANGE UP (ref 135–145)
WBC # BLD: 9.2 K/UL — SIGNIFICANT CHANGE UP (ref 3.8–10.5)
WBC # FLD AUTO: 9.2 K/UL — SIGNIFICANT CHANGE UP (ref 3.8–10.5)

## 2019-08-02 PROCEDURE — 95816 EEG AWAKE AND DROWSY: CPT | Mod: 26

## 2019-08-02 PROCEDURE — 99232 SBSQ HOSP IP/OBS MODERATE 35: CPT | Mod: GC

## 2019-08-02 RX ORDER — ATORVASTATIN CALCIUM 80 MG/1
80 TABLET, FILM COATED ORAL AT BEDTIME
Refills: 0 | Status: DISCONTINUED | OUTPATIENT
Start: 2019-08-02 | End: 2019-08-15

## 2019-08-02 RX ORDER — MAGNESIUM SULFATE 500 MG/ML
1 VIAL (ML) INJECTION ONCE
Refills: 0 | Status: COMPLETED | OUTPATIENT
Start: 2019-08-02 | End: 2019-08-02

## 2019-08-02 RX ORDER — POTASSIUM CHLORIDE 20 MEQ
40 PACKET (EA) ORAL ONCE
Refills: 0 | Status: COMPLETED | OUTPATIENT
Start: 2019-08-02 | End: 2019-08-02

## 2019-08-02 RX ORDER — ASPIRIN/CALCIUM CARB/MAGNESIUM 324 MG
81 TABLET ORAL DAILY
Refills: 0 | Status: DISCONTINUED | OUTPATIENT
Start: 2019-08-02 | End: 2019-08-15

## 2019-08-02 RX ADMIN — Medication 100 GRAM(S): at 13:27

## 2019-08-02 RX ADMIN — Medication 1: at 08:42

## 2019-08-02 RX ADMIN — HEPARIN SODIUM 5000 UNIT(S): 5000 INJECTION INTRAVENOUS; SUBCUTANEOUS at 06:56

## 2019-08-02 RX ADMIN — CARVEDILOL PHOSPHATE 25 MILLIGRAM(S): 80 CAPSULE, EXTENDED RELEASE ORAL at 06:56

## 2019-08-02 RX ADMIN — FAMOTIDINE 20 MILLIGRAM(S): 10 INJECTION INTRAVENOUS at 13:28

## 2019-08-02 RX ADMIN — Medication 1: at 22:48

## 2019-08-02 RX ADMIN — HEPARIN SODIUM 5000 UNIT(S): 5000 INJECTION INTRAVENOUS; SUBCUTANEOUS at 13:28

## 2019-08-02 RX ADMIN — SODIUM CHLORIDE 75 MILLILITER(S): 9 INJECTION INTRAMUSCULAR; INTRAVENOUS; SUBCUTANEOUS at 22:55

## 2019-08-02 RX ADMIN — Medication 1 MILLIGRAM(S): at 06:56

## 2019-08-02 RX ADMIN — HEPARIN SODIUM 5000 UNIT(S): 5000 INJECTION INTRAVENOUS; SUBCUTANEOUS at 22:50

## 2019-08-02 RX ADMIN — Medication 40 MILLIEQUIVALENT(S): at 13:28

## 2019-08-02 RX ADMIN — CARVEDILOL PHOSPHATE 25 MILLIGRAM(S): 80 CAPSULE, EXTENDED RELEASE ORAL at 18:25

## 2019-08-02 RX ADMIN — TAMSULOSIN HYDROCHLORIDE 0.4 MILLIGRAM(S): 0.4 CAPSULE ORAL at 22:50

## 2019-08-02 RX ADMIN — SODIUM CHLORIDE 75 MILLILITER(S): 9 INJECTION INTRAMUSCULAR; INTRAVENOUS; SUBCUTANEOUS at 08:43

## 2019-08-02 RX ADMIN — ATORVASTATIN CALCIUM 80 MILLIGRAM(S): 80 TABLET, FILM COATED ORAL at 22:54

## 2019-08-02 RX ADMIN — LEVETIRACETAM 500 MILLIGRAM(S): 250 TABLET, FILM COATED ORAL at 18:25

## 2019-08-02 RX ADMIN — Medication 2: at 13:27

## 2019-08-02 RX ADMIN — AMLODIPINE BESYLATE 10 MILLIGRAM(S): 2.5 TABLET ORAL at 06:56

## 2019-08-02 RX ADMIN — Medication 2: at 18:25

## 2019-08-02 RX ADMIN — Medication 81 MILLIGRAM(S): at 22:49

## 2019-08-02 RX ADMIN — LEVETIRACETAM 500 MILLIGRAM(S): 250 TABLET, FILM COATED ORAL at 06:56

## 2019-08-02 NOTE — PROGRESS NOTE ADULT - ASSESSMENT
70 y/o Divehi speaking male with past medical history of DM2, HTN, recent admission for SHAWNA 2/2 obstructing Right 1mm distal ureteral stone s/p URS, GBM Grade IV s/p resection on 1/29/2018 s/p chemoradiation (completed 3/23/2018) s/p 4 cycles of Temodar (completed 9/2018) admitted for worsening agitation and difficulty expressing himself which he reports is episodic for the past 1 year however, patient's family says this is a more recent change. Neurologic exam remarkable for notable periods of frustration when answering questions and impaired ability to name objects. CT head on admission 5.9cm partially calcified hyperdense mass in the left temporal lobe. Last MRI brain was in 5/14/2019 demonstrated stable postop bed with adjacent nodular area of enhancement which slightly decreased in size.     Impression: Episodic mixed aphasia w/ primarily anomic component possibly 2/2 focal seizure w/ preserved awareness in the setting of L temporal GBM  Acute L corona radiata infarct 2/2 cryptogenic etiology; hypercoagulable state is a possibilty    Recommendations:  [] MRA Head w/o contrast  [] MRA Neck w/ contrast (if renal status permits)   [] Will discuss the utility for Aspirin with Stroke Team  [] TTE  [] Lipitor 80mg QHS  [] A1c + Lipid Panel  [] Continuous vEEG  [x] MRI Brain w/ & w/o contrast (if renal status permits)  [x] Continue Decadron   [x] Continue Keppra 500mg PO BID  [x] Neuro-oncology to follow      Plan discussed with Dr. Sage. 70 y/o Upper sorbian speaking male with past medical history of DM2, HTN, recent admission for SHAWNA 2/2 obstructing Right 1mm distal ureteral stone s/p URS, GBM Grade IV s/p resection on 1/29/2018 s/p chemoradiation (completed 3/23/2018) s/p 4 cycles of Temodar (completed 9/2018) admitted for worsening agitation and difficulty expressing himself which he reports is episodic for the past 1 year however, patient's family says this is a more recent change. Neurologic exam remarkable for notable periods of frustration when answering questions and impaired ability to name objects. CT head on admission 5.9cm partially calcified hyperdense mass in the left temporal lobe. Last MRI brain was in 5/14/2019 demonstrated stable postop bed with adjacent nodular area of enhancement which slightly decreased in size.     Impression: Episodic mixed aphasia w/ primarily anomic component possibly 2/2 focal seizure w/ preserved awareness in the setting of L temporal GBM  Acute L corona radiata infarct 2/2 cryptogenic etiology; hypercoagulable state is a possibilty    Recommendations:  [] MRA Head w/o contrast  [] MRA Neck w/ contrast (if renal status permits)   [] Aspirin 81mg PO QD  [] TTE  [] Lipitor 80mg QHS  [] A1c + Lipid Panel  [] Continuous vEEG  [x] MRI Brain w/ & w/o contrast (if renal status permits)  [x] Continue Decadron   [x] Continue Keppra 500mg PO BID  [x] Neuro-oncology to follow      Plan discussed with Dr. Sage. 70 y/o Welsh speaking male with past medical history of DM2, HTN, recent admission for SHAWNA 2/2 obstructing Right 1mm distal ureteral stone s/p URS, GBM Grade IV s/p resection on 1/29/2018 s/p chemoradiation (completed 3/23/2018) s/p 4 cycles of Temodar (completed 9/2018) admitted for worsening agitation and difficulty expressing himself which he reports is episodic for the past 1 year however, patient's family says this is a more recent change. Neurologic exam remarkable for notable periods of frustration when answering questions and impaired ability to name objects. CT head on admission 5.9cm partially calcified hyperdense mass in the left temporal lobe. Last MRI brain was in 5/14/2019 demonstrated stable postop bed with adjacent nodular area of enhancement which slightly decreased in size.     Impression: Episodic mixed aphasia w/ primarily anomic component possibly 2/2 focal seizure w/ preserved awareness in the setting of L temporal GBM  Acute L corona radiata infarct 2/2 cryptogenic etiology; hypercoagulable state is a possibility vs small vessel disease given risk factors.    Recommendations:  [] MRA Head w/o contrast  [] MRA Neck w/ contrast (if renal status permits)   [] Aspirin 81mg PO QD  [] TTE  [] Lipitor 80mg QHS  [] A1c + Lipid Panel  [] Continuous vEEG  [x] MRI Brain w/ & w/o contrast (if renal status permits)  [x] Continue Decadron   [x] Continue Keppra 500mg PO BID  [x] Neuro-oncology to follow      Plan discussed with Dr. Sage.

## 2019-08-02 NOTE — PROGRESS NOTE ADULT - PROBLEM SELECTOR PLAN 3
- resolved; previously thought to be 2/2 dexamethasone use. No focal evidence of infection, no fevers or infectious symptoms but patients urine grew klebsiella - fs controlled  - fs achs  - c/w ISS, titrate as needed

## 2019-08-02 NOTE — PROGRESS NOTE ADULT - PROBLEM SELECTOR PLAN 5
- resolved; likely pre-renal in setting of reported decreased PO intake. Patient had EVELIN 2/2 nephrolithiasis 2 weeks ago s/p stent and flomax. CT scan prelim reading shows no repeat hydronephrosis. Patient is taking metformin.  - hold losartan, hctz and metformin  - Evelin resolved with IVF DVT ppx: hep subq  Diet: Carb consistent  f/u w/ SW regarding nursing home placement as per family request since family unable to take care of pt  disposition: dc pending improvement of mental status (mri, eeg pending)

## 2019-08-02 NOTE — PROGRESS NOTE ADULT - PROBLEM SELECTOR PLAN 4
- fs controlled  - fs achs  - c/w ISS, titrate as needed - resolved; likely pre-renal in setting of reported decreased PO intake. Patient had EVELIN 2/2 nephrolithiasis 2 weeks ago s/p stent and flomax. CT scan prelim reading shows no repeat hydronephrosis. Patient is taking metformin.  - hold losartan, hctz and metformin  - Evelin resolved with IVF  - mg and K repleted

## 2019-08-02 NOTE — PROGRESS NOTE ADULT - PROBLEM SELECTOR PLAN 6
DVT ppx: hep subq  Diet: Carb consistent  f/u w/ SW regarding nursing home placement as per family request since family unable to take care of pt  disposition: dc pending improvement of mental status (mri, eeg pending)

## 2019-08-02 NOTE — PROGRESS NOTE ADULT - PROBLEM SELECTOR PLAN 1
Likely 2/2 to GBM progression. Patient has focal neurological findings consistent with his brain tumor in the left temporal lobe. CT scan w/o contrast of the head shows partially calcified hyperdense mass in the left temporal lobe measuring approximately 5.9 x 2.2 cm, grossly unchanged from previous study representing known history of glioblastoma multiforme.   - No concern for increased ICP at this time. CT scan - no midline shift. Ventricles appear unchanged in size. There is no hydrocephalus.   -Called by radiologist this morning for prelim reading MRI of the head wo contrast. Radiologist states the temporal mass is unchanged from previous studies, however there is a new focus in the corona radiata that is either a lacunar infarct vs new tumor.  -patients urine cx grew klebsiella pneumonia. Patient denies any dysuria. afebrile and denies CVA tenderness. patient was recently admitted with nephrolithasis. Patient is on decadron that could be masking signs of infection. UTI could be contributing to patients AMS. Will start antibiotics.   - Patient is currently AAO x 1 - most likely worsening confusion 2/2 GBM + UTI. Patient has chronic cough. CXR relatively unchanged. TSH, folate, and B12 wnl. Neuro consult recs -  Continuous vEEG, MRI Brain w/ & w/o contrast if renal status permits, Continue Decadron, Continue Keppra 500mg PO BID  -f/u w/ neuro-oncologist Dr. Zina Hernandez recs today  - c/w decadron daily Likely 2/2 to GBM progression. Patient has focal neurological findings consistent with his brain tumor in the left temporal lobe. CT scan w/o contrast of the head shows partially calcified hyperdense mass in the left temporal lobe measuring approximately 5.9 x 2.2 cm, grossly unchanged from previous study representing known history of glioblastoma multiforme.   - No concern for increased ICP at this time. CT scan - no midline shift. Ventricles appear unchanged in size. There is no hydrocephalus.   -Called by radiologist this morning for prelim reading MRI of the head wo contrast. Radiologist states the temporal mass is unchanged from previous studies, however there is a new focus in the corona radiata that is either a lacunar infarct vs new tumor.  -patients urine cx grew klebsiella pneumonia [id number] cfu. Patient denies any dysuria. afebrile and denies CVA tenderness. patient was recently admitted with nephrolithasis. Will not treat for cystitis.  Patient has chronic cough. CXR relatively unchanged. TSH, folate, and B12 wnl. Neuro consult recs -  Continuous vEEG, Continue Decadron, Continue Keppra 500mg PO BID  - f/u w/ neuro-oncologist Dr. Zina Hernandez recs--> left a message with their answering service   - c/w decadron daily

## 2019-08-02 NOTE — PROGRESS NOTE ADULT - PROBLEM SELECTOR PLAN 2
-Diagnosed since Jan 2001, s/p resection, RT, chemo(last in Nov 2018), stable tumor from MRI in may. CT imaging and neurological exam as above. -Diagnosed since Jan 2001, s/p resection, RT, chemotherapy (last in Nov 2018), stable tumor from MRI in may. CT imaging and neurological exam as above.

## 2019-08-02 NOTE — PROGRESS NOTE ADULT - SUBJECTIVE AND OBJECTIVE BOX
Neurology Progress Note    S: Patient seen and examined at bedside. No acute complaints.     HPI: 70 y/o Kosovan speaking male with past medical history of DM2, HTN, recent admission for SHAWNA 2/2 obstructing Right 1mm distal ureteral stone s/p URS, GBM Grade IV s/p resection on 1/29/2018 s/p chemoradiation (completed 3/23/2018) s/p 4 cycles of Temodar (completed 9/2018) presents to [facility] for   worsening memory loss, confusion, and change in mood with increased agitation over the past two weeks, according to his two children. Patient has not been making sense when speaking to his children over the phone and has been refusing to let the home health aid into the house.   Patient, himself, states he is in the hospital because of difficulty expressing himself. Patient states that this is intermittent and has been happening for the past year. Patient also reports that because he is not able to express himself, it makes him irritated and frustrated. Patient reports he is aware that he is unable to express himself without loss of consciousness.     REVIEW OF SYSTEMS  A 10-system ROS was performed and is negative except for those items noted above and/or in the HPI.    PAST MEDICAL & SURGICAL HISTORY:  Type 2 diabetes mellitus  Glioblastoma multiforme  HTN (hypertension)  Glioblastoma multiforme  History of appendectomy    FAMILY HISTORY:  No pertinent family history in first degree relatives    SOCIAL HISTORY:     MEDICATIONS  (STANDING):  amLODIPine   Tablet 10 milliGRAM(s) Oral daily  atorvastatin 40 milliGRAM(s) Oral at bedtime  carvedilol 25 milliGRAM(s) Oral every 12 hours  dexamethasone     Tablet 1 milliGRAM(s) Oral daily  dextrose 5%. 1000 milliLiter(s) (50 mL/Hr) IV Continuous <Continuous>  dextrose 50% Injectable 12.5 Gram(s) IV Push once  dextrose 50% Injectable 25 Gram(s) IV Push once  dextrose 50% Injectable 25 Gram(s) IV Push once  famotidine    Tablet 20 milliGRAM(s) Oral daily  heparin  Injectable 5000 Unit(s) SubCutaneous every 8 hours  insulin lispro (HumaLOG) corrective regimen sliding scale   SubCutaneous at bedtime  insulin lispro (HumaLOG) corrective regimen sliding scale   SubCutaneous three times a day before meals  levETIRAcetam 500 milliGRAM(s) Oral two times a day  sodium chloride 0.9%. 1000 milliLiter(s) (75 mL/Hr) IV Continuous <Continuous>  tamsulosin 0.4 milliGRAM(s) Oral at bedtime    MEDICATIONS  (PRN):  dextrose 40% Gel 15 Gram(s) Oral once PRN Blood Glucose LESS THAN 70 milliGRAM(s)/deciliter  glucagon  Injectable 1 milliGRAM(s) IntraMuscular once PRN Glucose LESS THAN 70 milligrams/deciliter    ALLERGIES/INTOLERANCES:  Allergies  No Known Allergies    Intolerances    Vital Signs Last 24 Hrs  T(C): 36.4 (02 Aug 2019 14:09), Max: 36.8 (01 Aug 2019 21:54)  T(F): 97.6 (02 Aug 2019 14:09), Max: 98.2 (01 Aug 2019 21:54)  HR: 65 (02 Aug 2019 14:09) (64 - 80)  BP: 154/89 (02 Aug 2019 14:09) (149/70 - 158/86)  BP(mean): --  RR: 18 (02 Aug 2019 14:09) (18 - 18)  SpO2: 97% (02 Aug 2019 14:09) (96% - 97%)    General:  Constitutional: Male, appears stated age, in no apparent distress including pain  Head: Normocephalic & atraumatic.  Extremities: No cyanosis, clubbing, or edema.  Skin: No rashes, bruising, or discoloration.     Neurological (>12):  MS: AAOx1 (knows hes in the hospital; knows hes 70 y/o; knows the year is 2019). Follows all simple commands.    Language: Speech is clear, fluent with intact repetition. Unable to name objects.     CNs: PERRL (R = 3mm, L = 3mm). VFF. EOMI no nystagmus, no diplopia. V1-3 intact to LT, well developed masseter muscles b/l. No facial asymmetry b/l, full eye closure strength b/l. Head turning & shoulder shrug intact b/l. Tongue midline, normal movements, no atrophy.    Fundoscopic: Not visualized.     Motor: Normal muscle bulk & tone. No noticeable tremor. No pronator drift.              Deltoid	Biceps	Triceps	Wrist	Finger ABd	   R	5	5	5	5	5		5 	  L	5	5	5	5	5		5    	H-Flex	H-Ext	H-ABd	H-ADd	K-Flex	K-Ext	D-Flex	P-Flex  R	5	5	5	5	5	5	5	5 	   L	5	5	5	5	5	5	5	5	     Sensation: Intact to LT/PP b/l throughout.     Cortical: Extinction on DSS (neglect): none    Reflexes:                 Plantar Resp  R		Down   L		Down     Coordination: No dysmetria to FTN/HTS    Gait: Deferred                            11.4   9.2   )-----------( 320      ( 02 Aug 2019 06:56 )             33.4     08-02    144  |  107  |  31<H>  ----------------------------<  172<H>  3.2<L>   |  21<L>  |  1.64<H>    Ca    9.0      02 Aug 2019 06:56  Phos  3.1     08-02  Mg     1.4     08-02          STUDIES & IMAGING:  Studies (EKG, EEG, EMG, etc):     Radiology (XR, CT, MR, U/S, TTE/LEOADN):  < from: CT Head No Cont (07.30.19 @ 15:44) >  IMPRESSION:   A 5.9 cm partially calcified hyperdense mass in the left temporal lobe is   visualized consistent with patient's known glioblastoma multiforme. There   is no evidence of additional acute intracranial pathology.    < end of copied text >    < from: MR Head w/wo IV Cont (05.14.19 @ 11:37) >  INTERPRETATION:  Clinical indications: Follow-up brain tumor.    MRI of the brain was performed using sagittal T1, axial T1 T2 T2 FLAIR   diffusion and susceptibility weighted sequence. The patient was injected   with approximately 10 cc of Gadavist IV and sagittal coronal and axial   T1-weighted sequences were performed.    Postop changes compatible with a left pterional craniotomy is again seen.   There is thick irregular peripheral enhancement around the postop bed   with evidence of a nodular area of enhancement again seen involving the   left temporal region. The postop bed measures approximately 5.6 x 2.9 cm   and previously measured approximately 6.2 x 2.7 cm. The nodular area of   enhancement measures approximately 2.4 x 1.7 cm and previously measured   approximately 3.5 x 2.0. Surrounding T2 prolongation is again seen and   unchanged.    No new areas of abnormal signal signal enhancement seen.    Evaluation of the diffusion weighted sequence demonstrates no abnormal   areas of restricted diffusion to suggest acute infarct.    Parenchymal volume loss and chronic microvascular changes are identified.    The large vessels demonstrate normal flow voids from    Complete opacification of the right maxillary sinus is again seen.    Both mastoid and middle ear regions appear clear.    Impression: Previously noted postop bed as well as adjacent nodular area   of enhancement appears have slightly decreased in size. This could be   compatible with response to therapy though clinical correlation and   continued close interval follow-up is recommended.      < end of copied text >    < from: MR Head No Cont (08.01.19 @ 17:49) >  IMPRESSION:    The treated left temporal lobe mass is grossly stable compared to the   prior studies. Evaluation is partially limited by the lack intravenous   contrast.    A new punctate focus of increased diffusion-weighted signal involves the   left corona radiata. Differential considerations include an acute lacunar   infarct versus new small focusof tumor.    < end of copied text >

## 2019-08-02 NOTE — PROGRESS NOTE ADULT - SUBJECTIVE AND OBJECTIVE BOX
***************************************************************  Garry Burton, PGY1  Internal Medicine   pager: [phone]  ***************************************************************    ARSENIO DARNELL  69y  MRN: [id number]    Patient is a 69y old  Male who presents with a chief complaint of AMS (01 Aug 2019 09:27)      Subjective: no events ON. Patient is confused as to why he has been admitted and is frustrated because he is having trouble expressing himself. Denies fever, CP, SOB, abn pain, N/V, dysuria. Tolerating diet.      MEDICATIONS  (STANDING):  amLODIPine   Tablet 10 milliGRAM(s) Oral daily  atorvastatin 40 milliGRAM(s) Oral at bedtime  carvedilol 25 milliGRAM(s) Oral every 12 hours  dexamethasone     Tablet 1 milliGRAM(s) Oral daily  dextrose 5%. 1000 milliLiter(s) (50 mL/Hr) IV Continuous <Continuous>  dextrose 50% Injectable 12.5 Gram(s) IV Push once  dextrose 50% Injectable 25 Gram(s) IV Push once  dextrose 50% Injectable 25 Gram(s) IV Push once  famotidine    Tablet 20 milliGRAM(s) Oral daily  heparin  Injectable 5000 Unit(s) SubCutaneous every 8 hours  insulin lispro (HumaLOG) corrective regimen sliding scale   SubCutaneous at bedtime  insulin lispro (HumaLOG) corrective regimen sliding scale   SubCutaneous three times a day before meals  levETIRAcetam 500 milliGRAM(s) Oral two times a day  magnesium sulfate  IVPB 1 Gram(s) IV Intermittent once  potassium chloride    Tablet ER 40 milliEquivalent(s) Oral once  sodium chloride 0.9%. 1000 milliLiter(s) (75 mL/Hr) IV Continuous <Continuous>  tamsulosin 0.4 milliGRAM(s) Oral at bedtime    MEDICATIONS  (PRN):  dextrose 40% Gel 15 Gram(s) Oral once PRN Blood Glucose LESS THAN 70 milliGRAM(s)/deciliter  glucagon  Injectable 1 milliGRAM(s) IntraMuscular once PRN Glucose LESS THAN 70 milligrams/deciliter      Objective:    Vitals: Vital Signs Last 24 Hrs  T(C): 36.4 (08-02-19 @ 05:32), Max: 36.8 (08-01-19 @ 21:54)  T(F): 97.6 (08-02-19 @ 05:32), Max: 98.2 (08-01-19 @ 21:54)  HR: 64 (08-02-19 @ 05:32) (64 - 80)  BP: 158/86 (08-02-19 @ 05:32) (149/70 - 163/81)  BP(mean): --  RR: 18 (08-02-19 @ 05:32) (18 - 18)  SpO2: 97% (08-02-19 @ 05:32) (96% - 97%)            I&O's Summary    01 Aug 2019 07:01  -  02 Aug 2019 07:00  --------------------------------------------------------  IN: 2180 mL / OUT: 2800 mL / NET: -620 mL        PHYSICAL EXAM:  GENERAL: NAD  HEAD:  Atraumatic, Normocephalic  EYES: EOMI, conjunctiva and sclera clear  CHEST/LUNG: Clear to percussion bilaterally; No rales, rhonchi, wheezing, or rubs  HEART: Regular rate and rhythm; No murmurs, rubs, or gallops  ABDOMEN: Soft, Nontender, moderately distended  SKIN: No rashes or lesions  NERVOUS SYSTEM:  Alert & Oriented X1, strength 5/5 UE and LE b/l. short term memory 0/3. Patient has agnosia - unable to name glasses, watch or pen.     LABS:  08-02    144  |  107  |  31<H>  ----------------------------<  172<H>  3.2<L>   |  21<L>  |  1.64<H>  08-01    142  |  106  |  36<H>  ----------------------------<  168<H>  3.4<L>   |  22  |  1.94<H>  07-31    142  |  105  |  38<H>  ----------------------------<  195<H>  3.9   |  20<L>  |  2.62<H>    Ca    9.0      02 Aug 2019 06:56  Ca    8.8      01 Aug 2019 06:30  Ca    8.9      31 Jul 2019 14:40  Phos  3.1     08-02  Mg     1.4     08-02    TPro  6.0  /  Alb  3.5  /  TBili  0.4  /  DBili  x   /  AST  11  /  ALT  29  /  AlkPhos  74  07-31  TPro  7.4  /  Alb  4.3  /  TBili  0.5  /  DBili  x   /  AST  17  /  ALT  40  /  AlkPhos  87  07-30                      ABG - ( 01 Aug 2019 06:33 )  pH, Arterial: 7.37  pH, Blood: x     /  pCO2: 40    /  pO2: 47    / HCO3: 22    / Base Excess: -2.3  /  SaO2: 79                                      11.4   9.2   )-----------( 320      ( 02 Aug 2019 06:56 )             33.4                         11.7   9.6   )-----------( 305      ( 01 Aug 2019 06:30 )             34.8                         11.6   10.9  )-----------( 313      ( 31 Jul 2019 07:08 )             33.8     CAPILLARY BLOOD GLUCOSE      POCT Blood Glucose.: 194 mg/dL (02 Aug 2019 08:40)  POCT Blood Glucose.: 222 mg/dL (01 Aug 2019 22:38)  POCT Blood Glucose.: 178 mg/dL (01 Aug 2019 18:12)  POCT Blood Glucose.: 184 mg/dL (01 Aug 2019 13:31)  POCT Blood Glucose.: 214 mg/dL (01 Aug 2019 10:20)      RADIOLOGY & ADDITIONAL TESTS:    Imaging Personally Reviewed:  [x ] YES  [ ] NO    Consultants involved in case:   Consultant(s) Notes Reviewed:  [ x] YES  [ ] NO:   Care Discussed with Consultants/Other Providers [x ] YES  [ ] NO

## 2019-08-02 NOTE — PROGRESS NOTE ADULT - ATTENDING COMMENTS
Pt seen and examined on rounds with neurology team using Port[employer] . Pt states that he was getting frustrated because he is having difficulty getting words out, which has been an ongoing problem. On exam he was AOx3, CN all intact, he can follow simple and complex commands, he is fluent and can repeat , but has significant difficulty even naming high frequency objects. He is full strength throughout, sensation fully intact, normal gait.     MRI showed an acute infarct in the L corona radiata infarct. Not contributory to current clinical picture and likely was asymptomatic as pt denied having any right sided weakness.     Agree with plan as stated above.

## 2019-08-03 LAB
ANION GAP SERPL CALC-SCNC: 16 MMOL/L — SIGNIFICANT CHANGE UP (ref 5–17)
BUN SERPL-MCNC: 21 MG/DL — SIGNIFICANT CHANGE UP (ref 7–23)
CALCIUM SERPL-MCNC: 9 MG/DL — SIGNIFICANT CHANGE UP (ref 8.4–10.5)
CHLORIDE SERPL-SCNC: 104 MMOL/L — SIGNIFICANT CHANGE UP (ref 96–108)
CHOLEST SERPL-MCNC: 135 MG/DL — SIGNIFICANT CHANGE UP (ref 10–199)
CO2 SERPL-SCNC: 21 MMOL/L — LOW (ref 22–31)
CREAT SERPL-MCNC: 1.19 MG/DL — SIGNIFICANT CHANGE UP (ref 0.5–1.3)
GLUCOSE SERPL-MCNC: 162 MG/DL — HIGH (ref 70–99)
HBA1C BLD-MCNC: 8.6 % — HIGH (ref 4–5.6)
HCT VFR BLD CALC: 32.2 % — LOW (ref 39–50)
HDLC SERPL-MCNC: 30 MG/DL — LOW
HGB BLD-MCNC: 11.3 G/DL — LOW (ref 13–17)
LIPID PNL WITH DIRECT LDL SERPL: 71 MG/DL — SIGNIFICANT CHANGE UP
MAGNESIUM SERPL-MCNC: 1.3 MG/DL — LOW (ref 1.6–2.6)
MCHC RBC-ENTMCNC: 31.7 PG — SIGNIFICANT CHANGE UP (ref 27–34)
MCHC RBC-ENTMCNC: 35 GM/DL — SIGNIFICANT CHANGE UP (ref 32–36)
MCV RBC AUTO: 90.5 FL — SIGNIFICANT CHANGE UP (ref 80–100)
PHOSPHATE SERPL-MCNC: 2.8 MG/DL — SIGNIFICANT CHANGE UP (ref 2.5–4.5)
PLATELET # BLD AUTO: 334 K/UL — SIGNIFICANT CHANGE UP (ref 150–400)
POTASSIUM SERPL-MCNC: 3.2 MMOL/L — LOW (ref 3.5–5.3)
POTASSIUM SERPL-SCNC: 3.2 MMOL/L — LOW (ref 3.5–5.3)
RBC # BLD: 3.56 M/UL — LOW (ref 4.2–5.8)
RBC # FLD: 12.7 % — SIGNIFICANT CHANGE UP (ref 10.3–14.5)
SODIUM SERPL-SCNC: 141 MMOL/L — SIGNIFICANT CHANGE UP (ref 135–145)
TOTAL CHOLESTEROL/HDL RATIO MEASUREMENT: 4.5 RATIO — SIGNIFICANT CHANGE UP (ref 3.4–9.6)
TRIGL SERPL-MCNC: 168 MG/DL — HIGH (ref 10–149)
WBC # BLD: 9.1 K/UL — SIGNIFICANT CHANGE UP (ref 3.8–10.5)
WBC # FLD AUTO: 9.1 K/UL — SIGNIFICANT CHANGE UP (ref 3.8–10.5)

## 2019-08-03 PROCEDURE — 99233 SBSQ HOSP IP/OBS HIGH 50: CPT | Mod: GC

## 2019-08-03 PROCEDURE — 70544 MR ANGIOGRAPHY HEAD W/O DYE: CPT | Mod: 26

## 2019-08-03 PROCEDURE — 95951: CPT | Mod: 26

## 2019-08-03 RX ORDER — POTASSIUM CHLORIDE 20 MEQ
40 PACKET (EA) ORAL ONCE
Refills: 0 | Status: COMPLETED | OUTPATIENT
Start: 2019-08-03 | End: 2019-08-03

## 2019-08-03 RX ORDER — MAGNESIUM SULFATE 500 MG/ML
1 VIAL (ML) INJECTION ONCE
Refills: 0 | Status: COMPLETED | OUTPATIENT
Start: 2019-08-03 | End: 2019-08-03

## 2019-08-03 RX ORDER — INSULIN GLARGINE 100 [IU]/ML
5 INJECTION, SOLUTION SUBCUTANEOUS AT BEDTIME
Refills: 0 | Status: DISCONTINUED | OUTPATIENT
Start: 2019-08-03 | End: 2019-08-05

## 2019-08-03 RX ORDER — SENNA PLUS 8.6 MG/1
2 TABLET ORAL AT BEDTIME
Refills: 0 | Status: DISCONTINUED | OUTPATIENT
Start: 2019-08-03 | End: 2019-08-15

## 2019-08-03 RX ORDER — DOCUSATE SODIUM 100 MG
100 CAPSULE ORAL THREE TIMES A DAY
Refills: 0 | Status: DISCONTINUED | OUTPATIENT
Start: 2019-08-03 | End: 2019-08-15

## 2019-08-03 RX ORDER — POLYETHYLENE GLYCOL 3350 17 G/17G
17 POWDER, FOR SOLUTION ORAL ONCE
Refills: 0 | Status: COMPLETED | OUTPATIENT
Start: 2019-08-03 | End: 2019-08-03

## 2019-08-03 RX ADMIN — Medication 1 MILLIGRAM(S): at 06:04

## 2019-08-03 RX ADMIN — Medication 2: at 09:04

## 2019-08-03 RX ADMIN — FAMOTIDINE 20 MILLIGRAM(S): 10 INJECTION INTRAVENOUS at 11:10

## 2019-08-03 RX ADMIN — CARVEDILOL PHOSPHATE 25 MILLIGRAM(S): 80 CAPSULE, EXTENDED RELEASE ORAL at 06:05

## 2019-08-03 RX ADMIN — Medication 3: at 18:31

## 2019-08-03 RX ADMIN — HEPARIN SODIUM 5000 UNIT(S): 5000 INJECTION INTRAVENOUS; SUBCUTANEOUS at 06:05

## 2019-08-03 RX ADMIN — HEPARIN SODIUM 5000 UNIT(S): 5000 INJECTION INTRAVENOUS; SUBCUTANEOUS at 13:15

## 2019-08-03 RX ADMIN — Medication 100 GRAM(S): at 09:04

## 2019-08-03 RX ADMIN — CARVEDILOL PHOSPHATE 25 MILLIGRAM(S): 80 CAPSULE, EXTENDED RELEASE ORAL at 18:32

## 2019-08-03 RX ADMIN — LEVETIRACETAM 500 MILLIGRAM(S): 250 TABLET, FILM COATED ORAL at 06:04

## 2019-08-03 RX ADMIN — ATORVASTATIN CALCIUM 80 MILLIGRAM(S): 80 TABLET, FILM COATED ORAL at 21:35

## 2019-08-03 RX ADMIN — TAMSULOSIN HYDROCHLORIDE 0.4 MILLIGRAM(S): 0.4 CAPSULE ORAL at 21:33

## 2019-08-03 RX ADMIN — Medication 100 MILLIGRAM(S): at 13:15

## 2019-08-03 RX ADMIN — POLYETHYLENE GLYCOL 3350 17 GRAM(S): 17 POWDER, FOR SOLUTION ORAL at 09:04

## 2019-08-03 RX ADMIN — INSULIN GLARGINE 5 UNIT(S): 100 INJECTION, SOLUTION SUBCUTANEOUS at 21:33

## 2019-08-03 RX ADMIN — HEPARIN SODIUM 5000 UNIT(S): 5000 INJECTION INTRAVENOUS; SUBCUTANEOUS at 21:34

## 2019-08-03 RX ADMIN — Medication 81 MILLIGRAM(S): at 11:10

## 2019-08-03 RX ADMIN — Medication 100 MILLIGRAM(S): at 21:33

## 2019-08-03 RX ADMIN — Medication 40 MILLIEQUIVALENT(S): at 09:04

## 2019-08-03 RX ADMIN — LEVETIRACETAM 500 MILLIGRAM(S): 250 TABLET, FILM COATED ORAL at 18:32

## 2019-08-03 RX ADMIN — SENNA PLUS 2 TABLET(S): 8.6 TABLET ORAL at 21:35

## 2019-08-03 RX ADMIN — AMLODIPINE BESYLATE 10 MILLIGRAM(S): 2.5 TABLET ORAL at 06:04

## 2019-08-03 RX ADMIN — Medication 2: at 12:36

## 2019-08-03 NOTE — EEG REPORT - NS EEG TEXT BOX
ARSENIO DARNELL [id number]     Study Date: 		08-02-19    ROUTINE EEG    Technical Information:			  		  Placement and Labeling of Electrodes:  The EEG was performed utilizing 20 channels referential EEG connections (coronal over temporal over parasagittal montage) using all standard 10-20 electrode placements with EKG.  Recording was at a sampling rate of 256 samples per second per channel.  Time synchronized digital video recording was done simultaneously with EEG recording.  A low light infrared camera was used for low light recording.  Delio and seizure detection algorithms were utilized.    CSA Technical Component:  Quantitative EEG analysis using a separate Compressed Spectral Array (CSA) software package was conducted in real-time and run at bedside after set up by the technician, digitally displaying the power of electrographic frequencies included in the 1-30Hz band using a graded color map.  This data was reviewed and interpreted independently, and is reported in a separate section below.    --------------------------------------------------------------------------------------------------  History:  CC/ HPI Patient is a 69y old  Male who presents with a chief complaint of AMS (02 Aug 2019 17:50)    MEDICATIONS  (STANDING):  amLODIPine   Tablet 10 milliGRAM(s) Oral daily  aspirin enteric coated 81 milliGRAM(s) Oral daily  atorvastatin 80 milliGRAM(s) Oral at bedtime  carvedilol 25 milliGRAM(s) Oral every 12 hours  dexamethasone     Tablet 1 milliGRAM(s) Oral daily  famotidine    Tablet 20 milliGRAM(s) Oral daily  heparin  Injectable 5000 Unit(s) SubCutaneous every 8 hours  insulin lispro (HumaLOG) corrective regimen sliding scale   SubCutaneous at bedtime  insulin lispro (HumaLOG) corrective regimen sliding scale   SubCutaneous three times a day before meals  levETIRAcetam 500 milliGRAM(s) Oral two times a day  sodium chloride 0.9%. 1000 milliLiter(s) (75 mL/Hr) IV Continuous <Continuous>  tamsulosin 0.4 milliGRAM(s) Oral at bedtime    --------------------------------------------------------------------------------------------------  Study Interpretation:    [[[Abbreviation Key:  PDR=alpha rhythm/posterior dominant rhythm. A-P=anterior posterior gradient.  Amplitude: ‘very low’:<20; ‘low’:20-50; ‘medium’:; ‘high’:>200uV.  Persistence for periodic/rhythmic patterns (% of epoch) ‘rare’:<1%; ‘occasional’:1-10%; ‘frequent’:10-50%; ‘abundant’:50-90%; ‘continuous’:>90%.  Persistence for sporadic discharges: ‘rare’:<1/hr; ‘occasional’:1/min-1/hr; ‘frequent’:>1/min; ‘abundant’:>1/10 sec.  GRDA=generalized rhythmic delta activity, LRDA=lateralized rhythmic delta activity, TIRDA=temporal intermittent rhythmic delta activity, FIRDA=frontal intermittent rhythmic activity. LPD=PLED=lateralized periodic discharges, GPD=generalized periodic discharges, BiPDs=BiPLEDs=bilateral independent periodic epileptiform discharges, SIRPID=stimulus induced rhythmic, periodic, or ictal appearing discharges.  Modifiers: +F=with fast component, +S=with spike component, +R=with rhythmic component.  S-B=burst suppression pattern.  Max=maximal. N1-drowsy, N2-stage II sleep, N3-slow wave sleep.  HV=hyperventilation, PS=photic stimulation]]]    FINDINGS:  The background was continuous, spontaneously variable and reactive.  During wakefulness, the posteriorly dominant rhythm consisted of asymmetric 9hz over the right, 7-8Hz activity over the left, with an amplitude to 30 uV, that attenuated to eye opening.  Low amplitude central beta was noted in wakefulness.  Left accentuation of fast activity    Background Slowing:  Generalized slowing: none was present.  Focal slowing: irregular left sided theta/delta was present.    Sleep Background:  Stage II sleep transients were not recorded.    Epileptiform Activity:   No epileptiform discharges were present.    Events:  No clinical events were recorded.  No seizures were recorded.    Activation Procedures:   -Hyperventilation was not performed.    -Photic stimulation was not performed.    Artifacts:  Intermittent myogenic and movement artifacts were noted.    ECG:  The heart rate on single channel ECG at baseline was predominantly near BPM = 60-66  -----------------------------------------------------------------------------------------------------    EEG Classification / Summary:  Abnormal EEG study, awake   Left sided slowing, accentuation of fast activity  -----------------------------------------------------------------------------------------------------    Clinical Impression:  Left sided cerebral dysfunction, associated skull defect  There were no epileptiform abnormalities recorded.      -------------------------------------------------------------------------------------------------------  Yunior Corea M.D.   of Neurology, [facility] [place]
ARSENIO DARNELL [id number]     Study Date: 		08-02 to 8-3-19    --------------------------------------------------------------------------------------------------  History:  CC/ HPI Patient is a 69y old  Male who presents with a chief complaint of AMS (02 Aug 2019 17:50)    MEDICATIONS  (STANDING):  amLODIPine   Tablet 10 milliGRAM(s) Oral daily  aspirin enteric coated 81 milliGRAM(s) Oral daily  atorvastatin 80 milliGRAM(s) Oral at bedtime  carvedilol 25 milliGRAM(s) Oral every 12 hours  dexamethasone     Tablet 1 milliGRAM(s) Oral daily  famotidine    Tablet 20 milliGRAM(s) Oral daily  heparin  Injectable 5000 Unit(s) SubCutaneous every 8 hours  insulin lispro (HumaLOG) corrective regimen sliding scale   SubCutaneous at bedtime  insulin lispro (HumaLOG) corrective regimen sliding scale   SubCutaneous three times a day before meals  levETIRAcetam 500 milliGRAM(s) Oral two times a day  sodium chloride 0.9%. 1000 milliLiter(s) (75 mL/Hr) IV Continuous <Continuous>  tamsulosin 0.4 milliGRAM(s) Oral at bedtime    --------------------------------------------------------------------------------------------------  Study Interpretation:    [[[Abbreviation Key:  PDR=alpha rhythm/posterior dominant rhythm. A-P=anterior posterior gradient.  Amplitude: ‘very low’:<20; ‘low’:20-50; ‘medium’:; ‘high’:>200uV.  Persistence for periodic/rhythmic patterns (% of epoch) ‘rare’:<1%; ‘occasional’:1-10%; ‘frequent’:10-50%; ‘abundant’:50-90%; ‘continuous’:>90%.  Persistence for sporadic discharges: ‘rare’:<1/hr; ‘occasional’:1/min-1/hr; ‘frequent’:>1/min; ‘abundant’:>1/10 sec.  GRDA=generalized rhythmic delta activity, LRDA=lateralized rhythmic delta activity, TIRDA=temporal intermittent rhythmic delta activity, FIRDA=frontal intermittent rhythmic activity. LPD=PLED=lateralized periodic discharges, GPD=generalized periodic discharges, BiPDs=BiPLEDs=bilateral independent periodic epileptiform discharges, SIRPID=stimulus induced rhythmic, periodic, or ictal appearing discharges.  Modifiers: +F=with fast component, +S=with spike component, +R=with rhythmic component.  S-B=burst suppression pattern.  Max=maximal. N1-drowsy, N2-stage II sleep, N3-slow wave sleep.  HV=hyperventilation, PS=photic stimulation]]]    FINDINGS:  The background was continuous, spontaneously variable and reactive.  During wakefulness, the posteriorly dominant rhythm consisted of asymmetric 9hz over the right, 7-8Hz activity over the left, with an amplitude to 30 uV, that attenuated to eye opening.  Low amplitude central beta was noted in wakefulness.  Left accentuation of fast activity    Background Slowing:  Generalized slowing: none was present.  Focal slowing: irregular left sided theta/delta was present.    Sleep Background:  Stage II sleep transients were recorded with asymmetrical transients better formed over the right.    Epileptiform Activity:   T3 max sharp wave discharges were present.    Events:  No clinical events were recorded.  No seizures were recorded.    Activation Procedures:   -Hyperventilation was not performed.    -Photic stimulation was not performed.    Artifacts:  Intermittent myogenic and movement artifacts were noted.    ECG:  The heart rate on single channel ECG at baseline was predominantly near BPM = 60-66  -----------------------------------------------------------------------------------------------------    EEG Classification / Summary:  Abnormal EEG study, awake   T3 max sharp wave discharges were present.  Left sided temporal maximal slowing, accentuation of fast activity    -----------------------------------------------------------------------------------------------------    Clinical Impression:  Risk of seizures from the left midtemporal region  Left sided temporal maximal cerebral dysfunction, associated skull defect  No seizures recorded    -------------------------------------------------------------------------------------------------------  Yunior Corea M.D.   of Neurology, [facility] [place]

## 2019-08-03 NOTE — PROGRESS NOTE ADULT - PROBLEM SELECTOR PLAN 2
-Diagnosed since Jan 2001, s/p resection, RT, chemotherapy (last in Nov 2018), stable tumor from MRI in may. CT imaging and neurological exam as above.

## 2019-08-03 NOTE — PROGRESS NOTE ADULT - SUBJECTIVE AND OBJECTIVE BOX
***************************************************************  Garry Burton, PGY1  Internal Medicine   pager: [phone]  ***************************************************************    ARSENIO DARNELL  69y  MRN: [id number]    Patient is a 69y old  Male who presents with a chief complaint of AMS (02 Aug 2019 17:50)      Subjective: no events ON. On VEEG. Patient agitated this morning and confused as to why he is on VEEG. Constipated per the nurse. Unable to get the rest of the patients history.   MEDICATIONS  (STANDING):  amLODIPine   Tablet 10 milliGRAM(s) Oral daily  aspirin enteric coated 81 milliGRAM(s) Oral daily  atorvastatin 80 milliGRAM(s) Oral at bedtime  carvedilol 25 milliGRAM(s) Oral every 12 hours  dexamethasone     Tablet 1 milliGRAM(s) Oral daily  dextrose 5%. 1000 milliLiter(s) (50 mL/Hr) IV Continuous <Continuous>  dextrose 50% Injectable 12.5 Gram(s) IV Push once  dextrose 50% Injectable 25 Gram(s) IV Push once  dextrose 50% Injectable 25 Gram(s) IV Push once  docusate sodium 100 milliGRAM(s) Oral three times a day  famotidine    Tablet 20 milliGRAM(s) Oral daily  heparin  Injectable 5000 Unit(s) SubCutaneous every 8 hours  insulin lispro (HumaLOG) corrective regimen sliding scale   SubCutaneous at bedtime  insulin lispro (HumaLOG) corrective regimen sliding scale   SubCutaneous three times a day before meals  levETIRAcetam 500 milliGRAM(s) Oral two times a day  senna 2 Tablet(s) Oral at bedtime  sodium chloride 0.9%. 1000 milliLiter(s) (75 mL/Hr) IV Continuous <Continuous>  tamsulosin 0.4 milliGRAM(s) Oral at bedtime    MEDICATIONS  (PRN):  dextrose 40% Gel 15 Gram(s) Oral once PRN Blood Glucose LESS THAN 70 milliGRAM(s)/deciliter  glucagon  Injectable 1 milliGRAM(s) IntraMuscular once PRN Glucose LESS THAN 70 milligrams/deciliter      Objective:    Vitals: Vital Signs Last 24 Hrs  T(C): 36.5 (08-03-19 @ 04:20), Max: 36.5 (08-03-19 @ 04:20)  T(F): 97.7 (08-03-19 @ 04:20), Max: 97.7 (08-03-19 @ 04:20)  HR: 66 (08-03-19 @ 04:20) (65 - 69)  BP: 169/94 (08-03-19 @ 04:20) (139/76 - 169/94)  BP(mean): --  RR: 18 (08-03-19 @ 04:20) (18 - 18)  SpO2: 96% (08-03-19 @ 04:20) (96% - 97%)            I&O's Summary    02 Aug 2019 07:01  -  03 Aug 2019 07:00  --------------------------------------------------------  IN: 2800 mL / OUT: 1100 mL / NET: 1700 mL        PHYSICAL EXAM:  GENERAL: Agitated   HEAD:  Atraumatic, Normocephalic  EYES: EOMI, conjunctiva and sclera clear  CHEST/LUNG: Clear to percussion bilaterally; No rales, rhonchi, wheezing, or rubs  HEART: Regular rate and rhythm; No murmurs, rubs, or gallops  ABDOMEN: Soft, Nontender, Nondistended;   SKIN: No rashes or lesions  NERVOUS SYSTEM:      LABS:  08-03    141  |  104  |  21  ----------------------------<  162<H>  3.2<L>   |  21<L>  |  1.19  08-02    144  |  107  |  31<H>  ----------------------------<  172<H>  3.2<L>   |  21<L>  |  1.64<H>  08-01    142  |  106  |  36<H>  ----------------------------<  168<H>  3.4<L>   |  22  |  1.94<H>    Ca    9.0      03 Aug 2019 06:28  Ca    9.0      02 Aug 2019 06:56  Ca    8.8      01 Aug 2019 06:30  Phos  2.8     08-03  Mg     1.3     08-03                                                11.3   9.1   )-----------( 334      ( 03 Aug 2019 06:28 )             32.2                         11.4   9.2   )-----------( 320      ( 02 Aug 2019 06:56 )             33.4                         11.7   9.6   )-----------( 305      ( 01 Aug 2019 06:30 )             34.8     CAPILLARY BLOOD GLUCOSE      POCT Blood Glucose.: 208 mg/dL (03 Aug 2019 08:45)  POCT Blood Glucose.: 268 mg/dL (02 Aug 2019 22:14)  POCT Blood Glucose.: 209 mg/dL (02 Aug 2019 18:18)  POCT Blood Glucose.: 216 mg/dL (02 Aug 2019 13:15)      RADIOLOGY & ADDITIONAL TESTS:    Imaging Personally Reviewed:  [x ] YES  [ ] NO    Consultants involved in case:   Consultant(s) Notes Reviewed:  [ x] YES  [ ] NO:   Care Discussed with Consultants/Other Providers [x ] YES  [ ] NO ***************************************************************  Garry Burton, PGY1  Internal Medicine   pager: [phone]  ***************************************************************    ARSENIO DARNELL  69y  MRN: [id number]    Patient is a 69y old  Male who presents with a chief complaint of AMS (02 Aug 2019 17:50)      Subjective: no events ON. On VEEG. Patient agitated this morning and confused as to why he is on VEEG. Constipated per the nurse. Unable to get the rest of the patients history.     MEDICATIONS  (STANDING):  amLODIPine   Tablet 10 milliGRAM(s) Oral daily  aspirin enteric coated 81 milliGRAM(s) Oral daily  atorvastatin 80 milliGRAM(s) Oral at bedtime  carvedilol 25 milliGRAM(s) Oral every 12 hours  dexamethasone     Tablet 1 milliGRAM(s) Oral daily  dextrose 5%. 1000 milliLiter(s) (50 mL/Hr) IV Continuous <Continuous>  dextrose 50% Injectable 12.5 Gram(s) IV Push once  dextrose 50% Injectable 25 Gram(s) IV Push once  dextrose 50% Injectable 25 Gram(s) IV Push once  docusate sodium 100 milliGRAM(s) Oral three times a day  famotidine    Tablet 20 milliGRAM(s) Oral daily  heparin  Injectable 5000 Unit(s) SubCutaneous every 8 hours  insulin lispro (HumaLOG) corrective regimen sliding scale   SubCutaneous at bedtime  insulin lispro (HumaLOG) corrective regimen sliding scale   SubCutaneous three times a day before meals  levETIRAcetam 500 milliGRAM(s) Oral two times a day  senna 2 Tablet(s) Oral at bedtime  sodium chloride 0.9%. 1000 milliLiter(s) (75 mL/Hr) IV Continuous <Continuous>  tamsulosin 0.4 milliGRAM(s) Oral at bedtime    MEDICATIONS  (PRN):  dextrose 40% Gel 15 Gram(s) Oral once PRN Blood Glucose LESS THAN 70 milliGRAM(s)/deciliter  glucagon  Injectable 1 milliGRAM(s) IntraMuscular once PRN Glucose LESS THAN 70 milligrams/deciliter      Objective:    Vitals: Vital Signs Last 24 Hrs  T(C): 36.5 (08-03-19 @ 04:20), Max: 36.5 (08-03-19 @ 04:20)  T(F): 97.7 (08-03-19 @ 04:20), Max: 97.7 (08-03-19 @ 04:20)  HR: 66 (08-03-19 @ 04:20) (65 - 69)  BP: 169/94 (08-03-19 @ 04:20) (139/76 - 169/94)  BP(mean): --  RR: 18 (08-03-19 @ 04:20) (18 - 18)  SpO2: 96% (08-03-19 @ 04:20) (96% - 97%)            I&O's Summary    02 Aug 2019 07:01  -  03 Aug 2019 07:00  --------------------------------------------------------  IN: 2800 mL / OUT: 1100 mL / NET: 1700 mL        PHYSICAL EXAM:  GENERAL: Agitated   HEAD:  Atraumatic, Normocephalic  EYES: EOMI, conjunctiva and sclera clear  CHEST/LUNG: Clear to percussion bilaterally; No rales, rhonchi, wheezing, or rubs  HEART: Regular rate and rhythm; No murmurs, rubs, or gallops  ABDOMEN: Soft, Nontender, Nondistended;   SKIN: No rashes or lesions  NERVOUS SYSTEM:      LABS:  08-03    141  |  104  |  21  ----------------------------<  162<H>  3.2<L>   |  21<L>  |  1.19  08-02    144  |  107  |  31<H>  ----------------------------<  172<H>  3.2<L>   |  21<L>  |  1.64<H>  08-01    142  |  106  |  36<H>  ----------------------------<  168<H>  3.4<L>   |  22  |  1.94<H>    Ca    9.0      03 Aug 2019 06:28  Ca    9.0      02 Aug 2019 06:56  Ca    8.8      01 Aug 2019 06:30  Phos  2.8     08-03  Mg     1.3     08-03                                                11.3   9.1   )-----------( 334      ( 03 Aug 2019 06:28 )             32.2                         11.4   9.2   )-----------( 320      ( 02 Aug 2019 06:56 )             33.4                         11.7   9.6   )-----------( 305      ( 01 Aug 2019 06:30 )             34.8     CAPILLARY BLOOD GLUCOSE      POCT Blood Glucose.: 208 mg/dL (03 Aug 2019 08:45)  POCT Blood Glucose.: 268 mg/dL (02 Aug 2019 22:14)  POCT Blood Glucose.: 209 mg/dL (02 Aug 2019 18:18)  POCT Blood Glucose.: 216 mg/dL (02 Aug 2019 13:15)      RADIOLOGY & ADDITIONAL TESTS:    Imaging Personally Reviewed:  [x ] YES  [ ] NO    Consultants involved in case:   Consultant(s) Notes Reviewed:  [ x] YES  [ ] NO:   Care Discussed with Consultants/Other Providers [x ] YES  [ ] NO

## 2019-08-03 NOTE — PROGRESS NOTE ADULT - PROBLEM SELECTOR PLAN 1
Likely 2/2 to GBM progression. Patient has focal neurological findings consistent with his brain tumor in the left temporal lobe. CT scan w/o contrast of the head shows partially calcified hyperdense mass in the left temporal lobe measuring approximately 5.9 x 2.2 cm, grossly unchanged from previous study representing known history of glioblastoma multiforme.   - No concern for increased ICP at this time. CT scan - no midline shift. Ventricles appear unchanged in size. There is no hydrocephalus.   -Called by radiologist this morning for prelim reading MRI of the head wo contrast. Radiologist states the temporal mass is unchanged from previous studies, however there is a new focus in the corona radiata that is either a lacunar infarct vs new tumor.  -patients urine cx grew klebsiella pneumonia [id number] cfu. Patient denies any dysuria. afebrile and denies CVA tenderness. patient was recently admitted with nephrolithasis - no tx  Patient has chronic cough. CXR relatively unchanged. TSH, folate, and B12 wnl.   - f/u w/ neuro-oncologist Dr. Zina Hernandez recs--> left a message with their answering service   -MRA Head w/o contrast  -Carotid Duplex b/l  -Aspirin 81mg PO QD  -f/u TTE  -Lipitor 80mg QHS  -f/u A1c + Lipid Panel  -Continuous vEEG  -Continue Decadron   -Continue Keppra 500mg PO BID Likely 2/2 to GBM progression. Patient has focal neurological findings consistent with his brain tumor in the left temporal lobe. CT scan w/o contrast of the head shows partially calcified hyperdense mass in the left temporal lobe measuring approximately 5.9 x 2.2 cm, grossly unchanged from previous study representing known history of glioblastoma multiforme.   - No concern for increased ICP at this time. CT scan - no midline shift. Ventricles appear unchanged in size. There is no hydrocephalus.   -Called by radiologist this morning for prelim reading MRI of the head wo contrast. Radiologist states the temporal mass is unchanged from previous studies, however there is a new focus in the corona radiata that is either a lacunar infarct   -patients urine cx grew klebsiella pneumonia [id number] cfu. Patient denies any dysuria. afebrile and denies CVA tenderness. patient was recently admitted with nephrolithasis - no tx  Patient has chronic cough. CXR relatively unchanged. TSH, folate, and B12 wnl.   - discussed with neurology attending  -MRA Head w/o contrast-- pending  -Carotid Duplex b/l-- pending  -Aspirin 81mg PO QD  -f/u TTE-- pending  -Lipitor 80mg QHS  -f/u A1c + Lipid Panel  -Continuous vEEG--> no seizures noted; STOP eeg  -Continue Decadron 1 gm daily   -Continue Keppra 500mg PO BID

## 2019-08-03 NOTE — PROGRESS NOTE ADULT - PROBLEM SELECTOR PLAN 5
- resolved; likely pre-renal in setting of reported decreased PO intake. Patient had EVELIN 2/2 nephrolithiasis 2 weeks ago s/p stent and flomax. CT scan prelim reading shows no repeat hydronephrosis. Patient is taking metformin.  - hold losartan, hctz and metformin  - Evelin resolved with IVF  - mg and K repleted

## 2019-08-03 NOTE — PROGRESS NOTE ADULT - ASSESSMENT
69 year old M hx Grade IV glioblastoma, recent admission for lithotripsy of nephrolithiasis, brought in by family for altered mental status admitted for acute encephalopathy possibly a sequela of underlying GBM vs. breakthrough seizures vs. acute delirium in the setting of underlying dementia? 69 year old M hx Grade IV glioblastoma, recent admission for lithotripsy of nephrolithiasis, brought in by family for altered mental status admitted for acute encephalopathy possibly a sequela of underlying GBM vs.stroke vs. acute delirium in the setting of underlying dementia?

## 2019-08-03 NOTE — PROGRESS NOTE ADULT - PROBLEM SELECTOR PLAN 3
Patient hasn't had a BM since being admitted. Abdomen firm and moderately distended.   -Miralax 17mg  -Senna 2 tabs QHS  -Colace 100mg TID

## 2019-08-04 ENCOUNTER — TRANSCRIPTION ENCOUNTER (OUTPATIENT)
Age: 69
End: 2019-08-04

## 2019-08-04 DIAGNOSIS — N17.9 ACUTE KIDNEY FAILURE, UNSPECIFIED: ICD-10-CM

## 2019-08-04 LAB
ANION GAP SERPL CALC-SCNC: 15 MMOL/L — SIGNIFICANT CHANGE UP (ref 5–17)
BUN SERPL-MCNC: 20 MG/DL — SIGNIFICANT CHANGE UP (ref 7–23)
CALCIUM SERPL-MCNC: 8.9 MG/DL — SIGNIFICANT CHANGE UP (ref 8.4–10.5)
CHLORIDE SERPL-SCNC: 102 MMOL/L — SIGNIFICANT CHANGE UP (ref 96–108)
CO2 SERPL-SCNC: 22 MMOL/L — SIGNIFICANT CHANGE UP (ref 22–31)
CREAT SERPL-MCNC: 1.2 MG/DL — SIGNIFICANT CHANGE UP (ref 0.5–1.3)
GLUCOSE SERPL-MCNC: 185 MG/DL — HIGH (ref 70–99)
HCT VFR BLD CALC: 33.6 % — LOW (ref 39–50)
HGB BLD-MCNC: 11.5 G/DL — LOW (ref 13–17)
MAGNESIUM SERPL-MCNC: 1.2 MG/DL — LOW (ref 1.6–2.6)
MCHC RBC-ENTMCNC: 30.9 PG — SIGNIFICANT CHANGE UP (ref 27–34)
MCHC RBC-ENTMCNC: 34.2 GM/DL — SIGNIFICANT CHANGE UP (ref 32–36)
MCV RBC AUTO: 90.3 FL — SIGNIFICANT CHANGE UP (ref 80–100)
PHOSPHATE SERPL-MCNC: 2.9 MG/DL — SIGNIFICANT CHANGE UP (ref 2.5–4.5)
PLATELET # BLD AUTO: 347 K/UL — SIGNIFICANT CHANGE UP (ref 150–400)
POTASSIUM SERPL-MCNC: 3.5 MMOL/L — SIGNIFICANT CHANGE UP (ref 3.5–5.3)
POTASSIUM SERPL-SCNC: 3.5 MMOL/L — SIGNIFICANT CHANGE UP (ref 3.5–5.3)
RBC # BLD: 3.72 M/UL — LOW (ref 4.2–5.8)
RBC # FLD: 12.6 % — SIGNIFICANT CHANGE UP (ref 10.3–14.5)
SODIUM SERPL-SCNC: 139 MMOL/L — SIGNIFICANT CHANGE UP (ref 135–145)
WBC # BLD: 10.3 K/UL — SIGNIFICANT CHANGE UP (ref 3.8–10.5)
WBC # FLD AUTO: 10.3 K/UL — SIGNIFICANT CHANGE UP (ref 3.8–10.5)

## 2019-08-04 PROCEDURE — 93880 EXTRACRANIAL BILAT STUDY: CPT | Mod: 26

## 2019-08-04 PROCEDURE — 99232 SBSQ HOSP IP/OBS MODERATE 35: CPT | Mod: GC

## 2019-08-04 RX ORDER — MAGNESIUM SULFATE 500 MG/ML
1 VIAL (ML) INJECTION ONCE
Refills: 0 | Status: COMPLETED | OUTPATIENT
Start: 2019-08-04 | End: 2019-08-04

## 2019-08-04 RX ADMIN — HEPARIN SODIUM 5000 UNIT(S): 5000 INJECTION INTRAVENOUS; SUBCUTANEOUS at 13:59

## 2019-08-04 RX ADMIN — FAMOTIDINE 20 MILLIGRAM(S): 10 INJECTION INTRAVENOUS at 12:30

## 2019-08-04 RX ADMIN — SODIUM CHLORIDE 75 MILLILITER(S): 9 INJECTION INTRAMUSCULAR; INTRAVENOUS; SUBCUTANEOUS at 05:43

## 2019-08-04 RX ADMIN — INSULIN GLARGINE 5 UNIT(S): 100 INJECTION, SOLUTION SUBCUTANEOUS at 21:41

## 2019-08-04 RX ADMIN — LEVETIRACETAM 500 MILLIGRAM(S): 250 TABLET, FILM COATED ORAL at 18:06

## 2019-08-04 RX ADMIN — Medication 1: at 09:09

## 2019-08-04 RX ADMIN — Medication 100 MILLIGRAM(S): at 13:59

## 2019-08-04 RX ADMIN — HEPARIN SODIUM 5000 UNIT(S): 5000 INJECTION INTRAVENOUS; SUBCUTANEOUS at 21:40

## 2019-08-04 RX ADMIN — CARVEDILOL PHOSPHATE 25 MILLIGRAM(S): 80 CAPSULE, EXTENDED RELEASE ORAL at 18:06

## 2019-08-04 RX ADMIN — CARVEDILOL PHOSPHATE 25 MILLIGRAM(S): 80 CAPSULE, EXTENDED RELEASE ORAL at 09:09

## 2019-08-04 RX ADMIN — Medication 2: at 12:30

## 2019-08-04 RX ADMIN — HEPARIN SODIUM 5000 UNIT(S): 5000 INJECTION INTRAVENOUS; SUBCUTANEOUS at 05:44

## 2019-08-04 RX ADMIN — ATORVASTATIN CALCIUM 80 MILLIGRAM(S): 80 TABLET, FILM COATED ORAL at 21:40

## 2019-08-04 RX ADMIN — Medication 100 GRAM(S): at 09:08

## 2019-08-04 RX ADMIN — Medication 81 MILLIGRAM(S): at 12:30

## 2019-08-04 RX ADMIN — AMLODIPINE BESYLATE 10 MILLIGRAM(S): 2.5 TABLET ORAL at 05:44

## 2019-08-04 RX ADMIN — Medication 100 MILLIGRAM(S): at 05:44

## 2019-08-04 RX ADMIN — Medication 1: at 18:06

## 2019-08-04 RX ADMIN — LEVETIRACETAM 500 MILLIGRAM(S): 250 TABLET, FILM COATED ORAL at 05:44

## 2019-08-04 RX ADMIN — TAMSULOSIN HYDROCHLORIDE 0.4 MILLIGRAM(S): 0.4 CAPSULE ORAL at 21:41

## 2019-08-04 RX ADMIN — Medication 1 MILLIGRAM(S): at 05:45

## 2019-08-04 NOTE — PROGRESS NOTE ADULT - PROBLEM SELECTOR PLAN 1
Patient has focal neurological findings consistent with his brain tumor in the left temporal lobe. CT scan w/o contrast of the head shows partially calcified hyperdense mass in the left temporal lobe measuring approximately 5.9 x 2.2 cm, grossly unchanged from previous study representing known history of glioblastoma multiforme. MRI c/f acute infarct in left corona radiata.   - No concern for increased ICP at this time. CT scan - no midline shift. Ventricles appear unchanged in size. There is no hydrocephalus.   -MRA Head w/o contrast-- pending  -Carotid Duplex b/l-- pending  -Aspirin 81mg PO QD  -f/u TTE-- pending  -Lipitor 80mg QHS  -Continuous vEEG--> no seizures noted  -Continue Decadron 1 gm daily   -Continue Keppra 500mg PO BID Patient has focal neurological findings consistent with his brain tumor in the left temporal lobe. CT scan w/o contrast of the head shows partially calcified hyperdense mass in the left temporal lobe measuring approximately 5.9 x 2.2 cm, grossly unchanged from previous study representing known history of glioblastoma multiforme. MRI c/f acute infarct in left corona radiata.   - No concern for increased ICP at this time. CT scan - no midline shift. Ventricles appear unchanged in size. There is no hydrocephalus.   -MRA Head w/o contrast-- No large vessel occlusion or major stenosis  -Carotid Duplex b/l-- pending  -Aspirin 81mg PO QD  -f/u TTE-- pending  -Lipitor 80mg QHS  -Continuous vEEG--> no seizures noted  -Continue Decadron 1 gm daily   -Continue Keppra 500mg PO BID

## 2019-08-04 NOTE — DISCHARGE NOTE PROVIDER - NSDCFUSCHEDAPPT_GEN_ALL_CORE_FT
ARSENIO DARNELL P ; 08/19/2019 ; NPP Rad  Opd Lkvl  ARSENIO DARNELL P ; 08/19/2019 ; NPP Neurology [facility] ARSENIO DARNELL P ; 08/19/2019 ; NPP Rad  Opd Lkvl  ARSENIO DARNELL P ; 08/19/2019 ; NPP Neurology [address] [facility] ARSENIO DARNELL P ; 08/19/2019 ; NPP Rad  Opd Lkvl  ARSENIO DARNELL P ; 08/19/2019 ; NPP Neurology [facility] ARSENIO DARNELL ; 08/19/2019 ; NPP Neurology [address] [place] [address] ARSENIO DARNELL ; 08/19/2019 ; NPP Neurology [address] [place] [address] ARSENIO DARNELL ; 08/19/2019 ; NPP Neurology [address] [place] [address] ARSENIO DARNELL ; 08/19/2019 ; NPP Neurology [address] [place] [address]

## 2019-08-04 NOTE — PROGRESS NOTE ADULT - SUBJECTIVE AND OBJECTIVE BOX
Patient is a 69y old  Male who presents with a chief complaint of AMS (03 Aug 2019 10:30)    SUBJECTIVE / OVERNIGHT EVENTS:  No acute events o/n. Patient still confused this AM.     MEDICATIONS  (STANDING):  amLODIPine   Tablet 10 milliGRAM(s) Oral daily  aspirin enteric coated 81 milliGRAM(s) Oral daily  atorvastatin 80 milliGRAM(s) Oral at bedtime  carvedilol 25 milliGRAM(s) Oral every 12 hours  dexamethasone     Tablet 1 milliGRAM(s) Oral daily  dextrose 5%. 1000 milliLiter(s) (50 mL/Hr) IV Continuous <Continuous>  dextrose 50% Injectable 12.5 Gram(s) IV Push once  dextrose 50% Injectable 25 Gram(s) IV Push once  dextrose 50% Injectable 25 Gram(s) IV Push once  docusate sodium 100 milliGRAM(s) Oral three times a day  famotidine    Tablet 20 milliGRAM(s) Oral daily  heparin  Injectable 5000 Unit(s) SubCutaneous every 8 hours  insulin glargine Injectable (LANTUS) 5 Unit(s) SubCutaneous at bedtime  insulin lispro (HumaLOG) corrective regimen sliding scale   SubCutaneous at bedtime  insulin lispro (HumaLOG) corrective regimen sliding scale   SubCutaneous three times a day before meals  levETIRAcetam 500 milliGRAM(s) Oral two times a day  magnesium sulfate  IVPB 1 Gram(s) IV Intermittent once  senna 2 Tablet(s) Oral at bedtime  sodium chloride 0.9%. 1000 milliLiter(s) (75 mL/Hr) IV Continuous <Continuous>  tamsulosin 0.4 milliGRAM(s) Oral at bedtime    MEDICATIONS  (PRN):  dextrose 40% Gel 15 Gram(s) Oral once PRN Blood Glucose LESS THAN 70 milliGRAM(s)/deciliter  glucagon  Injectable 1 milliGRAM(s) IntraMuscular once PRN Glucose LESS THAN 70 milligrams/deciliter      Vital Signs Last 24 Hrs  T(C): 36.5 (04 Aug 2019 04:31), Max: 36.6 (03 Aug 2019 12:13)  T(F): 97.7 (04 Aug 2019 04:31), Max: 97.9 (03 Aug 2019 12:13)  HR: 58 (04 Aug 2019 04:31) (55 - 62)  BP: 166/76 (04 Aug 2019 04:31) (154/88 - 166/76)  BP(mean): --  RR: 18 (04 Aug 2019 04:31) (18 - 18)  SpO2: 96% (04 Aug 2019 04:31) (94% - 96%)      PHYSICAL EXAM  GENERAL: NAD, well-developed  HEAD:  Atraumatic, Normocephalic  EYES: EOMI, PERRLA, conjunctiva and sclera clear  NECK: Supple, No JVD  CHEST/LUNG: Clear to auscultation bilaterally; No wheeze  HEART: Regular rate and rhythm; No murmurs, rubs, or gallops  ABDOMEN: Soft, Nontender, Nondistended; Bowel sounds present  EXTREMITIES:  2+ Peripheral Pulses, No clubbing, cyanosis, or edema  PSYCH: AAOx1  SKIN: No rashes or lesions    CAPILLARY BLOOD GLUCOSE      POCT Blood Glucose.: 226 mg/dL (03 Aug 2019 21:32)  POCT Blood Glucose.: 258 mg/dL (03 Aug 2019 18:30)  POCT Blood Glucose.: 216 mg/dL (03 Aug 2019 12:35)  POCT Blood Glucose.: 208 mg/dL (03 Aug 2019 08:45)    I&O's Summary    03 Aug 2019 07:01  -  04 Aug 2019 07:00  --------------------------------------------------------  IN: 915 mL / OUT: 1825 mL / NET: -910 mL        LABS:                        11.5   10.3  )-----------( 347      ( 04 Aug 2019 06:27 )             33.6     08-04    139  |  102  |  20  ----------------------------<  185<H>  3.5   |  22  |  1.20    Ca    8.9      04 Aug 2019 06:27  Phos  2.9     08-04  Mg     1.2     08-04                RADIOLOGY & ADDITIONAL TESTS:     MICROBIOLOGY:    ANTIMICROBIALS:    CONSULTS:

## 2019-08-04 NOTE — PROGRESS NOTE ADULT - ASSESSMENT
69 year old M hx Grade IV glioblastoma, recent admission for lithotripsy of nephrolithiasis, brought in by family for altered mental status admitted for acute encephalopathy possibly a sequela of underlying GBM vs.stroke vs. acute delirium in the setting of underlying dementia? 69 year old M hx Grade IV glioblastoma, recent admission for lithotripsy of nephrolithiasis, brought in by family for altered mental status admitted for acute encephalopathy 2' stroke and acute delirium in the setting of underlying dementia. DC pending placement-- son wants to permanently place patient into a nursing home is awaiting approval to a few of his choices.

## 2019-08-04 NOTE — DISCHARGE NOTE PROVIDER - NSDCFUADDAPPT_GEN_ALL_CORE_FT
Dr. Zina Cuba:  08/19/2019 1:00PM   NPP Neurology [address] [place] [address]    MRI Appointment:  8/19/2019 11:30AM  NPP Radiology MRI [address] [place], [place] [id number] Dr. Zina Cuba:  08/19/2019 1:00PM   NPP Neurology [address] [place] [address]    MRI Appointment:  8/19/2019 11:30AM  N[facility] Radiology MRI [address] [place] [id number]    *Please repeat BMP frequently (at least within one week) to check creatinine levels (on d/c creatinine 1.25)*

## 2019-08-04 NOTE — PROGRESS NOTE ADULT - PROBLEM SELECTOR PLAN 6
DVT ppx: hep subq  Diet: Carb consistent  f/u w/ SW regarding nursing home placement as per family request since family unable to take care of pt  disposition: dc pending improvement of mental status (mri, eeg pending) DVT ppx: hep subq  Diet: Carb consistent  f/u w/ SW regarding nursing home placement as per family request since family unable to take care of pt  disposition: dc pending permanent placement at nursing home as patients family is unable to care for him

## 2019-08-04 NOTE — PROGRESS NOTE ADULT - PROBLEM SELECTOR PLAN 5
- resolved; likely pre-renal in setting of reported decreased PO intake. Patient had EVELIN 2/2 nephrolithiasis 2 weeks ago s/p stent and flomax. CT scan prelim reading shows no repeat hydronephrosis. Patient is taking metformin.  - hold losartan, hctz and metformin  - Evelin resolved with IVF  - mg and K repleted - resolved; likely pre-renal in setting of reported decreased PO intake. Patient had EVELIN 2/2 nephrolithiasis 2 weeks ago s/p stent and flomax. CT scan prelim reading shows no repeat hydronephrosis. Patient is taking metformin.  - hold losartan, hctz and metformin--> can resume as discharge   - Evelin resolved with IVF  - mg and K repleted

## 2019-08-04 NOTE — DISCHARGE NOTE PROVIDER - HOSPITAL COURSE
68 yo male hx Grade IV glioblastoma s/p radiation + surgical resection + chemo (nov 2018) on Decadron, HTN, T2DM, recent admission for nephrolithiasis + SHAWNA, brought in by family for altered mental status admitted for acute encephalopathy 2' stroke and acute delirium in the setting of underlying dementia. CT scan w/o contrast of the head showed partially calcified hyperdense mass in the left temporal lobe grossly unchanged from previous study representing known history of glioblastoma multiforme. Patient placed on home Decadron + Keppra. VEEG showed no seizures. MRI showed acute infarct in left corona radiata. Patient placed on high-intensity statin and aspirin. MRA head w/o contrast showed no large vessel occlusion or stenosis. Carotid duplex showed ______. TTE results showed ______        Patient was discharged to _______ on _______ 68 yo male hx Grade IV glioblastoma s/p radiation + surgical resection + chemo (nov 2018) on Decadron, HTN, T2DM, recent admission for nephrolithiasis + SHAWNA, brought in by family for altered mental status admitted for acute encephalopathy 2' stroke and acute delirium in the setting of underlying dementia. CT scan w/o contrast of the head showed partially calcified hyperdense mass in the left temporal lobe grossly unchanged from previous study representing known history of glioblastoma multiforme. Patient placed on home Decadron + Keppra. VEEG showed no seizures. MRI showed acute infarct in left corona radiata. Patient placed on high-intensity statin and aspirin. MRA head w/o contrast showed no large vessel occlusion or stenosis. Carotid duplex showed no stenosis of internal carotid. TTE results showed no PFO and EF 60% - unchanged from prior echo.         Patient was discharged to _______ on _______ 68 yo male hx Grade IV glioblastoma s/p radiation + surgical resection + chemo (nov 2018) on Decadron, HTN, T2DM, recent admission for nephrolithiasis + SHAWNA, brought in by family for altered mental status admitted for acute encephalopathy due to stroke and acute delirium in the setting of underlying dementia. CT scan w/o contrast of the head showed partially calcified hyperdense mass in the left temporal lobe grossly unchanged from previous study representing known history of glioblastoma multiforme. Patient placed on home Decadron + Keppra. VEEG showed no seizures. MRI showed acute infarct in left corona radiata. Patient placed on high-intensity statin and aspirin. MRA head w/o contrast showed no large vessel occlusion or stenosis. Carotid duplex showed no stenosis of internal carotid. TTE results showed no PFO and EF 60% - unchanged from prior echo. Course was complicated by SHAWNA which resolved with IV fluids.         Patient was discharged to _______ on _______ 70 yo male hx Grade IV glioblastoma s/p radiation + surgical resection + chemo (nov 2018) on Decadron, HTN, T2DM, recent admission for nephrolithiasis + SHAWNA, brought in by family for altered mental status admitted for acute encephalopathy due to stroke and acute delirium in the setting of underlying dementia. CT scan w/o contrast of the head showed partially calcified hyperdense mass in the left temporal lobe grossly unchanged from previous study representing known history of glioblastoma multiforme. Patient placed on home Decadron + Keppra. VEEG showed no seizures. MRI showed acute infarct in left corona radiata. Patient placed on high-intensity statin and aspirin. MRA head w/o contrast showed no large vessel occlusion or stenosis. Carotid duplex showed no stenosis of internal carotid. TTE results showed no PFO and EF 60% - unchanged from prior echo. Course was complicated by 2 SHAWNA which resolved with IV fluids. Pt also had hypertensive urgency, required multiple hydralazing IVP. He is currently on amlodipine, carvedillol and hydralazine for BP controll. Home HCTz and ARB is being held because patient is prone to SHAWNA.         Patient was discharged to 8/15/19 to long term care facility.

## 2019-08-04 NOTE — DISCHARGE NOTE PROVIDER - CARE PROVIDER_API CALL
Zina Cuba)  Neurology  [facility], [address] [place] [address]  Phone: [phone]  Fax: [phone]  Follow Up Time:

## 2019-08-04 NOTE — DISCHARGE NOTE PROVIDER - NSDCCPCAREPLAN_GEN_ALL_CORE_FT
PRINCIPAL DISCHARGE DIAGNOSIS  Diagnosis: Altered mental status  Assessment and Plan of Treatment: You were brought to the hospital for a change in your mental status. An MRI of the head showed you had a small stroke. The stroke and your underlying Glioblastoma Multiforme were responsible for this change in your mental status. You should follow up with your neuro-oncologist, Dr. Zina Cuba. PRINCIPAL DISCHARGE DIAGNOSIS  Diagnosis: Altered mental status  Assessment and Plan of Treatment: You were brought to the hospital for a change in your mental status. An MRI of the head showed you had a small stroke. The stroke and your underlying Glioblastoma Multiforme were responsible for this change in your mental status. You should follow up with your neuro-oncologist, Dr. Zina Cuba in two months for a repeat MRI. If you experience confusion, headache, nausea/vomiting, or any new concerning symptom, please return to the emergency room. PRINCIPAL DISCHARGE DIAGNOSIS  Diagnosis: Altered mental status  Assessment and Plan of Treatment: You were brought to the hospital for a change in your mental status. An MRI of the head showed you had a small stroke. The stroke and your underlying Glioblastoma Multiforme were responsible for this change in your mental status. You should follow up with your neuro-oncologist, Dr. Zina Cuba in two months for a repeat MRI. If you experience confusion, headache, nausea/vomiting, or any new concerning symptom, please return to the emergency room.      SECONDARY DISCHARGE DIAGNOSES  Diagnosis: HTN (hypertension)  Assessment and Plan of Treatment: You had high blood pressure during your stay. We stopped your thiazide and losartan because you had multiple acute kidney injuries, and these medication and further injure your kidneys. You were given your home amlodipine and carvedilol medications. We also started you on hydralazine 25mg three times a day. Please continue to take this new medication along with your home amlodipine and carvedilol. If you have a headache, dizziness, confusion or any new concerning symptom, please return to the emergency room.    Diagnosis: SHAWNA (acute kidney injury)  Assessment and Plan of Treatment: You had acute kidney injury during your hospital stay, likely from dehydration. The kidney injury improved with IV fluids. We held your thiazide and losartan as these medications can further injure your kidney. Please continue to not take these medication. PRINCIPAL DISCHARGE DIAGNOSIS  Diagnosis: Altered mental status  Assessment and Plan of Treatment: You were brought to the hospital for a change in your mental status. An MRI of the head showed you had a small stroke. The stroke and your underlying Glioblastoma Multiforme were responsible for this change in your mental status. You should follow up with your neuro-oncologist, Dr. Zina Cuba in two months for a repeat MRI. If you experience confusion, headache, nausea/vomiting, or any new concerning symptom, please return to the emergency room.      SECONDARY DISCHARGE DIAGNOSES  Diagnosis: HTN (hypertension)  Assessment and Plan of Treatment: You had high blood pressure during your stay. We stopped your thiazide and losartan because you had multiple acute kidney injuries, and these medication and further injure your kidneys. You were given your home amlodipine and carvedilol medications. We also started you on hydralazine 25mg three times a day. Please continue to take this new medication along with your home amlodipine and carvedilol. If you have a headache, dizziness, confusion or any new concerning symptom, please return to the emergency room.    Diagnosis: SHAWNA (acute kidney injury)  Assessment and Plan of Treatment: You had acute kidney injury during your hospital stay, likely from dehydration. The kidney injury improved with IV fluids. We held your thiazide and losartan as these medications can further injure your kidney. Please continue to not take these medication. Please have the doctors check your creatinine level frequently to check for further kidney injury. If your creatinine level goes up, have your doctor at the nursing facility give you IV fluid.

## 2019-08-05 ENCOUNTER — RX RENEWAL (OUTPATIENT)
Age: 69
End: 2019-08-05

## 2019-08-05 LAB
ANION GAP SERPL CALC-SCNC: 16 MMOL/L — SIGNIFICANT CHANGE UP (ref 5–17)
BUN SERPL-MCNC: 19 MG/DL — SIGNIFICANT CHANGE UP (ref 7–23)
CALCIUM SERPL-MCNC: 9.9 MG/DL — SIGNIFICANT CHANGE UP (ref 8.4–10.5)
CHLORIDE SERPL-SCNC: 103 MMOL/L — SIGNIFICANT CHANGE UP (ref 96–108)
CO2 SERPL-SCNC: 22 MMOL/L — SIGNIFICANT CHANGE UP (ref 22–31)
CREAT SERPL-MCNC: 1.28 MG/DL — SIGNIFICANT CHANGE UP (ref 0.5–1.3)
GLUCOSE BLDC GLUCOMTR-MCNC: 179 MG/DL — HIGH (ref 70–99)
GLUCOSE BLDC GLUCOMTR-MCNC: 204 MG/DL — HIGH (ref 70–99)
GLUCOSE SERPL-MCNC: 182 MG/DL — HIGH (ref 70–99)
MAGNESIUM SERPL-MCNC: 1.4 MG/DL — LOW (ref 1.6–2.6)
PHOSPHATE SERPL-MCNC: 4.2 MG/DL — SIGNIFICANT CHANGE UP (ref 2.5–4.5)
POTASSIUM SERPL-MCNC: 3.4 MMOL/L — LOW (ref 3.5–5.3)
POTASSIUM SERPL-SCNC: 3.4 MMOL/L — LOW (ref 3.5–5.3)
SODIUM SERPL-SCNC: 141 MMOL/L — SIGNIFICANT CHANGE UP (ref 135–145)

## 2019-08-05 PROCEDURE — 93306 TTE W/DOPPLER COMPLETE: CPT | Mod: 26

## 2019-08-05 PROCEDURE — 99232 SBSQ HOSP IP/OBS MODERATE 35: CPT | Mod: GC

## 2019-08-05 RX ORDER — DEXAMETHASONE 1 MG/1
1 TABLET ORAL DAILY
Qty: 30 | Refills: 0 | Status: ACTIVE | COMMUNITY
Start: 2018-03-13 | End: 1900-01-01

## 2019-08-05 RX ORDER — POTASSIUM CHLORIDE 20 MEQ
40 PACKET (EA) ORAL EVERY 4 HOURS
Refills: 0 | Status: COMPLETED | OUTPATIENT
Start: 2019-08-05 | End: 2019-08-05

## 2019-08-05 RX ORDER — INSULIN LISPRO 100/ML
2 VIAL (ML) SUBCUTANEOUS
Refills: 0 | Status: DISCONTINUED | OUTPATIENT
Start: 2019-08-05 | End: 2019-08-09

## 2019-08-05 RX ORDER — INSULIN GLARGINE 100 [IU]/ML
10 INJECTION, SOLUTION SUBCUTANEOUS AT BEDTIME
Refills: 0 | Status: DISCONTINUED | OUTPATIENT
Start: 2019-08-05 | End: 2019-08-08

## 2019-08-05 RX ORDER — MAGNESIUM SULFATE 500 MG/ML
1 VIAL (ML) INJECTION ONCE
Refills: 0 | Status: COMPLETED | OUTPATIENT
Start: 2019-08-05 | End: 2019-08-05

## 2019-08-05 RX ORDER — MAGNESIUM SULFATE 500 MG/ML
2 VIAL (ML) INJECTION ONCE
Refills: 0 | Status: COMPLETED | OUTPATIENT
Start: 2019-08-05 | End: 2019-08-05

## 2019-08-05 RX ADMIN — AMLODIPINE BESYLATE 10 MILLIGRAM(S): 2.5 TABLET ORAL at 05:20

## 2019-08-05 RX ADMIN — Medication 1 MILLIGRAM(S): at 05:20

## 2019-08-05 RX ADMIN — INSULIN GLARGINE 10 UNIT(S): 100 INJECTION, SOLUTION SUBCUTANEOUS at 22:05

## 2019-08-05 RX ADMIN — Medication 100 MILLIGRAM(S): at 05:20

## 2019-08-05 RX ADMIN — Medication 81 MILLIGRAM(S): at 13:13

## 2019-08-05 RX ADMIN — TAMSULOSIN HYDROCHLORIDE 0.4 MILLIGRAM(S): 0.4 CAPSULE ORAL at 22:02

## 2019-08-05 RX ADMIN — Medication 50 GRAM(S): at 18:24

## 2019-08-05 RX ADMIN — Medication 3: at 08:42

## 2019-08-05 RX ADMIN — Medication 100 GRAM(S): at 10:38

## 2019-08-05 RX ADMIN — FAMOTIDINE 20 MILLIGRAM(S): 10 INJECTION INTRAVENOUS at 13:14

## 2019-08-05 RX ADMIN — Medication 40 MILLIEQUIVALENT(S): at 18:25

## 2019-08-05 RX ADMIN — CARVEDILOL PHOSPHATE 25 MILLIGRAM(S): 80 CAPSULE, EXTENDED RELEASE ORAL at 05:20

## 2019-08-05 RX ADMIN — Medication 40 MILLIEQUIVALENT(S): at 22:05

## 2019-08-05 RX ADMIN — Medication 100 MILLIGRAM(S): at 22:03

## 2019-08-05 RX ADMIN — ATORVASTATIN CALCIUM 80 MILLIGRAM(S): 80 TABLET, FILM COATED ORAL at 22:02

## 2019-08-05 RX ADMIN — CARVEDILOL PHOSPHATE 25 MILLIGRAM(S): 80 CAPSULE, EXTENDED RELEASE ORAL at 18:24

## 2019-08-05 RX ADMIN — Medication 2 UNIT(S): at 18:25

## 2019-08-05 RX ADMIN — Medication 4: at 13:10

## 2019-08-05 RX ADMIN — Medication 2: at 18:25

## 2019-08-05 RX ADMIN — LEVETIRACETAM 500 MILLIGRAM(S): 250 TABLET, FILM COATED ORAL at 18:24

## 2019-08-05 RX ADMIN — LEVETIRACETAM 500 MILLIGRAM(S): 250 TABLET, FILM COATED ORAL at 05:20

## 2019-08-05 RX ADMIN — HEPARIN SODIUM 5000 UNIT(S): 5000 INJECTION INTRAVENOUS; SUBCUTANEOUS at 22:03

## 2019-08-05 RX ADMIN — HEPARIN SODIUM 5000 UNIT(S): 5000 INJECTION INTRAVENOUS; SUBCUTANEOUS at 13:13

## 2019-08-05 RX ADMIN — HEPARIN SODIUM 5000 UNIT(S): 5000 INJECTION INTRAVENOUS; SUBCUTANEOUS at 05:20

## 2019-08-05 RX ADMIN — SENNA PLUS 2 TABLET(S): 8.6 TABLET ORAL at 22:02

## 2019-08-05 RX ADMIN — Medication 100 MILLIGRAM(S): at 13:14

## 2019-08-05 NOTE — PROGRESS NOTE ADULT - ASSESSMENT
69 year old M hx Grade IV glioblastoma, recent admission for lithotripsy of nephrolithiasis, brought in by family for altered mental status admitted for acute encephalopathy secondary to stroke and acute delirium in the setting of underlying dementia. DC pending placement-- son wants to permanently place patient into a nursing home is awaiting approval to a few of his choices.

## 2019-08-05 NOTE — DIETITIAN INITIAL EVALUATION ADULT. - OTHER INFO
Pt admitted with AMS, pt A&Ox1 this morning. Pt with decreased po intake x 1 week PTA per H&P. However, pt has been eating well since admission, consuming 100% of meals for the past week. Was on dexamethasone and metformin PTA. Will obtain subjective information on follow up.

## 2019-08-05 NOTE — DIETITIAN INITIAL EVALUATION ADULT. - PERTINENT LABORATORY DATA
CAPILLARY BLOOD GLUCOSE    POCT Blood Glucose.: 308 mg/dL (05 Aug 2019 13:01)  POCT Blood Glucose.: 288 mg/dL (05 Aug 2019 08:40)  POCT Blood Glucose.: 207 mg/dL (04 Aug 2019 21:39)  POCT Blood Glucose.: 168 mg/dL (04 Aug 2019 17:44)    8/3 A1C 8.6

## 2019-08-05 NOTE — DIETITIAN INITIAL EVALUATION ADULT. - PROBLEM SELECTOR PLAN 6
DVT ppx: hep subq  Diet: Carb consistent  f/u w/ SW in AM regarding nursing home placement as per family request since family unable to take care of pt

## 2019-08-05 NOTE — DIETITIAN INITIAL EVALUATION ADULT. - PROBLEM SELECTOR PLAN 3
Likely reactive the setting of dexamethasone use  No focal evidence of infection, no fevers or infectious symptoms  Check CXR  Monitor off Abx

## 2019-08-05 NOTE — DIETITIAN INITIAL EVALUATION ADULT. - PROBLEM SELECTOR PLAN 5
May be pre-renal in setting of reported decreased PO intake  However FeNA suggests obstruction: prelim of CT without reports of hydronephrosis  Check bladder scan  gentle diuresis. trend creat, monitor I/Os

## 2019-08-05 NOTE — DIETITIAN INITIAL EVALUATION ADULT. - PROBLEM SELECTOR PLAN 2
-Diagnosed since Jan 2001, s/p resection, RT, chemo(last in Nov 2018), stable tumor from MRI in may  - CT head shows calcified hyperdense mass in the left temporal lobe consistent with existing GM  -CT head: A 5.9 cm partially calcified hyperdense mass in the left temporal lobe is   visualized consistent with patient's known glioblastoma multiforme. There   is no evidence of additional acute intracranial pathology.

## 2019-08-05 NOTE — PROGRESS NOTE ADULT - SUBJECTIVE AND OBJECTIVE BOX
Patient is a 69y old  Male who presents with a chief complaint of AMS (04 Aug 2019 20:34)      INTERVAL HPI/OVERNIGHT EVENTS:  No acute event overnight.       Vital Signs Last 24 Hrs  T(C): 36.7 (05 Aug 2019 05:37), Max: 36.7 (04 Aug 2019 21:11)  T(F): 98 (05 Aug 2019 05:37), Max: 98 (04 Aug 2019 21:11)  HR: 64 (05 Aug 2019 05:37) (61 - 66)  BP: 173/88 (05 Aug 2019 05:37) (148/87 - 173/88)  RR: 18 (05 Aug 2019 05:37) (18 - 18)  SpO2: 94% (05 Aug 2019 05:37) (94% - 96%)    PHYSICAL EXAM:  GENERAL: NAD.  CHEST/LUNG: Clear to auscultation; No rales, rhonchi, or wheezing.  Respiratory effort does not appear labored.  HEART: Regular rate and rhythm; S1 and S2,  no murmurs, rubs, or gallops.  ABDOMEN: Soft, not tender to palpation.  No masses or HSM appreciated.  No distension.  Bowel sounds present.  EXTREMITIES:  No clubbing, cyanosis, or edema.  Moves all extremities with strength 5/5.  SKIN: No obvious rashes or lesions.  Turgor okay.  NEURO:  Alert and oriented x 3, no focal sensory or  motor deficit, DTR 2+ bilaterally.    LABS:                        11.5   10.3  )-----------( 347      ( 04 Aug 2019 06:27 )             33.6     08-04    139  |  102  |  20  ----------------------------<  185<H>  3.5   |  22  |  1.20    Ca    8.9      04 Aug 2019 06:27  Phos  2.9     08-04  Mg     1.2     08-04          CAPILLARY BLOOD GLUCOSE      POCT Blood Glucose.: 207 mg/dL (04 Aug 2019 21:39)  POCT Blood Glucose.: 168 mg/dL (04 Aug 2019 17:44)  POCT Blood Glucose.: 202 mg/dL (04 Aug 2019 12:08)  POCT Blood Glucose.: 172 mg/dL (04 Aug 2019 08:53) Pia Ojeda PGY1  [id number]    Patient is a 69y old  Male who presents with a chief complaint of AMS (04 Aug 2019 20:34)      INTERVAL HPI/OVERNIGHT EVENTS:  No acute event overnight. This morning pt reports feeling well. Denies CP, SOB, abdominal pain, headache. A&Ox1. AM mg 1.4 - repleted w/ IV Mg.       Vital Signs Last 24 Hrs  T(C): 36.7 (05 Aug 2019 05:37), Max: 36.7 (04 Aug 2019 21:11)  T(F): 98 (05 Aug 2019 05:37), Max: 98 (04 Aug 2019 21:11)  HR: 64 (05 Aug 2019 05:37) (61 - 66)  BP: 173/88 (05 Aug 2019 05:37) (148/87 - 173/88)  RR: 18 (05 Aug 2019 05:37) (18 - 18)  SpO2: 94% (05 Aug 2019 05:37) (94% - 96%)    PHYSICAL EXAM:  GENERAL: NAD. Comfortable appearing.  HEENT: MMM, EOMI, PERRL.   CHEST/LUNG: Clear to auscultation; No rales, rhonchi, or wheezing.  Respiratory effort does not appear labored.  HEART: Regular rate and rhythm; S1 and S2,  no murmurs, rubs, or gallops.  ABDOMEN: Protuberant. Soft, not tender to palpation.  No masses or HSM appreciated.  Bowel sounds present.  EXTREMITIES: trace edema bilaterally in lower extremities.  Moves all extremities with strength 5/5.  SKIN: No obvious rashes or lesions.  Turgor okay.  NEURO:  Alert and oriented x 1, EOMI, PERRL, symmetric facial muscles, tongue and uvula midline. 5/5 strength in upper and lower extremities.    LABS:  08-05    141  |  103  |  19  ----------------------------<  182<H>  3.4 <from 3.5>   |  22  |  1.28      Ca    9.9      05 Aug 2019 07:01  Phos  4.2     08-05  Mg     1.4   (repleted)  08-05 Pia Ojeda PGY1  [id number]    Patient is a 69y old  Male who presents with a chief complaint of AMS (04 Aug 2019 20:34)      INTERVAL HPI/OVERNIGHT EVENTS:  No acute event overnight. This morning pt reports feeling well. Denies CP, SOB, abdominal pain, headache. A&Ox1. AM mg 1.4, K 3.4 - repleted w/ IV Mg and PO KCl.       Vital Signs Last 24 Hrs  T(C): 36.7 (05 Aug 2019 05:37), Max: 36.7 (04 Aug 2019 21:11)  T(F): 98 (05 Aug 2019 05:37), Max: 98 (04 Aug 2019 21:11)  HR: 64 (05 Aug 2019 05:37) (61 - 66)  BP: 173/88 (05 Aug 2019 05:37) (148/87 - 173/88)  RR: 18 (05 Aug 2019 05:37) (18 - 18)  SpO2: 94% (05 Aug 2019 05:37) (94% - 96%)    PHYSICAL EXAM:  GENERAL: NAD. Comfortable appearing.  HEENT: MMM, EOMI, PERRL.   CHEST/LUNG: Clear to auscultation; No rales, rhonchi, or wheezing.  Respiratory effort does not appear labored.  HEART: Regular rate and rhythm; S1 and S2,  no murmurs, rubs, or gallops.  ABDOMEN: Protuberant. Soft, not tender to palpation.  No masses or HSM appreciated.  Bowel sounds present.  EXTREMITIES: trace edema bilaterally in lower extremities.  Moves all extremities with strength 5/5.  SKIN: No obvious rashes or lesions.  Turgor okay.  NEURO:  Alert and oriented x 1, EOMI, PERRL, symmetric facial muscles, tongue and uvula midline. 5/5 strength in upper and lower extremities.    LABS:  08-05    141  |  103  |  19  ----------------------------<  182<H>  3.4 <from 3.5>   |  22  |  1.28      Ca    9.9      05 Aug 2019 07:01  Phos  4.2     08-05  Mg     1.4   (repleted)  08-05

## 2019-08-05 NOTE — DIETITIAN INITIAL EVALUATION ADULT. - REASON INDICATOR FOR ASSESSMENT
length of stay   attempted to see pt twice today, pt was at echo  obtained information from medical records

## 2019-08-05 NOTE — PROGRESS NOTE ADULT - PROBLEM SELECTOR PLAN 6
DVT ppx: hep subq  Diet: Carb consistent  f/u w/ SW regarding nursing home placement as per family request since family unable to take care of pt  disposition: dc pending permanent placement at nursing home as patients family is unable to care for him

## 2019-08-05 NOTE — PROGRESS NOTE ADULT - PROBLEM SELECTOR PLAN 2
-Diagnosed since Jan 2001, s/p resection, RT, chemotherapy (last in Nov 2018), stable tumor from MRI in may. CT imaging and neurological exam as above. -Diagnosed since Jan 2001, s/p resection, RT, chemotherapy (last in Nov 2018), stable tumor from MRI in may. CT imaging and neurological exam as above.  - Spoke to Dr. Zina Cuba regarding outpatient plans. Pt is to follow up with Dr. Cuba in 2 months for outpatient MRI. No chemo at this time.

## 2019-08-05 NOTE — PROGRESS NOTE ADULT - PROBLEM SELECTOR PROBLEM 2
Telephone Encounter by Kitty Trivedi CMA at 09/25/18 11:30 AM     Author:  Kitty Trivedi CMA Service:  (none) Author Type:  Certified Medical Assistant     Filed:  09/25/18 11:30 AM Encounter Date:  9/24/2018 Status:  Signed     :  Kitty Trivedi CMA (Certified Medical Assistant)       From: Kory Perez  To: Yee Burroughs DO  Sent: 9/24/2018  4:38 PM CDT  Subject: Lab Tests  Test Related Questions    This message is being sent by Jeff Perez on behalf of Kory Perez    Good afternoon Dr Burroughs,  My  works for the [facility], and thru [facility],   last week they took blood and ran Labs on me. I have received the results   and I would like them to send on to you so you can have them for your   records. Can you provide the FAX# they should use to send those labs to   you?  Sincerely,  Kory Perez       Revision History        Date/Time User Provider Type Action    > 09/25/18 11:30 AM Kitty Trivedi CMA Certified Medical Assistant Sign    Attribution information within the note text is not available.             Glioblastoma multiforme

## 2019-08-05 NOTE — PROGRESS NOTE ADULT - ATTENDING COMMENTS
Patient seen and examined.  ID [id number]. plan d/w w/ patient, [facility]  [phone]

## 2019-08-05 NOTE — DIETITIAN INITIAL EVALUATION ADULT. - PROBLEM SELECTOR PLAN 1
-Secondary to worsening glioblastoma vs delirium vs dementia.   -UA negative, no active source of infection  -f/u CXR in the setting of cough/SOB  -send B12, TSH, folate  -f/u w/ neuro-oncologist Dr.Deborah Hernandez for collateral/further eval for AMS   -consider MRI as patient reported to be obtained outpatient  -enhanced supervision for now

## 2019-08-05 NOTE — PROGRESS NOTE ADULT - PROBLEM SELECTOR PLAN 5
- resolved; likely pre-renal in setting of reported decreased PO intake. Patient had EVELIN 2/2 nephrolithiasis 2 weeks ago s/p stent and flomax. CT scan prelim reading shows no repeat hydronephrosis. Patient is taking metformin.  - hold losartan, hctz and metformin--> can resume as discharge   - Evelin resolved with IVF  - mg and K repleted

## 2019-08-05 NOTE — PROGRESS NOTE ADULT - PROBLEM SELECTOR PLAN 1
Altered mental status is likely from acute infarct (MRI c/f acute infarct in left corona radiata) and acute delirium. 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 No concern for increased ICP at this time (CT scan with no midline shift. Ventricles appear unchanged in size. There is no hydrocephalus). MRA Head shows no large vessel occlusion or major stenosis. Continuous vEEG with no seizures noted  -f/u Carotid Duplex b/l   -f/u TTE  -Aspirin 81mg PO QD  -Lipitor 80mg QHS  -Continue Decadron 1 gm daily   -Continue Keppra 500mg PO BID Altered mental status is likely from acute infarct (MRI c/f acute infarct in left corona radiata) and acute delirium. 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 No concern for increased ICP at this time (CT scan with no midline shift. Ventricles appear unchanged in size. There is no hydrocephalus). MRA Head shows no large vessel occlusion or major stenosis. Continuous vEEG with no seizures noted  -f/u Carotid Duplex b/l   -f/u TTE bubble study for PFO   -Aspirin 81mg PO QD  -Lipitor 80mg QHS  -Continue Decadron 1 gm daily   -Continue Keppra 500mg PO BID

## 2019-08-06 DIAGNOSIS — I10 ESSENTIAL (PRIMARY) HYPERTENSION: ICD-10-CM

## 2019-08-06 LAB
ANION GAP SERPL CALC-SCNC: 14 MMOL/L — SIGNIFICANT CHANGE UP (ref 5–17)
BUN SERPL-MCNC: 18 MG/DL — SIGNIFICANT CHANGE UP (ref 7–23)
CALCIUM SERPL-MCNC: 9.5 MG/DL — SIGNIFICANT CHANGE UP (ref 8.4–10.5)
CHLORIDE SERPL-SCNC: 104 MMOL/L — SIGNIFICANT CHANGE UP (ref 96–108)
CO2 SERPL-SCNC: 23 MMOL/L — SIGNIFICANT CHANGE UP (ref 22–31)
CREAT SERPL-MCNC: 1.1 MG/DL — SIGNIFICANT CHANGE UP (ref 0.5–1.3)
GLUCOSE BLDC GLUCOMTR-MCNC: 187 MG/DL — HIGH (ref 70–99)
GLUCOSE BLDC GLUCOMTR-MCNC: 206 MG/DL — HIGH (ref 70–99)
GLUCOSE BLDC GLUCOMTR-MCNC: 214 MG/DL — HIGH (ref 70–99)
GLUCOSE BLDC GLUCOMTR-MCNC: 227 MG/DL — HIGH (ref 70–99)
GLUCOSE SERPL-MCNC: 238 MG/DL — HIGH (ref 70–99)
MAGNESIUM SERPL-MCNC: 1.7 MG/DL — SIGNIFICANT CHANGE UP (ref 1.6–2.6)
PHOSPHATE SERPL-MCNC: 3.4 MG/DL — SIGNIFICANT CHANGE UP (ref 2.5–4.5)
POTASSIUM SERPL-MCNC: 3.6 MMOL/L — SIGNIFICANT CHANGE UP (ref 3.5–5.3)
POTASSIUM SERPL-SCNC: 3.6 MMOL/L — SIGNIFICANT CHANGE UP (ref 3.5–5.3)
SODIUM SERPL-SCNC: 141 MMOL/L — SIGNIFICANT CHANGE UP (ref 135–145)

## 2019-08-06 PROCEDURE — 99232 SBSQ HOSP IP/OBS MODERATE 35: CPT | Mod: GC

## 2019-08-06 RX ORDER — HYDROCORTISONE 1 %
1 OINTMENT (GRAM) TOPICAL THREE TIMES A DAY
Refills: 0 | Status: DISCONTINUED | OUTPATIENT
Start: 2019-08-06 | End: 2019-08-15

## 2019-08-06 RX ORDER — HYDROCHLOROTHIAZIDE 25 MG
25 TABLET ORAL
Refills: 0 | Status: DISCONTINUED | OUTPATIENT
Start: 2019-08-06 | End: 2019-08-07

## 2019-08-06 RX ORDER — LOSARTAN POTASSIUM 100 MG/1
100 TABLET, FILM COATED ORAL DAILY
Refills: 0 | Status: DISCONTINUED | OUTPATIENT
Start: 2019-08-06 | End: 2019-08-07

## 2019-08-06 RX ORDER — HYDROCHLOROTHIAZIDE 25 MG
25 TABLET ORAL
Refills: 0 | Status: DISCONTINUED | OUTPATIENT
Start: 2019-08-06 | End: 2019-08-06

## 2019-08-06 RX ADMIN — Medication 1 MILLIGRAM(S): at 05:30

## 2019-08-06 RX ADMIN — LOSARTAN POTASSIUM 100 MILLIGRAM(S): 100 TABLET, FILM COATED ORAL at 13:11

## 2019-08-06 RX ADMIN — Medication 2: at 13:12

## 2019-08-06 RX ADMIN — Medication 2 UNIT(S): at 13:12

## 2019-08-06 RX ADMIN — Medication 100 MILLIGRAM(S): at 05:30

## 2019-08-06 RX ADMIN — FAMOTIDINE 20 MILLIGRAM(S): 10 INJECTION INTRAVENOUS at 13:12

## 2019-08-06 RX ADMIN — Medication 1: at 17:58

## 2019-08-06 RX ADMIN — HEPARIN SODIUM 5000 UNIT(S): 5000 INJECTION INTRAVENOUS; SUBCUTANEOUS at 05:30

## 2019-08-06 RX ADMIN — Medication 2 UNIT(S): at 08:26

## 2019-08-06 RX ADMIN — Medication 2 UNIT(S): at 17:58

## 2019-08-06 RX ADMIN — AMLODIPINE BESYLATE 10 MILLIGRAM(S): 2.5 TABLET ORAL at 05:30

## 2019-08-06 RX ADMIN — ATORVASTATIN CALCIUM 80 MILLIGRAM(S): 80 TABLET, FILM COATED ORAL at 21:19

## 2019-08-06 RX ADMIN — HEPARIN SODIUM 5000 UNIT(S): 5000 INJECTION INTRAVENOUS; SUBCUTANEOUS at 21:19

## 2019-08-06 RX ADMIN — INSULIN GLARGINE 10 UNIT(S): 100 INJECTION, SOLUTION SUBCUTANEOUS at 21:20

## 2019-08-06 RX ADMIN — LEVETIRACETAM 500 MILLIGRAM(S): 250 TABLET, FILM COATED ORAL at 05:30

## 2019-08-06 RX ADMIN — CARVEDILOL PHOSPHATE 25 MILLIGRAM(S): 80 CAPSULE, EXTENDED RELEASE ORAL at 05:30

## 2019-08-06 RX ADMIN — CARVEDILOL PHOSPHATE 25 MILLIGRAM(S): 80 CAPSULE, EXTENDED RELEASE ORAL at 17:57

## 2019-08-06 RX ADMIN — Medication 2: at 08:26

## 2019-08-06 RX ADMIN — HEPARIN SODIUM 5000 UNIT(S): 5000 INJECTION INTRAVENOUS; SUBCUTANEOUS at 13:12

## 2019-08-06 RX ADMIN — Medication 81 MILLIGRAM(S): at 13:11

## 2019-08-06 RX ADMIN — LEVETIRACETAM 500 MILLIGRAM(S): 250 TABLET, FILM COATED ORAL at 17:56

## 2019-08-06 RX ADMIN — SENNA PLUS 2 TABLET(S): 8.6 TABLET ORAL at 21:19

## 2019-08-06 RX ADMIN — Medication 100 MILLIGRAM(S): at 13:11

## 2019-08-06 RX ADMIN — TAMSULOSIN HYDROCHLORIDE 0.4 MILLIGRAM(S): 0.4 CAPSULE ORAL at 21:19

## 2019-08-06 RX ADMIN — Medication 25 MILLIGRAM(S): at 21:19

## 2019-08-06 NOTE — PROGRESS NOTE ADULT - PROBLEM SELECTOR PLAN 6
DVT ppx: hep subq  Diet: Carb consistent  f/u w/ SW regarding nursing home placement as per family request since family unable to take care of pt  disposition: dc pending permanent placement at nursing home as patients family is unable to care for him - resolved; likely pre-renal in setting of reported decreased PO intake. Patient had EVELIN 2/2 nephrolithiasis 2 weeks ago s/p stent and flomax. CT scan prelim reading shows no repeat hydronephrosis. Patient is taking metformin.  - hold losartan, hctz and metformin--> can resume as discharge   - Evelin resolved with IVF  - mg and K repleted

## 2019-08-06 NOTE — PROGRESS NOTE ADULT - PROBLEM SELECTOR PLAN 2
-Diagnosed since Jan 2001, s/p resection, RT, chemotherapy (last in Nov 2018), stable tumor from MRI in may. CT imaging and neurological exam as above.  - Spoke to Dr. Zina Cuba regarding outpatient plans. Pt is to follow up with Dr. Cuba in 2 months for outpatient MRI. No chemo at this time.

## 2019-08-06 NOTE — PROGRESS NOTE ADULT - SUBJECTIVE AND OBJECTIVE BOX
Pia Ojeda PGY1  pager [phone]    Patient is a 69y old  Male who presents with a chief complaint of AMS (05 Aug 2019 05:58)      INTERVAL HPI/OVERNIGHT EVENTS:  No acute event overnight. TTE yesterday with EF 60% and diastolic LV dysfunction - unchanged from prior echo.       MEDICATIONS  (STANDING):  amLODIPine   Tablet 10 milliGRAM(s) Oral daily  aspirin enteric coated 81 milliGRAM(s) Oral daily  atorvastatin 80 milliGRAM(s) Oral at bedtime  carvedilol 25 milliGRAM(s) Oral every 12 hours  dexamethasone     Tablet 1 milliGRAM(s) Oral daily  docusate sodium 100 milliGRAM(s) Oral three times a day  famotidine    Tablet 20 milliGRAM(s) Oral daily  heparin  Injectable 5000 Unit(s) SubCutaneous every 8 hours  insulin glargine Injectable (LANTUS) 10 Unit(s) SubCutaneous at bedtime  insulin lispro (HumaLOG) corrective regimen sliding scale   SubCutaneous at bedtime  insulin lispro (HumaLOG) corrective regimen sliding scale   SubCutaneous three times a day before meals  insulin lispro Injectable (HumaLOG) 2 Unit(s) SubCutaneous three times a day before meals  levETIRAcetam 500 milliGRAM(s) Oral two times a day  senna 2 Tablet(s) Oral at bedtime  tamsulosin 0.4 milliGRAM(s) Oral at bedtime      Vital Signs Last 24 Hrs  T(C): 36.8 (06 Aug 2019 05:25), Max: 36.8 (06 Aug 2019 05:25)  T(F): 98.2 (06 Aug 2019 05:25), Max: 98.2 (06 Aug 2019 05:25)  HR: 61 (06 Aug 2019 05:25) (61 - 78)  BP: 183/78 (06 Aug 2019 05:25) (147/67 - 183/78)  RR: 18 (06 Aug 2019 05:25) (18 - 18)  SpO2: 97% (06 Aug 2019 05:25) (97% - 98%)    PHYSICAL EXAM:  GENERAL: NAD.  CHEST/LUNG: Clear to auscultation; No rales, rhonchi, or wheezing.  Respiratory effort does not appear labored.  HEART: Regular rate and rhythm; S1 and S2,  no murmurs, rubs, or gallops.  ABDOMEN: Soft, not tender to palpation.  No masses or HSM appreciated.  No distension.  Bowel sounds present.  EXTREMITIES:  No clubbing, cyanosis, or edema.  Moves all extremities with strength 5/5.  SKIN: No obvious rashes or lesions.  Turgor okay.  NEURO:  Alert and oriented x 3, no focal sensory or  motor deficit, DTR 2+ bilaterally.    LABS:       CAPILLARY BLOOD GLUCOSE      POCT Blood Glucose.: 179 mg/dL (05 Aug 2019 21:49)  POCT Blood Glucose.: 204 mg/dL (05 Aug 2019 18:20)  POCT Blood Glucose.: 308 mg/dL (05 Aug 2019 13:01)  POCT Blood Glucose.: 288 mg/dL (05 Aug 2019 08:40)      IMAGING:  Duplex Carotid:  IMPRESSION: No hemodynamically significant stenosis in the bilateral internal carotid arteries.   High resistance flow is noted in the right vertebral artery. CT angiogram on 1/27/2018 demonstrated a dominant left vertebral artery with a hypoplastic distal right vertebral artery.      TTE:  Conclusions: 1. Moderate left atrial enlargement. 2. Mild concentric left ventricular hypertrophy. 3. Endocardium not well visualized; grossly normal left ventricular systolic function. 4. Reversal of the E-A  waves of the mitral inflow pattern is consistent with diastolic LV dysfunction. 5. Normal right ventricular size and function.   *** Compared with echocardiogram of 7/17/2019, no significant changes noted.? Pia Lynette PGY1  pager [phone]   [phone]    Patient is a 69y old  Male who presents with a chief complaint of AMS (05 Aug 2019 05:58)      INTERVAL HPI/OVERNIGHT EVENTS:  No acute event overnight. TTE yesterday with EF 60% and diastolic LV dysfunction - unchanged from prior echo. This morning pt reports feeling well. A&Ox1. Denies CP, SOB, abdominal pain, diarrhea/constipation.      MEDICATIONS  (STANDING):  amLODIPine   Tablet 10 milliGRAM(s) Oral daily  aspirin enteric coated 81 milliGRAM(s) Oral daily  atorvastatin 80 milliGRAM(s) Oral at bedtime  carvedilol 25 milliGRAM(s) Oral every 12 hours  dexamethasone     Tablet 1 milliGRAM(s) Oral daily  docusate sodium 100 milliGRAM(s) Oral three times a day  famotidine    Tablet 20 milliGRAM(s) Oral daily  heparin  Injectable 5000 Unit(s) SubCutaneous every 8 hours  insulin glargine Injectable (LANTUS) 10 Unit(s) SubCutaneous at bedtime  insulin lispro (HumaLOG) corrective regimen sliding scale   SubCutaneous at bedtime  insulin lispro (HumaLOG) corrective regimen sliding scale   SubCutaneous three times a day before meals  insulin lispro Injectable (HumaLOG) 2 Unit(s) SubCutaneous three times a day before meals  levETIRAcetam 500 milliGRAM(s) Oral two times a day  senna 2 Tablet(s) Oral at bedtime  tamsulosin 0.4 milliGRAM(s) Oral at bedtime      Vital Signs Last 24 Hrs  T(C): 36.8 (06 Aug 2019 05:25), Max: 36.8 (06 Aug 2019 05:25)  T(F): 98.2 (06 Aug 2019 05:25), Max: 98.2 (06 Aug 2019 05:25)  HR: 61 (06 Aug 2019 05:25) (61 - 78)  BP: 183/78 (06 Aug 2019 05:25) (147/67 - 183/78)  RR: 18 (06 Aug 2019 05:25) (18 - 18)  SpO2: 97% (06 Aug 2019 05:25) (97% - 98%)    PHYSICAL EXAM:  GENERAL: NAD.  CHEST/LUNG: Clear to auscultation; No rales, rhonchi, or wheezing.  Respiratory effort does not appear labored.  HEART: Regular rate and rhythm; S1 and S2,  no murmurs, rubs, or gallops.  ABDOMEN: Soft, not tender to palpation.  No masses or HSM appreciated.  No distension.  Bowel sounds present.  EXTREMITIES:  No clubbing, cyanosis, or edema.  Moves all extremities with strength 5/5.  SKIN: No obvious rashes or lesions.  Turgor okay.  NEURO:  Alert and oriented x 3, no focal sensory or  motor deficit, DTR 2+ bilaterally.    LABS:  08-06    141  |  104  |  18  ----------------------------<  238<H>  3.6   |  23  |  1.10    Ca    9.5      06 Aug 2019 07:27  Phos  3.4     08-06  Mg     1.7     08-06           CAPILLARY BLOOD GLUCOSE      POCT Blood Glucose.: 179 mg/dL (05 Aug 2019 21:49)  POCT Blood Glucose.: 204 mg/dL (05 Aug 2019 18:20)  POCT Blood Glucose.: 308 mg/dL (05 Aug 2019 13:01)  POCT Blood Glucose.: 288 mg/dL (05 Aug 2019 08:40)      IMAGING:  Duplex Carotid:  IMPRESSION: No hemodynamically significant stenosis in the bilateral internal carotid arteries.   High resistance flow is noted in the right vertebral artery. CT angiogram on 1/27/2018 demonstrated a dominant left vertebral artery with a hypoplastic distal right vertebral artery.      TTE:  Conclusions: 1. Moderate left atrial enlargement. 2. Mild concentric left ventricular hypertrophy. 3. Endocardium not well visualized; grossly normal left ventricular systolic function. 4. Reversal of the E-A  waves of the mitral inflow pattern is consistent with diastolic LV dysfunction. 5. Normal right ventricular size and function.   *** Compared with echocardiogram of 7/17/2019, no significant changes noted.?

## 2019-08-06 NOTE — PROGRESS NOTE ADULT - PROBLEM SELECTOR PLAN 3
-Miralax 17mg  -Senna 2 tabs QHS  -Colace 100mg TID - amlodipine 10mg daily  - carvedilol 25BID  - start lisinopril 5?

## 2019-08-06 NOTE — PROGRESS NOTE ADULT - PROBLEM SELECTOR PLAN 4
- fs controlled  - lantus 10U qhs, humalog 2U TID  - c/w ISS, titrate as needed -Miralax 17mg  -Senna 2 tabs QHS  -Colace 100mg TID

## 2019-08-06 NOTE — PROGRESS NOTE ADULT - PROBLEM SELECTOR PLAN 1
Altered mental status is likely from acute infarct (MRI c/f acute infarct in left corona radiata) and acute delirium. 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 No concern for increased ICP at this time (CT scan with no midline shift. Ventricles appear unchanged in size. There is no hydrocephalus). MRA Head shows no large vessel occlusion or major stenosis. Continuous vEEG with no seizures noted. Carotid duplex with no stenosis of internal carotid. No PFO on TTE.  -Aspirin 81mg PO QD  -Lipitor 80mg QHS  -Continue Decadron 1 gm daily   -Continue Keppra 500mg PO BID

## 2019-08-06 NOTE — PROGRESS NOTE ADULT - PROBLEM SELECTOR PLAN 5
- resolved; likely pre-renal in setting of reported decreased PO intake. Patient had EVELIN 2/2 nephrolithiasis 2 weeks ago s/p stent and flomax. CT scan prelim reading shows no repeat hydronephrosis. Patient is taking metformin.  - hold losartan, hctz and metformin--> can resume as discharge   - Evelin resolved with IVF  - mg and K repleted - fs controlled  - lantus 10U qhs, humalog 2U TID  - c/w ISS, titrate as needed

## 2019-08-07 LAB
ANION GAP SERPL CALC-SCNC: 15 MMOL/L — SIGNIFICANT CHANGE UP (ref 5–17)
APPEARANCE UR: CLEAR — SIGNIFICANT CHANGE UP
BILIRUB UR-MCNC: NEGATIVE — SIGNIFICANT CHANGE UP
BUN SERPL-MCNC: 27 MG/DL — HIGH (ref 7–23)
CALCIUM SERPL-MCNC: 9.8 MG/DL — SIGNIFICANT CHANGE UP (ref 8.4–10.5)
CHLORIDE SERPL-SCNC: 104 MMOL/L — SIGNIFICANT CHANGE UP (ref 96–108)
CO2 SERPL-SCNC: 23 MMOL/L — SIGNIFICANT CHANGE UP (ref 22–31)
COLOR SPEC: SIGNIFICANT CHANGE UP
CREAT ?TM UR-MCNC: 85 MG/DL — SIGNIFICANT CHANGE UP
CREAT SERPL-MCNC: 2.01 MG/DL — HIGH (ref 0.5–1.3)
DIFF PNL FLD: NEGATIVE — SIGNIFICANT CHANGE UP
GLUCOSE BLDC GLUCOMTR-MCNC: 193 MG/DL — HIGH (ref 70–99)
GLUCOSE BLDC GLUCOMTR-MCNC: 195 MG/DL — HIGH (ref 70–99)
GLUCOSE BLDC GLUCOMTR-MCNC: 202 MG/DL — HIGH (ref 70–99)
GLUCOSE BLDC GLUCOMTR-MCNC: 279 MG/DL — HIGH (ref 70–99)
GLUCOSE SERPL-MCNC: 160 MG/DL — HIGH (ref 70–99)
GLUCOSE UR QL: ABNORMAL
KETONES UR-MCNC: NEGATIVE — SIGNIFICANT CHANGE UP
LEUKOCYTE ESTERASE UR-ACNC: NEGATIVE — SIGNIFICANT CHANGE UP
MAGNESIUM SERPL-MCNC: 1.6 MG/DL — SIGNIFICANT CHANGE UP (ref 1.6–2.6)
NITRITE UR-MCNC: NEGATIVE — SIGNIFICANT CHANGE UP
OSMOLALITY UR: 369 MOS/KG — SIGNIFICANT CHANGE UP (ref 300–900)
PH UR: 6 — SIGNIFICANT CHANGE UP (ref 5–8)
PHOSPHATE 24H UR-MCNC: 10.6 MG/DL — SIGNIFICANT CHANGE UP
PHOSPHATE SERPL-MCNC: 4.6 MG/DL — HIGH (ref 2.5–4.5)
POTASSIUM SERPL-MCNC: 3.7 MMOL/L — SIGNIFICANT CHANGE UP (ref 3.5–5.3)
POTASSIUM SERPL-SCNC: 3.7 MMOL/L — SIGNIFICANT CHANGE UP (ref 3.5–5.3)
POTASSIUM UR-SCNC: 30 MMOL/L — SIGNIFICANT CHANGE UP
PROT UR-MCNC: NEGATIVE — SIGNIFICANT CHANGE UP
SODIUM SERPL-SCNC: 142 MMOL/L — SIGNIFICANT CHANGE UP (ref 135–145)
SODIUM UR-SCNC: 82 MMOL/L — SIGNIFICANT CHANGE UP
SP GR SPEC: 1.01 — SIGNIFICANT CHANGE UP (ref 1.01–1.02)
UROBILINOGEN FLD QL: NEGATIVE — SIGNIFICANT CHANGE UP

## 2019-08-07 PROCEDURE — 76770 US EXAM ABDO BACK WALL COMP: CPT | Mod: 26

## 2019-08-07 PROCEDURE — 99232 SBSQ HOSP IP/OBS MODERATE 35: CPT | Mod: GC

## 2019-08-07 RX ORDER — HYDRALAZINE HCL 50 MG
25 TABLET ORAL EVERY 8 HOURS
Refills: 0 | Status: DISCONTINUED | OUTPATIENT
Start: 2019-08-07 | End: 2019-08-12

## 2019-08-07 RX ADMIN — Medication 1: at 08:52

## 2019-08-07 RX ADMIN — Medication 3: at 12:32

## 2019-08-07 RX ADMIN — LOSARTAN POTASSIUM 100 MILLIGRAM(S): 100 TABLET, FILM COATED ORAL at 05:31

## 2019-08-07 RX ADMIN — Medication 100 MILLIGRAM(S): at 21:38

## 2019-08-07 RX ADMIN — Medication 2 UNIT(S): at 08:52

## 2019-08-07 RX ADMIN — Medication 2: at 17:47

## 2019-08-07 RX ADMIN — HEPARIN SODIUM 5000 UNIT(S): 5000 INJECTION INTRAVENOUS; SUBCUTANEOUS at 05:31

## 2019-08-07 RX ADMIN — Medication 100 MILLIGRAM(S): at 05:31

## 2019-08-07 RX ADMIN — AMLODIPINE BESYLATE 10 MILLIGRAM(S): 2.5 TABLET ORAL at 05:31

## 2019-08-07 RX ADMIN — FAMOTIDINE 20 MILLIGRAM(S): 10 INJECTION INTRAVENOUS at 12:31

## 2019-08-07 RX ADMIN — Medication 2 UNIT(S): at 17:48

## 2019-08-07 RX ADMIN — SENNA PLUS 2 TABLET(S): 8.6 TABLET ORAL at 21:38

## 2019-08-07 RX ADMIN — HEPARIN SODIUM 5000 UNIT(S): 5000 INJECTION INTRAVENOUS; SUBCUTANEOUS at 12:32

## 2019-08-07 RX ADMIN — CARVEDILOL PHOSPHATE 25 MILLIGRAM(S): 80 CAPSULE, EXTENDED RELEASE ORAL at 05:31

## 2019-08-07 RX ADMIN — Medication 25 MILLIGRAM(S): at 08:51

## 2019-08-07 RX ADMIN — Medication 1 MILLIGRAM(S): at 05:31

## 2019-08-07 RX ADMIN — ATORVASTATIN CALCIUM 80 MILLIGRAM(S): 80 TABLET, FILM COATED ORAL at 21:38

## 2019-08-07 RX ADMIN — Medication 2 UNIT(S): at 12:32

## 2019-08-07 RX ADMIN — LEVETIRACETAM 500 MILLIGRAM(S): 250 TABLET, FILM COATED ORAL at 17:35

## 2019-08-07 RX ADMIN — HEPARIN SODIUM 5000 UNIT(S): 5000 INJECTION INTRAVENOUS; SUBCUTANEOUS at 21:38

## 2019-08-07 RX ADMIN — CARVEDILOL PHOSPHATE 25 MILLIGRAM(S): 80 CAPSULE, EXTENDED RELEASE ORAL at 17:35

## 2019-08-07 RX ADMIN — LEVETIRACETAM 500 MILLIGRAM(S): 250 TABLET, FILM COATED ORAL at 05:31

## 2019-08-07 RX ADMIN — TAMSULOSIN HYDROCHLORIDE 0.4 MILLIGRAM(S): 0.4 CAPSULE ORAL at 21:38

## 2019-08-07 RX ADMIN — Medication 81 MILLIGRAM(S): at 12:33

## 2019-08-07 RX ADMIN — Medication 100 MILLIGRAM(S): at 12:31

## 2019-08-07 RX ADMIN — INSULIN GLARGINE 10 UNIT(S): 100 INJECTION, SOLUTION SUBCUTANEOUS at 21:38

## 2019-08-07 NOTE — PROGRESS NOTE ADULT - PROBLEM SELECTOR PLAN 3
- amlodipine 10mg daily  - carvedilol 25BID  - HCTz 25mg BID and losartan 100mg QD -Diagnosed since Jan 2001, s/p resection, RT, chemotherapy (last in Nov 2018), stable tumor from MRI in may. CT imaging and neurological exam as above.  - Spoke to Dr. Zina Cuba regarding outpatient plans. Pt is to follow up with Dr. Cuba in 2 months for outpatient MRI. No chemo at this time.

## 2019-08-07 NOTE — PROGRESS NOTE ADULT - SUBJECTIVE AND OBJECTIVE BOX
Patient is a 69y old  Male who presents with a chief complaint of AMS (06 Aug 2019 06:13)      INTERVAL HPI/OVERNIGHT EVENTS:  Home HCTz and losartan restarted yesterday as pt was hypertensive to 180s. Overnight BP better controlled - -171. Overnight, pt was found with a left wrist rash near his ID band - denied itching or burning. Wrist band was changed to R wrist, and pt was given hydrocortisone cream. This morning pt is A&Ox1. Reports feeling well. Denies SOB, CP, abdominal pain, n/v. He does not know when his last BM was.         Vital Signs Last 24 Hrs  T(C): 36.5 (07 Aug 2019 05:07), Max: 36.7 (06 Aug 2019 21:15)  T(F): 97.7 (07 Aug 2019 05:07), Max: 98 (06 Aug 2019 21:15)  HR: 62 (07 Aug 2019 05:07) (60 - 68)  BP: 168/87 (07 Aug 2019 05:07) (146/68 - 177/88)  RR: 18 (07 Aug 2019 05:07) (17 - 18)  SpO2: 97% (07 Aug 2019 05:07) (97% - 98%)    PHYSICAL EXAM:  GENERAL: NAD. Sitting up out of bed.   HEENT: Nontraumatic, NC. EOMI, PERRL. MMM  CHEST/LUNG: Clear to auscultation; No rales, rhonchi, or wheezing.  Respiratory effort does not appear labored.  HEART: Regular rate and rhythm; S1 and S2,  no murmurs, rubs, or gallops.  ABDOMEN: Soft, not tender to palpation.  No masses or HSM appreciated.  No distension.  Bowel sounds present.  EXTREMITIES:  No clubbing, cyanosis, or edema.  Moves all extremities with strength 5/5.  SKIN: .  NEURO:  Alert and oriented x 1, no focal sensory or  motor deficit    LABS:      CAPILLARY BLOOD GLUCOSE      POCT Blood Glucose.: 227 mg/dL (06 Aug 2019 21:11)  POCT Blood Glucose.: 187 mg/dL (06 Aug 2019 17:52)  POCT Blood Glucose.: 214 mg/dL (06 Aug 2019 13:10)  POCT Blood Glucose.: 206 mg/dL (06 Aug 2019 08:15) Patient is a 69y old  Male who presents with a chief complaint of AMS (06 Aug 2019 06:13)      INTERVAL HPI/OVERNIGHT EVENTS:  Home HCTz and losartan restarted yesterday as pt was hypertensive to 180s. Overnight BP better controlled - -171. Overnight, pt was found with a left wrist rash near his ID band - denied itching or burning. Wrist band was changed to R wrist, and pt was given hydrocortisone cream. This morning pt is A&Ox1. Reports feeling well. Denies SOB, CP, abdominal pain, n/v.         Vital Signs Last 24 Hrs  T(C): 36.5 (07 Aug 2019 05:07), Max: 36.7 (06 Aug 2019 21:15)  T(F): 97.7 (07 Aug 2019 05:07), Max: 98 (06 Aug 2019 21:15)  HR: 62 (07 Aug 2019 05:07) (60 - 68)  BP: 168/87 (07 Aug 2019 05:07) (146/68 - 177/88)  RR: 18 (07 Aug 2019 05:07) (17 - 18)  SpO2: 97% (07 Aug 2019 05:07) (97% - 98%)    PHYSICAL EXAM:  GENERAL: NAD. Lying in bed, sleeping   HEENT: Nontraumatic, NC. EOMI, PERRL. MMM  CHEST/LUNG: Clear to auscultation; No rales, rhonchi, or wheezing.  Respiratory effort does not appear labored.  HEART: Regular rate and rhythm; S1 and S2,  no murmurs, rubs, or gallops.  ABDOMEN: Soft, not tender to palpation.  No masses or HSM appreciated.  No distension.  Bowel sounds present.  EXTREMITIES:  trace edema in LE bilaterally.   SKIN: erythematous rash at L wrist.   NEURO:  Alert and oriented x 1, no focal sensory or  motor deficit    LABS:      CAPILLARY BLOOD GLUCOSE  POCT Blood Glucose.: 227 mg/dL (06 Aug 2019 21:11)  POCT Blood Glucose.: 187 mg/dL (06 Aug 2019 17:52)  POCT Blood Glucose.: 214 mg/dL (06 Aug 2019 13:10)  POCT Blood Glucose.: 206 mg/dL (06 Aug 2019 08:15) Patient is a 69y old  Male who presents with a chief complaint of AMS (06 Aug 2019 06:13)      INTERVAL HPI/OVERNIGHT EVENTS:  Home HCTz and losartan restarted yesterday as pt was hypertensive to 180s. Overnight BP better controlled - -171. Overnight, pt was found with a left wrist rash near his ID band - denied itching or burning. Wrist band was changed to R wrist, and pt was given hydrocortisone cream. This morning pt is A&Ox1. Reports feeling well. Denies SOB, CP, abdominal pain, n/v.         Vital Signs Last 24 Hrs  T(C): 36.5 (07 Aug 2019 05:07), Max: 36.7 (06 Aug 2019 21:15)  T(F): 97.7 (07 Aug 2019 05:07), Max: 98 (06 Aug 2019 21:15)  HR: 62 (07 Aug 2019 05:07) (60 - 68)  BP: 168/87 (07 Aug 2019 05:07) (146/68 - 177/88)  RR: 18 (07 Aug 2019 05:07) (17 - 18)  SpO2: 97% (07 Aug 2019 05:07) (97% - 98%)    PHYSICAL EXAM:  GENERAL: NAD. Lying in bed, sleeping   HEENT: Nontraumatic, NC. EOMI, PERRL. MMM  CHEST/LUNG: Clear to auscultation; No rales, rhonchi, or wheezing.  Respiratory effort does not appear labored.  HEART: Regular rate and rhythm; S1 and S2,  no murmurs, rubs, or gallops.  ABDOMEN: Soft, not tender to palpation.  No masses or HSM appreciated.  No distension.  Bowel sounds present.  EXTREMITIES:  trace edema in LE bilaterally.   SKIN: erythematous rash at L wrist.   NEURO:  Alert and oriented x 1, no focal sensory or  motor deficit    LABS:  08-07    142  |  104  |  27<H>  ----------------------------<  160<H>  3.7   |  23  |  2.01<H>    Ca    9.8      07 Aug 2019 07:16  Phos  4.6     08-07  Mg     1.6     08-07        CAPILLARY BLOOD GLUCOSE  POCT Blood Glucose.: 227 mg/dL (06 Aug 2019 21:11)  POCT Blood Glucose.: 187 mg/dL (06 Aug 2019 17:52)  POCT Blood Glucose.: 214 mg/dL (06 Aug 2019 13:10)  POCT Blood Glucose.: 206 mg/dL (06 Aug 2019 08:15) Patient is a 69y old  Male who presents with a chief complaint of AMS (06 Aug 2019 06:13)      INTERVAL HPI/OVERNIGHT EVENTS:  Home HCTz and losartan restarted yesterday as pt was hypertensive to 180s. Overnight BP better controlled - -171. Overnight, pt was found with a left wrist rash near his ID band - denied itching or burning. Wrist band was changed to R wrist, and pt was given hydrocortisone cream. This morning pt is A&Ox1. Reports feeling well. Denies SOB, CP, abdominal pain, n/v.         Vital Signs Last 24 Hrs  T(C): 36.5 (07 Aug 2019 05:07), Max: 36.7 (06 Aug 2019 21:15)  T(F): 97.7 (07 Aug 2019 05:07), Max: 98 (06 Aug 2019 21:15)  HR: 62 (07 Aug 2019 05:07) (60 - 68)  BP: 168/87 (07 Aug 2019 05:07) (146/68 - 177/88)  RR: 18 (07 Aug 2019 05:07) (17 - 18)  SpO2: 97% (07 Aug 2019 05:07) (97% - 98%)    PHYSICAL EXAM:  GENERAL: NAD. Lying in bed, sleeping   HEENT: Nontraumatic, NC. EOMI, PERRL. MMM  CHEST/LUNG: Clear to auscultation; No rales, rhonchi, or wheezing.  Respiratory effort does not appear labored.  HEART: Regular rate and rhythm; S1 and S2,  no murmurs, rubs, or gallops.  ABDOMEN: Soft, not tender to palpation.  No masses or HSM appreciated.  No distension.  Bowel sounds present.  EXTREMITIES:  trace edema in LE bilaterally.   SKIN: erythematous rash at L wrist.   NEURO:  Alert and oriented x 1, no focal sensory or  motor deficit    LABS:  08-07    142  |  104  |  27<H>  ----------------------------<  160<H>  3.7   |  23  |  2.01<H>    Ca    9.8      07 Aug 2019 07:16  Phos  4.6     08-07  Mg     1.6     08-07    FeNa 1.4%    CAPILLARY BLOOD GLUCOSE  POCT Blood Glucose.: 227 mg/dL (06 Aug 2019 21:11)  POCT Blood Glucose.: 187 mg/dL (06 Aug 2019 17:52)  POCT Blood Glucose.: 214 mg/dL (06 Aug 2019 13:10)  POCT Blood Glucose.: 206 mg/dL (06 Aug 2019 08:15)

## 2019-08-07 NOTE — PROGRESS NOTE ADULT - ATTENDING COMMENTS
Dispo pending NH placement and resolution of sydnie    Josefina Ley  [phone] Dispo pending NH placement and resolution of sydnie. Updated Gualberto, son, regarding sydnie and renal u/s, dispo as well    Josefina Ley  [phone]

## 2019-08-07 NOTE — PROGRESS NOTE ADULT - PROBLEM SELECTOR PLAN 1
Altered mental status is likely from acute infarct (MRI c/f acute infarct in left corona radiata) and acute delirium. 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 No concern for increased ICP at this time (CT scan with no midline shift. Ventricles appear unchanged in size. There is no hydrocephalus). MRA Head shows no large vessel occlusion or major stenosis. Continuous vEEG with no seizures noted. Carotid duplex with no stenosis of internal carotid. No PFO on TTE.  -Aspirin 81mg PO QD  -Lipitor 80mg QHS  -Continue Decadron 1 gm daily   -Continue Keppra 500mg PO BID Patient had SHAWNA 2/2 nephrolithiasis 2 weeks ago s/p stent and flomax. Now with new SHAWNA, etiology unclear.  - f/u urine electrolyte  - calculate FeNa   - holding HCTz and losartan Patient had SHAWNA 2/2 nephrolithiasis 2 weeks ago s/p stent and flomax. Now with new SHAWNA, etiology unclear. FeNA 1.4% - intrinstic renal injury.  - holding HCTz and losartan

## 2019-08-07 NOTE — PROGRESS NOTE ADULT - PROBLEM SELECTOR PLAN 4
-Miralax 17mg  -Senna 2 tabs QHS  -Colace 100mg TID - amlodipine 10mg daily  - carvedilol 25BID  - HCTz 25mg BID and losartan 100mg QD - amlodipine 10mg daily  - carvedilol 25BID  - holding hctz and losartan  -if hypertensive, can consider hydralazine

## 2019-08-07 NOTE — PROGRESS NOTE ADULT - PROBLEM SELECTOR PLAN 2
-Diagnosed since Jan 2001, s/p resection, RT, chemotherapy (last in Nov 2018), stable tumor from MRI in may. CT imaging and neurological exam as above.  - Spoke to Dr. Zina Cuba regarding outpatient plans. Pt is to follow up with Dr. Cuba in 2 months for outpatient MRI. No chemo at this time. Altered mental status is likely from acute infarct (MRI c/f acute infarct in left corona radiata) and acute delirium. 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 No concern for increased ICP at this time (CT scan with no midline shift. Ventricles appear unchanged in size. There is no hydrocephalus). MRA Head shows no large vessel occlusion or major stenosis. Continuous vEEG with no seizures noted. Carotid duplex with no stenosis of internal carotid. No PFO on TTE.  -Aspirin 81mg PO QD  -Lipitor 80mg QHS  -Continue Decadron 1 gm daily   -Continue Keppra 500mg PO BID

## 2019-08-07 NOTE — PROGRESS NOTE ADULT - ASSESSMENT
69 year old M hx Grade IV glioblastoma, recent admission for lithotripsy of nephrolithiasis, brought in by family for altered mental status admitted for acute encephalopathy secondary to stroke and acute delirium in the setting of underlying dementia. DC pending placement-- son wants to permanently place patient into a nursing home is awaiting approval to a few of his choices. 69 year old M hx Grade IV glioblastoma, recent admission for lithotripsy of nephrolithiasis, brought in by family for altered mental status admitted for acute encephalopathy secondary to stroke and acute delirium in the setting of underlying dementia, now with SHAWNA.

## 2019-08-08 LAB
ANION GAP SERPL CALC-SCNC: 17 MMOL/L — SIGNIFICANT CHANGE UP (ref 5–17)
BASOPHILS # BLD AUTO: 0 K/UL — SIGNIFICANT CHANGE UP (ref 0–0.2)
BASOPHILS NFR BLD AUTO: 0.3 % — SIGNIFICANT CHANGE UP (ref 0–2)
BUN SERPL-MCNC: 32 MG/DL — HIGH (ref 7–23)
CALCIUM SERPL-MCNC: 9.4 MG/DL — SIGNIFICANT CHANGE UP (ref 8.4–10.5)
CHLORIDE SERPL-SCNC: 100 MMOL/L — SIGNIFICANT CHANGE UP (ref 96–108)
CO2 SERPL-SCNC: 23 MMOL/L — SIGNIFICANT CHANGE UP (ref 22–31)
CREAT SERPL-MCNC: 1.98 MG/DL — HIGH (ref 0.5–1.3)
EOSINOPHIL # BLD AUTO: 0.2 K/UL — SIGNIFICANT CHANGE UP (ref 0–0.5)
EOSINOPHIL NFR BLD AUTO: 2.1 % — SIGNIFICANT CHANGE UP (ref 0–6)
GLUCOSE BLDC GLUCOMTR-MCNC: 171 MG/DL — HIGH (ref 70–99)
GLUCOSE BLDC GLUCOMTR-MCNC: 237 MG/DL — HIGH (ref 70–99)
GLUCOSE BLDC GLUCOMTR-MCNC: 255 MG/DL — HIGH (ref 70–99)
GLUCOSE BLDC GLUCOMTR-MCNC: 268 MG/DL — HIGH (ref 70–99)
GLUCOSE SERPL-MCNC: 167 MG/DL — HIGH (ref 70–99)
HCT VFR BLD CALC: 34.6 % — LOW (ref 39–50)
HGB BLD-MCNC: 11.6 G/DL — LOW (ref 13–17)
LYMPHOCYTES # BLD AUTO: 1.9 K/UL — SIGNIFICANT CHANGE UP (ref 1–3.3)
LYMPHOCYTES # BLD AUTO: 19.8 % — SIGNIFICANT CHANGE UP (ref 13–44)
MAGNESIUM SERPL-MCNC: 1.5 MG/DL — LOW (ref 1.6–2.6)
MCHC RBC-ENTMCNC: 30.9 PG — SIGNIFICANT CHANGE UP (ref 27–34)
MCHC RBC-ENTMCNC: 33.5 GM/DL — SIGNIFICANT CHANGE UP (ref 32–36)
MCV RBC AUTO: 92.2 FL — SIGNIFICANT CHANGE UP (ref 80–100)
MONOCYTES # BLD AUTO: 0.8 K/UL — SIGNIFICANT CHANGE UP (ref 0–0.9)
MONOCYTES NFR BLD AUTO: 8.2 % — SIGNIFICANT CHANGE UP (ref 2–14)
NEUTROPHILS # BLD AUTO: 6.8 K/UL — SIGNIFICANT CHANGE UP (ref 1.8–7.4)
NEUTROPHILS NFR BLD AUTO: 69.6 % — SIGNIFICANT CHANGE UP (ref 43–77)
PHOSPHATE SERPL-MCNC: 4.7 MG/DL — HIGH (ref 2.5–4.5)
PLATELET # BLD AUTO: 305 K/UL — SIGNIFICANT CHANGE UP (ref 150–400)
POTASSIUM SERPL-MCNC: 3.4 MMOL/L — LOW (ref 3.5–5.3)
POTASSIUM SERPL-SCNC: 3.4 MMOL/L — LOW (ref 3.5–5.3)
RBC # BLD: 3.75 M/UL — LOW (ref 4.2–5.8)
RBC # FLD: 13.2 % — SIGNIFICANT CHANGE UP (ref 10.3–14.5)
SODIUM SERPL-SCNC: 140 MMOL/L — SIGNIFICANT CHANGE UP (ref 135–145)
WBC # BLD: 9.7 K/UL — SIGNIFICANT CHANGE UP (ref 3.8–10.5)
WBC # FLD AUTO: 9.7 K/UL — SIGNIFICANT CHANGE UP (ref 3.8–10.5)

## 2019-08-08 PROCEDURE — 99232 SBSQ HOSP IP/OBS MODERATE 35: CPT | Mod: GC

## 2019-08-08 RX ORDER — INSULIN GLARGINE 100 [IU]/ML
12 INJECTION, SOLUTION SUBCUTANEOUS AT BEDTIME
Refills: 0 | Status: DISCONTINUED | OUTPATIENT
Start: 2019-08-08 | End: 2019-08-10

## 2019-08-08 RX ORDER — POTASSIUM CHLORIDE 20 MEQ
40 PACKET (EA) ORAL EVERY 4 HOURS
Refills: 0 | Status: COMPLETED | OUTPATIENT
Start: 2019-08-08 | End: 2019-08-08

## 2019-08-08 RX ORDER — MAGNESIUM SULFATE 500 MG/ML
2 VIAL (ML) INJECTION ONCE
Refills: 0 | Status: COMPLETED | OUTPATIENT
Start: 2019-08-08 | End: 2019-08-08

## 2019-08-08 RX ORDER — SODIUM CHLORIDE 9 MG/ML
1000 INJECTION INTRAMUSCULAR; INTRAVENOUS; SUBCUTANEOUS
Refills: 0 | Status: DISCONTINUED | OUTPATIENT
Start: 2019-08-08 | End: 2019-08-09

## 2019-08-08 RX ADMIN — Medication 50 GRAM(S): at 08:45

## 2019-08-08 RX ADMIN — Medication 100 MILLIGRAM(S): at 21:29

## 2019-08-08 RX ADMIN — Medication 100 MILLIGRAM(S): at 14:17

## 2019-08-08 RX ADMIN — LEVETIRACETAM 500 MILLIGRAM(S): 250 TABLET, FILM COATED ORAL at 05:26

## 2019-08-08 RX ADMIN — INSULIN GLARGINE 12 UNIT(S): 100 INJECTION, SOLUTION SUBCUTANEOUS at 21:29

## 2019-08-08 RX ADMIN — HEPARIN SODIUM 5000 UNIT(S): 5000 INJECTION INTRAVENOUS; SUBCUTANEOUS at 14:17

## 2019-08-08 RX ADMIN — Medication 1: at 18:52

## 2019-08-08 RX ADMIN — LEVETIRACETAM 500 MILLIGRAM(S): 250 TABLET, FILM COATED ORAL at 17:14

## 2019-08-08 RX ADMIN — SODIUM CHLORIDE 75 MILLILITER(S): 9 INJECTION INTRAMUSCULAR; INTRAVENOUS; SUBCUTANEOUS at 08:40

## 2019-08-08 RX ADMIN — CARVEDILOL PHOSPHATE 25 MILLIGRAM(S): 80 CAPSULE, EXTENDED RELEASE ORAL at 05:26

## 2019-08-08 RX ADMIN — Medication 1 MILLIGRAM(S): at 05:26

## 2019-08-08 RX ADMIN — Medication 40 MILLIEQUIVALENT(S): at 08:49

## 2019-08-08 RX ADMIN — Medication 3: at 13:03

## 2019-08-08 RX ADMIN — HEPARIN SODIUM 5000 UNIT(S): 5000 INJECTION INTRAVENOUS; SUBCUTANEOUS at 21:29

## 2019-08-08 RX ADMIN — CARVEDILOL PHOSPHATE 25 MILLIGRAM(S): 80 CAPSULE, EXTENDED RELEASE ORAL at 17:14

## 2019-08-08 RX ADMIN — Medication 3: at 08:17

## 2019-08-08 RX ADMIN — Medication 40 MILLIEQUIVALENT(S): at 11:48

## 2019-08-08 RX ADMIN — TAMSULOSIN HYDROCHLORIDE 0.4 MILLIGRAM(S): 0.4 CAPSULE ORAL at 21:28

## 2019-08-08 RX ADMIN — AMLODIPINE BESYLATE 10 MILLIGRAM(S): 2.5 TABLET ORAL at 05:26

## 2019-08-08 RX ADMIN — SENNA PLUS 2 TABLET(S): 8.6 TABLET ORAL at 21:28

## 2019-08-08 RX ADMIN — HEPARIN SODIUM 5000 UNIT(S): 5000 INJECTION INTRAVENOUS; SUBCUTANEOUS at 05:26

## 2019-08-08 RX ADMIN — Medication 2 UNIT(S): at 08:18

## 2019-08-08 RX ADMIN — FAMOTIDINE 20 MILLIGRAM(S): 10 INJECTION INTRAVENOUS at 11:48

## 2019-08-08 RX ADMIN — Medication 100 MILLIGRAM(S): at 05:26

## 2019-08-08 RX ADMIN — Medication 81 MILLIGRAM(S): at 11:48

## 2019-08-08 RX ADMIN — Medication 2 UNIT(S): at 18:52

## 2019-08-08 RX ADMIN — ATORVASTATIN CALCIUM 80 MILLIGRAM(S): 80 TABLET, FILM COATED ORAL at 21:29

## 2019-08-08 RX ADMIN — Medication 2 UNIT(S): at 13:03

## 2019-08-08 NOTE — PROGRESS NOTE ADULT - ASSESSMENT
69 year old M hx Grade IV glioblastoma, recent admission for lithotripsy of nephrolithiasis, brought in by family for altered mental status admitted for acute encephalopathy secondary to stroke and acute delirium in the setting of underlying dementia, now with SHAWNA.

## 2019-08-08 NOTE — PROGRESS NOTE ADULT - ATTENDING COMMENTS
Patient pending placement and resolution of sydnie.     [facility] Attending  [phone]

## 2019-08-08 NOTE — PROGRESS NOTE ADULT - SUBJECTIVE AND OBJECTIVE BOX
Patient is a 69y old  Male who presents with a chief complaint of AMS (07 Aug 2019 06:02)      INTERVAL HPI/OVERNIGHT EVENTS:  No acute event overnight. Bladder scan yesterday evening with 80cc. Renal ultrasound without nephrolithiasis or hydronephrosis. This morning pt is feeling well. Denies dysuria, decrease urinary output, back pain, CP, abdominal pain, SOB. A&Ox1.      Vital Signs Last 24 Hrs  T(C): 36.7 (08 Aug 2019 04:37), Max: 36.7 (08 Aug 2019 04:37)  T(F): 98.1 (08 Aug 2019 04:37), Max: 98.1 (08 Aug 2019 04:37)  HR: 58 (08 Aug 2019 04:37) (58 - 64)  BP: 161/83 (08 Aug 2019 04:37) (117/69 - 161/83)  RR: 18 (08 Aug 2019 04:37) (18 - 18)  SpO2: 95% (08 Aug 2019 04:37) (95% - 98%)    PHYSICAL EXAM:  GENERAL: NAD. Sitting up from bed. Comfortable appearing  HEENT: atraumatic, NC, EOMI, MMM   CHEST/LUNG: Clear to auscultation; No rales, rhonchi, or wheezing.  Respiratory effort does not appear labored.  HEART: Regular rate and rhythm; S1 and S2,  no murmurs, rubs, or gallops.  ABDOMEN: protuberant, Soft, not tender to palpation.  No masses or HSM appreciated. Bowel sounds present.  BACK: No flank pain.   EXTREMITIES:  No clubbing, cyanosis, or edema.  Moves all extremities   NEURO:  Alert and oriented x 1, no focal sensory or  motor deficit    LABS:          CAPILLARY BLOOD GLUCOSE  POCT Blood Glucose.: 193 mg/dL (07 Aug 2019 21:31)  POCT Blood Glucose.: 202 mg/dL (07 Aug 2019 17:41)  POCT Blood Glucose.: 279 mg/dL (07 Aug 2019 12:30)  POCT Blood Glucose.: 195 mg/dL (07 Aug 2019 08:51)      Kidney Ultrasound: No hydronephrosis.    Mildly increased renal cortical echogenicity bilaterally, which is   nonspecific but can be seen in the setting of medical renal disease.    Bilateral renal cysts. Patient is a 69y old  Male who presents with a chief complaint of AMS (07 Aug 2019 06:02)      INTERVAL HPI/OVERNIGHT EVENTS:  No acute event overnight. Bladder scan yesterday evening with 80cc. Renal ultrasound without nephrolithiasis or hydronephrosis. This morning pt is feeling well. Denies dysuria, decrease urinary output, back pain, CP, abdominal pain, SOB. A&Ox1.      Vital Signs Last 24 Hrs  T(C): 36.7 (08 Aug 2019 04:37), Max: 36.7 (08 Aug 2019 04:37)  T(F): 98.1 (08 Aug 2019 04:37), Max: 98.1 (08 Aug 2019 04:37)  HR: 58 (08 Aug 2019 04:37) (58 - 64)  BP: 161/83 (08 Aug 2019 04:37) (117/69 - 161/83)  RR: 18 (08 Aug 2019 04:37) (18 - 18)  SpO2: 95% (08 Aug 2019 04:37) (95% - 98%)    PHYSICAL EXAM:  GENERAL: NAD. Sitting up from bed. Comfortable appearing  HEENT: atraumatic, NC, EOMI, MMM   CHEST/LUNG: Clear to auscultation; No rales, rhonchi, or wheezing.  Respiratory effort does not appear labored.  HEART: Regular rate and rhythm; S1 and S2,  no murmurs, rubs, or gallops.  ABDOMEN: protuberant, Soft, not tender to palpation.  No masses or HSM appreciated. Bowel sounds present.  BACK: No flank pain.   EXTREMITIES:  No clubbing, cyanosis, or edema.  Moves all extremities   NEURO:  Alert and oriented x 1, no focal sensory or  motor deficit    LABS:                        11.6   9.7   )-----------( 305      ( 08 Aug 2019 06:52 )             34.6     08-08    140  |  100  |  32<H>  ----------------------------<  167<H>  3.4<L>   |  23  |  1.98<H>    08-07    142  |  104  |  27<H>  ----------------------------<  160<H>  3.7   |  23  |  2.01<H>    Ca    9.4      08 Aug 2019 06:51  Ca    9.8      07 Aug 2019 07:16  Phos  4.7     08-08  Phos  4.6     08-07  Mg     1.5     08-08  Mg     1.6     08-07      CAPILLARY BLOOD GLUCOSE  POCT Blood Glucose.: 193 mg/dL (07 Aug 2019 21:31)  POCT Blood Glucose.: 202 mg/dL (07 Aug 2019 17:41)  POCT Blood Glucose.: 279 mg/dL (07 Aug 2019 12:30)  POCT Blood Glucose.: 195 mg/dL (07 Aug 2019 08:51)      Kidney Ultrasound: No hydronephrosis.    Mildly increased renal cortical echogenicity bilaterally, which is   nonspecific but can be seen in the setting of medical renal disease.    Bilateral renal cysts.

## 2019-08-08 NOTE — PROGRESS NOTE ADULT - PROBLEM SELECTOR PLAN 4
- amlodipine 10mg daily  - carvedilol 25BID  - holding hctz and losartan  -if hypertensive, can consider hydralazine

## 2019-08-08 NOTE — PROGRESS NOTE ADULT - PROBLEM SELECTOR PLAN 1
Patient had SHAWNA 2/2 nephrolithiasis 2 weeks ago s/p stent and flomax. Now with new SHAWNA, etiology unclear. FeNA 1.4%, no hydronephrosis or nephrolithiasis.   - holding HCTz and losartan Patient had SHAWNA 2/2 nephrolithiasis 2 weeks ago s/p stent and flomax. Earlier this hospitalization he had another SHAWNA, resolved with fluid. Now with new SHAWNA, etiology unclear. FeNA 1.4%, no hydronephrosis or nephrolithiasis. Will trial with light hydration.  - holding HCTz and losartan  - 75cc fluids for 8 hours Patient had SHAWNA 2/2 nephrolithiasis 2 weeks ago s/p stent and flomax. Earlier this hospitalization he had another SHAWNA, resolved with fluid. Now with new SHAWNA, etiology unclear. FeNA 1.4%, no hydronephrosis or nephrolithiasis. Will trial with light hydration.  - holding HCTz and losartan  - 75cc fluids for 8 hours  - repeat BMP tomorrow

## 2019-08-09 ENCOUNTER — TRANSCRIPTION ENCOUNTER (OUTPATIENT)
Age: 69
End: 2019-08-09

## 2019-08-09 LAB
ANION GAP SERPL CALC-SCNC: 15 MMOL/L — SIGNIFICANT CHANGE UP (ref 5–17)
ANION GAP SERPL CALC-SCNC: 15 MMOL/L — SIGNIFICANT CHANGE UP (ref 5–17)
BUN SERPL-MCNC: 33 MG/DL — HIGH (ref 7–23)
BUN SERPL-MCNC: 34 MG/DL — HIGH (ref 7–23)
CALCIUM SERPL-MCNC: 9.4 MG/DL — SIGNIFICANT CHANGE UP (ref 8.4–10.5)
CALCIUM SERPL-MCNC: 9.5 MG/DL — SIGNIFICANT CHANGE UP (ref 8.4–10.5)
CHLORIDE SERPL-SCNC: 102 MMOL/L — SIGNIFICANT CHANGE UP (ref 96–108)
CHLORIDE SERPL-SCNC: 103 MMOL/L — SIGNIFICANT CHANGE UP (ref 96–108)
CO2 SERPL-SCNC: 22 MMOL/L — SIGNIFICANT CHANGE UP (ref 22–31)
CO2 SERPL-SCNC: 22 MMOL/L — SIGNIFICANT CHANGE UP (ref 22–31)
CREAT SERPL-MCNC: 1.68 MG/DL — HIGH (ref 0.5–1.3)
CREAT SERPL-MCNC: 1.85 MG/DL — HIGH (ref 0.5–1.3)
GLUCOSE BLDC GLUCOMTR-MCNC: 213 MG/DL — HIGH (ref 70–99)
GLUCOSE BLDC GLUCOMTR-MCNC: 216 MG/DL — HIGH (ref 70–99)
GLUCOSE BLDC GLUCOMTR-MCNC: 240 MG/DL — HIGH (ref 70–99)
GLUCOSE BLDC GLUCOMTR-MCNC: 324 MG/DL — HIGH (ref 70–99)
GLUCOSE SERPL-MCNC: 181 MG/DL — HIGH (ref 70–99)
GLUCOSE SERPL-MCNC: 299 MG/DL — HIGH (ref 70–99)
MAGNESIUM SERPL-MCNC: 1.7 MG/DL — SIGNIFICANT CHANGE UP (ref 1.6–2.6)
PHOSPHATE SERPL-MCNC: 4.1 MG/DL — SIGNIFICANT CHANGE UP (ref 2.5–4.5)
POTASSIUM SERPL-MCNC: 4 MMOL/L — SIGNIFICANT CHANGE UP (ref 3.5–5.3)
POTASSIUM SERPL-MCNC: 4.5 MMOL/L — SIGNIFICANT CHANGE UP (ref 3.5–5.3)
POTASSIUM SERPL-SCNC: 4 MMOL/L — SIGNIFICANT CHANGE UP (ref 3.5–5.3)
POTASSIUM SERPL-SCNC: 4.5 MMOL/L — SIGNIFICANT CHANGE UP (ref 3.5–5.3)
SODIUM SERPL-SCNC: 139 MMOL/L — SIGNIFICANT CHANGE UP (ref 135–145)
SODIUM SERPL-SCNC: 140 MMOL/L — SIGNIFICANT CHANGE UP (ref 135–145)

## 2019-08-09 PROCEDURE — 99232 SBSQ HOSP IP/OBS MODERATE 35: CPT | Mod: GC

## 2019-08-09 RX ORDER — INSULIN LISPRO 100/ML
4 VIAL (ML) SUBCUTANEOUS
Refills: 0 | Status: DISCONTINUED | OUTPATIENT
Start: 2019-08-09 | End: 2019-08-15

## 2019-08-09 RX ORDER — SODIUM CHLORIDE 9 MG/ML
1000 INJECTION INTRAMUSCULAR; INTRAVENOUS; SUBCUTANEOUS
Refills: 0 | Status: DISCONTINUED | OUTPATIENT
Start: 2019-08-09 | End: 2019-08-10

## 2019-08-09 RX ADMIN — AMLODIPINE BESYLATE 10 MILLIGRAM(S): 2.5 TABLET ORAL at 05:34

## 2019-08-09 RX ADMIN — LEVETIRACETAM 500 MILLIGRAM(S): 250 TABLET, FILM COATED ORAL at 17:09

## 2019-08-09 RX ADMIN — Medication 4: at 12:52

## 2019-08-09 RX ADMIN — SENNA PLUS 2 TABLET(S): 8.6 TABLET ORAL at 22:27

## 2019-08-09 RX ADMIN — SODIUM CHLORIDE 75 MILLILITER(S): 9 INJECTION INTRAMUSCULAR; INTRAVENOUS; SUBCUTANEOUS at 08:35

## 2019-08-09 RX ADMIN — TAMSULOSIN HYDROCHLORIDE 0.4 MILLIGRAM(S): 0.4 CAPSULE ORAL at 22:27

## 2019-08-09 RX ADMIN — Medication 100 MILLIGRAM(S): at 22:28

## 2019-08-09 RX ADMIN — Medication 1 MILLIGRAM(S): at 05:35

## 2019-08-09 RX ADMIN — Medication 100 MILLIGRAM(S): at 05:34

## 2019-08-09 RX ADMIN — Medication 81 MILLIGRAM(S): at 11:18

## 2019-08-09 RX ADMIN — Medication 4 UNIT(S): at 18:56

## 2019-08-09 RX ADMIN — Medication 2 UNIT(S): at 08:27

## 2019-08-09 RX ADMIN — CARVEDILOL PHOSPHATE 25 MILLIGRAM(S): 80 CAPSULE, EXTENDED RELEASE ORAL at 05:34

## 2019-08-09 RX ADMIN — CARVEDILOL PHOSPHATE 25 MILLIGRAM(S): 80 CAPSULE, EXTENDED RELEASE ORAL at 17:09

## 2019-08-09 RX ADMIN — HEPARIN SODIUM 5000 UNIT(S): 5000 INJECTION INTRAVENOUS; SUBCUTANEOUS at 05:34

## 2019-08-09 RX ADMIN — LEVETIRACETAM 500 MILLIGRAM(S): 250 TABLET, FILM COATED ORAL at 05:35

## 2019-08-09 RX ADMIN — Medication 2 UNIT(S): at 12:53

## 2019-08-09 RX ADMIN — HEPARIN SODIUM 5000 UNIT(S): 5000 INJECTION INTRAVENOUS; SUBCUTANEOUS at 13:10

## 2019-08-09 RX ADMIN — ATORVASTATIN CALCIUM 80 MILLIGRAM(S): 80 TABLET, FILM COATED ORAL at 22:27

## 2019-08-09 RX ADMIN — HEPARIN SODIUM 5000 UNIT(S): 5000 INJECTION INTRAVENOUS; SUBCUTANEOUS at 22:28

## 2019-08-09 RX ADMIN — Medication 100 MILLIGRAM(S): at 13:10

## 2019-08-09 RX ADMIN — Medication 1 APPLICATION(S): at 22:29

## 2019-08-09 RX ADMIN — FAMOTIDINE 20 MILLIGRAM(S): 10 INJECTION INTRAVENOUS at 11:18

## 2019-08-09 RX ADMIN — INSULIN GLARGINE 12 UNIT(S): 100 INJECTION, SOLUTION SUBCUTANEOUS at 22:28

## 2019-08-09 RX ADMIN — Medication 2: at 18:56

## 2019-08-09 RX ADMIN — Medication 2: at 08:27

## 2019-08-09 NOTE — PROGRESS NOTE ADULT - SUBJECTIVE AND OBJECTIVE BOX
THIS NOTE IS INCOMPLETE    Yosef Cheung MD  Beeper: [phone]    Subjective:    Patient is a 69 year old male who presents with a chief complaint of AMS.    Overnight events: No acute events overnight.    Spoke with patient with phone . Patient states that he feels good, and that he has no current pain or discomfort. Has had normal bowel and bladder movements.     MEDICATIONS  (STANDING):  amLODIPine   Tablet 10 milliGRAM(s) Oral daily  aspirin enteric coated 81 milliGRAM(s) Oral daily  atorvastatin 80 milliGRAM(s) Oral at bedtime  carvedilol 25 milliGRAM(s) Oral every 12 hours  dexamethasone     Tablet 1 milliGRAM(s) Oral daily  dextrose 5%. 1000 milliLiter(s) (50 mL/Hr) IV Continuous <Continuous>  dextrose 50% Injectable 12.5 Gram(s) IV Push once  dextrose 50% Injectable 25 Gram(s) IV Push once  dextrose 50% Injectable 25 Gram(s) IV Push once  docusate sodium 100 milliGRAM(s) Oral three times a day  famotidine    Tablet 20 milliGRAM(s) Oral daily  heparin  Injectable 5000 Unit(s) SubCutaneous every 8 hours  hydrocortisone 1% Cream 1 Application(s) Topical three times a day  insulin glargine Injectable (LANTUS) 12 Unit(s) SubCutaneous at bedtime  insulin lispro (HumaLOG) corrective regimen sliding scale   SubCutaneous at bedtime  insulin lispro (HumaLOG) corrective regimen sliding scale   SubCutaneous three times a day before meals  insulin lispro Injectable (HumaLOG) 2 Unit(s) SubCutaneous three times a day before meals  levETIRAcetam 500 milliGRAM(s) Oral two times a day  senna 2 Tablet(s) Oral at bedtime  sodium chloride 0.9%. 1000 milliLiter(s) (75 mL/Hr) IV Continuous <Continuous>  tamsulosin 0.4 milliGRAM(s) Oral at bedtime    MEDICATIONS  (PRN):  dextrose 40% Gel 15 Gram(s) Oral once PRN Blood Glucose LESS THAN 70 milliGRAM(s)/deciliter  glucagon  Injectable 1 milliGRAM(s) IntraMuscular once PRN Glucose LESS THAN 70 milligrams/deciliter  hydrALAZINE 25 milliGRAM(s) Oral every 8 hours PRN SBP >180    Objective:    Vitals:   T(F): 98 (19 @ 04:28), Max: 98 (19 @ 04:28)  HR: 58 (19 @ 04:28) (58 - 66)  BP: 160/86 (19 @ 06:30) (123/64 - 177/87)  RR: 18 (19 @ 04:28) (18 - 18)  SpO2: 96% (19 @ 04:28) (95% - 96%)                I&O's Summary    08 Aug 2019 07:01  -  09 Aug 2019 07:00  --------------------------------------------------------  IN: 840 mL / OUT: 1525 mL / NET: -685 mL    PHYSICAL EXAM:  GENERAL: NAD, well-groomed, well-developed  HEAD:  Atraumatic, Normocephalic  EYES: EOMI, PERRLA, conjunctiva and sclera clear  ENMT: No tonsillar erythema, exudates, or enlargement; Moist mucous membranes, Good dentition, No lesions  NECK: Supple, No JVD, Normal thyroid  CHEST/LUNG: Clear to percussion bilaterally; No rales, rhonchi, wheezing, or rubs  HEART: Regular rate and rhythm; No murmurs, rubs, or gallops  ABDOMEN: Soft, Nontender, Nondistended; Bowel sounds present  EXTREMITIES:  2+ Peripheral Pulses, No clubbing, cyanosis, or edema  LYMPH: No lymphadenopathy noted  SKIN: No rashes or lesions  NERVOUS SYSTEM:  Alert & Oriented X3, Good concentration; Motor Strength 5/5 B/L upper and lower extremities; DTRs 2+ intact and symmetric                                             LABS:      140  |  103  |  34<H>  ----------------------------<  181<H>  4.0   |  22  |  1.85<H>      140  |  100  |  32<H>  ----------------------------<  167<H>  3.4<L>   |  23  |  1.98<H>  08-07    142  |  104  |  27<H>  ----------------------------<  160<H>  3.7   |  23  |  2.01<H>    Ca    9.4      09 Aug 2019 06:29  Ca    9.4      08 Aug 2019 06:51  Ca    9.8      07 Aug 2019 07:16  Phos  4.1       Mg     1.7           Urinalysis Basic - ( 07 Aug 2019 12:22 )    Color: Light Yellow / Appearance: Clear / S.011 / pH: x  Gluc: x / Ketone: Negative  / Bili: Negative / Urobili: Negative   Blood: x / Protein: Negative / Nitrite: Negative   Leuk Esterase: Negative / RBC: x / WBC x   Sq Epi: x / Non Sq Epi: x / Bacteria: x               11.6   9.7   )-----------( 305      ( 08 Aug 2019 06:52 )             34.6     CAPILLARY BLOOD GLUCOSE      POCT Blood Glucose.: 216 mg/dL (09 Aug 2019 08:23)  POCT Blood Glucose.: 237 mg/dL (08 Aug 2019 21:27)  POCT Blood Glucose.: 171 mg/dL (08 Aug 2019 18:50)  POCT Blood Glucose.: 268 mg/dL (08 Aug 2019 13:00)      RECENT CULTURES:      TELEMETRY:    ECG:    TTE:    RADIOLOGY & ADDITIONAL TESTS:    Imaging Personally Reviewed:  [ ] YES  [ ] NO    Consultants involved in case:   Consultant(s) Notes Reviewed:  [ ] YES  [ ] NO:   Care Discussed with Consultants/Other Providers [ ] YES  [ ] NO Yosef Cheung MD  Beeper: [phone]    Subjective:    Patient is a 69 year old male who presents with a chief complaint of AMS.    Overnight events: No acute events overnight.    Spoke with patient with phone . Patient states that he feels good, and that he has no current pain or discomfort. Has had normal bowel and bladder movements.     MEDICATIONS  (STANDING):  amLODIPine   Tablet 10 milliGRAM(s) Oral daily  aspirin enteric coated 81 milliGRAM(s) Oral daily  atorvastatin 80 milliGRAM(s) Oral at bedtime  carvedilol 25 milliGRAM(s) Oral every 12 hours  dexamethasone     Tablet 1 milliGRAM(s) Oral daily  dextrose 5%. 1000 milliLiter(s) (50 mL/Hr) IV Continuous <Continuous>  dextrose 50% Injectable 12.5 Gram(s) IV Push once  dextrose 50% Injectable 25 Gram(s) IV Push once  dextrose 50% Injectable 25 Gram(s) IV Push once  docusate sodium 100 milliGRAM(s) Oral three times a day  famotidine    Tablet 20 milliGRAM(s) Oral daily  heparin  Injectable 5000 Unit(s) SubCutaneous every 8 hours  hydrocortisone 1% Cream 1 Application(s) Topical three times a day  insulin glargine Injectable (LANTUS) 12 Unit(s) SubCutaneous at bedtime  insulin lispro (HumaLOG) corrective regimen sliding scale   SubCutaneous at bedtime  insulin lispro (HumaLOG) corrective regimen sliding scale   SubCutaneous three times a day before meals  insulin lispro Injectable (HumaLOG) 2 Unit(s) SubCutaneous three times a day before meals  levETIRAcetam 500 milliGRAM(s) Oral two times a day  senna 2 Tablet(s) Oral at bedtime  sodium chloride 0.9%. 1000 milliLiter(s) (75 mL/Hr) IV Continuous <Continuous>  tamsulosin 0.4 milliGRAM(s) Oral at bedtime    MEDICATIONS  (PRN):  dextrose 40% Gel 15 Gram(s) Oral once PRN Blood Glucose LESS THAN 70 milliGRAM(s)/deciliter  glucagon  Injectable 1 milliGRAM(s) IntraMuscular once PRN Glucose LESS THAN 70 milligrams/deciliter  hydrALAZINE 25 milliGRAM(s) Oral every 8 hours PRN SBP >180    Objective:    Vitals:   T(F): 98 (19 @ 04:28), Max: 98 (19 @ 04:28)  HR: 58 (19 @ 04:28) (58 - 66)  BP: 160/86 (19 @ 06:30) (123/64 - 177/87)  RR: 18 (19 @ 04:28) (18 - 18)  SpO2: 96% (19 @ 04:28) (95% - 96%)                I&O's Summary    08 Aug 2019 07:01  -  09 Aug 2019 07:00  --------------------------------------------------------  IN: 840 mL / OUT: 1525 mL / NET: -685 mL    PHYSICAL EXAM:  GENERAL: NAD, well-groomed, well-developed  HEAD:  atraumatic, normocephalic  EYES: EOMI, conjunctiva and sclera clear  ENMT: moist mucous membranes  CHEST/LUNG: Clear to auscultation bilaterally; no rales, rhonchi, wheezing, or rubs  HEART: regular rate and rhythm; no murmurs, rubs, or gallops  ABDOMEN: soft, nontender, distended; bowel sounds present  EXTREMITIES:  2+ peripheral pulses, no lower extremity edema    LABS:      140  |  103  |  34<H>  ----------------------------<  181<H>  4.0   |  22  |  1.85<H>    Ca    9.4      09 Aug 2019 06:29  Phos  4.1       Mg     1.7         Urinalysis Basic - ( 07 Aug 2019 12:22 )  Color: Light Yellow / Appearance: Clear / S.011 / pH: x  Gluc: x / Ketone: Negative  / Bili: Negative / Urobili: Negative   Blood: x / Protein: Negative / Nitrite: Negative   Leuk Esterase: Negative / RBC: x / WBC x   Sq Epi: x / Non Sq Epi: x / Bacteria: x    CAPILLARY BLOOD GLUCOSE  POCT Blood Glucose.: 216 mg/dL (09 Aug 2019 08:23)  POCT Blood Glucose.: 237 mg/dL (08 Aug 2019 21:27)  POCT Blood Glucose.: 171 mg/dL (08 Aug 2019 18:50)  POCT Blood Glucose.: 268 mg/dL (08 Aug 2019 13:00)

## 2019-08-09 NOTE — PROGRESS NOTE ADULT - PROBLEM SELECTOR PLAN 6
- fs controlled  - lantus 10U qhs, humalog 2U TID  - c/w ISS, titrate as needed - FS controlled  - lantus 10U qhs, humalog 2U TID  - c/w ISS, titrate as needed

## 2019-08-09 NOTE — PROGRESS NOTE ADULT - PROBLEM SELECTOR PLAN 3
-Diagnosed since Jan 2001, s/p resection, RT, chemotherapy (last in Nov 2018), stable tumor from MRI in may. CT imaging and neurological exam as above.  - Spoke to Dr. Zina Cuba regarding outpatient plans. Pt is to follow up with Dr. Cuba in 2 months for outpatient MRI. No chemo at this time. - Diagnosed since Jan 2001, s/p resection, RT, chemotherapy (last in Nov 2018), stable tumor from MRI in may. CT imaging and neurological exam as above.  - Team spoke to Dr. Zina Cuba regarding outpatient plans. Pt is to follow up with Dr. Cuba in 2 months for outpatient MRI. No chemo at this time.

## 2019-08-09 NOTE — DISCHARGE NOTE NURSING/CASE MANAGEMENT/SOCIAL WORK - NSDCDPATPORTLINK_GEN_ALL_CORE
You can access the [facility] Portal, offered by [facility], by registering with the following website: [email or web address]

## 2019-08-09 NOTE — PROGRESS NOTE ADULT - PROBLEM SELECTOR PLAN 4
- amlodipine 10mg daily  - carvedilol 25BID  - holding hctz and losartan  -if hypertensive, can consider hydralazine - amlodipine 10mg daily  - carvedilol 25BID  - holding HCTZ and losartan  - if hypertensive, can consider hydralazine

## 2019-08-09 NOTE — PROGRESS NOTE ADULT - PROBLEM SELECTOR PLAN 1
Patient had SHAWNA 2/2 nephrolithiasis 2 weeks ago s/p stent and flomax. Earlier this hospitalization he had another SHAWNA, resolved with fluid. Now with new SHAWNA, etiology unclear. FeNA 1.4%, no hydronephrosis or nephrolithiasis. Will trial with light hydration.  - holding HCTz and losartan  - 75cc fluids for 8 hours  - repeat BMP tomorrow Patient had SHAWNA 2/2 nephrolithiasis 2 weeks ago s/p stent and Flomax. Earlier this hospitalization he had another SHAWNA, resolved with fluid. Now with new SHAWNA. FeNA 1.4%, no hydronephrosis or nephrolithiasis. Cr is improving with light hydration.  - holding HCTZ and losartan  - 75cc fluids for 8 hours  - repeat BMP today afternoon Patient had SHAWNA 2/2 nephrolithiasis 2 weeks ago s/p stent and Flomax. Earlier this hospitalization he had another SHAWNA, resolved with fluid. Now with new SHAWNA. FeNA 1.4%, no hydronephrosis or nephrolithiasis. Cr is improving with light hydration.  - holding HCTZ and losartan  - 75cc fluids for 16 hours  - repeat BMP today afternoon

## 2019-08-09 NOTE — PROGRESS NOTE ADULT - PROBLEM SELECTOR PLAN 7
DVT ppx: hep subq  Diet: Carb consistent  f/u w/ SW regarding nursing home placement as per family request since family unable to take care of pt  disposition: dc pending permanent placement at nursing home as patients family is unable to care for him DVT ppx: hep subq  Diet: Carb consistent  f/u w/ SW regarding nursing home placement   disposition: dc pending permanent placement at nursing home as patients family

## 2019-08-09 NOTE — DISCHARGE NOTE NURSING/CASE MANAGEMENT/SOCIAL WORK - NSDCFUADDAPPT_GEN_ALL_CORE_FT
Dr. Zina Cuba:  08/19/2019 1:00PM   NPP Neurology [address] [place] [address]    MRI Appointment:  8/19/2019 11:30AM  NPP Radiology MRI [address] [place], [place] [id number]

## 2019-08-10 LAB
ANION GAP SERPL CALC-SCNC: 13 MMOL/L — SIGNIFICANT CHANGE UP (ref 5–17)
BUN SERPL-MCNC: 29 MG/DL — HIGH (ref 7–23)
CALCIUM SERPL-MCNC: 9.8 MG/DL — SIGNIFICANT CHANGE UP (ref 8.4–10.5)
CHLORIDE SERPL-SCNC: 104 MMOL/L — SIGNIFICANT CHANGE UP (ref 96–108)
CO2 SERPL-SCNC: 25 MMOL/L — SIGNIFICANT CHANGE UP (ref 22–31)
CREAT SERPL-MCNC: 1.43 MG/DL — HIGH (ref 0.5–1.3)
GLUCOSE BLDC GLUCOMTR-MCNC: 156 MG/DL — HIGH (ref 70–99)
GLUCOSE BLDC GLUCOMTR-MCNC: 197 MG/DL — HIGH (ref 70–99)
GLUCOSE BLDC GLUCOMTR-MCNC: 227 MG/DL — HIGH (ref 70–99)
GLUCOSE BLDC GLUCOMTR-MCNC: 256 MG/DL — HIGH (ref 70–99)
GLUCOSE SERPL-MCNC: 217 MG/DL — HIGH (ref 70–99)
POTASSIUM SERPL-MCNC: 4.2 MMOL/L — SIGNIFICANT CHANGE UP (ref 3.5–5.3)
POTASSIUM SERPL-SCNC: 4.2 MMOL/L — SIGNIFICANT CHANGE UP (ref 3.5–5.3)
SODIUM SERPL-SCNC: 142 MMOL/L — SIGNIFICANT CHANGE UP (ref 135–145)

## 2019-08-10 PROCEDURE — 99232 SBSQ HOSP IP/OBS MODERATE 35: CPT | Mod: GC

## 2019-08-10 RX ORDER — INSULIN GLARGINE 100 [IU]/ML
16 INJECTION, SOLUTION SUBCUTANEOUS AT BEDTIME
Refills: 0 | Status: DISCONTINUED | OUTPATIENT
Start: 2019-08-10 | End: 2019-08-15

## 2019-08-10 RX ORDER — SODIUM CHLORIDE 9 MG/ML
1000 INJECTION INTRAMUSCULAR; INTRAVENOUS; SUBCUTANEOUS
Refills: 0 | Status: DISCONTINUED | OUTPATIENT
Start: 2019-08-10 | End: 2019-08-11

## 2019-08-10 RX ADMIN — Medication 1: at 20:41

## 2019-08-10 RX ADMIN — Medication 100 MILLIGRAM(S): at 22:24

## 2019-08-10 RX ADMIN — Medication 1: at 22:43

## 2019-08-10 RX ADMIN — Medication 4 UNIT(S): at 13:36

## 2019-08-10 RX ADMIN — Medication 1 APPLICATION(S): at 22:24

## 2019-08-10 RX ADMIN — TAMSULOSIN HYDROCHLORIDE 0.4 MILLIGRAM(S): 0.4 CAPSULE ORAL at 22:24

## 2019-08-10 RX ADMIN — LEVETIRACETAM 500 MILLIGRAM(S): 250 TABLET, FILM COATED ORAL at 05:46

## 2019-08-10 RX ADMIN — Medication 1 APPLICATION(S): at 05:47

## 2019-08-10 RX ADMIN — Medication 1: at 13:36

## 2019-08-10 RX ADMIN — HEPARIN SODIUM 5000 UNIT(S): 5000 INJECTION INTRAVENOUS; SUBCUTANEOUS at 13:10

## 2019-08-10 RX ADMIN — SODIUM CHLORIDE 75 MILLILITER(S): 9 INJECTION INTRAMUSCULAR; INTRAVENOUS; SUBCUTANEOUS at 13:07

## 2019-08-10 RX ADMIN — CARVEDILOL PHOSPHATE 25 MILLIGRAM(S): 80 CAPSULE, EXTENDED RELEASE ORAL at 05:46

## 2019-08-10 RX ADMIN — Medication 4 UNIT(S): at 09:18

## 2019-08-10 RX ADMIN — Medication 100 MILLIGRAM(S): at 13:08

## 2019-08-10 RX ADMIN — FAMOTIDINE 20 MILLIGRAM(S): 10 INJECTION INTRAVENOUS at 11:54

## 2019-08-10 RX ADMIN — Medication 81 MILLIGRAM(S): at 11:54

## 2019-08-10 RX ADMIN — AMLODIPINE BESYLATE 10 MILLIGRAM(S): 2.5 TABLET ORAL at 05:46

## 2019-08-10 RX ADMIN — ATORVASTATIN CALCIUM 80 MILLIGRAM(S): 80 TABLET, FILM COATED ORAL at 22:24

## 2019-08-10 RX ADMIN — Medication 4 UNIT(S): at 20:41

## 2019-08-10 RX ADMIN — CARVEDILOL PHOSPHATE 25 MILLIGRAM(S): 80 CAPSULE, EXTENDED RELEASE ORAL at 18:10

## 2019-08-10 RX ADMIN — Medication 2: at 09:17

## 2019-08-10 RX ADMIN — LEVETIRACETAM 500 MILLIGRAM(S): 250 TABLET, FILM COATED ORAL at 18:10

## 2019-08-10 RX ADMIN — Medication 1 MILLIGRAM(S): at 05:47

## 2019-08-10 RX ADMIN — HEPARIN SODIUM 5000 UNIT(S): 5000 INJECTION INTRAVENOUS; SUBCUTANEOUS at 22:23

## 2019-08-10 RX ADMIN — HEPARIN SODIUM 5000 UNIT(S): 5000 INJECTION INTRAVENOUS; SUBCUTANEOUS at 05:47

## 2019-08-10 RX ADMIN — SENNA PLUS 2 TABLET(S): 8.6 TABLET ORAL at 22:24

## 2019-08-10 RX ADMIN — INSULIN GLARGINE 16 UNIT(S): 100 INJECTION, SOLUTION SUBCUTANEOUS at 22:43

## 2019-08-10 RX ADMIN — Medication 100 MILLIGRAM(S): at 05:46

## 2019-08-10 NOTE — PROGRESS NOTE ADULT - PROBLEM SELECTOR PLAN 7
DVT ppx: hep subq  Diet: Carb consistent  f/u w/ SW regarding nursing home placement   disposition: dc pending permanent placement at nursing home as patients family

## 2019-08-10 NOTE — PROGRESS NOTE ADULT - PROBLEM SELECTOR PLAN 2
Altered mental status is likely from acute infarct (MRI c/f acute infarct in left corona radiata) and acute delirium. CT head shows unchanged glioblastoma mass (5.9x2.2cm). No concern for increased ICP at this time (CT scan with no midline shift. Ventricles appear unchanged in size. There is no hydrocephalus). MRA Head shows no large vessel occlusion or major stenosis. Continuous vEEG with no seizures noted. Carotid duplex with no stenosis of internal carotid. No PFO on TTE.  -Aspirin 81mg PO QD  -Lipitor 80mg QHS  -Continue Decadron 1 gm daily   -Continue Keppra 500mg PO BID

## 2019-08-10 NOTE — PROGRESS NOTE ADULT - PROBLEM SELECTOR PLAN 1
Patient had SHAWNA 2/2 nephrolithiasis 2 weeks ago s/p stent and Flomax. Earlier this hospitalization he had another SHAWNA, resolved with fluid. Now with new SHAWNA, resolving with fluid.  - holding HCTZ and losartan  - s/p 75cc fluids for 16 hours yesterday Patient had SHAWNA 2/2 nephrolithiasis 2 weeks ago s/p stent and Flomax. Earlier this hospitalization he had another SHAWNA, resolved with fluid. Now with new SHAWNA, resolving with fluid.  - trend BMP    - holding HCTZ and losartan  - s/p 75cc fluids for 16 hours yesterday Patient had SHAWNA 2/2 nephrolithiasis 2 weeks ago s/p stent and Flomax. Earlier this hospitalization he had another SHAWNA, resolved with fluid. Now with new SHAWNA, resolving with fluid.  - trend BMP   - holding HCTZ and losartan  - 75cc fluids for 8 hours

## 2019-08-10 NOTE — PROGRESS NOTE ADULT - SUBJECTIVE AND OBJECTIVE BOX
Patient is a 69y old  Male who presents with a chief complaint of AMS (09 Aug 2019 09:28)      INTERVAL HPI/OVERNIGHT EVENTS:  PM BMP with improved creatinine after fluids. No acute events overnight. This morning pt reports feeling well. Denies CP, abdominal pain, SOB, and dysuria. A&Ox1.       Vital Signs Last 24 Hrs  T(C): 36.6 (10 Aug 2019 04:28), Max: 36.6 (10 Aug 2019 04:28)  T(F): 97.8 (10 Aug 2019 04:28), Max: 97.8 (10 Aug 2019 04:28)  HR: 64 (10 Aug 2019 04:28) (59 - 64)  BP: 160/69 (10 Aug 2019 04:28) (146/80 - 162/80)  RR: 18 (10 Aug 2019 04:28) (18 - 18)  SpO2: 97% (10 Aug 2019 04:28) (97% - 98%)    PHYSICAL EXAM:  GENERAL: NAD. Comfortable appearing  HEENT: Atraumatic, NC, MMM  CHEST/LUNG: Clear to auscultation; No rales, rhonchi, or wheezing.  Respiratory effort does not appear labored.  HEART: Regular rate and rhythm; S1 and S2,  no murmurs, rubs, or gallops.  ABDOMEN: Protuberant, Soft, not tender to palpation.  No masses or HSM appreciated. Bowel sounds present.  EXTREMITIES:  No clubbing, cyanosis, or edema.  Moves all extremities   SKIN: No obvious rashes or lesions.  Turgor okay.  NEURO:  Alert and oriented x 1, no focal sensory or  motor deficit    LABS:                        11.6   9.7   )-----------( 305      ( 08 Aug 2019 06:52 )             34.6     08-09    139  |  102  |  33<H>  ----------------------------<  299<H>  4.5   |  22  |  1.68<1.85>    Ca    9.5      09 Aug 2019 14:48  Phos  4.1     08-09  Mg     1.7     08-09          CAPILLARY BLOOD GLUCOSE  POCT Blood Glucose.: 240 mg/dL (09 Aug 2019 21:53)  POCT Blood Glucose.: 213 mg/dL (09 Aug 2019 18:53)  POCT Blood Glucose.: 324 mg/dL (09 Aug 2019 12:50)  POCT Blood Glucose.: 216 mg/dL (09 Aug 2019 08:23) Patient is a 69y old  Male who presents with a chief complaint of AMS (09 Aug 2019 09:28)      INTERVAL HPI/OVERNIGHT EVENTS:  PM BMP with improved creatinine after fluids. No acute events overnight. This morning pt reports feeling well. Denies CP, abdominal pain, SOB, and dysuria. A&Ox1.       Vital Signs Last 24 Hrs  T(C): 36.6 (10 Aug 2019 04:28), Max: 36.6 (10 Aug 2019 04:28)  T(F): 97.8 (10 Aug 2019 04:28), Max: 97.8 (10 Aug 2019 04:28)  HR: 64 (10 Aug 2019 04:28) (59 - 64)  BP: 160/69 (10 Aug 2019 04:28) (146/80 - 162/80)  RR: 18 (10 Aug 2019 04:28) (18 - 18)  SpO2: 97% (10 Aug 2019 04:28) (97% - 98%)    PHYSICAL EXAM:  GENERAL: NAD. Comfortable appearing  HEENT: Atraumatic, NC, MMM  CHEST/LUNG: Clear to auscultation; No rales, rhonchi, or wheezing.  Respiratory effort does not appear labored.  HEART: Regular rate and rhythm; S1 and S2,  no murmurs, rubs, or gallops.  ABDOMEN: Protuberant, Soft, not tender to palpation.  No masses or HSM appreciated. Bowel sounds present.  EXTREMITIES:  No clubbing, cyanosis, or edema.  Moves all extremities   SKIN: No obvious rashes or lesions.  Turgor okay.  NEURO:  Alert and oriented x 1, no focal sensory or  motor deficit    LABS:  08-10    142  |  104  |  29<H>  ----------------------------<  217<H>  4.2   |  25  |  1.43<H>    Ca    9.8      10 Aug 2019 11:35  Phos  4.1     08-09  Mg     1.7     08-09 08-09    139  |  102  |  33<H>  ----------------------------<  299<H>  4.5   |  22  |  1.68    Ca    9.5      09 Aug 2019 14:48  Phos  4.1     08-09  Mg     1.7     08-09          CAPILLARY BLOOD GLUCOSE  POCT Blood Glucose.: 240 mg/dL (09 Aug 2019 21:53)  POCT Blood Glucose.: 213 mg/dL (09 Aug 2019 18:53)  POCT Blood Glucose.: 324 mg/dL (09 Aug 2019 12:50)  POCT Blood Glucose.: 216 mg/dL (09 Aug 2019 08:23)

## 2019-08-10 NOTE — PROGRESS NOTE ADULT - PROBLEM SELECTOR PLAN 4
- amlodipine 10mg daily  - carvedilol 25BID  - holding HCTZ and losartan  - if hypertensive, can consider hydralazine

## 2019-08-10 NOTE — PROGRESS NOTE ADULT - ASSESSMENT
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 Currently pending nursing facility placement.

## 2019-08-11 LAB
ANION GAP SERPL CALC-SCNC: 14 MMOL/L — SIGNIFICANT CHANGE UP (ref 5–17)
BUN SERPL-MCNC: 26 MG/DL — HIGH (ref 7–23)
CALCIUM SERPL-MCNC: 9 MG/DL — SIGNIFICANT CHANGE UP (ref 8.4–10.5)
CHLORIDE SERPL-SCNC: 104 MMOL/L — SIGNIFICANT CHANGE UP (ref 96–108)
CO2 SERPL-SCNC: 23 MMOL/L — SIGNIFICANT CHANGE UP (ref 22–31)
CREAT SERPL-MCNC: 1.39 MG/DL — HIGH (ref 0.5–1.3)
GLUCOSE BLDC GLUCOMTR-MCNC: 183 MG/DL — HIGH (ref 70–99)
GLUCOSE BLDC GLUCOMTR-MCNC: 212 MG/DL — HIGH (ref 70–99)
GLUCOSE BLDC GLUCOMTR-MCNC: 224 MG/DL — HIGH (ref 70–99)
GLUCOSE BLDC GLUCOMTR-MCNC: 257 MG/DL — HIGH (ref 70–99)
GLUCOSE SERPL-MCNC: 241 MG/DL — HIGH (ref 70–99)
MAGNESIUM SERPL-MCNC: 1.3 MG/DL — LOW (ref 1.6–2.6)
PHOSPHATE SERPL-MCNC: 4.3 MG/DL — SIGNIFICANT CHANGE UP (ref 2.5–4.5)
POTASSIUM SERPL-MCNC: 3.7 MMOL/L — SIGNIFICANT CHANGE UP (ref 3.5–5.3)
POTASSIUM SERPL-SCNC: 3.7 MMOL/L — SIGNIFICANT CHANGE UP (ref 3.5–5.3)
SODIUM SERPL-SCNC: 141 MMOL/L — SIGNIFICANT CHANGE UP (ref 135–145)

## 2019-08-11 PROCEDURE — 99232 SBSQ HOSP IP/OBS MODERATE 35: CPT | Mod: GC

## 2019-08-11 RX ORDER — SODIUM CHLORIDE 9 MG/ML
1000 INJECTION INTRAMUSCULAR; INTRAVENOUS; SUBCUTANEOUS
Refills: 0 | Status: DISCONTINUED | OUTPATIENT
Start: 2019-08-11 | End: 2019-08-12

## 2019-08-11 RX ORDER — MAGNESIUM SULFATE 500 MG/ML
2 VIAL (ML) INJECTION ONCE
Refills: 0 | Status: COMPLETED | OUTPATIENT
Start: 2019-08-11 | End: 2019-08-11

## 2019-08-11 RX ADMIN — Medication 25 MILLIGRAM(S): at 22:46

## 2019-08-11 RX ADMIN — Medication 1: at 22:27

## 2019-08-11 RX ADMIN — Medication 81 MILLIGRAM(S): at 13:21

## 2019-08-11 RX ADMIN — LEVETIRACETAM 500 MILLIGRAM(S): 250 TABLET, FILM COATED ORAL at 18:24

## 2019-08-11 RX ADMIN — Medication 100 MILLIGRAM(S): at 22:28

## 2019-08-11 RX ADMIN — Medication 100 MILLIGRAM(S): at 05:46

## 2019-08-11 RX ADMIN — Medication 1 APPLICATION(S): at 05:47

## 2019-08-11 RX ADMIN — SODIUM CHLORIDE 75 MILLILITER(S): 9 INJECTION INTRAMUSCULAR; INTRAVENOUS; SUBCUTANEOUS at 12:08

## 2019-08-11 RX ADMIN — Medication 1 MILLIGRAM(S): at 05:46

## 2019-08-11 RX ADMIN — HEPARIN SODIUM 5000 UNIT(S): 5000 INJECTION INTRAVENOUS; SUBCUTANEOUS at 05:47

## 2019-08-11 RX ADMIN — HEPARIN SODIUM 5000 UNIT(S): 5000 INJECTION INTRAVENOUS; SUBCUTANEOUS at 13:21

## 2019-08-11 RX ADMIN — CARVEDILOL PHOSPHATE 25 MILLIGRAM(S): 80 CAPSULE, EXTENDED RELEASE ORAL at 05:46

## 2019-08-11 RX ADMIN — Medication 1 APPLICATION(S): at 22:27

## 2019-08-11 RX ADMIN — FAMOTIDINE 20 MILLIGRAM(S): 10 INJECTION INTRAVENOUS at 13:21

## 2019-08-11 RX ADMIN — Medication 2: at 13:20

## 2019-08-11 RX ADMIN — HEPARIN SODIUM 5000 UNIT(S): 5000 INJECTION INTRAVENOUS; SUBCUTANEOUS at 22:27

## 2019-08-11 RX ADMIN — Medication 4 UNIT(S): at 18:23

## 2019-08-11 RX ADMIN — ATORVASTATIN CALCIUM 80 MILLIGRAM(S): 80 TABLET, FILM COATED ORAL at 22:28

## 2019-08-11 RX ADMIN — Medication 50 GRAM(S): at 06:59

## 2019-08-11 RX ADMIN — Medication 1: at 09:04

## 2019-08-11 RX ADMIN — Medication 4 UNIT(S): at 09:05

## 2019-08-11 RX ADMIN — LEVETIRACETAM 500 MILLIGRAM(S): 250 TABLET, FILM COATED ORAL at 05:46

## 2019-08-11 RX ADMIN — TAMSULOSIN HYDROCHLORIDE 0.4 MILLIGRAM(S): 0.4 CAPSULE ORAL at 22:27

## 2019-08-11 RX ADMIN — AMLODIPINE BESYLATE 10 MILLIGRAM(S): 2.5 TABLET ORAL at 05:46

## 2019-08-11 RX ADMIN — INSULIN GLARGINE 16 UNIT(S): 100 INJECTION, SOLUTION SUBCUTANEOUS at 22:26

## 2019-08-11 RX ADMIN — SENNA PLUS 2 TABLET(S): 8.6 TABLET ORAL at 22:28

## 2019-08-11 RX ADMIN — Medication 1 APPLICATION(S): at 13:21

## 2019-08-11 RX ADMIN — CARVEDILOL PHOSPHATE 25 MILLIGRAM(S): 80 CAPSULE, EXTENDED RELEASE ORAL at 18:23

## 2019-08-11 RX ADMIN — Medication 4 UNIT(S): at 13:20

## 2019-08-11 RX ADMIN — Medication 100 MILLIGRAM(S): at 13:21

## 2019-08-11 RX ADMIN — Medication 2: at 18:23

## 2019-08-11 NOTE — PROGRESS NOTE ADULT - PROBLEM SELECTOR PLAN 1
Patient had SHAWNA 2/2 nephrolithiasis 2 weeks ago s/p stent and Flomax. This hospitalization course complicated by 2 SHAWNA, both resolved with fluids  - trend BMP   - holding HCTZ and losartan  - 75cc fluids for 8 hours  - replete Mg, K and phos as needed Patient had SHAWNA 2/2 nephrolithiasis 2 weeks ago s/p stent and Flomax. This hospitalization course complicated by 2 SHAWNA, both resolved with fluids  - trend BMP   - holding HCTZ and losartan  - replete Mg, K and phos as needed Patient had SHAWNA 2/2 nephrolithiasis 2 weeks ago s/p stent and Flomax. This hospitalization course complicated by 2 SHAWNA, both resolved with fluids  - trend BMP   - holding HCTZ and losartan  - replete Mg, K and phos as needed  - IV fluids for 8 hours

## 2019-08-11 NOTE — PROGRESS NOTE ADULT - PROBLEM SELECTOR PLAN 3
- Diagnosed since Jan 2001, s/p resection, RT, chemotherapy (last in Nov 2018), stable tumor from MRI in may. CT imaging and neurological exam as above.  - Team spoke to Dr. Zina Cuba regarding outpatient plans. Pt is to follow up with Dr. Cuba in 2 months for outpatient MRI. No chemo at this time.

## 2019-08-11 NOTE — PROGRESS NOTE ADULT - SUBJECTIVE AND OBJECTIVE BOX
Patient is a 69y old  Male who presents with a chief complaint of AMS (10 Aug 2019 06:14)      INTERVAL HPI/OVERNIGHT EVENTS:  No acute events overnight.      Vital Signs Last 24 Hrs  T(C): 36.8 (11 Aug 2019 05:24), Max: 36.8 (10 Aug 2019 14:35)  T(F): 98.2 (11 Aug 2019 05:24), Max: 98.2 (10 Aug 2019 14:35)  HR: 75 (11 Aug 2019 05:24) (65 - 75)  BP: 166/110 (11 Aug 2019 05:24) (143/73 - 166/110)  RR: 17 (11 Aug 2019 05:24) (16 - 17)  SpO2: 95% (11 Aug 2019 05:24) (95% - 97%)    PHYSICAL EXAM:  GENERAL: NAD.  CHEST/LUNG: Clear to auscultation; No rales, rhonchi, or wheezing.  Respiratory effort does not appear labored.  HEART: Regular rate and rhythm; S1 and S2,  no murmurs, rubs, or gallops.  ABDOMEN: Soft, not tender to palpation.  No masses or HSM appreciated.  No distension.  Bowel sounds present.  EXTREMITIES:  No clubbing, cyanosis, or edema.  Moves all extremities with strength 5/5.  SKIN: No obvious rashes or lesions.  Turgor okay.  NEURO:  Alert and oriented x 3, no focal sensory or  motor deficit, DTR 2+ bilaterally.    LABS:    08-11    141  |  104  |  26<H>  ----------------------------<  241<H>  3.7   |  23  |  1.39<H>    Ca    9.0      11 Aug 2019 06:12  Phos  4.3     08-11  Mg     1.3 (repleted)   08-11          CAPILLARY BLOOD GLUCOSE  POCT Blood Glucose.: 256 mg/dL (10 Aug 2019 22:29)  POCT Blood Glucose.: 156 mg/dL (10 Aug 2019 20:35)  POCT Blood Glucose.: 197 mg/dL (10 Aug 2019 13:29)  POCT Blood Glucose.: 227 mg/dL (10 Aug 2019 09:15) Patient is a 69y old  Male who presents with a chief complaint of AMS (10 Aug 2019 06:14)      INTERVAL HPI/OVERNIGHT EVENTS:  No acute events overnight. This morning pt reports feeling well. Denies CP, abdominal pain, SOB. Unable to recall last BM. Morning BMP with low Mg; Mg was repleted.       Vital Signs Last 24 Hrs  T(C): 36.8 (11 Aug 2019 05:24), Max: 36.8 (10 Aug 2019 14:35)  T(F): 98.2 (11 Aug 2019 05:24), Max: 98.2 (10 Aug 2019 14:35)  HR: 75 (11 Aug 2019 05:24) (65 - 75)  BP: 166/110 (11 Aug 2019 05:24) (143/73 - 166/110)  RR: 17 (11 Aug 2019 05:24) (16 - 17)  SpO2: 95% (11 Aug 2019 05:24) (95% - 97%)    PHYSICAL EXAM:  GENERAL: NAD.  CHEST/LUNG: Clear to auscultation; No rales, rhonchi, or wheezing.  Respiratory effort does not appear labored.  HEART: Regular rate and rhythm; S1 and S2,  no murmurs, rubs, or gallops.  ABDOMEN: Soft, not tender to palpation.  No masses or HSM appreciated.  No distension.  Bowel sounds present.  EXTREMITIES:  No clubbing, cyanosis, or edema.  Moves all extremities with strength 5/5.  SKIN: No obvious rashes or lesions.  Turgor okay.  NEURO:  Alert and oriented x 3, no focal sensory or  motor deficit, DTR 2+ bilaterally.    LABS:    08-11    141  |  104  |  26<H>  ----------------------------<  241<H>  3.7   |  23  |  1.39<H>    Ca    9.0      11 Aug 2019 06:12  Phos  4.3     08-11  Mg     1.3 (repleted)   08-11          CAPILLARY BLOOD GLUCOSE  POCT Blood Glucose.: 256 mg/dL (10 Aug 2019 22:29)  POCT Blood Glucose.: 156 mg/dL (10 Aug 2019 20:35)  POCT Blood Glucose.: 197 mg/dL (10 Aug 2019 13:29)  POCT Blood Glucose.: 227 mg/dL (10 Aug 2019 09:15) Patient is a 69y old  Male who presents with a chief complaint of AMS (10 Aug 2019 06:14)      INTERVAL HPI/OVERNIGHT EVENTS:  No acute events overnight. This morning pt reports feeling well. Denies CP, abdominal pain, SOB. Unable to recall last BM. Morning BMP with low Mg; Mg was repleted.       Vital Signs Last 24 Hrs  T(C): 36.8 (11 Aug 2019 05:24), Max: 36.8 (10 Aug 2019 14:35)  T(F): 98.2 (11 Aug 2019 05:24), Max: 98.2 (10 Aug 2019 14:35)  HR: 75 (11 Aug 2019 05:24) (65 - 75)  BP: 166/110 (11 Aug 2019 05:24) (143/73 - 166/110)  RR: 17 (11 Aug 2019 05:24) (16 - 17)  SpO2: 95% (11 Aug 2019 05:24) (95% - 97%)    PHYSICAL EXAM:  GENERAL: NAD. Sitting up in bed, eating.  CHEST/LUNG: Clear to auscultation; No rales, rhonchi, or wheezing.  Respiratory effort does not appear labored.  HEART: Regular rate and rhythm; S1 and S2,  no murmurs, rubs, or gallops.  ABDOMEN: Protuberant. Soft, not tender to palpation.  No masses or HSM appreciated.   Bowel sounds present.  EXTREMITIES:  No clubbing, cyanosis, or edema.  Moves all extremities with strength 5/5.  SKIN: No obvious rashes or lesions.  Turgor okay.  NEURO:  Alert and oriented x 1, no focal sensory or  motor deficit    LABS:    08-11    141  |  104  |  26<H>  ----------------------------<  241<H>  3.7   |  23  |  1.39<1.43>    Ca    9.0      11 Aug 2019 06:12  Phos  4.3     08-11  Mg     1.3 (repleted)   08-11          CAPILLARY BLOOD GLUCOSE  POCT Blood Glucose.: 256 mg/dL (10 Aug 2019 22:29)  POCT Blood Glucose.: 156 mg/dL (10 Aug 2019 20:35)  POCT Blood Glucose.: 197 mg/dL (10 Aug 2019 13:29)  POCT Blood Glucose.: 227 mg/dL (10 Aug 2019 09:15)

## 2019-08-11 NOTE — PROGRESS NOTE ADULT - ASSESSMENT
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 Currently pending nursing facility placement.

## 2019-08-12 LAB
ANION GAP SERPL CALC-SCNC: 13 MMOL/L — SIGNIFICANT CHANGE UP (ref 5–17)
BUN SERPL-MCNC: 23 MG/DL — SIGNIFICANT CHANGE UP (ref 7–23)
CALCIUM SERPL-MCNC: 9.3 MG/DL — SIGNIFICANT CHANGE UP (ref 8.4–10.5)
CHLORIDE SERPL-SCNC: 105 MMOL/L — SIGNIFICANT CHANGE UP (ref 96–108)
CO2 SERPL-SCNC: 22 MMOL/L — SIGNIFICANT CHANGE UP (ref 22–31)
CREAT SERPL-MCNC: 1.35 MG/DL — HIGH (ref 0.5–1.3)
GLUCOSE BLDC GLUCOMTR-MCNC: 171 MG/DL — HIGH (ref 70–99)
GLUCOSE BLDC GLUCOMTR-MCNC: 175 MG/DL — HIGH (ref 70–99)
GLUCOSE BLDC GLUCOMTR-MCNC: 179 MG/DL — HIGH (ref 70–99)
GLUCOSE BLDC GLUCOMTR-MCNC: 185 MG/DL — HIGH (ref 70–99)
GLUCOSE BLDC GLUCOMTR-MCNC: 206 MG/DL — HIGH (ref 70–99)
GLUCOSE BLDC GLUCOMTR-MCNC: 236 MG/DL — HIGH (ref 70–99)
GLUCOSE SERPL-MCNC: 181 MG/DL — HIGH (ref 70–99)
MAGNESIUM SERPL-MCNC: 1.5 MG/DL — LOW (ref 1.6–2.6)
PHOSPHATE SERPL-MCNC: 3.5 MG/DL — SIGNIFICANT CHANGE UP (ref 2.5–4.5)
POTASSIUM SERPL-MCNC: 3.5 MMOL/L — SIGNIFICANT CHANGE UP (ref 3.5–5.3)
POTASSIUM SERPL-SCNC: 3.5 MMOL/L — SIGNIFICANT CHANGE UP (ref 3.5–5.3)
SODIUM SERPL-SCNC: 140 MMOL/L — SIGNIFICANT CHANGE UP (ref 135–145)

## 2019-08-12 PROCEDURE — 99232 SBSQ HOSP IP/OBS MODERATE 35: CPT

## 2019-08-12 RX ORDER — MAGNESIUM SULFATE 500 MG/ML
2 VIAL (ML) INJECTION ONCE
Refills: 0 | Status: COMPLETED | OUTPATIENT
Start: 2019-08-12 | End: 2019-08-12

## 2019-08-12 RX ORDER — SODIUM CHLORIDE 9 MG/ML
1000 INJECTION INTRAMUSCULAR; INTRAVENOUS; SUBCUTANEOUS
Refills: 0 | Status: DISCONTINUED | OUTPATIENT
Start: 2019-08-12 | End: 2019-08-12

## 2019-08-12 RX ORDER — HYDRALAZINE HCL 50 MG
25 TABLET ORAL
Refills: 0 | Status: DISCONTINUED | OUTPATIENT
Start: 2019-08-12 | End: 2019-08-12

## 2019-08-12 RX ORDER — HYDRALAZINE HCL 50 MG
10 TABLET ORAL ONCE
Refills: 0 | Status: COMPLETED | OUTPATIENT
Start: 2019-08-12 | End: 2019-08-12

## 2019-08-12 RX ORDER — DOXAZOSIN MESYLATE 4 MG
1 TABLET ORAL AT BEDTIME
Refills: 0 | Status: DISCONTINUED | OUTPATIENT
Start: 2019-08-12 | End: 2019-08-13

## 2019-08-12 RX ADMIN — Medication 1: at 08:58

## 2019-08-12 RX ADMIN — Medication 4 UNIT(S): at 18:50

## 2019-08-12 RX ADMIN — HEPARIN SODIUM 5000 UNIT(S): 5000 INJECTION INTRAVENOUS; SUBCUTANEOUS at 22:20

## 2019-08-12 RX ADMIN — CARVEDILOL PHOSPHATE 25 MILLIGRAM(S): 80 CAPSULE, EXTENDED RELEASE ORAL at 05:57

## 2019-08-12 RX ADMIN — SENNA PLUS 2 TABLET(S): 8.6 TABLET ORAL at 22:19

## 2019-08-12 RX ADMIN — Medication 1 APPLICATION(S): at 05:58

## 2019-08-12 RX ADMIN — HEPARIN SODIUM 5000 UNIT(S): 5000 INJECTION INTRAVENOUS; SUBCUTANEOUS at 13:01

## 2019-08-12 RX ADMIN — Medication 100 MILLIGRAM(S): at 22:19

## 2019-08-12 RX ADMIN — HEPARIN SODIUM 5000 UNIT(S): 5000 INJECTION INTRAVENOUS; SUBCUTANEOUS at 05:57

## 2019-08-12 RX ADMIN — LEVETIRACETAM 500 MILLIGRAM(S): 250 TABLET, FILM COATED ORAL at 05:56

## 2019-08-12 RX ADMIN — Medication 2: at 12:59

## 2019-08-12 RX ADMIN — Medication 1 APPLICATION(S): at 22:19

## 2019-08-12 RX ADMIN — Medication 81 MILLIGRAM(S): at 13:00

## 2019-08-12 RX ADMIN — CARVEDILOL PHOSPHATE 25 MILLIGRAM(S): 80 CAPSULE, EXTENDED RELEASE ORAL at 17:42

## 2019-08-12 RX ADMIN — Medication 50 GRAM(S): at 08:45

## 2019-08-12 RX ADMIN — SODIUM CHLORIDE 75 MILLILITER(S): 9 INJECTION INTRAMUSCULAR; INTRAVENOUS; SUBCUTANEOUS at 09:49

## 2019-08-12 RX ADMIN — Medication 4 UNIT(S): at 08:59

## 2019-08-12 RX ADMIN — TAMSULOSIN HYDROCHLORIDE 0.4 MILLIGRAM(S): 0.4 CAPSULE ORAL at 22:20

## 2019-08-12 RX ADMIN — ATORVASTATIN CALCIUM 80 MILLIGRAM(S): 80 TABLET, FILM COATED ORAL at 22:21

## 2019-08-12 RX ADMIN — Medication 100 MILLIGRAM(S): at 13:00

## 2019-08-12 RX ADMIN — AMLODIPINE BESYLATE 10 MILLIGRAM(S): 2.5 TABLET ORAL at 05:57

## 2019-08-12 RX ADMIN — LEVETIRACETAM 500 MILLIGRAM(S): 250 TABLET, FILM COATED ORAL at 17:42

## 2019-08-12 RX ADMIN — Medication 1 MILLIGRAM(S): at 05:57

## 2019-08-12 RX ADMIN — INSULIN GLARGINE 16 UNIT(S): 100 INJECTION, SOLUTION SUBCUTANEOUS at 23:17

## 2019-08-12 RX ADMIN — Medication 2: at 18:50

## 2019-08-12 RX ADMIN — FAMOTIDINE 20 MILLIGRAM(S): 10 INJECTION INTRAVENOUS at 13:00

## 2019-08-12 RX ADMIN — Medication 100 MILLIGRAM(S): at 05:57

## 2019-08-12 RX ADMIN — Medication 10 MILLIGRAM(S): at 05:54

## 2019-08-12 RX ADMIN — Medication 4 UNIT(S): at 12:59

## 2019-08-12 NOTE — PROGRESS NOTE ADULT - PROBLEM SELECTOR PLAN 3
Altered mental status is likely from acute infarct (MRI c/f acute infarct in left corona radiata) and acute delirium. CT head shows unchanged glioblastoma mass (5.9x2.2cm). No concern for increased ICP at this time (CT scan with no midline shift. Ventricles appear unchanged in size. There is no hydrocephalus). MRA Head shows no large vessel occlusion or major stenosis. Continuous vEEG with no seizures noted. Carotid duplex with no stenosis of internal carotid. No PFO on TTE.  -Aspirin 81mg PO QD  -Lipitor 80mg QHS  -Continue Decadron 1 gm daily   -Continue Keppra 500mg PO BID Altered mental status is likely from acute infarct (MRI c/f acute infarct in left corona radiata) and acute delirium. CT head shows unchanged glioblastoma mass (5.9x2.2cm). No concern for increased ICP at this time (CT scan with no midline shift. Ventricles appear unchanged in size. There is no hydrocephalus). MRA Head shows no large vessel occlusion or major stenosis. Continuous vEEG with no seizures noted. Carotid duplex with no stenosis of internal carotid. No PFO on TTE.  -Aspirin 81mg PO QD  -Lipitor 80mg QHS  -Continue Decadron 1 gm daily   -Continue Keppra 500mg PO BID  - Pt is very calm, pleasant and cooperative. Never agitated during hospital stay. Patient had recent SHAWNA 2/2 nephrolithiasis prior to hospitalization s/p stent and Flomax. This hospitalization course complicated by 2 SHAWNA, currently resolving with fluids  - trend BMP   - holding HCTZ and losartan  - replete Mg, K and phos as needed

## 2019-08-12 NOTE — PROGRESS NOTE ADULT - SUBJECTIVE AND OBJECTIVE BOX
Patient is a 69y old  Male who presents with a chief complaint of AMS (11 Aug 2019 06:45)      INTERVAL HPI/OVERNIGHT EVENTS:  Overnight , got hydralazine 25mg x1. This morning pt reports feeling well. Denies headache, chest pain, abdominal pain, SOB. No urinary complaints.       Vital Signs Last 24 Hrs  T(C): 36.7 (12 Aug 2019 04:36), Max: 36.7 (12 Aug 2019 04:36)  T(F): 98 (12 Aug 2019 04:36), Max: 98 (12 Aug 2019 04:36)  HR: 65 (12 Aug 2019 04:36) (63 - 88)  BP: 189/89 (12 Aug 2019 04:36) (167/96 - 189/89)  RR: 18 (12 Aug 2019 04:36) (16 - 18)  SpO2: 97% (12 Aug 2019 04:36) (96% - 97%)    PHYSICAL EXAM:  GENERAL: NAD.   CHEST/LUNG: Clear to auscultation; No rales, rhonchi, or wheezing.  Respiratory effort does not appear labored.  HEART: Regular rate and rhythm; S1 and S2,  no murmurs, rubs, or gallops.  ABDOMEN: Soft, not tender to palpation.  No masses or HSM appreciated.  No distension.  Bowel sounds present.  EXTREMITIES:  No clubbing, cyanosis, or edema.  Moves all extremities with strength 5/5.  SKIN: No obvious rashes or lesions.  Turgor okay.  NEURO:  Alert and oriented x 3, no focal sensory or  motor deficit, DTR 2+ bilaterally.    LABS:    08-11    141  |  104  |  26<H>  ----------------------------<  241<H>  3.7   |  23  |  1.39<H>    Ca    9.0      11 Aug 2019 06:12  Phos  4.3     08-11  Mg     1.3     08-11          CAPILLARY BLOOD GLUCOSE  POCT Blood Glucose.: 257 mg/dL (11 Aug 2019 21:58)  POCT Blood Glucose.: 224 mg/dL (11 Aug 2019 18:14)  POCT Blood Glucose.: 212 mg/dL (11 Aug 2019 13:06)  POCT Blood Glucose.: 183 mg/dL (11 Aug 2019 08:55) Patient is a 69y old  Male who presents with a chief complaint of AMS (11 Aug 2019 06:45)      INTERVAL HPI/OVERNIGHT EVENTS:  Overnight , got hydralazine 25mg x1. This morning pt appeared more confused than usual - although A&Ox1 at baseline. He was breathing more heavily, but denied SOB. Denies CP,       Vital Signs Last 24 Hrs  T(C): 36.7 (12 Aug 2019 04:36), Max: 36.7 (12 Aug 2019 04:36)  T(F): 98 (12 Aug 2019 04:36), Max: 98 (12 Aug 2019 04:36)  HR: 65 (12 Aug 2019 04:36) (63 - 88)  BP: 189/89 (12 Aug 2019 04:36) (167/96 - 189/89)  RR: 18 (12 Aug 2019 04:36) (16 - 18)  SpO2: 97% (12 Aug 2019 04:36) (96% - 97%)    PHYSICAL EXAM:  GENERAL: NAD.   CHEST/LUNG: Clear to auscultation; No rales, rhonchi, or wheezing.  Respiratory effort does not appear labored.  HEART: Regular rate and rhythm; S1 and S2,  no murmurs, rubs, or gallops.  ABDOMEN: Soft, not tender to palpation.  No masses or HSM appreciated.  No distension.  Bowel sounds present.  EXTREMITIES:  No clubbing, cyanosis, or edema.  Moves all extremities with strength 5/5.  SKIN: No obvious rashes or lesions.  Turgor okay.  NEURO:  Alert and oriented x 3, no focal sensory or  motor deficit, DTR 2+ bilaterally.    LABS:    08-11    141  |  104  |  26<H>  ----------------------------<  241<H>  3.7   |  23  |  1.39<H>    Ca    9.0      11 Aug 2019 06:12  Phos  4.3     08-11  Mg     1.3     08-11          CAPILLARY BLOOD GLUCOSE  POCT Blood Glucose.: 257 mg/dL (11 Aug 2019 21:58)  POCT Blood Glucose.: 224 mg/dL (11 Aug 2019 18:14)  POCT Blood Glucose.: 212 mg/dL (11 Aug 2019 13:06)  POCT Blood Glucose.: 183 mg/dL (11 Aug 2019 08:55) Patient is a 69y old  Male who presents with a chief complaint of AMS (11 Aug 2019 06:45)      INTERVAL HPI/OVERNIGHT EVENTS:  Overnight , got hydralazine 25mg x1. This morning pt appeared more confused than usual but able to follow command - A&Ox1 at baseline. He was breathing more heavily, but denied SOB. Denies CP, abdominal pain.        Vital Signs Last 24 Hrs  T(C): 36.7 (12 Aug 2019 06:40), Max: 36.7 (12 Aug 2019 04:36)  T(F): 98 (12 Aug 2019 06:40), Max: 98 (12 Aug 2019 04:36)  HR: 59 (12 Aug 2019 06:40) (59 - 88)  BP: 153/84 (12 Aug 2019 06:40) (153/84 - 189/89)  RR: 18 (12 Aug 2019 06:40) (16 - 18)  SpO2: 98% (12 Aug 2019 06:40) (96% - 98%)    PHYSICAL EXAM:  GENERAL: Sitting up, breathing heavily. Appears more confused  HEENT: atraumatic, NC. EOMI.  CHEST/LUNG: Breathing heavily, not tachypneic. Clear to auscultation; No rales, rhonchi, or wheezing.    HEART: Regular rate and rhythm; S1 and S2,  no murmurs, rubs, or gallops.  ABDOMEN: Protuberant. Soft, not tender to palpation.  Bowel sounds present.  EXTREMITIES:  No clubbing, cyanosis, or edema.  Moves all extremities   SKIN: No obvious rashes or lesions.  Turgor okay.  NEURO:  Alert and oriented x 1, no focal sensory or  motor deficit    LABS:    08-12    140  |  105  |  23  ----------------------------<  181<H>  3.5   |  22  |  1.35<H>    Ca    9.3      12 Aug 2019 06:46  Phos  3.5     08-12  Mg     1.5  (repleted)   08-12        CAPILLARY BLOOD GLUCOSE  POCT Blood Glucose.: 257 mg/dL (11 Aug 2019 21:58)  POCT Blood Glucose.: 224 mg/dL (11 Aug 2019 18:14)  POCT Blood Glucose.: 212 mg/dL (11 Aug 2019 13:06)  POCT Blood Glucose.: 183 mg/dL (11 Aug 2019 08:55) Patient is a 69y old  Male who presents with a chief complaint of AMS (11 Aug 2019 06:45)      INTERVAL HPI/OVERNIGHT EVENTS:  Overnight , got hydralazine 25mg x1. This morning pt appeared more confused than usual but able to follow command - A&Ox1 at baseline. He was breathing more heavily, but denied SOB. Denies CP, abdominal pain.        Vital Signs Last 24 Hrs  T(C): 36.7 (12 Aug 2019 06:40), Max: 36.7 (12 Aug 2019 04:36)  T(F): 98 (12 Aug 2019 06:40), Max: 98 (12 Aug 2019 04:36)  HR: 59 (12 Aug 2019 06:40) (59 - 88)  BP: 153/84 (12 Aug 2019 06:40) (153/84 - 189/89)  RR: 18 (12 Aug 2019 06:40) (16 - 18)  SpO2: 98% (12 Aug 2019 06:40) (96% - 98%)    PHYSICAL EXAM:  GENERAL: Sitting up, breathing heavily. Appears more confused. Calm, pleasant, cooperative.   HEENT: atraumatic, NC. EOMI.  CHEST/LUNG: Breathing heavily, not tachypneic. Clear to auscultation; No rales, rhonchi, or wheezing.    HEART: Regular rate and rhythm; S1 and S2,  no murmurs, rubs, or gallops.  ABDOMEN: Protuberant. Soft, not tender to palpation.  Bowel sounds present.  EXTREMITIES:  No clubbing, cyanosis, or edema.  Moves all extremities   SKIN: No obvious rashes or lesions.  Turgor okay.  NEURO:  Alert and oriented x 1, no focal sensory or  motor deficit    LABS:    08-12    140  |  105  |  23  ----------------------------<  181<H>  3.5   |  22  |  1.35<H>    Ca    9.3      12 Aug 2019 06:46  Phos  3.5     08-12  Mg     1.5  (repleted)   08-12        CAPILLARY BLOOD GLUCOSE  POCT Blood Glucose.: 257 mg/dL (11 Aug 2019 21:58)  POCT Blood Glucose.: 224 mg/dL (11 Aug 2019 18:14)  POCT Blood Glucose.: 212 mg/dL (11 Aug 2019 13:06)  POCT Blood Glucose.: 183 mg/dL (11 Aug 2019 08:55)

## 2019-08-12 NOTE — PROGRESS NOTE ADULT - ATTENDING COMMENTS
Seen, examined the patient with house staff  - Sitting in chair, no specific complaints, no distress, still has dysarthria at time    reviewed labs, imaging and Neuro Plans  - The patient's AMS was related to acute infarct in left corona radiata and possible focal seizure. Echo- no thrombus or PFO, normal EF    c/w ASA, statin, and control of BP  - c/w Decadron for the GBM, outpatient f/u with Neurology,     PT recommended Rehab  - d/c planning in progress to rehab  ** spoke to [place] over the phone

## 2019-08-12 NOTE — PROGRESS NOTE ADULT - ASSESSMENT
69 year old M hx Grade IV glioblastoma, recent admission for lithotripsy of nephrolithiasis, brought in by family for altered mental status admitted for acute encephalopathy secondary to stroke and acute delirium in the setting of underlying dementia, course c/b 2 SHAWNA, both resolved with fluid. Currently pending nursing facility placement.

## 2019-08-12 NOTE — PROGRESS NOTE ADULT - PROBLEM SELECTOR PLAN 1
- amlodipine 10mg daily  - carvedilol 25BID  - holding HCTZ and losartan for recent SHAWNA  - if hypertensive, can consider hydralazine - amlodipine 10mg daily  - carvedilol 25BID  - holding HCTZ and losartan for recent SHAWNA  - if hypertensive SBP >180, hydralazine 25mg - amlodipine 10mg daily  - carvedilol 25BID  - starting doxazosin 1mg qd  - holding HCTZ and losartan for recent SHAWNA    - if hypertensive SBP >180, hydralazine 25mg Altered mental status is likely from acute infarct (MRI c/f acute infarct in left corona radiata) and focal seizure. CT head shows unchanged glioblastoma mass (5.9x2.2cm). No concern for increased ICP at this time (CT scan with no midline shift. Ventricles appear unchanged in size. There is no hydrocephalus). MRA Head shows no large vessel occlusion or major stenosis. Continuous vEEG with no seizures noted. Carotid duplex with no stenosis of internal carotid. No PFO on TTE.  - Neurology recommendation noted  - c/w Aspirin 81mg PO QD, Lipitor 80mg QHS, Decadron 1 gm daily   -Continue Keppra 500mg PO BID  - Pt is very calm, pleasant and cooperative. Never agitated during hospital stay.

## 2019-08-12 NOTE — PROGRESS NOTE ADULT - PROBLEM SELECTOR PLAN 2
Patient had SHAWNA 2/2 nephrolithiasis 2 weeks ago s/p stent and Flomax. This hospitalization course complicated by 2 SHAWNA, both resolved with fluids  - trend BMP   - holding HCTZ and losartan  - replete Mg, K and phos as needed Patient had SHAWNA 2/2 nephrolithiasis 2 weeks ago s/p stent and Flomax. This hospitalization course complicated by 2 SHAWNA, both resolved with fluids  - trend BMP   - holding HCTZ and losartan  - replete Mg, K and phos as needed  - 75cc fluid for 10 hours today - creatinine almost at baseline Patient had recent SHAWNA 2/2 nephrolithiasis prior to hospitalization s/p stent and Flomax. This hospitalization course complicated by 2 SHAWNA, currently resolving with fluids  - trend BMP   - holding HCTZ and losartan  - replete Mg, K and phos as needed  - 75cc fluid for 10 hours today - creatinine almost at baseline Patient had recent SHAWNA 2/2 nephrolithiasis prior to hospitalization s/p stent and Flomax. This hospitalization course complicated by 2 SHAWNA, currently resolving with fluids  - trend BMP   - holding HCTZ and losartan  - replete Mg, K and phos as needed BP fluctuates   c/w amlodipine 10mg daily, carvedilol 25BID  - starting doxazosin 1mg qd  - holding HCTZ and losartan for recent SHAWNA  - if hypertensive SBP >180, hydralazine 25mg

## 2019-08-13 LAB
ANION GAP SERPL CALC-SCNC: 13 MMOL/L — SIGNIFICANT CHANGE UP (ref 5–17)
BUN SERPL-MCNC: 20 MG/DL — SIGNIFICANT CHANGE UP (ref 7–23)
CALCIUM SERPL-MCNC: 9.2 MG/DL — SIGNIFICANT CHANGE UP (ref 8.4–10.5)
CHLORIDE SERPL-SCNC: 103 MMOL/L — SIGNIFICANT CHANGE UP (ref 96–108)
CO2 SERPL-SCNC: 26 MMOL/L — SIGNIFICANT CHANGE UP (ref 22–31)
CREAT SERPL-MCNC: 1.37 MG/DL — HIGH (ref 0.5–1.3)
GLUCOSE BLDC GLUCOMTR-MCNC: 179 MG/DL — HIGH (ref 70–99)
GLUCOSE BLDC GLUCOMTR-MCNC: 192 MG/DL — HIGH (ref 70–99)
GLUCOSE BLDC GLUCOMTR-MCNC: 192 MG/DL — HIGH (ref 70–99)
GLUCOSE BLDC GLUCOMTR-MCNC: 211 MG/DL — HIGH (ref 70–99)
GLUCOSE SERPL-MCNC: 163 MG/DL — HIGH (ref 70–99)
MAGNESIUM SERPL-MCNC: 1.5 MG/DL — LOW (ref 1.6–2.6)
PHOSPHATE SERPL-MCNC: 3.7 MG/DL — SIGNIFICANT CHANGE UP (ref 2.5–4.5)
POTASSIUM SERPL-MCNC: 3.5 MMOL/L — SIGNIFICANT CHANGE UP (ref 3.5–5.3)
POTASSIUM SERPL-SCNC: 3.5 MMOL/L — SIGNIFICANT CHANGE UP (ref 3.5–5.3)
SODIUM SERPL-SCNC: 142 MMOL/L — SIGNIFICANT CHANGE UP (ref 135–145)

## 2019-08-13 PROCEDURE — 99232 SBSQ HOSP IP/OBS MODERATE 35: CPT

## 2019-08-13 RX ORDER — HYDRALAZINE HCL 50 MG
10 TABLET ORAL ONCE
Refills: 0 | Status: COMPLETED | OUTPATIENT
Start: 2019-08-13 | End: 2019-08-13

## 2019-08-13 RX ORDER — HYDRALAZINE HCL 50 MG
10 TABLET ORAL THREE TIMES A DAY
Refills: 0 | Status: DISCONTINUED | OUTPATIENT
Start: 2019-08-13 | End: 2019-08-14

## 2019-08-13 RX ORDER — MAGNESIUM SULFATE 500 MG/ML
2 VIAL (ML) INJECTION ONCE
Refills: 0 | Status: COMPLETED | OUTPATIENT
Start: 2019-08-13 | End: 2019-08-13

## 2019-08-13 RX ORDER — HYDRALAZINE HCL 50 MG
5 TABLET ORAL ONCE
Refills: 0 | Status: DISCONTINUED | OUTPATIENT
Start: 2019-08-13 | End: 2019-08-13

## 2019-08-13 RX ADMIN — LEVETIRACETAM 500 MILLIGRAM(S): 250 TABLET, FILM COATED ORAL at 17:23

## 2019-08-13 RX ADMIN — Medication 1 MILLIGRAM(S): at 00:18

## 2019-08-13 RX ADMIN — INSULIN GLARGINE 16 UNIT(S): 100 INJECTION, SOLUTION SUBCUTANEOUS at 21:44

## 2019-08-13 RX ADMIN — SENNA PLUS 2 TABLET(S): 8.6 TABLET ORAL at 21:37

## 2019-08-13 RX ADMIN — AMLODIPINE BESYLATE 10 MILLIGRAM(S): 2.5 TABLET ORAL at 06:25

## 2019-08-13 RX ADMIN — FAMOTIDINE 20 MILLIGRAM(S): 10 INJECTION INTRAVENOUS at 11:20

## 2019-08-13 RX ADMIN — Medication 4 UNIT(S): at 08:58

## 2019-08-13 RX ADMIN — Medication 2: at 08:58

## 2019-08-13 RX ADMIN — LEVETIRACETAM 500 MILLIGRAM(S): 250 TABLET, FILM COATED ORAL at 06:25

## 2019-08-13 RX ADMIN — Medication 100 MILLIGRAM(S): at 06:25

## 2019-08-13 RX ADMIN — Medication 81 MILLIGRAM(S): at 11:20

## 2019-08-13 RX ADMIN — TAMSULOSIN HYDROCHLORIDE 0.4 MILLIGRAM(S): 0.4 CAPSULE ORAL at 21:36

## 2019-08-13 RX ADMIN — Medication 1 APPLICATION(S): at 06:22

## 2019-08-13 RX ADMIN — ATORVASTATIN CALCIUM 80 MILLIGRAM(S): 80 TABLET, FILM COATED ORAL at 21:36

## 2019-08-13 RX ADMIN — Medication 1 APPLICATION(S): at 21:38

## 2019-08-13 RX ADMIN — HEPARIN SODIUM 5000 UNIT(S): 5000 INJECTION INTRAVENOUS; SUBCUTANEOUS at 13:07

## 2019-08-13 RX ADMIN — HEPARIN SODIUM 5000 UNIT(S): 5000 INJECTION INTRAVENOUS; SUBCUTANEOUS at 06:25

## 2019-08-13 RX ADMIN — CARVEDILOL PHOSPHATE 25 MILLIGRAM(S): 80 CAPSULE, EXTENDED RELEASE ORAL at 17:23

## 2019-08-13 RX ADMIN — Medication 10 MILLIGRAM(S): at 21:36

## 2019-08-13 RX ADMIN — Medication 50 GRAM(S): at 09:06

## 2019-08-13 RX ADMIN — Medication 1: at 18:21

## 2019-08-13 RX ADMIN — Medication 10 MILLIGRAM(S): at 06:32

## 2019-08-13 RX ADMIN — Medication 10 MILLIGRAM(S): at 15:10

## 2019-08-13 RX ADMIN — Medication 4 UNIT(S): at 18:21

## 2019-08-13 RX ADMIN — Medication 1 MILLIGRAM(S): at 06:25

## 2019-08-13 RX ADMIN — Medication 100 MILLIGRAM(S): at 13:07

## 2019-08-13 RX ADMIN — CARVEDILOL PHOSPHATE 25 MILLIGRAM(S): 80 CAPSULE, EXTENDED RELEASE ORAL at 06:25

## 2019-08-13 RX ADMIN — Medication 100 MILLIGRAM(S): at 21:36

## 2019-08-13 RX ADMIN — Medication 4 UNIT(S): at 13:06

## 2019-08-13 RX ADMIN — HEPARIN SODIUM 5000 UNIT(S): 5000 INJECTION INTRAVENOUS; SUBCUTANEOUS at 21:37

## 2019-08-13 RX ADMIN — Medication 1: at 13:05

## 2019-08-13 NOTE — CHART NOTE - NSCHARTNOTEFT_GEN_A_CORE
Nutrition Follow Up Note    Patient seen for follow up       Pt was sleeping at bedside. Spoke to RN who reported pt with good appetite and po intake, consuming % of meals, and tolerating diet. Last BM yesterday 8/12.       Diet : Consistent Carbohydrate, no snack      Daily Weight: 112.6 kg  (08-07), 110.2 kg (07-31)  Weight Change: 2% wt gain x 1 week, will continue to follow and monitor     Pertinent Medications: MEDICATIONS  (STANDING):  amLODIPine   Tablet 10 milliGRAM(s) Oral daily  aspirin enteric coated 81 milliGRAM(s) Oral daily  atorvastatin 80 milliGRAM(s) Oral at bedtime  carvedilol 25 milliGRAM(s) Oral every 12 hours  dexamethasone     Tablet 1 milliGRAM(s) Oral daily  dextrose 5%. 1000 milliLiter(s) (50 mL/Hr) IV Continuous <Continuous>  dextrose 50% Injectable 12.5 Gram(s) IV Push once  dextrose 50% Injectable 25 Gram(s) IV Push once  dextrose 50% Injectable 25 Gram(s) IV Push once  docusate sodium 100 milliGRAM(s) Oral three times a day  doxazosin 1 milliGRAM(s) Oral at bedtime  famotidine    Tablet 20 milliGRAM(s) Oral daily  heparin  Injectable 5000 Unit(s) SubCutaneous every 8 hours  hydrocortisone 1% Cream 1 Application(s) Topical three times a day  insulin glargine Injectable (LANTUS) 16 Unit(s) SubCutaneous at bedtime  insulin lispro (HumaLOG) corrective regimen sliding scale   SubCutaneous at bedtime  insulin lispro (HumaLOG) corrective regimen sliding scale   SubCutaneous three times a day before meals  insulin lispro Injectable (HumaLOG) 4 Unit(s) SubCutaneous three times a day before meals  levETIRAcetam 500 milliGRAM(s) Oral two times a day  senna 2 Tablet(s) Oral at bedtime  tamsulosin 0.4 milliGRAM(s) Oral at bedtime    MEDICATIONS  (PRN):  dextrose 40% Gel 15 Gram(s) Oral once PRN Blood Glucose LESS THAN 70 milliGRAM(s)/deciliter  glucagon  Injectable 1 milliGRAM(s) IntraMuscular once PRN Glucose LESS THAN 70 milligrams/deciliter    Pertinent Labs: 08-13 @ 07:06: Na 142, BUN 20, Cr 1.37<H>, <H>, K+ 3.5, Phos 3.7, Mg 1.5<L>      Finger Sticks:  POCT Blood Glucose.: 211 mg/dL (08-13 @ 08:56)  POCT Blood Glucose.: 171 mg/dL (08-12 @ 22:47)  POCT Blood Glucose.: 175 mg/dL (08-12 @ 21:34)  POCT Blood Glucose.: 185 mg/dL (08-12 @ 21:32)  POCT Blood Glucose.: 236 mg/dL (08-12 @ 18:47)  POCT Blood Glucose.: 206 mg/dL (08-12 @ 12:58)      Skin: no pressure ulcer noted     Edema: none noted     Estimated Needs:   X ] no change since previous assessment  [ ] recalculated:     Previous Nutrition Diagnosis: no nutritional diagnosis at this time   Nutrition Diagnosis is: continues     New Nutrition Diagnosis: n/a       Recommend  1) continue consistent carb diet      Monitoring and Evaluation:     Continue to monitor Nutritional intake, Tolerance to diet prescription, weights, labs, skin integrity    RD remains available upon request and will follow up per protocol

## 2019-08-13 NOTE — PROVIDER CONTACT NOTE (OTHER) - ACTION/TREATMENT ORDERED:
no change in treatment
MD made aware, 10mg of hydralazine given as ordered then repeat pressure in 30 minutes
MD made aware, hydrocortisone cream written, pt refused cream. Will continue to monitor for pt safety.

## 2019-08-13 NOTE — PROGRESS NOTE ADULT - ASSESSMENT
69 year old M hx Grade IV glioblastoma, recent admission for lithotripsy of nephrolithiasis, brought in by family for altered mental status admitted for acute encephalopathy secondary to stroke and acute delirium in the setting of underlying dementia, course c/b 2 SHAWNA, both resolved with fluid, and hypertensive urgency. Currently pending nursing facility placement.

## 2019-08-13 NOTE — PROGRESS NOTE ADULT - PROBLEM SELECTOR PLAN 3
Patient had recent SHAWNA 2/2 nephrolithiasis prior to hospitalization s/p stent and Flomax. This hospitalization course complicated by 2 SHAWNA, currently resolving with fluids  - trend BMP   - holding HCTZ and losartan  - replete Mg, K and phos as needed

## 2019-08-13 NOTE — PROVIDER CONTACT NOTE (OTHER) - ASSESSMENT
pt with good appetite.
Pt A+Ox3 confused at times. Pt had a left wrist rash near where his ID band is placed. Pt denies itching or burning at the site.
Pt AO x3 standby assist. no distress noted. /101

## 2019-08-13 NOTE — PROGRESS NOTE ADULT - PROBLEM SELECTOR PLAN 1
Altered mental status is likely from acute infarct (MRI c/f acute infarct in left corona radiata) and focal seizure. CT head shows unchanged glioblastoma mass (5.9x2.2cm). No concern for increased ICP at this time (CT scan with no midline shift. Ventricles appear unchanged in size. There is no hydrocephalus). MRA Head shows no large vessel occlusion or major stenosis. Continuous vEEG with no seizures noted. Carotid duplex with no stenosis of internal carotid. No PFO on TTE.  - Neurology recommendation noted  - c/w Aspirin 81mg PO QD, Lipitor 80mg QHS, Decadron 1 gm daily   - Continue Keppra 500mg PO BID  - Pt is very calm, pleasant and cooperative. Never agitated during hospital stay.

## 2019-08-13 NOTE — PROGRESS NOTE ADULT - PROBLEM SELECTOR PLAN 2
BP fluctuates - becomes hypertensive to SBP 180s-190s overnight.   - c/w amlodipine 10mg daily, carvedilol 25BID  - c/w doxazosin 1mg qd  - holding HCTZ and losartan as pt is prone to SHAWNA  - if hypertensive SBP >180, hydralazine 5-10mg IV push BP fluctuates - becomes hypertensive to SBP 180s-190s overnight.   - c/w amlodipine 10mg daily, carvedilol 25BID  - c/w doxazosin 1mg qd - consider increasing to 2mg  - holding HCTZ and losartan as pt is prone to SHAWNA  - if hypertensive SBP >180, hydralazine 5-10mg IV push BP fluctuates - becomes hypertensive to SBP 180s-190s overnight.   - c/w amlodipine 10mg daily, carvedilol 25BID  - hydralazine 10mg TID  - holding HCTZ and losartan as pt is prone to SHAWNA  - if hypertensive SBP >180, hydralazine 5-10mg IV push

## 2019-08-13 NOTE — PROGRESS NOTE ADULT - SUBJECTIVE AND OBJECTIVE BOX
Patient is a 69y old  Male who presents with a chief complaint of AMS (12 Aug 2019 06:23)      INTERVAL HPI/OVERNIGHT EVENTS:  Pt started on doxazosin 1mg yesterday. Overnight pt was hypertensive to 197/101 - got hydralazine 10mg IV push.       Vital Signs Last 24 Hrs  T(C): 36.4 (13 Aug 2019 05:03), Max: 36.7 (12 Aug 2019 13:32)  T(F): 97.5 (13 Aug 2019 05:03), Max: 98.1 (12 Aug 2019 13:32)  HR: 67 (13 Aug 2019 05:03) (67 - 75)  BP: 197/101 (13 Aug 2019 05:03) (142/80 - 197/101)  RR: 18 (13 Aug 2019 05:03) (18 - 18)  SpO2: 94% (13 Aug 2019 05:03) (94% - 97%)    PHYSICAL EXAM:  GENERAL: NAD. Cooperative, Pleasant.   CHEST/LUNG: Clear to auscultation; No rales, rhonchi, or wheezing.  Respiratory effort does not appear labored.  HEART: Regular rate and rhythm; S1 and S2,  no murmurs, rubs, or gallops.  ABDOMEN: Soft, not tender to palpation.  No masses or HSM appreciated.  No distension.  Bowel sounds present.  EXTREMITIES:  No clubbing, cyanosis, or edema.  Moves all extremities with strength 5/5.  SKIN: No obvious rashes or lesions.  Turgor okay.  NEURO:  Alert and oriented x 1, no focal sensory or  motor deficit    LABS:        CAPILLARY BLOOD GLUCOSE  POCT Blood Glucose.: 171 mg/dL (12 Aug 2019 22:47)  POCT Blood Glucose.: 175 mg/dL (12 Aug 2019 21:34)  POCT Blood Glucose.: 185 mg/dL (12 Aug 2019 21:32)  POCT Blood Glucose.: 236 mg/dL (12 Aug 2019 18:47)  POCT Blood Glucose.: 206 mg/dL (12 Aug 2019 12:58)  POCT Blood Glucose.: 179 mg/dL (12 Aug 2019 08:54) Patient is a 69y old  Male who presents with a chief complaint of AMS (12 Aug 2019 06:23)      INTERVAL HPI/OVERNIGHT EVENTS:  Pt started on doxazosin 1mg yesterday. Overnight pt was hypertensive to 197/101 - got hydralazine 10mg IV push. This morning pt reports feeling well. Denies headache, dizziness, CP, Abd pain, SOB.       Vital Signs Last 24 Hrs  T(C): 36.4 (13 Aug 2019 05:03), Max: 36.7 (12 Aug 2019 13:32)  T(F): 97.5 (13 Aug 2019 05:03), Max: 98.1 (12 Aug 2019 13:32)  HR: 67 (13 Aug 2019 05:03) (67 - 75)  BP: 197/101 (13 Aug 2019 05:03) (142/80 - 197/101)  RR: 18 (13 Aug 2019 05:03) (18 - 18)  SpO2: 94% (13 Aug 2019 05:03) (94% - 97%)    PHYSICAL EXAM:  GENERAL: NAD. Cooperative, Pleasant. Able to follow instructions.   CHEST/LUNG: Clear to auscultation; No rales, rhonchi, or wheezing.  Respiratory effort does not appear labored.  HEART: Regular rate and rhythm; S1 and S2,  no murmurs, rubs, or gallops.  ABDOMEN: Protuberant. Soft, not tender to palpation.  No masses or HSM appreciated. Bowel sounds present.  EXTREMITIES:  1+ pitting edema to lower shin bilaterally.  Moves all extremities   SKIN: No obvious rashes or lesions.  Turgor okay.  NEURO:  Alert and oriented x 1, no focal sensory or  motor deficit    LABS:  08-13    142  |  103  |  20  ----------------------------<  163<H>  3.5   |  26  |  1.37<H>    creatinine: 1.37 <- 1.35    Ca    9.2      13 Aug 2019 07:06  Phos  3.7     08-13  Mg     1.5   (repleted)  08-13        CAPILLARY BLOOD GLUCOSE  POCT Blood Glucose.: 171 mg/dL (12 Aug 2019 22:47)  POCT Blood Glucose.: 175 mg/dL (12 Aug 2019 21:34)  POCT Blood Glucose.: 185 mg/dL (12 Aug 2019 21:32)  POCT Blood Glucose.: 236 mg/dL (12 Aug 2019 18:47)  POCT Blood Glucose.: 206 mg/dL (12 Aug 2019 12:58)  POCT Blood Glucose.: 179 mg/dL (12 Aug 2019 08:54)

## 2019-08-13 NOTE — PROGRESS NOTE ADULT - ATTENDING COMMENTS
Seen, examined the patient with house staff  - Resting in bed, calm, but confused at time, no specific complaints, no distress, still has dysarthria     reviewed labs, imaging and Neuro Plans  - The patient's AMS was related to acute infarct in left corona radiata and possible focal seizure. Echo- no thrombus or PFO, normal EF    c/w ASA, statin, and control of BP, added hydralazine 10mg tid, hold if SBP <110  - c/w Decadron for the GBM, outpatient f/u with Neurology,     PT recommended Rehab, spoke to - so far no acceptance in LTC  - c/w current meds  ** spoke to Dtr-in-law over the phone

## 2019-08-14 LAB
ANION GAP SERPL CALC-SCNC: 17 MMOL/L — SIGNIFICANT CHANGE UP (ref 5–17)
BUN SERPL-MCNC: 22 MG/DL — SIGNIFICANT CHANGE UP (ref 7–23)
CALCIUM SERPL-MCNC: 9.6 MG/DL — SIGNIFICANT CHANGE UP (ref 8.4–10.5)
CHLORIDE SERPL-SCNC: 103 MMOL/L — SIGNIFICANT CHANGE UP (ref 96–108)
CO2 SERPL-SCNC: 23 MMOL/L — SIGNIFICANT CHANGE UP (ref 22–31)
CREAT SERPL-MCNC: 1.46 MG/DL — HIGH (ref 0.5–1.3)
GLUCOSE BLDC GLUCOMTR-MCNC: 178 MG/DL — HIGH (ref 70–99)
GLUCOSE BLDC GLUCOMTR-MCNC: 179 MG/DL — HIGH (ref 70–99)
GLUCOSE BLDC GLUCOMTR-MCNC: 195 MG/DL — HIGH (ref 70–99)
GLUCOSE BLDC GLUCOMTR-MCNC: 220 MG/DL — HIGH (ref 70–99)
GLUCOSE SERPL-MCNC: 149 MG/DL — HIGH (ref 70–99)
MAGNESIUM SERPL-MCNC: 1.6 MG/DL — SIGNIFICANT CHANGE UP (ref 1.6–2.6)
PHOSPHATE SERPL-MCNC: 4.3 MG/DL — SIGNIFICANT CHANGE UP (ref 2.5–4.5)
POTASSIUM SERPL-MCNC: 3.6 MMOL/L — SIGNIFICANT CHANGE UP (ref 3.5–5.3)
POTASSIUM SERPL-SCNC: 3.6 MMOL/L — SIGNIFICANT CHANGE UP (ref 3.5–5.3)
SODIUM SERPL-SCNC: 143 MMOL/L — SIGNIFICANT CHANGE UP (ref 135–145)

## 2019-08-14 PROCEDURE — 99232 SBSQ HOSP IP/OBS MODERATE 35: CPT

## 2019-08-14 RX ORDER — SODIUM CHLORIDE 9 MG/ML
1000 INJECTION, SOLUTION INTRAVENOUS
Refills: 0 | Status: DISCONTINUED | OUTPATIENT
Start: 2019-08-14 | End: 2019-08-15

## 2019-08-14 RX ORDER — HYDRALAZINE HCL 50 MG
25 TABLET ORAL THREE TIMES A DAY
Refills: 0 | Status: DISCONTINUED | OUTPATIENT
Start: 2019-08-14 | End: 2019-08-15

## 2019-08-14 RX ORDER — SODIUM CHLORIDE 9 MG/ML
1000 INJECTION INTRAMUSCULAR; INTRAVENOUS; SUBCUTANEOUS
Refills: 0 | Status: DISCONTINUED | OUTPATIENT
Start: 2019-08-14 | End: 2019-08-14

## 2019-08-14 RX ADMIN — Medication 4 UNIT(S): at 13:06

## 2019-08-14 RX ADMIN — Medication 25 MILLIGRAM(S): at 12:57

## 2019-08-14 RX ADMIN — Medication 1 MILLIGRAM(S): at 05:49

## 2019-08-14 RX ADMIN — Medication 1 APPLICATION(S): at 21:17

## 2019-08-14 RX ADMIN — INSULIN GLARGINE 16 UNIT(S): 100 INJECTION, SOLUTION SUBCUTANEOUS at 21:37

## 2019-08-14 RX ADMIN — ATORVASTATIN CALCIUM 80 MILLIGRAM(S): 80 TABLET, FILM COATED ORAL at 21:16

## 2019-08-14 RX ADMIN — LEVETIRACETAM 500 MILLIGRAM(S): 250 TABLET, FILM COATED ORAL at 17:14

## 2019-08-14 RX ADMIN — CARVEDILOL PHOSPHATE 25 MILLIGRAM(S): 80 CAPSULE, EXTENDED RELEASE ORAL at 17:14

## 2019-08-14 RX ADMIN — AMLODIPINE BESYLATE 10 MILLIGRAM(S): 2.5 TABLET ORAL at 05:48

## 2019-08-14 RX ADMIN — HEPARIN SODIUM 5000 UNIT(S): 5000 INJECTION INTRAVENOUS; SUBCUTANEOUS at 12:58

## 2019-08-14 RX ADMIN — Medication 100 MILLIGRAM(S): at 05:49

## 2019-08-14 RX ADMIN — CARVEDILOL PHOSPHATE 25 MILLIGRAM(S): 80 CAPSULE, EXTENDED RELEASE ORAL at 05:48

## 2019-08-14 RX ADMIN — Medication 4 UNIT(S): at 18:46

## 2019-08-14 RX ADMIN — SENNA PLUS 2 TABLET(S): 8.6 TABLET ORAL at 21:16

## 2019-08-14 RX ADMIN — Medication 100 MILLIGRAM(S): at 21:16

## 2019-08-14 RX ADMIN — TAMSULOSIN HYDROCHLORIDE 0.4 MILLIGRAM(S): 0.4 CAPSULE ORAL at 21:16

## 2019-08-14 RX ADMIN — Medication 1: at 18:45

## 2019-08-14 RX ADMIN — HEPARIN SODIUM 5000 UNIT(S): 5000 INJECTION INTRAVENOUS; SUBCUTANEOUS at 05:49

## 2019-08-14 RX ADMIN — FAMOTIDINE 20 MILLIGRAM(S): 10 INJECTION INTRAVENOUS at 12:58

## 2019-08-14 RX ADMIN — Medication 1 APPLICATION(S): at 05:49

## 2019-08-14 RX ADMIN — Medication 2: at 13:06

## 2019-08-14 RX ADMIN — Medication 100 MILLIGRAM(S): at 12:57

## 2019-08-14 RX ADMIN — Medication 4 UNIT(S): at 08:53

## 2019-08-14 RX ADMIN — Medication 1: at 08:54

## 2019-08-14 RX ADMIN — Medication 25 MILLIGRAM(S): at 21:16

## 2019-08-14 RX ADMIN — LEVETIRACETAM 500 MILLIGRAM(S): 250 TABLET, FILM COATED ORAL at 05:50

## 2019-08-14 RX ADMIN — HEPARIN SODIUM 5000 UNIT(S): 5000 INJECTION INTRAVENOUS; SUBCUTANEOUS at 21:17

## 2019-08-14 RX ADMIN — Medication 10 MILLIGRAM(S): at 05:49

## 2019-08-14 RX ADMIN — Medication 81 MILLIGRAM(S): at 12:57

## 2019-08-14 NOTE — PROGRESS NOTE ADULT - SUBJECTIVE AND OBJECTIVE BOX
Patient is a 69y old  Male who presents with a chief complaint of AMS (13 Aug 2019 06:42)      INTERVAL HPI/OVERNIGHT EVENTS:  SBP elevated to 189/79 early morning, got usual morning dose of hydralazine. This morning pt reports feeling well. Denies chest pain, headache, dizziness, SOB.     Vital Signs Last 24 Hrs  T(C): 36.7 (14 Aug 2019 04:51), Max: 36.8 (13 Aug 2019 13:18)  T(F): 98 (14 Aug 2019 04:51), Max: 98.3 (13 Aug 2019 13:18)  HR: 70 (14 Aug 2019 04:51) (70 - 82)  BP: 189/79 (14 Aug 2019 04:51) (153/79 - 189/79)  RR: 18 (14 Aug 2019 04:51) (18 - 18)  SpO2: 96% (14 Aug 2019 04:51) (96% - 97%)    PHYSICAL EXAM:  GENERAL: NAD. Comfortable appearing. Pleasant, cooperative.   CHEST/LUNG: Clear to auscultation; No rales, rhonchi, or wheezing.  Respiratory effort does not appear labored.  HEART: Regular rate and rhythm; S1 and S2,  no murmurs, rubs, or gallops.  ABDOMEN: Soft, not tender to palpation.  No masses or HSM appreciated.  No distension.  Bowel sounds present.  EXTREMITIES:  No clubbing, cyanosis, or edema.  Moves all extremities with strength 5/5.  SKIN: No obvious rashes or lesions.  Turgor okay.  NEURO:  Alert and oriented x 1, no focal sensory or  motor deficit      LABS:      CAPILLARY BLOOD GLUCOSE      POCT Blood Glucose.: 192 mg/dL (13 Aug 2019 21:43)  POCT Blood Glucose.: 192 mg/dL (13 Aug 2019 18:19)  POCT Blood Glucose.: 179 mg/dL (13 Aug 2019 13:03)  POCT Blood Glucose.: 211 mg/dL (13 Aug 2019 08:56) Patient is a 69y old  Male who presents with a chief complaint of AMS (13 Aug 2019 06:42)      INTERVAL HPI/OVERNIGHT EVENTS:  SBP elevated to 189/79 early morning, got usual morning dose of hydralazine. This morning pt reports feeling well. Denies chest pain, headache, dizziness, SOB.     Vital Signs Last 24 Hrs  T(C): 36.7 (14 Aug 2019 04:51), Max: 36.8 (13 Aug 2019 13:18)  T(F): 98 (14 Aug 2019 04:51), Max: 98.3 (13 Aug 2019 13:18)  HR: 70 (14 Aug 2019 04:51) (70 - 82)  BP: 189/79 (14 Aug 2019 04:51) (153/79 - 189/79)  RR: 18 (14 Aug 2019 04:51) (18 - 18)  SpO2: 96% (14 Aug 2019 04:51) (96% - 97%)    PHYSICAL EXAM:  GENERAL: NAD. Comfortable appearing. Pleasant, cooperative.   CHEST/LUNG: Clear to auscultation; No rales, rhonchi, or wheezing.  Respiratory effort does not appear labored.  HEART: Regular rate and rhythm; S1 and S2,  no murmurs, rubs, or gallops.  ABDOMEN: Soft, not tender to palpation.  No masses or HSM appreciated.  No distension.  Bowel sounds present.  EXTREMITIES:  1+ pitting edema to lower shin bilaterally.  Moves all extremities with strength 5/5.  SKIN: No obvious rashes or lesions.  Turgor okay.  NEURO:  Alert and oriented x 1, no focal sensory or motor deficit      LABS:  Creatinine: 1.46 from 1.37    CAPILLARY BLOOD GLUCOSE  POCT Blood Glucose.: 192 mg/dL (13 Aug 2019 21:43)  POCT Blood Glucose.: 192 mg/dL (13 Aug 2019 18:19)  POCT Blood Glucose.: 179 mg/dL (13 Aug 2019 13:03)  POCT Blood Glucose.: 211 mg/dL (13 Aug 2019 08:56)

## 2019-08-14 NOTE — PROGRESS NOTE ADULT - PROBLEM SELECTOR PLAN 2
BP fluctuates - becomes hypertensive to SBP 180s-190s overnight.   - c/w amlodipine 10mg daily, carvedilol 25BID  - hydralazine 10mg TID  - holding HCTZ and losartan as pt is prone to SHAWNA  - if hypertensive SBP >180, hydralazine 5-10mg IV push BP fluctuates - becomes hypertensive to SBP 180s-190s overnight.   - c/w amlodipine 10mg daily, carvedilol 25BID  - hydralazine 10mg TID - increase?  - holding HCTZ and losartan as pt is prone to SHAWNA  - if hypertensive SBP >180, hydralazine 5-10mg IV push BP fluctuates - becomes hypertensive to SBP 180s-190s overnight.   - c/w amlodipine 10mg daily, carvedilol 25BID  - hydralazine 10mg TID increased to 25mg TID  - holding HCTZ and losartan as pt is prone to SHAWNA  - if hypertensive SBP >180, hydralazine 5-10mg IV push

## 2019-08-14 NOTE — PROGRESS NOTE ADULT - PROBLEM SELECTOR PLAN 3
Patient had recent SHAWNA 2/2 nephrolithiasis prior to hospitalization s/p stent and Flomax. This hospitalization course complicated by 2 SHAWNA, currently resolving with fluids  - trend BMP   - holding HCTZ and losartan  - replete Mg, K and phos as needed Patient had recent SHAWNA 2/2 nephrolithiasis prior to hospitalization s/p stent and Flomax. This hospitalization course complicated by 2 SHAWNA  - trend BMP   - holding HCTZ and losartan  - replete Mg, K and phos as needed Patient had recent SHAWNA 2/2 nephrolithiasis prior to hospitalization s/p stent and Flomax. This hospitalization course complicated by 2 SHAWNA.   - trend BMP   - holding HCTZ and losartan  - replete Mg, K and phos as needed  - 0.45% NS for 10 hours today

## 2019-08-14 NOTE — PROGRESS NOTE ADULT - ATTENDING COMMENTS
Seen, examined the patient with house staff  - Sitting in chair, calm, but confused at time, no specific complaints, no distress, still has dysarthria. At time BP gets elevated    reviewed labs, imaging and Neuro Plans  - The patient's AMS was related to acute infarct in left corona radiata and possible focal seizure. Echo- no thrombus or PFO, normal EF    c/w ASA, statin, and control of BP, increased hydralazine 25mg tid, hold if SBP <110  - c/w Decadron for the GBM, outpatient f/u with Neurology,     awaiting on insurance auth for Rehab placement  - c/w current meds  ** Team spoke to family about his status and plan of care.

## 2019-08-15 VITALS — DIASTOLIC BLOOD PRESSURE: 87 MMHG | SYSTOLIC BLOOD PRESSURE: 147 MMHG | HEART RATE: 80 BPM

## 2019-08-15 LAB
ANION GAP SERPL CALC-SCNC: 13 MMOL/L — SIGNIFICANT CHANGE UP (ref 5–17)
BUN SERPL-MCNC: 24 MG/DL — HIGH (ref 7–23)
CALCIUM SERPL-MCNC: 9.4 MG/DL — SIGNIFICANT CHANGE UP (ref 8.4–10.5)
CHLORIDE SERPL-SCNC: 103 MMOL/L — SIGNIFICANT CHANGE UP (ref 96–108)
CO2 SERPL-SCNC: 23 MMOL/L — SIGNIFICANT CHANGE UP (ref 22–31)
CREAT SERPL-MCNC: 1.25 MG/DL — SIGNIFICANT CHANGE UP (ref 0.5–1.3)
GLUCOSE BLDC GLUCOMTR-MCNC: 232 MG/DL — HIGH (ref 70–99)
GLUCOSE BLDC GLUCOMTR-MCNC: 289 MG/DL — HIGH (ref 70–99)
GLUCOSE SERPL-MCNC: 165 MG/DL — HIGH (ref 70–99)
MAGNESIUM SERPL-MCNC: 1.5 MG/DL — LOW (ref 1.6–2.6)
PHOSPHATE SERPL-MCNC: 4.1 MG/DL — SIGNIFICANT CHANGE UP (ref 2.5–4.5)
POTASSIUM SERPL-MCNC: 3.7 MMOL/L — SIGNIFICANT CHANGE UP (ref 3.5–5.3)
POTASSIUM SERPL-SCNC: 3.7 MMOL/L — SIGNIFICANT CHANGE UP (ref 3.5–5.3)
SODIUM SERPL-SCNC: 139 MMOL/L — SIGNIFICANT CHANGE UP (ref 135–145)

## 2019-08-15 PROCEDURE — 84105 ASSAY OF URINE PHOSPHORUS: CPT

## 2019-08-15 PROCEDURE — 84133 ASSAY OF URINE POTASSIUM: CPT

## 2019-08-15 PROCEDURE — 80048 BASIC METABOLIC PNL TOTAL CA: CPT

## 2019-08-15 PROCEDURE — 85027 COMPLETE CBC AUTOMATED: CPT

## 2019-08-15 PROCEDURE — 93306 TTE W/DOPPLER COMPLETE: CPT

## 2019-08-15 PROCEDURE — 95816 EEG AWAKE AND DROWSY: CPT

## 2019-08-15 PROCEDURE — 82746 ASSAY OF FOLIC ACID SERUM: CPT

## 2019-08-15 PROCEDURE — 81003 URINALYSIS AUTO W/O SCOPE: CPT

## 2019-08-15 PROCEDURE — 97116 GAIT TRAINING THERAPY: CPT

## 2019-08-15 PROCEDURE — 76770 US EXAM ABDO BACK WALL COMP: CPT

## 2019-08-15 PROCEDURE — 95951: CPT

## 2019-08-15 PROCEDURE — 84443 ASSAY THYROID STIM HORMONE: CPT

## 2019-08-15 PROCEDURE — 80053 COMPREHEN METABOLIC PANEL: CPT

## 2019-08-15 PROCEDURE — 93880 EXTRACRANIAL BILAT STUDY: CPT

## 2019-08-15 PROCEDURE — 80177 DRUG SCRN QUAN LEVETIRACETAM: CPT

## 2019-08-15 PROCEDURE — 81001 URINALYSIS AUTO W/SCOPE: CPT

## 2019-08-15 PROCEDURE — 83735 ASSAY OF MAGNESIUM: CPT

## 2019-08-15 PROCEDURE — 99239 HOSP IP/OBS DSCHRG MGMT >30: CPT | Mod: GC

## 2019-08-15 PROCEDURE — 87186 SC STD MICRODIL/AGAR DIL: CPT

## 2019-08-15 PROCEDURE — 74176 CT ABD & PELVIS W/O CONTRAST: CPT

## 2019-08-15 PROCEDURE — 84100 ASSAY OF PHOSPHORUS: CPT

## 2019-08-15 PROCEDURE — 82570 ASSAY OF URINE CREATININE: CPT

## 2019-08-15 PROCEDURE — 97161 PT EVAL LOW COMPLEX 20 MIN: CPT

## 2019-08-15 PROCEDURE — 83935 ASSAY OF URINE OSMOLALITY: CPT

## 2019-08-15 PROCEDURE — 87086 URINE CULTURE/COLONY COUNT: CPT

## 2019-08-15 PROCEDURE — 85730 THROMBOPLASTIN TIME PARTIAL: CPT

## 2019-08-15 PROCEDURE — 70551 MRI BRAIN STEM W/O DYE: CPT

## 2019-08-15 PROCEDURE — 70544 MR ANGIOGRAPHY HEAD W/O DYE: CPT

## 2019-08-15 PROCEDURE — 84300 ASSAY OF URINE SODIUM: CPT

## 2019-08-15 PROCEDURE — 82962 GLUCOSE BLOOD TEST: CPT

## 2019-08-15 PROCEDURE — 85610 PROTHROMBIN TIME: CPT

## 2019-08-15 PROCEDURE — 71045 X-RAY EXAM CHEST 1 VIEW: CPT

## 2019-08-15 PROCEDURE — 82803 BLOOD GASES ANY COMBINATION: CPT

## 2019-08-15 PROCEDURE — 70450 CT HEAD/BRAIN W/O DYE: CPT

## 2019-08-15 PROCEDURE — 97110 THERAPEUTIC EXERCISES: CPT

## 2019-08-15 PROCEDURE — 82607 VITAMIN B-12: CPT

## 2019-08-15 PROCEDURE — 83036 HEMOGLOBIN GLYCOSYLATED A1C: CPT

## 2019-08-15 PROCEDURE — 80061 LIPID PANEL: CPT

## 2019-08-15 PROCEDURE — 99285 EMERGENCY DEPT VISIT HI MDM: CPT

## 2019-08-15 RX ORDER — LOSARTAN POTASSIUM 100 MG/1
1 TABLET, FILM COATED ORAL
Qty: 0 | Refills: 0 | DISCHARGE

## 2019-08-15 RX ORDER — ATORVASTATIN CALCIUM 80 MG/1
1 TABLET, FILM COATED ORAL
Qty: 0 | Refills: 0 | DISCHARGE
Start: 2019-08-15

## 2019-08-15 RX ORDER — METFORMIN HYDROCHLORIDE 850 MG/1
1 TABLET ORAL
Qty: 0 | Refills: 0 | DISCHARGE

## 2019-08-15 RX ORDER — INSULIN LISPRO 100/ML
4 VIAL (ML) SUBCUTANEOUS
Qty: 10 | Refills: 0
Start: 2019-08-15 | End: 2019-09-13

## 2019-08-15 RX ORDER — DOCUSATE SODIUM 100 MG
1 CAPSULE ORAL
Qty: 0 | Refills: 0 | DISCHARGE
Start: 2019-08-15

## 2019-08-15 RX ORDER — INSULIN GLARGINE 100 [IU]/ML
16 INJECTION, SOLUTION SUBCUTANEOUS
Qty: 0 | Refills: 0 | DISCHARGE
Start: 2019-08-15

## 2019-08-15 RX ORDER — HEPARIN SODIUM 5000 [USP'U]/ML
5000 INJECTION INTRAVENOUS; SUBCUTANEOUS
Qty: 0 | Refills: 0 | DISCHARGE
Start: 2019-08-15

## 2019-08-15 RX ORDER — MAGNESIUM SULFATE 500 MG/ML
2 VIAL (ML) INJECTION ONCE
Refills: 0 | Status: COMPLETED | OUTPATIENT
Start: 2019-08-15 | End: 2019-08-15

## 2019-08-15 RX ORDER — ATORVASTATIN CALCIUM 80 MG/1
1 TABLET, FILM COATED ORAL
Qty: 0 | Refills: 0 | DISCHARGE

## 2019-08-15 RX ORDER — ASPIRIN/CALCIUM CARB/MAGNESIUM 324 MG
1 TABLET ORAL
Qty: 0 | Refills: 0 | DISCHARGE
Start: 2019-08-15

## 2019-08-15 RX ORDER — SENNA PLUS 8.6 MG/1
2 TABLET ORAL
Qty: 0 | Refills: 0 | DISCHARGE
Start: 2019-08-15

## 2019-08-15 RX ORDER — HYDRALAZINE HCL 50 MG
1 TABLET ORAL
Qty: 0 | Refills: 0 | DISCHARGE
Start: 2019-08-15

## 2019-08-15 RX ADMIN — Medication 2: at 09:17

## 2019-08-15 RX ADMIN — Medication 25 MILLIGRAM(S): at 05:20

## 2019-08-15 RX ADMIN — Medication 1 MILLIGRAM(S): at 05:19

## 2019-08-15 RX ADMIN — Medication 4 UNIT(S): at 09:17

## 2019-08-15 RX ADMIN — Medication 81 MILLIGRAM(S): at 11:27

## 2019-08-15 RX ADMIN — Medication 25 MILLIGRAM(S): at 12:56

## 2019-08-15 RX ADMIN — AMLODIPINE BESYLATE 10 MILLIGRAM(S): 2.5 TABLET ORAL at 05:20

## 2019-08-15 RX ADMIN — Medication 1 APPLICATION(S): at 05:21

## 2019-08-15 RX ADMIN — HEPARIN SODIUM 5000 UNIT(S): 5000 INJECTION INTRAVENOUS; SUBCUTANEOUS at 05:21

## 2019-08-15 RX ADMIN — Medication 50 GRAM(S): at 10:22

## 2019-08-15 RX ADMIN — Medication 4 UNIT(S): at 12:54

## 2019-08-15 RX ADMIN — CARVEDILOL PHOSPHATE 25 MILLIGRAM(S): 80 CAPSULE, EXTENDED RELEASE ORAL at 05:20

## 2019-08-15 RX ADMIN — Medication 100 MILLIGRAM(S): at 05:19

## 2019-08-15 RX ADMIN — FAMOTIDINE 20 MILLIGRAM(S): 10 INJECTION INTRAVENOUS at 11:27

## 2019-08-15 RX ADMIN — LEVETIRACETAM 500 MILLIGRAM(S): 250 TABLET, FILM COATED ORAL at 05:19

## 2019-08-15 RX ADMIN — Medication 3: at 12:54

## 2019-08-15 NOTE — PROGRESS NOTE ADULT - SUBJECTIVE AND OBJECTIVE BOX
Patient is a 69y old  Male who presents with a chief complaint of AMS (14 Aug 2019 06:33)      INTERVAL HPI/OVERNIGHT EVENTS:  Overnight pt was hypertensive to 180/89. This morning pt reports feeling well. Denies headache, dizziness, SOB, CP.       Vital Signs Last 24 Hrs  T(C): 36.8 (15 Aug 2019 04:46), Max: 36.8 (15 Aug 2019 04:46)  T(F): 98.2 (15 Aug 2019 04:46), Max: 98.2 (15 Aug 2019 04:46)  HR: 69 (15 Aug 2019 04:46) (66 - 73)  BP: 180/89 (15 Aug 2019 04:46) (138/75 - 180/89)  RR: 18 (15 Aug 2019 04:46) (18 - 18)  SpO2: 97% (15 Aug 2019 04:46) (96% - 97%)    PHYSICAL EXAM:  GENERAL: NAD. Pleasant, Cooperative.   CHEST/LUNG: Clear to auscultation; No rales, rhonchi, or wheezing.  Respiratory effort does not appear labored.  HEART: Regular rate and rhythm; S1 and S2,  no murmurs, rubs, or gallops.  ABDOMEN: Protuberant. Soft, not tender to palpation.  No masses or HSM appreciated.  No distension.  Bowel sounds present.  EXTREMITIES:  1+ pitting edema to shins bilaterally.  Moves all extremities with strength 5/5.  SKIN: No obvious rashes or lesions.  Turgor okay.  NEURO:  Alert and oriented x 1, no focal sensory or  motor deficit    LABS:      CAPILLARY BLOOD GLUCOSE  POCT Blood Glucose.: 195 mg/dL (14 Aug 2019 21:35)  POCT Blood Glucose.: 179 mg/dL (14 Aug 2019 18:20)  POCT Blood Glucose.: 220 mg/dL (14 Aug 2019 13:04)  POCT Blood Glucose.: 178 mg/dL (14 Aug 2019 08:45) Patient is a 69y old  Male who presents with a chief complaint of AMS (14 Aug 2019 06:33)      INTERVAL HPI/OVERNIGHT EVENTS:  Overnight pt was hypertensive to 180/89. This morning pt reports feeling well. Denies headache, dizziness, SOB, CP.       Vital Signs Last 24 Hrs  T(C): 36.8 (15 Aug 2019 04:46), Max: 36.8 (15 Aug 2019 04:46)  T(F): 98.2 (15 Aug 2019 04:46), Max: 98.2 (15 Aug 2019 04:46)  HR: 69 (15 Aug 2019 04:46) (66 - 73)  BP: 180/89 (15 Aug 2019 04:46) (138/75 - 180/89)  RR: 18 (15 Aug 2019 04:46) (18 - 18)  SpO2: 97% (15 Aug 2019 04:46) (96% - 97%)    PHYSICAL EXAM:  GENERAL: NAD. Pleasant, Cooperative.   CHEST/LUNG: Clear to auscultation; No rales, rhonchi, or wheezing.  Respiratory effort does not appear labored.  HEART: Regular rate and rhythm; S1 and S2,  no murmurs, rubs, or gallops.  ABDOMEN: Protuberant. Soft, not tender to palpation.  No masses or HSM appreciated.  No distension.  Bowel sounds present.  EXTREMITIES:  1+ pitting edema to shins bilaterally.  Moves all extremities with strength 5/5.  SKIN: No obvious rashes or lesions.  Turgor okay.  NEURO:  Alert and oriented x 1, no focal sensory or  motor deficit    LABS:  08-15    139  |  103  |  24<H>  ----------------------------<  165<H>  3.7   |  23  |  1.25    Ca    9.4      15 Aug 2019 07:13  Phos  4.1     08-15  Mg     1.5     08-15 - repleted        CAPILLARY BLOOD GLUCOSE  POCT Blood Glucose.: 195 mg/dL (14 Aug 2019 21:35)  POCT Blood Glucose.: 179 mg/dL (14 Aug 2019 18:20)  POCT Blood Glucose.: 220 mg/dL (14 Aug 2019 13:04)  POCT Blood Glucose.: 178 mg/dL (14 Aug 2019 08:45)

## 2019-08-15 NOTE — PROGRESS NOTE ADULT - PROVIDER SPECIALTY LIST ADULT
Internal Medicine
Neurology
Internal Medicine

## 2019-08-15 NOTE — PROGRESS NOTE ADULT - PROBLEM SELECTOR PLAN 2
BP fluctuates - becomes hypertensive to SBP 180s-190s overnight.   - c/w amlodipine 10mg daily, carvedilol 25BID  - hydralazine 25mg TID  - holding HCTZ and losartan as pt is prone to SHAWNA  - if hypertensive SBP >180, hydralazine 5-10mg IV push

## 2019-08-15 NOTE — PROGRESS NOTE ADULT - ATTENDING COMMENTS
Seen, examined the patient with house staff  - feels better, no fever, sitting in chair, calm, no distress, At time BP gets elevated    reviewed labs, imaging and Neuro Plans  - The patient's AMS was related to acute infarct in left corona radiata and possible focal seizure. Echo- no thrombus or PFO, normal EF    c/w ASA, statin, and control of BP, increased hydralazine 25mg tid, hold if SBP <110  - c/w Decadron for the GBM, outpatient f/u with Neurology,     PT recommended Rehab  - c/w current meds, d/c to LTC today  - discharge time- 43 min  ** Team spoke to family about his status and plan of care.

## 2019-08-15 NOTE — PROGRESS NOTE ADULT - PROBLEM SELECTOR PLAN 3
Patient had recent SHAWNA 2/2 nephrolithiasis prior to hospitalization s/p stent and Flomax. This hospitalization course complicated by 2 SHAWNA.   - trend BMP   - holding HCTZ and losartan  - replete Mg, K and phos as needed  - 0.45% NS for 10 hours today

## 2019-08-19 ENCOUNTER — APPOINTMENT (OUTPATIENT)
Dept: MRI IMAGING | Facility: IMAGING CENTER | Age: 69
End: 2019-08-19

## 2019-08-19 ENCOUNTER — APPOINTMENT (OUTPATIENT)
Dept: NEUROLOGY | Facility: CLINIC | Age: 69
End: 2019-08-19
Payer: MEDICAID

## 2019-08-19 ENCOUNTER — INBOUND DOCUMENT (OUTPATIENT)
Age: 69
End: 2019-08-19

## 2019-08-28 ENCOUNTER — APPOINTMENT (OUTPATIENT)
Dept: MRI IMAGING | Facility: IMAGING CENTER | Age: 69
End: 2019-08-28
Payer: MEDICAID

## 2019-08-28 ENCOUNTER — APPOINTMENT (OUTPATIENT)
Dept: NEUROLOGY | Facility: CLINIC | Age: 69
End: 2019-08-28
Payer: MEDICAID

## 2019-08-28 ENCOUNTER — OUTPATIENT (OUTPATIENT)
Dept: OUTPATIENT SERVICES | Facility: HOSPITAL | Age: 69
LOS: 1 days | End: 2019-08-28
Payer: MEDICAID

## 2019-08-28 VITALS
OXYGEN SATURATION: 98 % | HEART RATE: 55 BPM | DIASTOLIC BLOOD PRESSURE: 94 MMHG | RESPIRATION RATE: 18 BRPM | SYSTOLIC BLOOD PRESSURE: 182 MMHG

## 2019-08-28 VITALS — WEIGHT: 280 LBS | BODY MASS INDEX: 42.44 KG/M2 | HEIGHT: 68 IN

## 2019-08-28 DIAGNOSIS — Z90.49 ACQUIRED ABSENCE OF OTHER SPECIFIED PARTS OF DIGESTIVE TRACT: Chronic | ICD-10-CM

## 2019-08-28 DIAGNOSIS — Z00.00 ENCOUNTER FOR GENERAL ADULT MEDICAL EXAMINATION W/OUT ABNORMAL FINDINGS: ICD-10-CM

## 2019-08-28 DIAGNOSIS — C71.9 MALIGNANT NEOPLASM OF BRAIN, UNSPECIFIED: ICD-10-CM

## 2019-08-28 DIAGNOSIS — C71.9 MALIGNANT NEOPLASM OF BRAIN, UNSPECIFIED: Chronic | ICD-10-CM

## 2019-08-28 PROCEDURE — 70553 MRI BRAIN STEM W/O & W/DYE: CPT | Mod: 26

## 2019-08-28 PROCEDURE — 99214 OFFICE O/P EST MOD 30 MIN: CPT

## 2019-08-28 PROCEDURE — 70553 MRI BRAIN STEM W/O & W/DYE: CPT

## 2019-08-28 RX ORDER — CARVEDILOL 25 MG/1
25 TABLET, FILM COATED ORAL TWICE DAILY
Qty: 30 | Refills: 0 | Status: ACTIVE | COMMUNITY
Start: 2018-11-30

## 2019-08-28 RX ORDER — ATORVASTATIN CALCIUM 40 MG/1
40 TABLET, FILM COATED ORAL
Qty: 1 | Refills: 2 | Status: DISCONTINUED | COMMUNITY
Start: 2019-05-08 | End: 2019-08-28

## 2019-08-28 RX ORDER — SIMVASTATIN 40 MG/1
40 TABLET, FILM COATED ORAL AT BEDTIME
Qty: 60 | Refills: 0 | Status: ACTIVE | COMMUNITY
Start: 2019-08-28

## 2019-09-10 ENCOUNTER — OTHER (OUTPATIENT)
Age: 69
End: 2019-09-10

## 2019-10-15 NOTE — PROGRESS NOTE ADULT - PROBLEM SELECTOR PLAN 1
See above.   Abdominal pain presumably from the kidney stone.   Appreciate urology evaluation.  Flomax as above.  Renal US> Abdominal pain presumably from the kidney stone.     - Appreciate urology evaluation.    - c/w Flomax as above.    - f/u Renal U DYSURIA/HEMATURIA/PELVIC PAIN

## 2019-11-01 ENCOUNTER — OUTPATIENT (OUTPATIENT)
Dept: OUTPATIENT SERVICES | Facility: HOSPITAL | Age: 69
LOS: 1 days | End: 2019-11-01

## 2019-11-01 DIAGNOSIS — C71.9 MALIGNANT NEOPLASM OF BRAIN, UNSPECIFIED: Chronic | ICD-10-CM

## 2019-11-01 DIAGNOSIS — Z90.49 ACQUIRED ABSENCE OF OTHER SPECIFIED PARTS OF DIGESTIVE TRACT: Chronic | ICD-10-CM

## 2019-11-05 ENCOUNTER — INPATIENT (INPATIENT)
Facility: HOSPITAL | Age: 69
LOS: 30 days | Discharge: HOME | DRG: 54 | End: 2019-12-06
Attending: INTERNAL MEDICINE | Admitting: PSYCHIATRY & NEUROLOGY
Payer: MEDICAID

## 2019-11-05 ENCOUNTER — APPOINTMENT (OUTPATIENT)
Dept: NEUROLOGY | Facility: CLINIC | Age: 69
End: 2019-11-05
Payer: MEDICAID

## 2019-11-05 VITALS
RESPIRATION RATE: 18 BRPM | TEMPERATURE: 98.3 F | SYSTOLIC BLOOD PRESSURE: 129 MMHG | DIASTOLIC BLOOD PRESSURE: 77 MMHG | HEART RATE: 54 BPM | OXYGEN SATURATION: 98 % | HEIGHT: 68 IN

## 2019-11-05 VITALS
HEIGHT: 78 IN | HEART RATE: 54 BPM | OXYGEN SATURATION: 98 % | DIASTOLIC BLOOD PRESSURE: 84 MMHG | TEMPERATURE: 98 F | WEIGHT: 220.02 LBS | SYSTOLIC BLOOD PRESSURE: 145 MMHG | RESPIRATION RATE: 18 BRPM

## 2019-11-05 DIAGNOSIS — C71.9 MALIGNANT NEOPLASM OF BRAIN, UNSPECIFIED: Chronic | ICD-10-CM

## 2019-11-05 DIAGNOSIS — R41.82 ALTERED MENTAL STATUS, UNSPECIFIED: ICD-10-CM

## 2019-11-05 DIAGNOSIS — Z90.49 ACQUIRED ABSENCE OF OTHER SPECIFIED PARTS OF DIGESTIVE TRACT: Chronic | ICD-10-CM

## 2019-11-05 LAB
ALBUMIN SERPL ELPH-MCNC: 4.2 G/DL — SIGNIFICANT CHANGE UP (ref 3.3–5)
ALP SERPL-CCNC: 87 U/L — SIGNIFICANT CHANGE UP (ref 40–120)
ALT FLD-CCNC: 21 U/L — SIGNIFICANT CHANGE UP (ref 10–45)
ANION GAP SERPL CALC-SCNC: 13 MMOL/L — SIGNIFICANT CHANGE UP (ref 5–17)
APPEARANCE UR: CLEAR — SIGNIFICANT CHANGE UP
APTT BLD: 28.9 SEC — SIGNIFICANT CHANGE UP (ref 27.5–36.3)
AST SERPL-CCNC: 13 U/L — SIGNIFICANT CHANGE UP (ref 10–40)
BACTERIA # UR AUTO: NEGATIVE — SIGNIFICANT CHANGE UP
BASE EXCESS BLDV CALC-SCNC: 2.6 MMOL/L — HIGH (ref -2–2)
BASOPHILS # BLD AUTO: 0.03 K/UL — SIGNIFICANT CHANGE UP (ref 0–0.2)
BASOPHILS NFR BLD AUTO: 0.3 % — SIGNIFICANT CHANGE UP (ref 0–2)
BILIRUB SERPL-MCNC: 0.4 MG/DL — SIGNIFICANT CHANGE UP (ref 0.2–1.2)
BILIRUB UR-MCNC: NEGATIVE — SIGNIFICANT CHANGE UP
BUN SERPL-MCNC: 22 MG/DL — SIGNIFICANT CHANGE UP (ref 7–23)
CA-I SERPL-SCNC: 1.14 MMOL/L — SIGNIFICANT CHANGE UP (ref 1.12–1.3)
CALCIUM SERPL-MCNC: 9.3 MG/DL — SIGNIFICANT CHANGE UP (ref 8.4–10.5)
CHLORIDE BLDV-SCNC: 111 MMOL/L — HIGH (ref 96–108)
CHLORIDE SERPL-SCNC: 103 MMOL/L — SIGNIFICANT CHANGE UP (ref 96–108)
CO2 BLDV-SCNC: 29 MMOL/L — SIGNIFICANT CHANGE UP (ref 22–30)
CO2 SERPL-SCNC: 24 MMOL/L — SIGNIFICANT CHANGE UP (ref 22–31)
COLOR SPEC: YELLOW — SIGNIFICANT CHANGE UP
CREAT SERPL-MCNC: 1.13 MG/DL — SIGNIFICANT CHANGE UP (ref 0.5–1.3)
DIFF PNL FLD: NEGATIVE — SIGNIFICANT CHANGE UP
EOSINOPHIL # BLD AUTO: 0.08 K/UL — SIGNIFICANT CHANGE UP (ref 0–0.5)
EOSINOPHIL NFR BLD AUTO: 0.7 % — SIGNIFICANT CHANGE UP (ref 0–6)
EPI CELLS # UR: 1 /HPF — SIGNIFICANT CHANGE UP
GAS PNL BLDV: 135 MMOL/L — SIGNIFICANT CHANGE UP (ref 135–145)
GAS PNL BLDV: SIGNIFICANT CHANGE UP
GLUCOSE BLDC GLUCOMTR-MCNC: 165 MG/DL — HIGH (ref 70–99)
GLUCOSE BLDV-MCNC: 222 MG/DL — HIGH (ref 70–99)
GLUCOSE SERPL-MCNC: 210 MG/DL — HIGH (ref 70–99)
GLUCOSE UR QL: ABNORMAL
HCO3 BLDV-SCNC: 27 MMOL/L — SIGNIFICANT CHANGE UP (ref 21–29)
HCT VFR BLD CALC: 37.2 % — LOW (ref 39–50)
HCT VFR BLDA CALC: 40 % — SIGNIFICANT CHANGE UP (ref 39–50)
HGB BLD CALC-MCNC: 13.1 G/DL — SIGNIFICANT CHANGE UP (ref 13–17)
HGB BLD-MCNC: 12.6 G/DL — LOW (ref 13–17)
HYALINE CASTS # UR AUTO: 0 /LPF — SIGNIFICANT CHANGE UP (ref 0–2)
IMM GRANULOCYTES NFR BLD AUTO: 0.8 % — SIGNIFICANT CHANGE UP (ref 0–1.5)
INR BLD: 0.97 RATIO — SIGNIFICANT CHANGE UP (ref 0.88–1.16)
KETONES UR-MCNC: NEGATIVE — SIGNIFICANT CHANGE UP
LACTATE BLDV-MCNC: 1.5 MMOL/L — SIGNIFICANT CHANGE UP (ref 0.7–2)
LEUKOCYTE ESTERASE UR-ACNC: NEGATIVE — SIGNIFICANT CHANGE UP
LYMPHOCYTES # BLD AUTO: 1.4 K/UL — SIGNIFICANT CHANGE UP (ref 1–3.3)
LYMPHOCYTES # BLD AUTO: 12.6 % — LOW (ref 13–44)
MCHC RBC-ENTMCNC: 30.5 PG — SIGNIFICANT CHANGE UP (ref 27–34)
MCHC RBC-ENTMCNC: 33.9 GM/DL — SIGNIFICANT CHANGE UP (ref 32–36)
MCV RBC AUTO: 90.1 FL — SIGNIFICANT CHANGE UP (ref 80–100)
MONOCYTES # BLD AUTO: 0.79 K/UL — SIGNIFICANT CHANGE UP (ref 0–0.9)
MONOCYTES NFR BLD AUTO: 7.1 % — SIGNIFICANT CHANGE UP (ref 2–14)
NEUTROPHILS # BLD AUTO: 8.69 K/UL — HIGH (ref 1.8–7.4)
NEUTROPHILS NFR BLD AUTO: 78.5 % — HIGH (ref 43–77)
NITRITE UR-MCNC: NEGATIVE — SIGNIFICANT CHANGE UP
NRBC # BLD: 0 /100 WBCS — SIGNIFICANT CHANGE UP (ref 0–0)
OTHER CELLS CSF MANUAL: 12 ML/DL — LOW (ref 18–22)
PCO2 BLDV: 45 MMHG — SIGNIFICANT CHANGE UP (ref 35–50)
PH BLDV: 7.4 — SIGNIFICANT CHANGE UP (ref 7.35–7.45)
PH UR: 6 — SIGNIFICANT CHANGE UP (ref 5–8)
PLATELET # BLD AUTO: 305 K/UL — SIGNIFICANT CHANGE UP (ref 150–400)
PO2 BLDV: 36 MMHG — SIGNIFICANT CHANGE UP (ref 25–45)
POTASSIUM BLDV-SCNC: 3.5 MMOL/L — SIGNIFICANT CHANGE UP (ref 3.5–5.3)
POTASSIUM SERPL-MCNC: 3.8 MMOL/L — SIGNIFICANT CHANGE UP (ref 3.5–5.3)
POTASSIUM SERPL-SCNC: 3.8 MMOL/L — SIGNIFICANT CHANGE UP (ref 3.5–5.3)
PROT SERPL-MCNC: 6.5 G/DL — SIGNIFICANT CHANGE UP (ref 6–8.3)
PROT UR-MCNC: ABNORMAL
PROTHROM AB SERPL-ACNC: 11 SEC — SIGNIFICANT CHANGE UP (ref 10–12.9)
RBC # BLD: 4.13 M/UL — LOW (ref 4.2–5.8)
RBC # FLD: 13.6 % — SIGNIFICANT CHANGE UP (ref 10.3–14.5)
RBC CASTS # UR COMP ASSIST: 2 /HPF — SIGNIFICANT CHANGE UP (ref 0–4)
SAO2 % BLDV: 65 % — LOW (ref 67–88)
SODIUM SERPL-SCNC: 140 MMOL/L — SIGNIFICANT CHANGE UP (ref 135–145)
SP GR SPEC: 1.03 — HIGH (ref 1.01–1.02)
UROBILINOGEN FLD QL: ABNORMAL
WBC # BLD: 11.08 K/UL — HIGH (ref 3.8–10.5)
WBC # FLD AUTO: 11.08 K/UL — HIGH (ref 3.8–10.5)
WBC UR QL: 1 /HPF — SIGNIFICANT CHANGE UP (ref 0–5)

## 2019-11-05 PROCEDURE — 93010 ELECTROCARDIOGRAM REPORT: CPT

## 2019-11-05 PROCEDURE — 70450 CT HEAD/BRAIN W/O DYE: CPT | Mod: 26

## 2019-11-05 PROCEDURE — 99215 OFFICE O/P EST HI 40 MIN: CPT

## 2019-11-05 PROCEDURE — 99223 1ST HOSP IP/OBS HIGH 75: CPT

## 2019-11-05 PROCEDURE — 99285 EMERGENCY DEPT VISIT HI MDM: CPT

## 2019-11-05 PROCEDURE — 71045 X-RAY EXAM CHEST 1 VIEW: CPT | Mod: 26

## 2019-11-05 RX ORDER — LEVETIRACETAM 250 MG/1
750 TABLET, FILM COATED ORAL EVERY 12 HOURS
Refills: 0 | Status: DISCONTINUED | OUTPATIENT
Start: 2019-11-05 | End: 2019-12-06

## 2019-11-05 RX ORDER — DEXTROSE 50 % IN WATER 50 %
12.5 SYRINGE (ML) INTRAVENOUS ONCE
Refills: 0 | Status: DISCONTINUED | OUTPATIENT
Start: 2019-11-05 | End: 2019-11-09

## 2019-11-05 RX ORDER — DEXTROSE 50 % IN WATER 50 %
25 SYRINGE (ML) INTRAVENOUS ONCE
Refills: 0 | Status: DISCONTINUED | OUTPATIENT
Start: 2019-11-05 | End: 2019-12-06

## 2019-11-05 RX ORDER — METFORMIN HYDROCHLORIDE 850 MG/1
1 TABLET ORAL
Qty: 0 | Refills: 0 | DISCHARGE

## 2019-11-05 RX ORDER — LABETALOL HCL 100 MG
10 TABLET ORAL
Refills: 0 | Status: DISCONTINUED | OUTPATIENT
Start: 2019-11-05 | End: 2019-11-07

## 2019-11-05 RX ORDER — SODIUM CHLORIDE 9 MG/ML
1000 INJECTION, SOLUTION INTRAVENOUS
Refills: 0 | Status: DISCONTINUED | OUTPATIENT
Start: 2019-11-05 | End: 2019-11-09

## 2019-11-05 RX ORDER — INSULIN LISPRO 100/ML
VIAL (ML) SUBCUTANEOUS AT BEDTIME
Refills: 0 | Status: DISCONTINUED | OUTPATIENT
Start: 2019-11-05 | End: 2019-11-09

## 2019-11-05 RX ORDER — DEXTROSE 50 % IN WATER 50 %
15 SYRINGE (ML) INTRAVENOUS ONCE
Refills: 0 | Status: DISCONTINUED | OUTPATIENT
Start: 2019-11-05 | End: 2019-11-09

## 2019-11-05 RX ORDER — CARVEDILOL PHOSPHATE 80 MG/1
12.5 CAPSULE, EXTENDED RELEASE ORAL EVERY 12 HOURS
Refills: 0 | Status: DISCONTINUED | OUTPATIENT
Start: 2019-11-05 | End: 2019-11-09

## 2019-11-05 RX ORDER — HALOPERIDOL DECANOATE 100 MG/ML
5 INJECTION INTRAMUSCULAR ONCE
Refills: 0 | Status: COMPLETED | OUTPATIENT
Start: 2019-11-05 | End: 2019-11-05

## 2019-11-05 RX ORDER — ACETAMINOPHEN 500 MG
650 TABLET ORAL EVERY 6 HOURS
Refills: 0 | Status: DISCONTINUED | OUTPATIENT
Start: 2019-11-05 | End: 2019-12-06

## 2019-11-05 RX ORDER — HALOPERIDOL DECANOATE 100 MG/ML
2.5 INJECTION INTRAMUSCULAR EVERY 6 HOURS
Refills: 0 | Status: COMPLETED | OUTPATIENT
Start: 2019-11-05 | End: 2019-11-07

## 2019-11-05 RX ORDER — GLUCAGON INJECTION, SOLUTION 0.5 MG/.1ML
1 INJECTION, SOLUTION SUBCUTANEOUS ONCE
Refills: 0 | Status: DISCONTINUED | OUTPATIENT
Start: 2019-11-05 | End: 2019-12-06

## 2019-11-05 RX ORDER — SODIUM CHLORIDE 9 MG/ML
1000 INJECTION INTRAMUSCULAR; INTRAVENOUS; SUBCUTANEOUS
Refills: 0 | Status: DISCONTINUED | OUTPATIENT
Start: 2019-11-05 | End: 2019-11-06

## 2019-11-05 RX ORDER — ENOXAPARIN SODIUM 100 MG/ML
40 INJECTION SUBCUTANEOUS DAILY
Refills: 0 | Status: DISCONTINUED | OUTPATIENT
Start: 2019-11-06 | End: 2019-12-06

## 2019-11-05 RX ORDER — LEVETIRACETAM 250 MG/1
1500 TABLET, FILM COATED ORAL ONCE
Refills: 0 | Status: COMPLETED | OUTPATIENT
Start: 2019-11-05 | End: 2019-11-05

## 2019-11-05 RX ORDER — DEXTROSE 50 % IN WATER 50 %
25 SYRINGE (ML) INTRAVENOUS ONCE
Refills: 0 | Status: DISCONTINUED | OUTPATIENT
Start: 2019-11-05 | End: 2019-11-09

## 2019-11-05 RX ORDER — DEXAMETHASONE 0.5 MG/5ML
12 ELIXIR ORAL ONCE
Refills: 0 | Status: DISCONTINUED | OUTPATIENT
Start: 2019-11-05 | End: 2019-11-05

## 2019-11-05 RX ORDER — ATORVASTATIN CALCIUM 80 MG/1
80 TABLET, FILM COATED ORAL AT BEDTIME
Refills: 0 | Status: DISCONTINUED | OUTPATIENT
Start: 2019-11-05 | End: 2019-12-06

## 2019-11-05 RX ORDER — AMLODIPINE BESYLATE 2.5 MG/1
10 TABLET ORAL DAILY
Refills: 0 | Status: DISCONTINUED | OUTPATIENT
Start: 2019-11-05 | End: 2019-12-06

## 2019-11-05 RX ORDER — FAMOTIDINE 10 MG/ML
20 INJECTION INTRAVENOUS DAILY
Refills: 0 | Status: DISCONTINUED | OUTPATIENT
Start: 2019-11-05 | End: 2019-12-06

## 2019-11-05 RX ORDER — DEXAMETHASONE 0.5 MG/5ML
4 ELIXIR ORAL EVERY 12 HOURS
Refills: 0 | Status: DISCONTINUED | OUTPATIENT
Start: 2019-11-05 | End: 2019-11-12

## 2019-11-05 RX ORDER — INSULIN LISPRO 100/ML
VIAL (ML) SUBCUTANEOUS
Refills: 0 | Status: DISCONTINUED | OUTPATIENT
Start: 2019-11-05 | End: 2019-11-09

## 2019-11-05 RX ORDER — DEXAMETHASONE 0.5 MG/5ML
12 ELIXIR ORAL ONCE
Refills: 0 | Status: COMPLETED | OUTPATIENT
Start: 2019-11-05 | End: 2019-11-05

## 2019-11-05 RX ORDER — SENNA PLUS 8.6 MG/1
2 TABLET ORAL AT BEDTIME
Refills: 0 | Status: DISCONTINUED | OUTPATIENT
Start: 2019-11-05 | End: 2019-12-06

## 2019-11-05 RX ADMIN — LEVETIRACETAM 1500 MILLIGRAM(S): 250 TABLET, FILM COATED ORAL at 19:23

## 2019-11-05 RX ADMIN — LEVETIRACETAM 400 MILLIGRAM(S): 250 TABLET, FILM COATED ORAL at 23:34

## 2019-11-05 RX ADMIN — Medication 2 MILLIGRAM(S): at 21:15

## 2019-11-05 RX ADMIN — LEVETIRACETAM 400 MILLIGRAM(S): 250 TABLET, FILM COATED ORAL at 19:15

## 2019-11-05 RX ADMIN — Medication 106 MILLIGRAM(S): at 19:51

## 2019-11-05 RX ADMIN — Medication 12 MILLIGRAM(S): at 20:30

## 2019-11-05 RX ADMIN — HALOPERIDOL DECANOATE 2.5 MILLIGRAM(S): 100 INJECTION INTRAMUSCULAR at 23:33

## 2019-11-05 RX ADMIN — Medication 2 MILLIGRAM(S): at 18:50

## 2019-11-05 RX ADMIN — HALOPERIDOL DECANOATE 5 MILLIGRAM(S): 100 INJECTION INTRAMUSCULAR at 18:02

## 2019-11-05 NOTE — H&P ADULT - HISTORY OF PRESENT ILLNESS
69 year old man with PMHx T2DM, stage IV glioblastoma multiforme (dx in 2018), HTN and PSHx appendectomy presents to the ED with 3 weeks of increased R hemiparesis, worsened aphasia and urinary incontinence, sent from his neruo-oncologist Dr. Cuba's office. Pt has had resection, chemo radiation, most recently was noted to be clinically worse in March 2019 with increased somnolence and steroids were increased. Per his neuro-onc, concern for progression of disease. In ER, pt no family at bedside, pt was agitated, confused, aphasic, climbing out of bed, recieved 4mg of ativan, 5mg of haldol sequentially. Neurological exam was limited due to sedation, pt did not respond to basic orientation questions appropriately in [place]ic, had intermittent nature to nonsensical speech, but was able to show tongue, raise his arms - on mimicry only, R hemiparesis. Noted to have wet himself.  Admitted to Neurology for further evaluation.

## 2019-11-05 NOTE — ED PROVIDER NOTE - OBJECTIVE STATEMENT
69 year old male with pmhx T2DM, glioblastoma multiforme, HTN and pshx appendectomy presents to the ED c/o right-sided weakness with unknown start time. Pt was at [address] [facility], Brain Tumor clinic. Found by EMS to have been AMS for several days. Transport note from clinic state: "Pt clinically worse, will send him to [facility] ED." Denies HA, vomiting. Unable to obtain additional information due to AMS. 69 year old male with PMHx T2DM, stage IV glioblastoma multiforme, HTN and PSHx appendectomy presents to the ED c/o right-sided weakness with unknown start time. Pt was at [address] [facility], Brain Tumor clinic. Found by EMS to have been AMS for several days. Transport note from clinic state: "Patient clinically worse, will send him to [facility] ED." Denies HA, vomiting. Unable to obtain additional information due to AMS, but patient reports feeling fine.

## 2019-11-05 NOTE — ED PROVIDER NOTE - CLINICAL SUMMARY MEDICAL DECISION MAKING FREE TEXT BOX
Stage IV glioblastoma, AMS, r/o CVA, r/o worsening glioblastoma/edema, r/o infection; labs, imaging, will call neurosurgery Stage IV glioblastoma, AMS, r/o CVA, r/o worsening glioblastoma/edema, r/o infection; labs, imaging, will call neurosurgery/neurology evaluation, will require admission given mental status.

## 2019-11-05 NOTE — CONSULT NOTE ADULT - SUBJECTIVE AND OBJECTIVE BOX
p ([id number])     HPI:  69 year old male with PMHx T2DM, stage IV glioblastoma multiforme, HTN and PSHx appendectomy presents to the ED c/o right-sided weakness with unknown start time. Pt was at [address] [facility], Brain Tumor clinic. Found by EMS to have been AMS for several days. Transport note from clinic state: "Patient clinically worse, will send him to [facility] ED." Denies HA, vomiting. Unable to obtain additional information due to AMS, but patient reports feeling fine.    Imaging:  HCT done shows mp acute hemorrhage or hydro, and a prior L pterional craniotomy, and a calcified mass in the L temporal lobe  Exam:  PERRL, EOMI, no facial, aaox1, lethargic, inappropriately answering questions, aphasic, martins ag L>R. R drift.  --Anticoagulation:    =====================  PAST MEDICAL HISTORY   Type 2 diabetes mellitus  Glioblastoma multiforme  HTN (hypertension)    PAST SURGICAL HISTORY   Glioblastoma multiforme  History of appendectomy        MEDICATIONS:  Antibiotics:    Neuro:    Other:      SOCIAL HISTORY:   Occupation:   Marital Status:     FAMILY HISTORY:  No pertinent family history in first degree relatives      ROS: Negative except per HPI    LABS:  PT/INR - ( 05 Nov 2019 12:42 )   PT: 11.0 sec;   INR: 0.97 ratio         PTT - ( 05 Nov 2019 12:42 )  PTT:28.9 sec                        12.6   11.08 )-----------( 305      ( 05 Nov 2019 12:42 )             37.2     11-05    140  |  103  |  22  ----------------------------<  210<H>  3.8   |  24  |  1.13    Ca    9.3      05 Nov 2019 12:42    TPro  6.5  /  Alb  4.2  /  TBili  0.4  /  DBili  x   /  AST  13  /  ALT  21  /  AlkPhos  87  11-05

## 2019-11-05 NOTE — ED ADULT NURSE NOTE - OBJECTIVE STATEMENT
68 y/o male a+ox1, pmhx glioblastoma, DM, HTN, coming from rehab via EMS for hemiparesis and facial droop of unknown onset. Pt was brought to his MD outpatient and was found to have right sided hemiparesis and right sided facial droop, sent to ED for eval. Aid at bedside unable to confirm pt baseline mental status; medical records note pt was a+ox1 on last [facility] visit with no additional neuro deficits. No report of recent falls or injures, blood thinner use, chest pain or discomfort, dizziness, abdominal pain, n/v/d, fever. IV established, labs obtained. Pt on CM with spo2 on room air. Guard rails up, bed low and locked. Red socks and gown provided. Will reassess

## 2019-11-05 NOTE — HISTORY OF PRESENT ILLNESS
[FreeTextEntry1] : Julio Tyler is a 69 yo male who presented at this office for a neuro oncologic follow up \par In brief\par Jan/ 2018 - p/w headaches and expressive speech difficulty\par MRI - large left sided lesion \par Resection on 1/29 - pathology was GBM, IDH wt.\par MRI 2/17 - mass still large - measuring 8.6 x 3.1 cm - mass effect was reduced.\par Chemoradiation completed on 3/23/2018.\par 4/24/2018- MRI reviewed and first TMZ cycle was ordered ( Dose :325 mg). However future visits was transferred to emergency clinic due to insurance restrictions\par Patient only got 2 more maintenance cycle of TMZ since then. Third dose of TMZ 8/13-8/17.\par 9/6/2018- Patient transferred care to Dr Cuba. MRI done. \par 9/10-9/14- 4th cycle of TMZ\par 10/2/2018- MRI done. No TMZ ordered as patient BP was elevated. Patient sent to PCP.\par Patient cancelled the appointment for 10/9 as they never followed up with her PCP\par 10/17/18- Patient followed up with Dr Lou ( PCP). Started him on HCTZ along with Lisinopril 20 mg bid and Amlodipine 10 mg\par 5th cycle : Oct 29-Nov3)\par 11/20/2018 - MRI showed nodular enhancement . However no Rx was initiated as patient had hypertensive urgency. Patient since then following up with PCP and is currently on HCTZ 25 mg PO,Losartan 100 mg PO QD, Amlodipine 5 mg PO QD, Coreg 6.25 mg bid.\par 1/15/2019- Patient is here for a follow up. Reports that he feels his gait is getting affected and at times he feels his legs are getting weak. Admits compliance to all the medications . Plan was made to repeat MRI and also recommended to follow up with PCP as BP was elevated at the time of visit\par 1/18/19- Follow up with PCP and Amlodipine was increased to 10 mg daily\par 1/22/2019- MRI stable. Will reimage in 2 months. Recommended to follow up with PCP in the meantime for BP management\par 2/26/2019- Here for a follow up. Patient BP continues to be high. PCP managing the BP regimen and is currently on HCTZ 37.5 MG od, Coreg 6.25 mg bid, Amlodipine 10 mg od, Losartan 100 mg od. Plan was made with Dr Cuba to repeat MRI in a week , However family decided to postpone the MRI as there is noone to take him for appointments\par 3/19/2019- Patient here with a new MRI. Reports that for the past two - three days patient been sleeping a lot, poor appetite and is tired. Restarted dexamethasone 2 mg daily\par 4/16/2019- Clinically stable\par 5/14/2019- MRI reviewed. Dexamethasone decreased to 1 mg daily\par 8/28/2019- Patient here for a follow up. Reports that he is currently in a nursing home as he needed more closed supervision with his ADL's and medication management. MRI reviewed and plan was made to repeat MRI in 3 months.\par 11/5/2019- Patient here for a follow up. Spoke to patient's daughter in law over the phone who reports that patient is clinically worse for about 3 weeks. He sleeps throughout the day, incontinent and right side is more weaker\par Denies headaches, seizures, weakness, numbness, tingling.\par \par \par

## 2019-11-05 NOTE — CONSULT NOTE ADULT - SUBJECTIVE AND OBJECTIVE BOX
69 year old male with PMHx T2DM, stage IV glioblastoma multiforme, HTN and PSHx appendectomy presents to the ED c/o right-sided weakness with unknown start time. Pt was at [facility], Brain Tumor clinic. Found by EMS to have been AMS for several days. Transport note from clinic state: "Patient clinically worse, will send him to [facility] ED." Denies HA, vomiting. Unable to obtain additional information due to AMS, but patient reports feeling fine. 69 year old male with PMHx T2DM, stage IV glioblastoma multiforme, HTN and PSHx appendectomy presents to the ED c/o right-sided weakness with unknown start time. Pt was at [facility], Brain Tumor clinic. Found by EMS to have been AMS for several days. Transport note from clinic state: "Patient clinically worse, will send him to [facility] ED." Denies HA, vomiting. Unable to obtain additional information due to AMS, but patient reports feeling fine.      Imaging:  HCT done shows mp acute hemorrhage or hydro, and a prior L pterional craniotomy, and a calcified mass in the L temporal lobe.    Exam: PERRL, EOMI, no facial, aaox1, lethargic, inappropriately answering questions, aphasic, martins ag L>R. R drift.  --Anticoagulation:    =====================  PAST MEDICAL HISTORY   Type 2 diabetes mellitus  Glioblastoma multiforme  HTN (hypertension)    PAST SURGICAL HISTORY   Glioblastoma multiforme  History of appendectomy        MEDICATIONS:  Antibiotics:    Neuro:    Other:      SOCIAL HISTORY:   Occupation:   Marital Status:     FAMILY HISTORY:  No pertinent family history in first degree relatives      ROS: Negative except per HPI    LABS:  PT/INR - ( 05 Nov 2019 12:42 )   PT: 11.0 sec;   INR: 0.97 ratio         PTT - ( 05 Nov 2019 12:42 )  PTT:28.9 sec                        12.6   11.08 )-----------( 305      ( 05 Nov 2019 12:42 )             37.2     11-05    140  |  103  |  22  ----------------------------<  210<H>  3.8   |  24  |  1.13    Ca    9.3      05 Nov 2019 12:42    TPro  6.5  /  Alb  4.2  /  TBili  0.4  /  DBili  x   /  AST  13  /  ALT  21  /  AlkPhos  87  11-05

## 2019-11-05 NOTE — ED PROVIDER NOTE - PHYSICAL EXAMINATION
PHYSICAL EXAMINATION:  VITALS REVIEWED.  VS hypertensive, otherwise normal  GENERALIZED APPEARANCE:  Comfortable, no acute distress, ambulating without difficulty  SKIN:  Warm, dry, no cyanosis  HEAD:  No obvious scalp lesions  EYES:  Conjunctiva pink, no icterus  ENMT:  Mucus membranes moist, no stridor  NECK:  Supple, non-tender  CHEST AND RESPIRATORY:  Clear to auscultation B/L, good air entry B/L, equal chest expansion  HEART AND CARDIOVASCULAR:  Regular rate, no obvious murmur  ABDOMEN AND GI:  Soft, non-tender, non-distended.  No rebound, no guarding  EXTREMITIES:  No deformity, edema, or calf tenderness  NEURO: AAOx0-1, RUE 3/5, RLE 2/5, LUE 4+5/, LLE 4+/5, unable to assess cerebellar exam as patient not understanding

## 2019-11-05 NOTE — H&P ADULT - NSHPLABSRESULTS_GEN_ALL_CORE
12.6   11.08 )-----------( 305      ( 05 Nov 2019 12:42 )             37.2   11-05    140  |  103  |  22  ----------------------------<  210<H>  3.8   |  24  |  1.13    Ca    9.3      05 Nov 2019 12:42    TPro  6.5  /  Alb  4.2  /  TBili  0.4  /  DBili  x   /  AST  13  /  ALT  21  /  AlkPhos  87  11-05  PT/INR - ( 05 Nov 2019 12:42 )   PT: 11.0 sec;   INR: 0.97 ratio         PTT - ( 05 Nov 2019 12:42 )  PTT:28.9 sec    < from: CT Head No Cont (11.05.19 @ 13:27) >    COMPARISON: Brain CT dated 7/30/2019    FINDINGS:      Re-demonstrated is a calcified mass in the left temporal lobe which is   unchanged compared with the prior study. There is surrounding lucency   which is unchanged compared with the prior study and may represent edema   and/or infiltrating neoplasm.  No acute hemorrhage, hydrocephalus, midline shift or extra-axial   collections are present.   Age-appropriate involutional changes and moderate microvascular ischemic   changes are present.  Left pterional craniotomy changes are present.  Opacification of the right ovary sinus with diffuse wall thickening is   unchanged.Small left maxillary sinus polyp versus retention cysts.   Report mucosal thickening of the right sphenoid sinus.  The orbits are not remarkable in appearance. Right-sided lens enucleation   is noted.  The tympanomastoid cavities are free of acute disease.    Impression:  No acute hemorrhage, hydrocephalus or mass effect.  Unchanged left temporal lobe neoplasm.    < end of copied text >

## 2019-11-05 NOTE — DISCUSSION/SUMMARY
[FreeTextEntry1] : Patient seen and examined\par Clinically worse - aphasia and right hemiparesis.\par Suspect tumor progression.\par Would send to ER for evaluation - will need MRI and labs - r/o infection.\par Discussed with Dave (son) and daughter-in-law (Jessica).

## 2019-11-05 NOTE — H&P ADULT - NSHPPHYSICALEXAM_GEN_ALL_CORE
limited by sedation    obese man, appears restless (pre-sedation), was getting out of bed shouting nonsense, no resp distress    post sedation: somnolent, opened eyes to name, able to mimic basic commands, inappropriately responding, comprehension impaired    able to track, no facial asymmetry noted  R hemiparesis on gross assessment    did not cooperate further

## 2019-11-05 NOTE — H&P ADULT - ASSESSMENT
69 year old man with PMHx T2DM, stage IV glioblastoma multiforme (dx in 2018), HTN and PSHx appendectomy presents to the ED with 3 weeks of increased R hemiparesis, worsened aphasia and urinary incontinence, sent from his neruo-oncologist Dr. Cuba's office.     Impression: Suspect worsened 'confusion,' aphasia maybe from partial/focal seizures emanating from tumor site of L temporal lobe vs. POD not visualized on CT vs. toxic metabolic encephalopathy    [x] 12mg decadron and 1500mg Keppra load IVPB given in ED  [x] NeuroSx - no acute intervention  [x] UA, CXR negative    [] increased keppra maintenance to 750mg q12h IV  [] Decadron 4mg q12h IVP for now - GI and glycemic PPx  [] MRI Brain w/ and w/o seng to assess brain tumor  [] vEEG x 24 hours  [] recommend Palliative consult for GOC and advanced directives  [] NPO for now, consider swallow eval when mental status improves    DVT PPx: Lovenox 40 SQ    prognosis appears grim    d/w Dr. Cuba and Zakiya 69 year old man with PMHx T2DM, stage IV glioblastoma multiforme (dx in 2018), HTN and PSHx appendectomy presents to the ED with 3 weeks of increased R hemiparesis, worsened aphasia and urinary incontinence, sent from his neruo-oncologist Dr. Cuba's office.     Impression: Suspect worsened 'confusion,' aphasia maybe from partial/focal seizures emanating from tumor site of L temporal lobe vs. POD not visualized on CT vs. toxic metabolic encephalopathy    [x] 12mg decadron and 1500mg Keppra load IVPB given in ED  [x] NeuroSx - no acute intervention  [x] UA, CXR negative    [] increased keppra maintenance to 750mg q12h IV  [] Decadron 4mg q12h IVP for now - GI and glycemic PPx  [] MRI Brain w/ and w/o seng to assess brain tumor  [] vEEG x 24 hours  [] recommend Palliative consult for GOC and advanced directives  [] NPO for now, consider swallow eval when mental status improves  [] f/u neuro-onc Dr. Cuba reccs (will see pt 11/6)    DVT PPx: Lovenox 40 SQ    prognosis appears grim    d/w Dr. Cuba and Zakiya 69 year old man with PMHx T2DM, stage IV glioblastoma multiforme (dx in 2018), HTN and PSHx appendectomy presents to the ED with 3 weeks of increased R hemiparesis, worsened aphasia and urinary incontinence, sent from his neruo-oncologist Dr. Cuba's office.     Impression: Suspect worsened 'confusion,' aphasia maybe from partial/focal seizures emanating from tumor site of L temporal lobe vs. POD not visualized on CT vs. toxic metabolic encephalopathy    [x] 12mg decadron and 1500mg Keppra load IVPB given in ED  [x] NeuroSx - no acute intervention  [x] UA, CXR negative    [] increased keppra maintenance to 750mg q12h IV  [] Decadron 4mg q12h IVP for now - GI and glycemic PPx  [] MRI Brain w/ and w/o seng to assess brain tumor  [] vEEG x 24 hours  [] recommend Palliative consult for GOC and advanced directives  [] NPO for now, consider swallow eval when mental status improves  [] Haldol 2.5mg IM q6H PRN for agitation  [] f/u neuro-onc Dr. Cuba reccs (will see pt 11/6)    DVT PPx: Lovenox 40 SQ    prognosis appears grim    d/w Dr. Cuba and Zakiya 69 year old man with PMHx T2DM, stage IV glioblastoma multiforme (dx in 2018), HTN and PSHx appendectomy presents to the ED with 3 weeks of increased R hemiparesis, worsened aphasia and urinary incontinence, sent from his neruo-oncologist Dr. Cuba's office.     Impression: Suspect worsened 'confusion,' aphasia maybe from partial/focal seizures emanating from tumor site of L temporal lobe vs. POD not visualized on CT vs. toxic metabolic encephalopathy    [x] 12mg decadron and 1500mg Keppra load IVPB given in ED  [x] NeuroSx - no acute intervention  [x] UA, CXR negative    [] increased keppra maintenance to 750mg q12h IV  [] Decadron 4mg q12h IVP for now - GI and glycemic PPx  [] MRI Brain w/ and w/o seng to assess brain tumor  [] vEEG x 24 hours  [] recommend Palliative consult for GOC and advanced directives  [] NPO for now, consider swallow eval when mental status improves  [] Haldol 2.5mg IM q6H PRN for agitation  [] f/u neuro-onc Dr. Cuba reccs (will see pt 11/6)  [] ** PLEASE perform MED REC - NO FAMILY AVAILABLE AND PT WITH AMS    DVT PPx: Lovenox 40 SQ    prognosis appears grim    d/w Dr. Cuba and Zakiya

## 2019-11-05 NOTE — PATIENT PROFILE ADULT - STATED REASON FOR ADMISSION
Went to neurology appt upon which the doctor, Dr. Cuba saw that his condition had changed and sent him to the hospital

## 2019-11-05 NOTE — PATIENT PROFILE ADULT - LANGUAGE ASSISTANCE NEEDED
No-Patient/Caregiver offered and refused free interpretation services. No-Patient/Caregiver offered and refused free interpretation services./patient does not participate with interpretor phone

## 2019-11-05 NOTE — CONSULT NOTE ADULT - ASSESSMENT
Julio Barber  68M w/ pmh of GBM dx in 1/2018, s/p resection in 1/2018 IDH wt, s/p chemo, radiation presenting from outpatient clinic for R sided weakness, aphasia, confusion.  After speaking with family patient has been getting worse over the course of the last year. HCT done shows mp acute hemorrhage or hydro, and a prior L pterional craniotomy, and a calcified mass in the L temporal lobe. Exam: PERRL, EOMI, no facial, aaox1, lethargic, inappropriately answering questions, aphasic, martins ag L>R. R drift.  -no acute neurosurgical intervention  -MR head recommended to understand prognosis.  - neuro onc consult

## 2019-11-05 NOTE — CONSULT NOTE ADULT - ASSESSMENT
Julio Barber  68M w/ pmh of GBM dx in 1/2018, s/p resection in 1/2018 IDH wt, s/p chemo, radiation presenting from outpatient clinic for R sided weakness, aphasia, confusion.  After speaking with family patient has been getting worse over the course of the last year. HCT done shows mp acute hemorrhage or hydro, and a prior L pterional craniotomy, and a calcified mass in the L temporal lobe. Exam: PERRL, EOMI, no facial, aaox1, lethargic, inappropriately answering questions, aphasic, martins ag L>R. R drift.  -no acute neurosurgical intervention  -AMS work up   -MR head recommended to understand prognosis.  - neuro onc consult, f/u with Dr. Cuba regarding further treatment

## 2019-11-06 DIAGNOSIS — Z71.89 OTHER SPECIFIED COUNSELING: ICD-10-CM

## 2019-11-06 LAB
ALBUMIN SERPL ELPH-MCNC: 4.4 G/DL — SIGNIFICANT CHANGE UP (ref 3.3–5)
ALP SERPL-CCNC: 98 U/L — SIGNIFICANT CHANGE UP (ref 40–120)
ALT FLD-CCNC: 26 U/L — SIGNIFICANT CHANGE UP (ref 10–45)
ANION GAP SERPL CALC-SCNC: 17 MMOL/L — SIGNIFICANT CHANGE UP (ref 5–17)
AST SERPL-CCNC: 23 U/L — SIGNIFICANT CHANGE UP (ref 10–40)
BASOPHILS # BLD AUTO: 0.03 K/UL — SIGNIFICANT CHANGE UP (ref 0–0.2)
BASOPHILS NFR BLD AUTO: 0.2 % — SIGNIFICANT CHANGE UP (ref 0–2)
BILIRUB SERPL-MCNC: 0.6 MG/DL — SIGNIFICANT CHANGE UP (ref 0.2–1.2)
BUN SERPL-MCNC: 18 MG/DL — SIGNIFICANT CHANGE UP (ref 7–23)
CALCIUM SERPL-MCNC: 10 MG/DL — SIGNIFICANT CHANGE UP (ref 8.4–10.5)
CHLORIDE SERPL-SCNC: 103 MMOL/L — SIGNIFICANT CHANGE UP (ref 96–108)
CO2 SERPL-SCNC: 21 MMOL/L — LOW (ref 22–31)
CREAT SERPL-MCNC: 0.91 MG/DL — SIGNIFICANT CHANGE UP (ref 0.5–1.3)
CULTURE RESULTS: SIGNIFICANT CHANGE UP
EOSINOPHIL # BLD AUTO: 0 K/UL — SIGNIFICANT CHANGE UP (ref 0–0.5)
EOSINOPHIL NFR BLD AUTO: 0 % — SIGNIFICANT CHANGE UP (ref 0–6)
FOLATE SERPL-MCNC: 16.2 NG/ML — SIGNIFICANT CHANGE UP
GLUCOSE BLDC GLUCOMTR-MCNC: 183 MG/DL — HIGH (ref 70–99)
GLUCOSE BLDC GLUCOMTR-MCNC: 193 MG/DL — HIGH (ref 70–99)
GLUCOSE BLDC GLUCOMTR-MCNC: 209 MG/DL — HIGH (ref 70–99)
GLUCOSE SERPL-MCNC: 238 MG/DL — HIGH (ref 70–99)
HBA1C BLD-MCNC: 7.2 % — HIGH (ref 4–5.6)
HCT VFR BLD CALC: 41.4 % — SIGNIFICANT CHANGE UP (ref 39–50)
HGB BLD-MCNC: 13.7 G/DL — SIGNIFICANT CHANGE UP (ref 13–17)
IMM GRANULOCYTES NFR BLD AUTO: 0.9 % — SIGNIFICANT CHANGE UP (ref 0–1.5)
LYMPHOCYTES # BLD AUTO: 0.99 K/UL — LOW (ref 1–3.3)
LYMPHOCYTES # BLD AUTO: 7.9 % — LOW (ref 13–44)
MCHC RBC-ENTMCNC: 29.6 PG — SIGNIFICANT CHANGE UP (ref 27–34)
MCHC RBC-ENTMCNC: 33.1 GM/DL — SIGNIFICANT CHANGE UP (ref 32–36)
MCV RBC AUTO: 89.4 FL — SIGNIFICANT CHANGE UP (ref 80–100)
MONOCYTES # BLD AUTO: 0.41 K/UL — SIGNIFICANT CHANGE UP (ref 0–0.9)
MONOCYTES NFR BLD AUTO: 3.3 % — SIGNIFICANT CHANGE UP (ref 2–14)
NEUTROPHILS # BLD AUTO: 10.94 K/UL — HIGH (ref 1.8–7.4)
NEUTROPHILS NFR BLD AUTO: 87.7 % — HIGH (ref 43–77)
PLATELET # BLD AUTO: 396 K/UL — SIGNIFICANT CHANGE UP (ref 150–400)
POTASSIUM SERPL-MCNC: 3.8 MMOL/L — SIGNIFICANT CHANGE UP (ref 3.5–5.3)
POTASSIUM SERPL-SCNC: 3.8 MMOL/L — SIGNIFICANT CHANGE UP (ref 3.5–5.3)
PROT SERPL-MCNC: 7.2 G/DL — SIGNIFICANT CHANGE UP (ref 6–8.3)
RBC # BLD: 4.63 M/UL — SIGNIFICANT CHANGE UP (ref 4.2–5.8)
RBC # FLD: 13.5 % — SIGNIFICANT CHANGE UP (ref 10.3–14.5)
SODIUM SERPL-SCNC: 141 MMOL/L — SIGNIFICANT CHANGE UP (ref 135–145)
SPECIMEN SOURCE: SIGNIFICANT CHANGE UP
T3 SERPL-MCNC: 84 NG/DL — SIGNIFICANT CHANGE UP (ref 80–200)
T4 AB SER-ACNC: 7.5 UG/DL — SIGNIFICANT CHANGE UP (ref 4.6–12)
TSH SERPL-MCNC: 1.07 UIU/ML — SIGNIFICANT CHANGE UP (ref 0.27–4.2)
VIT B12 SERPL-MCNC: 342 PG/ML — SIGNIFICANT CHANGE UP (ref 232–1245)
WBC # BLD: 12.48 K/UL — HIGH (ref 3.8–10.5)
WBC # FLD AUTO: 12.48 K/UL — HIGH (ref 3.8–10.5)

## 2019-11-06 PROCEDURE — 71275 CT ANGIOGRAPHY CHEST: CPT | Mod: 26

## 2019-11-06 PROCEDURE — 70553 MRI BRAIN STEM W/O & W/DYE: CPT | Mod: 26

## 2019-11-06 PROCEDURE — 93010 ELECTROCARDIOGRAM REPORT: CPT

## 2019-11-06 PROCEDURE — 95816 EEG AWAKE AND DROWSY: CPT | Mod: 26

## 2019-11-06 PROCEDURE — 99233 SBSQ HOSP IP/OBS HIGH 50: CPT

## 2019-11-06 PROCEDURE — 93970 EXTREMITY STUDY: CPT | Mod: 26

## 2019-11-06 PROCEDURE — 95951: CPT | Mod: 26

## 2019-11-06 RX ORDER — SODIUM CHLORIDE 9 MG/ML
1000 INJECTION INTRAMUSCULAR; INTRAVENOUS; SUBCUTANEOUS
Refills: 0 | Status: DISCONTINUED | OUTPATIENT
Start: 2019-11-06 | End: 2019-11-08

## 2019-11-06 RX ORDER — NYSTATIN CREAM 100000 [USP'U]/G
1 CREAM TOPICAL EVERY 8 HOURS
Refills: 0 | Status: DISCONTINUED | OUTPATIENT
Start: 2019-11-06 | End: 2019-12-06

## 2019-11-06 RX ORDER — DIPHENHYDRAMINE HCL 50 MG
25 CAPSULE ORAL ONCE
Refills: 0 | Status: COMPLETED | OUTPATIENT
Start: 2019-11-06 | End: 2019-11-06

## 2019-11-06 RX ORDER — METOPROLOL TARTRATE 50 MG
5 TABLET ORAL ONCE
Refills: 0 | Status: COMPLETED | OUTPATIENT
Start: 2019-11-06 | End: 2019-11-06

## 2019-11-06 RX ORDER — SODIUM CHLORIDE 9 MG/ML
250 INJECTION INTRAMUSCULAR; INTRAVENOUS; SUBCUTANEOUS ONCE
Refills: 0 | Status: DISCONTINUED | OUTPATIENT
Start: 2019-11-06 | End: 2019-12-06

## 2019-11-06 RX ADMIN — NYSTATIN CREAM 1 APPLICATION(S): 100000 CREAM TOPICAL at 17:33

## 2019-11-06 RX ADMIN — Medication 10 MILLIGRAM(S): at 03:57

## 2019-11-06 RX ADMIN — Medication 1: at 17:45

## 2019-11-06 RX ADMIN — LEVETIRACETAM 400 MILLIGRAM(S): 250 TABLET, FILM COATED ORAL at 12:49

## 2019-11-06 RX ADMIN — Medication 4 MILLIGRAM(S): at 17:33

## 2019-11-06 RX ADMIN — Medication 2 MILLIGRAM(S): at 07:44

## 2019-11-06 RX ADMIN — Medication 2: at 12:47

## 2019-11-06 RX ADMIN — LEVETIRACETAM 400 MILLIGRAM(S): 250 TABLET, FILM COATED ORAL at 17:34

## 2019-11-06 RX ADMIN — Medication 4 MILLIGRAM(S): at 07:30

## 2019-11-06 RX ADMIN — Medication 25 MILLIGRAM(S): at 02:24

## 2019-11-06 RX ADMIN — Medication 2 MILLIGRAM(S): at 09:26

## 2019-11-06 RX ADMIN — ENOXAPARIN SODIUM 40 MILLIGRAM(S): 100 INJECTION SUBCUTANEOUS at 12:48

## 2019-11-06 RX ADMIN — Medication 5 MILLIGRAM(S): at 07:30

## 2019-11-06 RX ADMIN — Medication 2 MILLIGRAM(S): at 22:00

## 2019-11-06 RX ADMIN — HALOPERIDOL DECANOATE 2.5 MILLIGRAM(S): 100 INJECTION INTRAMUSCULAR at 17:43

## 2019-11-06 NOTE — CHART NOTE - NSCHARTNOTEFT_GEN_A_CORE
NeuroSx notes are indicating "-MR head recommended to understand prognosis.- neuro onc consult, f/u with Dr. Cuba regarding further treatment." I called Dr. Zina Cuba in order to have a better understanding about the patient's illness and prognosis. Dr. Cuba asked me to please hold on a palliative care consult until further work up was done since she is waiting for imagining results so she can better understand the patient's current clinical condition. She will informed the primary team about my discussion with her.     Will hold on a palliative care consult at this time. Dr. Cuba is supposed to call me back for whenever a palliative care consult is consider to be appropriate.     Adam Wynne MD.   [phone]

## 2019-11-06 NOTE — OCCUPATIONAL THERAPY INITIAL EVALUATION ADULT - LIVES WITH, PROFILE
Pt unable to provide social history, pt sister at bedside, Prior to admission pt living in LTC facility for ~2-3mos, before that was living alone at home with private aide

## 2019-11-06 NOTE — PHYSICAL THERAPY INITIAL EVALUATION ADULT - PASSIVE RANGE OF MOTION EXAMINATION, REHAB EVAL
Left UE Passive ROM was WNL (within normal limits)/Right UE Passive ROM was WNL (within normal limits)/Right LE Passive ROM was WNL (within normal limits)/Left LE Passive ROM was WNL (within normal limits)

## 2019-11-06 NOTE — CHART NOTE - NSCHARTNOTEFT_GEN_A_CORE
Patient restrained, was agitated overnight. Given IV ativan and haldol without relief then given benadryl. He was tachycardic sustaining in the 140s, started on Fluids IV Bolus 250 ml. Continue to monitor. Continue on tele monitor and 1:1 supervision

## 2019-11-06 NOTE — OCCUPATIONAL THERAPY INITIAL EVALUATION ADULT - PERTINENT HX OF CURRENT PROBLEM, REHAB EVAL
68yo M stage IV GBM (dx in 2018),p/w 3 weeks of increased R hemiparesis, worsened aphasia and urinary incontinence. Pt has had resection, chemo radiation, most recently was noted to be clinically worse in March 2019 with increased somnolence and steroids were increased. Per neuro-onc, concern for progression of disease.Suspect worsened 'confusion,' aphasia maybe from partial/focal seizures emanating from tumor site of L temporal lobe vs POD not visualized on CT vs. toxic metabolic encephalopathy

## 2019-11-06 NOTE — OCCUPATIONAL THERAPY INITIAL EVALUATION ADULT - MANUAL MUSCLE TESTING RESULTS, REHAB EVAL
LUE/LLE: 3/5, RUE: 2-/5, RLE: 2+/5/grossly assessed due to grossly assessed due to/LUE/LLE: 3/5, RUE: 2/5  throughout

## 2019-11-06 NOTE — OCCUPATIONAL THERAPY INITIAL EVALUATION ADULT - ADL RETRAINING, OT EVAL
Goal: Pt will perform UB/LB dressing with Min A using compensatory dressing technique and appropriate DME within 4 weeks.

## 2019-11-06 NOTE — PROGRESS NOTE ADULT - ASSESSMENT
69 year old man with PMHx T2DM, stage IV glioblastoma multiforme (dx in 2018), HTN and PSHx appendectomy presents to the ED with 3 weeks of increased R hemiparesis, worsened aphasia and urinary incontinence, sent from his neruo-oncologist Dr. Cuba's office. Pt has had resection, chemo radiation, most recently was noted to be clinically worse in March 2019 with increased somnolence and steroids were increased. Per his neuro-onc, concern for progression of disease. In ER, pt no family at bedside, pt was agitated, confused, aphasic, climbing out of bed, recieved 4mg of ativan, 5mg of haldol sequentially. Neurological exam was limited due to sedation, pt did not respond to basic orientation questions appropriately in Albanian, had intermittent nature to nonsensical speech, but was able to show tongue, raise his arms - on mimicry only, R hemiparesis. Noted to have wet himself.  Admitted to Neurology for further evaluation.    Impression: Suspect worsened 'confusion,' aphasia maybe from partial/focal seizures emanating from tumor site of L temporal lobe vs. POD not visualized on CT vs. toxic metabolic encephalopathy    [x] NeuroSx - no acute intervention  [x] UA, CXR negative    [] increased keppra maintenance to 750 BID  [] Decadron 4mg BID  [] MRI Brain w/ and w/o seng to assess brain tumor  [] vEEG x 24 hours  [] recommend Palliative consult for GOC and advanced directives- hold off for now  [] NPO for now, consider swallow eval when mental status improves  [] Haldol 2.5mg IM q6H PRN for agitation  [] f/u neuro-onc Dr. Cuba recs  [] restraints, 1:1, haldol  [x] CTA chest - no PE  [] LE dopplers pending 69 year old man with PMHx T2DM, stage IV glioblastoma multiforme (dx in 2018), HTN and PSHx appendectomy presents to the ED with 3 weeks of increased R hemiparesis, worsened aphasia and urinary incontinence, sent from his neruo-oncologist Dr. Cuba's office. Pt has had resection, chemo radiation, most recently was noted to be clinically worse in March 2019 with increased somnolence and steroids were increased. Per his neuro-onc, concern for progression of disease. In ER, pt no family at bedside, pt was agitated, confused, aphasic, climbing out of bed, recieved 4mg of ativan, 5mg of haldol sequentially. Neurological exam was limited due to sedation, pt did not respond to basic orientation questions appropriately in Frisian, had intermittent nature to nonsensical speech, but was able to show tongue, raise his arms - on mimicry only, R hemiparesis. Noted to have wet himself.  Admitted to Neurology for further evaluation.    Impression: Suspect worsened 'confusion,' aphasia maybe from partial/focal seizures emanating from tumor site of L temporal lobe vs. POD not visualized on CT vs. toxic metabolic encephalopathy    [x] NeuroSx - no acute intervention  [x] UA, CXR negative    [] increased keppra maintenance to 750 BID  [] Decadron 4mg BID  [] MRI Brain w/ and w/o seng to assess brain tumor  [] vEEG x 24 hours  [] NPO for now, consider swallow eval when mental status improves  [] Haldol 2.5mg IM q6H PRN for agitation  [] f/u neuro-onc Dr. Cuba recs  [] restraints, 1:1, haldol  [x] CTA chest - no PE  [] LE dopplers pending 69 year old man with PMHx T2DM, stage IV glioblastoma multiforme (dx in 2018), HTN and PSHx appendectomy presents to the ED with 3 weeks of increased R hemiparesis, worsened aphasia and urinary incontinence, sent from his neruo-oncologist Dr. Cuba's office. Pt has had resection, chemo radiation, most recently was noted to be clinically worse in March 2019 with increased somnolence and steroids were increased. Per his neuro-onc, concern for progression of disease. In ER, pt no family at bedside, pt was agitated, confused, aphasic, climbing out of bed, recieved 4mg of ativan, 5mg of haldol sequentially. Neurological exam was limited due to sedation, pt did not respond to basic orientation questions appropriately in Mongolian, had intermittent nature to nonsensical speech, but was able to show tongue, raise his arms - on mimicry only, R hemiparesis. Noted to have wet himself.  Admitted to Neurology for further evaluation.    Impression: Suspect worsened 'confusion,' aphasia maybe from partial/focal seizures emanating from tumor site of L temporal lobe vs. POD not visualized on CT vs. toxic metabolic encephalopathy    [x] NeuroSx - no acute intervention  [x] UA, CXR negative    [] increased keppra maintenance to 750 BID  [] Decadron 4mg BID  [] MRI Brain w/ and w/o seng to assess brain tumor  [] vEEG x 24 hours  [] NPO for now, consider swallow eval when mental status improves  [] Haldol 2.5mg IM q6H PRN for agitation  [] aspirin 81mg  held  [] f/u neuro-onc Dr. Cuba recs  [] restraints, 1:1, haldol  [x] CTA chest - no PE  [] LE dopplers pending

## 2019-11-06 NOTE — PROGRESS NOTE ADULT - SUBJECTIVE AND OBJECTIVE BOX
MRN-[id number]  Patient is a 69y old  Male who presents with a chief complaint of worsened R hemiparesis and aphasia (2019 22:14)    HPI:  69 year old man with PMHx T2DM, stage IV glioblastoma multiforme (dx in 2018), HTN and PSHx appendectomy presents to the ED with 3 weeks of increased R hemiparesis, worsened aphasia and urinary incontinence, sent from his neruo-oncologist Dr. Cuba's office. Pt has had resection, chemo radiation, most recently was noted to be clinically worse in 2019 with increased somnolence and steroids were increased. Per his neuro-onc, concern for progression of disease. In ER, pt no family at bedside, pt was agitated, confused, aphasic, climbing out of bed, recieved 4mg of ativan, 5mg of haldol sequentially. Neurological exam was limited due to sedation, pt did not respond to basic orientation questions appropriately in Micronesian, had intermittent nature to nonsensical speech, but was able to show tongue, raise his arms - on mimicry only, R hemiparesis. Noted to have wet himself.  Admitted to Neurology for further evaluation. (2019 22:14)      PAST MEDICAL & SURGICAL HISTORY:  Type 2 diabetes mellitus  Glioblastoma multiforme  HTN (hypertension)  Glioblastoma multiforme  History of appendectomy    FAMILY HISTORY:    Social Hx:  Nonsmoker, no drug or alcohol use    Home Medications:  amLODIPine 10 mg oral tablet: 1 tab(s) orally once a day (2019 00:51)  atorvastatin 80 mg oral tablet: 1 tab(s) orally once a day (at bedtime) (15 Aug 2019 11:58)  carvedilol 25 mg oral tablet: 1 tab(s) orally every 12 hours (2019 00:51)  dexamethasone 1 mg oral tablet: 1 tab(s) orally once a day (2019 00:51)  docusate sodium 100 mg oral capsule: 1 cap(s) orally 3 times a day (15 Aug 2019 10:51)  famotidine 20 mg oral tablet: 1 tab(s) orally once a day (2019 00:51)  heparin: 5000 unit(s) subcutaneous every 8 hours (15 Aug 2019 10:51)  insulin glargine: 16 unit(s) subcutaneous once a day (at bedtime) (15 Aug 2019 10:51)  levETIRAcetam 500 mg oral tablet: 1 tab(s) orally 2 times a day (2019 00:51)  losartan 100 mg oral tablet: 1 tab(s) orally once a day (2019 22:29)  metFORMIN 850 mg oral tablet: 1 tab(s) orally 2 times a day (2019 22:29)  senna oral tablet: 2 tab(s) orally once a day (at bedtime) (15 Aug 2019 10:51)    MEDICATIONS  (STANDING):  amLODIPine   Tablet 10 milliGRAM(s) Oral daily  atorvastatin 80 milliGRAM(s) Oral at bedtime  carvedilol 12.5 milliGRAM(s) Oral every 12 hours  dexAMETHasone  Injectable 4 milliGRAM(s) IV Push every 12 hours  dextrose 5%. 1000 milliLiter(s) (50 mL/Hr) IV Continuous <Continuous>  dextrose 50% Injectable 12.5 Gram(s) IV Push once  dextrose 50% Injectable 25 Gram(s) IV Push once  dextrose 50% Injectable 25 Gram(s) IV Push once  enoxaparin Injectable 40 milliGRAM(s) SubCutaneous daily  famotidine    Tablet 20 milliGRAM(s) Oral daily  insulin lispro (HumaLOG) corrective regimen sliding scale   SubCutaneous three times a day before meals  insulin lispro (HumaLOG) corrective regimen sliding scale   SubCutaneous at bedtime  levETIRAcetam  IVPB 750 milliGRAM(s) IV Intermittent every 12 hours  senna 2 Tablet(s) Oral at bedtime  sodium chloride 0.9% Bolus 250 milliLiter(s) IV Bolus once  sodium chloride 0.9%. 1000 milliLiter(s) (75 mL/Hr) IV Continuous <Continuous>    MEDICATIONS  (PRN):  acetaminophen  Suppository .. 650 milliGRAM(s) Rectal every 6 hours PRN Temp greater or equal to 38C (100.4F), Mild Pain (1 - 3)  dextrose 40% Gel 15 Gram(s) Oral once PRN Blood Glucose LESS THAN 70 milliGRAM(s)/deciliter  glucagon  Injectable 1 milliGRAM(s) IntraMuscular once PRN Glucose LESS THAN 70 milligrams/deciliter  haloperidol    Injectable 2.5 milliGRAM(s) IntraMuscular every 6 hours PRN Agitation  labetalol Injectable 10 milliGRAM(s) IV Push every 2 hours PRN Systolic blood pressure > 170  nystatin Powder 1 Application(s) Topical every 8 hours PRN rash/itching    Allergies  No Known Allergies    Intolerances      REVIEW OF SYSTEMS  General:	  Skin/Breast:	  Ophthalmologic:  ENMT:	  Respiratory and Thorax:	  Cardiovascular:	  Gastrointestinal:	  Genitourinary:	  Musculoskeletal:	  Neurological:	  Psychiatric:	  Hematology/Lymphatics:	  Endocrine:	  Allergic/Immunologic:	    ROS: Pertinent positives in HPI, all other ROS were reviewed and are negative.      Vital Signs Last 24 Hrs  T(C): 36.9 (2019 15:21), Max: 37.1 (2019 07:24)  T(F): 98.4 (2019 15:21), Max: 98.7 (2019 07:24)  HR: 88 (2019 15:21) (78 - 125)  BP: 168/93 (2019 15:21) (153/86 - 198/111)  BP(mean): --  RR: 18 (2019 15:21) (18 - 22)  SpO2: 97% (2019 15:21) (93% - 98%)    GENERAL EXAM:  Constitutional: awake and alert. NAD  HEENT: PERRLA, EOMI  Neck: Supple  Respiratory: Breath sounds are clear bilaterally  Cardiovascular: S1 and S2, regular / irregular rhythm  Gastrointestinal: soft, nontender  Extremities: no edema, no cyanosis  Vascular: no carotid bruits  Musculoskeletal: no joint swelling/tenderness, no abnormal movements  Skin: no rashes    NEUROLOGICAL EXAM: LIMITED EXAM  MS: patient very agitated, not following commands or answering questions.  Moving all four extremities.  In restraints on both UEs, kicking legs.  Not speaking in intelligible words.      Labs:   cbc                      13.7   12.48 )-----------( 396      ( 2019 09:08 )             41.4     [id number]    141  |  103  |  18  ----------------------------<  238<H>  3.8   |  21<L>  |  0.91    Ca    10.0      2019 06:17    TPro  7.2  /  Alb  4.4  /  TBili  0.6  /  DBili  x   /  AST  23  /  ALT  26  /  AlkPhos  98  11-06    CoagsPT/INR - ( 2019 12:42 )   PT: 11.0 sec;   INR: 0.97 ratio         PTT - ( 2019 12:42 )  PTT:28.9 sec  Lipids  [id number] [id number] 7.2    CardiacMarkers    LFTsLIVER FUNCTIONS - ( 2019 06:17 )  Alb: 4.4 g/dL / Pro: 7.2 g/dL / ALK PHOS: 98 U/L / ALT: 26 U/L / AST: 23 U/L / GGT: x           UAUrinalysis Basic - ( 2019 14:17 )    Color: Yellow / Appearance: Clear / S.030 / pH: x  Gluc: x / Ketone: Negative  / Bili: Negative / Urobili: 2 mg/dL   Blood: x / Protein: 30 mg/dL / Nitrite: Negative   Leuk Esterase: Negative / RBC: 2 /hpf / WBC 1 /HPF   Sq Epi: x / Non Sq Epi: 1 /hpf / Bacteria: Negative      CSF  Immunological Labs    Radiology:  -CT Head  -MRI brain  -MRA brain/Carotids  -EEG  -EKG  -TTE/STERLING [id number]  Patient is a 69y old  Male who presents with a chief complaint of worsened R hemiparesis and aphasia (2019 22:14)    HPI:  69 year old man with PMHx T2DM, stage IV glioblastoma multiforme (dx in 2018), HTN and PSHx appendectomy presents to the ED with 3 weeks of increased R hemiparesis, worsened aphasia and urinary incontinence, sent from his neruo-oncologist Dr. Cuba's office. Pt has had resection, chemo radiation, most recently was noted to be clinically worse in 2019 with increased somnolence and steroids were increased. Per his neuro-onc, concern for progression of disease. In ER, pt no family at bedside, pt was agitated, confused, aphasic, climbing out of bed, recieved 4mg of ativan, 5mg of haldol sequentially. Neurological exam was limited due to sedation, pt did not respond to basic orientation questions appropriately in Bolivian, had intermittent nature to nonsensical speech, but was able to show tongue, raise his arms - on mimicry only, R hemiparesis. Noted to have wet himself.  Admitted to Neurology for further evaluation. (2019 22:14)    PAST MEDICAL & SURGICAL HISTORY:  Type 2 diabetes mellitus  Glioblastoma multiforme  HTN (hypertension)  Glioblastoma multiforme  History of appendectomy    FAMILY HISTORY:    Social Hx:  Nonsmoker, no drug or alcohol use    Home Medications:  amLODIPine 10 mg oral tablet: 1 tab(s) orally once a day (2019 00:51)  atorvastatin 80 mg oral tablet: 1 tab(s) orally once a day (at bedtime) (15 Aug 2019 11:58)  carvedilol 25 mg oral tablet: 1 tab(s) orally every 12 hours (2019 00:51)  dexamethasone 1 mg oral tablet: 1 tab(s) orally once a day (2019 00:51)  docusate sodium 100 mg oral capsule: 1 cap(s) orally 3 times a day (15 Aug 2019 10:51)  famotidine 20 mg oral tablet: 1 tab(s) orally once a day (2019 00:51)  heparin: 5000 unit(s) subcutaneous every 8 hours (15 Aug 2019 10:51)  insulin glargine: 16 unit(s) subcutaneous once a day (at bedtime) (15 Aug 2019 10:51)  levETIRAcetam 500 mg oral tablet: 1 tab(s) orally 2 times a day (2019 00:51)  losartan 100 mg oral tablet: 1 tab(s) orally once a day (2019 22:29)  metFORMIN 850 mg oral tablet: 1 tab(s) orally 2 times a day (2019 22:29)  senna oral tablet: 2 tab(s) orally once a day (at bedtime) (15 Aug 2019 10:51)    MEDICATIONS  (STANDING):  amLODIPine   Tablet 10 milliGRAM(s) Oral daily  atorvastatin 80 milliGRAM(s) Oral at bedtime  carvedilol 12.5 milliGRAM(s) Oral every 12 hours  dexAMETHasone  Injectable 4 milliGRAM(s) IV Push every 12 hours  dextrose 5%. 1000 milliLiter(s) (50 mL/Hr) IV Continuous <Continuous>  dextrose 50% Injectable 12.5 Gram(s) IV Push once  dextrose 50% Injectable 25 Gram(s) IV Push once  dextrose 50% Injectable 25 Gram(s) IV Push once  enoxaparin Injectable 40 milliGRAM(s) SubCutaneous daily  famotidine    Tablet 20 milliGRAM(s) Oral daily  insulin lispro (HumaLOG) corrective regimen sliding scale   SubCutaneous three times a day before meals  insulin lispro (HumaLOG) corrective regimen sliding scale   SubCutaneous at bedtime  levETIRAcetam  IVPB 750 milliGRAM(s) IV Intermittent every 12 hours  senna 2 Tablet(s) Oral at bedtime  sodium chloride 0.9% Bolus 250 milliLiter(s) IV Bolus once  sodium chloride 0.9%. 1000 milliLiter(s) (75 mL/Hr) IV Continuous <Continuous>    MEDICATIONS  (PRN):  acetaminophen  Suppository .. 650 milliGRAM(s) Rectal every 6 hours PRN Temp greater or equal to 38C (100.4F), Mild Pain (1 - 3)  dextrose 40% Gel 15 Gram(s) Oral once PRN Blood Glucose LESS THAN 70 milliGRAM(s)/deciliter  glucagon  Injectable 1 milliGRAM(s) IntraMuscular once PRN Glucose LESS THAN 70 milligrams/deciliter  haloperidol    Injectable 2.5 milliGRAM(s) IntraMuscular every 6 hours PRN Agitation  labetalol Injectable 10 milliGRAM(s) IV Push every 2 hours PRN Systolic blood pressure > 170  nystatin Powder 1 Application(s) Topical every 8 hours PRN rash/itching    Allergies  No Known Allergies    REVIEW OF SYSTEMS  General:	  Skin/Breast:	  Ophthalmologic:  ENMT:	  Respiratory and Thorax:	  Cardiovascular:	  Gastrointestinal:	  Genitourinary:	  Musculoskeletal:	  Neurological:	  Psychiatric:	  Hematology/Lymphatics:	  Endocrine:	  Allergic/Immunologic:	    ROS: Pertinent positives in HPI, all other ROS were reviewed and are negative.      Vital Signs Last 24 Hrs  T(C): 36.9 (2019 15:21), Max: 37.1 (2019 07:24)  T(F): 98.4 (2019 15:21), Max: 98.7 (2019 07:24)  HR: 88 (2019 15:21) (78 - 125)  BP: 168/93 (2019 15:21) (153/86 - 198/111)  BP(mean): --  RR: 18 (2019 15:21) (18 - 22)  SpO2: 97% (2019 15:21) (93% - 98%)    GENERAL EXAM:  Constitutional: awake and alert. NAD  HEENT: PERRLA, EOMI  Neck: Supple  Respiratory: Breath sounds are clear bilaterally  Cardiovascular: S1 and S2, regular / irregular rhythm  Gastrointestinal: soft, nontender  Extremities: no edema, no cyanosis  Vascular: no carotid bruits  Musculoskeletal: no joint swelling/tenderness, no abnormal movements  Skin: no rashes    NEUROLOGICAL EXAM: LIMITED EXAM  MS: patient very agitated, not following commands or answering questions.  Moving all four extremities.  In restraints on both UEs, kicking legs.  Not speaking in intelligible words.      Labs:   cbc                      13.7   12.48 )-----------( 396      ( 2019 09:08 )             41.4     [id number]    141  |  103  |  18  ----------------------------<  238<H>  3.8   |  21<L>  |  0.91    Ca    10.0      2019 06:17    TPro  7.2  /  Alb  4.4  /  TBili  0.6  /  DBili  x   /  AST  23  /  ALT  26  /  AlkPhos  98  11-06    CoagsPT/INR - ( 2019 12:42 )   PT: 11.0 sec;   INR: 0.97 ratio         PTT - ( 2019 12:42 )  PTT:28.9 sec  Lipids  I4Q30-30 PjsatghhdxV2C 7.2    CardiacMarkers    LFTsLIVER FUNCTIONS - ( 2019 06:17 )  Alb: 4.4 g/dL / Pro: 7.2 g/dL / ALK PHOS: 98 U/L / ALT: 26 U/L / AST: 23 U/L / GGT: x           UAUrinalysis Basic - ( 2019 14:17 )    Color: Yellow / Appearance: Clear / S.030 / pH: x  Gluc: x / Ketone: Negative  / Bili: Negative / Urobili: 2 mg/dL   Blood: x / Protein: 30 mg/dL / Nitrite: Negative   Leuk Esterase: Negative / RBC: 2 /hpf / WBC 1 /HPF   Sq Epi: x / Non Sq Epi: 1 /hpf / Bacteria: Negative

## 2019-11-06 NOTE — OCCUPATIONAL THERAPY INITIAL EVALUATION ADULT - BALANCE TRAINING, PT EVAL
Goal: Patient will increase static/dynamic sitting balance by 1 grade to facilitate increased safety, ability to perform ADLs and functional mobility within 4 weeks.

## 2019-11-06 NOTE — PHYSICAL THERAPY INITIAL EVALUATION ADULT - PERTINENT HX OF CURRENT PROBLEM, REHAB EVAL
Pt is 69M admitted 11/5/19 PMHx DM2, HTN, stage IV glioblastoma multiforme(dx in 2018); presents to ED with 3 weeks of increased R hemiparesis, worsened aphasia & urinary incontinence, sent from his neruo-oncologist office- concern for progression of disease. Pt is 69M admitted 11/5/19 PMHx DM2, HTN, stage IV glioblastoma multiforme(dx in 2018); presents to ED with 3 weeks of increased R hemiparesis, worsened aphasia & urinary incontinence, sent from his neruo-oncologist office- concern for progression of disease. Hospital course complicated by -160s with V.tach on tele.

## 2019-11-06 NOTE — PHYSICAL THERAPY INITIAL EVALUATION ADULT - TRANSFER TRAINING, PT EVAL
GOAL: Pt will perform sit to/from stand transfers min Ax1 with/without AD as needed within 3-4weeks.

## 2019-11-06 NOTE — PHYSICAL THERAPY INITIAL EVALUATION ADULT - PRECAUTIONS/LIMITATIONS, REHAB EVAL
fall precautions/CTH: No acute hemorrhage, hydrocephalus or mass effect.Unchanged left temporal lobe neoplasm.

## 2019-11-06 NOTE — OCCUPATIONAL THERAPY INITIAL EVALUATION ADULT - TRANSFER TRAINING, PT EVAL
Goal: Pt will perform sit to stand, bed <> chair, toilet, tub and shower transfers with Min A within 4 weeks

## 2019-11-06 NOTE — OCCUPATIONAL THERAPY INITIAL EVALUATION ADULT - DIAGNOSIS, OT EVAL
Pt currently presents with decreased cognition, R sided weakness, possible R sided neglect, decreased endurance, balance and strength limiting independence with ADLs and functional mobility.

## 2019-11-06 NOTE — OCCUPATIONAL THERAPY INITIAL EVALUATION ADULT - GENERAL OBSERVATIONS, REHAB EVAL
Pt received semisupine in bed, +chair position, +air tap, A+Ox1 (oriented to person in [place], required choices), +stroke unit monitoring, +ng tube feed, 1:1 PCA with bilateral restraints

## 2019-11-06 NOTE — OCCUPATIONAL THERAPY INITIAL EVALUATION ADULT - RANGE OF MOTION EXAMINATION, LOWER EXTREMITY
Left LE Active ROM was WFL (within functional limits)/Right LE Active Assistive ROM was WFL  (within functional limits)

## 2019-11-06 NOTE — OCCUPATIONAL THERAPY INITIAL EVALUATION ADULT - NS ASR FOLLOW COMMAND OT EVAL
increased time for processing/sequencing, communicated in primary language ([place])/aphasia/oral apraxia/able to follow single-step instructions/50% of the time

## 2019-11-06 NOTE — CONSULT NOTE ADULT - SUBJECTIVE AND OBJECTIVE BOX
HPI:  69 year old man with  T2DM, stage IV glioblastoma multiforme (dx in 2018), HTN and PSHx appendectomy presents to the ED with 3 weeks of increased R hemiparesis, worsened aphasia and urinary incontinence, sent from his neruo-oncologist Dr. Cuba's office. Pt has had resection, chemo radiation, most recently was noted to be clinically worse in 2019 with increased somnolence and steroids were increased. Per his neuro-onc, concern for progression of disease. In ER,  pt was agitated, confused, aphasic, climbing out of bed, recieved 4mg of ativan, 5mg of haldol sequentially. Neurological exam was limited due to sedation, pt did not respond to basic orientation questions appropriately in Kenyan, had intermittent nature to nonsensical speech, but was able to show tongue, raise his arms - on mimicry only, R hemiparesis. Noted to have wet himself.  Admitted to Neurology for further evaluation. Palliative care was consulted for [facility].     PERTINENT PM/SXH:   Type 2 diabetes mellitus  Glioblastoma multiforme  HTN (hypertension)    Glioblastoma multiforme  History of appendectomy    FAMILY HISTORY:    ITEMS NOT CHECKED ARE NOT PRESENT    SOCIAL HISTORY:   Significant other/partner:  [ ]  Children:  [ ]  Taoist/Spirituality:  Substance hx:  [ ]   Tobacco hx:  [ ]   Alcohol hx: [ ]   Home Opioid hx:  [ ] I-Stop Reference No:  Living Situation: [ ]Home  [ ]Long term care  [ ]Rehab [ ]Other  As per care coordination note from : "Message left with patient's daughter Jannet & son  Gualberto. Call back received from Daughter-in-law Jessica (Gualberto's spouse) who  provide following information. Patient lives alone in an elevator accessible  apartment & has home attendant 6hrs/7 days a week from [employer]  [employer] ([phone]/[phone]. Jessica reports that for the past week the  patient has not let the aide into the apartment & family wants long term  placement in [place]. [place] SNF list emailed to Jessica at [email or web address].  P/T recommending no skilled P/T needs. Patient confused & unable to designate a  caregiver. Case management to continue to follow & remain available."   And last note () indicating: "Chart reviewed. Per medical team B, pt medically cleared for discharge  to long term care, pt and daughter in law Jessica and son Gualberto in agreement.   Al and Aruna in admissions dept at [facility] confirmed medical  acceptance and bed availability, pt and family in agreement."  And  "Pt appointed vanessa Barber ([phone]) or son Gualberto Barber ([phone]) as Caregivers."  ADVANCE DIRECTIVES:    DNR  MOLST  [ ]  Living Will  [ ]   DECISION MAKER(s):  [ ] Health Care Proxy(s)  [ ] Surrogate(s)  [ ] Guardian           Name(s): Phone Number(s):    BASELINE (I)ADL(s) (prior to admission):  [place]: [ ]Total  [ ] Moderate [ ]Dependent    Allergies    No Known Allergies    Intolerances    MEDICATIONS  (STANDING):  amLODIPine   Tablet 10 milliGRAM(s) Oral daily  atorvastatin 80 milliGRAM(s) Oral at bedtime  carvedilol 12.5 milliGRAM(s) Oral every 12 hours  dexAMETHasone  Injectable 4 milliGRAM(s) IV Push every 12 hours  dextrose 5%. 1000 milliLiter(s) (50 mL/Hr) IV Continuous <Continuous>  dextrose 50% Injectable 12.5 Gram(s) IV Push once  dextrose 50% Injectable 25 Gram(s) IV Push once  dextrose 50% Injectable 25 Gram(s) IV Push once  enoxaparin Injectable 40 milliGRAM(s) SubCutaneous daily  famotidine    Tablet 20 milliGRAM(s) Oral daily  insulin lispro (HumaLOG) corrective regimen sliding scale   SubCutaneous three times a day before meals  insulin lispro (HumaLOG) corrective regimen sliding scale   SubCutaneous at bedtime  levETIRAcetam  IVPB 750 milliGRAM(s) IV Intermittent every 12 hours  metoprolol tartrate Injectable 5 milliGRAM(s) IV Push once  senna 2 Tablet(s) Oral at bedtime  sodium chloride 0.9% Bolus 250 milliLiter(s) IV Bolus once  sodium chloride 0.9%. 1000 milliLiter(s) (85 mL/Hr) IV Continuous <Continuous>    MEDICATIONS  (PRN):  acetaminophen  Suppository .. 650 milliGRAM(s) Rectal every 6 hours PRN Temp greater or equal to 38C (100.4F), Mild Pain (1 - 3)  dextrose 40% Gel 15 Gram(s) Oral once PRN Blood Glucose LESS THAN 70 milliGRAM(s)/deciliter  glucagon  Injectable 1 milliGRAM(s) IntraMuscular once PRN Glucose LESS THAN 70 milligrams/deciliter  haloperidol    Injectable 2.5 milliGRAM(s) IntraMuscular every 6 hours PRN Agitation  labetalol Injectable 10 milliGRAM(s) IV Push every 2 hours PRN Systolic blood pressure > 170    PRESENT SYMPTOMS: [ ]Unable to obtain due to poor mentation   Source if other than patient:  [ ]Family   [ ]Team     Pain: [ ] yes [ ] no  QOL impact -   Location -                    Aggravating factors -  Quality -  Radiation -  Timing-  Severity (0-10 scale):  Minimal acceptable level (0-10 scale):     PAIN AD Score:     [email or web address] (press ctrl +  left click to view)    Dyspnea:                           [ ]Mild [ ]Moderate [ ]Severe  Anxiety:                             [ ]Mild [ ]Moderate [ ]Severe  Fatigue:                             [ ]Mild [ ]Moderate [ ]Severe  Nausea:                             [ ]Mild [ ]Moderate [ ]Severe  Loss of appetite:              [ ]Mild [ ]Moderate [ ]Severe  Constipation:                    [ ]Mild [ ]Moderate [ ]Severe    Other Symptoms:  [ ]All other review of systems negative     Karnofsky Performance Score/Palliative Performance Status Version 2:         %    http://npcrc.org/files/news/palliative_performance_scale_ppsv2.pdf  PHYSICAL EXAM:  Vital Signs Last 24 Hrs  T(C): 37.1 (2019 07:24), Max: 37.1 (2019 07:24)  T(F): 98.7 (2019 07:24), Max: 98.7 (2019 07:24)  HR: 117 (2019 07:24) (54 - 125)  BP: 198/111 (2019 07:24) (145/84 - 198/111)  BP(mean): --  RR: 21 (2019 07:24) (18 - 22)  SpO2: 94% (2019 07:24) (93% - 98%) I&O's Summary    2019 07:01  -  2019 07:00  --------------------------------------------------------  IN: 0 mL / OUT: 275 mL / NET: -275 mL    GENERAL:  [ ]Alert  [ ]Oriented x   [ ]Lethargic  [ ]Cachexia  [ ]Unarousable  [ ]Verbal  [ ]Non-Verbal  Behavioral:   [ ] Anxiety  [ ] Delirium [ ] Agitation [ ] Other  HEENT:  [ ]Normal   [ ]Dry mouth   [ ]ET Tube/Trach  [ ]Oral lesions  PULMONARY:   [ ]Clear [ ]Tachypnea  [ ]Audible excessive secretions   [ ]Rhonchi        [ ]Right [ ]Left [ ]Bilateral  [ ]Crackles        [ ]Right [ ]Left [ ]Bilateral  [ ]Wheezing     [ ]Right [ ]Left [ ]Bilateral  CARDIOVASCULAR:    [ ]Regular [ ]Irregular [ ]Tachy  [ ]Aaron [ ]Murmur [ ]Other  GASTROINTESTINAL:  [ ]Soft  [ ]Distended   [ ]+BS  [ ]Non tender [ ]Tender  [ ]PEG [ ]OGT/ NGT  Last BM:   GENITOURINARY:  [ ]Normal [ ] Incontinent   [ ]Oliguria/Anuria   [ ]Mclean  MUSCULOSKELETAL:   [ ]Normal   [ ]Weakness  [ ]Bed/Wheelchair bound [ ]Edema  NEUROLOGIC:   [ ]No focal deficits  [ ] Cognitive impairment  [ ] Dysphagia [ ]Dysarthria [ ] Paresis [ ]Other   SKIN:   [ ]Normal   [ ]Pressure ulcer(s)  [ ]Rash    CRITICAL CARE:  [ ] Shock Present  [ ]Septic [ ]Cardiogenic [ ]Neurologic [ ]Hypovolemic  [ ]  Vasopressors [ ]  Inotropes   [ ] Respiratory failure present [ ] mechanical ventilation [ ] non-invasive ventilatory support [ ] High flow  [ ] Acute  [ ] Chronic [ ] Hypoxic  [ ] Hypercarbic [ ] Other  [ ] Other organ failure     LABS:                        13.7   12.48 )-----------( 396      ( 2019 09:08 )             41.4   11-06    141  |  103  |  18  ----------------------------<  238<H>  3.8   |  21<L>  |  0.91    Ca    10.0      2019 06:17    TPro  7.2  /  Alb  4.4  /  TBili  0.6  /  DBili  x   /  AST  23  /  ALT  26  /  AlkPhos  98  11-06  PT/INR - ( 2019 12:42 )   PT: 11.0 sec;   INR: 0.97 ratio         PTT - ( 2019 12:42 )  PTT:28.9 sec    Urinalysis Basic - ( 2019 14:17 )    Color: Yellow / Appearance: Clear / S.030 / pH: x  Gluc: x / Ketone: Negative  / Bili: Negative / Urobili: 2 mg/dL   Blood: x / Protein: 30 mg/dL / Nitrite: Negative   Leuk Esterase: Negative / RBC: 2 /hpf / WBC 1 /HPF   Sq Epi: x / Non Sq Epi: 1 /hpf / Bacteria: Negative      RADIOLOGY & ADDITIONAL STUDIES:  < from: CT Head No Cont (19 @ 13:27) >  EXAM:  CT BRAIN                            PROCEDURE DATE:  2019  Impression:    No acute hemorrhage, hydrocephalus or mass effect.  Unchanged left temporal lobe neoplasm.                    SHAVONNE ABREU M.D., ATTENDING RADIOLOGIST  This document has been electronically signed. 2019  2:38PM    < end of copied text >    PROTEIN CALORIE MALNUTRITION PRESENT: [ ] Yes [ ] No  [ ] PPSV2 < or = to 30% [ ] significant weight loss  [ ] poor nutritional intake [ ] catabolic state [ ] anasarca     Albumin, Serum: 4.4 g/dL (19 @ 06:17)  Artificial Nutrition [ ]     REFERRALS:   [ ]Chaplaincy  [ ] Hospice  [ ]Child Life  [ ]Social Work  [ ]Case management [ ]Holistic Therapy     Goals of Care Document:

## 2019-11-06 NOTE — EEG REPORT - NS EEG TEXT BOX
ARSENIO DARNELL [id number]     Study Date: 		11-06-19    ROUTINE EEG    Technical Information:			  		  Placement and Labeling of Electrodes:  The EEG was performed utilizing 20 channels referential EEG connections (coronal over temporal over parasagittal montage) using all standard 10-20 electrode placements with EKG.  Recording was at a sampling rate of 256 samples per second per channel.  Time synchronized digital video recording was done simultaneously with EEG recording.  A low light infrared camera was used for low light recording.  Delio and seizure detection algorithms were utilized.    CSA Technical Component:  Quantitative EEG analysis using a separate Compressed Spectral Array (CSA) software package was conducted in real-time and run at bedside after set up by the technician, digitally displaying the power of electrographic frequencies included in the 1-30Hz band using a graded color map.  This data was reviewed and interpreted independently, and is reported in a separate section below.    --------------------------------------------------------------------------------------------------  History:  CC/ HPI Patient is a 69y old  Male who presents with a chief complaint of worsened R hemiparesis and aphasia (06 Nov 2019 17:21)    MEDICATIONS  (STANDING):  amLODIPine   Tablet 10 milliGRAM(s) Oral daily  atorvastatin 80 milliGRAM(s) Oral at bedtime  carvedilol 12.5 milliGRAM(s) Oral every 12 hours  dexAMETHasone  Injectable 4 milliGRAM(s) IV Push every 12 hours  dextrose 5%. 1000 milliLiter(s) (50 mL/Hr) IV Continuous <Continuous>  dextrose 50% Injectable 12.5 Gram(s) IV Push once  dextrose 50% Injectable 25 Gram(s) IV Push once  dextrose 50% Injectable 25 Gram(s) IV Push once  enoxaparin Injectable 40 milliGRAM(s) SubCutaneous daily  famotidine    Tablet 20 milliGRAM(s) Oral daily  insulin lispro (HumaLOG) corrective regimen sliding scale   SubCutaneous three times a day before meals  insulin lispro (HumaLOG) corrective regimen sliding scale   SubCutaneous at bedtime  levETIRAcetam  IVPB 750 milliGRAM(s) IV Intermittent every 12 hours  senna 2 Tablet(s) Oral at bedtime  sodium chloride 0.9% Bolus 250 milliLiter(s) IV Bolus once  sodium chloride 0.9%. 1000 milliLiter(s) (75 mL/Hr) IV Continuous <Continuous>    --------------------------------------------------------------------------------------------------  Study Interpretation:    [[[Abbreviation Key:  PDR=alpha rhythm/posterior dominant rhythm. A-P=anterior posterior gradient.  Amplitude: ‘very low’:<20; ‘low’:20-50; ‘medium’:; ‘high’:>200uV.  Persistence for periodic/rhythmic patterns (% of epoch) ‘rare’:<1%; ‘occasional’:1-10%; ‘frequent’:10-50%; ‘abundant’:50-90%; ‘continuous’:>90%.  Persistence for sporadic discharges: ‘rare’:<1/hr; ‘occasional’:1/min-1/hr; ‘frequent’:>1/min; ‘abundant’:>1/10 sec.  GRDA=generalized rhythmic delta activity, LRDA=lateralized rhythmic delta activity, TIRDA=temporal intermittent rhythmic delta activity, FIRDA=frontal intermittent rhythmic activity. LPD=PLED=lateralized periodic discharges, GPD=generalized periodic discharges, BiPDs=BiPLEDs=bilateral independent periodic epileptiform discharges, SIRPID=stimulus induced rhythmic, periodic, or ictal appearing discharges.  Modifiers: +F=with fast component, +S=with spike component, +R=with rhythmic component.  S-B=burst suppression pattern.  Max=maximal. N1-drowsy, N2-stage II sleep, N3-slow wave sleep.  HV=hyperventilation, PS=photic stimulation]]]    FINDINGS:  The background was continuous, spontaneously variable and reactive.  During wakefulness, the posteriorly dominant rhythm was poorly modulated to 9.5hz seen better over the right.    There was left hemisphere irregular theta and delta activity present.    Sleep Background:  Drowsiness was characterized by fragmentation, attenuation, and slowing of the background activity.    Sleep was characterized by the presence of symmetric spindles, and K-complexes.    Epileptiform Activity:   No epileptiform discharges were present.    Events:  No clinical events were recorded.  No seizures were recorded.    Activation Procedures:   -Hyperventilation was not performed.    -Photic stimulation was not performed.    Artifacts:  Intermittent myogenic and movement artifacts were noted.    ECG:  The heart rate on single channel ECG at baseline was predominantly near BPM = 60-70  -----------------------------------------------------------------------------------------------------    EEG Classification / Summary:  Abnormal EEG study  Left hemispheric slowing, persistent, polymorphic    -----------------------------------------------------------------------------------------------------    Clinical Impression:  Left sided focal cerebral dysfunction.  There were no epileptiform abnormalities recorded.      -------------------------------------------------------------------------------------------------------  Yunior Corea M.D.   of Neurology, [facility] [place]

## 2019-11-07 LAB
ALBUMIN SERPL ELPH-MCNC: 4.2 G/DL — SIGNIFICANT CHANGE UP (ref 3.3–5)
ALP SERPL-CCNC: 84 U/L — SIGNIFICANT CHANGE UP (ref 40–120)
ALT FLD-CCNC: 21 U/L — SIGNIFICANT CHANGE UP (ref 10–45)
ANION GAP SERPL CALC-SCNC: 13 MMOL/L — SIGNIFICANT CHANGE UP (ref 5–17)
ANION GAP SERPL CALC-SCNC: 16 MMOL/L — SIGNIFICANT CHANGE UP (ref 5–17)
AST SERPL-CCNC: 24 U/L — SIGNIFICANT CHANGE UP (ref 10–40)
BILIRUB SERPL-MCNC: 0.4 MG/DL — SIGNIFICANT CHANGE UP (ref 0.2–1.2)
BUN SERPL-MCNC: 29 MG/DL — HIGH (ref 7–23)
BUN SERPL-MCNC: 30 MG/DL — HIGH (ref 7–23)
CALCIUM SERPL-MCNC: 9.4 MG/DL — SIGNIFICANT CHANGE UP (ref 8.4–10.5)
CALCIUM SERPL-MCNC: 9.4 MG/DL — SIGNIFICANT CHANGE UP (ref 8.4–10.5)
CHLORIDE SERPL-SCNC: 104 MMOL/L — SIGNIFICANT CHANGE UP (ref 96–108)
CHLORIDE SERPL-SCNC: 105 MMOL/L — SIGNIFICANT CHANGE UP (ref 96–108)
CO2 SERPL-SCNC: 22 MMOL/L — SIGNIFICANT CHANGE UP (ref 22–31)
CO2 SERPL-SCNC: 25 MMOL/L — SIGNIFICANT CHANGE UP (ref 22–31)
CREAT SERPL-MCNC: 0.96 MG/DL — SIGNIFICANT CHANGE UP (ref 0.5–1.3)
CREAT SERPL-MCNC: 1.08 MG/DL — SIGNIFICANT CHANGE UP (ref 0.5–1.3)
GLUCOSE BLDC GLUCOMTR-MCNC: 183 MG/DL — HIGH (ref 70–99)
GLUCOSE BLDC GLUCOMTR-MCNC: 195 MG/DL — HIGH (ref 70–99)
GLUCOSE BLDC GLUCOMTR-MCNC: 196 MG/DL — HIGH (ref 70–99)
GLUCOSE BLDC GLUCOMTR-MCNC: 199 MG/DL — HIGH (ref 70–99)
GLUCOSE BLDC GLUCOMTR-MCNC: 207 MG/DL — HIGH (ref 70–99)
GLUCOSE BLDC GLUCOMTR-MCNC: 214 MG/DL — HIGH (ref 70–99)
GLUCOSE SERPL-MCNC: 195 MG/DL — HIGH (ref 70–99)
GLUCOSE SERPL-MCNC: 198 MG/DL — HIGH (ref 70–99)
HCT VFR BLD CALC: 37.8 % — LOW (ref 39–50)
HCT VFR BLD CALC: 38.6 % — LOW (ref 39–50)
HGB BLD-MCNC: 12.3 G/DL — LOW (ref 13–17)
HGB BLD-MCNC: 12.5 G/DL — LOW (ref 13–17)
MCHC RBC-ENTMCNC: 29.5 PG — SIGNIFICANT CHANGE UP (ref 27–34)
MCHC RBC-ENTMCNC: 29.8 PG — SIGNIFICANT CHANGE UP (ref 27–34)
MCHC RBC-ENTMCNC: 31.9 GM/DL — LOW (ref 32–36)
MCHC RBC-ENTMCNC: 33.1 GM/DL — SIGNIFICANT CHANGE UP (ref 32–36)
MCV RBC AUTO: 89.2 FL — SIGNIFICANT CHANGE UP (ref 80–100)
MCV RBC AUTO: 93.5 FL — SIGNIFICANT CHANGE UP (ref 80–100)
NRBC # BLD: 0 /100 WBCS — SIGNIFICANT CHANGE UP (ref 0–0)
PLATELET # BLD AUTO: 353 K/UL — SIGNIFICANT CHANGE UP (ref 150–400)
PLATELET # BLD AUTO: 358 K/UL — SIGNIFICANT CHANGE UP (ref 150–400)
POTASSIUM SERPL-MCNC: 3.6 MMOL/L — SIGNIFICANT CHANGE UP (ref 3.5–5.3)
POTASSIUM SERPL-MCNC: 3.6 MMOL/L — SIGNIFICANT CHANGE UP (ref 3.5–5.3)
POTASSIUM SERPL-SCNC: 3.6 MMOL/L — SIGNIFICANT CHANGE UP (ref 3.5–5.3)
POTASSIUM SERPL-SCNC: 3.6 MMOL/L — SIGNIFICANT CHANGE UP (ref 3.5–5.3)
PROT SERPL-MCNC: 6.4 G/DL — SIGNIFICANT CHANGE UP (ref 6–8.3)
RBC # BLD: 4.13 M/UL — LOW (ref 4.2–5.8)
RBC # BLD: 4.24 M/UL — SIGNIFICANT CHANGE UP (ref 4.2–5.8)
RBC # FLD: 13.7 % — SIGNIFICANT CHANGE UP (ref 10.3–14.5)
RBC # FLD: 13.7 % — SIGNIFICANT CHANGE UP (ref 10.3–14.5)
SODIUM SERPL-SCNC: 142 MMOL/L — SIGNIFICANT CHANGE UP (ref 135–145)
SODIUM SERPL-SCNC: 143 MMOL/L — SIGNIFICANT CHANGE UP (ref 135–145)
WBC # BLD: 12.25 K/UL — HIGH (ref 3.8–10.5)
WBC # BLD: 13.17 K/UL — HIGH (ref 3.8–10.5)
WBC # FLD AUTO: 12.25 K/UL — HIGH (ref 3.8–10.5)
WBC # FLD AUTO: 13.17 K/UL — HIGH (ref 3.8–10.5)

## 2019-11-07 PROCEDURE — 93010 ELECTROCARDIOGRAM REPORT: CPT

## 2019-11-07 PROCEDURE — 99233 SBSQ HOSP IP/OBS HIGH 50: CPT

## 2019-11-07 RX ORDER — HYDRALAZINE HCL 50 MG
20 TABLET ORAL EVERY 4 HOURS
Refills: 0 | Status: DISCONTINUED | OUTPATIENT
Start: 2019-11-07 | End: 2019-12-06

## 2019-11-07 RX ORDER — OLANZAPINE 15 MG/1
10 TABLET, FILM COATED ORAL EVERY 8 HOURS
Refills: 0 | Status: DISCONTINUED | OUTPATIENT
Start: 2019-11-07 | End: 2019-11-14

## 2019-11-07 RX ORDER — HYDRALAZINE HCL 50 MG
5 TABLET ORAL ONCE
Refills: 0 | Status: COMPLETED | OUTPATIENT
Start: 2019-11-07 | End: 2019-11-07

## 2019-11-07 RX ORDER — OLANZAPINE 15 MG/1
10 TABLET, FILM COATED ORAL ONCE
Refills: 0 | Status: DISCONTINUED | OUTPATIENT
Start: 2019-11-07 | End: 2019-11-14

## 2019-11-07 RX ORDER — NICARDIPINE HYDROCHLORIDE 30 MG/1
5 CAPSULE, EXTENDED RELEASE ORAL
Qty: 40 | Refills: 0 | Status: DISCONTINUED | OUTPATIENT
Start: 2019-11-07 | End: 2019-11-07

## 2019-11-07 RX ORDER — SODIUM CHLORIDE 9 MG/ML
1000 INJECTION INTRAMUSCULAR; INTRAVENOUS; SUBCUTANEOUS
Refills: 0 | Status: DISCONTINUED | OUTPATIENT
Start: 2019-11-07 | End: 2019-11-12

## 2019-11-07 RX ORDER — ACETAMINOPHEN 500 MG
1000 TABLET ORAL ONCE
Refills: 0 | Status: COMPLETED | OUTPATIENT
Start: 2019-11-07 | End: 2019-11-08

## 2019-11-07 RX ORDER — DIPHENHYDRAMINE HCL 50 MG
25 CAPSULE ORAL ONCE
Refills: 0 | Status: COMPLETED | OUTPATIENT
Start: 2019-11-07 | End: 2019-11-07

## 2019-11-07 RX ORDER — HYDRALAZINE HCL 50 MG
10 TABLET ORAL ONCE
Refills: 0 | Status: COMPLETED | OUTPATIENT
Start: 2019-11-07 | End: 2019-11-07

## 2019-11-07 RX ORDER — NICARDIPINE HYDROCHLORIDE 30 MG/1
4 CAPSULE, EXTENDED RELEASE ORAL
Qty: 40 | Refills: 0 | Status: DISCONTINUED | OUTPATIENT
Start: 2019-11-07 | End: 2019-11-08

## 2019-11-07 RX ORDER — LABETALOL HCL 100 MG
20 TABLET ORAL
Refills: 0 | Status: DISCONTINUED | OUTPATIENT
Start: 2019-11-07 | End: 2019-12-06

## 2019-11-07 RX ORDER — METOPROLOL TARTRATE 50 MG
5 TABLET ORAL ONCE
Refills: 0 | Status: COMPLETED | OUTPATIENT
Start: 2019-11-07 | End: 2019-11-07

## 2019-11-07 RX ADMIN — Medication 1: at 07:14

## 2019-11-07 RX ADMIN — Medication 10 MILLIGRAM(S): at 07:00

## 2019-11-07 RX ADMIN — Medication 25 MILLIGRAM(S): at 21:43

## 2019-11-07 RX ADMIN — HALOPERIDOL DECANOATE 2.5 MILLIGRAM(S): 100 INJECTION INTRAMUSCULAR at 20:53

## 2019-11-07 RX ADMIN — Medication 20 MILLIGRAM(S): at 18:02

## 2019-11-07 RX ADMIN — Medication 10 MILLIGRAM(S): at 14:42

## 2019-11-07 RX ADMIN — Medication 1 MILLIGRAM(S): at 18:48

## 2019-11-07 RX ADMIN — LEVETIRACETAM 400 MILLIGRAM(S): 250 TABLET, FILM COATED ORAL at 16:57

## 2019-11-07 RX ADMIN — Medication 4 MILLIGRAM(S): at 16:59

## 2019-11-07 RX ADMIN — Medication 5 MILLIGRAM(S): at 16:37

## 2019-11-07 RX ADMIN — Medication 2: at 17:37

## 2019-11-07 RX ADMIN — Medication 4 MILLIGRAM(S): at 05:09

## 2019-11-07 RX ADMIN — Medication 10 MILLIGRAM(S): at 08:35

## 2019-11-07 RX ADMIN — HALOPERIDOL DECANOATE 2.5 MILLIGRAM(S): 100 INJECTION INTRAMUSCULAR at 02:39

## 2019-11-07 RX ADMIN — Medication 2 MILLIGRAM(S): at 16:57

## 2019-11-07 RX ADMIN — Medication 10 MILLIGRAM(S): at 13:28

## 2019-11-07 RX ADMIN — Medication 5 MILLIGRAM(S): at 04:40

## 2019-11-07 RX ADMIN — LEVETIRACETAM 400 MILLIGRAM(S): 250 TABLET, FILM COATED ORAL at 05:09

## 2019-11-07 RX ADMIN — Medication 10 MILLIGRAM(S): at 06:31

## 2019-11-07 RX ADMIN — Medication 10 MILLIGRAM(S): at 00:19

## 2019-11-07 RX ADMIN — Medication 1: at 11:50

## 2019-11-07 RX ADMIN — Medication 10 MILLIGRAM(S): at 10:25

## 2019-11-07 RX ADMIN — HALOPERIDOL DECANOATE 2.5 MILLIGRAM(S): 100 INJECTION INTRAMUSCULAR at 14:43

## 2019-11-07 RX ADMIN — ENOXAPARIN SODIUM 40 MILLIGRAM(S): 100 INJECTION SUBCUTANEOUS at 11:43

## 2019-11-07 NOTE — PROGRESS NOTE ADULT - SUBJECTIVE AND OBJECTIVE BOX
SUBJECTIVE: No events overnight    HPI:     69 year old man with PMHx T2DM, stage IV glioblastoma multiforme (dx in 2018), HTN and PSHx appendectomy presents to the ED with 3 weeks of increased R hemiparesis, worsened aphasia and urinary incontinence, sent from his neruo-oncologist Dr. Cuba's office. Pt has had resection, chemo radiation, most recently was noted to be clinically worse in March 2019 with increased somnolence and steroids were increased. Per his neuro-onc, concern for progression of disease. In ER, pt no family at bedside, pt was agitated, confused, aphasic, climbing out of bed, recieved 4mg of ativan, 5mg of haldol sequentially. Neurological exam was limited due to sedation, pt did not respond to basic orientation questions appropriately in [place], had intermittent nature to nonsensical speech, but was able to show tongue, raise his arms - on mimicry only, R hemiparesis. Noted to have wet himself.   On neurology floor the patient has been intermittently agitated, receiving haldol, ativan as needed.      MEDICATIONS (HOME):  Home Medications:  amLODIPine 10 mg oral tablet: 1 tab(s) orally once a day (31 Jul 2019 00:51)  atorvastatin 80 mg oral tablet: 1 tab(s) orally once a day (at bedtime) (15 Aug 2019 11:58)  carvedilol 25 mg oral tablet: 1 tab(s) orally every 12 hours (31 Jul 2019 00:51)  dexamethasone 1 mg oral tablet: 1 tab(s) orally once a day (31 Jul 2019 00:51)  docusate sodium 100 mg oral capsule: 1 cap(s) orally 3 times a day (15 Aug 2019 10:51)  famotidine 20 mg oral tablet: 1 tab(s) orally once a day (31 Jul 2019 00:51)  heparin: 5000 unit(s) subcutaneous every 8 hours (15 Aug 2019 10:51)  insulin glargine: 16 unit(s) subcutaneous once a day (at bedtime) (15 Aug 2019 10:51)  levETIRAcetam 500 mg oral tablet: 1 tab(s) orally 2 times a day (31 Jul 2019 00:51)  losartan 100 mg oral tablet: 1 tab(s) orally once a day (05 Nov 2019 22:29)  metFORMIN 850 mg oral tablet: 1 tab(s) orally 2 times a day (05 Nov 2019 22:29)  senna oral tablet: 2 tab(s) orally once a day (at bedtime) (15 Aug 2019 10:51)    MEDICATIONS  (STANDING):  amLODIPine   Tablet 10 milliGRAM(s) Oral daily  atorvastatin 80 milliGRAM(s) Oral at bedtime  carvedilol 12.5 milliGRAM(s) Oral every 12 hours  dexAMETHasone  Injectable 4 milliGRAM(s) IV Push every 12 hours  dextrose 5%. 1000 milliLiter(s) (50 mL/Hr) IV Continuous <Continuous>  dextrose 50% Injectable 12.5 Gram(s) IV Push once  dextrose 50% Injectable 25 Gram(s) IV Push once  dextrose 50% Injectable 25 Gram(s) IV Push once  enoxaparin Injectable 40 milliGRAM(s) SubCutaneous daily  famotidine    Tablet 20 milliGRAM(s) Oral daily  insulin lispro (HumaLOG) corrective regimen sliding scale   SubCutaneous three times a day before meals  insulin lispro (HumaLOG) corrective regimen sliding scale   SubCutaneous at bedtime  levETIRAcetam  IVPB 750 milliGRAM(s) IV Intermittent every 12 hours  LORazepam   Injectable 2 milliGRAM(s) IV Push once  senna 2 Tablet(s) Oral at bedtime  sodium chloride 0.9% Bolus 250 milliLiter(s) IV Bolus once  sodium chloride 0.9%. 1000 milliLiter(s) (75 mL/Hr) IV Continuous <Continuous>  sodium chloride 0.9%. 1000 milliLiter(s) (75 mL/Hr) IV Continuous <Continuous>    MEDICATIONS  (PRN):  acetaminophen  Suppository .. 650 milliGRAM(s) Rectal every 6 hours PRN Temp greater or equal to 38C (100.4F), Mild Pain (1 - 3)  dextrose 40% Gel 15 Gram(s) Oral once PRN Blood Glucose LESS THAN 70 milliGRAM(s)/deciliter  glucagon  Injectable 1 milliGRAM(s) IntraMuscular once PRN Glucose LESS THAN 70 milligrams/deciliter  haloperidol    Injectable 2.5 milliGRAM(s) IntraMuscular every 6 hours PRN Agitation  labetalol Injectable 10 milliGRAM(s) IV Push every 2 hours PRN Systolic blood pressure > 170  nystatin Powder 1 Application(s) Topical every 8 hours PRN rash/itching    ALLERGIES/INTOLERANCES:  Allergies  No Known Allergies    Intolerances    VITALS & EXAMINATION:  Vital Signs Last 24 Hrs  T(C): 36.4 (07 Nov 2019 12:03), Max: 37.1 (07 Nov 2019 00:00)  T(F): 97.5 (07 Nov 2019 12:03), Max: 98.7 (07 Nov 2019 00:00)  HR: 75 (07 Nov 2019 13:55) (68 - 98)  BP: 182/89 (07 Nov 2019 13:55) (157/82 - 199/101)  BP(mean): --  RR: 18 (07 Nov 2019 13:55) (18 - 22)  SpO2: 97% (07 Nov 2019 13:55) (96% - 99%)    PHYSICAL EXAMINATION  Constitutional: No apparent distress.  Head: Normocephalic & atraumatic.  Extremities: No edema, clubbing, cyanosis  Skin: No rashes    Neurological:    MS: agitated, in bed in restraints, eyes open, periodically yelling or kicking, spitting, not answering questions or following commands  But does answer to orientation questions, AOx2 (name and "hospital" not [facility] or day or date)  Motor: all extremities moving, pulling with force against restraints periodically  Sensation: withdraws to touch all four extremities  Tone: high in all four extremities as he keeps moving them when touched      LABORATORY:  CBC                       12.5   13.17 )-----------( 358      ( 07 Nov 2019 09:45 )             37.8     Chem 11-07    143  |  105  |  29<H>  ----------------------------<  198<H>  3.6   |  25  |  0.96    Ca    9.4      07 Nov 2019 09:45    TPro  6.4  /  Alb  4.2  /  TBili  0.4  /  DBili  x   /  AST  24  /  ALT  21  /  AlkPhos  84  11-07    LFTs LIVER FUNCTIONS - ( 07 Nov 2019 09:45 )  Alb: 4.2 g/dL / Pro: 6.4 g/dL / ALK PHOS: 84 U/L / ALT: 21 U/L / AST: 24 U/L / GGT: x           Coagulopathy   Lipid Panel   A1c 11-06 [id number] 7.2    Cardiac enzymes     U/A SUBJECTIVE: No events overnight    HPI:   69 year old man with PMHx T2DM, stage IV glioblastoma multiforme (dx in 2018), HTN and PSHx appendectomy presents to the ED with 3 weeks of increased R hemiparesis, worsened aphasia and urinary incontinence, sent from his neruo-oncologist Dr. Cuba's office. Pt has had resection, chemo radiation, most recently was noted to be clinically worse in March 2019 with increased somnolence and steroids were increased. Per his neuro-onc, concern for progression of disease. In ER, pt no family at bedside, pt was agitated, confused, aphasic, climbing out of bed, recieved 4mg of ativan, 5mg of haldol sequentially. Neurological exam was limited due to sedation, pt did not respond to basic orientation questions appropriately in [place], had intermittent nature to nonsensical speech, but was able to show tongue, raise his arms - on mimicry only, R hemiparesis. Noted to have wet himself.   On neurology floor the patient has been intermittently agitated, receiving haldol, ativan as needed.      MEDICATIONS (HOME):  Home Medications:  amLODIPine 10 mg oral tablet: 1 tab(s) orally once a day (31 Jul 2019 00:51)  atorvastatin 80 mg oral tablet: 1 tab(s) orally once a day (at bedtime) (15 Aug 2019 11:58)  carvedilol 25 mg oral tablet: 1 tab(s) orally every 12 hours (31 Jul 2019 00:51)  dexamethasone 1 mg oral tablet: 1 tab(s) orally once a day (31 Jul 2019 00:51)  docusate sodium 100 mg oral capsule: 1 cap(s) orally 3 times a day (15 Aug 2019 10:51)  famotidine 20 mg oral tablet: 1 tab(s) orally once a day (31 Jul 2019 00:51)  heparin: 5000 unit(s) subcutaneous every 8 hours (15 Aug 2019 10:51)  insulin glargine: 16 unit(s) subcutaneous once a day (at bedtime) (15 Aug 2019 10:51)  levETIRAcetam 500 mg oral tablet: 1 tab(s) orally 2 times a day (31 Jul 2019 00:51)  losartan 100 mg oral tablet: 1 tab(s) orally once a day (05 Nov 2019 22:29)  metFORMIN 850 mg oral tablet: 1 tab(s) orally 2 times a day (05 Nov 2019 22:29)  senna oral tablet: 2 tab(s) orally once a day (at bedtime) (15 Aug 2019 10:51)    MEDICATIONS  (STANDING):  amLODIPine   Tablet 10 milliGRAM(s) Oral daily  atorvastatin 80 milliGRAM(s) Oral at bedtime  carvedilol 12.5 milliGRAM(s) Oral every 12 hours  dexAMETHasone  Injectable 4 milliGRAM(s) IV Push every 12 hours  dextrose 5%. 1000 milliLiter(s) (50 mL/Hr) IV Continuous <Continuous>  dextrose 50% Injectable 12.5 Gram(s) IV Push once  dextrose 50% Injectable 25 Gram(s) IV Push once  dextrose 50% Injectable 25 Gram(s) IV Push once  enoxaparin Injectable 40 milliGRAM(s) SubCutaneous daily  famotidine    Tablet 20 milliGRAM(s) Oral daily  insulin lispro (HumaLOG) corrective regimen sliding scale   SubCutaneous three times a day before meals  insulin lispro (HumaLOG) corrective regimen sliding scale   SubCutaneous at bedtime  levETIRAcetam  IVPB 750 milliGRAM(s) IV Intermittent every 12 hours  LORazepam   Injectable 2 milliGRAM(s) IV Push once  senna 2 Tablet(s) Oral at bedtime  sodium chloride 0.9% Bolus 250 milliLiter(s) IV Bolus once  sodium chloride 0.9%. 1000 milliLiter(s) (75 mL/Hr) IV Continuous <Continuous>  sodium chloride 0.9%. 1000 milliLiter(s) (75 mL/Hr) IV Continuous <Continuous>    MEDICATIONS  (PRN):  acetaminophen  Suppository .. 650 milliGRAM(s) Rectal every 6 hours PRN Temp greater or equal to 38C (100.4F), Mild Pain (1 - 3)  dextrose 40% Gel 15 Gram(s) Oral once PRN Blood Glucose LESS THAN 70 milliGRAM(s)/deciliter  glucagon  Injectable 1 milliGRAM(s) IntraMuscular once PRN Glucose LESS THAN 70 milligrams/deciliter  haloperidol    Injectable 2.5 milliGRAM(s) IntraMuscular every 6 hours PRN Agitation  labetalol Injectable 10 milliGRAM(s) IV Push every 2 hours PRN Systolic blood pressure > 170  nystatin Powder 1 Application(s) Topical every 8 hours PRN rash/itching    ALLERGIES/INTOLERANCES:  Allergies  No Known Allergies    VITALS & EXAMINATION:  Vital Signs Last 24 Hrs  T(C): 36.4 (07 Nov 2019 12:03), Max: 37.1 (07 Nov 2019 00:00)  T(F): 97.5 (07 Nov 2019 12:03), Max: 98.7 (07 Nov 2019 00:00)  HR: 75 (07 Nov 2019 13:55) (68 - 98)  BP: 182/89 (07 Nov 2019 13:55) (157/82 - 199/101)  BP(mean): --  RR: 18 (07 Nov 2019 13:55) (18 - 22)  SpO2: 97% (07 Nov 2019 13:55) (96% - 99%)    PHYSICAL EXAMINATION  Constitutional: No apparent distress.  Head: Normocephalic & atraumatic.  Extremities: No edema, clubbing, cyanosis  Skin: No rashes    Neurological:  MS: agitated, in bed in restraints, eyes open, periodically yelling or kicking, spitting, not answering questions or following commands  But does answer to orientation questions, AOx2 (name and "hospital" not [facility] or day or date)  Motor: all extremities moving, pulling with force against restraints periodically  Sensation: withdraws to touch all four extremities  Tone: high in all four extremities as he keeps moving them when touched      LABORATORY:  CBC                       12.5   13.17 )-----------( 358      ( 07 Nov 2019 09:45 )             37.8     Chem 11-07    143  |  105  |  29<H>  ----------------------------<  198<H>  3.6   |  25  |  0.96    Ca    9.4      07 Nov 2019 09:45    TPro  6.4  /  Alb  4.2  /  TBili  0.4  /  DBili  x   /  AST  24  /  ALT  21  /  AlkPhos  84  11-07    LFTs LIVER FUNCTIONS - ( 07 Nov 2019 09:45 )  Alb: 4.2 g/dL / Pro: 6.4 g/dL / ALK PHOS: 84 U/L / ALT: 21 U/L / AST: 24 U/L / GGT: x           A1c 11-06 [id number] 7.2%

## 2019-11-07 NOTE — PROGRESS NOTE ADULT - ASSESSMENT
69 year old man with PMHx T2DM, stage IV glioblastoma multiforme (dx in 2018), HTN and PSHx appendectomy presents to the ED with 3 weeks of increased R hemiparesis, worsened aphasia and urinary incontinence, sent from his neruo-oncologist Dr. Cuba's office. Pt has had resection, chemo radiation, most recently was noted to be clinically worse in March 2019 with increased somnolence and steroids were increased. Per his neuro-onc, concern for progression of disease. In ER, pt no family at bedside, pt was agitated, confused, aphasic, climbing out of bed, recieved 4mg of ativan, 5mg of haldol sequentially. Neurological exam was limited due to sedation, pt did not respond to basic orientation questions appropriately in [place], had intermittent nature to nonsensical speech, but was able to show tongue, raise his arms - on mimicry only, R hemiparesis. Noted to have wet himself.  Admitted to Neurology for further evaluation.    Impression: Suspect worsened 'confusion,' aphasia maybe from partial/focal seizures emanating from tumor site of L temporal lobe vs. POD not visualized on CT vs. toxic metabolic encephalopathy.     [x] NeuroSx - no acute intervention  [x] UA, CXR negative    [] increased keppra maintenance to 750 BID  [] Decadron 4mg BID  [x] MRI Brain w/ and w/o seng to assess brain tumor  [x] vEEG x 24 hours  [] NPO, failed dysphagia screen today,   [] Haldol 2.5mg IM q6H PRN for agitation  [] aspirin 81mg  held  [] f/u neuro-onc Dr. Cuba recs  [x] CTA chest - no PE  [] LE dopplers pending

## 2019-11-07 NOTE — CHART NOTE - NSCHARTNOTEFT_GEN_A_CORE
Pts BP still uncontrolled.  Consulted cardiology and will put patient on cardene drip titrated to SBP < 160.  Pt to be transferred to stroke unit.

## 2019-11-07 NOTE — PROVIDER CONTACT NOTE (OTHER) - ACTION/TREATMENT ORDERED:
Ativan 2mg IVP given a per order. Will reassess BP when patient calms down. Restraints remain in place.

## 2019-11-07 NOTE — PROGRESS NOTE ADULT - ASSESSMENT
69 year old man with PMHx T2DM, stage IV glioblastoma multiforme (dx in 2018), HTN and PSHx appendectomy presents to the ED with 3 weeks of increased R hemiparesis, worsened aphasia and urinary incontinence, sent from his neruo-oncologist Dr. Cuba's office. Pt has had resection, chemo radiation, most recently was noted to be clinically worse in March 2019 with increased somnolence and steroids were increased. Per his neuro-onc, concern for progression of disease lessened given MRI results.  Patient's BP has been elevated and difficult to control.  Patient continues to be aphasic, possibly from partial/focal seizures emanating from tumor site of L temporal lobe vs. toxic metabolic encephalopathy.      []  NGT for ability to start home HTN meds and for feeding  [] SPSW consult  [x] NeuroSx - no acute intervention  [x] UA, CXR negative  [x] increased keppra maintenance to 750 BID  [x] Decadron 4mg BID  [x] MRI Brain w/ and w/o seng to assess brain tumor - no significant change from prior study  [x] vEEG x 24 hours- L focal cerebral dysfunction, no epileptiform activity  [] NPO, did not pass dysphagia screen  [] Haldol 2.5mg IM q6H PRN for agitation  [] aspirin 81mg  held  [] f/u neuro-onc Dr. Cuba recs  [] restraints, 1:1, haldol PRN  [x] CTA chest - no PE  [] LE dopplers pending

## 2019-11-07 NOTE — EEG REPORT - NS EEG TEXT BOX
ARSENIO DARNELL [id number]     Study Date: 		11-06-19 14:07   End Date:                       11-07-19 07:27  Off EEG for MRI from 21:08 to 02:02    Continuous EEG    Technical Information:			  		  Placement and Labeling of Electrodes:  The EEG was performed utilizing 20 channels referential EEG connections (coronal over temporal over parasagittal montage) using all standard 10-20 electrode placements with EKG.  Recording was at a sampling rate of 256 samples per second per channel.  Time synchronized digital video recording was done simultaneously with EEG recording.  A low light infrared camera was used for low light recording.  Delio and seizure detection algorithms were utilized.    CSA Technical Component:  Quantitative EEG analysis using a separate Compressed Spectral Array (CSA) software package was conducted in real-time and run at bedside after set up by the technician, digitally displaying the power of electrographic frequencies included in the 1-30Hz band using a graded color map.  This data was reviewed and interpreted independently, and is reported in a separate section below.    --------------------------------------------------------------------------------------------------  History:  CC/ HPI Patient is a 69y old  Male who presents with a chief complaint of worsened R hemiparesis and aphasia (06 Nov 2019 17:21)    MEDICATIONS  (STANDING):  amLODIPine   Tablet 10 milliGRAM(s) Oral daily  atorvastatin 80 milliGRAM(s) Oral at bedtime  carvedilol 12.5 milliGRAM(s) Oral every 12 hours  dexAMETHasone  Injectable 4 milliGRAM(s) IV Push every 12 hours  dextrose 5%. 1000 milliLiter(s) (50 mL/Hr) IV Continuous <Continuous>  dextrose 50% Injectable 12.5 Gram(s) IV Push once  dextrose 50% Injectable 25 Gram(s) IV Push once  dextrose 50% Injectable 25 Gram(s) IV Push once  enoxaparin Injectable 40 milliGRAM(s) SubCutaneous daily  famotidine    Tablet 20 milliGRAM(s) Oral daily  insulin lispro (HumaLOG) corrective regimen sliding scale   SubCutaneous three times a day before meals  insulin lispro (HumaLOG) corrective regimen sliding scale   SubCutaneous at bedtime  levETIRAcetam  IVPB 750 milliGRAM(s) IV Intermittent every 12 hours  senna 2 Tablet(s) Oral at bedtime  sodium chloride 0.9% Bolus 250 milliLiter(s) IV Bolus once  sodium chloride 0.9%. 1000 milliLiter(s) (75 mL/Hr) IV Continuous <Continuous>    --------------------------------------------------------------------------------------------------  Study Interpretation:    [[[Abbreviation Key:  PDR=alpha rhythm/posterior dominant rhythm. A-P=anterior posterior gradient.  Amplitude: ‘very low’:<20; ‘low’:20-50; ‘medium’:; ‘high’:>200uV.  Persistence for periodic/rhythmic patterns (% of epoch) ‘rare’:<1%; ‘occasional’:1-10%; ‘frequent’:10-50%; ‘abundant’:50-90%; ‘continuous’:>90%.  Persistence for sporadic discharges: ‘rare’:<1/hr; ‘occasional’:1/min-1/hr; ‘frequent’:>1/min; ‘abundant’:>1/10 sec.  GRDA=generalized rhythmic delta activity, LRDA=lateralized rhythmic delta activity, TIRDA=temporal intermittent rhythmic delta activity, FIRDA=frontal intermittent rhythmic activity. LPD=PLED=lateralized periodic discharges, GPD=generalized periodic discharges, BiPDs=BiPLEDs=bilateral independent periodic epileptiform discharges, SIRPID=stimulus induced rhythmic, periodic, or ictal appearing discharges.  Modifiers: +F=with fast component, +S=with spike component, +R=with rhythmic component.  S-B=burst suppression pattern.  Max=maximal. N1-drowsy, N2-stage II sleep, N3-slow wave sleep.  HV=hyperventilation, PS=photic stimulation]]]    FINDINGS:  The background was continuous, spontaneously variable and reactive.  During wakefulness, the posteriorly dominant rhythm was poorly modulated to 9.5hz seen better over the right.    There was left hemisphere irregular theta and delta activity present.    Sleep Background:  Drowsiness was characterized by fragmentation, attenuation, and slowing of the background activity.    Sleep was characterized by the presence of symmetric spindles, and K-complexes.      Non-Epileptiform Activity:  Breach artifact in the left fronto-temporal region.    Epileptiform Activity:   No epileptiform discharges were present.    Events:  No clinical events were recorded.  No seizures were recorded.    Activation Procedures:   -Hyperventilation was not performed.    -Photic stimulation was not performed.    Artifacts:  Intermittent myogenic and movement artifacts were noted.  Off EEG for MRI from 21:08 to 02:02    ECG:  The heart rate on single channel ECG at baseline was predominantly near BPM = 60-70  -----------------------------------------------------------------------------------------------------    EEG Classification / Summary:  Abnormal EEG study  Left hemispheric slowing, persistent, polymorphic  Breach artifact in the left fronto-temporal region.    -----------------------------------------------------------------------------------------------------    Clinical Impression:  Left sided focal cerebral dysfunction.  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  There were no epileptiform abnormalities recorded.      -------------------------------------------------------------------------------------------------------  Rosalie Balbuena DO  Epilepsy Fellow, [facility] Epilepsy [place] ARSENIO DARNELL [id number]     Study Date: 		11-06-19 14:07   End Date:                       11-07-19 07:27  Off EEG for MRI from 21:08 to 02:02    Continuous EEG    Technical Information:			  		  Placement and Labeling of Electrodes:  The EEG was performed utilizing 20 channels referential EEG connections (coronal over temporal over parasagittal montage) using all standard 10-20 electrode placements with EKG.  Recording was at a sampling rate of 256 samples per second per channel.  Time synchronized digital video recording was done simultaneously with EEG recording.  A low light infrared camera was used for low light recording.  Delio and seizure detection algorithms were utilized.    CSA Technical Component:  Quantitative EEG analysis using a separate Compressed Spectral Array (CSA) software package was conducted in real-time and run at bedside after set up by the technician, digitally displaying the power of electrographic frequencies included in the 1-30Hz band using a graded color map.  This data was reviewed and interpreted independently, and is reported in a separate section below.    --------------------------------------------------------------------------------------------------  History:  CC/ HPI Patient is a 69y old  Male who presents with a chief complaint of worsened R hemiparesis and aphasia (06 Nov 2019 17:21)    MEDICATIONS  (STANDING):  amLODIPine   Tablet 10 milliGRAM(s) Oral daily  atorvastatin 80 milliGRAM(s) Oral at bedtime  carvedilol 12.5 milliGRAM(s) Oral every 12 hours  dexAMETHasone  Injectable 4 milliGRAM(s) IV Push every 12 hours  enoxaparin Injectable 40 milliGRAM(s) SubCutaneous daily  famotidine    Tablet 20 milliGRAM(s) Oral daily  insulin lispro (HumaLOG) corrective regimen sliding scale   SubCutaneous three times a day before meals  insulin lispro (HumaLOG) corrective regimen sliding scale   SubCutaneous at bedtime  levETIRAcetam  IVPB 750 milliGRAM(s) IV Intermittent every 12 hours  senna 2 Tablet(s) Oral at bedtime  sodium chloride 0.9% Bolus 250 milliLiter(s) IV Bolus once  sodium chloride 0.9%. 1000 milliLiter(s) (75 mL/Hr) IV Continuous <Continuous>    --------------------------------------------------------------------------------------------------  Study Interpretation:    [[[Abbreviation Key:  PDR=alpha rhythm/posterior dominant rhythm. A-P=anterior posterior gradient.  Amplitude: ‘very low’:<20; ‘low’:20-50; ‘medium’:; ‘high’:>200uV.  Persistence for periodic/rhythmic patterns (% of epoch) ‘rare’:<1%; ‘occasional’:1-10%; ‘frequent’:10-50%; ‘abundant’:50-90%; ‘continuous’:>90%.  Persistence for sporadic discharges: ‘rare’:<1/hr; ‘occasional’:1/min-1/hr; ‘frequent’:>1/min; ‘abundant’:>1/10 sec.  GRDA=generalized rhythmic delta activity, LRDA=lateralized rhythmic delta activity, TIRDA=temporal intermittent rhythmic delta activity, FIRDA=frontal intermittent rhythmic activity. LPD=PLED=lateralized periodic discharges, GPD=generalized periodic discharges, BiPDs=BiPLEDs=bilateral independent periodic epileptiform discharges, SIRPID=stimulus induced rhythmic, periodic, or ictal appearing discharges.  Modifiers: +F=with fast component, +S=with spike component, +R=with rhythmic component.  S-B=burst suppression pattern.  Max=maximal. N1-drowsy, N2-stage II sleep, N3-slow wave sleep.  HV=hyperventilation, PS=photic stimulation]]]    FINDINGS:  The background was continuous, spontaneously variable and reactive.  During wakefulness, the posteriorly dominant rhythm was poorly modulated to 9.5hz seen better over the right.    There was left hemisphere irregular theta and delta activity present.    Sleep Background:  Drowsiness was characterized by fragmentation, attenuation, and slowing of the background activity.    Sleep was characterized by the presence of symmetric spindles, and K-complexes.      Non-Epileptiform Activity:  Breach artifact in the left fronto-temporal region.    Epileptiform Activity:   No epileptiform discharges were present.    Events:  No clinical events were recorded.  No seizures were recorded.    Activation Procedures:   -Hyperventilation was not performed.    -Photic stimulation was not performed.    Artifacts:  Intermittent myogenic and movement artifacts were noted.  Off EEG for MRI from 21:08 to 02:02    ECG:  The heart rate on single channel ECG at baseline was predominantly near BPM = 60-70  -----------------------------------------------------------------------------------------------------    EEG Classification / Summary:  Abnormal EEG study  Left hemispheric slowing, persistent, polymorphic  Breach artifact in the left fronto-temporal region.    -----------------------------------------------------------------------------------------------------    Clinical Impression:  Left sided focal cerebral dysfunction.  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  There were no epileptiform abnormalities recorded.      -------------------------------------------------------------------------------------------------------  Rosalie Balbuena DO  Epilepsy Fellow, [facility] Epilepsy [place]

## 2019-11-07 NOTE — PROGRESS NOTE ADULT - SUBJECTIVE AND OBJECTIVE BOX
SUBJECTIVE: No events overnight    MEDICATIONS (HOME):  Home Medications:  amLODIPine 10 mg oral tablet: 1 tab(s) orally once a day (2019 00:51)  atorvastatin 80 mg oral tablet: 1 tab(s) orally once a day (at bedtime) (15 Aug 2019 11:58)  carvedilol 25 mg oral tablet: 1 tab(s) orally every 12 hours (2019 00:51)  dexamethasone 1 mg oral tablet: 1 tab(s) orally once a day (2019 00:51)  docusate sodium 100 mg oral capsule: 1 cap(s) orally 3 times a day (15 Aug 2019 10:51)  famotidine 20 mg oral tablet: 1 tab(s) orally once a day (2019 00:51)  heparin: 5000 unit(s) subcutaneous every 8 hours (15 Aug 2019 10:51)  insulin glargine: 16 unit(s) subcutaneous once a day (at bedtime) (15 Aug 2019 10:51)  levETIRAcetam 500 mg oral tablet: 1 tab(s) orally 2 times a day (2019 00:51)  losartan 100 mg oral tablet: 1 tab(s) orally once a day (2019 22:29)  metFORMIN 850 mg oral tablet: 1 tab(s) orally 2 times a day (2019 22:29)  senna oral tablet: 2 tab(s) orally once a day (at bedtime) (15 Aug 2019 10:51)    MEDICATIONS  (STANDING):  amLODIPine   Tablet 10 milliGRAM(s) Oral daily  atorvastatin 80 milliGRAM(s) Oral at bedtime  carvedilol 12.5 milliGRAM(s) Oral every 12 hours  dexAMETHasone  Injectable 4 milliGRAM(s) IV Push every 12 hours  dextrose 5%. 1000 milliLiter(s) (50 mL/Hr) IV Continuous <Continuous>  dextrose 50% Injectable 12.5 Gram(s) IV Push once  dextrose 50% Injectable 25 Gram(s) IV Push once  dextrose 50% Injectable 25 Gram(s) IV Push once  enoxaparin Injectable 40 milliGRAM(s) SubCutaneous daily  famotidine    Tablet 20 milliGRAM(s) Oral daily  insulin lispro (HumaLOG) corrective regimen sliding scale   SubCutaneous three times a day before meals  insulin lispro (HumaLOG) corrective regimen sliding scale   SubCutaneous at bedtime  levETIRAcetam  IVPB 750 milliGRAM(s) IV Intermittent every 12 hours  senna 2 Tablet(s) Oral at bedtime  sodium chloride 0.9% Bolus 250 milliLiter(s) IV Bolus once  sodium chloride 0.9%. 1000 milliLiter(s) (75 mL/Hr) IV Continuous <Continuous>  sodium chloride 0.9%. 1000 milliLiter(s) (75 mL/Hr) IV Continuous <Continuous>    MEDICATIONS  (PRN):  acetaminophen  Suppository .. 650 milliGRAM(s) Rectal every 6 hours PRN Temp greater or equal to 38C (100.4F), Mild Pain (1 - 3)  dextrose 40% Gel 15 Gram(s) Oral once PRN Blood Glucose LESS THAN 70 milliGRAM(s)/deciliter  glucagon  Injectable 1 milliGRAM(s) IntraMuscular once PRN Glucose LESS THAN 70 milligrams/deciliter  haloperidol    Injectable 2.5 milliGRAM(s) IntraMuscular every 6 hours PRN Agitation  labetalol Injectable 10 milliGRAM(s) IV Push every 2 hours PRN Systolic blood pressure > 170  nystatin Powder 1 Application(s) Topical every 8 hours PRN rash/itching    ALLERGIES/INTOLERANCES:  Allergies  No Known Allergies    Intolerances    VITALS & EXAMINATION:  Vital Signs Last 24 Hrs  T(C): 36.8 (2019 10:03), Max: 37.1 (2019 12:14)  T(F): 98.3 (2019 10:03), Max: 98.7 (2019 12:14)  HR: 84 (2019 10:03) (68 - 98)  BP: 175/89 (2019 10:51) (156/86 - 199/101)  BP(mean): --  RR: 18 (2019 10:03) (18 - 22)  SpO2: 97% (2019 10:03) (96% - 99%)    PHYSICAL EXAMINATION  Constitutional: No apparent distress.  Head: Normocephalic & atraumatic.  Extremities: No edema, clubbing, cyanosis  Skin: No rashes    Neurological:    Neurological Exam    Mental Status:  alert and oriented x3, fluent speech, following commands, repetition and naming intact    Cranial Nerves: eyes open, tracking,     Motor: moving all extremities    Deep Tendon Reflexes: increased tone in all extremities, and moving, so unable to elicit reflexes        LABORATORY:  CBC                       12.5   13.17 )-----------( 358      ( 2019 09:45 )             37.8     Chem     143  |  105  |  29<H>  ----------------------------<  198<H>  3.6   |  25  |  0.96    Ca    9.4      2019 09:45    TPro  6.4  /  Alb  4.2  /  TBili  0.4  /  DBili  x   /  AST  24  /  ALT  21  /  AlkPhos  84  11-07    LFTs LIVER FUNCTIONS - ( 2019 09:45 )  Alb: 4.2 g/dL / Pro: 6.4 g/dL / ALK PHOS: 84 U/L / ALT: 21 U/L / AST: 24 U/L / GGT: x           Coagulopathy PT/INR - ( 2019 12:42 )   PT: 11.0 sec;   INR: 0.97 ratio         PTT - ( 2019 12:42 )  PTT:28.9 sec  Lipid Panel   A1c  [id number] 7.2    Cardiac enzymes     U/A Urinalysis Basic - ( 2019 14:17 )    Color: Yellow / Appearance: Clear / S.030 / pH: x  Gluc: x / Ketone: Negative  / Bili: Negative / Urobili: 2 mg/dL   Blood: x / Protein: 30 mg/dL / Nitrite: Negative   Leuk Esterase: Negative / RBC: 2 /hpf / WBC 1 /HPF   Sq Epi: x / Non Sq Epi: 1 /hpf / Bacteria: Negative SUBJECTIVE: No events overnight    MEDICATIONS (HOME):  Home Medications:  amLODIPine 10 mg oral tablet: 1 tab(s) orally once a day (2019 00:51)  atorvastatin 80 mg oral tablet: 1 tab(s) orally once a day (at bedtime) (15 Aug 2019 11:58)  carvedilol 25 mg oral tablet: 1 tab(s) orally every 12 hours (2019 00:51)  dexamethasone 1 mg oral tablet: 1 tab(s) orally once a day (2019 00:51)  docusate sodium 100 mg oral capsule: 1 cap(s) orally 3 times a day (15 Aug 2019 10:51)  famotidine 20 mg oral tablet: 1 tab(s) orally once a day (2019 00:51)  heparin: 5000 unit(s) subcutaneous every 8 hours (15 Aug 2019 10:51)  insulin glargine: 16 unit(s) subcutaneous once a day (at bedtime) (15 Aug 2019 10:51)  levETIRAcetam 500 mg oral tablet: 1 tab(s) orally 2 times a day (2019 00:51)  losartan 100 mg oral tablet: 1 tab(s) orally once a day (2019 22:29)  metFORMIN 850 mg oral tablet: 1 tab(s) orally 2 times a day (2019 22:29)  senna oral tablet: 2 tab(s) orally once a day (at bedtime) (15 Aug 2019 10:51)    MEDICATIONS  (STANDING):  amLODIPine   Tablet 10 milliGRAM(s) Oral daily  atorvastatin 80 milliGRAM(s) Oral at bedtime  carvedilol 12.5 milliGRAM(s) Oral every 12 hours  dexAMETHasone  Injectable 4 milliGRAM(s) IV Push every 12 hours  dextrose 5%. 1000 milliLiter(s) (50 mL/Hr) IV Continuous <Continuous>  dextrose 50% Injectable 12.5 Gram(s) IV Push once  dextrose 50% Injectable 25 Gram(s) IV Push once  dextrose 50% Injectable 25 Gram(s) IV Push once  enoxaparin Injectable 40 milliGRAM(s) SubCutaneous daily  famotidine    Tablet 20 milliGRAM(s) Oral daily  insulin lispro (HumaLOG) corrective regimen sliding scale   SubCutaneous three times a day before meals  insulin lispro (HumaLOG) corrective regimen sliding scale   SubCutaneous at bedtime  levETIRAcetam  IVPB 750 milliGRAM(s) IV Intermittent every 12 hours  senna 2 Tablet(s) Oral at bedtime  sodium chloride 0.9% Bolus 250 milliLiter(s) IV Bolus once  sodium chloride 0.9%. 1000 milliLiter(s) (75 mL/Hr) IV Continuous <Continuous>  sodium chloride 0.9%. 1000 milliLiter(s) (75 mL/Hr) IV Continuous <Continuous>    MEDICATIONS  (PRN):  acetaminophen  Suppository .. 650 milliGRAM(s) Rectal every 6 hours PRN Temp greater or equal to 38C (100.4F), Mild Pain (1 - 3)  dextrose 40% Gel 15 Gram(s) Oral once PRN Blood Glucose LESS THAN 70 milliGRAM(s)/deciliter  glucagon  Injectable 1 milliGRAM(s) IntraMuscular once PRN Glucose LESS THAN 70 milligrams/deciliter  haloperidol    Injectable 2.5 milliGRAM(s) IntraMuscular every 6 hours PRN Agitation  labetalol Injectable 10 milliGRAM(s) IV Push every 2 hours PRN Systolic blood pressure > 170  nystatin Powder 1 Application(s) Topical every 8 hours PRN rash/itching    ALLERGIES/INTOLERANCES:  Allergies  No Known Allergies    Intolerances    VITALS & EXAMINATION:  Vital Signs Last 24 Hrs  T(C): 36.8 (2019 10:03), Max: 37.1 (2019 12:14)  T(F): 98.3 (2019 10:03), Max: 98.7 (2019 12:14)  HR: 84 (2019 10:03) (68 - 98)  BP: 175/89 (2019 10:51) (156/86 - 199/101)  BP(mean): --  RR: 18 (2019 10:03) (18 - 22)  SpO2: 97% (2019 10:03) (96% - 99%)    PHYSICAL EXAMINATION      Neurological:    Neurological Exam    Mental Status:  alert and oriented x2, agitated because he can't eat, and refusing therapy and NGT, and exam, so below is from observation    Cranial Nerves: eyes open, tracking,     Motor: moving all extremities    Deep Tendon Reflexes: increased tone in all extremities        LABORATORY:  CBC                       12.5   13.17 )-----------( 358      ( 2019 09:45 )             37.8     Chem 11-07    143  |  105  |  29<H>  ----------------------------<  198<H>  3.6   |  25  |  0.96    Ca    9.4      2019 09:45    TPro  6.4  /  Alb  4.2  /  TBili  0.4  /  DBili  x   /  AST  24  /  ALT  21  /  AlkPhos  84  11-07    LFTs LIVER FUNCTIONS - ( 2019 09:45 )  Alb: 4.2 g/dL / Pro: 6.4 g/dL / ALK PHOS: 84 U/L / ALT: 21 U/L / AST: 24 U/L / GGT: x           Coagulopathy PT/INR - ( 2019 12:42 )   PT: 11.0 sec;   INR: 0.97 ratio         PTT - ( 2019 12:42 )  PTT:28.9 sec  Lipid Panel   A1c  [id number] 7.2    Cardiac enzymes     U/A Urinalysis Basic - ( 2019 14:17 )    Color: Yellow / Appearance: Clear / S.030 / pH: x  Gluc: x / Ketone: Negative  / Bili: Negative / Urobili: 2 mg/dL   Blood: x / Protein: 30 mg/dL / Nitrite: Negative   Leuk Esterase: Negative / RBC: 2 /hpf / WBC 1 /HPF   Sq Epi: x / Non Sq Epi: 1 /hpf / Bacteria: Negative SUBJECTIVE: No events overnight    MEDICATIONS (HOME):  Home Medications:  amLODIPine 10 mg oral tablet: 1 tab(s) orally once a day (2019 00:51)  atorvastatin 80 mg oral tablet: 1 tab(s) orally once a day (at bedtime) (15 Aug 2019 11:58)  carvedilol 25 mg oral tablet: 1 tab(s) orally every 12 hours (2019 00:51)  dexamethasone 1 mg oral tablet: 1 tab(s) orally once a day (2019 00:51)  docusate sodium 100 mg oral capsule: 1 cap(s) orally 3 times a day (15 Aug 2019 10:51)  famotidine 20 mg oral tablet: 1 tab(s) orally once a day (2019 00:51)  heparin: 5000 unit(s) subcutaneous every 8 hours (15 Aug 2019 10:51)  insulin glargine: 16 unit(s) subcutaneous once a day (at bedtime) (15 Aug 2019 10:51)  levETIRAcetam 500 mg oral tablet: 1 tab(s) orally 2 times a day (2019 00:51)  losartan 100 mg oral tablet: 1 tab(s) orally once a day (2019 22:29)  metFORMIN 850 mg oral tablet: 1 tab(s) orally 2 times a day (2019 22:29)  senna oral tablet: 2 tab(s) orally once a day (at bedtime) (15 Aug 2019 10:51)    MEDICATIONS  (STANDING):  amLODIPine   Tablet 10 milliGRAM(s) Oral daily  atorvastatin 80 milliGRAM(s) Oral at bedtime  carvedilol 12.5 milliGRAM(s) Oral every 12 hours  dexAMETHasone  Injectable 4 milliGRAM(s) IV Push every 12 hours  dextrose 5%. 1000 milliLiter(s) (50 mL/Hr) IV Continuous <Continuous>  dextrose 50% Injectable 12.5 Gram(s) IV Push once  dextrose 50% Injectable 25 Gram(s) IV Push once  dextrose 50% Injectable 25 Gram(s) IV Push once  enoxaparin Injectable 40 milliGRAM(s) SubCutaneous daily  famotidine    Tablet 20 milliGRAM(s) Oral daily  insulin lispro (HumaLOG) corrective regimen sliding scale   SubCutaneous three times a day before meals  insulin lispro (HumaLOG) corrective regimen sliding scale   SubCutaneous at bedtime  levETIRAcetam  IVPB 750 milliGRAM(s) IV Intermittent every 12 hours  senna 2 Tablet(s) Oral at bedtime  sodium chloride 0.9% Bolus 250 milliLiter(s) IV Bolus once  sodium chloride 0.9%. 1000 milliLiter(s) (75 mL/Hr) IV Continuous <Continuous>  sodium chloride 0.9%. 1000 milliLiter(s) (75 mL/Hr) IV Continuous <Continuous>    MEDICATIONS  (PRN):  acetaminophen  Suppository .. 650 milliGRAM(s) Rectal every 6 hours PRN Temp greater or equal to 38C (100.4F), Mild Pain (1 - 3)  dextrose 40% Gel 15 Gram(s) Oral once PRN Blood Glucose LESS THAN 70 milliGRAM(s)/deciliter  glucagon  Injectable 1 milliGRAM(s) IntraMuscular once PRN Glucose LESS THAN 70 milligrams/deciliter  haloperidol    Injectable 2.5 milliGRAM(s) IntraMuscular every 6 hours PRN Agitation  labetalol Injectable 10 milliGRAM(s) IV Push every 2 hours PRN Systolic blood pressure > 170  nystatin Powder 1 Application(s) Topical every 8 hours PRN rash/itching    ALLERGIES/INTOLERANCES:  Allergies  No Known Allergies    Intolerances    VITALS & EXAMINATION:  Vital Signs Last 24 Hrs  T(C): 36.8 (2019 10:03), Max: 37.1 (2019 12:14)  T(F): 98.3 (2019 10:03), Max: 98.7 (2019 12:14)  HR: 84 (2019 10:03) (68 - 98)  BP: 175/89 (2019 10:51) (156/86 - 199/101)  BP(mean): --  RR: 18 (2019 10:03) (18 - 22)  SpO2: 97% (2019 10:03) (96% - 99%)    PHYSICAL EXAMINATION  Neurological Exam    Mental Status:  alert and oriented x2, agitated because he can't eat, and refusing therapy and NGT, and exam, so below is from observation    Cranial Nerves: eyes open, tracking,     Motor: moving all extremities    Deep Tendon Reflexes: increased tone in all extremities        LABORATORY:  CBC                       12.5   13.17 )-----------( 358      ( 2019 09:45 )             37.8     Chem 11-07    143  |  105  |  29<H>  ----------------------------<  198<H>  3.6   |  25  |  0.96    Ca    9.4      2019 09:45    TPro  6.4  /  Alb  4.2  /  TBili  0.4  /  DBili  x   /  AST  24  /  ALT  21  /  AlkPhos  84  11-07    LFTs LIVER FUNCTIONS - ( 2019 09:45 )  Alb: 4.2 g/dL / Pro: 6.4 g/dL / ALK PHOS: 84 U/L / ALT: 21 U/L / AST: 24 U/L / GGT: x           Coagulopathy PT/INR - ( 2019 12:42 )   PT: 11.0 sec;   INR: 0.97 ratio         PTT - ( 2019 12:42 )  PTT:28.9 sec  Lipid Panel   A1c  [id number] 7.2    Cardiac enzymes     U/A Urinalysis Basic - ( 2019 14:17 )    Color: Yellow / Appearance: Clear / S.030 / pH: x  Gluc: x / Ketone: Negative  / Bili: Negative / Urobili: 2 mg/dL   Blood: x / Protein: 30 mg/dL / Nitrite: Negative   Leuk Esterase: Negative / RBC: 2 /hpf / WBC 1 /HPF   Sq Epi: x / Non Sq Epi: 1 /hpf / Bacteria: Negative

## 2019-11-07 NOTE — PROVIDER CONTACT NOTE (OTHER) - ASSESSMENT
Pt alert and responsive. Oriented to self and location. Patient thrashing and pulling at restraints. Pt spitting and kicking when in close proximity.

## 2019-11-08 DIAGNOSIS — I16.0 HYPERTENSIVE URGENCY: ICD-10-CM

## 2019-11-08 DIAGNOSIS — E11.9 TYPE 2 DIABETES MELLITUS WITHOUT COMPLICATIONS: ICD-10-CM

## 2019-11-08 DIAGNOSIS — C71.9 MALIGNANT NEOPLASM OF BRAIN, UNSPECIFIED: ICD-10-CM

## 2019-11-08 DIAGNOSIS — R13.10 DYSPHAGIA, UNSPECIFIED: ICD-10-CM

## 2019-11-08 LAB
ANION GAP SERPL CALC-SCNC: 17 MMOL/L — SIGNIFICANT CHANGE UP (ref 5–17)
APPEARANCE UR: ABNORMAL
BACTERIA # UR AUTO: ABNORMAL
BASOPHILS # BLD AUTO: 0.01 K/UL — SIGNIFICANT CHANGE UP (ref 0–0.2)
BASOPHILS NFR BLD AUTO: 0.1 % — SIGNIFICANT CHANGE UP (ref 0–2)
BILIRUB UR-MCNC: NEGATIVE — SIGNIFICANT CHANGE UP
BUN SERPL-MCNC: 25 MG/DL — HIGH (ref 7–23)
CALCIUM SERPL-MCNC: 9.6 MG/DL — SIGNIFICANT CHANGE UP (ref 8.4–10.5)
CHLORIDE SERPL-SCNC: 106 MMOL/L — SIGNIFICANT CHANGE UP (ref 96–108)
CO2 SERPL-SCNC: 22 MMOL/L — SIGNIFICANT CHANGE UP (ref 22–31)
COLOR SPEC: ABNORMAL
CREAT SERPL-MCNC: 0.92 MG/DL — SIGNIFICANT CHANGE UP (ref 0.5–1.3)
DIFF PNL FLD: NEGATIVE — SIGNIFICANT CHANGE UP
EOSINOPHIL # BLD AUTO: 0 K/UL — SIGNIFICANT CHANGE UP (ref 0–0.5)
EOSINOPHIL NFR BLD AUTO: 0 % — SIGNIFICANT CHANGE UP (ref 0–6)
EPI CELLS # UR: 0 /HPF — SIGNIFICANT CHANGE UP
GLUCOSE BLDC GLUCOMTR-MCNC: 169 MG/DL — HIGH (ref 70–99)
GLUCOSE BLDC GLUCOMTR-MCNC: 174 MG/DL — HIGH (ref 70–99)
GLUCOSE BLDC GLUCOMTR-MCNC: 199 MG/DL — HIGH (ref 70–99)
GLUCOSE BLDC GLUCOMTR-MCNC: 218 MG/DL — HIGH (ref 70–99)
GLUCOSE SERPL-MCNC: 211 MG/DL — HIGH (ref 70–99)
GLUCOSE UR QL: ABNORMAL
HCT VFR BLD CALC: 37.3 % — LOW (ref 39–50)
HCT VFR BLD CALC: 38.8 % — LOW (ref 39–50)
HGB BLD-MCNC: 12.2 G/DL — LOW (ref 13–17)
HGB BLD-MCNC: 13.3 G/DL — SIGNIFICANT CHANGE UP (ref 13–17)
HYALINE CASTS # UR AUTO: 0 /LPF — SIGNIFICANT CHANGE UP (ref 0–2)
IMM GRANULOCYTES NFR BLD AUTO: 0.6 % — SIGNIFICANT CHANGE UP (ref 0–1.5)
KETONES UR-MCNC: SIGNIFICANT CHANGE UP
LEUKOCYTE ESTERASE UR-ACNC: ABNORMAL
LYMPHOCYTES # BLD AUTO: 0.53 K/UL — LOW (ref 1–3.3)
LYMPHOCYTES # BLD AUTO: 3.3 % — LOW (ref 13–44)
MCHC RBC-ENTMCNC: 29.7 PG — SIGNIFICANT CHANGE UP (ref 27–34)
MCHC RBC-ENTMCNC: 31 PG — SIGNIFICANT CHANGE UP (ref 27–34)
MCHC RBC-ENTMCNC: 32.7 GM/DL — SIGNIFICANT CHANGE UP (ref 32–36)
MCHC RBC-ENTMCNC: 34.3 GM/DL — SIGNIFICANT CHANGE UP (ref 32–36)
MCV RBC AUTO: 90.4 FL — SIGNIFICANT CHANGE UP (ref 80–100)
MCV RBC AUTO: 90.8 FL — SIGNIFICANT CHANGE UP (ref 80–100)
MONOCYTES # BLD AUTO: 0.87 K/UL — SIGNIFICANT CHANGE UP (ref 0–0.9)
MONOCYTES NFR BLD AUTO: 5.4 % — SIGNIFICANT CHANGE UP (ref 2–14)
NEUTROPHILS # BLD AUTO: 14.48 K/UL — HIGH (ref 1.8–7.4)
NEUTROPHILS NFR BLD AUTO: 90.6 % — HIGH (ref 43–77)
NITRITE UR-MCNC: NEGATIVE — SIGNIFICANT CHANGE UP
NRBC # BLD: 0 /100 WBCS — SIGNIFICANT CHANGE UP (ref 0–0)
NRBC # BLD: 0 /100 WBCS — SIGNIFICANT CHANGE UP (ref 0–0)
PH UR: 8 — SIGNIFICANT CHANGE UP (ref 5–8)
PLATELET # BLD AUTO: 331 K/UL — SIGNIFICANT CHANGE UP (ref 150–400)
PLATELET # BLD AUTO: 353 K/UL — SIGNIFICANT CHANGE UP (ref 150–400)
POTASSIUM SERPL-MCNC: 3.6 MMOL/L — SIGNIFICANT CHANGE UP (ref 3.5–5.3)
POTASSIUM SERPL-SCNC: 3.6 MMOL/L — SIGNIFICANT CHANGE UP (ref 3.5–5.3)
PROT UR-MCNC: SIGNIFICANT CHANGE UP
RBC # BLD: 4.11 M/UL — LOW (ref 4.2–5.8)
RBC # BLD: 4.29 M/UL — SIGNIFICANT CHANGE UP (ref 4.2–5.8)
RBC # FLD: 13.6 % — SIGNIFICANT CHANGE UP (ref 10.3–14.5)
RBC # FLD: 13.9 % — SIGNIFICANT CHANGE UP (ref 10.3–14.5)
RBC CASTS # UR COMP ASSIST: 1 /HPF — SIGNIFICANT CHANGE UP (ref 0–4)
SODIUM SERPL-SCNC: 145 MMOL/L — SIGNIFICANT CHANGE UP (ref 135–145)
SP GR SPEC: 1.02 — SIGNIFICANT CHANGE UP (ref 1.01–1.02)
UROBILINOGEN FLD QL: NEGATIVE — SIGNIFICANT CHANGE UP
WBC # BLD: 15.99 K/UL — HIGH (ref 3.8–10.5)
WBC # BLD: 20.19 K/UL — HIGH (ref 3.8–10.5)
WBC # FLD AUTO: 15.99 K/UL — HIGH (ref 3.8–10.5)
WBC # FLD AUTO: 20.19 K/UL — HIGH (ref 3.8–10.5)
WBC UR QL: 13 /HPF — HIGH (ref 0–5)

## 2019-11-08 PROCEDURE — 71045 X-RAY EXAM CHEST 1 VIEW: CPT | Mod: 26

## 2019-11-08 PROCEDURE — 71045 X-RAY EXAM CHEST 1 VIEW: CPT | Mod: 26,77

## 2019-11-08 PROCEDURE — 43752 NASAL/OROGASTRIC W/TUBE PLMT: CPT | Mod: 59

## 2019-11-08 PROCEDURE — 31575 DIAGNOSTIC LARYNGOSCOPY: CPT

## 2019-11-08 PROCEDURE — 99222 1ST HOSP IP/OBS MODERATE 55: CPT | Mod: 25

## 2019-11-08 PROCEDURE — 99232 SBSQ HOSP IP/OBS MODERATE 35: CPT

## 2019-11-08 RX ORDER — CEFTRIAXONE 500 MG/1
1000 INJECTION, POWDER, FOR SOLUTION INTRAMUSCULAR; INTRAVENOUS ONCE
Refills: 0 | Status: COMPLETED | OUTPATIENT
Start: 2019-11-08 | End: 2019-11-08

## 2019-11-08 RX ORDER — CEFTRIAXONE 500 MG/1
1000 INJECTION, POWDER, FOR SOLUTION INTRAMUSCULAR; INTRAVENOUS EVERY 24 HOURS
Refills: 0 | Status: DISCONTINUED | OUTPATIENT
Start: 2019-11-09 | End: 2019-11-12

## 2019-11-08 RX ORDER — CEFTRIAXONE 500 MG/1
INJECTION, POWDER, FOR SOLUTION INTRAMUSCULAR; INTRAVENOUS
Refills: 0 | Status: DISCONTINUED | OUTPATIENT
Start: 2019-11-08 | End: 2019-11-12

## 2019-11-08 RX ADMIN — Medication 20 MILLIGRAM(S): at 20:20

## 2019-11-08 RX ADMIN — Medication 1000 MILLIGRAM(S): at 04:49

## 2019-11-08 RX ADMIN — ATORVASTATIN CALCIUM 80 MILLIGRAM(S): 80 TABLET, FILM COATED ORAL at 23:27

## 2019-11-08 RX ADMIN — LEVETIRACETAM 400 MILLIGRAM(S): 250 TABLET, FILM COATED ORAL at 17:40

## 2019-11-08 RX ADMIN — SENNA PLUS 2 TABLET(S): 8.6 TABLET ORAL at 23:27

## 2019-11-08 RX ADMIN — SODIUM CHLORIDE 75 MILLILITER(S): 9 INJECTION INTRAMUSCULAR; INTRAVENOUS; SUBCUTANEOUS at 07:53

## 2019-11-08 RX ADMIN — NICARDIPINE HYDROCHLORIDE 20 MG/HR: 30 CAPSULE, EXTENDED RELEASE ORAL at 06:31

## 2019-11-08 RX ADMIN — Medication 1: at 07:53

## 2019-11-08 RX ADMIN — OLANZAPINE 10 MILLIGRAM(S): 15 TABLET, FILM COATED ORAL at 05:20

## 2019-11-08 RX ADMIN — LEVETIRACETAM 400 MILLIGRAM(S): 250 TABLET, FILM COATED ORAL at 06:30

## 2019-11-08 RX ADMIN — Medication 2: at 12:28

## 2019-11-08 RX ADMIN — Medication 4 MILLIGRAM(S): at 05:23

## 2019-11-08 RX ADMIN — Medication 400 MILLIGRAM(S): at 04:19

## 2019-11-08 RX ADMIN — OLANZAPINE 10 MILLIGRAM(S): 15 TABLET, FILM COATED ORAL at 14:43

## 2019-11-08 RX ADMIN — Medication 4 MILLIGRAM(S): at 17:41

## 2019-11-08 RX ADMIN — ENOXAPARIN SODIUM 40 MILLIGRAM(S): 100 INJECTION SUBCUTANEOUS at 12:22

## 2019-11-08 RX ADMIN — SODIUM CHLORIDE 75 MILLILITER(S): 9 INJECTION INTRAMUSCULAR; INTRAVENOUS; SUBCUTANEOUS at 06:31

## 2019-11-08 RX ADMIN — SODIUM CHLORIDE 75 MILLILITER(S): 9 INJECTION INTRAMUSCULAR; INTRAVENOUS; SUBCUTANEOUS at 23:27

## 2019-11-08 RX ADMIN — CEFTRIAXONE 100 MILLIGRAM(S): 500 INJECTION, POWDER, FOR SOLUTION INTRAMUSCULAR; INTRAVENOUS at 17:40

## 2019-11-08 RX ADMIN — OLANZAPINE 10 MILLIGRAM(S): 15 TABLET, FILM COATED ORAL at 22:39

## 2019-11-08 RX ADMIN — Medication 1: at 17:40

## 2019-11-08 NOTE — SWALLOW BEDSIDE ASSESSMENT ADULT - DIET PRIOR TO ADMI
regular/thin liquids unknown; per patient's sister, he was on po diet without restrictions at prior nursing home. Patient's sister endorsed he had swallow evaluation 2 months prior, though uncertain of results, clinician will attempt to contact facility. unknown; per patient's sister, he was on po diet without restrictions at prior nursing home. Nursing home contacted by this clinician and endorsed the patient was tolerating a regular solid/thin liquid diet. Patient's sister endorsed he had seen speech therapy during his stay at prior nursing facility, though uncertain of specific results, per clinician's discussion with therapy team at nursing facility, patient was last d/c'd from speech therapy on 10/14 for cognitive therapy.

## 2019-11-08 NOTE — PROGRESS NOTE ADULT - SUBJECTIVE AND OBJECTIVE BOX
SUBJECTIVE: No events overnight    MEDICATIONS (HOME):  Home Medications:  amLODIPine 10 mg oral tablet: 1 tab(s) orally once a day (2019 00:51)  atorvastatin 80 mg oral tablet: 1 tab(s) orally once a day (at bedtime) (15 Aug 2019 11:58)  carvedilol 25 mg oral tablet: 1 tab(s) orally every 12 hours (2019 00:51)  dexamethasone 1 mg oral tablet: 1 tab(s) orally once a day (2019 00:51)  docusate sodium 100 mg oral capsule: 1 cap(s) orally 3 times a day (15 Aug 2019 10:51)  famotidine 20 mg oral tablet: 1 tab(s) orally once a day (2019 00:51)  heparin: 5000 unit(s) subcutaneous every 8 hours (15 Aug 2019 10:51)  insulin glargine: 16 unit(s) subcutaneous once a day (at bedtime) (15 Aug 2019 10:51)  levETIRAcetam 500 mg oral tablet: 1 tab(s) orally 2 times a day (2019 00:51)  losartan 100 mg oral tablet: 1 tab(s) orally once a day (2019 22:29)  metFORMIN 850 mg oral tablet: 1 tab(s) orally 2 times a day (2019 22:29)  senna oral tablet: 2 tab(s) orally once a day (at bedtime) (15 Aug 2019 10:51)    MEDICATIONS  (STANDING):  amLODIPine   Tablet 10 milliGRAM(s) Oral daily  atorvastatin 80 milliGRAM(s) Oral at bedtime  carvedilol 12.5 milliGRAM(s) Oral every 12 hours  cefTRIAXone   IVPB      dexAMETHasone  Injectable 4 milliGRAM(s) IV Push every 12 hours  dextrose 5%. 1000 milliLiter(s) (50 mL/Hr) IV Continuous <Continuous>  dextrose 50% Injectable 12.5 Gram(s) IV Push once  dextrose 50% Injectable 25 Gram(s) IV Push once  dextrose 50% Injectable 25 Gram(s) IV Push once  enoxaparin Injectable 40 milliGRAM(s) SubCutaneous daily  famotidine    Tablet 20 milliGRAM(s) Oral daily  insulin lispro (HumaLOG) corrective regimen sliding scale   SubCutaneous three times a day before meals  insulin lispro (HumaLOG) corrective regimen sliding scale   SubCutaneous at bedtime  levETIRAcetam  IVPB 750 milliGRAM(s) IV Intermittent every 12 hours  niCARdipine Infusion 4 mG/Hr (20 mL/Hr) IV Continuous <Continuous>  OLANZapine Disintegrating Tablet 10 milliGRAM(s) Oral once  senna 2 Tablet(s) Oral at bedtime  sodium chloride 0.9% Bolus 250 milliLiter(s) IV Bolus once  sodium chloride 0.9%. 1000 milliLiter(s) (75 mL/Hr) IV Continuous <Continuous>    MEDICATIONS  (PRN):  acetaminophen  Suppository .. 650 milliGRAM(s) Rectal every 6 hours PRN Temp greater or equal to 38C (100.4F), Mild Pain (1 - 3)  dextrose 40% Gel 15 Gram(s) Oral once PRN Blood Glucose LESS THAN 70 milliGRAM(s)/deciliter  glucagon  Injectable 1 milliGRAM(s) IntraMuscular once PRN Glucose LESS THAN 70 milligrams/deciliter  hydrALAZINE Injectable 20 milliGRAM(s) IV Push every 4 hours PRN SBP > 160  labetalol Injectable 20 milliGRAM(s) IV Push every 3 hours PRN Systolic blood pressure > 160  nystatin Powder 1 Application(s) Topical every 8 hours PRN rash/itching  OLANZapine Injectable 10 milliGRAM(s) IntraMuscular every 8 hours PRN agitation    ALLERGIES/INTOLERANCES:  Allergies  No Known Allergies    Intolerances    VITALS & EXAMINATION:  Vital Signs Last 24 Hrs  T(C): 36.6 (2019 17:26), Max: 37.1 (2019 00:00)  T(F): 97.9 (2019 17:26), Max: 98.8 (2019 00:00)  HR: 91 (2019 18:00) (63 - 116)  BP: 153/84 (2019 18:00) (103/66 - 185/92)  BP(mean): 103 (2019 18:00) (74 - 154)  RR: 20 (2019 18:00) (12 - 28)  SpO2: 96% (2019 18:00) (92% - 99%)      Neurological:  Patient arousable to shoulder shaking.  Had receive zyprexa and was difficult to keep awake.  When awake was agitated.  AOx1.    Moving all extremities spontaneously.  Sensation to light touch intact 4x.      LABORATORY:  CBC                       12.2   15.99 )-----------( 331      ( 2019 13:08 )             37.3     Chem     145  |  106  |  25<H>  ----------------------------<  211<H>  3.6   |  22  |  0.92    Ca    9.6      2019 04:45    TPro  6.4  /  Alb  4.2  /  TBili  0.4  /  DBili  x   /  AST  24  /  ALT  21  /  AlkPhos  84  11-07    LFTs LIVER FUNCTIONS - ( 2019 09:45 )  Alb: 4.2 g/dL / Pro: 6.4 g/dL / ALK PHOS: 84 U/L / ALT: 21 U/L / AST: 24 U/L / GGT: x           Coagulopathy   Lipid Panel   A1c  [id number] 7.2    Cardiac enzymes     U/A Urinalysis Basic - ( 2019 10:58 )    Color: Light Orange / Appearance: Turbid / S.020 / pH: x  Gluc: x / Ketone: Trace  / Bili: Negative / Urobili: Negative   Blood: x / Protein: Trace / Nitrite: Negative   Leuk Esterase: Moderate / RBC: 1 /hpf / WBC 13 /HPF   Sq Epi: x / Non Sq Epi: 0 /hpf / Bacteria: Many

## 2019-11-08 NOTE — DIETITIAN INITIAL EVALUATION ADULT. - FACTORS AFF FOOD INTAKE
Pt currently NPO. Per RN, s/p Bedside Swallow Exam today, pending recommendations at this time. No reports of nausea/vomiting, diarrhea/constipation at this time. Per RN, last BM unknown, likely PTA. Pt currently NPO. S/p Bedside Swallow Exam today, recommending NPO; alternative means of nutrition/hydration and meds as needed. Per RN, plan to place NGT to initiate enteral nutrition later today. No reports of nausea/vomiting, diarrhea/constipation at this time. Per RN, last BM unknown, likely PTA.

## 2019-11-08 NOTE — DIETITIAN INITIAL EVALUATION ADULT. - ENTERAL
Defer initiation of feedings to medical team. If enteral nutrition is warranted, recommend Glucerna 1.2 at goal rate of 75 cc/hr x 24 hours to provide 1800 cc formula, 2160 kcal, 108 gm protein, 1449 cc free water (meeting 22 kcal/kg, 1.1 gm/kg protein using dosing weight 99.8 kg)

## 2019-11-08 NOTE — PROGRESS NOTE ADULT - NSHPATTENDINGPLANDISCUSS_GEN_ALL_CORE
Patient, family, residents, medical students.
Patient, residents, medical students.
Patient, residents, medical students.

## 2019-11-08 NOTE — SWALLOW BEDSIDE ASSESSMENT ADULT - SLP PERTINENT HISTORY OF CURRENT PROBLEM
H&P: 69 year old man with PMHx T2DM, stage IV glioblastoma multiforme (dx in 2018), HTN and PSHx appendectomy presents to the ED with 3 weeks of increased R hemiparesis, worsened aphasia and urinary incontinence, sent from his neruo-oncologist Dr. Cuba's office. Pt has had resection, chemo radiation, most recently was noted to be clinically worse in March 2019 with increased somnolence and steroids were increased. Per his neuro-onc, concern for progression of disease. In ER, pt no family at bedside, pt was agitated, confused, aphasic, climbing out of bed, recieved 4mg of ativan, 5mg of haldol sequentially. Neurological exam was limited due to sedation, pt did not respond to basic orientation questions appropriately in [place]ian, had intermittent nature to nonsensical speech, but was able to show tongue, raise his arms - on mimicry only, R hemiparesis. Noted to have wet himself.  Admitted to Neurology for further evaluation. H&P: 69 year old man with PMHx T2DM, stage IV glioblastoma multiforme (dx in 2018), HTN and PSHx appendectomy presents to the ED with 3 weeks of increased R hemiparesis, worsened aphasia and urinary incontinence, sent from his neruo-oncologist Dr. Cuba's office. Pt has had resection, chemo radiation, most recently was noted to be clinically worse in March 2019 with increased somnolence and steroids were increased. Per his neuro-onc, concern for progression of disease. In ER, pt no family at bedside, pt was agitated, confused, aphasic, climbing out of bed, recieved 4mg of ativan, 5mg of haldol sequentially. Neurological exam was limited due to sedation, pt did not respond to basic orientation questions appropriately in Thai, had intermittent nature to nonsensical speech, but was able to show tongue, raise his arms - on mimicry only, R hemiparesis. Noted to have wet himself.  Admitted to Neurology for further evaluation..

## 2019-11-08 NOTE — DIETITIAN INITIAL EVALUATION ADULT. - PERTINENT LABORATORY DATA
(11/8) POCT blood glucose 199, BUN 25, Glucose 211, (11/7) POCT blood glucose 183-214, (11/6) HgbA1c 7.2%

## 2019-11-08 NOTE — PROGRESS NOTE ADULT - ASSESSMENT
69 year old man with PMHx T2DM, stage IV glioblastoma multiforme (dx in 2018), HTN and PSHx appendectomy presents to the ED with 3 weeks of increased R hemiparesis, worsened aphasia and urinary incontinence, sent from his neruo-oncologist Dr. Cuba's office. Pt has had resection, chemo radiation, most recently was noted to be clinically worse in March 2019 with increased somnolence and steroids were increased. Per his neuro-onc, concern for progression of disease. In ER, pt no family at bedside, pt was agitated, confused, aphasic, climbing out of bed, recieved 4mg of ativan, 5mg of haldol sequentially. Neurological exam was limited due to sedation, pt did not respond to basic orientation questions appropriately in Vietnamese, had intermittent nature to nonsensical speech, but was able to show tongue, raise his arms - on mimicry only, R hemiparesis. Noted to have wet himself.  Admitted to Neurology for further evaluation.      Evening call - follow-up CXR results for NGT placement by ENT - once okay, start tube feeds (glucerna 1.2 at 75 ml/hr for 24 hours (goal rate 75 ml) and restart PO meds; can be moved out of stroke unit (off cardene drip)    [x] NeuroSx - no acute intervention  [x] EEG - no seizures  [x] MRI brain - increased edema, no increased enhancement   [x] Keppra 750 BID  [x] Decadron 4mg BID  [] recommended Palliative consult for GOC and advanced directives- hold off for now until Dr. Cuba reviews MRI  [] Zyprexa prn agitation   [] f/u neuro-onc Dr. Cuba recs  [] restraints, 1:1, haldol  [] Speech and swallow - rec NPO  [] NGT placement failed despite multiple attempts - ENT to place Friday afternoon  [] After NGT placement confirmed - can start tube feeds (Glucerna 1.2, 75 mgl/hr for 24 hours (goal rate 75) and po meds  [] titrated off cardene drip, can be moved out of stroke unit once bed available on floor  [] Ceftriaxone started for UTI (11/8 - 11/14) - follow-up urine culture results

## 2019-11-08 NOTE — SWALLOW BEDSIDE ASSESSMENT ADULT - COMMENTS
Impression: Suspect worsened 'confusion,' aphasia maybe from partial/focal seizures emanating from tumor site of L temporal lobe vs. POD not visualized on CT vs. toxic metabolic encephalopathy Impression: Suspect worsened 'confusion,' aphasia maybe from partial/focal seizures emanating from tumor site of L temporal lobe vs. POD not visualized on CT vs. toxic metabolic encephalopathy  Nursing Swallow Screen: 11/5/19 Patient failed due to inability to be positioned correctly.  Neurosurgery consulted on 11/5/19, no surgical f/u needed. Impression: Suspect worsened 'confusion,' aphasia maybe from partial/focal seizures emanating from tumor site of L temporal lobe vs. POD not visualized on CT vs. toxic metabolic encephalopathy  Nursing Swallow Screen: 11/5/19 Patient failed due to inability to be positioned correctly.  Palliative consulted on 11/6/19, however after discussion with neuro-onc, instructed to hold off on consultation until further imaging/workup complete.  Neurosurgery consulted on 11/5/19, no surgical f/u needed.  Patient with ongoing agitation, on restraints, 1:1, and with adverse behaviors (spitting, yelling).   MRI 11/6 results/impressions:   Impression: Previously noted large area of peripheral enhancement in the   left temporal frontal region is again seen with decrease surrounding   enhancement. Slight increase surrounding edema is identified. This   decreased enhancement could be secondary to response to therapy though   clinical correlation and continued close interval follow-up is   recommended.  For all further imaging, please see results/EMR. Patient accepted 3 trials of thin liquid via 5cc tspn administration. Patient with moderately reduced oral hygiene (xerostomia, discoloration at lingual and palatal surfaces), oral care completed by clinician. Patient with reduced arousal throughout assessment, with inability to have eyes open, and with confusion throughout as seen by confabulations. Patient with a moderate oral dysphagia as seen by poor bolus containment, anterior loss of bolus and discoordination. Patient with multiple swallows across trials, indicative of pharyngeal discoordination and possible pharyngeal residue. Patient with overt s/s penetration/aspiration x1/3 trials as seen by immediate coughing. Recommend continue NPO at this time, with alternative means nutrition/hydration as needed. Will re-evaluate as arousal improves, on Monday. Patient accepted 3 trials of thin liquid via 5cc tspn administration. Patient with moderately reduced oral hygiene (xerostomia, discoloration at lingual and palatal surfaces), oral care completed by clinician. Patient with reduced arousal throughout assessment, with inability to have eyes open, and with confusion throughout as seen by confabulations. Patient with a moderate oral dysphagia as seen by poor bolus containment, anterior loss of bolus and discoordination. Patient with a suspected mild-moderate pharyngeal dysphagia as seen by multiple swallows across trials, indicative of pharyngeal discoordination and possible pharyngeal residue. Patient with overt s/s penetration/aspiration x1/3 trials as seen by immediate coughing. Recommend continue NPO at this time, with alternative means nutrition/hydration as needed. Will re-evaluate as arousal improves, on Monday.

## 2019-11-08 NOTE — CONSULT NOTE ADULT - ASSESSMENT
69 year old man with PMHx stage IV glioblastoma multiforme (dx in 2018) admitted to neuro for worsening symptoms and increasing agitation for the past 3 weeks presents with dysphagia. ENT consulted for guided NGT placement. LATRICIA feed was placed successfully at bedside during indirect laryngoscopy.

## 2019-11-08 NOTE — DIETITIAN INITIAL EVALUATION ADULT. - REASON INDICATOR FOR ASSESSMENT
Seen for: length of stay on Stroke Unit  Source: wife/daughter at bedside, previous RD note, EMR    Per chart, pt is a 69 year old male with PMH of T2DM, stage IV glioblastoma multiforme, HTN presents to the ED with 3 weeks of increased R hemiparesis, worsened aphasia and urinary incontinence. Suspect worsened 'confusion,' aphasia maybe from partial/focal seizures emanating from tumor site of L temporal lobe vs. POD not visualized on CT vs. toxic metabolic encephalopathy.

## 2019-11-08 NOTE — SWALLOW BEDSIDE ASSESSMENT ADULT - SWALLOW EVAL: DIAGNOSIS
GBM Patient admitted for GBM and now presenting with oropharyngeal dysphagia Patient admitted for GBM and now presenting with oropharyngeal dysphagia. Patient with moderately reduced oral hygiene (xerostomia, discoloration at lingual and palatal surfaces), oral care completed by clinician. Patient with reduced arousal throughout assessment, with inability to have eyes open, and with confusion throughout as seen by confabulations. Patient with a moderate oral dysphagia as seen by poor bolus containment, anterior loss of bolus and discoordination. Patient with a suspected mild-moderate pharyngeal dysphagia as seen by multiple swallows across trials, indicative of pharyngeal discoordination and possible pharyngeal residue. Patient with overt s/s penetration/aspiration x1/3 trials as seen by immediate coughing. Will re-evaluate as arousal improves, on Monday.

## 2019-11-08 NOTE — SWALLOW BEDSIDE ASSESSMENT ADULT - SLP GENERAL OBSERVATIONS
Patient seen at bedside with sister and 2 friends present for swallow evaluation. Patient with poor arousal throughout assessment, with difficulty sustaining eyes open. Patient intermittently followed one step commands and verbalized intelligible words, though predominantly with confabulations and unitelligible speech. Patient with clear baseline vocal quality. Per patient's sister, patient with recent decline in last few weeks of communication function. Patient seen at bedside with sister and 2 friends present for swallow evaluation. Patient with poor arousal throughout assessment, with difficulty sustaining eyes open. Patient with some physical restlessness during evaluation, though minimally impacting assessment. Patient intermittently followed one step commands and verbalized intelligible words, though predominantly with confabulations and unitelligible speech. Patient with clear baseline vocal quality. Per patient's sister, patient with recent decline in last few weeks of communication function. Patient seen at bedside with sister and 2 friends present for swallow evaluation. Patient with poor arousal throughout assessment, with difficulty sustaining eyes open. Patient with some physical restlessness during evaluation, though minimally impacting assessment. Patient intermittently followed one step commands and verbalized intelligible words, though predominantly with confabulations and unitelligible speech. Patient with clear baseline vocal quality. Per patient's sister, patient with recent decline in last few weeks of communication function. Patient with bilateral wrist restraints in place. Patient receiving 3L O2 via nc. Patient seen at bedside with sister and 2 friends present for swallow evaluation. Patient with poor arousal throughout assessment, with difficulty sustaining eyes open. Patient with some physical restlessness during evaluation, though minimally impacting assessment. Patient intermittently followed one step commands and verbalized intelligible words, though predominantly with confabulations and unitelligible speech. Patient with clear baseline vocal quality. Per patient's sister, patient with recent decline in last few weeks of communication function. Patient with bilateral wrist restraints in place. Patient receiving 3L O2 via nc. Patient on 1:1.

## 2019-11-08 NOTE — DIETITIAN INITIAL EVALUATION ADULT. - ADD RECOMMEND
If PO diet is initiated, recommend Consistent Carbohydrate (no snacks); defer texture/consistency to medical team. Monitor tolerance to diet prescription, nutritional intake, weight trends, labs and skin integrity.

## 2019-11-08 NOTE — CONSULT NOTE ADULT - SUBJECTIVE AND OBJECTIVE BOX
CC: NGT    HPI: 69 year old man with PMHx stage IV glioblastoma multiforme (dx in 2018) admitted to neuro for worsening symptoms and increasing agitation for the past 3 weeks presents with dysphagia. ENT consulted for guided NGT placement. Unable to obtain history from pt due to condition. Pt seen and examined at bedside.       PAST MEDICAL & SURGICAL HISTORY:  Type 2 diabetes mellitus  Glioblastoma multiforme  HTN (hypertension)  Glioblastoma multiforme  History of appendectomy    Allergies    No Known Allergies    Intolerances      MEDICATIONS  (STANDING):  amLODIPine   Tablet 10 milliGRAM(s) Oral daily  atorvastatin 80 milliGRAM(s) Oral at bedtime  carvedilol 12.5 milliGRAM(s) Oral every 12 hours  cefTRIAXone   IVPB 1000 milliGRAM(s) IV Intermittent once  cefTRIAXone   IVPB      dexAMETHasone  Injectable 4 milliGRAM(s) IV Push every 12 hours  dextrose 5%. 1000 milliLiter(s) (50 mL/Hr) IV Continuous <Continuous>  dextrose 50% Injectable 12.5 Gram(s) IV Push once  dextrose 50% Injectable 25 Gram(s) IV Push once  dextrose 50% Injectable 25 Gram(s) IV Push once  enoxaparin Injectable 40 milliGRAM(s) SubCutaneous daily  famotidine    Tablet 20 milliGRAM(s) Oral daily  insulin lispro (HumaLOG) corrective regimen sliding scale   SubCutaneous three times a day before meals  insulin lispro (HumaLOG) corrective regimen sliding scale   SubCutaneous at bedtime  levETIRAcetam  IVPB 750 milliGRAM(s) IV Intermittent every 12 hours  niCARdipine Infusion 4 mG/Hr (20 mL/Hr) IV Continuous <Continuous>  OLANZapine Disintegrating Tablet 10 milliGRAM(s) Oral once  senna 2 Tablet(s) Oral at bedtime  sodium chloride 0.9% Bolus 250 milliLiter(s) IV Bolus once  sodium chloride 0.9%. 1000 milliLiter(s) (75 mL/Hr) IV Continuous <Continuous>    MEDICATIONS  (PRN):  acetaminophen  Suppository .. 650 milliGRAM(s) Rectal every 6 hours PRN Temp greater or equal to 38C (100.4F), Mild Pain (1 - 3)  dextrose 40% Gel 15 Gram(s) Oral once PRN Blood Glucose LESS THAN 70 milliGRAM(s)/deciliter  glucagon  Injectable 1 milliGRAM(s) IntraMuscular once PRN Glucose LESS THAN 70 milligrams/deciliter  hydrALAZINE Injectable 20 milliGRAM(s) IV Push every 4 hours PRN SBP > 160  labetalol Injectable 20 milliGRAM(s) IV Push every 3 hours PRN Systolic blood pressure > 160  nystatin Powder 1 Application(s) Topical every 8 hours PRN rash/itching  OLANZapine Injectable 10 milliGRAM(s) IntraMuscular every 8 hours PRN agitation      Social History: no pertinent social history    Family history: no pertinent family history    ROS:   Unable to assess due to pts condition    Vital Signs Last 24 Hrs  T(C): 36.6 (08 Nov 2019 17:26), Max: 37.1 (08 Nov 2019 00:00)  T(F): 97.9 (08 Nov 2019 17:26), Max: 98.8 (08 Nov 2019 00:00)  HR: 77 (08 Nov 2019 17:00) (63 - 116)  BP: 149/90 (08 Nov 2019 17:00) (103/66 - 215/94)  BP(mean): 108 (08 Nov 2019 17:00) (74 - 154)  RR: 18 (08 Nov 2019 17:00) (12 - 28)  SpO2: 96% (08 Nov 2019 17:00) (92% - 99%)                          12.2   15.99 )-----------( 331      ( 08 Nov 2019 13:08 )             37.3    11-08    145  |  106  |  25<H>  ----------------------------<  211<H>  3.6   |  22  |  0.92    Ca    9.6      08 Nov 2019 04:45    TPro  6.4  /  Alb  4.2  /  TBili  0.4  /  DBili  x   /  AST  24  /  ALT  21  /  AlkPhos  84  11-07       PHYSICAL EXAM:  Gen: Agitated  Skin: No rashes, bruises, or lesions  Head: Normocephalic, Atraumatic  Face: no edema, erythema, or fluctuance. Parotid glands soft without mass  Eyes: no scleral injection  Nose: Nares bilaterally patent, no discharge  Mouth: No Stridor / Drooling / Trismus.  Mucosa moist, tongue/uvula midline, oropharynx clear  Neck: Flat, supple, no lymphadenopathy, trachea midline, no masses  Lymphatic: No lymphadenopathy  Resp: breathing easily, no stridor  CV: no peripheral edema/cyanosis  GI: nondistended   Peripheral vascular: no JVD or edema  Neuro: facial nerve intact, no facial droop      Reason for Laryngoscopy:    Patient was unable to cooperate with mirror.  Nasopharynx, oropharynx, and hypopharynx clear, no bleeding. Tongue base, posterior pharyngeal wall, vallecula, epiglottis, and subglottis appear normal. No erythema, edema, pooling of secretions, masses or lesions. Airway patent, no foreign body visualized. No glottic/supraglottic edema. True vocal cords, arytenoids, vestibular folds, ventricles, pyriform sinuses, and aryepiglottic folds appear normal bilaterally. Vocal cords mobile with good contact b/l.      LATRICIA Feed was placed successfully during indirect laryngoscopy, placement confirmed with ausculation of air in stomach and visualization of tube entering esophageal inlet.

## 2019-11-08 NOTE — DIETITIAN INITIAL EVALUATION ADULT. - NS FNS REASON FOR WEIGHT CHANG
appropriate PO intake rather than excessive intake since hospitalization/admission to long term care facility

## 2019-11-08 NOTE — CONSULT NOTE ADULT - PROBLEM SELECTOR RECOMMENDATION 9
Cont with cardene gtt until NGT is placed   Then start lisinopril 40   Norvasc 10   IV labetolol 20 mg Q4 hours SBP > 160

## 2019-11-08 NOTE — DIETITIAN INITIAL EVALUATION ADULT. - OTHER INFO
Per family, pt was recently discharged from [facility] to nursing home in August. Family reports that at nursing home, pt was tolerating a consistent carbohydrate diet with regular textured/consistency foods/liquids. Per H&P, pt noted to take insulin glargine PTA; however, per transfer documentation, pt previously taking metformin for glycemic maintenance; family unsure of which is accurate. Recent HgbA1c of 7.2% (11/6). NKFA. No reported PTA micronutrient supplementation. Per previous RD note, pt weighed 110 kg (8/5/19) during previous admission. Current weight noted as 99.8 kg (11/5), which indicates a 9.2% weight loss in 3 months. Family endorses pt with good appetite and intake PTA, and state that weight loss is likely consistent with appropriate PO intake during hospitalizations/at long term care, as he was presumably consuming excess of his energy requirements prior to admission in August. Of note, pt's height in chart currently noted as 79 inches; family reported to RD pt's accurate height is 68 inches.     Skin per chart: free of pressure injuries  Edema per chart: 1+ generalized     Ht: 68 inches, Wt: 99.8 kg (11/5), BMI: 33.4 kg/m2, IBW: 154 pounds (+/-10%), %IBW: 142%

## 2019-11-08 NOTE — CONSULT NOTE ADULT - ASSESSMENT
69 year old man with PMHx T2DM, stage IV glioblastoma multiforme (dx in 2018), HTN and PSHx appendectomy presents to the ED with 3 weeks of increased R hemiparesis, worsened aphasia and urinary incontinence, sent from his neruo-oncologist Dr. Cuba's office. Pt has had resection, chemo radiation, most recently was noted to be clinically worse in March 2019 with increased somnolence and steroids were increased. Per his neuro-onc, concern for progression of disease lessened given MRI results.  Patient's BP has been elevated and difficult to control.  Patient continues to be aphasic, possibly from partial/focal seizures emanating from tumor site of L temporal lobe vs. toxic metabolic encephalopathy.    Now with hypertensive urgency

## 2019-11-08 NOTE — CONSULT NOTE ADULT - SUBJECTIVE AND OBJECTIVE BOX
CHIEF COMPLAINT: HTN      HISTORY OF PRESENT ILLNESS:  69 year old man with PMHx T2DM, stage IV glioblastoma multiforme (dx in 2018), HTN and PSHx appendectomy presents to the ED with 3 weeks of increased R hemiparesis, worsened aphasia and urinary incontinence, sent from his neruo-oncologist Dr. uCba's office. Pt has had resection, chemo radiation, most recently was noted to be clinically worse in March 2019 with increased somnolence and steroids were increased. Per his neuro-onc, concern for progression of disease. In ER, pt no family at bedside, pt was agitated, confused, aphasic, climbing out of bed, recieved 4mg of ativan, 5mg of haldol sequentially. Neurological exam was limited due to sedation, pt did not respond to basic orientation questions appropriately in [place], had intermittent nature to nonsensical speech, but was able to show tongue, raise his arms - on mimicry only, R hemiparesis. Noted to have wet himself.   On neurology floor the patient has been intermittently agitated, receiving haldol, ativan as needed.   Had uncontrolled HTN while on the neurology floor. No improvement with IV medications. NGT was unsuccessful. I advised transfer to Stroke unit for initiation of Cardene gtt. He remains on that still.   Unable to provide any information.         PAST MEDICAL & SURGICAL HISTORY:  Type 2 diabetes mellitus  Glioblastoma multiforme  HTN (hypertension)  Glioblastoma multiforme  History of appendectomy          MEDICATIONS:  amLODIPine   Tablet 10 milliGRAM(s) Oral daily  carvedilol 12.5 milliGRAM(s) Oral every 12 hours  enoxaparin Injectable 40 milliGRAM(s) SubCutaneous daily  hydrALAZINE Injectable 20 milliGRAM(s) IV Push every 4 hours PRN  labetalol Injectable 20 milliGRAM(s) IV Push every 3 hours PRN  niCARdipine Infusion 4 mG/Hr IV Continuous <Continuous>        acetaminophen  Suppository .. 650 milliGRAM(s) Rectal every 6 hours PRN  levETIRAcetam  IVPB 750 milliGRAM(s) IV Intermittent every 12 hours  OLANZapine Disintegrating Tablet 10 milliGRAM(s) Oral once  OLANZapine Injectable 10 milliGRAM(s) IntraMuscular every 8 hours PRN    famotidine    Tablet 20 milliGRAM(s) Oral daily  senna 2 Tablet(s) Oral at bedtime    atorvastatin 80 milliGRAM(s) Oral at bedtime  dexAMETHasone  Injectable 4 milliGRAM(s) IV Push every 12 hours  dextrose 40% Gel 15 Gram(s) Oral once PRN  dextrose 50% Injectable 12.5 Gram(s) IV Push once  dextrose 50% Injectable 25 Gram(s) IV Push once  dextrose 50% Injectable 25 Gram(s) IV Push once  glucagon  Injectable 1 milliGRAM(s) IntraMuscular once PRN  insulin lispro (HumaLOG) corrective regimen sliding scale   SubCutaneous three times a day before meals  insulin lispro (HumaLOG) corrective regimen sliding scale   SubCutaneous at bedtime    dextrose 5%. 1000 milliLiter(s) IV Continuous <Continuous>  nystatin Powder 1 Application(s) Topical every 8 hours PRN  sodium chloride 0.9% Bolus 250 milliLiter(s) IV Bolus once  sodium chloride 0.9%. 1000 milliLiter(s) IV Continuous <Continuous>      FAMILY HISTORY:      SOCIAL HISTORY:    [ ] Non-smoker  [ ] Smoker  [ ] Alcohol    Allergies    No Known Allergies    Intolerances    	    REVIEW OF SYSTEMS:      [ ] All others negative	  [XX ] Unable to obtain    PHYSICAL EXAM:  T(C): 36.8 (11-08-19 @ 07:32), Max: 37.1 (11-08-19 @ 00:00)  HR: 76 (11-08-19 @ 14:30) (63 - 116)  BP: 143/86 (11-08-19 @ 14:30) (113/94 - 215/94)  RR: 19 (11-08-19 @ 14:30) (12 - 28)  SpO2: 98% (11-08-19 @ 14:30) (91% - 99%)  Wt(kg): --  I&O's Summary    07 Nov 2019 07:01  -  08 Nov 2019 07:00  --------------------------------------------------------  IN: 1350 mL / OUT: 800 mL / NET: 550 mL    08 Nov 2019 07:01  -  08 Nov 2019 15:19  --------------------------------------------------------  IN: 900 mL / OUT: 0 mL / NET: 900 mL        Appearance: Unresponsive   HEENT:   Dry oral mucosa  Lymphatic: No lymphadenopathy  Cardiovascular: Normal S1 S2, No JVD, No murmurs, No edema  Respiratory: Lungs clear to auscultation	  Psychiatry: A & O x)  Gastrointestinal:  Soft, Non-tender, + BS	  Skin: No rashes, No ecchymoses, No cyanosis	  Extremities: Decreased  range of motion, No clubbing, cyanosis or edema  Vascular: Peripheral pulses palpable 2+ bilaterally  Neurological:  MS: agitated, in bed in restraints, eyes open, periodically yelling or kicking, spitting, not answering questions or following commands  But does answer to orientation questions, AOx2 (name and "hospital" not [facility] or day or date)  Motor: all extremities moving, pulling with force against restraints periodically  Sensation: withdraws to touch all four extremities  Tone: high in all four extremities as he keeps moving them when touched    TELEMETRY: 	SR    ECG:  Sinus tach, PVCs, Bifasicular block 	  RADIOLOGY:       < from: MR Head w/wo IV Cont (11.06.19 @ 23:11) >    EXAM:  MR BRAIN WAW IC                            PROCEDURE DATE:  11/06/2019            INTERPRETATION:  Clinical indication: GBM. Aphasia. Right hemiparesis.    MRI of the brain was performed using sagittal T1, axial T1 T2 T2 FLAIR   diffusion and susceptibility weighted sequence. The patient was injected   with approximately 10 cc of Gadavist IV and sagittal coronal and axial   T1-weighted sequences were performed.    This exam is compared with prior contrast enhanced brain MRI performed on   August 28, 2019    Postop changes compatible with a left pterional craniotomy is again seen.   Again seen is a necrotic/peripherally enhancing lesion in the involving   the left temporal frontal region. Involvement left periatrial region is   identified. Overall this area of peripheral enhancement appears slightly   less conspicuous. This finding measures approximately measures   approximately 8.8 x 2.9 cm and previously measured approximately 8.7 x   3.5 cm. Surrounding T2 prolongation is identified in increased when   compared prior exam. Parenchymal volume loss and chronic microvascular   ischemic changes are again seen.    There is no evidence of acute hemorrhage seen..    No significant shift or herniation is seen.    Evaluation of the diffusion weighted sequence demonstrates no abnormal   areas of restricted diffusion to suggest acute infarct.    The large vessels demonstrate normal flow voids    Right maxillary right sphenoid sinus mucosal thickening is again seen.    Impression: Previously noted large area of peripheral enhancement in the   left temporal frontal region is again seen with decrease surrounding   enhancement. Slight increase surrounding edema is identified. This   decreased enhancement could be secondary to response to therapy though   clinical correlation and continued close interval follow-up is   recommended.                    YONG POE M.D., ATTENDING RADIOLOGIST  This document has been electronically signed. Nov 7 2019 10:25AM                < end of copied text >     < from: CT Angio Chest w/ IV Cont (11.06.19 @ 09:50) >    EXAM:  CT ANGIO CHEST (W)AW IC                            PROCEDURE DATE:  11/06/2019            INTERPRETATION:  CLINICAL INFORMATION: Desaturation and tachycardia in   setting of glioblastoma multiform. Evaluate for pulmonary embolism.    COMPARISON: Chest radiograph from 11/5/2019.    PROCEDURE:   CT Angiography of the Chest.  90 ml of Omnipaque 350 was injected intravenously. 10 ml were discarded.  Sagittal and coronal reformats were performed as well as 3D (MIP)   reconstructions.      FINDINGS: There is motion and metal artifact throughout the study.    LUNGS AND AIRWAYS: Patent central airways.  Right lower lobe 7 mm   calcified granuloma (5:116).    PLEURA: No pleural effusion.    MEDIASTINUM AND RODRIGO: No lymphadenopathy.    VESSELS: No main, right, or left pulmonary artery embolism. Evaluation   for lobar, segmental, or subsegmental embolus is limited by motion and   metal artifact.    HEART: Heart size is normal. No pericardial effusion.    CHEST WALL AND LOWER NECK: Within normal limits.    VISUALIZED UPPER ABDOMEN: Within normal limits.    BONES: Degenerative spinal disease.    IMPRESSION:     No main, right, or left pulmonary artery embolism. Evaluation for lobar,   segmental, or subsegmental embolus is limited by motion and metal artifact                LINDSAY BURKETT M.D., RADIOLOGY RESIDENT  This document has been electronically signed.  JESSICA ARREGUIN M.D., ATTENDING RADIOLOGIST  This document has been electronically signed. Nov 6 2019 11:56AM                < end of copied text >    OTHER: 	  	  LABS:	 	    CARDIAC MARKERS:                                  12.2   15.99 )-----------( 331      ( 08 Nov 2019 13:08 )             37.3     11-08    145  |  106  |  25<H>  ----------------------------<  211<H>  3.6   |  22  |  0.92    Ca    9.6      08 Nov 2019 04:45    TPro  6.4  /  Alb  4.2  /  TBili  0.4  /  DBili  x   /  AST  24  /  ALT  21  /  AlkPhos  84  11-07    proBNP:   Lipid Profile:   HgA1c:   TSH:

## 2019-11-09 DIAGNOSIS — I48.91 UNSPECIFIED ATRIAL FIBRILLATION: ICD-10-CM

## 2019-11-09 LAB
ALBUMIN SERPL ELPH-MCNC: 3.5 G/DL — SIGNIFICANT CHANGE UP (ref 3.3–5)
ALP SERPL-CCNC: 76 U/L — SIGNIFICANT CHANGE UP (ref 40–120)
ALT FLD-CCNC: 23 U/L — SIGNIFICANT CHANGE UP (ref 10–45)
ANION GAP SERPL CALC-SCNC: 14 MMOL/L — SIGNIFICANT CHANGE UP (ref 5–17)
ANION GAP SERPL CALC-SCNC: 14 MMOL/L — SIGNIFICANT CHANGE UP (ref 5–17)
APTT BLD: 27.2 SEC — LOW (ref 27.5–36.3)
AST SERPL-CCNC: 18 U/L — SIGNIFICANT CHANGE UP (ref 10–40)
B-OH-BUTYR SERPL-SCNC: 0.9 MMOL/L — HIGH
BILIRUB SERPL-MCNC: 0.4 MG/DL — SIGNIFICANT CHANGE UP (ref 0.2–1.2)
BUN SERPL-MCNC: 21 MG/DL — SIGNIFICANT CHANGE UP (ref 7–23)
BUN SERPL-MCNC: 22 MG/DL — SIGNIFICANT CHANGE UP (ref 7–23)
CALCIUM SERPL-MCNC: 9.2 MG/DL — SIGNIFICANT CHANGE UP (ref 8.4–10.5)
CALCIUM SERPL-MCNC: 9.7 MG/DL — SIGNIFICANT CHANGE UP (ref 8.4–10.5)
CHLORIDE SERPL-SCNC: 107 MMOL/L — SIGNIFICANT CHANGE UP (ref 96–108)
CHLORIDE SERPL-SCNC: 110 MMOL/L — HIGH (ref 96–108)
CO2 SERPL-SCNC: 20 MMOL/L — LOW (ref 22–31)
CO2 SERPL-SCNC: 21 MMOL/L — LOW (ref 22–31)
CREAT SERPL-MCNC: 0.79 MG/DL — SIGNIFICANT CHANGE UP (ref 0.5–1.3)
CREAT SERPL-MCNC: 0.82 MG/DL — SIGNIFICANT CHANGE UP (ref 0.5–1.3)
GAS PNL BLDA: SIGNIFICANT CHANGE UP
GLUCOSE BLDC GLUCOMTR-MCNC: 207 MG/DL — HIGH (ref 70–99)
GLUCOSE BLDC GLUCOMTR-MCNC: 216 MG/DL — HIGH (ref 70–99)
GLUCOSE BLDC GLUCOMTR-MCNC: 233 MG/DL — HIGH (ref 70–99)
GLUCOSE BLDC GLUCOMTR-MCNC: 233 MG/DL — HIGH (ref 70–99)
GLUCOSE BLDC GLUCOMTR-MCNC: 247 MG/DL — HIGH (ref 70–99)
GLUCOSE SERPL-MCNC: 255 MG/DL — HIGH (ref 70–99)
GLUCOSE SERPL-MCNC: 260 MG/DL — HIGH (ref 70–99)
HCT VFR BLD CALC: 36.8 % — LOW (ref 39–50)
HCT VFR BLD CALC: 42 % — SIGNIFICANT CHANGE UP (ref 39–50)
HGB BLD-MCNC: 12.6 G/DL — LOW (ref 13–17)
HGB BLD-MCNC: 13.5 G/DL — SIGNIFICANT CHANGE UP (ref 13–17)
INR BLD: 1.06 RATIO — SIGNIFICANT CHANGE UP (ref 0.88–1.16)
MAGNESIUM SERPL-MCNC: 2 MG/DL — SIGNIFICANT CHANGE UP (ref 1.6–2.6)
MCHC RBC-ENTMCNC: 29.6 PG — SIGNIFICANT CHANGE UP (ref 27–34)
MCHC RBC-ENTMCNC: 31 PG — SIGNIFICANT CHANGE UP (ref 27–34)
MCHC RBC-ENTMCNC: 32.1 GM/DL — SIGNIFICANT CHANGE UP (ref 32–36)
MCHC RBC-ENTMCNC: 34.2 GM/DL — SIGNIFICANT CHANGE UP (ref 32–36)
MCV RBC AUTO: 90.4 FL — SIGNIFICANT CHANGE UP (ref 80–100)
MCV RBC AUTO: 92.1 FL — SIGNIFICANT CHANGE UP (ref 80–100)
NRBC # BLD: 0 /100 WBCS — SIGNIFICANT CHANGE UP (ref 0–0)
PHOSPHATE SERPL-MCNC: 3.5 MG/DL — SIGNIFICANT CHANGE UP (ref 2.5–4.5)
PLATELET # BLD AUTO: 297 K/UL — SIGNIFICANT CHANGE UP (ref 150–400)
PLATELET # BLD AUTO: 374 K/UL — SIGNIFICANT CHANGE UP (ref 150–400)
POTASSIUM SERPL-MCNC: 3.6 MMOL/L — SIGNIFICANT CHANGE UP (ref 3.5–5.3)
POTASSIUM SERPL-MCNC: 3.7 MMOL/L — SIGNIFICANT CHANGE UP (ref 3.5–5.3)
POTASSIUM SERPL-SCNC: 3.6 MMOL/L — SIGNIFICANT CHANGE UP (ref 3.5–5.3)
POTASSIUM SERPL-SCNC: 3.7 MMOL/L — SIGNIFICANT CHANGE UP (ref 3.5–5.3)
PROT SERPL-MCNC: 6.1 G/DL — SIGNIFICANT CHANGE UP (ref 6–8.3)
PROTHROM AB SERPL-ACNC: 12.2 SEC — SIGNIFICANT CHANGE UP (ref 10–12.9)
RBC # BLD: 4.07 M/UL — LOW (ref 4.2–5.8)
RBC # BLD: 4.56 M/UL — SIGNIFICANT CHANGE UP (ref 4.2–5.8)
RBC # FLD: 13.5 % — SIGNIFICANT CHANGE UP (ref 10.3–14.5)
RBC # FLD: 13.7 % — SIGNIFICANT CHANGE UP (ref 10.3–14.5)
SODIUM SERPL-SCNC: 142 MMOL/L — SIGNIFICANT CHANGE UP (ref 135–145)
SODIUM SERPL-SCNC: 144 MMOL/L — SIGNIFICANT CHANGE UP (ref 135–145)
TROPONIN T, HIGH SENSITIVITY RESULT: 31 NG/L — SIGNIFICANT CHANGE UP (ref 0–51)
WBC # BLD: 16.3 K/UL — HIGH (ref 3.8–10.5)
WBC # BLD: 16.32 K/UL — HIGH (ref 3.8–10.5)
WBC # FLD AUTO: 16.3 K/UL — HIGH (ref 3.8–10.5)
WBC # FLD AUTO: 16.32 K/UL — HIGH (ref 3.8–10.5)

## 2019-11-09 PROCEDURE — 93010 ELECTROCARDIOGRAM REPORT: CPT

## 2019-11-09 PROCEDURE — 99232 SBSQ HOSP IP/OBS MODERATE 35: CPT

## 2019-11-09 PROCEDURE — 99233 SBSQ HOSP IP/OBS HIGH 50: CPT | Mod: GC

## 2019-11-09 RX ORDER — DILTIAZEM HCL 120 MG
15 CAPSULE, EXT RELEASE 24 HR ORAL ONCE
Refills: 0 | Status: COMPLETED | OUTPATIENT
Start: 2019-11-09 | End: 2019-11-09

## 2019-11-09 RX ORDER — DEXTROSE 50 % IN WATER 50 %
25 SYRINGE (ML) INTRAVENOUS ONCE
Refills: 0 | Status: DISCONTINUED | OUTPATIENT
Start: 2019-11-09 | End: 2019-12-06

## 2019-11-09 RX ORDER — DILTIAZEM HCL 120 MG
5 CAPSULE, EXT RELEASE 24 HR ORAL
Qty: 125 | Refills: 0 | Status: DISCONTINUED | OUTPATIENT
Start: 2019-11-09 | End: 2019-11-09

## 2019-11-09 RX ORDER — AMIODARONE HYDROCHLORIDE 400 MG/1
400 TABLET ORAL
Refills: 0 | Status: DISCONTINUED | OUTPATIENT
Start: 2019-11-09 | End: 2019-12-06

## 2019-11-09 RX ORDER — GLUCAGON INJECTION, SOLUTION 0.5 MG/.1ML
1 INJECTION, SOLUTION SUBCUTANEOUS ONCE
Refills: 0 | Status: DISCONTINUED | OUTPATIENT
Start: 2019-11-09 | End: 2019-12-06

## 2019-11-09 RX ORDER — INSULIN LISPRO 100/ML
3 VIAL (ML) SUBCUTANEOUS EVERY 6 HOURS
Refills: 0 | Status: DISCONTINUED | OUTPATIENT
Start: 2019-11-09 | End: 2019-11-11

## 2019-11-09 RX ORDER — DEXTROSE 50 % IN WATER 50 %
15 SYRINGE (ML) INTRAVENOUS ONCE
Refills: 0 | Status: DISCONTINUED | OUTPATIENT
Start: 2019-11-09 | End: 2019-12-06

## 2019-11-09 RX ORDER — DILTIAZEM HCL 120 MG
10 CAPSULE, EXT RELEASE 24 HR ORAL
Qty: 125 | Refills: 0 | Status: DISCONTINUED | OUTPATIENT
Start: 2019-11-09 | End: 2019-11-09

## 2019-11-09 RX ORDER — METOPROLOL TARTRATE 50 MG
25 TABLET ORAL EVERY 8 HOURS
Refills: 0 | Status: DISCONTINUED | OUTPATIENT
Start: 2019-11-09 | End: 2019-11-11

## 2019-11-09 RX ORDER — LABETALOL HCL 100 MG
10 TABLET ORAL ONCE
Refills: 0 | Status: COMPLETED | OUTPATIENT
Start: 2019-11-09 | End: 2019-11-09

## 2019-11-09 RX ORDER — DEXTROSE 50 % IN WATER 50 %
12.5 SYRINGE (ML) INTRAVENOUS ONCE
Refills: 0 | Status: DISCONTINUED | OUTPATIENT
Start: 2019-11-09 | End: 2019-12-06

## 2019-11-09 RX ORDER — DIGOXIN 250 MCG
0.5 TABLET ORAL ONCE
Refills: 0 | Status: COMPLETED | OUTPATIENT
Start: 2019-11-09 | End: 2019-11-09

## 2019-11-09 RX ORDER — AMIODARONE HYDROCHLORIDE 400 MG/1
200 TABLET ORAL DAILY
Refills: 0 | Status: DISCONTINUED | OUTPATIENT
Start: 2019-11-09 | End: 2019-11-09

## 2019-11-09 RX ORDER — SODIUM CHLORIDE 9 MG/ML
1000 INJECTION, SOLUTION INTRAVENOUS
Refills: 0 | Status: DISCONTINUED | OUTPATIENT
Start: 2019-11-09 | End: 2019-12-06

## 2019-11-09 RX ORDER — METOPROLOL TARTRATE 50 MG
5 TABLET ORAL ONCE
Refills: 0 | Status: COMPLETED | OUTPATIENT
Start: 2019-11-09 | End: 2019-11-09

## 2019-11-09 RX ORDER — AMIODARONE HYDROCHLORIDE 400 MG/1
150 TABLET ORAL ONCE
Refills: 0 | Status: COMPLETED | OUTPATIENT
Start: 2019-11-09 | End: 2019-11-09

## 2019-11-09 RX ORDER — INSULIN LISPRO 100/ML
VIAL (ML) SUBCUTANEOUS
Refills: 0 | Status: DISCONTINUED | OUTPATIENT
Start: 2019-11-09 | End: 2019-12-06

## 2019-11-09 RX ORDER — LISINOPRIL 2.5 MG/1
10 TABLET ORAL DAILY
Refills: 0 | Status: DISCONTINUED | OUTPATIENT
Start: 2019-11-09 | End: 2019-11-12

## 2019-11-09 RX ADMIN — Medication 25 MILLIGRAM(S): at 23:19

## 2019-11-09 RX ADMIN — Medication 2: at 07:45

## 2019-11-09 RX ADMIN — AMIODARONE HYDROCHLORIDE 600 MILLIGRAM(S): 400 TABLET ORAL at 07:22

## 2019-11-09 RX ADMIN — Medication 10 MILLIGRAM(S): at 02:10

## 2019-11-09 RX ADMIN — LEVETIRACETAM 400 MILLIGRAM(S): 250 TABLET, FILM COATED ORAL at 17:39

## 2019-11-09 RX ADMIN — Medication 5 MILLIGRAM(S): at 02:37

## 2019-11-09 RX ADMIN — ATORVASTATIN CALCIUM 80 MILLIGRAM(S): 80 TABLET, FILM COATED ORAL at 23:19

## 2019-11-09 RX ADMIN — FAMOTIDINE 20 MILLIGRAM(S): 10 INJECTION INTRAVENOUS at 11:51

## 2019-11-09 RX ADMIN — Medication 10 MG/HR: at 11:45

## 2019-11-09 RX ADMIN — Medication 5 MG/HR: at 03:10

## 2019-11-09 RX ADMIN — Medication 2: at 11:54

## 2019-11-09 RX ADMIN — Medication 3 UNIT(S): at 17:35

## 2019-11-09 RX ADMIN — Medication 4: at 17:35

## 2019-11-09 RX ADMIN — OLANZAPINE 10 MILLIGRAM(S): 15 TABLET, FILM COATED ORAL at 13:25

## 2019-11-09 RX ADMIN — SENNA PLUS 2 TABLET(S): 8.6 TABLET ORAL at 23:19

## 2019-11-09 RX ADMIN — LEVETIRACETAM 400 MILLIGRAM(S): 250 TABLET, FILM COATED ORAL at 06:46

## 2019-11-09 RX ADMIN — ENOXAPARIN SODIUM 40 MILLIGRAM(S): 100 INJECTION SUBCUTANEOUS at 11:51

## 2019-11-09 RX ADMIN — CEFTRIAXONE 100 MILLIGRAM(S): 500 INJECTION, POWDER, FOR SOLUTION INTRAMUSCULAR; INTRAVENOUS at 16:45

## 2019-11-09 RX ADMIN — Medication 4 MILLIGRAM(S): at 06:46

## 2019-11-09 RX ADMIN — CARVEDILOL PHOSPHATE 12.5 MILLIGRAM(S): 80 CAPSULE, EXTENDED RELEASE ORAL at 06:40

## 2019-11-09 RX ADMIN — Medication 3 UNIT(S): at 23:20

## 2019-11-09 RX ADMIN — AMLODIPINE BESYLATE 10 MILLIGRAM(S): 2.5 TABLET ORAL at 06:40

## 2019-11-09 RX ADMIN — Medication 4 MILLIGRAM(S): at 17:35

## 2019-11-09 RX ADMIN — Medication 25 MILLIGRAM(S): at 13:15

## 2019-11-09 RX ADMIN — Medication 5 MILLIGRAM(S): at 02:30

## 2019-11-09 RX ADMIN — SODIUM CHLORIDE 75 MILLILITER(S): 9 INJECTION INTRAMUSCULAR; INTRAVENOUS; SUBCUTANEOUS at 13:16

## 2019-11-09 NOTE — CONSULT NOTE ADULT - ASSESSMENT
70 yo M with a PMH significant for DM, stage IV glioblastoma multiforme (dx in 2018), HTN who was sent in by his neuro-oncologist for further evaluation of increased R hemiparesis, worsened aphasia and urinary incontinence, started on steroids. Course c/b HTN urgency, resolved and AF with RVR. CCU consulted for AF with RVR.    #AF with RVR  Upon review of telemetry: patient was in sinus until about 2 am at which point he converted to AF with rates up to 140s. He is mostly rate controlled on dilt gtt. CHADSVASC 3.  - increase dilt gtt to 10mg/hr  - switch carvedilol 12.5mg BID to metoprolol tartrate 25mg q8h  - no indication for CCU at this time  - ideally he should be on AC however likely limited given GBM and cerebral edema  - patient currently being followed by Dr Leonard, defer rest of care to him    Jaswant Brown MD  Cardiology Fellow  [phone]  All Cardiology service information can be found 24/7 on [email or web address], password: [facility]

## 2019-11-09 NOTE — CONSULT NOTE ADULT - ASSESSMENT
69 year old man with PMH DM2, Stage IV GBM, HTN who presented to the ED with 3 weeks of increased R hemiparesis, worsened aphasia and urinary incontinence with c/b by new Afib with RVR, HTN urgency, and UTI    #Afib RVR  -appreciate CCU consult  -C/w diltiazem gtt at 10mg/hr, digoxin, metoprolol 25mg q8hr  -Recommend digoxin level with AM labs  -Goal HR <110  -High CHADSVASC score, however given brain tumor would defer AC per discussion of cardiology with patient's health care proxy    #UTI  -c/w CTX as per team  -Monitor for fevers    #DM2  -not well controlled  -A1C 7.2  -Recommend Humalog 3 units q6hrs as pt is NPO  -Increased sliding scale to moderate  -c/w FS q6hrs    #HTN  -Well controlled for now  -c/w amlodipine  -Would add to hydralazine PRN to hold if HR >100 given reflective tachycardia  -c/w Labetalol PRN order    Will continue to follow with you  Samuel SALMON [id number] 69 year old man with PMH DM2, Stage IV GBM, HTN who presented to the ED with 3 weeks of increased R hemiparesis, worsened aphasia and urinary incontinence with c/b by new Afib with RVR, HTN urgency, and UTI    #Afib RVR  -appreciate CCU consult  -C/w diltiazem gtt at 10mg/hr, digoxin, metoprolol 25mg q8hr  -Recommend digoxin level with AM labs  -Goal HR <110  -High CHADSVASC score, however given brain tumor would defer AC per discussion of cardiology with patient's health care proxy      #GBM  -As of August 2019, neuro- oncologist mentioned no plan for chemotherapy    #UTI  -c/w CTX as per team  -Monitor for fevers    #DM2  -not well controlled  -A1C 7.2  -Recommend Humalog 3 units q6hrs as pt is NPO  -Increased sliding scale to moderate  -c/w FS q6hrs    #HTN  -Well controlled for now  -c/w amlodipine  -Would add to hydralazine PRN to hold if HR >100 given reflective tachycardia  -c/w Labetalol PRN order    -Spoke to Dr. Leonard, and he will manage the care of this patient    Will continue to follow with you  Samuel SALMON [id number] 69 year old man with PMH DM2, Stage IV GBM, HTN who presented to the ED with 3 weeks of increased R hemiparesis, worsened aphasia and urinary incontinence with c/b by new Afib with RVR, HTN urgency, and UTI    #Afib RVR  -appreciate CCU consult  -C/w diltiazem gtt at 10mg/hr, digoxin, metoprolol 25mg q8hr  -Recommend digoxin level with AM labs  -Goal HR <110  -High CHADSVASC score, however given brain tumor would defer AC per discussion of cardiology with patient's health care proxy  - spoke to Cardiologist- Dr Seymour, he will resume care at this point      #GBM  -As of August 2019, neuro- oncologist mentioned no plan for chemotherapy  - Neurology plan noted, on steroid    #UTI  -c/w IV ceftriaxone as per team, f/u urine c/s  -Monitor for fevers    #DM2  -not well controlled  -A1C 7.2%  -Recommend Humalog 3 units q6hrs as pt is NPO  -Increased sliding scale to moderate  -c/w FS q6hrs    #HTN  -Well controlled for now  -c/w amlodipine  -Would add to hydralazine PRN to hold if HR >100 given reflective tachycardia  -c/w Labetalol PRN order    -Spoke to Dr. Leonard, and he will manage the care of this patient under his service    Will continue to follow with you  Samuel SALMON [id number]

## 2019-11-09 NOTE — PROGRESS NOTE ADULT - SUBJECTIVE AND OBJECTIVE BOX
Subjective: Patient seen and examined. No new events except as noted.   remains in Stroke unit   NGT placed     REVIEW OF SYSTEMS:  Unable to obtain       MEDICATIONS:  MEDICATIONS  (STANDING):  aMIOdarone    Tablet 200 milliGRAM(s) Oral daily  amLODIPine   Tablet 10 milliGRAM(s) Oral daily  atorvastatin 80 milliGRAM(s) Oral at bedtime  cefTRIAXone   IVPB 1000 milliGRAM(s) IV Intermittent every 24 hours  cefTRIAXone   IVPB      dexAMETHasone  Injectable 4 milliGRAM(s) IV Push every 12 hours  dextrose 5%. 1000 milliLiter(s) (50 mL/Hr) IV Continuous <Continuous>  dextrose 50% Injectable 25 Gram(s) IV Push once  dextrose 50% Injectable 12.5 Gram(s) IV Push once  dextrose 50% Injectable 25 Gram(s) IV Push once  dextrose 50% Injectable 25 Gram(s) IV Push once  digoxin  Injectable 0.5 milliGRAM(s) IV Push once  diltiazem Injectable 15 milliGRAM(s) IV Push once  enoxaparin Injectable 40 milliGRAM(s) SubCutaneous daily  famotidine    Tablet 20 milliGRAM(s) Oral daily  insulin lispro (HumaLOG) corrective regimen sliding scale   SubCutaneous three times a day before meals  insulin lispro Injectable (HumaLOG) 3 Unit(s) SubCutaneous every 6 hours  levETIRAcetam  IVPB 750 milliGRAM(s) IV Intermittent every 12 hours  metoprolol tartrate 25 milliGRAM(s) Oral every 8 hours  metoprolol tartrate Injectable 5 milliGRAM(s) IV Push once  metoprolol tartrate Injectable 5 milliGRAM(s) IV Push once  OLANZapine Disintegrating Tablet 10 milliGRAM(s) Oral once  senna 2 Tablet(s) Oral at bedtime  sodium chloride 0.9% Bolus 250 milliLiter(s) IV Bolus once  sodium chloride 0.9%. 1000 milliLiter(s) (75 mL/Hr) IV Continuous <Continuous>      PHYSICAL EXAM:  T(C): 36.7 (11-09-19 @ 16:30), Max: 36.8 (11-09-19 @ 08:00)  HR: 61 (11-09-19 @ 18:00) (55 - 139)  BP: 155/88 (11-09-19 @ 18:00) (114/77 - 167/78)  RR: 21 (11-09-19 @ 18:00) (13 - 27)  SpO2: 96% (11-09-19 @ 18:00) (89% - 99%)  Wt(kg): --  I&O's Summary    08 Nov 2019 07:01  -  09 Nov 2019 07:00  --------------------------------------------------------  IN: 1200 mL / OUT: 1650 mL / NET: -450 mL    09 Nov 2019 07:01  -  09 Nov 2019 19:35  --------------------------------------------------------  IN: 1270 mL / OUT: 500 mL / NET: 770 mL          Appearance: NAD, confused   HEENT:   Dry oral mucosa +NGT   Lymphatic: No lymphadenopathy  Cardiovascular: Normal S1 S2, No JVD, No murmurs, No edema  Respiratory: Decreased bs   Psychiatry: A & O x) 0  Gastrointestinal:  Soft, Non-tender, + BS	  Skin: No rashes, No ecchymoses, No cyanosis	  Extremities: Decreased  range of motion, No clubbing, cyanosis or edema  Vascular: Peripheral pulses palpable 2+ bilaterally  Neurological:  MS: agitated, in bed in restraints, eyes open, periodically yelling or kicking, spitting, not answering questions or following commands  But does answer to orientation questions, AOx2 (name and "hospital" not [facility] or day or date)  Motor: all extremities moving, pulling with force against restraints periodically  Sensation: withdraws to touch all four extremities      LABS:    CARDIAC MARKERS:                                13.5   16.32 )-----------( 374      ( 09 Nov 2019 11:06 )             42.0     11-09    142  |  107  |  22  ----------------------------<  255<H>  3.7   |  21<L>  |  0.82    Ca    9.7      09 Nov 2019 07:07  Phos  3.5     11-09  Mg     2.0     11-09    TPro  6.1  /  Alb  3.5  /  TBili  0.4  /  DBili  x   /  AST  18  /  ALT  23  /  AlkPhos  76  11-09    proBNP:   Lipid Profile:   HgA1c:   TSH:     0          TELEMETRY: 	 AF, Now SR    ECG:  	  RADIOLOGY:   DIAGNOSTIC TESTING:  [ ] Echocardiogram:  [ ]  Catheterization:  [ ] Stress Test:    OTHER:

## 2019-11-09 NOTE — PROVIDER CONTACT NOTE (OTHER) - ASSESSMENT
Pt admitted for GBM. Pt restless, agitated,  at baseline. Pt heart rate now sustaining in 150-160s and patient becoming increasingly lethargic.  Pt only responsive to vigorous shaking.

## 2019-11-09 NOTE — PROGRESS NOTE ADULT - SUBJECTIVE AND OBJECTIVE BOX
Interval History: Patient was in Afib with RVR, still in afib with RVR but HR is now in 110s- 40s, got multiple pushes of lopressor, digoxin, amio once, and is now on diltiazem drip, in addition evaluated by CCU fellow, dilt drip rate increased and carvedilol 12.5 switched to metoprolol 25 q8.     MEDICATIONS  (STANDING):  amLODIPine   Tablet 10 milliGRAM(s) Oral daily  atorvastatin 80 milliGRAM(s) Oral at bedtime  cefTRIAXone   IVPB 1000 milliGRAM(s) IV Intermittent every 24 hours  cefTRIAXone   IVPB      dexAMETHasone  Injectable 4 milliGRAM(s) IV Push every 12 hours  dextrose 5%. 1000 milliLiter(s) (50 mL/Hr) IV Continuous <Continuous>  dextrose 50% Injectable 12.5 Gram(s) IV Push once  dextrose 50% Injectable 25 Gram(s) IV Push once  dextrose 50% Injectable 25 Gram(s) IV Push once  digoxin  Injectable 0.5 milliGRAM(s) IV Push once  diltiazem Infusion 10 mG/Hr (10 mL/Hr) IV Continuous <Continuous>  diltiazem Injectable 15 milliGRAM(s) IV Push once  enoxaparin Injectable 40 milliGRAM(s) SubCutaneous daily  famotidine    Tablet 20 milliGRAM(s) Oral daily  insulin lispro (HumaLOG) corrective regimen sliding scale   SubCutaneous three times a day before meals  insulin lispro (HumaLOG) corrective regimen sliding scale   SubCutaneous at bedtime  levETIRAcetam  IVPB 750 milliGRAM(s) IV Intermittent every 12 hours  metoprolol tartrate 25 milliGRAM(s) Oral every 8 hours  metoprolol tartrate Injectable 5 milliGRAM(s) IV Push once  metoprolol tartrate Injectable 5 milliGRAM(s) IV Push once  OLANZapine Disintegrating Tablet 10 milliGRAM(s) Oral once  senna 2 Tablet(s) Oral at bedtime  sodium chloride 0.9% Bolus 250 milliLiter(s) IV Bolus once  sodium chloride 0.9%. 1000 milliLiter(s) (75 mL/Hr) IV Continuous <Continuous>    MEDICATIONS  (PRN):  acetaminophen  Suppository .. 650 milliGRAM(s) Rectal every 6 hours PRN Temp greater or equal to 38C (100.4F), Mild Pain (1 - 3)  dextrose 40% Gel 15 Gram(s) Oral once PRN Blood Glucose LESS THAN 70 milliGRAM(s)/deciliter  glucagon  Injectable 1 milliGRAM(s) IntraMuscular once PRN Glucose LESS THAN 70 milligrams/deciliter  hydrALAZINE Injectable 20 milliGRAM(s) IV Push every 4 hours PRN SBP > 160  labetalol Injectable 20 milliGRAM(s) IV Push every 3 hours PRN Systolic blood pressure > 160  nystatin Powder 1 Application(s) Topical every 8 hours PRN rash/itching  OLANZapine Injectable 10 milliGRAM(s) IntraMuscular every 8 hours PRN agitation    Vital Signs Last 24 Hrs  T(C): 36.6 (09 Nov 2019 03:30), Max: 36.9 (08 Nov 2019 12:00)  T(F): 97.8 (09 Nov 2019 03:30), Max: 98.4 (08 Nov 2019 12:00)  HR: 123 (09 Nov 2019 10:00) (69 - 139)  BP: 138/86 (09 Nov 2019 10:00) (103/66 - 167/78)  BP(mean): 103 (09 Nov 2019 10:00) (74 - 118)  RR: 23 (09 Nov 2019 10:00) (12 - 27)  SpO2: 95% (09 Nov 2019 10:00) (89% - 99%)    Neurological:  Patient arousable to shoulder shaking.  Had receive zyprexa and was difficult to keep awake.  When awake was agitated.  AOx1.    Moving all extremities spontaneously.  Sensation to light touch intact 4x.    Labs:   cbc                      12.6   16.30 )-----------( 297      ( 09 Nov 2019 03:18 )             36.8     [id number]    142  |  107  |  22  ----------------------------<  255<H>  3.7   |  21<L>  |  0.82    Ca    9.7      09 Nov 2019 07:07  Phos  3.5     11-09  Mg     2.0     11-09    TPro  6.1  /  Alb  3.5  /  TBili  0.4  /  DBili  x   /  AST  18  /  ALT  23  /  AlkPhos  76  11-09

## 2019-11-09 NOTE — CONSULT NOTE ADULT - SUBJECTIVE AND OBJECTIVE BOX
Patient seen and evaluated at bedside    Chief Complaint: AMS    HPI:  Mr Barber is a 70 yo M with a PMH significant for DM, stage IV glioblastoma multiforme (dx in 2018), HTN who was sent in by his neuro-oncologist for further evaluation of increased R hemiparesis, worsened aphasia and urinary incontinence, started on steroids. Course c/b HTN urgency briefly requiring cardene gtt now transitioned to PO. Course has also been complicated by AF with RVR, now on amio and dilt gtts. CCU consulted for AF with RVR.      PMHx:   Type 2 diabetes mellitus  Glioblastoma multiforme  HTN (hypertension)      PSHx:   Glioblastoma multiforme  History of appendectomy      Allergies:  No Known Allergies      Current Medications:   acetaminophen  Suppository .. 650 milliGRAM(s) Rectal every 6 hours PRN  amLODIPine   Tablet 10 milliGRAM(s) Oral daily  atorvastatin 80 milliGRAM(s) Oral at bedtime  carvedilol 12.5 milliGRAM(s) Oral every 12 hours  cefTRIAXone   IVPB 1000 milliGRAM(s) IV Intermittent every 24 hours  cefTRIAXone   IVPB      dexAMETHasone  Injectable 4 milliGRAM(s) IV Push every 12 hours  dextrose 40% Gel 15 Gram(s) Oral once PRN  dextrose 5%. 1000 milliLiter(s) IV Continuous <Continuous>  dextrose 50% Injectable 12.5 Gram(s) IV Push once  dextrose 50% Injectable 25 Gram(s) IV Push once  dextrose 50% Injectable 25 Gram(s) IV Push once  digoxin  Injectable 0.5 milliGRAM(s) IV Push once  diltiazem Infusion 5 mG/Hr IV Continuous <Continuous>  diltiazem Injectable 15 milliGRAM(s) IV Push once  enoxaparin Injectable 40 milliGRAM(s) SubCutaneous daily  famotidine    Tablet 20 milliGRAM(s) Oral daily  glucagon  Injectable 1 milliGRAM(s) IntraMuscular once PRN  hydrALAZINE Injectable 20 milliGRAM(s) IV Push every 4 hours PRN  insulin lispro (HumaLOG) corrective regimen sliding scale   SubCutaneous three times a day before meals  insulin lispro (HumaLOG) corrective regimen sliding scale   SubCutaneous at bedtime  labetalol Injectable 20 milliGRAM(s) IV Push every 3 hours PRN  levETIRAcetam  IVPB 750 milliGRAM(s) IV Intermittent every 12 hours  metoprolol tartrate Injectable 5 milliGRAM(s) IV Push once  metoprolol tartrate Injectable 5 milliGRAM(s) IV Push once  nystatin Powder 1 Application(s) Topical every 8 hours PRN  OLANZapine Disintegrating Tablet 10 milliGRAM(s) Oral once  OLANZapine Injectable 10 milliGRAM(s) IntraMuscular every 8 hours PRN  senna 2 Tablet(s) Oral at bedtime  sodium chloride 0.9% Bolus 250 milliLiter(s) IV Bolus once  sodium chloride 0.9%. 1000 milliLiter(s) IV Continuous <Continuous>      FAMILY HISTORY:      Social History:  Smoking History: denies  Alcohol Use: denies  Drug Use: denies    REVIEW OF SYSTEMS:  CONSTITUTIONAL: No weakness, fevers or chills  EYES/ENT: No visual changes;  No dysphagia  NECK: No pain or stiffness  RESPIRATORY: No cough, wheezing, hemoptysis; No shortness of breath  CARDIOVASCULAR: No chest pain or palpitations; No lower extremity edema  GASTROINTESTINAL: No abdominal or epigastric pain. No nausea, vomiting, or hematemesis; No diarrhea or constipation. No melena or hematochezia.  BACK: No back pain  GENITOURINARY: No dysuria, frequency or hematuria  NEUROLOGICAL: No numbness or weakness  SKIN: No itching, burning, rashes, or lesions   All other review of systems is negative unless indicated above.    Physical Exam:  T(F): 97.8 (11-09), Max: 98.4 (11-08)  HR: 123 (11-09) (69 - 139)  BP: 138/86 (11-09) (103/66 - 167/78)  RR: 23 (11-09)  SpO2: 95% (11-09)  GENERAL: No acute distress, well-developed  HEAD:  Atraumatic, Normocephalic  ENT: EOMI, PERRLA, conjunctiva and sclera clear, Neck supple, No JVD, moist mucosa  CHEST/LUNG: Clear to auscultation bilaterally; No wheeze, equal breath sounds bilaterally   BACK: No spinal tenderness  HEART: Regular rate and rhythm; No murmurs, rubs, or gallops  ABDOMEN: Soft, Nontender, Nondistended; Bowel sounds present  EXTREMITIES:  No clubbing, cyanosis, or edema  PSYCH: Nl behavior, nl affect  NEUROLOGY: AAOx3, non-focal, cranial nerves intact  SKIN: Normal color, No rashes or lesions      Cardiovascular Diagnostic Testing:    ECG: Personally reviewed:    Echo: Personally reviewed:  8/5/19  Conclusions:  1. Moderate left atrial enlargement.  2. Mild concentric left ventricular hypertrophy.  3. Endocardium not well visualized; grossly normal left  ventricular systolic function.  4. Reversal of the E-A  waves of the mitral inflow pattern  is consistent with diastolic LV dysfunction.  5. Normal right ventricular size and function.  *** Compared with echocardiogram of 7/17/2019, no  significant changes noted.      Imaging:    CXR: Personally reviewed    Labs: Personally reviewed                        12.6   16.30 )-----------( 297      ( 09 Nov 2019 03:18 )             36.8     11-09    142  |  107  |  22  ----------------------------<  255<H>  3.7   |  21<L>  |  0.82    Ca    9.7      09 Nov 2019 07:07  Phos  3.5     11-09  Mg     2.0     11-09    TPro  6.1  /  Alb  3.5  /  TBili  0.4  /  DBili  x   /  AST  18  /  ALT  23  /  AlkPhos  76  11-09    PT/INR - ( 09 Nov 2019 07:07 )   PT: 12.2 sec;   INR: 1.06 ratio         PTT - ( 09 Nov 2019 07:07 )  PTT:27.2 sec    CARDIAC MARKERS ( 09 Nov 2019 02:42 )  31 ng/L / x     / x     / x     / x     / x                Hemoglobin A1C, Whole Blood: 7.2 % (11-06 @ 09:08)    Thyroid Stimulating Hormone, Serum: 1.07 uIU/mL (11-06 @ 08:52) Patient seen and evaluated at bedside    Chief Complaint: AMS    HPI:  Mr Barber is a 70 yo M with a PMH significant for DM, stage IV glioblastoma multiforme (dx in 2018), HTN who was sent in by his neuro-oncologist for further evaluation of increased R hemiparesis, worsened aphasia and urinary incontinence, started on steroids. Course c/b HTN urgency briefly requiring cardene gtt now transitioned to PO. Course has also been complicated by AF with RVR, now on dilt gtt. CCU consulted for AF with RVR. Currently patient responding to voice and commands but not answering all questions appropriately. He states he feels well but does not deny specific complaints when asked.      PMHx:   Type 2 diabetes mellitus  Glioblastoma multiforme  HTN (hypertension)      PSHx:   Glioblastoma multiforme  History of appendectomy      Allergies:  No Known Allergies      Current Medications:   acetaminophen  Suppository .. 650 milliGRAM(s) Rectal every 6 hours PRN  amLODIPine   Tablet 10 milliGRAM(s) Oral daily  atorvastatin 80 milliGRAM(s) Oral at bedtime  carvedilol 12.5 milliGRAM(s) Oral every 12 hours  cefTRIAXone   IVPB 1000 milliGRAM(s) IV Intermittent every 24 hours  cefTRIAXone   IVPB      dexAMETHasone  Injectable 4 milliGRAM(s) IV Push every 12 hours  dextrose 40% Gel 15 Gram(s) Oral once PRN  dextrose 5%. 1000 milliLiter(s) IV Continuous <Continuous>  dextrose 50% Injectable 12.5 Gram(s) IV Push once  dextrose 50% Injectable 25 Gram(s) IV Push once  dextrose 50% Injectable 25 Gram(s) IV Push once  digoxin  Injectable 0.5 milliGRAM(s) IV Push once  diltiazem Infusion 5 mG/Hr IV Continuous <Continuous>  diltiazem Injectable 15 milliGRAM(s) IV Push once  enoxaparin Injectable 40 milliGRAM(s) SubCutaneous daily  famotidine    Tablet 20 milliGRAM(s) Oral daily  glucagon  Injectable 1 milliGRAM(s) IntraMuscular once PRN  hydrALAZINE Injectable 20 milliGRAM(s) IV Push every 4 hours PRN  insulin lispro (HumaLOG) corrective regimen sliding scale   SubCutaneous three times a day before meals  insulin lispro (HumaLOG) corrective regimen sliding scale   SubCutaneous at bedtime  labetalol Injectable 20 milliGRAM(s) IV Push every 3 hours PRN  levETIRAcetam  IVPB 750 milliGRAM(s) IV Intermittent every 12 hours  metoprolol tartrate Injectable 5 milliGRAM(s) IV Push once  metoprolol tartrate Injectable 5 milliGRAM(s) IV Push once  nystatin Powder 1 Application(s) Topical every 8 hours PRN  OLANZapine Disintegrating Tablet 10 milliGRAM(s) Oral once  OLANZapine Injectable 10 milliGRAM(s) IntraMuscular every 8 hours PRN  senna 2 Tablet(s) Oral at bedtime  sodium chloride 0.9% Bolus 250 milliLiter(s) IV Bolus once  sodium chloride 0.9%. 1000 milliLiter(s) IV Continuous <Continuous>        Social History:  Smoking History: denies  Alcohol Use: denies  Drug Use: denies    REVIEW OF SYSTEMS:  Unable to accurately assess given mental status.    Physical Exam:  T(F): 97.8 (11-09), Max: 98.4 (11-08)  HR: 123 (11-09) (69 - 139)  BP: 138/86 (11-09) (103/66 - 167/78)  RR: 23 (11-09)  SpO2: 95% (11-09)  GENERAL: ill appearing, NG tube in place  HEAD:  Atraumatic, Normocephalic  ENT: EOMI, PERRLA, conjunctiva and sclera clear, Neck supple, No JVD, moist mucosa  CHEST/LUNG: diffuse coarse breath sounds  HEART: irregularly irregular, tachycardic, No murmurs, rubs, or gallops  ABDOMEN: Soft, Nontender, Nondistended; Bowel sounds present  EXTREMITIES:  No clubbing, cyanosis, or edema  PSYCH: Nl behavior, nl affect  NEUROLOGY: drowsy but arousable, oriented x 1, right sided hemiparesis  SKIN: Normal color, No rashes or lesions      Cardiovascular Diagnostic Testing:    Echo: Personally reviewed:  8/5/19  Conclusions:  1. Moderate left atrial enlargement.  2. Mild concentric left ventricular hypertrophy.  3. Endocardium not well visualized; grossly normal left  ventricular systolic function.  4. Reversal of the E-A  waves of the mitral inflow pattern  is consistent with diastolic LV dysfunction.  5. Normal right ventricular size and function.  *** Compared with echocardiogram of 7/17/2019, no  significant changes noted.      Imaging:    Labs: Personally reviewed                        12.6   16.30 )-----------( 297      ( 09 Nov 2019 03:18 )             36.8     11-09    142  |  107  |  22  ----------------------------<  255<H>  3.7   |  21<L>  |  0.82    Ca    9.7      09 Nov 2019 07:07  Phos  3.5     11-09  Mg     2.0     11-09    TPro  6.1  /  Alb  3.5  /  TBili  0.4  /  DBili  x   /  AST  18  /  ALT  23  /  AlkPhos  76  11-09    PT/INR - ( 09 Nov 2019 07:07 )   PT: 12.2 sec;   INR: 1.06 ratio    PTT - ( 09 Nov 2019 07:07 )  PTT:27.2 sec    CARDIAC MARKERS ( 09 Nov 2019 02:42 )  31 ng/L / x     / x     / x     / x     / x            Hemoglobin A1C, Whole Blood: 7.2 % (11-06 @ 09:08)    Thyroid Stimulating Hormone, Serum: 1.07 uIU/mL (11-06 @ 08:52)

## 2019-11-09 NOTE — PROVIDER CONTACT NOTE (OTHER) - SITUATION
Pt admitted for GBM.  Pt with previous sustained Tachycardia up too 160s.  Pt now with HR sustaining 140s.

## 2019-11-09 NOTE — PROVIDER CONTACT NOTE (OTHER) - ASSESSMENT
Pt admitted for GBM.  Pt agitated, confused, and restless.  HR 80-90s throughout the night.   Pt with previous sustained Tachycardia up too 160s. RRT initated.  Cardizem drip started; pt sustained in 110s with some bursts up to 130.  PA notified.  Pt now with HR sustaining 140s.

## 2019-11-09 NOTE — PROVIDER CONTACT NOTE (OTHER) - ACTION/TREATMENT ORDERED:
PA and MD assessed pt at bedside.  Pt remains neurologically stable.  Cardiology (MD Seymour) consulted.  Amiodarone IV started.  Will continue to monitor HR.

## 2019-11-09 NOTE — RAPID RESPONSE TEAM SUMMARY - NSSITUATIONBACKGROUNDRRT_GEN_ALL_CORE
Briefly, 69 year old man with PMHx T2DM, stage IV glioblastoma multiforme (dx in 2018), HTN and PSHx appendectomy presents to the ED with 3 weeks of increased R hemiparesis, worsened aphasia and urinary incontinence. RRT was called for Afib with RVR with rates sustaining in 140s. SBP ranged from 110s-120s. Lopressor 5 mg x 2 was given without adequate response. Digoxin 0.5 mg x 1 given, also with no response. Cardizem 10 mg was subsequently given, with HR reduction to 110s briefly. Second dose given and cardizem gtt was initiated, with rates now sustaining in the 110s.

## 2019-11-09 NOTE — PROGRESS NOTE ADULT - ATTENDING COMMENTS
I have examined the pt at bedside.  The risks and the benefits of the proposed diagnostic/therapeutic approach were thoroughly discussed.   I agree with the above plan and have modified it where necessary.
I have examined the pt at bedside.  The risks and the benefits of the proposed diagnostic/therapeutic approach were thoroughly discussed.   I agree with the above plan and have modified it where necessary.
Patient seen and examined, neuro deficits stable with dense RHP and aphasia.  Cardiology fellow recs appreciated, will increase diltiazem drip for rate control.      Would be cautious with AC and defer to Dr. Cuba on Monday.  As patient has no acute neurological issues and has a fib with RVR requiring diltiazem drip, would continue to engage medicine for transfer to their service to optimize patient's care.
I have examined the pt at bedside.  The risks and the benefits of the proposed diagnostic/therapeutic approach were thoroughly discussed.   I agree with the above plan and have modified it where necessary.

## 2019-11-09 NOTE — PROVIDER CONTACT NOTE (OTHER) - SITUATION
Pt admitted for GBM.  Pt heart rate now sustaining in 150-160s and patient becoming increasingly lethargic.

## 2019-11-09 NOTE — CHART NOTE - NSCHARTNOTEFT_GEN_A_CORE
Patient was in -160s with V.tach on tele. EKG revealed sustained Atrial fibrillation. Patient was more somnolent than baseline and was responding to verbal and noxious stimuli. Patient was given IV Labetalol 10 mg which did not break the tachycardia. Rapid response was called for altered mental status and possible V. tach and patient was treated by rapid response team with multiple IV medications to help improve the tachycardia and a. fibrillation. Patient is now on diltiazem infusion 125 mg and  sustained. He is agitated but will hold on PRN Zyprexa for now due to prolonged QTc seen on most recent EKG 11/9. Will continue to monitor q1 vitals and reassess. Holding NGT feeds for now as well.

## 2019-11-09 NOTE — PROGRESS NOTE ADULT - ASSESSMENT
69 year old man with PMHx T2DM, stage IV glioblastoma multiforme (dx in 2018), HTN and PSHx appendectomy presents to the ED with 3 weeks of increased R hemiparesis, worsened aphasia and urinary incontinence, sent from his neruo-oncologist Dr. Cuba's office. Pt has had resection, chemo radiation, most recently was noted to be clinically worse in March 2019 with increased somnolence and steroids were increased. Per his neuro-onc, concern for progression of disease. In ER, pt no family at bedside, pt was agitated, confused, aphasic, climbing out of bed, recieved 4mg of ativan, 5mg of haldol sequentially. Neurological exam was limited due to sedation, pt did not respond to basic orientation questions appropriately in Turkmen, had intermittent nature to nonsensical speech, but was able to show tongue, raise his arms - on mimicry only, R hemiparesis. Noted to have wet himself.  Admitted to Neurology for further evaluation. current issues are primarily cardiac - is in Afib with RVR    Plan  [] CCU evaluation appreciated  [] pending medicine evaluation/possible transfer for primarily cardiac issues.   [x] NeuroSx - no acute intervention  [x] EEG - no seizures  [x] MRI brain - increased edema, no increased enhancement   [x] Keppra 750 BID  [x] Decadron 4mg BID  [] recommended Palliative consult for GOC and advanced directives- hold off for now until Dr. Cuba reviews MRI  [] Zyprexa prn agitation   [] f/u neuro-onc Dr. Cuba recs  [] restraints, 1:1, haldol  [] Speech and swallow - rec NPO  [] NGT placement failed despite multiple attempts - ENT to place Friday afternoon  [] After NGT placement confirmed - can start tube feeds (Glucerna 1.2, 75 mgl/hr for 24 hours (goal rate 75) and po meds  [] titrated off cardene drip, can be moved out of stroke unit once bed available on floor  [] Ceftriaxone started for UTI (11/8 - 11/14) - follow-up urine culture results 69 year old man with PMHx T2DM, stage IV glioblastoma multiforme (dx in 2018), HTN and PSHx appendectomy presents to the ED with 3 weeks of increased R hemiparesis, worsened aphasia and urinary incontinence, sent from his neruo-oncologist Dr. Cuba's office. Pt has had resection, chemo radiation, most recently was noted to be clinically worse in March 2019 with increased somnolence and steroids were increased. Per his neuro-onc, concern for progression of disease. In ER, pt no family at bedside, pt was agitated, confused, aphasic, climbing out of bed, recieved 4mg of ativan, 5mg of haldol sequentially. Neurological exam was limited due to sedation, pt did not respond to basic orientation questions appropriately in Mongolian, had intermittent nature to nonsensical speech, but was able to show tongue, raise his arms - on mimicry only, R hemiparesis. Noted to have wet himself.  Admitted to Neurology for further evaluation. current issues are primarily cardiac - is in Afib with RVR    Plan  [] CCU evaluation appreciated  [] pending medicine evaluation/possible transfer for primarily cardiac issues.   [x] NeuroSx - no acute intervention  [x] EEG - no seizures  [x] MRI brain - increased edema, no increased enhancement   [x] Keppra 750 BID  [x] Decadron 4mg BID  [] recommended Palliative consult for GOC and advanced directives- hold off for now until Dr. Cuba reviews MRI  [] Zyprexa prn agitation   [] f/u neuro-onc Dr. Cuba recs  [] restraints, 1:1, haldol  [] Speech and swallow - rec NPO  [] NPO with TF  [] Ceftriaxone started for UTI (11/8 - 11/14) - follow-up urine culture results

## 2019-11-09 NOTE — CONSULT NOTE ADULT - SUBJECTIVE AND OBJECTIVE BOX
ARSENIO DARNELL  69y  Male    Pt with aphasia and difficulties communicating verbally, thus most of history obtained from chart review. Pt also Bots[place]an speaking, and refused translation services thus Bots[place]an language was used.     69 year old man with PMH DM2, Stage IV GBM, HTN who presented to the ED with 3 weeks of increased R hemiparesis, worsened aphasia and urinary incontinence, sent in from neuro-oncologist's office for further eval.  Pt has had resection, chemo radiation, most recently was noted to be clinically worse in March 2019 with increased somnolence and steroids were increased. Per his neuro-onc, concern for progression of disease. Pt has been agitated in the ED as well as on 4 Peter, and is currently 1:1. Work up includes EEG which showed no seizures, MRI Brain which revealed increased edema and no increased enhancement. Pt also being treated for UTI with CTX, which tx ends on 11/14. CTA done on admission was negative for PE. Course complicated by Afib with RVR s/p RRT.      PAST MEDICAL/SURGICAL HISTORY  PAST MEDICAL & SURGICAL HISTORY:  Type 2 diabetes mellitus  Glioblastoma multiforme  HTN (hypertension)  Glioblastoma multiforme  History of appendectomy      REVIEW OF SYSTEMS:  Unable to obtain 2/2 aphasia, but patient nods head when asked if any chest pain, palpitations, or SOB.    T(C): 36.8 (11-09-19 @ 08:00), Max: 36.8 (11-09-19 @ 08:00)  HR: 129 (11-09-19 @ 13:00) (69 - 139)  BP: 156/95 (11-09-19 @ 13:00) (103/66 - 167/78)  RR: 21 (11-09-19 @ 13:00) (14 - 27)  SpO2: 95% (11-09-19 @ 13:00) (89% - 99%)  Wt(kg): --Vital Signs Last 24 Hrs  T(C): 36.8 (09 Nov 2019 08:00), Max: 36.8 (09 Nov 2019 08:00)  T(F): 98.2 (09 Nov 2019 08:00), Max: 98.2 (09 Nov 2019 08:00)  HR: 129 (09 Nov 2019 13:00) (69 - 139)  BP: 156/95 (09 Nov 2019 13:00) (103/66 - 167/78)  BP(mean): 108 (09 Nov 2019 13:00) (74 - 118)  RR: 21 (09 Nov 2019 13:00) (14 - 27)  SpO2: 95% (09 Nov 2019 13:00) (89% - 99%)    PHYSICAL EXAM:  GENERAL: NAD, well-groomed, well-developed  HEAD:  Atraumatic, Normocephalic  EYES: EOMI, PERRLA, conjunctiva and sclera clear  ENMT: No tonsillar erythema, exudates, or enlargement; Moist mucous membranes, Good dentition, No lesions  NECK: Supple, No JVD, Normal thyroid  NERVOUS SYSTEM:  Alert & Oriented X3, Good concentration; Motor Strength 5/5 B/L upper and lower extremities; DTRs 2+ intact and symmetric  CHEST/LUNG: Clear to percussion bilaterally; No rales, rhonchi, wheezing, or rubs  HEART: Regular rate and rhythm; No murmurs, rubs, or gallops  ABDOMEN: Soft, Nontender, Nondistended; Bowel sounds present  EXTREMITIES:  2+ Peripheral Pulses, No clubbing, cyanosis, or edema  LYMPH: No lymphadenopathy noted  SKIN: No rashes or lesions    Consultant(s) Notes Reviewed:  [x ] YES  [ ] NO  Care Discussed with Consultants/Other Providers [ x] YES  [ ] NO    LABS:                        13.5   16.32 )-----------( 374      ( 09 Nov 2019 11:06 )             42.0     11-09    142  |  107  |  22  ----------------------------<  255<H>  3.7   |  21<L>  |  0.82    Ca    9.7      09 Nov 2019 07:07  Phos  3.5     11-09  Mg     2.0     11-09    TPro  6.1  /  Alb  3.5  /  TBili  0.4  /  DBili  x   /  AST  18  /  ALT  23  /  AlkPhos  76  11-09        RADIOLOGY & ADDITIONAL TESTS:    Imaging Personally Reviewed:  [X ] YES  [ ] NO ARSENIO DARNELL    Pt with aphasia and difficulties communicating verbally, thus most of history obtained from chart review. Pt also Hungarian speaking, and refused translation services thus Hungarian language was used.     69 year old man with PMH DM2, Stage IV GBM, HTN who presented to the ED with 3 weeks of increased R hemiparesis, worsened aphasia and urinary incontinence, sent in from neuro-oncologist's office for further eval.  Pt has had resection, chemo radiation, most recently was noted to be clinically worse in March 2019 with increased somnolence and steroids were increased. Per his neuro-onc, concern for progression of disease. Pt has been agitated in the ED as well as on 4 Peter, and is currently 1:1. Work up includes EEG which showed no seizures, MRI Brain which revealed increased edema and no increased enhancement. Pt also being treated for UTI with CTX, which tx ends on 11/14. CTA done on admission was negative for PE. Course complicated by Afib with RVR s/p RRT.    GBM dx in 2001 s/p resection and RT. No chemo therapy since 11/2018.      PAST MEDICAL/SURGICAL HISTORY  PAST MEDICAL & SURGICAL HISTORY:  Type 2 diabetes mellitus  Glioblastoma multiforme  HTN (hypertension)  Glioblastoma multiforme  History of appendectomy      REVIEW OF SYSTEMS:  Unable to obtain 2/2 aphasia, but patient nods head when asked if any chest pain, palpitations, or SOB.    T(C): 36.8 (11-09-19 @ 08:00), Max: 36.8 (11-09-19 @ 08:00)  HR: 129 (11-09-19 @ 13:00) (69 - 139)  BP: 156/95 (11-09-19 @ 13:00) (103/66 - 167/78)  RR: 21 (11-09-19 @ 13:00) (14 - 27)  SpO2: 95% (11-09-19 @ 13:00) (89% - 99%)  Wt(kg): --Vital Signs Last 24 Hrs  T(C): 36.8 (09 Nov 2019 08:00), Max: 36.8 (09 Nov 2019 08:00)  T(F): 98.2 (09 Nov 2019 08:00), Max: 98.2 (09 Nov 2019 08:00)  HR: 129 (09 Nov 2019 13:00) (69 - 139)  BP: 156/95 (09 Nov 2019 13:00) (103/66 - 167/78)  BP(mean): 108 (09 Nov 2019 13:00) (74 - 118)  RR: 21 (09 Nov 2019 13:00) (14 - 27)  SpO2: 95% (09 Nov 2019 13:00) (89% - 99%)    PHYSICAL EXAM:  GENERAL: NAD, NGT in place  HEAD:  Atraumatic, Normocephalic  EYES: EOMI, PERRLA, conjunctiva and sclera clear  ENMT: No tonsillar erythema, exudates, or enlargement; dry mucous membranes  NECK: Supple, No JVD, Normal thyroid  NERVOUS SYSTEM:  Alert & Oriented X2 to person and place, sensation intact, moves all four ext, follows commands.  CHEST/LUNG: Clear to percussion bilaterally; No rales, rhonchi, wheezing, or rubs  HEART: irregular rhythm; No murmurs, rubs, or gallops  ABDOMEN: Soft, Nontender, Nondistended; Bowel sounds present  EXTREMITIES:  2+ Peripheral Pulses, No clubbing, cyanosis, or edema  LYMPH: No lymphadenopathy noted  SKIN: No rashes or lesions    Consultant(s) Notes Reviewed:  [x ] YES  [ ] NO  Care Discussed with Consultants/Other Providers [ x] YES  [ ] NO    LABS:                        13.5   16.32 )-----------( 374      ( 09 Nov 2019 11:06 )             42.0     11-09    142  |  107  |  22  ----------------------------<  255<H>  3.7   |  21<L>  |  0.82    Ca    9.7      09 Nov 2019 07:07  Phos  3.5     11-09  Mg     2.0     11-09    TPro  6.1  /  Alb  3.5  /  TBili  0.4  /  DBili  x   /  AST  18  /  ALT  23  /  AlkPhos  76  11-09      RADIOLOGY & ADDITIONAL TESTS:  < from: Xray Chest 1 View- PORTABLE-Routine (11.08.19 @ 18:24) >  Impression: There is a nasogastric tube with its tip in good position,   within the stomach.    < from: MR Head w/wo IV Cont (11.06.19 @ 23:11) >  Impression: Previously noted large area of peripheral enhancement in the   left temporal frontal region is again seen with decrease surrounding   enhancement. Slight increase surrounding edema is identified. This   decreased enhancement could be secondary to response to therapy though   clinical correlation and continued close interval follow-up is   recommended.      < from: CT Angio Chest w/ IV Cont (11.06.19 @ 09:50) >  IMPRESSION:     No main, right, or left pulmonary artery embolism. Evaluation for lobar,   segmental, or subsegmental embolus is limited by motion and metal artifact    Imaging Personally Reviewed:  [X ] YES  [ ] NO

## 2019-11-10 LAB
ALBUMIN SERPL ELPH-MCNC: 3.4 G/DL — SIGNIFICANT CHANGE UP (ref 3.3–5)
ALP SERPL-CCNC: 72 U/L — SIGNIFICANT CHANGE UP (ref 40–120)
ALT FLD-CCNC: 18 U/L — SIGNIFICANT CHANGE UP (ref 10–45)
ANION GAP SERPL CALC-SCNC: 12 MMOL/L — SIGNIFICANT CHANGE UP (ref 5–17)
ANION GAP SERPL CALC-SCNC: 14 MMOL/L — SIGNIFICANT CHANGE UP (ref 5–17)
AST SERPL-CCNC: 10 U/L — SIGNIFICANT CHANGE UP (ref 10–40)
BILIRUB SERPL-MCNC: 0.2 MG/DL — SIGNIFICANT CHANGE UP (ref 0.2–1.2)
BUN SERPL-MCNC: 38 MG/DL — HIGH (ref 7–23)
BUN SERPL-MCNC: 40 MG/DL — HIGH (ref 7–23)
CALCIUM SERPL-MCNC: 9.3 MG/DL — SIGNIFICANT CHANGE UP (ref 8.4–10.5)
CALCIUM SERPL-MCNC: 9.3 MG/DL — SIGNIFICANT CHANGE UP (ref 8.4–10.5)
CHLORIDE SERPL-SCNC: 108 MMOL/L — SIGNIFICANT CHANGE UP (ref 96–108)
CHLORIDE SERPL-SCNC: 110 MMOL/L — HIGH (ref 96–108)
CO2 SERPL-SCNC: 21 MMOL/L — LOW (ref 22–31)
CO2 SERPL-SCNC: 22 MMOL/L — SIGNIFICANT CHANGE UP (ref 22–31)
CREAT SERPL-MCNC: 0.95 MG/DL — SIGNIFICANT CHANGE UP (ref 0.5–1.3)
CREAT SERPL-MCNC: 1 MG/DL — SIGNIFICANT CHANGE UP (ref 0.5–1.3)
DIGOXIN SERPL-MCNC: 0.6 NG/ML — LOW (ref 0.8–2)
GLUCOSE BLDC GLUCOMTR-MCNC: 226 MG/DL — HIGH (ref 70–99)
GLUCOSE BLDC GLUCOMTR-MCNC: 236 MG/DL — HIGH (ref 70–99)
GLUCOSE BLDC GLUCOMTR-MCNC: 247 MG/DL — HIGH (ref 70–99)
GLUCOSE SERPL-MCNC: 279 MG/DL — HIGH (ref 70–99)
GLUCOSE SERPL-MCNC: 284 MG/DL — HIGH (ref 70–99)
HCT VFR BLD CALC: 41.1 % — SIGNIFICANT CHANGE UP (ref 39–50)
HCT VFR BLD CALC: 42.8 % — SIGNIFICANT CHANGE UP (ref 39–50)
HGB BLD-MCNC: 13.5 G/DL — SIGNIFICANT CHANGE UP (ref 13–17)
HGB BLD-MCNC: 13.7 G/DL — SIGNIFICANT CHANGE UP (ref 13–17)
MCHC RBC-ENTMCNC: 29.9 PG — SIGNIFICANT CHANGE UP (ref 27–34)
MCHC RBC-ENTMCNC: 30 PG — SIGNIFICANT CHANGE UP (ref 27–34)
MCHC RBC-ENTMCNC: 32 GM/DL — SIGNIFICANT CHANGE UP (ref 32–36)
MCHC RBC-ENTMCNC: 32.8 GM/DL — SIGNIFICANT CHANGE UP (ref 32–36)
MCV RBC AUTO: 91.3 FL — SIGNIFICANT CHANGE UP (ref 80–100)
MCV RBC AUTO: 93.4 FL — SIGNIFICANT CHANGE UP (ref 80–100)
NRBC # BLD: 0 /100 WBCS — SIGNIFICANT CHANGE UP (ref 0–0)
PLATELET # BLD AUTO: 332 K/UL — SIGNIFICANT CHANGE UP (ref 150–400)
PLATELET # BLD AUTO: 355 K/UL — SIGNIFICANT CHANGE UP (ref 150–400)
POTASSIUM SERPL-MCNC: 3.7 MMOL/L — SIGNIFICANT CHANGE UP (ref 3.5–5.3)
POTASSIUM SERPL-MCNC: 3.8 MMOL/L — SIGNIFICANT CHANGE UP (ref 3.5–5.3)
POTASSIUM SERPL-SCNC: 3.7 MMOL/L — SIGNIFICANT CHANGE UP (ref 3.5–5.3)
POTASSIUM SERPL-SCNC: 3.8 MMOL/L — SIGNIFICANT CHANGE UP (ref 3.5–5.3)
PROT SERPL-MCNC: 5.9 G/DL — LOW (ref 6–8.3)
RBC # BLD: 4.5 M/UL — SIGNIFICANT CHANGE UP (ref 4.2–5.8)
RBC # BLD: 4.58 M/UL — SIGNIFICANT CHANGE UP (ref 4.2–5.8)
RBC # FLD: 13.3 % — SIGNIFICANT CHANGE UP (ref 10.3–14.5)
RBC # FLD: 13.4 % — SIGNIFICANT CHANGE UP (ref 10.3–14.5)
SODIUM SERPL-SCNC: 143 MMOL/L — SIGNIFICANT CHANGE UP (ref 135–145)
SODIUM SERPL-SCNC: 144 MMOL/L — SIGNIFICANT CHANGE UP (ref 135–145)
WBC # BLD: 11.57 K/UL — HIGH (ref 3.8–10.5)
WBC # BLD: 12.39 K/UL — HIGH (ref 3.8–10.5)
WBC # FLD AUTO: 11.57 K/UL — HIGH (ref 3.8–10.5)
WBC # FLD AUTO: 12.39 K/UL — HIGH (ref 3.8–10.5)

## 2019-11-10 RX ORDER — ACETAMINOPHEN 500 MG
650 TABLET ORAL ONCE
Refills: 0 | Status: COMPLETED | OUTPATIENT
Start: 2019-11-10 | End: 2019-11-10

## 2019-11-10 RX ADMIN — Medication 650 MILLIGRAM(S): at 22:00

## 2019-11-10 RX ADMIN — SODIUM CHLORIDE 75 MILLILITER(S): 9 INJECTION INTRAMUSCULAR; INTRAVENOUS; SUBCUTANEOUS at 12:02

## 2019-11-10 RX ADMIN — Medication 3 UNIT(S): at 12:01

## 2019-11-10 RX ADMIN — LISINOPRIL 10 MILLIGRAM(S): 2.5 TABLET ORAL at 06:43

## 2019-11-10 RX ADMIN — ENOXAPARIN SODIUM 40 MILLIGRAM(S): 100 INJECTION SUBCUTANEOUS at 12:01

## 2019-11-10 RX ADMIN — Medication 4: at 12:01

## 2019-11-10 RX ADMIN — LEVETIRACETAM 400 MILLIGRAM(S): 250 TABLET, FILM COATED ORAL at 06:53

## 2019-11-10 RX ADMIN — Medication 4: at 17:25

## 2019-11-10 RX ADMIN — Medication 3 UNIT(S): at 06:52

## 2019-11-10 RX ADMIN — Medication 650 MILLIGRAM(S): at 21:33

## 2019-11-10 RX ADMIN — AMLODIPINE BESYLATE 10 MILLIGRAM(S): 2.5 TABLET ORAL at 06:43

## 2019-11-10 RX ADMIN — Medication 4 MILLIGRAM(S): at 06:43

## 2019-11-10 RX ADMIN — LEVETIRACETAM 400 MILLIGRAM(S): 250 TABLET, FILM COATED ORAL at 17:37

## 2019-11-10 RX ADMIN — Medication 4: at 06:52

## 2019-11-10 RX ADMIN — Medication 4 MILLIGRAM(S): at 17:26

## 2019-11-10 RX ADMIN — Medication 3 UNIT(S): at 17:25

## 2019-11-10 RX ADMIN — CEFTRIAXONE 100 MILLIGRAM(S): 500 INJECTION, POWDER, FOR SOLUTION INTRAMUSCULAR; INTRAVENOUS at 16:34

## 2019-11-10 RX ADMIN — SENNA PLUS 2 TABLET(S): 8.6 TABLET ORAL at 21:33

## 2019-11-10 RX ADMIN — ATORVASTATIN CALCIUM 80 MILLIGRAM(S): 80 TABLET, FILM COATED ORAL at 21:33

## 2019-11-10 RX ADMIN — FAMOTIDINE 20 MILLIGRAM(S): 10 INJECTION INTRAVENOUS at 12:01

## 2019-11-10 RX ADMIN — AMIODARONE HYDROCHLORIDE 400 MILLIGRAM(S): 400 TABLET ORAL at 06:43

## 2019-11-10 NOTE — PROGRESS NOTE ADULT - SUBJECTIVE AND OBJECTIVE BOX
Subjective: Patient seen and examined. No new events except as noted.   remains in Stroke unit   HR controlled     REVIEW OF SYSTEMS:  Unable to obtain       MEDICATIONS:  MEDICATIONS  (STANDING):  aMIOdarone    Tablet 400 milliGRAM(s) Oral two times a day  amLODIPine   Tablet 10 milliGRAM(s) Oral daily  atorvastatin 80 milliGRAM(s) Oral at bedtime  cefTRIAXone   IVPB 1000 milliGRAM(s) IV Intermittent every 24 hours  cefTRIAXone   IVPB      dexAMETHasone  Injectable 4 milliGRAM(s) IV Push every 12 hours  dextrose 5%. 1000 milliLiter(s) (50 mL/Hr) IV Continuous <Continuous>  dextrose 50% Injectable 25 Gram(s) IV Push once  dextrose 50% Injectable 12.5 Gram(s) IV Push once  dextrose 50% Injectable 25 Gram(s) IV Push once  dextrose 50% Injectable 25 Gram(s) IV Push once  digoxin  Injectable 0.5 milliGRAM(s) IV Push once  diltiazem Injectable 15 milliGRAM(s) IV Push once  enoxaparin Injectable 40 milliGRAM(s) SubCutaneous daily  famotidine    Tablet 20 milliGRAM(s) Oral daily  insulin lispro (HumaLOG) corrective regimen sliding scale   SubCutaneous three times a day before meals  insulin lispro Injectable (HumaLOG) 3 Unit(s) SubCutaneous every 6 hours  levETIRAcetam  IVPB 750 milliGRAM(s) IV Intermittent every 12 hours  lisinopril 10 milliGRAM(s) Oral daily  metoprolol tartrate 25 milliGRAM(s) Oral every 8 hours  metoprolol tartrate Injectable 5 milliGRAM(s) IV Push once  metoprolol tartrate Injectable 5 milliGRAM(s) IV Push once  OLANZapine Disintegrating Tablet 10 milliGRAM(s) Oral once  senna 2 Tablet(s) Oral at bedtime  sodium chloride 0.9% Bolus 250 milliLiter(s) IV Bolus once  sodium chloride 0.9%. 1000 milliLiter(s) (75 mL/Hr) IV Continuous <Continuous>      PHYSICAL EXAM:  T(C): 36.7 (11-10-19 @ 08:00), Max: 36.8 (11-09-19 @ 14:00)  HR: 55 (11-10-19 @ 09:00) (49 - 130)  BP: 140/68 (11-10-19 @ 08:00) (130/100 - 159/77)  RR: 16 (11-10-19 @ 09:00) (13 - 26)  SpO2: 98% (11-10-19 @ 09:00) (93% - 100%)  Wt(kg): --  I&O's Summary    09 Nov 2019 07:01  -  10 Nov 2019 07:00  --------------------------------------------------------  IN: 1270 mL / OUT: 1000 mL / NET: 270 mL    10 Nov 2019 07:01  -  10 Nov 2019 11:16  --------------------------------------------------------  IN: 450 mL / OUT: 0 mL / NET: 450 mL        Appearance: NAD, confused   HEENT:   Dry oral mucosa +NGT   Lymphatic: No lymphadenopathy  Cardiovascular: Normal S1 S2, No JVD, No murmurs, No edema  Respiratory: Decreased bs   Psychiatry: A & O x) 0  Gastrointestinal:  Soft, Non-tender, + BS	  Skin: No rashes, No ecchymoses, No cyanosis	  Extremities: Decreased  range of motion, No clubbing, cyanosis or edema  Vascular: Peripheral pulses palpable 2+ bilaterally  Neurological:  MS: agitated, in bed in restraints, eyes open, periodically yelling or kicking, spitting, not answering questions or following commands  But does answer to orientation questions, AOx2 (name and "hospital" not [facility] or day or date)  Motor: all extremities moving, pulling with force against restraints periodically  Sensation: withdraws to touch all four extremities        LABS:    CARDIAC MARKERS:                                13.7   11.57 )-----------( 355      ( 10 Nov 2019 09:11 )             42.8     11-10    143  |  108  |  40<H>  ----------------------------<  279<H>  3.7   |  21<L>  |  1.00    Ca    9.3      10 Nov 2019 06:39  Phos  3.5     11-09  Mg     2.0     11-09    TPro  5.9<L>  /  Alb  3.4  /  TBili  0.2  /  DBili  x   /  AST  10  /  ALT  18  /  AlkPhos  72  11-10    proBNP:   Lipid Profile:   HgA1c:   TSH:               TELEMETRY: 	SR    ECG:  	  RADIOLOGY:   DIAGNOSTIC TESTING:  [ ] Echocardiogram:  [ ]  Catheterization:  [ ] Stress Test:    OTHER:

## 2019-11-11 ENCOUNTER — TRANSCRIPTION ENCOUNTER (OUTPATIENT)
Age: 69
End: 2019-11-11

## 2019-11-11 LAB
-  AMIKACIN: SIGNIFICANT CHANGE UP
-  AMPICILLIN/SULBACTAM: SIGNIFICANT CHANGE UP
-  AMPICILLIN: SIGNIFICANT CHANGE UP
-  AZTREONAM: SIGNIFICANT CHANGE UP
-  CEFAZOLIN: SIGNIFICANT CHANGE UP
-  CEFEPIME: SIGNIFICANT CHANGE UP
-  CEFOXITIN: SIGNIFICANT CHANGE UP
-  CEFTRIAXONE: SIGNIFICANT CHANGE UP
-  CIPROFLOXACIN: SIGNIFICANT CHANGE UP
-  ERTAPENEM: SIGNIFICANT CHANGE UP
-  GENTAMICIN: SIGNIFICANT CHANGE UP
-  LEVOFLOXACIN: SIGNIFICANT CHANGE UP
-  MEROPENEM: SIGNIFICANT CHANGE UP
-  NITROFURANTOIN: SIGNIFICANT CHANGE UP
-  PIPERACILLIN/TAZOBACTAM: SIGNIFICANT CHANGE UP
-  TOBRAMYCIN: SIGNIFICANT CHANGE UP
-  TRIMETHOPRIM/SULFAMETHOXAZOLE: SIGNIFICANT CHANGE UP
ANION GAP SERPL CALC-SCNC: 14 MMOL/L — SIGNIFICANT CHANGE UP (ref 5–17)
BUN SERPL-MCNC: 59 MG/DL — HIGH (ref 7–23)
CALCIUM SERPL-MCNC: 9.2 MG/DL — SIGNIFICANT CHANGE UP (ref 8.4–10.5)
CHLORIDE SERPL-SCNC: 114 MMOL/L — HIGH (ref 96–108)
CO2 SERPL-SCNC: 20 MMOL/L — LOW (ref 22–31)
CREAT SERPL-MCNC: 1.02 MG/DL — SIGNIFICANT CHANGE UP (ref 0.5–1.3)
CULTURE RESULTS: SIGNIFICANT CHANGE UP
GLUCOSE BLDC GLUCOMTR-MCNC: 237 MG/DL — HIGH (ref 70–99)
GLUCOSE BLDC GLUCOMTR-MCNC: 264 MG/DL — HIGH (ref 70–99)
GLUCOSE BLDC GLUCOMTR-MCNC: 269 MG/DL — HIGH (ref 70–99)
GLUCOSE BLDC GLUCOMTR-MCNC: 286 MG/DL — HIGH (ref 70–99)
GLUCOSE BLDC GLUCOMTR-MCNC: 293 MG/DL — HIGH (ref 70–99)
GLUCOSE SERPL-MCNC: 314 MG/DL — HIGH (ref 70–99)
HCT VFR BLD CALC: 39.7 % — SIGNIFICANT CHANGE UP (ref 39–50)
HGB BLD-MCNC: 12.6 G/DL — LOW (ref 13–17)
MCHC RBC-ENTMCNC: 29.4 PG — SIGNIFICANT CHANGE UP (ref 27–34)
MCHC RBC-ENTMCNC: 31.7 GM/DL — LOW (ref 32–36)
MCV RBC AUTO: 92.8 FL — SIGNIFICANT CHANGE UP (ref 80–100)
METHOD TYPE: SIGNIFICANT CHANGE UP
NRBC # BLD: 0 /100 WBCS — SIGNIFICANT CHANGE UP (ref 0–0)
ORGANISM # SPEC MICROSCOPIC CNT: SIGNIFICANT CHANGE UP
ORGANISM # SPEC MICROSCOPIC CNT: SIGNIFICANT CHANGE UP
PLATELET # BLD AUTO: 330 K/UL — SIGNIFICANT CHANGE UP (ref 150–400)
POTASSIUM SERPL-MCNC: 4.1 MMOL/L — SIGNIFICANT CHANGE UP (ref 3.5–5.3)
POTASSIUM SERPL-SCNC: 4.1 MMOL/L — SIGNIFICANT CHANGE UP (ref 3.5–5.3)
RBC # BLD: 4.28 M/UL — SIGNIFICANT CHANGE UP (ref 4.2–5.8)
RBC # FLD: 13.4 % — SIGNIFICANT CHANGE UP (ref 10.3–14.5)
SODIUM SERPL-SCNC: 148 MMOL/L — HIGH (ref 135–145)
SPECIMEN SOURCE: SIGNIFICANT CHANGE UP
WBC # BLD: 10.95 K/UL — HIGH (ref 3.8–10.5)
WBC # FLD AUTO: 10.95 K/UL — HIGH (ref 3.8–10.5)

## 2019-11-11 RX ORDER — INSULIN LISPRO 100/ML
4 VIAL (ML) SUBCUTANEOUS EVERY 6 HOURS
Refills: 0 | Status: DISCONTINUED | OUTPATIENT
Start: 2019-11-11 | End: 2019-11-13

## 2019-11-11 RX ORDER — METOPROLOL TARTRATE 50 MG
25 TABLET ORAL EVERY 8 HOURS
Refills: 0 | Status: DISCONTINUED | OUTPATIENT
Start: 2019-11-11 | End: 2019-11-11

## 2019-11-11 RX ORDER — METOPROLOL TARTRATE 50 MG
25 TABLET ORAL EVERY 8 HOURS
Refills: 0 | Status: DISCONTINUED | OUTPATIENT
Start: 2019-11-11 | End: 2019-12-06

## 2019-11-11 RX ADMIN — AMIODARONE HYDROCHLORIDE 400 MILLIGRAM(S): 400 TABLET ORAL at 17:49

## 2019-11-11 RX ADMIN — Medication 6: at 12:09

## 2019-11-11 RX ADMIN — Medication 3 UNIT(S): at 00:11

## 2019-11-11 RX ADMIN — ENOXAPARIN SODIUM 40 MILLIGRAM(S): 100 INJECTION SUBCUTANEOUS at 12:05

## 2019-11-11 RX ADMIN — LISINOPRIL 10 MILLIGRAM(S): 2.5 TABLET ORAL at 05:40

## 2019-11-11 RX ADMIN — Medication 20 MILLIGRAM(S): at 17:49

## 2019-11-11 RX ADMIN — Medication 4 MILLIGRAM(S): at 05:40

## 2019-11-11 RX ADMIN — Medication 20 MILLIGRAM(S): at 12:06

## 2019-11-11 RX ADMIN — ATORVASTATIN CALCIUM 80 MILLIGRAM(S): 80 TABLET, FILM COATED ORAL at 21:36

## 2019-11-11 RX ADMIN — LEVETIRACETAM 400 MILLIGRAM(S): 250 TABLET, FILM COATED ORAL at 05:41

## 2019-11-11 RX ADMIN — Medication 3 UNIT(S): at 12:08

## 2019-11-11 RX ADMIN — AMLODIPINE BESYLATE 10 MILLIGRAM(S): 2.5 TABLET ORAL at 05:40

## 2019-11-11 RX ADMIN — SODIUM CHLORIDE 75 MILLILITER(S): 9 INJECTION INTRAMUSCULAR; INTRAVENOUS; SUBCUTANEOUS at 17:49

## 2019-11-11 RX ADMIN — Medication 25 MILLIGRAM(S): at 08:28

## 2019-11-11 RX ADMIN — Medication 3 UNIT(S): at 06:06

## 2019-11-11 RX ADMIN — NYSTATIN CREAM 1 APPLICATION(S): 100000 CREAM TOPICAL at 14:38

## 2019-11-11 RX ADMIN — FAMOTIDINE 20 MILLIGRAM(S): 10 INJECTION INTRAVENOUS at 12:05

## 2019-11-11 RX ADMIN — CEFTRIAXONE 100 MILLIGRAM(S): 500 INJECTION, POWDER, FOR SOLUTION INTRAMUSCULAR; INTRAVENOUS at 16:35

## 2019-11-11 RX ADMIN — NYSTATIN CREAM 1 APPLICATION(S): 100000 CREAM TOPICAL at 04:52

## 2019-11-11 RX ADMIN — SODIUM CHLORIDE 75 MILLILITER(S): 9 INJECTION INTRAMUSCULAR; INTRAVENOUS; SUBCUTANEOUS at 05:42

## 2019-11-11 RX ADMIN — Medication 4 UNIT(S): at 17:35

## 2019-11-11 RX ADMIN — Medication 4: at 17:33

## 2019-11-11 RX ADMIN — SENNA PLUS 2 TABLET(S): 8.6 TABLET ORAL at 21:36

## 2019-11-11 RX ADMIN — Medication 25 MILLIGRAM(S): at 14:38

## 2019-11-11 RX ADMIN — LEVETIRACETAM 400 MILLIGRAM(S): 250 TABLET, FILM COATED ORAL at 17:33

## 2019-11-11 RX ADMIN — Medication 20 MILLIGRAM(S): at 06:57

## 2019-11-11 RX ADMIN — Medication 6: at 06:06

## 2019-11-11 RX ADMIN — AMIODARONE HYDROCHLORIDE 400 MILLIGRAM(S): 400 TABLET ORAL at 08:28

## 2019-11-11 RX ADMIN — Medication 4 MILLIGRAM(S): at 17:33

## 2019-11-11 RX ADMIN — Medication 4 UNIT(S): at 23:19

## 2019-11-11 RX ADMIN — Medication 25 MILLIGRAM(S): at 22:10

## 2019-11-11 NOTE — SWALLOW BEDSIDE ASSESSMENT ADULT - SLP PERTINENT HISTORY OF CURRENT PROBLEM
H&P: 69 year old man with PMHx T2DM, stage IV glioblastoma multiforme (dx in 2018), HTN and PSHx appendectomy presents to the ED with 3 weeks of increased R hemiparesis, worsened aphasia and urinary incontinence, sent from his neruo-oncologist Dr. Cuba's office. Pt has had resection, chemo radiation, most recently was noted to be clinically worse in March 2019 with increased somnolence and steroids were increased. Per his neuro-onc, concern for progression of disease. In ER, pt no family at bedside, pt was agitated, confused, aphasic, climbing out of bed, recieved 4mg of ativan, 5mg of haldol sequentially. Neurological exam was limited due to sedation, pt did not respond to basic orientation questions appropriately in Vietnamese, had intermittent nature to nonsensical speech, but was able to show tongue, raise his arms - on mimicry only, R hemiparesis. Noted to have wet himself.  Admitted to Neurology for further evaluation..

## 2019-11-11 NOTE — DISCHARGE NOTE NURSING/CASE MANAGEMENT/SOCIAL WORK - NSDCPEPTSTRK_GEN_ALL_CORE
Stroke warning signs and symptoms/Stroke education booklet/Need for follow up after discharge/Call 911 for stroke/Prescribed medications/Risk factors for stroke/Stroke support groups for patients, families, and friends/Signs and symptoms of stroke

## 2019-11-11 NOTE — CHART NOTE - NSCHARTNOTEFT_GEN_A_CORE
Constant observation renewed as patient is agitated when he is awake, trying to remove lines and get out of the bed. Not responding to redirection.    Primary day team, to reassess need for restraints and constant observation in am    Meera Suarez NP  spectralink [phone]

## 2019-11-11 NOTE — DISCHARGE NOTE NURSING/CASE MANAGEMENT/SOCIAL WORK - PATIENT PORTAL LINK FT
You can access the FollowMyHealth Patient Portal offered by [facility] by registering at the following website: [email or web address]. By joining [facility]’s FollowMyHealth portal, you will also be able to view your health information using other applications (apps) compatible with our system.

## 2019-11-11 NOTE — PROGRESS NOTE ADULT - SUBJECTIVE AND OBJECTIVE BOX
Subjective: Patient seen and examined. No new events except as noted.   remains in Stroke unit   NGT remains   Bradycardic   remains 1:1       REVIEW OF SYSTEMS:  Unable to obtain     MEDICATIONS:  MEDICATIONS  (STANDING):  aMIOdarone    Tablet 400 milliGRAM(s) Oral two times a day  amLODIPine   Tablet 10 milliGRAM(s) Oral daily  atorvastatin 80 milliGRAM(s) Oral at bedtime  cefTRIAXone   IVPB 1000 milliGRAM(s) IV Intermittent every 24 hours  cefTRIAXone   IVPB      dexAMETHasone  Injectable 4 milliGRAM(s) IV Push every 12 hours  dextrose 5%. 1000 milliLiter(s) (50 mL/Hr) IV Continuous <Continuous>  dextrose 50% Injectable 25 Gram(s) IV Push once  dextrose 50% Injectable 12.5 Gram(s) IV Push once  dextrose 50% Injectable 25 Gram(s) IV Push once  dextrose 50% Injectable 25 Gram(s) IV Push once  digoxin  Injectable 0.5 milliGRAM(s) IV Push once  diltiazem Injectable 15 milliGRAM(s) IV Push once  enoxaparin Injectable 40 milliGRAM(s) SubCutaneous daily  famotidine    Tablet 20 milliGRAM(s) Oral daily  insulin lispro (HumaLOG) corrective regimen sliding scale   SubCutaneous three times a day before meals  insulin lispro Injectable (HumaLOG) 3 Unit(s) SubCutaneous every 6 hours  levETIRAcetam  IVPB 750 milliGRAM(s) IV Intermittent every 12 hours  lisinopril 10 milliGRAM(s) Oral daily  metoprolol tartrate 25 milliGRAM(s) Oral every 8 hours  metoprolol tartrate Injectable 5 milliGRAM(s) IV Push once  metoprolol tartrate Injectable 5 milliGRAM(s) IV Push once  OLANZapine Disintegrating Tablet 10 milliGRAM(s) Oral once  senna 2 Tablet(s) Oral at bedtime  sodium chloride 0.9% Bolus 250 milliLiter(s) IV Bolus once  sodium chloride 0.9%. 1000 milliLiter(s) (75 mL/Hr) IV Continuous <Continuous>      PHYSICAL EXAM:  T(C): 36.5 (11-11-19 @ 07:45), Max: 36.7 (11-10-19 @ 14:00)  HR: 70 (11-11-19 @ 08:15) (48 - 74)  BP: 153/85 (11-11-19 @ 08:15) (114/60 - 193/87)  RR: 13 (11-11-19 @ 08:15) (13 - 19)  SpO2: 98% (11-11-19 @ 08:15) (95% - 98%)  Wt(kg): --  I&O's Summary    10 Nov 2019 07:01  -  11 Nov 2019 07:00  --------------------------------------------------------  IN: 4000 mL / OUT: 1450 mL / NET: 2550 mL          Appearance: NAD, confused   HEENT:   Dry oral mucosa +NGT   Lymphatic: No lymphadenopathy  Cardiovascular: Normal S1 S2, No JVD, No murmurs, No edema  Respiratory: Decreased bs   Psychiatry: A & O x) 0  Gastrointestinal:  Soft, Non-tender, + BS	  Skin: No rashes, No ecchymoses, No cyanosis	  Extremities: Decreased  range of motion, No clubbing, cyanosis or edema  Vascular: Peripheral pulses palpable 2+ bilaterally  Neurological:  MS: agitated, in bed in restraints, eyes open, periodically yelling or kicking, spitting, not answering questions or following commands  But does answer to orientation questions, AOx2 (name and "hospital" not [facility] or day or date)  Motor: all extremities moving, pulling with force against restraints periodically  Sensation: withdraws to touch all four extremities      LABS:    CARDIAC MARKERS:                                12.6   10.95 )-----------( 330      ( 11 Nov 2019 06:07 )             39.7     11-11    148<H>  |  114<H>  |  59<H>  ----------------------------<  314<H>  4.1   |  20<L>  |  1.02    Ca    9.2      11 Nov 2019 06:07    TPro  5.9<L>  /  Alb  3.4  /  TBili  0.2  /  DBili  x   /  AST  10  /  ALT  18  /  AlkPhos  72  11-10    proBNP:   Lipid Profile:   HgA1c:   TSH:     0          TELEMETRY: SR, SB 	    ECG:  	  RADIOLOGY:   DIAGNOSTIC TESTING:  [ ] Echocardiogram:  [ ]  Catheterization:  [ ] Stress Test:    OTHER:

## 2019-11-11 NOTE — SWALLOW BEDSIDE ASSESSMENT ADULT - SLP GENERAL OBSERVATIONS
Pt found in bed, awake and alert. PT finishing session. Pt on 1:1 2/2 agitation. +NGT. Sister requesting to translate for evaluation. Pt following simple 1-step commands with models ~50%. A&O x0.

## 2019-11-11 NOTE — PROGRESS NOTE ADULT - PROBLEM SELECTOR PLAN 4
Now in sinus   Decrease amiodarone 200 daily   Hold ALL AV jennifer blockers for HR < 60  No AC secondary to GBM and high risk of bleed

## 2019-11-11 NOTE — SWALLOW BEDSIDE ASSESSMENT ADULT - PHARYNGEAL PHASE
Wet vocal quality post oral intake/Delayed cough post oral intake/Multiple swallows/Delayed pharyngeal swallow Delayed cough post oral intake/Throat clear post oral intake/Delayed pharyngeal swallow

## 2019-11-11 NOTE — SWALLOW BEDSIDE ASSESSMENT ADULT - COMMENTS
Impression: Suspect worsened 'confusion,' aphasia maybe from partial/focal seizures emanating from tumor site of L temporal lobe vs. POD not visualized on CT vs. toxic metabolic encephalopathy  Nursing Swallow Screen: 11/5/19 Patient failed due to inability to be positioned correctly.  Palliative consulted on 11/6/19, however after discussion with neuro-onc, instructed to hold off on consultation until further imaging/workup complete.  Neurosurgery consulted on 11/5/19, no surgical f/u needed.  Patient with ongoing agitation, on restraints, 1:1, and with adverse behaviors (spitting, yelling).   Bedside swallow evaluation completed 11/8: recommendations for npo recommended. See exam for details.   RRT 11/9 2/2 abnormal heart rate. Cardiology following re: hypotensive urgency   MRI 11/6 results/impressions: Impression: Previously noted large area of peripheral enhancement in the left temporal frontal region is again seen with decrease surrounding enhancement. Slight increase surrounding edema is identified. This decreased enhancement could be secondary to response to therapy though clinical correlation and continued close interval follow-up is recommended.  For all further imaging, please see results/EMR.

## 2019-11-11 NOTE — SWALLOW BEDSIDE ASSESSMENT ADULT - SWALLOW EVAL: DIAGNOSIS
Pt admitted with worsened R hemiparesis and aphasia. PMHX of GBM. Pt seen for reassessment of swallow. Although cognitive deficits persist, improvement noted in alertness and overall participation in evaluation. Pt continues to present with s/s of laryngeal penetration/aspiration on conservative PO textures.

## 2019-11-12 LAB
ANION GAP SERPL CALC-SCNC: 15 MMOL/L — SIGNIFICANT CHANGE UP (ref 5–17)
BUN SERPL-MCNC: 42 MG/DL — HIGH (ref 7–23)
CALCIUM SERPL-MCNC: 9.1 MG/DL — SIGNIFICANT CHANGE UP (ref 8.4–10.5)
CHLORIDE SERPL-SCNC: 108 MMOL/L — SIGNIFICANT CHANGE UP (ref 96–108)
CO2 SERPL-SCNC: 22 MMOL/L — SIGNIFICANT CHANGE UP (ref 22–31)
CREAT SERPL-MCNC: 0.79 MG/DL — SIGNIFICANT CHANGE UP (ref 0.5–1.3)
GLUCOSE BLDC GLUCOMTR-MCNC: 253 MG/DL — HIGH (ref 70–99)
GLUCOSE BLDC GLUCOMTR-MCNC: 266 MG/DL — HIGH (ref 70–99)
GLUCOSE BLDC GLUCOMTR-MCNC: 274 MG/DL — HIGH (ref 70–99)
GLUCOSE BLDC GLUCOMTR-MCNC: 291 MG/DL — HIGH (ref 70–99)
GLUCOSE SERPL-MCNC: 306 MG/DL — HIGH (ref 70–99)
HCT VFR BLD CALC: 40.7 % — SIGNIFICANT CHANGE UP (ref 39–50)
HGB BLD-MCNC: 13.5 G/DL — SIGNIFICANT CHANGE UP (ref 13–17)
MCHC RBC-ENTMCNC: 29.9 PG — SIGNIFICANT CHANGE UP (ref 27–34)
MCHC RBC-ENTMCNC: 33.2 GM/DL — SIGNIFICANT CHANGE UP (ref 32–36)
MCV RBC AUTO: 90.2 FL — SIGNIFICANT CHANGE UP (ref 80–100)
NRBC # BLD: 0 /100 WBCS — SIGNIFICANT CHANGE UP (ref 0–0)
PLATELET # BLD AUTO: 344 K/UL — SIGNIFICANT CHANGE UP (ref 150–400)
POTASSIUM SERPL-MCNC: 4.3 MMOL/L — SIGNIFICANT CHANGE UP (ref 3.5–5.3)
POTASSIUM SERPL-SCNC: 4.3 MMOL/L — SIGNIFICANT CHANGE UP (ref 3.5–5.3)
RBC # BLD: 4.51 M/UL — SIGNIFICANT CHANGE UP (ref 4.2–5.8)
RBC # FLD: 13.6 % — SIGNIFICANT CHANGE UP (ref 10.3–14.5)
SODIUM SERPL-SCNC: 145 MMOL/L — SIGNIFICANT CHANGE UP (ref 135–145)
WBC # BLD: 11.34 K/UL — HIGH (ref 3.8–10.5)
WBC # FLD AUTO: 11.34 K/UL — HIGH (ref 3.8–10.5)

## 2019-11-12 PROCEDURE — 99232 SBSQ HOSP IP/OBS MODERATE 35: CPT

## 2019-11-12 RX ORDER — LISINOPRIL 2.5 MG/1
20 TABLET ORAL DAILY
Refills: 0 | Status: DISCONTINUED | OUTPATIENT
Start: 2019-11-12 | End: 2019-12-02

## 2019-11-12 RX ORDER — ERTAPENEM SODIUM 1 G/1
1000 INJECTION, POWDER, LYOPHILIZED, FOR SOLUTION INTRAMUSCULAR; INTRAVENOUS ONCE
Refills: 0 | Status: COMPLETED | OUTPATIENT
Start: 2019-11-12 | End: 2019-11-12

## 2019-11-12 RX ORDER — DEXAMETHASONE 0.5 MG/5ML
4 ELIXIR ORAL DAILY
Refills: 0 | Status: DISCONTINUED | OUTPATIENT
Start: 2019-11-12 | End: 2019-12-06

## 2019-11-12 RX ORDER — ERTAPENEM SODIUM 1 G/1
INJECTION, POWDER, LYOPHILIZED, FOR SOLUTION INTRAMUSCULAR; INTRAVENOUS
Refills: 0 | Status: DISCONTINUED | OUTPATIENT
Start: 2019-11-12 | End: 2019-11-19

## 2019-11-12 RX ORDER — ERTAPENEM SODIUM 1 G/1
1000 INJECTION, POWDER, LYOPHILIZED, FOR SOLUTION INTRAMUSCULAR; INTRAVENOUS EVERY 24 HOURS
Refills: 0 | Status: DISCONTINUED | OUTPATIENT
Start: 2019-11-13 | End: 2019-11-19

## 2019-11-12 RX ADMIN — ATORVASTATIN CALCIUM 80 MILLIGRAM(S): 80 TABLET, FILM COATED ORAL at 21:44

## 2019-11-12 RX ADMIN — Medication 6: at 06:14

## 2019-11-12 RX ADMIN — Medication 4 UNIT(S): at 23:39

## 2019-11-12 RX ADMIN — NYSTATIN CREAM 1 APPLICATION(S): 100000 CREAM TOPICAL at 21:44

## 2019-11-12 RX ADMIN — LISINOPRIL 20 MILLIGRAM(S): 2.5 TABLET ORAL at 13:02

## 2019-11-12 RX ADMIN — Medication 6: at 12:30

## 2019-11-12 RX ADMIN — AMLODIPINE BESYLATE 10 MILLIGRAM(S): 2.5 TABLET ORAL at 05:27

## 2019-11-12 RX ADMIN — Medication 4 UNIT(S): at 06:13

## 2019-11-12 RX ADMIN — LEVETIRACETAM 400 MILLIGRAM(S): 250 TABLET, FILM COATED ORAL at 05:28

## 2019-11-12 RX ADMIN — Medication 20 MILLIGRAM(S): at 08:19

## 2019-11-12 RX ADMIN — ENOXAPARIN SODIUM 40 MILLIGRAM(S): 100 INJECTION SUBCUTANEOUS at 13:02

## 2019-11-12 RX ADMIN — SENNA PLUS 2 TABLET(S): 8.6 TABLET ORAL at 21:44

## 2019-11-12 RX ADMIN — Medication 25 MILLIGRAM(S): at 17:09

## 2019-11-12 RX ADMIN — AMIODARONE HYDROCHLORIDE 400 MILLIGRAM(S): 400 TABLET ORAL at 17:09

## 2019-11-12 RX ADMIN — Medication 20 MILLIGRAM(S): at 13:02

## 2019-11-12 RX ADMIN — ERTAPENEM SODIUM 120 MILLIGRAM(S): 1 INJECTION, POWDER, LYOPHILIZED, FOR SOLUTION INTRAMUSCULAR; INTRAVENOUS at 18:42

## 2019-11-12 RX ADMIN — Medication 4 UNIT(S): at 18:02

## 2019-11-12 RX ADMIN — Medication 25 MILLIGRAM(S): at 05:27

## 2019-11-12 RX ADMIN — Medication 25 MILLIGRAM(S): at 21:43

## 2019-11-12 RX ADMIN — LEVETIRACETAM 400 MILLIGRAM(S): 250 TABLET, FILM COATED ORAL at 17:09

## 2019-11-12 RX ADMIN — Medication 20 MILLIGRAM(S): at 00:10

## 2019-11-12 RX ADMIN — Medication 4 MILLIGRAM(S): at 18:03

## 2019-11-12 RX ADMIN — LISINOPRIL 10 MILLIGRAM(S): 2.5 TABLET ORAL at 05:27

## 2019-11-12 RX ADMIN — Medication 6: at 18:02

## 2019-11-12 RX ADMIN — FAMOTIDINE 20 MILLIGRAM(S): 10 INJECTION INTRAVENOUS at 13:02

## 2019-11-12 RX ADMIN — AMIODARONE HYDROCHLORIDE 400 MILLIGRAM(S): 400 TABLET ORAL at 05:27

## 2019-11-12 RX ADMIN — Medication 4 MILLIGRAM(S): at 05:28

## 2019-11-12 RX ADMIN — CEFTRIAXONE 100 MILLIGRAM(S): 500 INJECTION, POWDER, FOR SOLUTION INTRAMUSCULAR; INTRAVENOUS at 17:16

## 2019-11-12 RX ADMIN — Medication 4 UNIT(S): at 12:30

## 2019-11-12 NOTE — SWALLOW BEDSIDE ASSESSMENT ADULT - ASR SWALLOW RECOMMEND DIAG
VFSS/MBS/Recommend modified barium swallow study to objectively assess the swallow mechanism
defer at this time

## 2019-11-12 NOTE — SWALLOW BEDSIDE ASSESSMENT ADULT - SWALLOW EVAL: ORAL MUSCULATURE
unable to assess due to poor participation/comprehension
limited 2/2 reduced command following
generally intact

## 2019-11-12 NOTE — SWALLOW BEDSIDE ASSESSMENT ADULT - PHARYNGEAL PHASE
Delayed cough post oral intake/Decreased laryngeal elevation/Delayed pharyngeal swallow Cough post oral intake/Decreased laryngeal elevation/Delayed pharyngeal swallow

## 2019-11-12 NOTE — SWALLOW BEDSIDE ASSESSMENT ADULT - SPECIFY REASON(S)
to subjectively reassess the swallow mechanism r/o dysphagia
to subjectively reassess the swallow mechanism r/o dysphagia
to assess swallow mechanism and r/o dysphagia

## 2019-11-12 NOTE — SWALLOW BEDSIDE ASSESSMENT ADULT - ASPIRATION PRECAUTIONS
yes/Maintain aspiration precautions for secretions and enteral feeds
for secretions and enteral feeding/yes
yes

## 2019-11-12 NOTE — SWALLOW BEDSIDE ASSESSMENT ADULT - SWALLOW EVAL: CRITERIA FOR SKILLED INTERVENTION MET
demonstrates skilled criteria for swallowing intervention
pending MBS results
demonstrates skilled criteria for swallowing intervention

## 2019-11-12 NOTE — PROGRESS NOTE ADULT - SUBJECTIVE AND OBJECTIVE BOX
Subjective: Patient seen and examined. No new events except as noted.   remains in Stroke unit       REVIEW OF SYSTEMS:  Unable to obtain       MEDICATIONS:  MEDICATIONS  (STANDING):  aMIOdarone    Tablet 400 milliGRAM(s) Oral two times a day  amLODIPine   Tablet 10 milliGRAM(s) Oral daily  atorvastatin 80 milliGRAM(s) Oral at bedtime  cefTRIAXone   IVPB 1000 milliGRAM(s) IV Intermittent every 24 hours  cefTRIAXone   IVPB      dexAMETHasone  Injectable 4 milliGRAM(s) IV Push every 12 hours  dextrose 5%. 1000 milliLiter(s) (50 mL/Hr) IV Continuous <Continuous>  dextrose 50% Injectable 25 Gram(s) IV Push once  dextrose 50% Injectable 12.5 Gram(s) IV Push once  dextrose 50% Injectable 25 Gram(s) IV Push once  dextrose 50% Injectable 25 Gram(s) IV Push once  digoxin  Injectable 0.5 milliGRAM(s) IV Push once  diltiazem Injectable 15 milliGRAM(s) IV Push once  enoxaparin Injectable 40 milliGRAM(s) SubCutaneous daily  famotidine    Tablet 20 milliGRAM(s) Oral daily  insulin lispro (HumaLOG) corrective regimen sliding scale   SubCutaneous three times a day before meals  insulin lispro Injectable (HumaLOG) 4 Unit(s) SubCutaneous every 6 hours  levETIRAcetam  IVPB 750 milliGRAM(s) IV Intermittent every 12 hours  lisinopril 10 milliGRAM(s) Oral daily  metoprolol tartrate 25 milliGRAM(s) Oral every 8 hours  metoprolol tartrate Injectable 5 milliGRAM(s) IV Push once  metoprolol tartrate Injectable 5 milliGRAM(s) IV Push once  OLANZapine Disintegrating Tablet 10 milliGRAM(s) Oral once  senna 2 Tablet(s) Oral at bedtime  sodium chloride 0.9% Bolus 250 milliLiter(s) IV Bolus once  sodium chloride 0.9%. 1000 milliLiter(s) (75 mL/Hr) IV Continuous <Continuous>      PHYSICAL EXAM:  T(C): 36.6 (11-12-19 @ 08:07), Max: 36.9 (11-11-19 @ 16:00)  HR: 60 (11-12-19 @ 10:00) (60 - 87)  BP: 152/71 (11-12-19 @ 10:00) (148/74 - 195/91)  RR: 15 (11-12-19 @ 10:00) (5 - 27)  SpO2: 97% (11-12-19 @ 10:00) (82% - 99%)  Wt(kg): --  I&O's Summary    11 Nov 2019 07:01  -  12 Nov 2019 07:00  --------------------------------------------------------  IN: 3680 mL / OUT: 100 mL / NET: 3580 mL        Appearance: NAD, confused   HEENT:   Dry oral mucosa +NGT   Lymphatic: No lymphadenopathy  Cardiovascular: Normal S1 S2, No JVD, No murmurs, No edema  Respiratory: Decreased bs   Psychiatry: A & O x) 0  Gastrointestinal:  Soft, Non-tender, + BS	  Skin: No rashes, No ecchymoses, No cyanosis	  Extremities: Decreased  range of motion, No clubbing, cyanosis or edema  Vascular: Peripheral pulses palpable 2+ bilaterally  Neurological:  MS: agitated, in bed in restraints, eyes open, periodically yelling or kicking, spitting, not answering questions or following commands  But does answer to orientation questions, AOx2 (name and "hospital" not [facility] or day or date)  Motor: all extremities moving, pulling with force against restraints periodically  Sensation: withdraws to touch all four extremities      LABS:    CARDIAC MARKERS:                                13.5   11.34 )-----------( 344      ( 12 Nov 2019 05:29 )             40.7     11-12    145  |  108  |  42<H>  ----------------------------<  306<H>  4.3   |  22  |  0.79    Ca    9.1      12 Nov 2019 05:29      proBNP:   Lipid Profile:   HgA1c:   TSH:             TELEMETRY: 	SR    ECG:  	  RADIOLOGY:   DIAGNOSTIC TESTING:  [ ] Echocardiogram:  [ ]  Catheterization:  [ ] Stress Test:    OTHER:

## 2019-11-12 NOTE — PROGRESS NOTE ADULT - SUBJECTIVE AND OBJECTIVE BOX
Mr. Barber was seen in neuro oncologic follow up - events noted.  MRI , labs, and EEG reviewed.  Currently, he is calm - does not follow verbal commands - limited verbal output.  right side mildly weak compared to left  VFF to threat.    MRI without clear progression - no blood.  EEG - no active seizures  urine culture positive - on antibiotics    possible deterioration related to infection.  On cardiology service now for new onset atrial fibrillation and worsening hypertension.  Brain tumor is not a contraindication for anticoagulation if that is what is medically indicated.  Will d/w family as well.    Please feel free to call if any questions.  MITESH Cuba  [phone]

## 2019-11-12 NOTE — SWALLOW BEDSIDE ASSESSMENT ADULT - SLP GENERAL OBSERVATIONS
Pt encountered awake and reclined in bed on room air. +NGT, +1:1, b/l wrist restraints in place. RN and PCA assisted SLP in repositioning Pt upright. RN Felipe interpreting throughout evaluation. Pt following simple commands in Hebrew ~50%. Flat affect.

## 2019-11-12 NOTE — SWALLOW BEDSIDE ASSESSMENT ADULT - SLP PERTINENT HISTORY OF CURRENT PROBLEM
H&P: 69 year old man with PMHx T2DM, stage IV glioblastoma multiforme (dx in 2018), HTN and PSHx appendectomy presents to the ED with 3 weeks of increased R hemiparesis, worsened aphasia and urinary incontinence, sent from his neruo-oncologist Dr. Cuba's office. Pt has had resection, chemo radiation, most recently was noted to be clinically worse in March 2019 with increased somnolence and steroids were increased. Per his neuro-onc, concern for progression of disease. In ER, pt no family at bedside, pt was agitated, confused, aphasic, climbing out of bed, recieved 4mg of ativan, 5mg of haldol sequentially. Neurological exam was limited due to sedation, pt did not respond to basic orientation questions appropriately in Maori, had intermittent nature to nonsensical speech, but was able to show tongue, raise his arms - on mimicry only, R hemiparesis. Noted to have wet himself.  Admitted to Neurology for further evaluation..

## 2019-11-12 NOTE — SWALLOW BEDSIDE ASSESSMENT ADULT - SWALLOW EVAL: DIAGNOSIS
Pt presents with oropharyngeal dysphagia characterized by oral holding of honey thick liquid, delayed pharyngeal swallows, and s/s indicative of possible laryngeal penetration/aspiration of conservative textures. However, baseline cough makes behavioral analysis of swallow function difficult to a degree. Objective assessment of swallow mechanism warranted.

## 2019-11-12 NOTE — CHART NOTE - NSCHARTNOTEFT_GEN_A_CORE
Medicine NP    Received in report patient requiring 1:1 and restraints per primary team for severe agitation, climbing OOB and pulling at NGT which behavior becomes worse at night. Patient restless at this time, on Zyprexa prn. Will continue overnight for patient safety, primary team to follow in AM.    Sita Barnett, [facility]  [id number] Medicine NP    Received in report patient requiring 1:1 and restraints per primary team for severe agitation, climbing OOB and pulling at NGT which behavior becomes worse at night. Patient resting at this time, on Zyprexa prn. Will continue overnight for patient safety, primary team to follow in AM.    Sita Barnett, [facility]-BC  [id number]

## 2019-11-12 NOTE — SWALLOW BEDSIDE ASSESSMENT ADULT - SWALLOW EVAL: RECOMMENDED DIET
NPO with nonoral means of nutrition/hydration/medication
NPO, with non-oral nutrition/hydration/medications.
NPO; alternative means of nutrition/hydration and meds as needed

## 2019-11-13 LAB
ANION GAP SERPL CALC-SCNC: 15 MMOL/L — SIGNIFICANT CHANGE UP (ref 5–17)
BUN SERPL-MCNC: 46 MG/DL — HIGH (ref 7–23)
CALCIUM SERPL-MCNC: 9.2 MG/DL — SIGNIFICANT CHANGE UP (ref 8.4–10.5)
CHLORIDE SERPL-SCNC: 106 MMOL/L — SIGNIFICANT CHANGE UP (ref 96–108)
CO2 SERPL-SCNC: 25 MMOL/L — SIGNIFICANT CHANGE UP (ref 22–31)
CREAT SERPL-MCNC: 0.93 MG/DL — SIGNIFICANT CHANGE UP (ref 0.5–1.3)
CULTURE RESULTS: SIGNIFICANT CHANGE UP
CULTURE RESULTS: SIGNIFICANT CHANGE UP
GLUCOSE BLDC GLUCOMTR-MCNC: 236 MG/DL — HIGH (ref 70–99)
GLUCOSE BLDC GLUCOMTR-MCNC: 236 MG/DL — HIGH (ref 70–99)
GLUCOSE BLDC GLUCOMTR-MCNC: 246 MG/DL — HIGH (ref 70–99)
GLUCOSE BLDC GLUCOMTR-MCNC: 285 MG/DL — HIGH (ref 70–99)
GLUCOSE SERPL-MCNC: 293 MG/DL — HIGH (ref 70–99)
HCT VFR BLD CALC: 41.5 % — SIGNIFICANT CHANGE UP (ref 39–50)
HGB BLD-MCNC: 13.1 G/DL — SIGNIFICANT CHANGE UP (ref 13–17)
MCHC RBC-ENTMCNC: 29.5 PG — SIGNIFICANT CHANGE UP (ref 27–34)
MCHC RBC-ENTMCNC: 31.6 GM/DL — LOW (ref 32–36)
MCV RBC AUTO: 93.5 FL — SIGNIFICANT CHANGE UP (ref 80–100)
PLATELET # BLD AUTO: 324 K/UL — SIGNIFICANT CHANGE UP (ref 150–400)
POTASSIUM SERPL-MCNC: 3.9 MMOL/L — SIGNIFICANT CHANGE UP (ref 3.5–5.3)
POTASSIUM SERPL-SCNC: 3.9 MMOL/L — SIGNIFICANT CHANGE UP (ref 3.5–5.3)
RBC # BLD: 4.44 M/UL — SIGNIFICANT CHANGE UP (ref 4.2–5.8)
RBC # FLD: 13.5 % — SIGNIFICANT CHANGE UP (ref 10.3–14.5)
SODIUM SERPL-SCNC: 146 MMOL/L — HIGH (ref 135–145)
SPECIMEN SOURCE: SIGNIFICANT CHANGE UP
SPECIMEN SOURCE: SIGNIFICANT CHANGE UP
WBC # BLD: 11.47 K/UL — HIGH (ref 3.8–10.5)
WBC # FLD AUTO: 11.47 K/UL — HIGH (ref 3.8–10.5)

## 2019-11-13 PROCEDURE — 74230 X-RAY XM SWLNG FUNCJ C+: CPT | Mod: 26

## 2019-11-13 RX ORDER — HYDRALAZINE HCL 50 MG
10 TABLET ORAL ONCE
Refills: 0 | Status: COMPLETED | OUTPATIENT
Start: 2019-11-13 | End: 2019-11-13

## 2019-11-13 RX ORDER — INSULIN LISPRO 100/ML
VIAL (ML) SUBCUTANEOUS AT BEDTIME
Refills: 0 | Status: DISCONTINUED | OUTPATIENT
Start: 2019-11-13 | End: 2019-12-06

## 2019-11-13 RX ADMIN — Medication 4 MILLIGRAM(S): at 05:19

## 2019-11-13 RX ADMIN — NYSTATIN CREAM 1 APPLICATION(S): 100000 CREAM TOPICAL at 05:17

## 2019-11-13 RX ADMIN — Medication 25 MILLIGRAM(S): at 05:18

## 2019-11-13 RX ADMIN — AMIODARONE HYDROCHLORIDE 400 MILLIGRAM(S): 400 TABLET ORAL at 05:18

## 2019-11-13 RX ADMIN — SENNA PLUS 2 TABLET(S): 8.6 TABLET ORAL at 21:49

## 2019-11-13 RX ADMIN — AMIODARONE HYDROCHLORIDE 400 MILLIGRAM(S): 400 TABLET ORAL at 17:34

## 2019-11-13 RX ADMIN — LEVETIRACETAM 400 MILLIGRAM(S): 250 TABLET, FILM COATED ORAL at 20:06

## 2019-11-13 RX ADMIN — Medication 20 MILLIGRAM(S): at 15:32

## 2019-11-13 RX ADMIN — AMLODIPINE BESYLATE 10 MILLIGRAM(S): 2.5 TABLET ORAL at 05:16

## 2019-11-13 RX ADMIN — Medication 4: at 17:35

## 2019-11-13 RX ADMIN — Medication 20 MILLIGRAM(S): at 23:23

## 2019-11-13 RX ADMIN — Medication 4: at 05:50

## 2019-11-13 RX ADMIN — Medication 10 MILLIGRAM(S): at 21:50

## 2019-11-13 RX ADMIN — Medication 20 MILLIGRAM(S): at 04:17

## 2019-11-13 RX ADMIN — FAMOTIDINE 20 MILLIGRAM(S): 10 INJECTION INTRAVENOUS at 11:56

## 2019-11-13 RX ADMIN — Medication 25 MILLIGRAM(S): at 17:34

## 2019-11-13 RX ADMIN — Medication 6: at 11:55

## 2019-11-13 RX ADMIN — LISINOPRIL 20 MILLIGRAM(S): 2.5 TABLET ORAL at 05:16

## 2019-11-13 RX ADMIN — LEVETIRACETAM 400 MILLIGRAM(S): 250 TABLET, FILM COATED ORAL at 05:16

## 2019-11-13 RX ADMIN — ATORVASTATIN CALCIUM 80 MILLIGRAM(S): 80 TABLET, FILM COATED ORAL at 21:50

## 2019-11-13 RX ADMIN — ENOXAPARIN SODIUM 40 MILLIGRAM(S): 100 INJECTION SUBCUTANEOUS at 11:56

## 2019-11-13 RX ADMIN — ERTAPENEM SODIUM 120 MILLIGRAM(S): 1 INJECTION, POWDER, LYOPHILIZED, FOR SOLUTION INTRAMUSCULAR; INTRAVENOUS at 17:41

## 2019-11-13 RX ADMIN — Medication 4 UNIT(S): at 05:50

## 2019-11-13 RX ADMIN — Medication 4 UNIT(S): at 11:56

## 2019-11-13 RX ADMIN — Medication 4 UNIT(S): at 17:36

## 2019-11-13 NOTE — SWALLOW VFSS/MBS ASSESSMENT ADULT - SLP GENERAL OBSERVATIONS
Pt arrived in radiology suite with 1:1 Kaitlin. +KFT. Pt is Danish speaking. 1:1 remained in room to translate as patient is unable to utilize  phone 2/2 cognitive deficits. Pt cooperative. On B/L wrist restraints. Communicating simple needs/wants and following simple 1-step commands for evaluation purposes. Pt frequently blinking his eyes during exam and stated "eyes itchy". Upon completion of exam, RN Jennifer and NP Meera alerted.

## 2019-11-13 NOTE — SWALLOW VFSS/MBS ASSESSMENT ADULT - RECOMMENDED CONSISTENCY
Dysphagia 1 with nectar thickened liquids Dysphagia 1 with nectar thickened liquids. Feed only when awake and alert. 100% supervision during meals. Small single sips via tsp or straw when drinking. Medication in applesauce.

## 2019-11-13 NOTE — SWALLOW VFSS/MBS ASSESSMENT ADULT - ORAL PHASE
Residue in oral cavity/moderate pooling of material in lateral sulci; + lingual pumping; multiple swallows x4 to clear oral residue/Reduced anterior - posterior transport/Incomplete tongue to palate contact/Uncontrolled bolus / spillover in [place]-pharynx/Delayed oral transit time Delayed oral transit time/Reduced anterior - posterior transport/Residue in oral cavity/Incomplete tongue to palate contact/mild pooling of material in lateral sulci; + lingual pumping; multiple piecemeal swallows x2-3 to clear oral cavity Reduced anterior - posterior transport/Residue in oral cavity/Incomplete tongue to palate contact/Delayed oral transit time/mild pooling of material in lateral sulci; + lingual pumping; multiple piecemeal swallows x2-3 to clear oral cavity residue Residue in oral cavity/moderate pooling of material in lateral sulci; + lingual pumping; swallows x2 to clear oral cavity residue/Delayed oral transit time/Reduced anterior - posterior transport/Incomplete tongue to palate contact Incomplete tongue to palate contact/Reduced anterior - posterior transport/Uncontrolled bolus / spillover in [place]-pharynx/mild-moderate pooling of material in lateral sulci and on lingual surface; + lingual pumping; multiple swallows x2 to clear oral cavity residue/Residue in oral cavity/Delayed oral transit time Incomplete tongue to palate contact/mild-moderate pooling of material in lateral sulci; + lingual pumping; multiple swallows x2 to clear oral cavity residue; passive spillover of oral residue to the vallecula/Residue in oral cavity/Uncontrolled bolus / spillover in hypopharynx/Reduced anterior - posterior transport Delayed oral transit time/Residue in oral cavity/Incomplete tongue to palate contact/Reduced anterior - posterior transport/Uncontrolled bolus / spillover in [place]-pharynx/moderate pooling of material in lateral sulci; + lingual pumping; multiple (spontaneous) swallows x3 to clear oral residue. repeat swallows are successful to reduce post swallow residue Reduced anterior - posterior transport/Residue in oral cavity/Uncontrolled bolus / spillover in [place]-pharynx/Uncontrolled bolus / spillover in hypopharynx/Delayed oral transit time/Incomplete tongue to palate contact/mild-moderate pooling of material in lateral sulci and on lingual surface; + lingual pumping; multiple swallows x2 to clear oral cavity residue

## 2019-11-13 NOTE — PROGRESS NOTE ADULT - SUBJECTIVE AND OBJECTIVE BOX
Subjective: Patient seen and examined. No new events except as noted.   remains in Stroke unt   nonverbal   barely opens eyes   remains 1:1   NGT remains     REVIEW OF SYSTEMS:  Unable to obtain       MEDICATIONS:  MEDICATIONS  (STANDING):  aMIOdarone    Tablet 400 milliGRAM(s) Oral two times a day  amLODIPine   Tablet 10 milliGRAM(s) Oral daily  atorvastatin 80 milliGRAM(s) Oral at bedtime  dexAMETHasone  Injectable 4 milliGRAM(s) IV Push daily  dextrose 5%. 1000 milliLiter(s) (50 mL/Hr) IV Continuous <Continuous>  dextrose 50% Injectable 25 Gram(s) IV Push once  dextrose 50% Injectable 12.5 Gram(s) IV Push once  dextrose 50% Injectable 25 Gram(s) IV Push once  dextrose 50% Injectable 25 Gram(s) IV Push once  digoxin  Injectable 0.5 milliGRAM(s) IV Push once  diltiazem Injectable 15 milliGRAM(s) IV Push once  enoxaparin Injectable 40 milliGRAM(s) SubCutaneous daily  ertapenem  IVPB      ertapenem  IVPB 1000 milliGRAM(s) IV Intermittent every 24 hours  famotidine    Tablet 20 milliGRAM(s) Oral daily  insulin lispro (HumaLOG) corrective regimen sliding scale   SubCutaneous three times a day before meals  insulin lispro Injectable (HumaLOG) 4 Unit(s) SubCutaneous every 6 hours  levETIRAcetam  IVPB 750 milliGRAM(s) IV Intermittent every 12 hours  lisinopril 20 milliGRAM(s) Oral daily  metoprolol tartrate 25 milliGRAM(s) Oral every 8 hours  metoprolol tartrate Injectable 5 milliGRAM(s) IV Push once  metoprolol tartrate Injectable 5 milliGRAM(s) IV Push once  OLANZapine Disintegrating Tablet 10 milliGRAM(s) Oral once  senna 2 Tablet(s) Oral at bedtime  sodium chloride 0.9% Bolus 250 milliLiter(s) IV Bolus once      PHYSICAL EXAM:  T(C): 36.9 (11-13-19 @ 07:29), Max: 37 (11-12-19 @ 15:58)  HR: 51 (11-13-19 @ 08:00) (51 - 76)  BP: 157/74 (11-13-19 @ 08:00) (142/78 - 178/93)  RR: 20 (11-13-19 @ 08:00) (8 - 22)  SpO2: 97% (11-13-19 @ 08:00) (92% - 97%)  Wt(kg): --  I&O's Summary    12 Nov 2019 07:01  -  13 Nov 2019 07:00  --------------------------------------------------------  IN: 1225 mL / OUT: 1200 mL / NET: 25 mL      Appearance: NAD, confused   HEENT:   Dry oral mucosa +NGT   Lymphatic: No lymphadenopathy  Cardiovascular: Normal S1 S2, No JVD, No murmurs, No edema  Respiratory: Decreased bs   Psychiatry: A & O x) 0  Gastrointestinal:  Soft, Non-tender, + BS	  Skin: No rashes, No ecchymoses, No cyanosis	  Extremities: Decreased  range of motion, No clubbing, cyanosis or edema  Vascular: Peripheral pulses palpable 2+ bilaterally  Neurological:  MS: agitated, in bed in restraints, eyes open, periodically yelling or kicking, spitting, not answering questions or following commands  But does answer to orientation questions, AOx2 (name and "hospital" not [facility] or day or date)  Motor: all extremities moving, pulling with force against restraints periodically  Sensation: withdraws to touch all four extremities    LABS:    CARDIAC MARKERS:                                13.1   11.47 )-----------( 324      ( 13 Nov 2019 09:37 )             41.5     11-13    146<H>  |  106  |  46<H>  ----------------------------<  293<H>  3.9   |  25  |  0.93    Ca    9.2      13 Nov 2019 06:51      proBNP:   Lipid Profile:   HgA1c:   TSH:             TELEMETRY: 	  SR  ECG:  	  RADIOLOGY:   DIAGNOSTIC TESTING:  [ ] Echocardiogram:  [ ]  Catheterization:  [ ] Stress Test:    OTHER:

## 2019-11-13 NOTE — SWALLOW VFSS/MBS ASSESSMENT ADULT - RECOMMENDED FEEDING/EATING TECHNIQUES
check mouth frequently for oral residue/pocketing/crush medication (when feasible)/oral hygiene/allow for swallow between intakes/small sips/bites/maintain upright posture during/after eating for 30 mins/position upright (90 degrees)

## 2019-11-13 NOTE — SWALLOW VFSS/MBS ASSESSMENT ADULT - SLP PERTINENT HISTORY OF CURRENT PROBLEM
H&P: 69 year old man with PMHx T2DM, stage IV glioblastoma multiforme (dx in 2018), HTN and PSHx appendectomy presents to the ED with 3 weeks of increased R hemiparesis, worsened aphasia and urinary incontinence, sent from his neruo-oncologist Dr. Cuba's office. Pt has had resection, chemo radiation, most recently was noted to be clinically worse in March 2019 with increased somnolence and steroids were increased. Per his neuro-onc, concern for progression of disease. In ER, pt no family at bedside, pt was agitated, confused, aphasic, climbing out of bed, recieved 4mg of ativan, 5mg of haldol sequentially. Neurological exam was limited due to sedation, pt did not respond to basic orientation questions appropriately in Wolof, had intermittent nature to nonsensical speech, but was able to show tongue, raise his arms - on mimicry only, R hemiparesis. Noted to have wet himself.  Admitted to Neurology for further evaluation.. H&P: 69 year old man with PMHx T2DM, stage IV glioblastoma multiforme (dx in 2018), HTN and PSHx appendectomy presents to the ED with 3 weeks of increased R hemiparesis, worsened aphasia and urinary incontinence, sent from his neruo-oncologist Dr. Cuba's office. Pt has had resection, chemo radiation, most recently was noted to be clinically worse in March 2019 with increased somnolence and steroids were increased. Per his neuro-onc, concern for progression of disease. In ER, pt no family at bedside, pt was agitated, confused, aphasic, climbing out of bed, recieved 4mg of ativan, 5mg of haldol sequentially. Neurological exam was limited due to sedation, pt did not respond to basic orientation questions appropriately in Hungarian, had intermittent nature to nonsensical speech, but was able to show tongue, raise his arms - on mimicry only, R hemiparesis. Noted to have wet himself.  Admitted to Neurology for further evaluation.

## 2019-11-13 NOTE — SWALLOW VFSS/MBS ASSESSMENT ADULT - DIAGNOSTIC IMPRESSIONS
Pt presents with an oropharyngeal dysphagia superimposed upon by cognitive deficits. The oral phase of swallow is characterized by poor bolus formation/control/transfer, pooling of material in the lateral sulci, and premature spillage on all trials. The pharyngeal phase of swallow is characterized by laryngeal penetration with retrieval on thin purees and laryngeal penetration without retrieval on straw sips of thin liquids. No aspiration was evident on examination. Pt presents with an oropharyngeal dysphagia superimposed upon cognitive deficits. The oral phase of swallow is characterized by poor bolus formation/control/transfer, pooling of material in the lateral sulci, and premature spillage on all trials. The pharyngeal phase of swallow is characterized by laryngeal penetration with retrieval on thin purees. Laryngeal penetration evident on straw sips of thin liquids, unable to visualize if material fully retrieved. No aspiration was evident on examination. Pt presents with baseline cough which is also evident during PO intake in the absence of impaired airway protection. Pt presents with an oropharyngeal dysphagia superimposed upon cognitive deficits. The oral phase of swallow is characterized by poor bolus formation/control/transfer, pooling of material in the lateral sulci, and premature spillage on all trials. The pharyngeal phase of swallow is characterized by laryngeal penetration with retrieval on thin purees. Laryngeal penetration evident on straw sips of thin liquids. No aspiration was evident on examination. Pt presents with baseline cough which is also evident during PO intake in the absence of impaired airway protection.

## 2019-11-13 NOTE — SWALLOW VFSS/MBS ASSESSMENT ADULT - PHARYNGEAL PHASE COMMENTS
Cough noted following pharyngeal swallow, however laryngeal penetration/aspiration was not evident on this trial On the last Po trial of thin liquid, material in pyriform sinus appears to be spilling over the arytenoids. Fluoroscopy turned off prior to visualizing if penetration/aspiration occurred.

## 2019-11-13 NOTE — SWALLOW VFSS/MBS ASSESSMENT ADULT - NS SWALLOW VFSS REC ASPIR MON
fever/pneumonia/change of breathing pattern/throat clearing/upper respiratory infection/If overt s/s of laryngeal penetration/aspiration are present, d/c PO intake and contact this service at ext [phone]./cough/gurgly voice

## 2019-11-13 NOTE — SWALLOW VFSS/MBS ASSESSMENT ADULT - LARYNGEAL PENETRATION DURING THE SWALLOW - SILENT
Deep laryngeal penetration above the vocal folds; fully retrieved on repeat swallow Mild/Deep laryngeal penetration above the vocal folds; fully retrieved on repeat swallow Deep laryngeal penetration above vocal cords and spillover of material over the arytenoids; unable to visualize retrieval of materal Mild/trace/mild laryngeal penetration above the vocal folds; fully retrieved on repeat swallow, variable from shallow to deep. occurs over the laryngeal surface of the epiglottis and over the arytenoids variable from shallow to deep laryngeal penetration above vocal cords and spillover of material over the arytenoids

## 2019-11-13 NOTE — SWALLOW VFSS/MBS ASSESSMENT ADULT - COMMENTS
Continued:   Impression: Suspect worsened 'confusion,' aphasia maybe from partial/focal seizures emanating from tumor site of L temporal lobe vs. POD not visualized on CT vs. toxic metabolic encephalopathy  Nursing Swallow Screen: 11/5/19 Patient failed due to inability to be positioned correctly.  Palliative consulted on 11/6/19, however after discussion with neuro-onc, instructed to hold off on consultation until further imaging/workup complete.  Neurosurgery consulted on 11/5/19, no surgical f/u needed.  Patient with ongoing agitation, on restraints, 1:1, and with adverse behaviors (spitting, yelling).   Bedside swallow evaluation completed 11/8: recommendations for npo recommended. See exam for details.   RRT 11/9 2/2 abnormal heart rate. Cardiology following re: hypotensive urgency   MRI 11/6 results/impressions: Impression: Previously noted large area of peripheral enhancement in the left temporal frontal region is again seen with decrease surrounding enhancement. Slight increase surrounding edema is identified. This decreased enhancement could be secondary to response to therapy though clinical correlation and continued close interval follow-up is recommended.  For all further imaging, please see results/EMR.

## 2019-11-14 DIAGNOSIS — F05 DELIRIUM DUE TO KNOWN PHYSIOLOGICAL CONDITION: ICD-10-CM

## 2019-11-14 LAB
ANION GAP SERPL CALC-SCNC: 14 MMOL/L — SIGNIFICANT CHANGE UP (ref 5–17)
BUN SERPL-MCNC: 36 MG/DL — HIGH (ref 7–23)
CALCIUM SERPL-MCNC: 9.1 MG/DL — SIGNIFICANT CHANGE UP (ref 8.4–10.5)
CHLORIDE SERPL-SCNC: 105 MMOL/L — SIGNIFICANT CHANGE UP (ref 96–108)
CO2 SERPL-SCNC: 25 MMOL/L — SIGNIFICANT CHANGE UP (ref 22–31)
CREAT SERPL-MCNC: 0.82 MG/DL — SIGNIFICANT CHANGE UP (ref 0.5–1.3)
GLUCOSE BLDC GLUCOMTR-MCNC: 209 MG/DL — HIGH (ref 70–99)
GLUCOSE BLDC GLUCOMTR-MCNC: 218 MG/DL — HIGH (ref 70–99)
GLUCOSE BLDC GLUCOMTR-MCNC: 222 MG/DL — HIGH (ref 70–99)
GLUCOSE BLDC GLUCOMTR-MCNC: 227 MG/DL — HIGH (ref 70–99)
GLUCOSE SERPL-MCNC: 294 MG/DL — HIGH (ref 70–99)
HCT VFR BLD CALC: 42.6 % — SIGNIFICANT CHANGE UP (ref 39–50)
HGB BLD-MCNC: 14 G/DL — SIGNIFICANT CHANGE UP (ref 13–17)
MCHC RBC-ENTMCNC: 30.3 PG — SIGNIFICANT CHANGE UP (ref 27–34)
MCHC RBC-ENTMCNC: 32.9 GM/DL — SIGNIFICANT CHANGE UP (ref 32–36)
MCV RBC AUTO: 92.2 FL — SIGNIFICANT CHANGE UP (ref 80–100)
NRBC # BLD: 0 /100 WBCS — SIGNIFICANT CHANGE UP (ref 0–0)
PLATELET # BLD AUTO: 290 K/UL — SIGNIFICANT CHANGE UP (ref 150–400)
POTASSIUM SERPL-MCNC: 4.2 MMOL/L — SIGNIFICANT CHANGE UP (ref 3.5–5.3)
POTASSIUM SERPL-SCNC: 4.2 MMOL/L — SIGNIFICANT CHANGE UP (ref 3.5–5.3)
RBC # BLD: 4.62 M/UL — SIGNIFICANT CHANGE UP (ref 4.2–5.8)
RBC # FLD: 13.6 % — SIGNIFICANT CHANGE UP (ref 10.3–14.5)
SODIUM SERPL-SCNC: 144 MMOL/L — SIGNIFICANT CHANGE UP (ref 135–145)
WBC # BLD: 13.46 K/UL — HIGH (ref 3.8–10.5)
WBC # FLD AUTO: 13.46 K/UL — HIGH (ref 3.8–10.5)

## 2019-11-14 PROCEDURE — 99222 1ST HOSP IP/OBS MODERATE 55: CPT | Mod: GC

## 2019-11-14 PROCEDURE — 93010 ELECTROCARDIOGRAM REPORT: CPT

## 2019-11-14 RX ORDER — VALPROIC ACID (AS SODIUM SALT) 250 MG/5ML
250 SOLUTION, ORAL ORAL EVERY 8 HOURS
Refills: 0 | Status: DISCONTINUED | OUTPATIENT
Start: 2019-11-14 | End: 2019-11-15

## 2019-11-14 RX ORDER — LISINOPRIL 2.5 MG/1
10 TABLET ORAL ONCE
Refills: 0 | Status: COMPLETED | OUTPATIENT
Start: 2019-11-14 | End: 2019-11-14

## 2019-11-14 RX ORDER — LANOLIN ALCOHOL/MO/W.PET/CERES
3 CREAM (GRAM) TOPICAL AT BEDTIME
Refills: 0 | Status: DISCONTINUED | OUTPATIENT
Start: 2019-11-14 | End: 2019-11-15

## 2019-11-14 RX ORDER — ACETAMINOPHEN 500 MG
1000 TABLET ORAL ONCE
Refills: 0 | Status: COMPLETED | OUTPATIENT
Start: 2019-11-14 | End: 2019-11-14

## 2019-11-14 RX ORDER — OLANZAPINE 15 MG/1
5 TABLET, FILM COATED ORAL ONCE
Refills: 0 | Status: DISCONTINUED | OUTPATIENT
Start: 2019-11-14 | End: 2019-11-14

## 2019-11-14 RX ORDER — LANOLIN ALCOHOL/MO/W.PET/CERES
5 CREAM (GRAM) TOPICAL ONCE
Refills: 0 | Status: COMPLETED | OUTPATIENT
Start: 2019-11-14 | End: 2019-11-14

## 2019-11-14 RX ADMIN — Medication 400 MILLIGRAM(S): at 00:11

## 2019-11-14 RX ADMIN — OLANZAPINE 10 MILLIGRAM(S): 15 TABLET, FILM COATED ORAL at 02:51

## 2019-11-14 RX ADMIN — LISINOPRIL 20 MILLIGRAM(S): 2.5 TABLET ORAL at 05:22

## 2019-11-14 RX ADMIN — ATORVASTATIN CALCIUM 80 MILLIGRAM(S): 80 TABLET, FILM COATED ORAL at 22:07

## 2019-11-14 RX ADMIN — Medication 20 MILLIGRAM(S): at 22:07

## 2019-11-14 RX ADMIN — Medication 25 MILLIGRAM(S): at 17:45

## 2019-11-14 RX ADMIN — Medication 1000 MILLIGRAM(S): at 01:11

## 2019-11-14 RX ADMIN — AMIODARONE HYDROCHLORIDE 400 MILLIGRAM(S): 400 TABLET ORAL at 18:10

## 2019-11-14 RX ADMIN — Medication 4 MILLIGRAM(S): at 05:38

## 2019-11-14 RX ADMIN — ERTAPENEM SODIUM 120 MILLIGRAM(S): 1 INJECTION, POWDER, LYOPHILIZED, FOR SOLUTION INTRAMUSCULAR; INTRAVENOUS at 18:11

## 2019-11-14 RX ADMIN — Medication 25 MILLIGRAM(S): at 22:08

## 2019-11-14 RX ADMIN — AMLODIPINE BESYLATE 10 MILLIGRAM(S): 2.5 TABLET ORAL at 09:09

## 2019-11-14 RX ADMIN — FAMOTIDINE 20 MILLIGRAM(S): 10 INJECTION INTRAVENOUS at 12:39

## 2019-11-14 RX ADMIN — LEVETIRACETAM 400 MILLIGRAM(S): 250 TABLET, FILM COATED ORAL at 18:11

## 2019-11-14 RX ADMIN — Medication 4: at 18:11

## 2019-11-14 RX ADMIN — OLANZAPINE 10 MILLIGRAM(S): 15 TABLET, FILM COATED ORAL at 12:34

## 2019-11-14 RX ADMIN — AMIODARONE HYDROCHLORIDE 400 MILLIGRAM(S): 400 TABLET ORAL at 09:09

## 2019-11-14 RX ADMIN — SENNA PLUS 2 TABLET(S): 8.6 TABLET ORAL at 22:08

## 2019-11-14 RX ADMIN — LISINOPRIL 10 MILLIGRAM(S): 2.5 TABLET ORAL at 00:17

## 2019-11-14 RX ADMIN — Medication 5 MILLIGRAM(S): at 00:17

## 2019-11-14 RX ADMIN — Medication 3 MILLIGRAM(S): at 22:08

## 2019-11-14 RX ADMIN — Medication 4: at 12:43

## 2019-11-14 RX ADMIN — LEVETIRACETAM 400 MILLIGRAM(S): 250 TABLET, FILM COATED ORAL at 08:10

## 2019-11-14 RX ADMIN — Medication 4: at 09:10

## 2019-11-14 RX ADMIN — ENOXAPARIN SODIUM 40 MILLIGRAM(S): 100 INJECTION SUBCUTANEOUS at 12:39

## 2019-11-14 RX ADMIN — Medication 20 MILLIGRAM(S): at 12:33

## 2019-11-14 NOTE — PROVIDER CONTACT NOTE (OTHER) - ASSESSMENT
Pt remains neurologically unchanged, A+Ox1, and following commands. Pt is afebrile, not showing any signs of distress. Pt remains neurologically unchanged, A+Ox1, and following commands. Pt is afebrile and not showing any signs of distress. Pt moaning and said he has pain, IV tylenol given. 10mg hydralazine IVP given and 10mg lisinopril PO given and BP continues to be elevated and pt remains bradycardic.

## 2019-11-14 NOTE — BEHAVIORAL HEALTH ASSESSMENT NOTE - OTHER
Unknown bed bound "okay" unable to assess unable to assess, does not appear internally preoccupied As above Patient is confused/agitated and cannot be engaged, however no concerns on suicidality expressed by team, which makes him at low acute risk of self harm. Unable to assess

## 2019-11-14 NOTE — CHART NOTE - NSCHARTNOTEFT_GEN_A_CORE
ARSENIO DARNELL    Notified by RN patient is joe agitated and try to fight with staffs. Pt is oriented to name only.        Interventions taken   Ativan 0.5 mg iv x1  cont assess and monitor.  f/u with am team.                      Niles Real ANP-BC  Spectralink #[id number]

## 2019-11-14 NOTE — BEHAVIORAL HEALTH ASSESSMENT NOTE - NSBHCHARTREVIEWLAB_PSY_A_CORE FT
14.0   13.46 )-----------( 290      ( 14 Nov 2019 10:44 )             42.6     11-14    144  |  105  |  36<H>  ----------------------------<  294<H>  4.2   |  25  |  0.82    Ca    9.1      14 Nov 2019 10:44

## 2019-11-14 NOTE — PROGRESS NOTE ADULT - SUBJECTIVE AND OBJECTIVE BOX
Subjective: Patient seen and examined. No new events except as noted.   moved out of Stroke unit     REVIEW OF SYSTEMS:  Unable to obtain         MEDICATIONS:  MEDICATIONS  (STANDING):  aMIOdarone    Tablet 400 milliGRAM(s) Oral two times a day  amLODIPine   Tablet 10 milliGRAM(s) Oral daily  atorvastatin 80 milliGRAM(s) Oral at bedtime  dexAMETHasone  Injectable 4 milliGRAM(s) IV Push daily  dextrose 5%. 1000 milliLiter(s) (50 mL/Hr) IV Continuous <Continuous>  dextrose 50% Injectable 25 Gram(s) IV Push once  dextrose 50% Injectable 12.5 Gram(s) IV Push once  dextrose 50% Injectable 25 Gram(s) IV Push once  dextrose 50% Injectable 25 Gram(s) IV Push once  digoxin  Injectable 0.5 milliGRAM(s) IV Push once  diltiazem Injectable 15 milliGRAM(s) IV Push once  enoxaparin Injectable 40 milliGRAM(s) SubCutaneous daily  ertapenem  IVPB      ertapenem  IVPB 1000 milliGRAM(s) IV Intermittent every 24 hours  famotidine    Tablet 20 milliGRAM(s) Oral daily  insulin lispro (HumaLOG) corrective regimen sliding scale   SubCutaneous three times a day before meals  insulin lispro (HumaLOG) corrective regimen sliding scale   SubCutaneous at bedtime  levETIRAcetam  IVPB 750 milliGRAM(s) IV Intermittent every 12 hours  lisinopril 20 milliGRAM(s) Oral daily  metoprolol tartrate 25 milliGRAM(s) Oral every 8 hours  metoprolol tartrate Injectable 5 milliGRAM(s) IV Push once  metoprolol tartrate Injectable 5 milliGRAM(s) IV Push once  OLANZapine Disintegrating Tablet 10 milliGRAM(s) Oral once  senna 2 Tablet(s) Oral at bedtime  sodium chloride 0.9% Bolus 250 milliLiter(s) IV Bolus once      PHYSICAL EXAM:  T(C): 36.4 (11-14-19 @ 09:05), Max: 36.8 (11-13-19 @ 15:22)  HR: 61 (11-14-19 @ 09:59) (52 - 64)  BP: 178/81 (11-14-19 @ 09:59) (151/79 - 198/93)  RR: 18 (11-14-19 @ 09:05) (18 - 22)  SpO2: 94% (11-14-19 @ 09:05) (94% - 98%)  Wt(kg): --  I&O's Summary    13 Nov 2019 07:01  -  14 Nov 2019 07:00  --------------------------------------------------------  IN: 0 mL / OUT: 1300 mL / NET: -1300 mL          Appearance: NAD, confused   HEENT:   Dry oral mucosa   Lymphatic: No lymphadenopathy  Cardiovascular: Normal S1 S2, No JVD, No murmurs, No edema  Respiratory: Decreased bs   Psychiatry: A & O x) 0  Gastrointestinal:  Soft, Non-tender, + BS	  Skin: No rashes, No ecchymoses, No cyanosis	  Extremities: Decreased  range of motion, No clubbing, cyanosis or edema  Vascular: Peripheral pulses palpable 2+ bilaterally  Neurological:  MS: agitated, in bed in restraints, eyes open, periodically yelling or kicking, spitting, not answering questions or following commands  But does answer to orientation questions, AOx2 (name and "hospital" not [facility] or day or date)  Motor: all extremities moving, pulling with force against restraints periodically  Sensation: withdraws to touch all four extremities      LABS:    CARDIAC MARKERS:                                13.1   11.47 )-----------( 324      ( 13 Nov 2019 09:37 )             41.5     11-13    146<H>  |  106  |  46<H>  ----------------------------<  293<H>  3.9   |  25  |  0.93    Ca    9.2      13 Nov 2019 06:51      proBNP:   Lipid Profile:   HgA1c:   TSH:             TELEMETRY: 	    ECG:  	  RADIOLOGY:   DIAGNOSTIC TESTING:  [ ] Echocardiogram:  [ ]  Catheterization:  [ ] Stress Test:    OTHER:

## 2019-11-14 NOTE — BEHAVIORAL HEALTH ASSESSMENT NOTE - NSBHCHARTREVIEWIMAGING_PSY_A_CORE FT
< from: CT Head No Cont (11.05.19 @ 13:27) >    FINDINGS:      Redemonstrated is a calcified mass in the left temporal lobe which is   unchanged compared with the prior study. There is surrounding lucency   which is unchanged compared with the prior study and may represent edema   and/or infiltrating neoplasm.    No acute hemorrhage, hydrocephalus, midline shift or extra-axial   collections are present.     Age-appropriate involutional changes and moderate microvascular ischemic   changes are present.    Left pterional craniotomy changes are present.    Opacification of the right ovary sinus with diffuse wall thickening is   unchanged.Small left maxillary sinus polyp versus retention cysts.   Report mucosal thickening of the right sphenoid sinus.    The orbits are not remarkable in appearance. Right-sided lens enucleation   is noted.    The tympanomastoid cavities are free of acute disease.    Impression:    No acute hemorrhage, hydrocephalus or mass effect.  Unchanged left temporal lobe neoplasm.    < end of copied text >

## 2019-11-14 NOTE — BEHAVIORAL HEALTH ASSESSMENT NOTE - NSBHCHARTREVIEWINVESTIGATE_PSY_A_CORE FT
< from: 12 Lead ECG (11.09.19 @ 02:19) >      Ventricular Rate 123 BPM    Atrial Rate 159 BPM    QRS Duration 148 ms    Q-T Interval 384 ms    QTC Calculation(Bezet) 549 ms    R Axis -58 degrees    T Axis 17 degrees    Diagnosis Line ATRIAL FIBRILLATION WITH RAPID VENTRICULAR RESPONSE  LEFT AXIS DEVIATION  RIGHT BUNDLE BRANCH BLOCK  POSSIBLE LATERAL INFARCT , AGE UNDETERMINED  ABNORMAL ECG    Confirmed by MD Felipe, Joao ([id number]) on 11/9/2019 12:19:30 PM    < end of copied text >

## 2019-11-14 NOTE — BEHAVIORAL HEALTH ASSESSMENT NOTE - HPI (INCLUDE ILLNESS QUALITY, SEVERITY, DURATION, TIMING, CONTEXT, MODIFYING FACTORS, ASSOCIATED SIGNS AND SYMPTOMS)
69 year old man with PMH of T2DM, stage IV glioblastoma multiforme (dx in 2018), HTN and PSHx appendectomy presents to the ED with 3 weeks of increased R hemiparesis, worsened aphasia and urinary incontinence, referred from his Neruo-oncologist Dr. Cuba's office, with concerns related to progression of disease (previously had chemo/chemo)  Psychiatry is consulted for recommendations on agitation. As per primary team , patient is currently on Zyprexa 10 mg q 8 hr prn agitation, and presents with prolonged Qtc 559.   Upon evaluation using translation services, patient appears confused, disoriented, believes it is "2010". He then mumbles something upon further questioning to assess for hallucinations, which was incomprehensible for . He then gets agitated and starts saying, "Enough, enough" and "leave", was unable to be subsequently engaged,   Collateral information was attempted from patient's daughter (listed as emergency contact) and son, but they did not answer the phone, so voicemail message was left.

## 2019-11-14 NOTE — BEHAVIORAL HEALTH ASSESSMENT NOTE - SUMMARY
69 year old man with PMHx T2DM, stage IV glioblastoma multiforme (dx in 2018), HTN and PSHx appendectomy presents to the ED with 3 weeks of increased R hemiparesis, worsened aphasia and urinary incontinence, referred from his Neruo-oncologist Dr. Cuba's office, with concerns related to progression of disease (previously had chemo/chemo)  Psychiatry is consulted for recommendations on agitation. As per primary team , patient is currently on Zyprexa 10 mg q 8 hr prn agitation, and presents with prolonged Qtc 559.   Patient appears confused, disoriented and agitated, likely in context of delirium, due to general medical condition. We recommend D/c Zyprexa in the setting of prolonged QTc. For agitation, we recommend Depakote 250mg IV q8h PRN agitation and  Ativan 1mg-2mg PO/IV q6h PRN severe agitation. No psychiatric admission indicated at this time.

## 2019-11-14 NOTE — BEHAVIORAL HEALTH ASSESSMENT NOTE - NSBHCHARTREVIEWVS_PSY_A_CORE FT
T(C): 36.6 (11-14-19 @ 15:46), Max: 36.7 (11-13-19 @ 18:50)  HR: 74 (11-14-19 @ 15:46) (52 - 74)  BP: 196/105 (11-14-19 @ 15:46) (161/96 - 198/93)  RR: 18 (11-14-19 @ 15:46) (18 - 19)  SpO2: 96% (11-14-19 @ 15:46) (94% - 98%)  Wt(kg): --

## 2019-11-14 NOTE — CHART NOTE - NSCHARTNOTEFT_GEN_A_CORE
Chart/Event Note  [facility]CO[facility] 469 D1  ARSENIO DARNELL, 69y, Male  [id number]    Reason for Notification:   Notified by RN that patient was hypertensive, without change in condition or symptoms.     Events/History of Present Illness  Patient's chart reviewed. Patient noted to have significant systolic hypertension across inpatient stay, on PRN Hydralazine and Labetalol with multiple uses of PRNs. Went to bedside with nursing staff to evaluate patient. Patient at previously documented baseline mentation. When asked if he has pain, patient stated "yes," then replied "no." No new neurologic deficits noted. Hypertension and mild bradycardia on vitals.     Review of Systems:  Unable to complete ROS due to patient's mentation.     T(C): 36.4 (11-13-19 @ 23:58), Max: 36.9 (11-13-19 @ 07:29)  HR: 53 (11-14-19 @ 01:02) (51 - 68)  BP: 175/88 (11-14-19 @ 01:02) (143/80 - 193/103)  RR: 18 (11-13-19 @ 23:58) (15 - 22)  SpO2: 96% (11-13-19 @ 23:58) (92% - 98%)    Physical Examination:  GENERAL: No acute distress noted during examination.   NERVOUS SYSTEM:  Alert & oriented X1 with poor concentration.   HEAD:  Atraumatic and normocephalic.  EYES: EOMI/PERRLA. Conjunctiva and sclera clear  NECK: Supple with no JVD.   CHEST: Clear to ascultation bilaterally. No rales, rhonchi, wheezing, or rubs heard. Symmetrical/bilateral chest wall rise.   HEART: Regular rate and rhythm with no murmurs, rubs, or gallops. Hypertensive.   ABDOMEN: Soft, nontender, and nondistended.   EXTREMITIES:  2+ Peripheral Pulses without clubbing, cyanosis, or edema.    Medical Decision Making/Assessment/Plan:  69-year-old Male with past medical history DM, HTN, and glioblastoma presented with worsening neurologic symptoms, now with asymptomatic hypertension.     - Diagnosis: Asymptomatic Hypertension.    No change in clinical condition. No symptoms   1)  2)  3)  4)   5) Will endorse the above diagnostics and findings to the day medicine team for review and follow up. Will additionally sign out to day medicine teams to follow with relevant specialties.     - Diagnosis:     1)  2)  3)  4)  5)      Addy Dan NP  Department of Medicine   # Chart/Event Note  [facility] 469 D1  ARSENIO DARNELL, 69y, Male  [id number]    Reason for Notification:   Notified by RN that patient was hypertensive, without change in condition or symptoms.     Events/History of Present Illness  Patient's chart reviewed. Patient noted to have significant systolic hypertension across inpatient stay, on PRN Hydralazine and Labetalol with multiple uses of PRNs. Went to bedside with nursing staff to evaluate patient. Patient at previously documented baseline mentation. When asked if he has pain, patient stated "yes," then replied "no." No new neurologic deficits noted. Hypertension and mild bradycardia on vitals.     Review of Systems:  Unable to complete ROS due to patient's mentation.     T(C): 36.4 (11-13-19 @ 23:58), Max: 36.9 (11-13-19 @ 07:29)  HR: 53 (11-14-19 @ 01:02) (51 - 68)  BP: 175/88 (11-14-19 @ 01:02) (143/80 - 193/103)  RR: 18 (11-13-19 @ 23:58) (15 - 22)  SpO2: 96% (11-13-19 @ 23:58) (92% - 98%)    Physical Examination:  GENERAL: No acute distress noted during examination.   NERVOUS SYSTEM:  Alert & oriented X1 with poor concentration.   HEAD:  Atraumatic and normocephalic.  EYES: EOMI/PERRLA. Conjunctiva and sclera clear  NECK: Supple with no JVD.   CHEST: Clear to ascultation bilaterally. No rales, rhonchi, wheezing, or rubs heard. Symmetrical/bilateral chest wall rise.   HEART: Regular rate and rhythm with no murmurs, rubs, or gallops. Hypertensive.   ABDOMEN: Soft, nontender, and nondistended.   EXTREMITIES:  2+ Peripheral Pulses without clubbing, cyanosis, or edema.    Medical Decision Making/Assessment/Plan:  69-year-old Male with past medical history DM, HTN, and glioblastoma presented with worsening neurologic symptoms, now with asymptomatic hypertension.     - Diagnosis: Asymptomatic Hypertension.    No change in clinical condition. No symptoms with hypertension. Will treat with patient's previously ordered PRNs and PO agents.   1) PRNs to be given as ordered under hold parameters. Will given an additional dose of Hydralazine and Lisinopril.    2) Re-evaluate blood pressure on hour post medication administration.   3) Continue neurologic exams as ordered.   4) Will endorse the above diagnostics and findings to the day medicine team for review and follow up. Will additionally sign out to day medicine teams to follow with relevant specialties.     Addy Dan NP  Department of Medicine   #[id number] Chart/Event Note  [facility] 469 D1  ARSENIO DARNELL, 69y, Male  [id number]    Reason for Notification:   Notified by RN that patient was hypertensive, without change in condition or symptoms.     Events/History of Present Illness  Patient's chart reviewed. Patient noted to have significant systolic hypertension across inpatient stay, on PRN Hydralazine and Labetalol with multiple uses of PRNs. Went to bedside with nursing staff to evaluate patient. Patient at previously documented baseline mentation. When asked if he has pain, patient stated "yes," then replied "no." No new neurologic deficits noted. Hypertension and mild bradycardia on vitals.     Review of Systems:  Unable to complete ROS due to patient's mentation.     T(C): 36.4 (11-13-19 @ 23:58), Max: 36.9 (11-13-19 @ 07:29)  HR: 53 (11-14-19 @ 01:02) (51 - 68)  BP: 175/88 (11-14-19 @ 01:02) (143/80 - 193/103)  RR: 18 (11-13-19 @ 23:58) (15 - 22)  SpO2: 96% (11-13-19 @ 23:58) (92% - 98%)    Physical Examination:  GENERAL: No acute distress noted during examination.   NERVOUS SYSTEM:  Alert & oriented X1 with poor concentration.   HEAD:  Atraumatic and normocephalic.  EYES: EOMI/PERRLA. Conjunctiva and sclera clear  NECK: Supple with no JVD.   CHEST: Clear to ascultation bilaterally. No rales, rhonchi, wheezing, or rubs heard. Symmetrical/bilateral chest wall rise.   HEART: Regular rate and rhythm with no murmurs, rubs, or gallops. Hypertensive.   ABDOMEN: Soft, nontender, and nondistended.   EXTREMITIES:  2+ Peripheral Pulses without clubbing, cyanosis, or edema.    Medical Decision Making/Assessment/Plan:  69-year-old Male with past medical history DM, HTN, and glioblastoma presented with worsening neurologic symptoms, now with asymptomatic hypertension.     - Diagnosis: Asymptomatic Hypertension.    No change in clinical condition. No symptoms with hypertension. Will treat with patient's previously ordered PRNs and PO agents.   1) PRNs to be given as ordered under hold parameters. Will given an additional dose of Hydralazine and Lisinopril.  Will also give a one time dose of IV Tylenol to treat for underlying pain, as well as Melatonin for sleep aid.   2) Re-evaluate blood pressure on hour post medication administration.   3) Continue neurologic exams as ordered.   4) Will endorse the above diagnostics and findings to the day medicine team for review and follow up. Will additionally sign out to day medicine teams to follow with relevant specialties.     Addy Dan NP  Department of Medicine   #[id number]    Addendum 0445: Blood pressure improving. Discussed with RN, patient to receive am medications as scheduled. Will follow with day medicine team.

## 2019-11-14 NOTE — BEHAVIORAL HEALTH ASSESSMENT NOTE - NSBHREFERDETAILS_PSY_A_CORE_FT
pt with diagnosis of stage 1V GBM , on zyprexa 10 mg q 8 hr PRN, with Qtc prolongation 559, presenting with episodes of agitation q 3 hr

## 2019-11-14 NOTE — PROVIDER CONTACT NOTE (OTHER) - ASSESSMENT
Patient agitated, pulling off tele box and leads. Attempted to administer AM meds, patient spitting. Did not receive AM medications due to mental state.

## 2019-11-14 NOTE — PROVIDER CONTACT NOTE (OTHER) - ASSESSMENT
patient not following commands. Patient very restless at this time. Patient hitting bed and throwing legs out of bed. Patient trying to reach wheelchair and yelling at staff member. BP still not managed at this time even with standing and PRN medications.

## 2019-11-14 NOTE — PROVIDER CONTACT NOTE (OTHER) - BACKGROUND
Pt p/w worsening R hemiparesis and aphasia. Pt previously in stroke unit on a cardizem drip. BP has been elevated and difficult to control.

## 2019-11-14 NOTE — BEHAVIORAL HEALTH ASSESSMENT NOTE - CASE SUMMARY
69 year old man with PMHx T2DM, stage IV glioblastoma multiforme (dx in 2018), HTN and PSHx appendectomy presents to the ED with 3 weeks of increased R hemiparesis, worsened aphasia and urinary incontinence, sent from his neruo-oncologist office for AMS, course c/b a.fib, psychiatry consulted for management of agitation in the setting of prolonged QTc.  On interview, pt is oriented to self only, calm presently but perseverative, severely limited historian.  Able to follow simple commands and name objects.  Cannot answer questions about physical pain meaningfully, cannot answer questions regarding AVH.  Denies SI current/past.  Denies alcohol or other substance abuse.  Per staff, pt has been waxing and waning, intermittently agitated.  Sx c/w Delirium 2/2 GMC.  Recs: 1. D/c Zyprexa in the setting of prolonged QTc.  2. F/u with neuro whether VPA may be used over Keppra as Keppra may worsen agitation.  3. PRN: Depakote 250mg IV q8h PRN agitation.  Ativan 1mg-2mg PO/IV q6h PRN severe agitation (Caution judicious use as may worsen delirium).  Melatonin 3mg PO qHS PRN insomnia.  4. Check: TSH, B12, RPR. 5. Minimize use of benzos, opioids, anticholinergics, or other deliriogenic agents when possible.  Maintain sleep wake cycle.  Provide frequent reorientation and redirection.  Family member at bedside if possible. Assess for need for glasses and hearing aid (if applicable).  6. Pt does not meet criteria for psychiatric hospitalization.  Recommend outpt psych f/u at [facility] after d/c- [phone].   CL Psych will follow.

## 2019-11-14 NOTE — BEHAVIORAL HEALTH ASSESSMENT NOTE - NSBHCONSULTRECOMMENDOTHER_PSY_A_CORE FT
1. No standing psych meds for now.    2. PRN: Depakote 250mg IV q8h PRN agitation, Ativan 1mg-2mg PO/IV q6h PRN severe agitation (Caution judicious use as may worsen delirium).  Melatonin 3mg PO qHS PRN insomnia.    3. Check: TSH, B12, folate, RPR, UA, U-tox; consider CT head    4. Minimize use of benzos, opioids, anticholinergics, or other deliriogenic agents when possible.  Maintain sleep wake cycle.  Provide frequent reorientation and redirection.  Family member at bedside if possible. Assess for need for glasses and hearing aid (if applicable).    5. Pt does not meet criteria for psychiatric hospitalization.  Recommend outpt psych f/u at [facility] after d/c- [phone].   CL Psych will follow.

## 2019-11-15 DIAGNOSIS — G93.40 ENCEPHALOPATHY, UNSPECIFIED: ICD-10-CM

## 2019-11-15 DIAGNOSIS — R53.2 FUNCTIONAL QUADRIPLEGIA: ICD-10-CM

## 2019-11-15 DIAGNOSIS — Z71.89 OTHER SPECIFIED COUNSELING: ICD-10-CM

## 2019-11-15 DIAGNOSIS — Z51.5 ENCOUNTER FOR PALLIATIVE CARE: ICD-10-CM

## 2019-11-15 LAB
ANION GAP SERPL CALC-SCNC: 13 MMOL/L — SIGNIFICANT CHANGE UP (ref 5–17)
BUN SERPL-MCNC: 33 MG/DL — HIGH (ref 7–23)
CALCIUM SERPL-MCNC: 8.8 MG/DL — SIGNIFICANT CHANGE UP (ref 8.4–10.5)
CHLORIDE SERPL-SCNC: 108 MMOL/L — SIGNIFICANT CHANGE UP (ref 96–108)
CO2 SERPL-SCNC: 25 MMOL/L — SIGNIFICANT CHANGE UP (ref 22–31)
CREAT SERPL-MCNC: 0.86 MG/DL — SIGNIFICANT CHANGE UP (ref 0.5–1.3)
GLUCOSE BLDC GLUCOMTR-MCNC: 231 MG/DL — HIGH (ref 70–99)
GLUCOSE BLDC GLUCOMTR-MCNC: 242 MG/DL — HIGH (ref 70–99)
GLUCOSE BLDC GLUCOMTR-MCNC: 262 MG/DL — HIGH (ref 70–99)
GLUCOSE BLDC GLUCOMTR-MCNC: 276 MG/DL — HIGH (ref 70–99)
GLUCOSE SERPL-MCNC: 293 MG/DL — HIGH (ref 70–99)
HCT VFR BLD CALC: 41.3 % — SIGNIFICANT CHANGE UP (ref 39–50)
HGB BLD-MCNC: 13.1 G/DL — SIGNIFICANT CHANGE UP (ref 13–17)
MCHC RBC-ENTMCNC: 29.7 PG — SIGNIFICANT CHANGE UP (ref 27–34)
MCHC RBC-ENTMCNC: 31.7 GM/DL — LOW (ref 32–36)
MCV RBC AUTO: 93.7 FL — SIGNIFICANT CHANGE UP (ref 80–100)
PLATELET # BLD AUTO: 311 K/UL — SIGNIFICANT CHANGE UP (ref 150–400)
POTASSIUM SERPL-MCNC: 4 MMOL/L — SIGNIFICANT CHANGE UP (ref 3.5–5.3)
POTASSIUM SERPL-SCNC: 4 MMOL/L — SIGNIFICANT CHANGE UP (ref 3.5–5.3)
RBC # BLD: 4.41 M/UL — SIGNIFICANT CHANGE UP (ref 4.2–5.8)
RBC # FLD: 13.4 % — SIGNIFICANT CHANGE UP (ref 10.3–14.5)
SODIUM SERPL-SCNC: 146 MMOL/L — HIGH (ref 135–145)
WBC # BLD: 13.63 K/UL — HIGH (ref 3.8–10.5)
WBC # FLD AUTO: 13.63 K/UL — HIGH (ref 3.8–10.5)

## 2019-11-15 PROCEDURE — 99223 1ST HOSP IP/OBS HIGH 75: CPT

## 2019-11-15 RX ORDER — VALPROIC ACID (AS SODIUM SALT) 250 MG/5ML
250 SOLUTION, ORAL ORAL EVERY 8 HOURS
Refills: 0 | Status: DISCONTINUED | OUTPATIENT
Start: 2019-11-15 | End: 2019-12-06

## 2019-11-15 RX ORDER — LANOLIN ALCOHOL/MO/W.PET/CERES
3 CREAM (GRAM) TOPICAL AT BEDTIME
Refills: 0 | Status: DISCONTINUED | OUTPATIENT
Start: 2019-11-15 | End: 2019-12-06

## 2019-11-15 RX ADMIN — Medication 6: at 13:05

## 2019-11-15 RX ADMIN — Medication 4: at 17:35

## 2019-11-15 RX ADMIN — ENOXAPARIN SODIUM 40 MILLIGRAM(S): 100 INJECTION SUBCUTANEOUS at 13:04

## 2019-11-15 RX ADMIN — Medication 25 MILLIGRAM(S): at 21:11

## 2019-11-15 RX ADMIN — Medication 20 MILLIGRAM(S): at 20:32

## 2019-11-15 RX ADMIN — SENNA PLUS 2 TABLET(S): 8.6 TABLET ORAL at 21:11

## 2019-11-15 RX ADMIN — AMIODARONE HYDROCHLORIDE 400 MILLIGRAM(S): 400 TABLET ORAL at 17:35

## 2019-11-15 RX ADMIN — Medication 4 MILLIGRAM(S): at 05:08

## 2019-11-15 RX ADMIN — FAMOTIDINE 20 MILLIGRAM(S): 10 INJECTION INTRAVENOUS at 13:04

## 2019-11-15 RX ADMIN — ATORVASTATIN CALCIUM 80 MILLIGRAM(S): 80 TABLET, FILM COATED ORAL at 21:11

## 2019-11-15 RX ADMIN — Medication 6: at 08:55

## 2019-11-15 RX ADMIN — Medication 20 MILLIGRAM(S): at 05:08

## 2019-11-15 RX ADMIN — LEVETIRACETAM 400 MILLIGRAM(S): 250 TABLET, FILM COATED ORAL at 17:34

## 2019-11-15 RX ADMIN — Medication 25 MILLIGRAM(S): at 13:04

## 2019-11-15 RX ADMIN — LEVETIRACETAM 400 MILLIGRAM(S): 250 TABLET, FILM COATED ORAL at 05:08

## 2019-11-15 RX ADMIN — Medication 20 MILLIGRAM(S): at 09:17

## 2019-11-15 RX ADMIN — ERTAPENEM SODIUM 120 MILLIGRAM(S): 1 INJECTION, POWDER, LYOPHILIZED, FOR SOLUTION INTRAMUSCULAR; INTRAVENOUS at 17:35

## 2019-11-15 NOTE — PROVIDER CONTACT NOTE (OTHER) - ASSESSMENT
/89, HR 61. Patient agitated when assessed, follows commands at times. Noncompliant with PO medication. Spitting at staff

## 2019-11-15 NOTE — CONSULT NOTE ADULT - ASSESSMENT
68 yo M with GBM with progression of disease.  p/w weakness and aphasia now with agitation.  Palliative care called for GOC.

## 2019-11-15 NOTE — PROVIDER CONTACT NOTE (OTHER) - ACTION/TREATMENT ORDERED:
Awaiting NP to assess patient at bedside and awaiting medication orders. As per NP, no notes written on hydrocortisone intervention. Awaiting NP to assess patient at bedside and awaiting medication orders.

## 2019-11-15 NOTE — PROVIDER CONTACT NOTE (OTHER) - ACTION/TREATMENT ORDERED:
NP Addie Reddy notified. Hold PO meds. Pt received IV push hydralazine. PRN medication ordered for severe agitation. Will continue to monitor

## 2019-11-15 NOTE — PROGRESS NOTE ADULT - PROBLEM SELECTOR PLAN 4
Now in sinus    amiodarone 200 daily   Hold ALL AV jennifer blockers for HR < 60  No AC secondary to GBM and high risk of bleed

## 2019-11-15 NOTE — PROVIDER CONTACT NOTE (OTHER) - BACKGROUND
Pt with hx of GBM. Pt presented with altered mental status and increased R side weakness. Detail Level: Zone Quality 226: Preventive Care And Screening: Tobacco Use: Screening And Cessation Intervention: Patient screened for tobacco use, is a smoker AND received Cessation Counseling

## 2019-11-15 NOTE — PROGRESS NOTE ADULT - SUBJECTIVE AND OBJECTIVE BOX
Subjective: Patient seen and examined. No new events except as noted.     REVIEW OF SYSTEMS:  Unable to obtain       MEDICATIONS:  MEDICATIONS  (STANDING):  aMIOdarone    Tablet 400 milliGRAM(s) Oral two times a day  amLODIPine   Tablet 10 milliGRAM(s) Oral daily  atorvastatin 80 milliGRAM(s) Oral at bedtime  dexAMETHasone  Injectable 4 milliGRAM(s) IV Push daily  dextrose 5%. 1000 milliLiter(s) (50 mL/Hr) IV Continuous <Continuous>  dextrose 50% Injectable 25 Gram(s) IV Push once  dextrose 50% Injectable 12.5 Gram(s) IV Push once  dextrose 50% Injectable 25 Gram(s) IV Push once  dextrose 50% Injectable 25 Gram(s) IV Push once  digoxin  Injectable 0.5 milliGRAM(s) IV Push once  diltiazem Injectable 15 milliGRAM(s) IV Push once  enoxaparin Injectable 40 milliGRAM(s) SubCutaneous daily  ertapenem  IVPB      ertapenem  IVPB 1000 milliGRAM(s) IV Intermittent every 24 hours  famotidine    Tablet 20 milliGRAM(s) Oral daily  insulin lispro (HumaLOG) corrective regimen sliding scale   SubCutaneous three times a day before meals  insulin lispro (HumaLOG) corrective regimen sliding scale   SubCutaneous at bedtime  levETIRAcetam  IVPB 750 milliGRAM(s) IV Intermittent every 12 hours  lisinopril 20 milliGRAM(s) Oral daily  melatonin 3 milliGRAM(s) Oral at bedtime  metoprolol tartrate 25 milliGRAM(s) Oral every 8 hours  metoprolol tartrate Injectable 5 milliGRAM(s) IV Push once  metoprolol tartrate Injectable 5 milliGRAM(s) IV Push once  senna 2 Tablet(s) Oral at bedtime  sodium chloride 0.9% Bolus 250 milliLiter(s) IV Bolus once      PHYSICAL EXAM:  T(C): 36.7 (11-15-19 @ 09:05), Max: 36.9 (11-15-19 @ 04:31)  HR: 59 (11-15-19 @ 09:05) (54 - 74)  BP: 170/90 (11-15-19 @ 09:13) (151/74 - 196/105)  RR: 18 (11-15-19 @ 09:05) (18 - 19)  SpO2: 97% (11-15-19 @ 09:05) (94% - 98%)  Wt(kg): --  I&O's Summary          Appearance: NAD, confused   HEENT:   Dry oral mucosa   Lymphatic: No lymphadenopathy  Cardiovascular: Normal S1 S2, No JVD, No murmurs, No edema  Respiratory: Decreased bs   Psychiatry: A & O x) 0  Gastrointestinal:  Soft, Non-tender, + BS	  Skin: No rashes, No ecchymoses, No cyanosis	  Extremities: Decreased  range of motion, No clubbing, cyanosis or edema  Vascular: Peripheral pulses palpable 2+ bilaterally  Neurological:  MS: agitated, in bed in restraints, eyes open, periodically yelling or kicking, spitting, not answering questions or following commands  But does answer to orientation questions, AOx2 (name and "hospital" not [facility] or day or date)  Motor: all extremities moving, pulling with force against restraints periodically  Sensation: withdraws to touch all four extremities        LABS:    CARDIAC MARKERS:                                14.0   13.46 )-----------( 290      ( 14 Nov 2019 10:44 )             42.6     11-15    146<H>  |  108  |  33<H>  ----------------------------<  293<H>  4.0   |  25  |  0.86    Ca    8.8      15 Nov 2019 09:14      proBNP:   Lipid Profile:   HgA1c:   TSH:             TELEMETRY: 	    ECG:  	  RADIOLOGY:   DIAGNOSTIC TESTING:  [ ] Echocardiogram:  [ ]  Catheterization:  [ ] Stress Test:    OTHER:

## 2019-11-15 NOTE — CONSULT NOTE ADULT - SUBJECTIVE AND OBJECTIVE BOX
HPI:  69 year old man with PMHx T2DM, stage IV glioblastoma multiforme (dx in 2018), HTN and PSHx appendectomy presents to the ED with 3 weeks of increased R hemiparesis, worsened aphasia and urinary incontinence, sent from his neruo-oncologist Dr. Cuba's office. Pt has had resection, chemo radiation, most recently was noted to be clinically worse in March 2019 with increased somnolence and steroids were increased. Per his neuro-onc, concern for progression of disease. In ER, pt no family at bedside, pt was agitated, confused, aphasic, climbing out of bed, recieved 4mg of ativan, 5mg of haldol sequentially. Neurological exam was limited due to sedation, pt did not respond to basic orientation questions appropriately in Azeri, had intermittent nature to nonsensical speech, but was able to show tongue, raise his arms - on mimicry only, R hemiparesis. Noted to have wet himself.  Admitted to Neurology for further evaluation. (05 Nov 2019 22:14)    PERTINENT PM/SXH:   Type 2 diabetes mellitus  Glioblastoma multiforme  HTN (hypertension)    Glioblastoma multiforme  History of appendectomy    FAMILY HISTORY:    ITEMS NOT CHECKED ARE NOT PRESENT    SOCIAL HISTORY:   Significant other/partner:  [x ]  Children: 3 [ ]  Baptist/Spirituality:  Substance hx:  [ ]   Tobacco hx:  [ ]   Alcohol hx: [ ]   Home Opioid hx:  [ ] I-Stop Reference No:  Living Situation: [x ]Home  [ ]Long term care  [ ]Rehab [ ]Other    ADVANCE DIRECTIVES:    DNR    MOLST  [ ]    Living Will  [ ]   DECISION MAKER(s):  [ ] Health Care Proxy(s)  [x ] Surrogate(s)  [ ] Guardian           Name(s): Phone Number(s):Jessica [phone]/ Gualberto [phone]/ Jannet [phone]    BASELINE (I)ADL(s) (prior to admission):  [place]: [ ]Total  [x ] Moderate [ ]Dependent    Allergies    No Known Allergies    Intolerances    MEDICATIONS  (STANDING):  aMIOdarone    Tablet 400 milliGRAM(s) Oral two times a day  amLODIPine   Tablet 10 milliGRAM(s) Oral daily  atorvastatin 80 milliGRAM(s) Oral at bedtime  dexAMETHasone  Injectable 4 milliGRAM(s) IV Push daily  dextrose 5%. 1000 milliLiter(s) (50 mL/Hr) IV Continuous <Continuous>  dextrose 50% Injectable 25 Gram(s) IV Push once  dextrose 50% Injectable 12.5 Gram(s) IV Push once  dextrose 50% Injectable 25 Gram(s) IV Push once  dextrose 50% Injectable 25 Gram(s) IV Push once  digoxin  Injectable 0.5 milliGRAM(s) IV Push once  diltiazem Injectable 15 milliGRAM(s) IV Push once  enoxaparin Injectable 40 milliGRAM(s) SubCutaneous daily  ertapenem  IVPB      ertapenem  IVPB 1000 milliGRAM(s) IV Intermittent every 24 hours  famotidine    Tablet 20 milliGRAM(s) Oral daily  insulin lispro (HumaLOG) corrective regimen sliding scale   SubCutaneous three times a day before meals  insulin lispro (HumaLOG) corrective regimen sliding scale   SubCutaneous at bedtime  levETIRAcetam  IVPB 750 milliGRAM(s) IV Intermittent every 12 hours  lisinopril 20 milliGRAM(s) Oral daily  melatonin 3 milliGRAM(s) Oral at bedtime  metoprolol tartrate 25 milliGRAM(s) Oral every 8 hours  metoprolol tartrate Injectable 5 milliGRAM(s) IV Push once  metoprolol tartrate Injectable 5 milliGRAM(s) IV Push once  senna 2 Tablet(s) Oral at bedtime  sodium chloride 0.9% Bolus 250 milliLiter(s) IV Bolus once    MEDICATIONS  (PRN):  acetaminophen  Suppository .. 650 milliGRAM(s) Rectal every 6 hours PRN Temp greater or equal to 38C (100.4F), Mild Pain (1 - 3)  dextrose 40% Gel 15 Gram(s) Oral once PRN Blood Glucose LESS THAN 70 milliGRAM(s)/deciliter  glucagon  Injectable 1 milliGRAM(s) IntraMuscular once PRN Glucose LESS THAN 70 milligrams/deciliter  glucagon  Injectable 1 milliGRAM(s) IntraMuscular once PRN Glucose LESS THAN 70 milligrams/deciliter  hydrALAZINE Injectable 20 milliGRAM(s) IV Push every 4 hours PRN SBP > 160  labetalol Injectable 20 milliGRAM(s) IV Push every 3 hours PRN Systolic blood pressure > 160  nystatin Powder 1 Application(s) Topical every 8 hours PRN rash/itching  valproate sodium IVPB 250 milliGRAM(s) IV Intermittent every 8 hours PRN Agitation    PRESENT SYMPTOMS: [ x]Unable to obtain due to poor mentation   Source if other than patient:  [ ]Family   [ ]Team     Pain: [ ] yes [ ] no  QOL impact -   Location -                    Aggravating factors -  Quality -  Radiation -  Timing-  Severity (0-10 scale):  Minimal acceptable level (0-10 scale):     PAIN AD Score:     [email or web address] (press ctrl +  left click to view)    Dyspnea:                           [ ]Mild [ ]Moderate [ ]Severe  Anxiety:                             [ ]Mild [ ]Moderate [ ]Severe  Fatigue:                             [ ]Mild [ ]Moderate [ ]Severe  Nausea:                            [ ]Mild [ ]Moderate [ ]Severe  Loss of appetite:               [ ]Mild [ ]Moderate [ ]Severe  Constipation:                    [ ]Mild [ ]Moderate [ ]Severe    Other Symptoms:  [ ]All other review of systems negative     Karnofsky Performance Score/Palliative Performance Status Version 2:     30    %    [email or web address]    PHYSICAL EXAM:  Vital Signs Last 24 Hrs  T(C): 36.7 (15 Nov 2019 09:05), Max: 36.9 (15 Nov 2019 04:31)  T(F): 98 (15 Nov 2019 09:05), Max: 98.5 (15 Nov 2019 04:31)  HR: 59 (15 Nov 2019 09:05) (54 - 74)  BP: 170/90 (15 Nov 2019 09:13) (151/74 - 196/105)  BP(mean): --  RR: 18 (15 Nov 2019 09:05) (18 - 19)  SpO2: 97% (15 Nov 2019 09:05) (94% - 98%) I&O's Summary    GENERAL:  [x ]Alert  [x ]Oriented x 1  [ ]Lethargic  [ ]Cachexia  [ ]Unarousable  [ x]Verbal  [ ]Non-Verbal    Behavioral:   [ ] Anxiety  [ ] Delirium [ x] Agitation [ ] Other    HEENT:  [x ]Normal   [ ]Dry mouth   [ ]ET Tube/Trach  [ ]Oral lesions    PULMONARY:   [x]Clear [ ]Tachypnea  [ ]Audible excessive secretions   [ ]Rhonchi        [ ]Right [ ]Left [ ]Bilateral  [ ]Crackles        [ ]Right [ ]Left [ ]Bilateral  [ ]Wheezing     [ ]Right [ ]Left [ ]Bilateral    CARDIOVASCULAR:    [x ]Regular [ ]Irregular [ ]Tachy  [ ]Aaron [ ]Murmur [ ]Other    GASTROINTESTINAL:  [x ]Soft  [ ]Distended   [x ]+BS  [ x]Non tender [ ]Tender  [ ]PEG [ ]OGT/ NGT  Last BM:     GENITOURINARY:  [ ]Normal [x ] Incontinent   [ ]Oliguria/Anuria   [ ]Mclean    MUSCULOSKELETAL:   [ ]Normal   [x ]Weakness  [ x]Bed/Wheelchair bound [ ]Edema    NEUROLOGIC:   [ ]No focal deficits  [x ] Cognitive impairment  [ ] Dysphagia [ ]Dysarthria [ ] Paresis [ ]Other     SKIN:   [ ]Normal   [ ]Pressure ulcer(s)  [ ]Rash    CRITICAL CARE:  [ ] Shock Present  [ ] Septic [ ]Cardiogenic [ ]Neurologic [ ]Hypovolemic  [ ]  Vasopressors [ ]  Inotropes   [ ] Respiratory failure present [ ] mechanical ventilation [ ] non-invasive ventilatory support [ ] High flow  [ ] Acute  [ ] Chronic [ ] Hypoxic  [ ] Hypercarbic [ ] Other  [ ] Other organ failure     LABS:                        14.0   13.46 )-----------( 290      ( 14 Nov 2019 10:44 )             42.6   11-15    146<H>  |  108  |  33<H>  ----------------------------<  293<H>  4.0   |  25  |  0.86    Ca    8.8      15 Nov 2019 09:14          RADIOLOGY & ADDITIONAL STUDIES:    PROTEIN CALORIE MALNUTRITION PRESENT: [ ] Yes [ ] No  [ ] PPSV2 < or = to 30% [ ] significant weight loss  [ ] poor nutritional intake [ ] catabolic state [ ] anasarca     Albumin, Serum: 3.4 g/dL (11-10-19 @ 06:39)  Artificial Nutrition [ ]     REFERRALS:   [ ]Chaplaincy  [ ] Hospice  [ ]Child Life  [ ]Social Work  [ ]Case management [ ]Holistic Therapy     Goals of Care Document:   Care Coordination Assessment [C. Provider] (11-06-19 @ 11:39)   Progress Notes - Care Coordination [C. Provider] (11-14-19 @ 12:25)

## 2019-11-15 NOTE — CONSULT NOTE ADULT - PROBLEM SELECTOR RECOMMENDATION 4
s  spoke with Jannet via the phone.  She will not be at the pts bedside until 6:30 pm. She asked that I call her sister jessica at [phone].  I called Jessica and left message to discuss GOC.  Awaiting return call.

## 2019-11-15 NOTE — PROVIDER CONTACT NOTE (OTHER) - ASSESSMENT
Pt has hx of IAD,, nora cream used after incontinent care. Pt with increased patchy redness throughout body and groin rash now elevated.

## 2019-11-15 NOTE — PROVIDER CONTACT NOTE (OTHER) - SITUATION
As per day team, hydrocortisone order to be put in for generalized rash throughout body. No order for hydrocortisone per night medicine NP.

## 2019-11-16 LAB
ANION GAP SERPL CALC-SCNC: 13 MMOL/L — SIGNIFICANT CHANGE UP (ref 5–17)
BUN SERPL-MCNC: 29 MG/DL — HIGH (ref 7–23)
CALCIUM SERPL-MCNC: 9 MG/DL — SIGNIFICANT CHANGE UP (ref 8.4–10.5)
CHLORIDE SERPL-SCNC: 104 MMOL/L — SIGNIFICANT CHANGE UP (ref 96–108)
CO2 SERPL-SCNC: 24 MMOL/L — SIGNIFICANT CHANGE UP (ref 22–31)
CREAT SERPL-MCNC: 0.99 MG/DL — SIGNIFICANT CHANGE UP (ref 0.5–1.3)
GLUCOSE BLDC GLUCOMTR-MCNC: 241 MG/DL — HIGH (ref 70–99)
GLUCOSE BLDC GLUCOMTR-MCNC: 261 MG/DL — HIGH (ref 70–99)
GLUCOSE BLDC GLUCOMTR-MCNC: 275 MG/DL — HIGH (ref 70–99)
GLUCOSE BLDC GLUCOMTR-MCNC: 287 MG/DL — HIGH (ref 70–99)
GLUCOSE SERPL-MCNC: 367 MG/DL — HIGH (ref 70–99)
POTASSIUM SERPL-MCNC: 4.5 MMOL/L — SIGNIFICANT CHANGE UP (ref 3.5–5.3)
POTASSIUM SERPL-SCNC: 4.5 MMOL/L — SIGNIFICANT CHANGE UP (ref 3.5–5.3)
SODIUM SERPL-SCNC: 141 MMOL/L — SIGNIFICANT CHANGE UP (ref 135–145)

## 2019-11-16 RX ADMIN — ENOXAPARIN SODIUM 40 MILLIGRAM(S): 100 INJECTION SUBCUTANEOUS at 13:13

## 2019-11-16 RX ADMIN — AMIODARONE HYDROCHLORIDE 400 MILLIGRAM(S): 400 TABLET ORAL at 05:24

## 2019-11-16 RX ADMIN — AMLODIPINE BESYLATE 10 MILLIGRAM(S): 2.5 TABLET ORAL at 05:23

## 2019-11-16 RX ADMIN — Medication 20 MILLIGRAM(S): at 20:07

## 2019-11-16 RX ADMIN — Medication 4 MILLIGRAM(S): at 05:24

## 2019-11-16 RX ADMIN — Medication 20 MILLIGRAM(S): at 00:31

## 2019-11-16 RX ADMIN — AMIODARONE HYDROCHLORIDE 400 MILLIGRAM(S): 400 TABLET ORAL at 17:19

## 2019-11-16 RX ADMIN — LEVETIRACETAM 400 MILLIGRAM(S): 250 TABLET, FILM COATED ORAL at 17:22

## 2019-11-16 RX ADMIN — Medication 25 MILLIGRAM(S): at 13:14

## 2019-11-16 RX ADMIN — ERTAPENEM SODIUM 120 MILLIGRAM(S): 1 INJECTION, POWDER, LYOPHILIZED, FOR SOLUTION INTRAMUSCULAR; INTRAVENOUS at 17:20

## 2019-11-16 RX ADMIN — Medication 1: at 21:39

## 2019-11-16 RX ADMIN — Medication 4: at 09:27

## 2019-11-16 RX ADMIN — Medication 6: at 17:19

## 2019-11-16 RX ADMIN — LEVETIRACETAM 400 MILLIGRAM(S): 250 TABLET, FILM COATED ORAL at 05:24

## 2019-11-16 RX ADMIN — SENNA PLUS 2 TABLET(S): 8.6 TABLET ORAL at 21:39

## 2019-11-16 RX ADMIN — LISINOPRIL 20 MILLIGRAM(S): 2.5 TABLET ORAL at 05:24

## 2019-11-16 RX ADMIN — Medication 6: at 13:13

## 2019-11-16 RX ADMIN — FAMOTIDINE 20 MILLIGRAM(S): 10 INJECTION INTRAVENOUS at 13:14

## 2019-11-16 RX ADMIN — ATORVASTATIN CALCIUM 80 MILLIGRAM(S): 80 TABLET, FILM COATED ORAL at 21:38

## 2019-11-16 NOTE — PROGRESS NOTE ADULT - SUBJECTIVE AND OBJECTIVE BOX
Subjective: Patient seen and examined. No new events except as noted.     REVIEW OF SYSTEMS:  Unable to obtain       MEDICATIONS:  MEDICATIONS  (STANDING):  aMIOdarone    Tablet 400 milliGRAM(s) Oral two times a day  amLODIPine   Tablet 10 milliGRAM(s) Oral daily  atorvastatin 80 milliGRAM(s) Oral at bedtime  dexAMETHasone  Injectable 4 milliGRAM(s) IV Push daily  dextrose 5%. 1000 milliLiter(s) (50 mL/Hr) IV Continuous <Continuous>  dextrose 50% Injectable 25 Gram(s) IV Push once  dextrose 50% Injectable 12.5 Gram(s) IV Push once  dextrose 50% Injectable 25 Gram(s) IV Push once  dextrose 50% Injectable 25 Gram(s) IV Push once  digoxin  Injectable 0.5 milliGRAM(s) IV Push once  diltiazem Injectable 15 milliGRAM(s) IV Push once  enoxaparin Injectable 40 milliGRAM(s) SubCutaneous daily  ertapenem  IVPB      ertapenem  IVPB 1000 milliGRAM(s) IV Intermittent every 24 hours  famotidine    Tablet 20 milliGRAM(s) Oral daily  insulin lispro (HumaLOG) corrective regimen sliding scale   SubCutaneous three times a day before meals  insulin lispro (HumaLOG) corrective regimen sliding scale   SubCutaneous at bedtime  levETIRAcetam  IVPB 750 milliGRAM(s) IV Intermittent every 12 hours  lisinopril 20 milliGRAM(s) Oral daily  metoprolol tartrate 25 milliGRAM(s) Oral every 8 hours  metoprolol tartrate Injectable 5 milliGRAM(s) IV Push once  metoprolol tartrate Injectable 5 milliGRAM(s) IV Push once  senna 2 Tablet(s) Oral at bedtime  sodium chloride 0.9% Bolus 250 milliLiter(s) IV Bolus once      PHYSICAL EXAM:  T(C): 36.5 (11-16-19 @ 15:55), Max: 36.9 (11-16-19 @ 08:56)  HR: 60 (11-16-19 @ 15:55) (55 - 66)  BP: 167/94 (11-16-19 @ 15:55) (156/83 - 191/91)  RR: 18 (11-16-19 @ 15:55) (17 - 18)  SpO2: 95% (11-16-19 @ 15:55) (94% - 98%)  Wt(kg): --  I&O's Summary    15 Nov 2019 07:01  -  16 Nov 2019 07:00  --------------------------------------------------------  IN: 440 mL / OUT: 1 mL / NET: 439 mL            Appearance: NAD, confused   HEENT:   Dry oral mucosa   Lymphatic: No lymphadenopathy  Cardiovascular: Normal S1 S2, No JVD, No murmurs, No edema  Respiratory: Decreased bs   Psychiatry: A & O x) 0  Gastrointestinal:  Soft, Non-tender, + BS	  Skin: No rashes, No ecchymoses, No cyanosis	  Extremities: Decreased  range of motion, No clubbing, cyanosis or edema  Vascular: Peripheral pulses palpable 2+ bilaterally  Neurological:  MS: calm, eyes open, periodically yelling or kicking, spitting, not answering questions or following commands  But does answer to orientation questions, AOx2 (name and "hospital" not [facility] or day or date)  Motor: all extremities moving, pulling with force against restraints periodically  Sensation: withdraws to touch all four extremities    LABS:    CARDIAC MARKERS:                                13.1   13.63 )-----------( 311      ( 15 Nov 2019 12:55 )             41.3     11-16    141  |  104  |  29<H>  ----------------------------<  367<H>  4.5   |  24  |  0.99    Ca    9.0      16 Nov 2019 11:21      proBNP:   Lipid Profile:   HgA1c:   TSH:             TELEMETRY: 	AFib    ECG:  	  RADIOLOGY:   DIAGNOSTIC TESTING:  [ ] Echocardiogram:  [ ]  Catheterization:  [ ] Stress Test:    OTHER:

## 2019-11-17 LAB
ANION GAP SERPL CALC-SCNC: 13 MMOL/L — SIGNIFICANT CHANGE UP (ref 5–17)
BUN SERPL-MCNC: 25 MG/DL — HIGH (ref 7–23)
CALCIUM SERPL-MCNC: 9.1 MG/DL — SIGNIFICANT CHANGE UP (ref 8.4–10.5)
CHLORIDE SERPL-SCNC: 109 MMOL/L — HIGH (ref 96–108)
CO2 SERPL-SCNC: 24 MMOL/L — SIGNIFICANT CHANGE UP (ref 22–31)
CREAT SERPL-MCNC: 0.86 MG/DL — SIGNIFICANT CHANGE UP (ref 0.5–1.3)
GLUCOSE BLDC GLUCOMTR-MCNC: 228 MG/DL — HIGH (ref 70–99)
GLUCOSE BLDC GLUCOMTR-MCNC: 248 MG/DL — HIGH (ref 70–99)
GLUCOSE BLDC GLUCOMTR-MCNC: 257 MG/DL — HIGH (ref 70–99)
GLUCOSE BLDC GLUCOMTR-MCNC: 258 MG/DL — HIGH (ref 70–99)
GLUCOSE SERPL-MCNC: 193 MG/DL — HIGH (ref 70–99)
HCT VFR BLD CALC: 40 % — SIGNIFICANT CHANGE UP (ref 39–50)
HGB BLD-MCNC: 12.9 G/DL — LOW (ref 13–17)
MCHC RBC-ENTMCNC: 29.7 PG — SIGNIFICANT CHANGE UP (ref 27–34)
MCHC RBC-ENTMCNC: 32.3 GM/DL — SIGNIFICANT CHANGE UP (ref 32–36)
MCV RBC AUTO: 92 FL — SIGNIFICANT CHANGE UP (ref 80–100)
PLATELET # BLD AUTO: 321 K/UL — SIGNIFICANT CHANGE UP (ref 150–400)
POTASSIUM SERPL-MCNC: 3.4 MMOL/L — LOW (ref 3.5–5.3)
POTASSIUM SERPL-SCNC: 3.4 MMOL/L — LOW (ref 3.5–5.3)
RBC # BLD: 4.35 M/UL — SIGNIFICANT CHANGE UP (ref 4.2–5.8)
RBC # FLD: 13.4 % — SIGNIFICANT CHANGE UP (ref 10.3–14.5)
SODIUM SERPL-SCNC: 146 MMOL/L — HIGH (ref 135–145)
WBC # BLD: 14.78 K/UL — HIGH (ref 3.8–10.5)
WBC # FLD AUTO: 14.78 K/UL — HIGH (ref 3.8–10.5)

## 2019-11-17 RX ADMIN — AMLODIPINE BESYLATE 10 MILLIGRAM(S): 2.5 TABLET ORAL at 05:12

## 2019-11-17 RX ADMIN — Medication 20 MILLIGRAM(S): at 17:00

## 2019-11-17 RX ADMIN — ERTAPENEM SODIUM 120 MILLIGRAM(S): 1 INJECTION, POWDER, LYOPHILIZED, FOR SOLUTION INTRAMUSCULAR; INTRAVENOUS at 18:51

## 2019-11-17 RX ADMIN — LISINOPRIL 20 MILLIGRAM(S): 2.5 TABLET ORAL at 05:12

## 2019-11-17 RX ADMIN — Medication 6: at 09:28

## 2019-11-17 RX ADMIN — ENOXAPARIN SODIUM 40 MILLIGRAM(S): 100 INJECTION SUBCUTANEOUS at 17:02

## 2019-11-17 RX ADMIN — AMIODARONE HYDROCHLORIDE 400 MILLIGRAM(S): 400 TABLET ORAL at 17:03

## 2019-11-17 RX ADMIN — LEVETIRACETAM 400 MILLIGRAM(S): 250 TABLET, FILM COATED ORAL at 05:13

## 2019-11-17 RX ADMIN — Medication 1 MILLIGRAM(S): at 18:00

## 2019-11-17 RX ADMIN — LEVETIRACETAM 400 MILLIGRAM(S): 250 TABLET, FILM COATED ORAL at 17:01

## 2019-11-17 RX ADMIN — Medication 4 MILLIGRAM(S): at 05:12

## 2019-11-17 RX ADMIN — AMIODARONE HYDROCHLORIDE 400 MILLIGRAM(S): 400 TABLET ORAL at 05:12

## 2019-11-17 RX ADMIN — Medication 1 MILLIGRAM(S): at 12:00

## 2019-11-17 RX ADMIN — SENNA PLUS 2 TABLET(S): 8.6 TABLET ORAL at 21:41

## 2019-11-17 RX ADMIN — ATORVASTATIN CALCIUM 80 MILLIGRAM(S): 80 TABLET, FILM COATED ORAL at 21:41

## 2019-11-17 RX ADMIN — Medication 20 MILLIGRAM(S): at 09:36

## 2019-11-17 RX ADMIN — Medication 25 MILLIGRAM(S): at 23:54

## 2019-11-17 RX ADMIN — Medication 4: at 18:00

## 2019-11-17 RX ADMIN — Medication 4: at 14:00

## 2019-11-17 RX ADMIN — Medication 1: at 22:21

## 2019-11-17 NOTE — PROGRESS NOTE ADULT - SUBJECTIVE AND OBJECTIVE BOX
Subjective: Patient seen and examined. No new events except as noted.   states to want regular food   more awake and alert   REVIEW OF SYSTEMS:    CONSTITUTIONAL: + weakness, fevers or chills  EYES/ENT: No visual changes;  No vertigo or throat pain   NECK: No pain or stiffness  RESPIRATORY: No cough, wheezing, hemoptysis; No shortness of breath  CARDIOVASCULAR: No chest pain or palpitations  GASTROINTESTINAL: No abdominal or epigastric pain. No nausea, vomiting, or hematemesis; No diarrhea or constipation. No melena or hematochezia.  GENITOURINARY: No dysuria, frequency or hematuria  NEUROLOGICAL: No numbness or weakness  SKIN: No itching, burning, rashes, or lesions   All other review of systems is negative unless indicated above.    MEDICATIONS:  MEDICATIONS  (STANDING):  aMIOdarone    Tablet 400 milliGRAM(s) Oral two times a day  amLODIPine   Tablet 10 milliGRAM(s) Oral daily  atorvastatin 80 milliGRAM(s) Oral at bedtime  dexAMETHasone  Injectable 4 milliGRAM(s) IV Push daily  dextrose 5%. 1000 milliLiter(s) (50 mL/Hr) IV Continuous <Continuous>  dextrose 50% Injectable 25 Gram(s) IV Push once  dextrose 50% Injectable 12.5 Gram(s) IV Push once  dextrose 50% Injectable 25 Gram(s) IV Push once  dextrose 50% Injectable 25 Gram(s) IV Push once  digoxin  Injectable 0.5 milliGRAM(s) IV Push once  diltiazem Injectable 15 milliGRAM(s) IV Push once  enoxaparin Injectable 40 milliGRAM(s) SubCutaneous daily  ertapenem  IVPB      ertapenem  IVPB 1000 milliGRAM(s) IV Intermittent every 24 hours  famotidine    Tablet 20 milliGRAM(s) Oral daily  insulin lispro (HumaLOG) corrective regimen sliding scale   SubCutaneous three times a day before meals  insulin lispro (HumaLOG) corrective regimen sliding scale   SubCutaneous at bedtime  levETIRAcetam  IVPB 750 milliGRAM(s) IV Intermittent every 12 hours  lisinopril 20 milliGRAM(s) Oral daily  metoprolol tartrate 25 milliGRAM(s) Oral every 8 hours  metoprolol tartrate Injectable 5 milliGRAM(s) IV Push once  metoprolol tartrate Injectable 5 milliGRAM(s) IV Push once  senna 2 Tablet(s) Oral at bedtime  sodium chloride 0.9% Bolus 250 milliLiter(s) IV Bolus once      PHYSICAL EXAM:  T(C): 36.8 (11-17-19 @ 08:33), Max: 36.8 (11-16-19 @ 13:04)  HR: 63 (11-17-19 @ 08:33) (55 - 66)  BP: 178/83 (11-17-19 @ 08:33) (130/70 - 178/83)  RR: 18 (11-17-19 @ 08:33) (17 - 19)  SpO2: 95% (11-17-19 @ 08:33) (95% - 98%)  Wt(kg): --  I&O's Summary    16 Nov 2019 07:01  -  17 Nov 2019 07:00  --------------------------------------------------------  IN: 120 mL / OUT: 0 mL / NET: 120 mL        Appearance: NAD, alert   HEENT:   Dry oral mucosa   Lymphatic: No lymphadenopathy  Cardiovascular: Normal S1 S2, No JVD, No murmurs, No edema  Respiratory: Decreased bs   Psychiatry: A & O x) 0  Gastrointestinal:  Soft, Non-tender, + BS	  Skin: No rashes, No ecchymoses, No cyanosis	  Extremities: Decreased  range of motion, No clubbing, cyanosis or edema  Vascular: Peripheral pulses palpable 2+ bilaterally  Neurological:  MS: calm, more alert , following commands, answers to orientation questions, [id number]: all extremities moving, pulling with force against restraints periodically  Sensation: withdraws to touch all four extremities      LABS:    CARDIAC MARKERS:                                12.9   14.78 )-----------( 321      ( 17 Nov 2019 09:28 )             40.0     11-17    146<H>  |  109<H>  |  25<H>  ----------------------------<  193<H>  3.4<L>   |  24  |  0.86    Ca    9.1      17 Nov 2019 07:29      proBNP:   Lipid Profile:   HgA1c:   TSH:             TELEMETRY: 	  SR  ECG:  	  RADIOLOGY:   DIAGNOSTIC TESTING:  [ ] Echocardiogram:  [ ]  Catheterization:  [ ] Stress Test:    OTHER:

## 2019-11-18 LAB
GLUCOSE BLDC GLUCOMTR-MCNC: 212 MG/DL — HIGH (ref 70–99)
GLUCOSE BLDC GLUCOMTR-MCNC: 229 MG/DL — HIGH (ref 70–99)
GLUCOSE BLDC GLUCOMTR-MCNC: 256 MG/DL — HIGH (ref 70–99)
GLUCOSE BLDC GLUCOMTR-MCNC: 296 MG/DL — HIGH (ref 70–99)

## 2019-11-18 PROCEDURE — 99233 SBSQ HOSP IP/OBS HIGH 50: CPT

## 2019-11-18 RX ADMIN — Medication 6: at 12:40

## 2019-11-18 RX ADMIN — Medication 25 MILLIGRAM(S): at 05:47

## 2019-11-18 RX ADMIN — ENOXAPARIN SODIUM 40 MILLIGRAM(S): 100 INJECTION SUBCUTANEOUS at 12:39

## 2019-11-18 RX ADMIN — AMLODIPINE BESYLATE 10 MILLIGRAM(S): 2.5 TABLET ORAL at 05:47

## 2019-11-18 RX ADMIN — SENNA PLUS 2 TABLET(S): 8.6 TABLET ORAL at 21:49

## 2019-11-18 RX ADMIN — Medication 6: at 17:11

## 2019-11-18 RX ADMIN — Medication 4 MILLIGRAM(S): at 05:47

## 2019-11-18 RX ADMIN — LEVETIRACETAM 400 MILLIGRAM(S): 250 TABLET, FILM COATED ORAL at 05:47

## 2019-11-18 RX ADMIN — AMIODARONE HYDROCHLORIDE 400 MILLIGRAM(S): 400 TABLET ORAL at 17:10

## 2019-11-18 RX ADMIN — LEVETIRACETAM 400 MILLIGRAM(S): 250 TABLET, FILM COATED ORAL at 17:42

## 2019-11-18 RX ADMIN — AMIODARONE HYDROCHLORIDE 400 MILLIGRAM(S): 400 TABLET ORAL at 05:46

## 2019-11-18 RX ADMIN — Medication 4: at 09:22

## 2019-11-18 RX ADMIN — Medication 20 MILLIGRAM(S): at 23:30

## 2019-11-18 RX ADMIN — Medication 25 MILLIGRAM(S): at 14:51

## 2019-11-18 RX ADMIN — LISINOPRIL 20 MILLIGRAM(S): 2.5 TABLET ORAL at 05:46

## 2019-11-18 RX ADMIN — FAMOTIDINE 20 MILLIGRAM(S): 10 INJECTION INTRAVENOUS at 12:39

## 2019-11-18 RX ADMIN — ATORVASTATIN CALCIUM 80 MILLIGRAM(S): 80 TABLET, FILM COATED ORAL at 21:49

## 2019-11-18 RX ADMIN — Medication 20 MILLIGRAM(S): at 00:06

## 2019-11-18 RX ADMIN — ERTAPENEM SODIUM 120 MILLIGRAM(S): 1 INJECTION, POWDER, LYOPHILIZED, FOR SOLUTION INTRAMUSCULAR; INTRAVENOUS at 17:10

## 2019-11-18 NOTE — PROGRESS NOTE ADULT - SUBJECTIVE AND OBJECTIVE BOX
Subjective: Patient seen and examined. No new events except as noted.   appears comfortable   no cp or sob     REVIEW OF SYSTEMS:    CONSTITUTIONAL: + weakness, fevers or chills  EYES/ENT: No visual changes;  No vertigo or throat pain   NECK: No pain or stiffness  RESPIRATORY: No cough, wheezing, hemoptysis; No shortness of breath  CARDIOVASCULAR: No chest pain or palpitations  GASTROINTESTINAL: No abdominal or epigastric pain. No nausea, vomiting, or hematemesis; No diarrhea or constipation. No melena or hematochezia.  GENITOURINARY: No dysuria, frequency or hematuria  NEUROLOGICAL: + numbness or weakness  SKIN: No itching, burning, rashes, or lesions   All other review of systems is negative unless indicated above.    MEDICATIONS:  MEDICATIONS  (STANDING):  aMIOdarone    Tablet 400 milliGRAM(s) Oral two times a day  amLODIPine   Tablet 10 milliGRAM(s) Oral daily  atorvastatin 80 milliGRAM(s) Oral at bedtime  dexAMETHasone  Injectable 4 milliGRAM(s) IV Push daily  dextrose 5%. 1000 milliLiter(s) (50 mL/Hr) IV Continuous <Continuous>  dextrose 50% Injectable 25 Gram(s) IV Push once  dextrose 50% Injectable 12.5 Gram(s) IV Push once  dextrose 50% Injectable 25 Gram(s) IV Push once  dextrose 50% Injectable 25 Gram(s) IV Push once  digoxin  Injectable 0.5 milliGRAM(s) IV Push once  diltiazem Injectable 15 milliGRAM(s) IV Push once  enoxaparin Injectable 40 milliGRAM(s) SubCutaneous daily  ertapenem  IVPB      ertapenem  IVPB 1000 milliGRAM(s) IV Intermittent every 24 hours  famotidine    Tablet 20 milliGRAM(s) Oral daily  insulin lispro (HumaLOG) corrective regimen sliding scale   SubCutaneous three times a day before meals  insulin lispro (HumaLOG) corrective regimen sliding scale   SubCutaneous at bedtime  levETIRAcetam  IVPB 750 milliGRAM(s) IV Intermittent every 12 hours  lisinopril 20 milliGRAM(s) Oral daily  metoprolol tartrate 25 milliGRAM(s) Oral every 8 hours  metoprolol tartrate Injectable 5 milliGRAM(s) IV Push once  metoprolol tartrate Injectable 5 milliGRAM(s) IV Push once  senna 2 Tablet(s) Oral at bedtime  sodium chloride 0.9% Bolus 250 milliLiter(s) IV Bolus once      PHYSICAL EXAM:  T(C): 36.9 (11-18-19 @ 08:55), Max: 36.9 (11-18-19 @ 08:55)  HR: 60 (11-18-19 @ 08:55) (60 - 80)  BP: 151/75 (11-18-19 @ 08:55) (142/76 - 179/97)  RR: 18 (11-18-19 @ 08:55) (17 - 19)  SpO2: 95% (11-18-19 @ 08:55) (94% - 95%)  Wt(kg): --  I&O's Summary    17 Nov 2019 07:01  -  18 Nov 2019 07:00  --------------------------------------------------------  IN: 420 mL / OUT: 0 mL / NET: 420 mL    18 Nov 2019 07:01  -  18 Nov 2019 09:56  --------------------------------------------------------  IN: 240 mL / OUT: 0 mL / NET: 240 mL          Appearance: NAD, alert   HEENT:   Dry oral mucosa   Lymphatic: No lymphadenopathy  Cardiovascular: Normal S1 S2, No JVD, No murmurs, No edema  Respiratory: Decreased bs   Psychiatry: A & O x) 0  Gastrointestinal:  Soft, Non-tender, + BS	  Skin: No rashes, No ecchymoses, No cyanosis	  Extremities: Decreased  range of motion, No clubbing, cyanosis or edema  Vascular: Peripheral pulses palpable 2+ bilaterally  Neurological:  MS: calm, more alert , following commands, answers to orientation questions, [id number]: all extremities moving, pulling with force against restraints periodically  Sensation: withdraws to touch all four extremities        LABS:    CARDIAC MARKERS:                                12.9   14.78 )-----------( 321      ( 17 Nov 2019 09:28 )             40.0     11-17    146<H>  |  109<H>  |  25<H>  ----------------------------<  193<H>  3.4<L>   |  24  |  0.86    Ca    9.1      17 Nov 2019 07:29      proBNP:   Lipid Profile:   HgA1c:   TSH:             TELEMETRY: 	SR    ECG:  	  RADIOLOGY:   DIAGNOSTIC TESTING:  [ ] Echocardiogram:  [ ]  Catheterization:  [ ] Stress Test:    OTHER:

## 2019-11-18 NOTE — PROGRESS NOTE ADULT - SUBJECTIVE AND OBJECTIVE BOX
SUBJECTIVE AND OBJECTIVE:  INTERVAL HPI/OVERNIGHT EVENTS:    DNR on chart:   Allergies    No Known Allergies    Intolerances    MEDICATIONS  (STANDING):  aMIOdarone    Tablet 400 milliGRAM(s) Oral two times a day  amLODIPine   Tablet 10 milliGRAM(s) Oral daily  atorvastatin 80 milliGRAM(s) Oral at bedtime  dexAMETHasone  Injectable 4 milliGRAM(s) IV Push daily  dextrose 5%. 1000 milliLiter(s) (50 mL/Hr) IV Continuous <Continuous>  dextrose 50% Injectable 25 Gram(s) IV Push once  dextrose 50% Injectable 12.5 Gram(s) IV Push once  dextrose 50% Injectable 25 Gram(s) IV Push once  dextrose 50% Injectable 25 Gram(s) IV Push once  enoxaparin Injectable 40 milliGRAM(s) SubCutaneous daily  ertapenem  IVPB      ertapenem  IVPB 1000 milliGRAM(s) IV Intermittent every 24 hours  famotidine    Tablet 20 milliGRAM(s) Oral daily  insulin lispro (HumaLOG) corrective regimen sliding scale   SubCutaneous three times a day before meals  insulin lispro (HumaLOG) corrective regimen sliding scale   SubCutaneous at bedtime  levETIRAcetam  IVPB 750 milliGRAM(s) IV Intermittent every 12 hours  lisinopril 20 milliGRAM(s) Oral daily  metoprolol tartrate 25 milliGRAM(s) Oral every 8 hours  senna 2 Tablet(s) Oral at bedtime  sodium chloride 0.9% Bolus 250 milliLiter(s) IV Bolus once    MEDICATIONS  (PRN):  acetaminophen  Suppository .. 650 milliGRAM(s) Rectal every 6 hours PRN Temp greater or equal to 38C (100.4F), Mild Pain (1 - 3)  dextrose 40% Gel 15 Gram(s) Oral once PRN Blood Glucose LESS THAN 70 milliGRAM(s)/deciliter  glucagon  Injectable 1 milliGRAM(s) IntraMuscular once PRN Glucose LESS THAN 70 milligrams/deciliter  glucagon  Injectable 1 milliGRAM(s) IntraMuscular once PRN Glucose LESS THAN 70 milligrams/deciliter  hydrALAZINE Injectable 20 milliGRAM(s) IV Push every 4 hours PRN SBP > 160  labetalol Injectable 20 milliGRAM(s) IV Push every 3 hours PRN Systolic blood pressure > 160  LORazepam     Tablet 1 milliGRAM(s) Oral every 4 hours PRN Severe agitation  LORazepam   Injectable 1 milliGRAM(s) IV Push every 6 hours PRN severe agitation  melatonin 3 milliGRAM(s) Oral at bedtime PRN Insomnia  nystatin Powder 1 Application(s) Topical every 8 hours PRN rash/itching  valproate sodium IVPB 250 milliGRAM(s) IV Intermittent every 8 hours PRN Agitation      ITEMS UNCHECKED ARE NOT PRESENT    PRESENT SYMPTOMS: [ ]Unable to obtain due to poor mentation   Source if other than patient:  [ ]Family   [ ]Team     Pain:  [ ]yes [ ]no  QOL impact -   Location -                    Aggravating factors -  Quality -  Radiation -  Timing-  Severity (0-10 scale):  Minimal acceptable level (0-10 scale):     Dyspnea:                           [ ]Mild [ ]Moderate [ ]Severe  Anxiety:                             [ ]Mild [ ]Moderate [ ]Severe  Fatigue:                             [ ]Mild [ ]Moderate [ ]Severe  Nausea:                             [ ]Mild [ ]Moderate [ ]Severe  Loss of appetite:              [ ]Mild [ ]Moderate [ ]Severe  Constipation:                    [ ]Mild [ ]Moderate [ ]Severe    PAIN AD Score:	  [email or web address]/painad.pdf (Ctrl + left click to view)    Other Symptoms:  [ ]All other review of systems negative     Palliative Performance Status Version 2:         %      http://npcrc.org/files/news/palliative_performance_scale_ppsv2.pdf  PHYSICAL EXAM:  Vital Signs Last 24 Hrs  T(C): 36.2 (18 Nov 2019 15:19), Max: 36.9 (18 Nov 2019 08:55)  T(F): 97.2 (18 Nov 2019 15:19), Max: 98.5 (18 Nov 2019 08:55)  HR: 60 (18 Nov 2019 15:19) (60 - 72)  BP: 149/82 (18 Nov 2019 15:19) (139/79 - 179/97)  BP(mean): --  RR: 18 (18 Nov 2019 15:19) (17 - 18)  SpO2: 97% (18 Nov 2019 15:19) (95% - 97%) I&O's Summary    17 Nov 2019 07:01  -  18 Nov 2019 07:00  --------------------------------------------------------  IN: 420 mL / OUT: 0 mL / NET: 420 mL    18 Nov 2019 07:01  -  18 Nov 2019 18:45  --------------------------------------------------------  IN: 660 mL / OUT: 0 mL / NET: 660 mL       GENERAL:  [ ]Alert  [ ]Oriented x   [ ]Lethargic  [ ]Cachexia  [ ]Unarousable  [ ]Verbal  [ ]Non-Verbal  Behavioral:   [ ]Anxiety  [ ]Delirium [ ]Agitation [ ]Other  HEENT:  [ ]Normal   [ ]Dry mouth   [ ]ET Tube/Trach  [ ]Oral lesions  PULMONARY:   [ ]Clear [ ]Tachypnea  [ ]Audible excessive secretions   [ ]Rhonchi        [ ]Right [ ]Left [ ]Bilateral  [ ]Crackles        [ ]Right [ ]Left [ ]Bilateral  [ ]Wheezing     [ ]Right [ ]Left [ ]Bilateral  [ ]Diminished BS [ ] Right [ ]Left [ ]Bilateral  CARDIOVASCULAR:    [ ]Regular [ ]Irregular [ ]Tachy  [ ]Aaron [ ]Murmur [ ]Other  GASTROINTESTINAL:  [ ]Soft  [ ]Distended   [ ]+BS  [ ]Non tender [ ]Tender  [ ]PEG [ ]OGT/ NGT   Last BM:      GENITOURINARY:  [ ]Normal [ ]Incontinent   [ ]Oliguria/Anuria   [ ]Mclean  MUSCULOSKELETAL:   [ ]Normal   [ ]Weakness  [ ]Bed/Wheelchair bound [ ]Edema  NEUROLOGIC:   [ ]No focal deficits  [ ] Cognitive impairment  [ ] Dysphagia [ ]Dysarthria [ ] Paresis [ ]Other   SKIN:   [ ]Normal  [ ]Rash   [ ]Pressure ulcer(s) [ ]y [ ]n present on admission    CRITICAL CARE:  [ ]Shock Present  [ ]Septic [ ]Cardiogenic [ ]Neurologic [ ]Hypovolemic  [ ]Vasopressors [ ]Inotropes  [ ]Respiratory failure present [ ]Mechanical Ventilation [ ]Non-invasive ventilatory support [ ]High-Flow  [ ]Acute  [ ]Chronic [ ]Hypoxic  [ ]Hypercarbic [ ]Other  [ ]Other organ failure     LABS:                        12.9   14.78 )-----------( 321      ( 17 Nov 2019 09:28 )             40.0   11-17    146<H>  |  109<H>  |  25<H>  ----------------------------<  193<H>  3.4<L>   |  24  |  0.86    Ca    9.1      17 Nov 2019 07:29          RADIOLOGY & ADDITIONAL STUDIES:    Protein Calorie Malnutrition Present: [ ]mild [ ]moderate [ ]severe [ ]underweight [ ]morbid obesity  https://www.andeal.org/vault/4770/web/files/ONC/Table_Clinical%20Characteristics%20to%20Document%20Malnutrition-White%20JV%20et%20al%202012.pdf    Height (cm): 200.66 (11-05-19 @ 12:22), 167.6 (07-30-19 @ 23:46), 167.64 (07-19-19 @ 09:15)  Weight (kg): 99.8 (11-05-19 @ 12:22), 110.4 (07-30-19 @ 23:46), 113.4 (07-19-19 @ 09:15)  BMI (kg/m2): 24.8 (11-05-19 @ 12:22), 39.3 (07-30-19 @ 23:46), 40.4 (07-19-19 @ 09:15)    [ ]PPSV2 < or = 30%  [ ]significant weight loss [ ]poor nutritional intake [ ]anasarca   Albumin, Serum: 3.4 g/dL (11-10-19 @ 06:39)   [ ]Artificial Nutrition    REFERRALS:   [ ]Chaplaincy  [ ]Hospice  [ ]Child Life  [ ]Social Work  [ ]Case management [ ]Holistic Therapy     Goals of Care Document: HPI: 70 yo M with T2DM, stage IV glioblastoma multiforme (dx in 2018), HTN p/w weakness, aphasia, and severe agitation.  Palliative care called for GOC.    SUBJECTIVE AND OBJECTIVE: Persistent delirium and lethargy. Not following commands appropriately   INTERVAL HPI/OVERNIGHT EVENTS: No new events.     DNR on chart: No   Allergies    No Known Allergies    Intolerances    MEDICATIONS  (STANDING):  aMIOdarone    Tablet 400 milliGRAM(s) Oral two times a day  amLODIPine   Tablet 10 milliGRAM(s) Oral daily  atorvastatin 80 milliGRAM(s) Oral at bedtime  dexAMETHasone  Injectable 4 milliGRAM(s) IV Push daily  dextrose 5%. 1000 milliLiter(s) (50 mL/Hr) IV Continuous <Continuous>  dextrose 50% Injectable 25 Gram(s) IV Push once  dextrose 50% Injectable 12.5 Gram(s) IV Push once  dextrose 50% Injectable 25 Gram(s) IV Push once  dextrose 50% Injectable 25 Gram(s) IV Push once  enoxaparin Injectable 40 milliGRAM(s) SubCutaneous daily  ertapenem  IVPB      ertapenem  IVPB 1000 milliGRAM(s) IV Intermittent every 24 hours  famotidine    Tablet 20 milliGRAM(s) Oral daily  insulin lispro (HumaLOG) corrective regimen sliding scale   SubCutaneous three times a day before meals  insulin lispro (HumaLOG) corrective regimen sliding scale   SubCutaneous at bedtime  levETIRAcetam  IVPB 750 milliGRAM(s) IV Intermittent every 12 hours  lisinopril 20 milliGRAM(s) Oral daily  metoprolol tartrate 25 milliGRAM(s) Oral every 8 hours  senna 2 Tablet(s) Oral at bedtime  sodium chloride 0.9% Bolus 250 milliLiter(s) IV Bolus once    MEDICATIONS  (PRN):  acetaminophen  Suppository .. 650 milliGRAM(s) Rectal every 6 hours PRN Temp greater or equal to 38C (100.4F), Mild Pain (1 - 3)  dextrose 40% Gel 15 Gram(s) Oral once PRN Blood Glucose LESS THAN 70 milliGRAM(s)/deciliter  glucagon  Injectable 1 milliGRAM(s) IntraMuscular once PRN Glucose LESS THAN 70 milligrams/deciliter  glucagon  Injectable 1 milliGRAM(s) IntraMuscular once PRN Glucose LESS THAN 70 milligrams/deciliter  hydrALAZINE Injectable 20 milliGRAM(s) IV Push every 4 hours PRN SBP > 160  labetalol Injectable 20 milliGRAM(s) IV Push every 3 hours PRN Systolic blood pressure > 160  LORazepam     Tablet 1 milliGRAM(s) Oral every 4 hours PRN Severe agitation  LORazepam   Injectable 1 milliGRAM(s) IV Push every 6 hours PRN severe agitation  melatonin 3 milliGRAM(s) Oral at bedtime PRN Insomnia  nystatin Powder 1 Application(s) Topical every 8 hours PRN rash/itching  valproate sodium IVPB 250 milliGRAM(s) IV Intermittent every 8 hours PRN Agitation      ITEMS UNCHECKED ARE NOT PRESENT    PRESENT SYMPTOMS: [x ]Unable to obtain due to poor mentation   Source if other than patient:  [ ]Family   [ ]Team     Pain:  [ ]yes [ ]no  QOL impact -   Location -                    Aggravating factors -  Quality -  Radiation -  Timing-  Severity (0-10 scale):  Minimal acceptable level (0-10 scale):     Dyspnea:                           [ ]Mild [ ]Moderate [ ]Severe  Anxiety:                             [ ]Mild [ ]Moderate [ ]Severe  Fatigue:                             [ ]Mild [ ]Moderate [ ]Severe  Nausea:                             [ ]Mild [ ]Moderate [ ]Severe  Loss of appetite:              [ ]Mild [ ]Moderate [ ]Severe  Constipation:                    [ ]Mild [ ]Moderate [ ]Severe    PAIN AD Score:	2  http://geriatrictoolkit.[email or web address]/cog/painad.pdf (Ctrl + left click to view)    Other Symptoms:  [ ]All other review of systems negative     Palliative Performance Status Version 2:   20      %      http://[email or web address].org/files/news/palliative_performance_scale_[email or web address].pdf  PHYSICAL EXAM:  Vital Signs Last 24 Hrs  T(C): 36.2 (18 Nov 2019 15:19), Max: 36.9 (18 Nov 2019 08:55)  T(F): 97.2 (18 Nov 2019 15:19), Max: 98.5 (18 Nov 2019 08:55)  HR: 60 (18 Nov 2019 15:19) (60 - 72)  BP: 149/82 (18 Nov 2019 15:19) (139/79 - 179/97)  BP(mean): --  RR: 18 (18 Nov 2019 15:19) (17 - 18)  SpO2: 97% (18 Nov 2019 15:19) (95% - 97%) I&O's Summary    17 Nov 2019 07:01  - 18 Nov 2019 07:00  --------------------------------------------------------  IN: 420 mL / OUT: 0 mL / NET: 420 mL    18 Nov 2019 07:01  -  18 Nov 2019 18:45  --------------------------------------------------------  IN: 660 mL / OUT: 0 mL / NET: 660 mL       GENERAL:  [ ]Alert  [ ]Oriented x   [x ]Lethargic  [ ]Cachexia  [ ]Unarousable  [x ] Minimally Verbal  [ ]Non-Verbal  Behavioral:   [ ]Anxiety  [x ]Delirium [ ]Agitation [ ]Other  HEENT:  [ ]Normal   [ ]Dry mouth   [ ]ET Tube/Trach  [ ]Oral lesions  PULMONARY:   [ ]Clear [ ]Tachypnea  [ ]Audible excessive secretions   [x ]Rhonchi        [ ]Right [ ]Left [ ]Bilateral  [ ]Crackles        [ ]Right [ ]Left [ ]Bilateral  [ ]Wheezing     [ ]Right [ ]Left [ ]Bilateral  [ ]Diminished BS [ ] Right [ ]Left [ ]Bilateral  CARDIOVASCULAR:    [  ]Regular [x ]Irregular [ ]Tachy  [ ]Aaron [ ]Murmur [ ]Other  GASTROINTESTINAL:  [ x]Soft  [ ]Distended   [ ]+BS  [ ]Non tender [ ]Tender  [ ]PEG [ ]OGT/ NGT   Last BM: No documented last BM.      GENITOURINARY:  [ ]Normal [x ]Incontinent   [ ]Oliguria/Anuria   [ ]Mclean  MUSCULOSKELETAL:   [ ]Normal   [x ]Weakness  [ ]Bed/Wheelchair bound [ ]Edema  NEUROLOGIC:   [ ]No focal deficits  [x ] Cognitive impairment  [ ] Dysphagia [ ]Dysarthria [x ] Paresis [ ]Other   SKIN:   [ ]Normal  [ ]Rash   [ ]Pressure ulcer(s) [ ]y [ ]n present on admission    CRITICAL CARE:  [ ]Shock Present  [ ]Septic [ ]Cardiogenic [ ]Neurologic [ ]Hypovolemic  [ ]Vasopressors [ ]Inotropes  [ ]Respiratory failure present [ ]Mechanical Ventilation [ ]Non-invasive ventilatory support [ ]High-Flow  [ ]Acute  [ ]Chronic [ ]Hypoxic  [ ]Hypercarbic [ ]Other  [ ]Other organ failure     LABS:                        12.9   14.78 )-----------( 321      ( 17 Nov 2019 09:28 )             40.0   11-17    146<H>  |  109<H>  |  25<H>  ----------------------------<  193<H>  3.4<L>   |  24  |  0.86    Ca    9.1      17 Nov 2019 07:29          RADIOLOGY & ADDITIONAL STUDIES:  < from: MR Head w/wo IV Cont (11.06.19 @ 23:11) >    EXAM:  MR BRAIN WAW IC                            PROCEDURE DATE:  11/06/2019  Impression: Previously noted large area of peripheral enhancement in the   left temporal frontal region is again seen with decrease surrounding   enhancement. Slight increase surrounding edema is identified. This   decreased enhancement could be secondary to response to therapy though   clinical correlation and continued close interval follow-up is   recommended.                    YONG POE M.D., ATTENDING RADIOLOGIST  This document has been electronically signed. Nov 7 2019 10:25AM        < end of copied text >    Protein Calorie Malnutrition Present: [ ]mild [ ]moderate [ ]severe [ ]underweight [ ]morbid obesity  https://www.andeal.org/vault/2440/web/files/ONC/Table_Clinical%20Characteristics%20to%20Document%20Malnutrition-White%20JV%20et%20al%202012.pdf    Height (cm): 200.66 (11-05-19 @ 12:22), 167.6 (07-30-19 @ 23:46), 167.64 (07-19-19 @ 09:15)  Weight (kg): 99.8 (11-05-19 @ 12:22), 110.4 (07-30-19 @ 23:46), 113.4 (07-19-19 @ 09:15)  BMI (kg/m2): 24.8 (11-05-19 @ 12:22), 39.3 (07-30-19 @ 23:46), 40.4 (07-19-19 @ 09:15)    [ ]PPSV2 < or = 30%  [ ]significant weight loss [ ]poor nutritional intake [ ]anasarca   Albumin, Serum: 3.4 g/dL (11-10-19 @ 06:39)   [ ]Artificial Nutrition    REFERRALS:   [ ]Chaplaincy  [ ]Hospice  [ ]Child Life  [ ]Social Work  [ ]Case management [ ]Holistic Therapy     Goals of Care Document:

## 2019-11-18 NOTE — PROGRESS NOTE ADULT - ASSESSMENT
70 yo M with T2DM, stage IV glioblastoma multiforme (dx in 2018), HTN p/w weakness, aphasia, and severe agitation.  Palliative care called for GOC.

## 2019-11-18 NOTE — PROGRESS NOTE ADULT - PROBLEM SELECTOR PLAN 2
Work up an Rx as per primary.   Unclear if his behavioral and cognitive issues are 2/2 to Delirium or to an organic process associated to his GBM. vs Encephalopathy. Work up an Rx as per primary.   Unclear if his behavioral and cognitive issues are 2/2 to Delirium or to an organic process associated to his GBM.  Agitation: Rx as per Psych.

## 2019-11-18 NOTE — PROGRESS NOTE ADULT - PROBLEM SELECTOR PLAN 4
The patient's children's are his surrogates.   Patient is full code at this time. Dulcolax PRN x 1 dose today.   Senna 2 tabs HS.

## 2019-11-19 DIAGNOSIS — R41.0 DISORIENTATION, UNSPECIFIED: ICD-10-CM

## 2019-11-19 DIAGNOSIS — Z51.5 ENCOUNTER FOR PALLIATIVE CARE: ICD-10-CM

## 2019-11-19 DIAGNOSIS — K59.00 CONSTIPATION, UNSPECIFIED: ICD-10-CM

## 2019-11-19 LAB
ANION GAP SERPL CALC-SCNC: 14 MMOL/L — SIGNIFICANT CHANGE UP (ref 5–17)
BUN SERPL-MCNC: 26 MG/DL — HIGH (ref 7–23)
CALCIUM SERPL-MCNC: 8.7 MG/DL — SIGNIFICANT CHANGE UP (ref 8.4–10.5)
CHLORIDE SERPL-SCNC: 106 MMOL/L — SIGNIFICANT CHANGE UP (ref 96–108)
CO2 SERPL-SCNC: 23 MMOL/L — SIGNIFICANT CHANGE UP (ref 22–31)
CREAT SERPL-MCNC: 0.9 MG/DL — SIGNIFICANT CHANGE UP (ref 0.5–1.3)
GLUCOSE BLDC GLUCOMTR-MCNC: 126 MG/DL — HIGH (ref 70–99)
GLUCOSE BLDC GLUCOMTR-MCNC: 213 MG/DL — HIGH (ref 70–99)
GLUCOSE BLDC GLUCOMTR-MCNC: 237 MG/DL — HIGH (ref 70–99)
GLUCOSE BLDC GLUCOMTR-MCNC: 259 MG/DL — HIGH (ref 70–99)
GLUCOSE SERPL-MCNC: 212 MG/DL — HIGH (ref 70–99)
HCT VFR BLD CALC: 40.4 % — SIGNIFICANT CHANGE UP (ref 39–50)
HGB BLD-MCNC: 12.8 G/DL — LOW (ref 13–17)
MCHC RBC-ENTMCNC: 28.8 PG — SIGNIFICANT CHANGE UP (ref 27–34)
MCHC RBC-ENTMCNC: 31.7 GM/DL — LOW (ref 32–36)
MCV RBC AUTO: 91 FL — SIGNIFICANT CHANGE UP (ref 80–100)
PLATELET # BLD AUTO: 346 K/UL — SIGNIFICANT CHANGE UP (ref 150–400)
POTASSIUM SERPL-MCNC: 3.4 MMOL/L — LOW (ref 3.5–5.3)
POTASSIUM SERPL-SCNC: 3.4 MMOL/L — LOW (ref 3.5–5.3)
RBC # BLD: 4.44 M/UL — SIGNIFICANT CHANGE UP (ref 4.2–5.8)
RBC # FLD: 13.3 % — SIGNIFICANT CHANGE UP (ref 10.3–14.5)
SODIUM SERPL-SCNC: 143 MMOL/L — SIGNIFICANT CHANGE UP (ref 135–145)
WBC # BLD: 16.57 K/UL — HIGH (ref 3.8–10.5)
WBC # FLD AUTO: 16.57 K/UL — HIGH (ref 3.8–10.5)

## 2019-11-19 PROCEDURE — 99358 PROLONG SERVICE W/O CONTACT: CPT

## 2019-11-19 PROCEDURE — 99223 1ST HOSP IP/OBS HIGH 75: CPT

## 2019-11-19 RX ORDER — SIMETHICONE 80 MG/1
80 TABLET, CHEWABLE ORAL ONCE
Refills: 0 | Status: COMPLETED | OUTPATIENT
Start: 2019-11-19 | End: 2019-11-19

## 2019-11-19 RX ORDER — SIMETHICONE 80 MG/1
80 TABLET, CHEWABLE ORAL
Refills: 0 | Status: DISCONTINUED | OUTPATIENT
Start: 2019-11-19 | End: 2019-12-06

## 2019-11-19 RX ORDER — SENNA PLUS 8.6 MG/1
2 TABLET ORAL AT BEDTIME
Refills: 0 | Status: DISCONTINUED | OUTPATIENT
Start: 2019-11-19 | End: 2019-11-19

## 2019-11-19 RX ORDER — POTASSIUM CHLORIDE 20 MEQ
40 PACKET (EA) ORAL ONCE
Refills: 0 | Status: COMPLETED | OUTPATIENT
Start: 2019-11-19 | End: 2019-11-19

## 2019-11-19 RX ORDER — POLYETHYLENE GLYCOL 3350 17 G/17G
17 POWDER, FOR SOLUTION ORAL DAILY
Refills: 0 | Status: DISCONTINUED | OUTPATIENT
Start: 2019-11-19 | End: 2019-12-06

## 2019-11-19 RX ADMIN — ENOXAPARIN SODIUM 40 MILLIGRAM(S): 100 INJECTION SUBCUTANEOUS at 17:27

## 2019-11-19 RX ADMIN — Medication 3 MILLIGRAM(S): at 21:29

## 2019-11-19 RX ADMIN — Medication 25 MILLIGRAM(S): at 05:08

## 2019-11-19 RX ADMIN — AMIODARONE HYDROCHLORIDE 400 MILLIGRAM(S): 400 TABLET ORAL at 17:27

## 2019-11-19 RX ADMIN — Medication 10 MILLIGRAM(S): at 17:27

## 2019-11-19 RX ADMIN — LEVETIRACETAM 400 MILLIGRAM(S): 250 TABLET, FILM COATED ORAL at 17:34

## 2019-11-19 RX ADMIN — POLYETHYLENE GLYCOL 3350 17 GRAM(S): 17 POWDER, FOR SOLUTION ORAL at 17:52

## 2019-11-19 RX ADMIN — SENNA PLUS 2 TABLET(S): 8.6 TABLET ORAL at 21:29

## 2019-11-19 RX ADMIN — Medication 1 MILLIGRAM(S): at 12:56

## 2019-11-19 RX ADMIN — Medication 20 MILLIGRAM(S): at 20:30

## 2019-11-19 RX ADMIN — Medication 25 MILLIGRAM(S): at 21:28

## 2019-11-19 RX ADMIN — ATORVASTATIN CALCIUM 80 MILLIGRAM(S): 80 TABLET, FILM COATED ORAL at 21:28

## 2019-11-19 RX ADMIN — LISINOPRIL 20 MILLIGRAM(S): 2.5 TABLET ORAL at 05:08

## 2019-11-19 RX ADMIN — Medication 1 MILLIGRAM(S): at 21:22

## 2019-11-19 RX ADMIN — Medication 25 MILLIGRAM(S): at 15:27

## 2019-11-19 RX ADMIN — Medication 4: at 08:42

## 2019-11-19 RX ADMIN — Medication 6: at 12:34

## 2019-11-19 RX ADMIN — SIMETHICONE 80 MILLIGRAM(S): 80 TABLET, CHEWABLE ORAL at 18:58

## 2019-11-19 RX ADMIN — Medication 4: at 17:35

## 2019-11-19 RX ADMIN — AMIODARONE HYDROCHLORIDE 400 MILLIGRAM(S): 400 TABLET ORAL at 05:08

## 2019-11-19 RX ADMIN — SIMETHICONE 80 MILLIGRAM(S): 80 TABLET, CHEWABLE ORAL at 18:48

## 2019-11-19 RX ADMIN — FAMOTIDINE 20 MILLIGRAM(S): 10 INJECTION INTRAVENOUS at 12:56

## 2019-11-19 RX ADMIN — AMLODIPINE BESYLATE 10 MILLIGRAM(S): 2.5 TABLET ORAL at 05:07

## 2019-11-19 RX ADMIN — LEVETIRACETAM 400 MILLIGRAM(S): 250 TABLET, FILM COATED ORAL at 05:11

## 2019-11-19 RX ADMIN — Medication 4 MILLIGRAM(S): at 05:08

## 2019-11-19 NOTE — CONSULT NOTE ADULT - ASSESSMENT
69 year old man with PMHx T2DM, stage IV glioblastoma multiforme (dx in 2018), HTN and PSHx appendectomy presents to the ED with 3 weeks of increased R hemiparesis, worsened aphasia and urinary incontinence, sent from his neuro-oncologist Dr. Cuba's office.  Leukocytosis, no fever  Poor mental status, not able to provide ROS  UA negative, UCX Proteus, ESBL  XR clear  Given 7 day course ertapenem for aspiration vs UTI  Rising WBC, unclear etiology; no diarrhea, no other new symptoms  WBC ? gradual response to steroid vs occult infection vs aspiration  Overall, leukocytosis, ? aspiration, malignancy  - Continue monitor off abx for now  - Trend WBC, monitor for fevers, monitor for further signs infection  - Low threshold to restart abx if clinical signs infection  - Consider CT C/A/P if WBC continues to rise/other signs sepsis    Praveen Mays MD  Pager [phone]  After 5pm and on weekends call [phone] 69 year old man with PMHx T2DM, stage IV glioblastoma multiforme (dx in 2018), HTN and PSHx appendectomy presents to the ED with 3 weeks of increased R hemiparesis, worsened aphasia and urinary incontinence, sent from his neuro-oncologist Dr. Cuba's office.  Leukocytosis, no fever  Poor mental status, not able to provide ROS  UA negative, UCX Proteus, ESBL  XR clear  Given 7 day course ertapenem for aspiration vs UTI  Rising WBC, unclear etiology; no diarrhea, no other new symptoms  WBC ? gradual response to steroid vs occult infection vs aspiration  Overall, leukocytosis, ? aspiration, malignancy, altered mental status  - Continue monitor off abx for now  - Trend WBC, monitor for fevers, monitor for further signs infection  - Low threshold to restart abx if clinical signs infection  - Consider CT C/A/P if WBC continues to rise/other signs sepsis    Praveen Mays MD  Pager [phone]  After 5pm and on weekends call [phone]

## 2019-11-19 NOTE — CHART NOTE - NSCHARTNOTEFT_GEN_A_CORE
No neurosurgical contraindication to discharge  - May f/u outpatient with Dr. Cuba for further discussion of treatment  - May f/u with Dr. Tate as needed

## 2019-11-19 NOTE — CONSULT NOTE ADULT - COMMENTS
[  ] All other systems negative aside from above.  [X] ROS unobtainable because: Poor mental status, poorly verbal

## 2019-11-19 NOTE — CHART NOTE - NSCHARTNOTEFT_GEN_A_CORE
House ID called for consult this am at Dr Hightower's request; spoke with Nivia Chase requested for consult      LIUDMILA Hernandez [id number]

## 2019-11-19 NOTE — CHART NOTE - NSCHARTNOTEFT_GEN_A_CORE
Vital Signs Last 24 Hrs  T(C): 36.7 (19 Nov 2019 15:29), Max: 36.8 (18 Nov 2019 23:28)  T(F): 98 (19 Nov 2019 15:29), Max: 98.2 (18 Nov 2019 23:28)  HR: 56 (19 Nov 2019 15:29) (52 - 64)  BP: 140/82 (19 Nov 2019 15:29) (140/82 - 171/88)  BP(mean): --  RR: 18 (19 Nov 2019 15:29) (18 - 19)  SpO2: 96% (19 Nov 2019 15:29) (95% - 96%)                          12.8   16.57 )-----------( 346      ( 19 Nov 2019 08:59 )             40.4   11-19    143  |  106  |  26<H>  ----------------------------<  212<H>  3.4<L>   |  23  |  0.90    Ca    8.7      19 Nov 2019 06:22 I called the patient's daughter, Jannet ([phone]), and d/w her about the patient. Jannet indicated that during the last year, the patient has had a significant cognitive and functional decline. Furthermore, that he has become extremely frustrated and probably depressed due to his lack of capacity to communicate. She also indicated that she and her family lost the capacity to care for the patient at home and that due to that he needed to be place in a NH; however, that this nursing home is far away from the patient's children (Jannet works in [place], her sister lives in [place], and her brother also does not have easy acces to the NH where the patient is staying, [facility]). Jannet indicated that even though MRI finding are not showing progression of disease that through the last year she is actually seen how her father is getting worse. Jannet was willing to touch based with her brother, Gualberto Barber ([phone]), in order to set up a FM so GOC and services can be better defined.   Jannet or her brother will send me an email so we can have a FM before DC.     Vital Signs Last 24 Hrs  T(C): 36.7 (19 Nov 2019 15:29), Max: 36.8 (18 Nov 2019 23:28)  T(F): 98 (19 Nov 2019 15:29), Max: 98.2 (18 Nov 2019 23:28)  HR: 56 (19 Nov 2019 15:29) (52 - 64)  BP: 140/82 (19 Nov 2019 15:29) (140/82 - 171/88)  BP(mean): --  RR: 18 (19 Nov 2019 15:29) (18 - 19)  SpO2: 96% (19 Nov 2019 15:29) (95% - 96%)                          12.8   16.57 )-----------( 346      ( 19 Nov 2019 08:59 )             40.4   11-19    143  |  106  |  26<H>  ----------------------------<  212<H>  3.4<L>   |  23  |  0.90    Ca    8.7      19 Nov 2019 06:22    I tried to call Dr. Cuba so I was able to inform her about my discussion with the patient's daughter; however, I was only able to reach out to her office voice mail and a message to call back was left.  I will try to call Dr. Cuba again on 11/20. I will also inform the primary team.     26' were spent in non face to face time during this encounter. Time in     Adam Wynne MD.   [phone] I called the patient's daughter, Jannet ([phone]), and d/w her about the patient. Jannet indicated that during the last year, the patient has had a significant cognitive and functional decline. Furthermore, that he has become extremely frustrated and probably depressed due to his lack of capacity to communicate. She also indicated that she and her family lost the capacity to care for the patient at home and that due to that he needed to be place in a NH; however, that this nursing home is far away from the patient's children (Jannet works in [place], her sister lives in [place], and her brother also does not have easy acces to the NH where the patient is staying, [facility]). Jannet indicated that even though MRI finding are not showing progression of disease that through the last year she is actually seen how her father is getting worse. Jannet was willing to touch base with her brother, Gualberto Barber ([phone]), in order to set up a FM so GOC and services (where I will be even considering discussing NH with hospice or [facility] vs LTC) can be better defined. Jannet or her brother will send me an email so we can have a FM before DC.     Vital Signs Last 24 Hrs  T(C): 36.7 (19 Nov 2019 15:29), Max: 36.8 (18 Nov 2019 23:28)  T(F): 98 (19 Nov 2019 15:29), Max: 98.2 (18 Nov 2019 23:28)  HR: 56 (19 Nov 2019 15:29) (52 - 64)  BP: 140/82 (19 Nov 2019 15:29) (140/82 - 171/88)  BP(mean): --  RR: 18 (19 Nov 2019 15:29) (18 - 19)  SpO2: 96% (19 Nov 2019 15:29) (95% - 96%)                          12.8   16.57 )-----------( 346      ( 19 Nov 2019 08:59 )             40.4   11-19    143  |  106  |  26<H>  ----------------------------<  212<H>  3.4<L>   |  23  |  0.90    Ca    8.7      19 Nov 2019 06:22    I tried to call Dr. Cuba so I was able to inform her about my discussion with the patient's daughter; however, I was only able to reach out to her office voice mail and a message to call back was left.  I will try to call Dr. Cuba again on 11/20. On 11/20 I will also inform the primary team and his floor case manger about my conversation with his daughter as above.      26' were spent in non face to face time during this encounter. Time in     Adam Wynne MD.   [phone]

## 2019-11-19 NOTE — PROGRESS NOTE ADULT - SUBJECTIVE AND OBJECTIVE BOX
Subjective: Patient seen and examined. No new events except as noted.   Feels weak     REVIEW OF SYSTEMS:    CONSTITUTIONAL: + weakness, fevers or chills  EYES/ENT: No visual changes;  No vertigo or throat pain   NECK: No pain or stiffness  RESPIRATORY: No cough, wheezing, hemoptysis; No shortness of breath  CARDIOVASCULAR: No chest pain or palpitations  GASTROINTESTINAL: No abdominal or epigastric pain. No nausea, vomiting, or hematemesis; No diarrhea or constipation. No melena or hematochezia.  GENITOURINARY: No dysuria, frequency or hematuria  NEUROLOGICAL: +numbness or weakness  SKIN: No itching, burning, rashes, or lesions   All other review of systems is negative unless indicated above.    MEDICATIONS:  MEDICATIONS  (STANDING):  aMIOdarone    Tablet 400 milliGRAM(s) Oral two times a day  amLODIPine   Tablet 10 milliGRAM(s) Oral daily  atorvastatin 80 milliGRAM(s) Oral at bedtime  dexAMETHasone  Injectable 4 milliGRAM(s) IV Push daily  dextrose 5%. 1000 milliLiter(s) (50 mL/Hr) IV Continuous <Continuous>  dextrose 50% Injectable 25 Gram(s) IV Push once  dextrose 50% Injectable 12.5 Gram(s) IV Push once  dextrose 50% Injectable 25 Gram(s) IV Push once  dextrose 50% Injectable 25 Gram(s) IV Push once  enoxaparin Injectable 40 milliGRAM(s) SubCutaneous daily  ertapenem  IVPB      ertapenem  IVPB 1000 milliGRAM(s) IV Intermittent every 24 hours  famotidine    Tablet 20 milliGRAM(s) Oral daily  insulin lispro (HumaLOG) corrective regimen sliding scale   SubCutaneous three times a day before meals  insulin lispro (HumaLOG) corrective regimen sliding scale   SubCutaneous at bedtime  levETIRAcetam  IVPB 750 milliGRAM(s) IV Intermittent every 12 hours  lisinopril 20 milliGRAM(s) Oral daily  metoprolol tartrate 25 milliGRAM(s) Oral every 8 hours  senna 2 Tablet(s) Oral at bedtime  sodium chloride 0.9% Bolus 250 milliLiter(s) IV Bolus once      PHYSICAL EXAM:  T(C): 36.7 (11-19-19 @ 11:38), Max: 36.8 (11-18-19 @ 23:28)  HR: 60 (11-19-19 @ 11:38) (52 - 64)  BP: 159/87 (11-19-19 @ 11:38) (139/79 - 171/88)  RR: 18 (11-19-19 @ 11:38) (18 - 19)  SpO2: 96% (11-19-19 @ 11:38) (95% - 97%)  Wt(kg): --  I&O's Summary    18 Nov 2019 07:01  -  19 Nov 2019 07:00  --------------------------------------------------------  IN: 660 mL / OUT: 0 mL / NET: 660 mL    19 Nov 2019 07:01  -  19 Nov 2019 12:12  --------------------------------------------------------  IN: 240 mL / OUT: 0 mL / NET: 240 mL        Appearance: NAD, alert   HEENT:   Dry oral mucosa   Lymphatic: No lymphadenopathy  Cardiovascular: Normal S1 S2, No JVD, No murmurs, No edema  Respiratory: Decreased bs   Psychiatry: A & O x) 0  Gastrointestinal:  Soft, Non-tender, + BS	  Skin: No rashes, No ecchymoses, No cyanosis	  Extremities: Decreased  range of motion, No clubbing, cyanosis or edema  Vascular: Peripheral pulses palpable 2+ bilaterally  Neurological:  MS: calm, more alert , following commands, answers to orientation questions, [id number]: all extremities moving, pulling with force against restraints periodically  Sensation: withdraws to touch all four extremities        LABS:    CARDIAC MARKERS:                                12.8   16.57 )-----------( 346      ( 19 Nov 2019 08:59 )             40.4     11-19    143  |  106  |  26<H>  ----------------------------<  212<H>  3.4<L>   |  23  |  0.90    Ca    8.7      19 Nov 2019 06:22      proBNP:   Lipid Profile:   HgA1c:   TSH:             TELEMETRY: 	SR    ECG:  	  RADIOLOGY:   DIAGNOSTIC TESTING:  [ ] Echocardiogram:  [ ]  Catheterization:  [ ] Stress Test:    OTHER:

## 2019-11-19 NOTE — CHART NOTE - NSCHARTNOTEFT_GEN_A_CORE
Nutrition Follow Up Note    Source: Comprehensive chart review, RN, PCA    Patient seen for nutrition follow up. Chart reviewed, events noted. Per chart, pt is a 69 year old male with PMH of T2DM, stage IV glioblastoma multiforme, HTN, admitted for worsened R hemiparesis and aphasia. As per NeuroOnc last notes, AMS probably not 2/2 to GBM but 2/2 to infection. S/p MBS Assessment recommending "Dysphagia 1 with nectar thickened liquids. Feed only when awake and alert. 100% supervision during meals. Small single sips via tsp or straw when drinking. Medication in applesauce" (11/13).     Diet : Consistent Carbohydrate, Dysphagia 1 Puree - Nectar Consistency Fluids    Patient noted with aphasia. RD visited pt, seen sleeping in bed. Spoke with RN who reports pt with good intake and tolerating current diet texture/consistency. Endorses some constipation; last BM unclear per chart/RN. RN reports plan to provide suppository later today. No other acute GI distress reported.     PO intake : %     Source for PO intake: RN     Enteral /Parenteral Nutrition: n/a    Dosing weight: 99.8 kg (11/5) - No new weight to address at this time.     Pertinent Medications: MEDICATIONS  (STANDING):  aMIOdarone    Tablet 400 milliGRAM(s) Oral two times a day  amLODIPine   Tablet 10 milliGRAM(s) Oral daily  atorvastatin 80 milliGRAM(s) Oral at bedtime  dexAMETHasone  Injectable 4 milliGRAM(s) IV Push daily  dextrose 5%. 1000 milliLiter(s) (50 mL/Hr) IV Continuous <Continuous>  dextrose 50% Injectable 25 Gram(s) IV Push once  dextrose 50% Injectable 12.5 Gram(s) IV Push once  dextrose 50% Injectable 25 Gram(s) IV Push once  dextrose 50% Injectable 25 Gram(s) IV Push once  enoxaparin Injectable 40 milliGRAM(s) SubCutaneous daily  famotidine    Tablet 20 milliGRAM(s) Oral daily  insulin lispro (HumaLOG) corrective regimen sliding scale   SubCutaneous three times a day before meals  insulin lispro (HumaLOG) corrective regimen sliding scale   SubCutaneous at bedtime  levETIRAcetam  IVPB 750 milliGRAM(s) IV Intermittent every 12 hours  lisinopril 20 milliGRAM(s) Oral daily  metoprolol tartrate 25 milliGRAM(s) Oral every 8 hours  polyethylene glycol 3350 17 Gram(s) Oral daily  senna 2 Tablet(s) Oral at bedtime  sodium chloride 0.9% Bolus 250 milliLiter(s) IV Bolus once    MEDICATIONS  (PRN):  acetaminophen  Suppository .. 650 milliGRAM(s) Rectal every 6 hours PRN Temp greater or equal to 38C (100.4F), Mild Pain (1 - 3)  bisacodyl Suppository 10 milliGRAM(s) Rectal daily PRN Constipation  dextrose 40% Gel 15 Gram(s) Oral once PRN Blood Glucose LESS THAN 70 milliGRAM(s)/deciliter  glucagon  Injectable 1 milliGRAM(s) IntraMuscular once PRN Glucose LESS THAN 70 milligrams/deciliter  glucagon  Injectable 1 milliGRAM(s) IntraMuscular once PRN Glucose LESS THAN 70 milligrams/deciliter  hydrALAZINE Injectable 20 milliGRAM(s) IV Push every 4 hours PRN SBP > 160  labetalol Injectable 20 milliGRAM(s) IV Push every 3 hours PRN Systolic blood pressure > 160  LORazepam     Tablet 1 milliGRAM(s) Oral every 4 hours PRN Severe agitation  LORazepam   Injectable 1 milliGRAM(s) IV Push every 6 hours PRN severe agitation  melatonin 3 milliGRAM(s) Oral at bedtime PRN Insomnia  nystatin Powder 1 Application(s) Topical every 8 hours PRN rash/itching  valproate sodium IVPB 250 milliGRAM(s) IV Intermittent every 8 hours PRN Agitation    Pertinent Labs: 11-19 @ 06:22: Na 143, BUN 26<H>, Cr 0.90, <H>, K+ 3.4<L>     Finger Sticks:  POCT Blood Glucose.: 259 mg/dL (11-19 @ 12:25)  POCT Blood Glucose.: 213 mg/dL (11-19 @ 08:40)  POCT Blood Glucose.: 212 mg/dL (11-18 @ 21:10)  POCT Blood Glucose.: 256 mg/dL (11-18 @ 17:09)    Skin per nursing documentation: IAD, left arm rash  Edema per nursing documentation: 1+ generalized, 2+ right hand    Estimated Needs:   [x ] no change since previous assessment  [ ] recalculated:     Previous Nutrition Diagnosis: Swallowing Difficulty  Nutrition Diagnosis is: ongoing, addressed with modified texture/consistency diet    New Nutrition Diagnosis: n/a     Interventions:     Recommend  1) Continue current diet order of Consistent Carbohydrate; defer texture/consistency to medical team/speech pathologist  2) Encourage PO intake and provide feeding assistance  3) Monitor and replete electrolytes PRN   4) Recommend obtain new weight to assess changes, if any    Monitoring and Evaluation:     Continue to monitor Nutritional intake, Tolerance to diet prescription, weights, labs, skin integrity    RD remains available upon request and will follow up per protocol    Mary Nieves RD, CDN    Pager# [phone]

## 2019-11-20 LAB
ANION GAP SERPL CALC-SCNC: 13 MMOL/L — SIGNIFICANT CHANGE UP (ref 5–17)
BUN SERPL-MCNC: 22 MG/DL — SIGNIFICANT CHANGE UP (ref 7–23)
CALCIUM SERPL-MCNC: 8.8 MG/DL — SIGNIFICANT CHANGE UP (ref 8.4–10.5)
CHLORIDE SERPL-SCNC: 104 MMOL/L — SIGNIFICANT CHANGE UP (ref 96–108)
CO2 SERPL-SCNC: 21 MMOL/L — LOW (ref 22–31)
CREAT SERPL-MCNC: 0.85 MG/DL — SIGNIFICANT CHANGE UP (ref 0.5–1.3)
GLUCOSE BLDC GLUCOMTR-MCNC: 170 MG/DL — HIGH (ref 70–99)
GLUCOSE BLDC GLUCOMTR-MCNC: 201 MG/DL — HIGH (ref 70–99)
GLUCOSE BLDC GLUCOMTR-MCNC: 229 MG/DL — HIGH (ref 70–99)
GLUCOSE BLDC GLUCOMTR-MCNC: 233 MG/DL — HIGH (ref 70–99)
GLUCOSE SERPL-MCNC: 246 MG/DL — HIGH (ref 70–99)
POTASSIUM SERPL-MCNC: 3.7 MMOL/L — SIGNIFICANT CHANGE UP (ref 3.5–5.3)
POTASSIUM SERPL-SCNC: 3.7 MMOL/L — SIGNIFICANT CHANGE UP (ref 3.5–5.3)
SODIUM SERPL-SCNC: 138 MMOL/L — SIGNIFICANT CHANGE UP (ref 135–145)

## 2019-11-20 PROCEDURE — 99232 SBSQ HOSP IP/OBS MODERATE 35: CPT

## 2019-11-20 RX ADMIN — Medication 20 MILLIGRAM(S): at 05:15

## 2019-11-20 RX ADMIN — Medication 3 MILLIGRAM(S): at 21:19

## 2019-11-20 RX ADMIN — Medication 4 MILLIGRAM(S): at 05:11

## 2019-11-20 RX ADMIN — LISINOPRIL 20 MILLIGRAM(S): 2.5 TABLET ORAL at 06:39

## 2019-11-20 RX ADMIN — AMIODARONE HYDROCHLORIDE 400 MILLIGRAM(S): 400 TABLET ORAL at 05:11

## 2019-11-20 RX ADMIN — ATORVASTATIN CALCIUM 80 MILLIGRAM(S): 80 TABLET, FILM COATED ORAL at 21:19

## 2019-11-20 RX ADMIN — FAMOTIDINE 20 MILLIGRAM(S): 10 INJECTION INTRAVENOUS at 12:25

## 2019-11-20 RX ADMIN — AMLODIPINE BESYLATE 10 MILLIGRAM(S): 2.5 TABLET ORAL at 08:50

## 2019-11-20 RX ADMIN — Medication 4: at 17:33

## 2019-11-20 RX ADMIN — POLYETHYLENE GLYCOL 3350 17 GRAM(S): 17 POWDER, FOR SOLUTION ORAL at 12:25

## 2019-11-20 RX ADMIN — Medication 4: at 08:50

## 2019-11-20 RX ADMIN — ENOXAPARIN SODIUM 40 MILLIGRAM(S): 100 INJECTION SUBCUTANEOUS at 12:26

## 2019-11-20 RX ADMIN — LEVETIRACETAM 400 MILLIGRAM(S): 250 TABLET, FILM COATED ORAL at 17:33

## 2019-11-20 RX ADMIN — Medication 40 MILLIEQUIVALENT(S): at 06:40

## 2019-11-20 RX ADMIN — SENNA PLUS 2 TABLET(S): 8.6 TABLET ORAL at 21:19

## 2019-11-20 RX ADMIN — Medication 4: at 12:26

## 2019-11-20 RX ADMIN — Medication 25 MILLIGRAM(S): at 05:11

## 2019-11-20 RX ADMIN — AMIODARONE HYDROCHLORIDE 400 MILLIGRAM(S): 400 TABLET ORAL at 17:33

## 2019-11-20 RX ADMIN — LEVETIRACETAM 400 MILLIGRAM(S): 250 TABLET, FILM COATED ORAL at 05:12

## 2019-11-20 NOTE — PROGRESS NOTE ADULT - ASSESSMENT
69 year old man with PMHx T2DM, stage IV glioblastoma multiforme (dx in 2018), HTN and PSHx appendectomy presents to the ED with 3 weeks of increased R hemiparesis, worsened aphasia and urinary incontinence, sent from his neuro-oncologist Dr. Cuba's office.  Leukocytosis, no fever  Poor mental status, not able to provide ROS  UA negative, UCX Proteus, ESBL  CXR clear  Given 7 day course ertapenem for aspiration vs UTI  WBC ? gradual response to steroid vs occult infection vs aspiration  Clinically unchanged today  Overall, leukocytosis, ? aspiration, malignancy, altered mental status  - Continue monitor off abx for now  - Trend WBC, monitor for fevers, monitor for further signs infection  - Low threshold to restart abx if clinical signs infection  - Consider CT C/A/P if WBC continues to rise/other signs sepsis    Praveen Mays MD  Pager [phone]  After 5pm and on weekends call [phone]

## 2019-11-20 NOTE — CHART NOTE - NSCHARTNOTEFT_GEN_A_CORE
d/w Dr. Leonard and Dr. Cuba about the inputs the patient's daughter gave to me on 11/19. At this point Dr. Leonard and Dr. Cuba agree on that at this point a dispo plan that includes NH with hospice or [facility] is more appropriate. Will d/w the patient's children.     Adam Wynne MD.   [phone]

## 2019-11-20 NOTE — PROVIDER CONTACT NOTE (OTHER) - ASSESSMENT
Pt confused, agitated & restless; /100. parameters in place for less than 160SBP. A&ox1, confused. Aphasic. slurred / garbled speech

## 2019-11-20 NOTE — PROGRESS NOTE ADULT - SUBJECTIVE AND OBJECTIVE BOX
CC: F/U for Leukocytosis    Saw/spoke to patient. No fevers, no chills. No new complaints. Unchanged.    Allergies  No Known Allergies    ANTIMICROBIALS:  Off, s/p course Ertapenem    PE:    Vital Signs Last 24 Hrs  T(C): 37.1 (20 Nov 2019 08:21), Max: 37.1 (20 Nov 2019 08:21)  T(F): 98.8 (20 Nov 2019 08:21), Max: 98.8 (20 Nov 2019 08:21)  HR: 61 (20 Nov 2019 08:21) (55 - 70)  BP: 166/81 (20 Nov 2019 08:21) (140/82 - 190/100)  RR: 10 (20 Nov 2019 08:21) (10 - 18)  SpO2: 98% (20 Nov 2019 08:21) (96% - 98%)    Gen: AOx0-1, poorly verbal, minimally responsive  CV: S1+S2 normal, nontachycardic  Resp: Clear bilat, no resp distress, no crackles/wheezes  Abd: Soft, nontender, +BS  Ext: No LE edema, no wounds    LABS:                        12.8   16.57 )-----------( 346      ( 19 Nov 2019 08:59 )             40.4     11-20    138  |  104  |  22  ----------------------------<  246<H>  3.7   |  21<L>  |  0.85    Ca    8.8      20 Nov 2019 09:47    MICROBIOLOGY:    .Blood Blood  11-08-19   No growth at 5 days.      .Urine Catheterized  11-08-19   >100,000 CFU/ml Proteus mirabilis ESBL  --  Proteus mirabilis ESBL    (otherwise reviewed)    RADIOLOGY:    11/8 CXR:    Impression: There is a nasogastric tube with its tip in good position,   within the stomach.

## 2019-11-20 NOTE — PROGRESS NOTE ADULT - SUBJECTIVE AND OBJECTIVE BOX
Subjective: Patient seen and examined. No new events except as noted.   resting comfortably       REVIEW OF SYSTEMS:    CONSTITUTIONAL: + weakness, fevers or chills  EYES/ENT: No visual changes;  No vertigo or throat pain   NECK: No pain or stiffness  RESPIRATORY: No cough, wheezing, hemoptysis; No shortness of breath  CARDIOVASCULAR: No chest pain or palpitations  GASTROINTESTINAL: No abdominal or epigastric pain. No nausea, vomiting, or hematemesis; No diarrhea or constipation. No melena or hematochezia.  GENITOURINARY: No dysuria, frequency or hematuria  NEUROLOGICAL: + numbness or weakness  SKIN: No itching, burning, rashes, or lesions   All other review of systems is negative unless indicated above.    MEDICATIONS:  MEDICATIONS  (STANDING):  aMIOdarone    Tablet 400 milliGRAM(s) Oral two times a day  amLODIPine   Tablet 10 milliGRAM(s) Oral daily  atorvastatin 80 milliGRAM(s) Oral at bedtime  dexAMETHasone  Injectable 4 milliGRAM(s) IV Push daily  dextrose 5%. 1000 milliLiter(s) (50 mL/Hr) IV Continuous <Continuous>  dextrose 50% Injectable 25 Gram(s) IV Push once  dextrose 50% Injectable 12.5 Gram(s) IV Push once  dextrose 50% Injectable 25 Gram(s) IV Push once  dextrose 50% Injectable 25 Gram(s) IV Push once  enoxaparin Injectable 40 milliGRAM(s) SubCutaneous daily  famotidine    Tablet 20 milliGRAM(s) Oral daily  insulin lispro (HumaLOG) corrective regimen sliding scale   SubCutaneous three times a day before meals  insulin lispro (HumaLOG) corrective regimen sliding scale   SubCutaneous at bedtime  levETIRAcetam  IVPB 750 milliGRAM(s) IV Intermittent every 12 hours  lisinopril 20 milliGRAM(s) Oral daily  metoprolol tartrate 25 milliGRAM(s) Oral every 8 hours  polyethylene glycol 3350 17 Gram(s) Oral daily  senna 2 Tablet(s) Oral at bedtime  sodium chloride 0.9% Bolus 250 milliLiter(s) IV Bolus once      PHYSICAL EXAM:  T(C): 37.1 (11-20-19 @ 08:21), Max: 37.1 (11-20-19 @ 08:21)  HR: 61 (11-20-19 @ 08:21) (55 - 70)  BP: 166/81 (11-20-19 @ 08:21) (140/82 - 190/100)  RR: 10 (11-20-19 @ 08:21) (10 - 18)  SpO2: 98% (11-20-19 @ 08:21) (96% - 98%)  Wt(kg): --  I&O's Summary    19 Nov 2019 07:01  -  20 Nov 2019 07:00  --------------------------------------------------------  IN: 480 mL / OUT: 0 mL / NET: 480 mL          Appearance: NAD, alert   HEENT:   Dry oral mucosa   Lymphatic: No lymphadenopathy  Cardiovascular: Normal S1 S2, No JVD, No murmurs, No edema  Respiratory: Decreased bs   Psychiatry: A & O x) 0  Gastrointestinal:  Soft, Non-tender, + BS	  Skin: No rashes, No ecchymoses, No cyanosis	  Extremities: Decreased  range of motion, No clubbing, cyanosis or edema  Vascular: Peripheral pulses palpable 2+ bilaterally  Neurological:  MS: calm, more alert , following commands, answers to orientation questions, [id number]: all extremities moving, pulling with force against restraints periodically  Sensation: withdraws to touch all four extremities        LABS:    CARDIAC MARKERS:                                12.8   16.57 )-----------( 346      ( 19 Nov 2019 08:59 )             40.4     11-20    138  |  104  |  22  ----------------------------<  246<H>  3.7   |  21<L>  |  0.85    Ca    8.8      20 Nov 2019 09:47      proBNP:   Lipid Profile:   HgA1c:   TSH:             TELEMETRY: SR	    ECG:  	  RADIOLOGY:   DIAGNOSTIC TESTING:  [ ] Echocardiogram:  [ ]  Catheterization:  [ ] Stress Test:    OTHER:

## 2019-11-20 NOTE — PROVIDER CONTACT NOTE (OTHER) - ACTION/TREATMENT ORDERED:
ok to give AM lisinopril PO and to reschedule am amlodipine to 0900 with concern of dropping pressure too quickly

## 2019-11-21 LAB
GLUCOSE BLDC GLUCOMTR-MCNC: 189 MG/DL — HIGH (ref 70–99)
GLUCOSE BLDC GLUCOMTR-MCNC: 219 MG/DL — HIGH (ref 70–99)
GLUCOSE BLDC GLUCOMTR-MCNC: 222 MG/DL — HIGH (ref 70–99)
GLUCOSE BLDC GLUCOMTR-MCNC: 276 MG/DL — HIGH (ref 70–99)
HCT VFR BLD CALC: 40.9 % — SIGNIFICANT CHANGE UP (ref 39–50)
HGB BLD-MCNC: 13 G/DL — SIGNIFICANT CHANGE UP (ref 13–17)
MCHC RBC-ENTMCNC: 29.3 PG — SIGNIFICANT CHANGE UP (ref 27–34)
MCHC RBC-ENTMCNC: 31.8 GM/DL — LOW (ref 32–36)
MCV RBC AUTO: 92.1 FL — SIGNIFICANT CHANGE UP (ref 80–100)
PLATELET # BLD AUTO: 317 K/UL — SIGNIFICANT CHANGE UP (ref 150–400)
RBC # BLD: 4.44 M/UL — SIGNIFICANT CHANGE UP (ref 4.2–5.8)
RBC # FLD: 13.5 % — SIGNIFICANT CHANGE UP (ref 10.3–14.5)
WBC # BLD: 14.95 K/UL — HIGH (ref 3.8–10.5)
WBC # FLD AUTO: 14.95 K/UL — HIGH (ref 3.8–10.5)

## 2019-11-21 PROCEDURE — 99232 SBSQ HOSP IP/OBS MODERATE 35: CPT

## 2019-11-21 RX ADMIN — Medication 4: at 08:59

## 2019-11-21 RX ADMIN — Medication 25 MILLIGRAM(S): at 14:06

## 2019-11-21 RX ADMIN — LEVETIRACETAM 400 MILLIGRAM(S): 250 TABLET, FILM COATED ORAL at 17:42

## 2019-11-21 RX ADMIN — Medication 4 MILLIGRAM(S): at 05:01

## 2019-11-21 RX ADMIN — ENOXAPARIN SODIUM 40 MILLIGRAM(S): 100 INJECTION SUBCUTANEOUS at 12:43

## 2019-11-21 RX ADMIN — SENNA PLUS 2 TABLET(S): 8.6 TABLET ORAL at 22:55

## 2019-11-21 RX ADMIN — AMLODIPINE BESYLATE 10 MILLIGRAM(S): 2.5 TABLET ORAL at 05:02

## 2019-11-21 RX ADMIN — AMIODARONE HYDROCHLORIDE 400 MILLIGRAM(S): 400 TABLET ORAL at 17:42

## 2019-11-21 RX ADMIN — Medication 20 MILLIGRAM(S): at 23:28

## 2019-11-21 RX ADMIN — LISINOPRIL 20 MILLIGRAM(S): 2.5 TABLET ORAL at 05:02

## 2019-11-21 RX ADMIN — AMIODARONE HYDROCHLORIDE 400 MILLIGRAM(S): 400 TABLET ORAL at 05:02

## 2019-11-21 RX ADMIN — Medication 6: at 12:48

## 2019-11-21 RX ADMIN — Medication 2: at 17:42

## 2019-11-21 RX ADMIN — Medication 25 MILLIGRAM(S): at 05:02

## 2019-11-21 RX ADMIN — ATORVASTATIN CALCIUM 80 MILLIGRAM(S): 80 TABLET, FILM COATED ORAL at 22:55

## 2019-11-21 RX ADMIN — FAMOTIDINE 20 MILLIGRAM(S): 10 INJECTION INTRAVENOUS at 12:43

## 2019-11-21 RX ADMIN — LEVETIRACETAM 400 MILLIGRAM(S): 250 TABLET, FILM COATED ORAL at 05:13

## 2019-11-21 NOTE — PROGRESS NOTE ADULT - SUBJECTIVE AND OBJECTIVE BOX
CC: F/U for Leukocytosis    Saw/spoke to patient. Patient more awake today. Conversing in Russian. No fevers, no chills. Unchanged.    Allergies  No Known Allergies    ANTIMICROBIALS:  Off    PE:    Vital Signs Last 24 Hrs  T(C): 36.3 (21 Nov 2019 08:17), Max: 37 (20 Nov 2019 12:16)  T(F): 97.4 (21 Nov 2019 08:17), Max: 98.6 (20 Nov 2019 12:16)  HR: 74 (21 Nov 2019 08:17) (52 - 74)  BP: 163/80 (21 Nov 2019 08:17) (123/71 - 163/80)  RR: 19 (21 Nov 2019 08:17) (18 - 19)  SpO2: 96% (21 Nov 2019 08:17) (94% - 97%)    Gen: AOx1, verbal, non-toxic  CV: S1+S2 normal, nontachycardic  Resp: Clear bilat, no resp distress, no crackles/wheezes  Abd: Soft, nontender, +BS  Ext: No LE edema, no wounds    LABS:                        13.0   14.95 )-----------( 317      ( 21 Nov 2019 08:47 )             40.9     11-20    138  |  104  |  22  ----------------------------<  246<H>  3.7   |  21<L>  |  0.85    Ca    8.8      20 Nov 2019 09:47    MICROBIOLOGY:    .Blood Blood  11-08-19   No growth at 5 days.    .Urine Catheterized  11-08-19   >100,000 CFU/ml Proteus mirabilis ESBL  --  Proteus mirabilis ESBL    (otherwise reviewed)    RADIOLOGY:    11/8 CXR:    Impression: There is a nasogastric tube with its tip in good position,   within the stomach.

## 2019-11-21 NOTE — CHART NOTE - NSCHARTNOTEFT_GEN_A_CORE
Tomorrow at 10:30 am, I will meet with the patient's son and daughter in order to further discuss GOC. I still have to discuss with the patient's children about dispo options other than LTC ([facility], NH with hospice). A referral for [facility] was already done in order to define eligibility.     Will continue to f/u.     Adam Wynne MD.   [phone]

## 2019-11-21 NOTE — PROGRESS NOTE ADULT - SUBJECTIVE AND OBJECTIVE BOX
Subjective: Patient seen and examined. No new events except as noted.   resting comfortably     REVIEW OF SYSTEMS:    CONSTITUTIONAL:+ weakness, fevers or chills  EYES/ENT: No visual changes;  No vertigo or throat pain   NECK: No pain or stiffness  RESPIRATORY: No cough, wheezing, hemoptysis; No shortness of breath  CARDIOVASCULAR: No chest pain or palpitations  GASTROINTESTINAL: No abdominal or epigastric pain. No nausea, vomiting, or hematemesis; No diarrhea or constipation. No melena or hematochezia.  GENITOURINARY: No dysuria, frequency or hematuria  NEUROLOGICAL: + numbness or weakness  SKIN: No itching, burning, rashes, or lesions   All other review of systems is negative unless indicated above.    MEDICATIONS:  MEDICATIONS  (STANDING):  aMIOdarone    Tablet 400 milliGRAM(s) Oral two times a day  amLODIPine   Tablet 10 milliGRAM(s) Oral daily  atorvastatin 80 milliGRAM(s) Oral at bedtime  dexAMETHasone  Injectable 4 milliGRAM(s) IV Push daily  dextrose 5%. 1000 milliLiter(s) (50 mL/Hr) IV Continuous <Continuous>  dextrose 50% Injectable 25 Gram(s) IV Push once  dextrose 50% Injectable 12.5 Gram(s) IV Push once  dextrose 50% Injectable 25 Gram(s) IV Push once  dextrose 50% Injectable 25 Gram(s) IV Push once  enoxaparin Injectable 40 milliGRAM(s) SubCutaneous daily  famotidine    Tablet 20 milliGRAM(s) Oral daily  insulin lispro (HumaLOG) corrective regimen sliding scale   SubCutaneous three times a day before meals  insulin lispro (HumaLOG) corrective regimen sliding scale   SubCutaneous at bedtime  levETIRAcetam  IVPB 750 milliGRAM(s) IV Intermittent every 12 hours  lisinopril 20 milliGRAM(s) Oral daily  metoprolol tartrate 25 milliGRAM(s) Oral every 8 hours  polyethylene glycol 3350 17 Gram(s) Oral daily  senna 2 Tablet(s) Oral at bedtime  sodium chloride 0.9% Bolus 250 milliLiter(s) IV Bolus once      PHYSICAL EXAM:  T(C): 36.3 (11-21-19 @ 08:17), Max: 37 (11-20-19 @ 12:16)  HR: 74 (11-21-19 @ 08:17) (52 - 74)  BP: 163/80 (11-21-19 @ 08:17) (123/71 - 163/80)  RR: 19 (11-21-19 @ 08:17) (18 - 19)  SpO2: 96% (11-21-19 @ 08:17) (94% - 97%)  Wt(kg): --  I&O's Summary    20 Nov 2019 07:01  -  21 Nov 2019 07:00  --------------------------------------------------------  IN: 180 mL / OUT: 0 mL / NET: 180 mL          Appearance: NAD, alert   HEENT:   Dry oral mucosa   Lymphatic: No lymphadenopathy  Cardiovascular: Normal S1 S2, No JVD, No murmurs, No edema  Respiratory: Decreased bs   Psychiatry: A & O x) 0  Gastrointestinal:  Soft, Non-tender, + BS	  Skin: No rashes, No ecchymoses, No cyanosis	  Extremities: Decreased  range of motion, No clubbing, cyanosis or edema  Vascular: Peripheral pulses palpable 2+ bilaterally  Neurological:  MS: calm, more alert , following commands, answers to orientation questions, [id number]: all extremities moving, pulling with force against restraints periodically  Sensation: withdraws to touch all four extremities        LABS:    CARDIAC MARKERS:                                13.0   14.95 )-----------( 317      ( 21 Nov 2019 08:47 )             40.9     11-20    138  |  104  |  22  ----------------------------<  246<H>  3.7   |  21<L>  |  0.85    Ca    8.8      20 Nov 2019 09:47      proBNP:   Lipid Profile:   HgA1c:   TSH:             TELEMETRY: 	SR    ECG:  	  RADIOLOGY:   DIAGNOSTIC TESTING:  [ ] Echocardiogram:  [ ]  Catheterization:  [ ] Stress Test:    OTHER:

## 2019-11-21 NOTE — PROGRESS NOTE ADULT - PROBLEM SELECTOR PLAN 2
NSx follow up. Need their clearance for discharge   off Abx  ID consulted  Aspiration precautions   DC planning. Discussed with social work

## 2019-11-21 NOTE — PROGRESS NOTE ADULT - ASSESSMENT
69 year old man with PMHx T2DM, stage IV glioblastoma multiforme (dx in 2018), HTN and PSHx appendectomy presents to the ED with 3 weeks of increased R hemiparesis, worsened aphasia and urinary incontinence, sent from his neuro-oncologist Dr. Cuba's office.  Leukocytosis, no fever  Poor mental status, not able to provide ROS  UA negative, UCX Proteus, ESBL  CXR clear  Given 7 day course ertapenem for aspiration vs UTI  Clinically improved (verbal today), WBC decreasing, no further signs infection  Overall, leukocytosis, aspiration, malignancy, altered mental status  - Continue monitor off abx for now  - Trend WBC, monitor for fevers, monitor for further signs infection  - Low threshold to restart abx if clinical signs infection  - Consider CT C/A/P if WBC continues to rise/other signs sepsis    Praveen Mays MD  Pager [phone]  After 5pm and on weekends call [phone]

## 2019-11-22 LAB
ANION GAP SERPL CALC-SCNC: 16 MMOL/L — SIGNIFICANT CHANGE UP (ref 5–17)
BUN SERPL-MCNC: 31 MG/DL — HIGH (ref 7–23)
CALCIUM SERPL-MCNC: 8.8 MG/DL — SIGNIFICANT CHANGE UP (ref 8.4–10.5)
CHLORIDE SERPL-SCNC: 106 MMOL/L — SIGNIFICANT CHANGE UP (ref 96–108)
CO2 SERPL-SCNC: 22 MMOL/L — SIGNIFICANT CHANGE UP (ref 22–31)
CREAT SERPL-MCNC: 1.02 MG/DL — SIGNIFICANT CHANGE UP (ref 0.5–1.3)
GLUCOSE BLDC GLUCOMTR-MCNC: 199 MG/DL — HIGH (ref 70–99)
GLUCOSE BLDC GLUCOMTR-MCNC: 227 MG/DL — HIGH (ref 70–99)
GLUCOSE BLDC GLUCOMTR-MCNC: 234 MG/DL — HIGH (ref 70–99)
GLUCOSE BLDC GLUCOMTR-MCNC: 243 MG/DL — HIGH (ref 70–99)
GLUCOSE BLDC GLUCOMTR-MCNC: 246 MG/DL — HIGH (ref 70–99)
GLUCOSE SERPL-MCNC: 181 MG/DL — HIGH (ref 70–99)
HCT VFR BLD CALC: 39.7 % — SIGNIFICANT CHANGE UP (ref 39–50)
HGB BLD-MCNC: 13 G/DL — SIGNIFICANT CHANGE UP (ref 13–17)
MCHC RBC-ENTMCNC: 29.2 PG — SIGNIFICANT CHANGE UP (ref 27–34)
MCHC RBC-ENTMCNC: 32.7 GM/DL — SIGNIFICANT CHANGE UP (ref 32–36)
MCV RBC AUTO: 89.2 FL — SIGNIFICANT CHANGE UP (ref 80–100)
NRBC # BLD: 0 /100 WBCS — SIGNIFICANT CHANGE UP (ref 0–0)
PLATELET # BLD AUTO: 319 K/UL — SIGNIFICANT CHANGE UP (ref 150–400)
POTASSIUM SERPL-MCNC: 3.4 MMOL/L — LOW (ref 3.5–5.3)
POTASSIUM SERPL-SCNC: 3.4 MMOL/L — LOW (ref 3.5–5.3)
RBC # BLD: 4.45 M/UL — SIGNIFICANT CHANGE UP (ref 4.2–5.8)
RBC # FLD: 13.5 % — SIGNIFICANT CHANGE UP (ref 10.3–14.5)
SODIUM SERPL-SCNC: 144 MMOL/L — SIGNIFICANT CHANGE UP (ref 135–145)
WBC # BLD: 14.82 K/UL — HIGH (ref 3.8–10.5)
WBC # FLD AUTO: 14.82 K/UL — HIGH (ref 3.8–10.5)

## 2019-11-22 PROCEDURE — 99232 SBSQ HOSP IP/OBS MODERATE 35: CPT

## 2019-11-22 PROCEDURE — 99497 ADVNCD CARE PLAN 30 MIN: CPT | Mod: 25

## 2019-11-22 PROCEDURE — 99233 SBSQ HOSP IP/OBS HIGH 50: CPT

## 2019-11-22 PROCEDURE — 99498 ADVNCD CARE PLAN ADDL 30 MIN: CPT | Mod: 25

## 2019-11-22 RX ORDER — ESCITALOPRAM OXALATE 10 MG/1
5 TABLET, FILM COATED ORAL DAILY
Refills: 0 | Status: DISCONTINUED | OUTPATIENT
Start: 2019-11-22 | End: 2019-12-06

## 2019-11-22 RX ORDER — POTASSIUM CHLORIDE 20 MEQ
20 PACKET (EA) ORAL
Refills: 0 | Status: COMPLETED | OUTPATIENT
Start: 2019-11-22 | End: 2019-11-22

## 2019-11-22 RX ADMIN — LISINOPRIL 20 MILLIGRAM(S): 2.5 TABLET ORAL at 05:32

## 2019-11-22 RX ADMIN — SENNA PLUS 2 TABLET(S): 8.6 TABLET ORAL at 21:06

## 2019-11-22 RX ADMIN — ATORVASTATIN CALCIUM 80 MILLIGRAM(S): 80 TABLET, FILM COATED ORAL at 21:06

## 2019-11-22 RX ADMIN — FAMOTIDINE 20 MILLIGRAM(S): 10 INJECTION INTRAVENOUS at 13:51

## 2019-11-22 RX ADMIN — Medication 20 MILLIGRAM(S): at 23:22

## 2019-11-22 RX ADMIN — AMIODARONE HYDROCHLORIDE 400 MILLIGRAM(S): 400 TABLET ORAL at 05:32

## 2019-11-22 RX ADMIN — LEVETIRACETAM 400 MILLIGRAM(S): 250 TABLET, FILM COATED ORAL at 05:29

## 2019-11-22 RX ADMIN — AMIODARONE HYDROCHLORIDE 400 MILLIGRAM(S): 400 TABLET ORAL at 18:19

## 2019-11-22 RX ADMIN — Medication 20 MILLIEQUIVALENT(S): at 21:06

## 2019-11-22 RX ADMIN — Medication 20 MILLIEQUIVALENT(S): at 19:47

## 2019-11-22 RX ADMIN — Medication 25 MILLIGRAM(S): at 13:52

## 2019-11-22 RX ADMIN — Medication 4 MILLIGRAM(S): at 05:31

## 2019-11-22 RX ADMIN — LEVETIRACETAM 400 MILLIGRAM(S): 250 TABLET, FILM COATED ORAL at 18:19

## 2019-11-22 RX ADMIN — Medication 1 MILLIGRAM(S): at 23:49

## 2019-11-22 RX ADMIN — Medication 4: at 13:23

## 2019-11-22 RX ADMIN — Medication 4: at 18:21

## 2019-11-22 RX ADMIN — POLYETHYLENE GLYCOL 3350 17 GRAM(S): 17 POWDER, FOR SOLUTION ORAL at 13:51

## 2019-11-22 RX ADMIN — Medication 4: at 09:37

## 2019-11-22 RX ADMIN — ENOXAPARIN SODIUM 40 MILLIGRAM(S): 100 INJECTION SUBCUTANEOUS at 13:51

## 2019-11-22 RX ADMIN — AMLODIPINE BESYLATE 10 MILLIGRAM(S): 2.5 TABLET ORAL at 05:31

## 2019-11-22 RX ADMIN — Medication 0: at 21:38

## 2019-11-22 RX ADMIN — Medication 25 MILLIGRAM(S): at 21:06

## 2019-11-22 NOTE — PROGRESS NOTE ADULT - PROBLEM SELECTOR PLAN 6
I also d/w his children about code status and artificial nutrition. I indicated that resuscitation and NGT placement were unlikely to offer the patient a reasonable chance for recovery and instead creating distressful symptoms. They were going to further discuss about ACP and I will f/u with them on Monday.   50' were spent in ACP during this encounter.
PPS 20 %  Patient needs full nursing care.

## 2019-11-22 NOTE — PROGRESS NOTE ADULT - PROBLEM SELECTOR PLAN 5
will continue to f/u for GOC.   Emotional support was provided for his family.
The patient's children's are his surrogates.   Patient is full code at this time.

## 2019-11-22 NOTE — PROGRESS NOTE ADULT - PROBLEM SELECTOR PLAN 2
vs Encephalopathy. Work up an Rx as per primary.   Unclear if his behavioral and cognitive issues are 2/2 to Delirium or to an organic process associated to his GBM.  Agitation: Rx as per Psych.

## 2019-11-22 NOTE — PROGRESS NOTE ADULT - SUBJECTIVE AND OBJECTIVE BOX
Subjective: Patient seen and examined. No new events except as noted.   appears comfortable   no cp or sob     REVIEW OF SYSTEMS:    CONSTITUTIONAL: + weakness, fevers or chills  EYES/ENT: No visual changes;  No vertigo or throat pain   NECK: No pain or stiffness  RESPIRATORY: No cough, wheezing, hemoptysis; No shortness of breath  CARDIOVASCULAR: No chest pain or palpitations  GASTROINTESTINAL: No abdominal or epigastric pain. No nausea, vomiting, or hematemesis; No diarrhea or constipation. No melena or hematochezia.  GENITOURINARY: No dysuria, frequency or hematuria  NEUROLOGICAL: No numbness or weakness  SKIN: No itching, burning, rashes, or lesions   All other review of systems is negative unless indicated above.    MEDICATIONS:  MEDICATIONS  (STANDING):  aMIOdarone    Tablet 400 milliGRAM(s) Oral two times a day  amLODIPine   Tablet 10 milliGRAM(s) Oral daily  atorvastatin 80 milliGRAM(s) Oral at bedtime  dexAMETHasone  Injectable 4 milliGRAM(s) IV Push daily  dextrose 5%. 1000 milliLiter(s) (50 mL/Hr) IV Continuous <Continuous>  dextrose 50% Injectable 25 Gram(s) IV Push once  dextrose 50% Injectable 12.5 Gram(s) IV Push once  dextrose 50% Injectable 25 Gram(s) IV Push once  dextrose 50% Injectable 25 Gram(s) IV Push once  enoxaparin Injectable 40 milliGRAM(s) SubCutaneous daily  famotidine    Tablet 20 milliGRAM(s) Oral daily  insulin lispro (HumaLOG) corrective regimen sliding scale   SubCutaneous three times a day before meals  insulin lispro (HumaLOG) corrective regimen sliding scale   SubCutaneous at bedtime  levETIRAcetam  IVPB 750 milliGRAM(s) IV Intermittent every 12 hours  lisinopril 20 milliGRAM(s) Oral daily  metoprolol tartrate 25 milliGRAM(s) Oral every 8 hours  polyethylene glycol 3350 17 Gram(s) Oral daily  senna 2 Tablet(s) Oral at bedtime  sodium chloride 0.9% Bolus 250 milliLiter(s) IV Bolus once      PHYSICAL EXAM:  T(C): 36.6 (11-22-19 @ 08:41), Max: 36.6 (11-21-19 @ 19:45)  HR: 61 (11-22-19 @ 08:41) (54 - 70)  BP: 157/83 (11-22-19 @ 08:41) (130/79 - 167/80)  RR: 22 (11-22-19 @ 08:41) (18 - 22)  SpO2: 95% (11-22-19 @ 08:41) (94% - 96%)  Wt(kg): --  I&O's Summary    21 Nov 2019 07:01  -  22 Nov 2019 07:00  --------------------------------------------------------  IN: 700 mL / OUT: 0 mL / NET: 700 mL    22 Nov 2019 07:01  -  22 Nov 2019 11:54  --------------------------------------------------------  IN: 300 mL / OUT: 0 mL / NET: 300 mL        Appearance: NAD, alert   HEENT:   Dry oral mucosa   Lymphatic: No lymphadenopathy  Cardiovascular: Normal S1 S2, No JVD, No murmurs, No edema  Respiratory: Decreased bs   Psychiatry: A & O x) 0  Gastrointestinal:  Soft, Non-tender, + BS	  Skin: No rashes, No ecchymoses, No cyanosis	  Extremities: Decreased  range of motion, No clubbing, cyanosis or edema  Vascular: Peripheral pulses palpable 2+ bilaterally  Neurological:  MS: calm, more alert , following commands, answers to orientation questions, [id number]: all extremities moving, pulling with force against restraints periodically  Sensation: withdraws to touch all four extremities          LABS:    CARDIAC MARKERS:                                13.0   14.82 )-----------( 319      ( 22 Nov 2019 06:43 )             39.7     11-22    144  |  106  |  31<H>  ----------------------------<  181<H>  3.4<L>   |  22  |  1.02    Ca    8.8      22 Nov 2019 06:49      proBNP:   Lipid Profile:   HgA1c:   TSH:             TELEMETRY: 	SR    ECG:  	  RADIOLOGY:   DIAGNOSTIC TESTING:  [ ] Echocardiogram:  [ ]  Catheterization:  [ ] Stress Test:    OTHER:

## 2019-11-22 NOTE — CONSULT NOTE ADULT - SUBJECTIVE AND OBJECTIVE BOX
69 year old man with PMHx T2DM, stage IV glioblastoma multiforme (dx in 2018), HTN and PSHx appendectomy presents to the ED with 3 weeks of increased R hemiparesis, worsened aphasia and urinary incontinence, sent from his neruo-oncologist Dr. Cuba's office. Pt has had resection, chemo radiation, most recently was noted to be clinically worse in March 2019 with increased somnolence and steroids were increased. Per his neuro-onc, concern for progression of disease. In ER, pt no family at bedside, pt was agitated, confused, aphasic, climbing out of bed, recieved 4mg of ativan, 5mg of haldol sequentially. Neurological exam was limited due to sedation, pt did not respond to basic orientation questions appropriately in Bulgarian, had intermittent nature to nonsensical speech, but was able to show tongue, raise his arms - on mimicry only, R hemiparesis. Noted to have wet himself.  Admitted to Neurology for further evaluation. (05 Nov 2019 22:14)      PAST MEDICAL & SURGICAL HISTORY:  Type 2 diabetes mellitus  Glioblastoma multiforme  HTN (hypertension)  Glioblastoma multiforme  History of appendectomy      Review of Systems:   UTO as pt is not alert    Allergies    No Known Allergies    Intolerances        Social History:     FAMILY HISTORY:      MEDICATIONS  (STANDING):  aMIOdarone    Tablet 400 milliGRAM(s) Oral two times a day  amLODIPine   Tablet 10 milliGRAM(s) Oral daily  atorvastatin 80 milliGRAM(s) Oral at bedtime  dexAMETHasone  Injectable 4 milliGRAM(s) IV Push daily  dextrose 5%. 1000 milliLiter(s) (50 mL/Hr) IV Continuous <Continuous>  dextrose 50% Injectable 25 Gram(s) IV Push once  dextrose 50% Injectable 12.5 Gram(s) IV Push once  dextrose 50% Injectable 25 Gram(s) IV Push once  dextrose 50% Injectable 25 Gram(s) IV Push once  enoxaparin Injectable 40 milliGRAM(s) SubCutaneous daily  famotidine    Tablet 20 milliGRAM(s) Oral daily  insulin lispro (HumaLOG) corrective regimen sliding scale   SubCutaneous three times a day before meals  insulin lispro (HumaLOG) corrective regimen sliding scale   SubCutaneous at bedtime  levETIRAcetam  IVPB 750 milliGRAM(s) IV Intermittent every 12 hours  lisinopril 20 milliGRAM(s) Oral daily  metoprolol tartrate 25 milliGRAM(s) Oral every 8 hours  polyethylene glycol 3350 17 Gram(s) Oral daily  senna 2 Tablet(s) Oral at bedtime  sodium chloride 0.9% Bolus 250 milliLiter(s) IV Bolus once    MEDICATIONS  (PRN):  acetaminophen  Suppository .. 650 milliGRAM(s) Rectal every 6 hours PRN Temp greater or equal to 38C (100.4F), Mild Pain (1 - 3)  bisacodyl Suppository 10 milliGRAM(s) Rectal daily PRN Constipation  dextrose 40% Gel 15 Gram(s) Oral once PRN Blood Glucose LESS THAN 70 milliGRAM(s)/deciliter  glucagon  Injectable 1 milliGRAM(s) IntraMuscular once PRN Glucose LESS THAN 70 milligrams/deciliter  glucagon  Injectable 1 milliGRAM(s) IntraMuscular once PRN Glucose LESS THAN 70 milligrams/deciliter  hydrALAZINE Injectable 20 milliGRAM(s) IV Push every 4 hours PRN SBP > 160  labetalol Injectable 20 milliGRAM(s) IV Push every 3 hours PRN Systolic blood pressure > 160  LORazepam     Tablet 1 milliGRAM(s) Oral every 4 hours PRN Severe agitation  LORazepam   Injectable 1 milliGRAM(s) IV Push every 6 hours PRN severe agitation  melatonin 3 milliGRAM(s) Oral at bedtime PRN Insomnia  nystatin Powder 1 Application(s) Topical every 8 hours PRN rash/itching  simethicone 80 milliGRAM(s) Chew two times a day PRN Gas  valproate sodium IVPB 250 milliGRAM(s) IV Intermittent every 8 hours PRN Agitation      Vital Signs Last 24 Hrs  T(C): 36.8 (22 Nov 2019 12:57), Max: 36.8 (22 Nov 2019 12:57)  T(F): 98.2 (22 Nov 2019 12:57), Max: 98.2 (22 Nov 2019 12:57)  HR: 61 (22 Nov 2019 12:57) (54 - 70)  BP: 148/72 (22 Nov 2019 12:57) (130/79 - 167/80)  BP(mean): --  RR: 19 (22 Nov 2019 12:57) (18 - 22)  SpO2: 95% (22 Nov 2019 12:57) (95% - 96%)  CAPILLARY BLOOD GLUCOSE      POCT Blood Glucose.: 246 mg/dL (22 Nov 2019 12:23)  POCT Blood Glucose.: 234 mg/dL (22 Nov 2019 09:20)  POCT Blood Glucose.: 222 mg/dL (21 Nov 2019 21:14)  POCT Blood Glucose.: 189 mg/dL (21 Nov 2019 17:12)    I&O's Summary    21 Nov 2019 07:01  -  22 Nov 2019 07:00  --------------------------------------------------------  IN: 700 mL / OUT: 0 mL / NET: 700 mL    22 Nov 2019 07:01  -  22 Nov 2019 13:02  --------------------------------------------------------  IN: 300 mL / OUT: 0 mL / NET: 300 mL        PHYSICAL EXAM:  GENERAL: NAD, well-developed  HEAD:  Atraumatic, Normocephalic  EYES: EOMI, PERRLA, conjunctiva and sclera clear  NECK: Supple, No JVD  CHEST/LUNG: Clear to auscultation bilaterally; No wheeze  HEART: Regular rate and rhythm; No murmurs, rubs, or gallops  ABDOMEN: Soft, Nontender, Nondistended; Bowel sounds present  EXTREMITIES:  2+ Peripheral Pulses, No clubbing, cyanosis, or edema  PSYCH: AAOx1  NEUROLOGY: rt hemiplegia  SKIN: No rashes or lesions    LABS:                        13.0   14.82 )-----------( 319      ( 22 Nov 2019 06:43 )             39.7     11-22    144  |  106  |  31<H>  ----------------------------<  181<H>  3.4<L>   |  22  |  1.02    Ca    8.8      22 Nov 2019 06:49                RADIOLOGY & ADDITIONAL TESTS:    Imaging Personally Reviewed:    Consultant(s) Notes Reviewed:      Care Discussed with Consultants/Other Providers:

## 2019-11-22 NOTE — PROGRESS NOTE ADULT - SUBJECTIVE AND OBJECTIVE BOX
HPI: 70 yo M with T2DM, stage IV glioblastoma multiforme (dx in 2018), HTN p/w weakness, aphasia, and severe agitation.  Palliative care called for GOC.    SUBJECTIVE AND OBJECTIVE: Today, the patient was more alert and f/u simple commands. However, he is still having dysarthria confusion, and significant functional impairment.   MEDICATIONS  (STANDING):  aMIOdarone    Tablet 400 milliGRAM(s) Oral two times a day  amLODIPine   Tablet 10 milliGRAM(s) Oral daily  atorvastatin 80 milliGRAM(s) Oral at bedtime  dexAMETHasone  Injectable 4 milliGRAM(s) IV Push daily  dextrose 5%. 1000 milliLiter(s) (50 mL/Hr) IV Continuous <Continuous>  dextrose 50% Injectable 25 Gram(s) IV Push once  dextrose 50% Injectable 12.5 Gram(s) IV Push once  dextrose 50% Injectable 25 Gram(s) IV Push once  dextrose 50% Injectable 25 Gram(s) IV Push once  enoxaparin Injectable 40 milliGRAM(s) SubCutaneous daily  famotidine    Tablet 20 milliGRAM(s) Oral daily  insulin lispro (HumaLOG) corrective regimen sliding scale   SubCutaneous three times a day before meals  insulin lispro (HumaLOG) corrective regimen sliding scale   SubCutaneous at bedtime  levETIRAcetam  IVPB 750 milliGRAM(s) IV Intermittent every 12 hours  lisinopril 20 milliGRAM(s) Oral daily  metoprolol tartrate 25 milliGRAM(s) Oral every 8 hours  polyethylene glycol 3350 17 Gram(s) Oral daily  senna 2 Tablet(s) Oral at bedtime  sodium chloride 0.9% Bolus 250 milliLiter(s) IV Bolus once    MEDICATIONS  (PRN):  acetaminophen  Suppository .. 650 milliGRAM(s) Rectal every 6 hours PRN Temp greater or equal to 38C (100.4F), Mild Pain (1 - 3)  bisacodyl Suppository 10 milliGRAM(s) Rectal daily PRN Constipation  dextrose 40% Gel 15 Gram(s) Oral once PRN Blood Glucose LESS THAN 70 milliGRAM(s)/deciliter  glucagon  Injectable 1 milliGRAM(s) IntraMuscular once PRN Glucose LESS THAN 70 milligrams/deciliter  glucagon  Injectable 1 milliGRAM(s) IntraMuscular once PRN Glucose LESS THAN 70 milligrams/deciliter  hydrALAZINE Injectable 20 milliGRAM(s) IV Push every 4 hours PRN SBP > 160  labetalol Injectable 20 milliGRAM(s) IV Push every 3 hours PRN Systolic blood pressure > 160  LORazepam     Tablet 1 milliGRAM(s) Oral every 4 hours PRN Severe agitation  LORazepam   Injectable 1 milliGRAM(s) IV Push every 6 hours PRN severe agitation  melatonin 3 milliGRAM(s) Oral at bedtime PRN Insomnia  nystatin Powder 1 Application(s) Topical every 8 hours PRN rash/itching  simethicone 80 milliGRAM(s) Chew two times a day PRN Gas  valproate sodium IVPB 250 milliGRAM(s) IV Intermittent every 8 hours PRN Agitation  INTERVAL HPI/OVERNIGHT EVENTS: No new events.     DNR on chart: No   Allergies    No Known Allergies    Intolerances          ITEMS UNCHECKED ARE NOT PRESENT    PRESENT SYMPTOMS: [x ]Unable to obtain due to poor mentation   Source if other than patient:  [ ]Family   [ ]Team     Pain:  [ ]yes [x ]no  QOL impact -   Location -                    Aggravating factors -  Quality -  Radiation -  Timing-  Severity (0-10 scale):  Minimal acceptable level (0-10 scale):     Dyspnea:                           [ ]Mild [ ]Moderate [ ]Severe  Anxiety:                             [ ]Mild [ ]Moderate [ ]Severe  Fatigue:                             [ ]Mild [ ]Moderate [ ]Severe  Nausea:                             [ ]Mild [ ]Moderate [ ]Severe  Loss of appetite:              [ ]Mild [ ]Moderate [ ]Severe  Constipation:                    [ ]Mild [ ]Moderate [ ]Severe    PAIN AD Score:	2  [email or web address].[email or web address]/painad.pdf (Ctrl + left click to view)    Other Symptoms:  [ ]All other review of systems negative     Palliative Performance Status Version 2:   20      %      http://npcrc.org/files/news/palliative_performance_scale_ppsv2.pdf  PHYSICAL EXAM:  Vital Signs Last 24 Hrs  T(C): 36.8 (22 Nov 2019 15:26), Max: 36.8 (22 Nov 2019 12:57)  T(F): 98.2 (22 Nov 2019 15:26), Max: 98.2 (22 Nov 2019 12:57)  HR: 57 (22 Nov 2019 15:26) (54 - 64)  BP: 138/69 (22 Nov 2019 15:26) (130/79 - 167/80)  BP(mean): --  RR: 18 (22 Nov 2019 15:26) (18 - 22)  SpO2: 95% (22 Nov 2019 15:26) (95% - 96%)       GENERAL:  [ x]Alert  [ ]Oriented x   [ ]Lethargic  [ ]Cachexia  [ ]Unarousable  [x ]Verbal  [ ]Non-Verbal  Behavioral:   [ ]Anxiety  [x ]Delirium [ ]Agitation [ ]Other  HEENT:  [ ]Normal   [ ]Dry mouth   [ ]ET Tube/Trach  [ ]Oral lesions  PULMONARY:   [x]Clear [ ]Tachypnea  [ ]Audible excessive secretions   [ ]Rhonchi        [ ]Right [ ]Left [ ]Bilateral  [ ]Crackles        [ ]Right [ ]Left [ ]Bilateral  [ ]Wheezing     [ ]Right [ ]Left [ ]Bilateral  [ ]Diminished BS [ ] Right [ ]Left [ ]Bilateral  CARDIOVASCULAR:    [  ]Regular [x ]Irregular [ ]Tachy  [ ]Aaron [ ]Murmur [ ]Other  GASTROINTESTINAL:  [ x]Soft  [ ]Distended   [ ]+BS  [ ]Non tender [ ]Tender  [ ]PEG [ ]OGT/ NGT   Last BM: 11/19     GENITOURINARY:  [ ]Normal [x ]Incontinent   [ ]Oliguria/Anuria   [ ]Mclean  MUSCULOSKELETAL:   [ ]Normal   [x ]Weakness  [ ]Bed/Wheelchair bound [ ]Edema  NEUROLOGIC:   [ ]No focal deficits  [x ] Cognitive impairment  [ ] Dysphagia [ ]Dysarthria [x ] Right alysia Paresis [ ]Other   SKIN:   [ ]Normal  [ ]Rash   [ ]Pressure ulcer(s) [ ]y [ ]n present on admission    CRITICAL CARE:  [ ]Shock Present  [ ]Septic [ ]Cardiogenic [ ]Neurologic [ ]Hypovolemic  [ ]Vasopressors [ ]Inotropes  [ ]Respiratory failure present [ ]Mechanical Ventilation [ ]Non-invasive ventilatory support [ ]High-Flow  [ ]Acute  [ ]Chronic [ ]Hypoxic  [ ]Hypercarbic [ ]Other  [ ]Other organ failure     LABS:                                      13.0   14.82 )-----------( 319      ( 22 Nov 2019 06:43 )             39.7   11-22    144  |  106  |  31<H>  ----------------------------<  181<H>  3.4<L>   |  22  |  1.02    Ca    8.8      22 Nov 2019 06:49            RADIOLOGY & ADDITIONAL STUDIES:  < from: MR Head w/wo IV Cont (11.06.19 @ 23:11) >    EXAM:  MR BRAIN WAW IC                            PROCEDURE DATE:  11/06/2019  Impression: Previously noted large area of peripheral enhancement in the   left temporal frontal region is again seen with decrease surrounding   enhancement. Slight increase surrounding edema is identified. This   decreased enhancement could be secondary to response to therapy though   clinical correlation and continued close interval follow-up is   recommended.                    YONG POE M.D., ATTENDING RADIOLOGIST  This document has been electronically signed. Nov 7 2019 10:25AM        < end of copied text >    Protein Calorie Malnutrition Present: [ ]mild [ ]moderate [ ]severe [ ]underweight [ ]morbid obesity  https://www.andeal.org/vault/2440/web/files/ONC/Table_Clinical%20Characteristics%20to%20Document%20Malnutrition-White%20JV%20et%20al%536267.pdf    Height (cm): 200.66 (11-05-19 @ 12:22), 167.6 (07-30-19 @ 23:46), 167.64 (07-19-19 @ 09:15)  Weight (kg): 99.8 (11-05-19 @ 12:22), 110.4 (07-30-19 @ 23:46), 113.4 (07-19-19 @ 09:15)  BMI (kg/m2): 24.8 (11-05-19 @ 12:22), 39.3 (07-30-19 @ 23:46), 40.4 (07-19-19 @ 09:15)    [ ]PPSV2 < or = 30%  [ ]significant weight loss [ ]poor nutritional intake [ ]anasarca   Albumin, Serum: 3.4 g/dL (11-10-19 @ 06:39)   [ ]Artificial Nutrition    REFERRALS:   [ ]Chaplaincy  [ ]Hospice  [ ]Child Life  [ ]Social Work  [ ]Case management [ ]Holistic Therapy     Goals of Care Document:

## 2019-11-22 NOTE — PROVIDER CONTACT NOTE (OTHER) - SITUATION
Pt mid PRBC transfusion. Transfusion stopped and disconnected.   HR 150s, pt c/o being cold, shivering noted.

## 2019-11-22 NOTE — PROGRESS NOTE ADULT - SUBJECTIVE AND OBJECTIVE BOX
CC: F/U for Leukocytosis    Saw/spoke to patient. No fevers, no chills. No new complaints. Unchanged.    Allergies  No Known Allergies    ANTIMICROBIALS:  Off    PE:    Vital Signs Last 24 Hrs  T(C): 36.6 (22 Nov 2019 08:41), Max: 36.6 (21 Nov 2019 19:45)  T(F): 97.8 (22 Nov 2019 08:41), Max: 97.8 (21 Nov 2019 19:45)  HR: 61 (22 Nov 2019 08:41) (54 - 70)  BP: 157/83 (22 Nov 2019 08:41) (130/79 - 167/80)  RR: 22 (22 Nov 2019 08:41) (18 - 22)  SpO2: 95% (22 Nov 2019 08:41) (95% - 96%)    Gen: AOx3, NAD, non-toxic, pleasant  CV: S1+S2 normal, nontachycardic  Resp: Clear bilat, no resp distress, no crackles/wheezes  Abd: Soft, nontender, +BS  Ext: No LE edema, no wounds    LABS:                        13.0   14.82 )-----------( 319      ( 22 Nov 2019 06:43 )             39.7     11-22    144  |  106  |  31<H>  ----------------------------<  181<H>  3.4<L>   |  22  |  1.02    Ca    8.8      22 Nov 2019 06:49    MICROBIOLOGY:    .Blood Blood  11-08-19   No growth at 5 days.     .Urine Catheterized  11-08-19   >100,000 CFU/ml Proteus mirabilis ESBL  --  Proteus mirabilis ESBL    (otherwise reviewed)    RADIOLOGY:    11/8 CXR:    Impression: There is a nasogastric tube with its tip in good position,   within the stomach.

## 2019-11-22 NOTE — CONSULT NOTE ADULT - REASON FOR ADMISSION
worsened R hemiparesis and aphasia

## 2019-11-22 NOTE — PROGRESS NOTE ADULT - ASSESSMENT
69 year old man with PMHx T2DM, stage IV glioblastoma multiforme (dx in 2018), HTN and PSHx appendectomy presents to the ED with 3 weeks of increased R hemiparesis, worsened aphasia and urinary incontinence, sent from his neuro-oncologist Dr. Cuba's office.  Leukocytosis, no fever  Poor mental status, not able to provide ROS  UA negative, UCX Proteus, ESBL  CXR clear  Given 7 day course ertapenem for aspiration vs UTI  Clinically improved (mental status markedly improved), WBC decreasing, no further signs infection  Presently low suspicion for infection  Overall, leukocytosis, aspiration, malignancy, altered mental status  - Continue monitor off abx for now  - Trend WBC, monitor for fevers, monitor for further signs infection  - Further workup if focal signs infection    Praveen Mays MD  Pager [phone]  After 5pm and on weekends call [phone]

## 2019-11-22 NOTE — PROGRESS NOTE ADULT - ASSESSMENT
68 yo M with T2DM, stage IV glioblastoma multiforme (dx in 2018), HTN p/w weakness, aphasia, and severe agitation.  Palliative care called for GOC.

## 2019-11-22 NOTE — PROGRESS NOTE ADULT - PROBLEM SELECTOR PLAN 2
Non operative  off Abx  ID consulted  Aspiration precautions   DC planning. Discussed with social work

## 2019-11-22 NOTE — CONSULT NOTE ADULT - PROVIDER SPECIALTY LIST ADULT
CCU
Neurosurgery
Neurosurgery
Palliative Care
Internal Medicine
Internal Medicine
Infectious Disease
ENT
Cardiology
Palliative Care

## 2019-11-22 NOTE — PROVIDER CONTACT NOTE (OTHER) - ACTION/TREATMENT ORDERED:
Jace GOLDMAN at bedside.   25 Metoprolol Tab given via NGT as per order.   5 mg Metoprolol IV push given as per order.   BP resolved, 112/76

## 2019-11-23 LAB
ANION GAP SERPL CALC-SCNC: 15 MMOL/L — SIGNIFICANT CHANGE UP (ref 5–17)
BUN SERPL-MCNC: 26 MG/DL — HIGH (ref 7–23)
CALCIUM SERPL-MCNC: 8.9 MG/DL — SIGNIFICANT CHANGE UP (ref 8.4–10.5)
CHLORIDE SERPL-SCNC: 107 MMOL/L — SIGNIFICANT CHANGE UP (ref 96–108)
CO2 SERPL-SCNC: 21 MMOL/L — LOW (ref 22–31)
CREAT SERPL-MCNC: 1 MG/DL — SIGNIFICANT CHANGE UP (ref 0.5–1.3)
GLUCOSE BLDC GLUCOMTR-MCNC: 181 MG/DL — HIGH (ref 70–99)
GLUCOSE BLDC GLUCOMTR-MCNC: 186 MG/DL — HIGH (ref 70–99)
GLUCOSE BLDC GLUCOMTR-MCNC: 193 MG/DL — HIGH (ref 70–99)
GLUCOSE BLDC GLUCOMTR-MCNC: 245 MG/DL — HIGH (ref 70–99)
GLUCOSE SERPL-MCNC: 176 MG/DL — HIGH (ref 70–99)
HCT VFR BLD CALC: 40.8 % — SIGNIFICANT CHANGE UP (ref 39–50)
HGB BLD-MCNC: 13.1 G/DL — SIGNIFICANT CHANGE UP (ref 13–17)
MCHC RBC-ENTMCNC: 29.2 PG — SIGNIFICANT CHANGE UP (ref 27–34)
MCHC RBC-ENTMCNC: 32.1 GM/DL — SIGNIFICANT CHANGE UP (ref 32–36)
MCV RBC AUTO: 90.9 FL — SIGNIFICANT CHANGE UP (ref 80–100)
PLATELET # BLD AUTO: 305 K/UL — SIGNIFICANT CHANGE UP (ref 150–400)
POTASSIUM SERPL-MCNC: 3.4 MMOL/L — LOW (ref 3.5–5.3)
POTASSIUM SERPL-SCNC: 3.4 MMOL/L — LOW (ref 3.5–5.3)
RBC # BLD: 4.49 M/UL — SIGNIFICANT CHANGE UP (ref 4.2–5.8)
RBC # FLD: 13.5 % — SIGNIFICANT CHANGE UP (ref 10.3–14.5)
SODIUM SERPL-SCNC: 143 MMOL/L — SIGNIFICANT CHANGE UP (ref 135–145)
WBC # BLD: 15.23 K/UL — HIGH (ref 3.8–10.5)
WBC # FLD AUTO: 15.23 K/UL — HIGH (ref 3.8–10.5)

## 2019-11-23 RX ORDER — POTASSIUM CHLORIDE 20 MEQ
20 PACKET (EA) ORAL
Refills: 0 | Status: COMPLETED | OUTPATIENT
Start: 2019-11-23 | End: 2019-11-23

## 2019-11-23 RX ORDER — POTASSIUM CHLORIDE 20 MEQ
20 PACKET (EA) ORAL
Refills: 0 | Status: DISCONTINUED | OUTPATIENT
Start: 2019-11-23 | End: 2019-11-23

## 2019-11-23 RX ADMIN — ESCITALOPRAM OXALATE 5 MILLIGRAM(S): 10 TABLET, FILM COATED ORAL at 13:02

## 2019-11-23 RX ADMIN — ENOXAPARIN SODIUM 40 MILLIGRAM(S): 100 INJECTION SUBCUTANEOUS at 13:02

## 2019-11-23 RX ADMIN — AMIODARONE HYDROCHLORIDE 400 MILLIGRAM(S): 400 TABLET ORAL at 06:04

## 2019-11-23 RX ADMIN — Medication 4: at 12:57

## 2019-11-23 RX ADMIN — LISINOPRIL 20 MILLIGRAM(S): 2.5 TABLET ORAL at 06:04

## 2019-11-23 RX ADMIN — SENNA PLUS 2 TABLET(S): 8.6 TABLET ORAL at 21:30

## 2019-11-23 RX ADMIN — Medication 20 MILLIEQUIVALENT(S): at 17:53

## 2019-11-23 RX ADMIN — POLYETHYLENE GLYCOL 3350 17 GRAM(S): 17 POWDER, FOR SOLUTION ORAL at 13:02

## 2019-11-23 RX ADMIN — Medication 25 MILLIGRAM(S): at 06:04

## 2019-11-23 RX ADMIN — LEVETIRACETAM 400 MILLIGRAM(S): 250 TABLET, FILM COATED ORAL at 17:50

## 2019-11-23 RX ADMIN — Medication 20 MILLIEQUIVALENT(S): at 21:30

## 2019-11-23 RX ADMIN — ATORVASTATIN CALCIUM 80 MILLIGRAM(S): 80 TABLET, FILM COATED ORAL at 21:30

## 2019-11-23 RX ADMIN — AMIODARONE HYDROCHLORIDE 400 MILLIGRAM(S): 400 TABLET ORAL at 17:50

## 2019-11-23 RX ADMIN — Medication 2: at 17:50

## 2019-11-23 RX ADMIN — Medication 4 MILLIGRAM(S): at 06:04

## 2019-11-23 RX ADMIN — Medication 2: at 09:21

## 2019-11-23 RX ADMIN — AMLODIPINE BESYLATE 10 MILLIGRAM(S): 2.5 TABLET ORAL at 06:04

## 2019-11-23 RX ADMIN — FAMOTIDINE 20 MILLIGRAM(S): 10 INJECTION INTRAVENOUS at 13:02

## 2019-11-23 RX ADMIN — LEVETIRACETAM 400 MILLIGRAM(S): 250 TABLET, FILM COATED ORAL at 06:05

## 2019-11-23 NOTE — PROGRESS NOTE ADULT - SUBJECTIVE AND OBJECTIVE BOX
Subjective: Patient seen and examined. No new events except as noted.   feels ok   no cp   REVIEW OF SYSTEMS:    CONSTITUTIONAL: +weakness, fevers or chills  EYES/ENT: No visual changes;  No vertigo or throat pain   NECK: No pain or stiffness  RESPIRATORY: No cough, wheezing, hemoptysis; No shortness of breath  CARDIOVASCULAR: No chest pain or palpitations  GASTROINTESTINAL: No abdominal or epigastric pain. No nausea, vomiting, or hematemesis; No diarrhea or constipation. No melena or hematochezia.  GENITOURINARY: No dysuria, frequency or hematuria  NEUROLOGICAL: No numbness or weakness  SKIN: No itching, burning, rashes, or lesions   All other review of systems is negative unless indicated above.    MEDICATIONS:  MEDICATIONS  (STANDING):  aMIOdarone    Tablet 400 milliGRAM(s) Oral two times a day  amLODIPine   Tablet 10 milliGRAM(s) Oral daily  atorvastatin 80 milliGRAM(s) Oral at bedtime  dexAMETHasone  Injectable 4 milliGRAM(s) IV Push daily  dextrose 5%. 1000 milliLiter(s) (50 mL/Hr) IV Continuous <Continuous>  dextrose 50% Injectable 25 Gram(s) IV Push once  dextrose 50% Injectable 12.5 Gram(s) IV Push once  dextrose 50% Injectable 25 Gram(s) IV Push once  dextrose 50% Injectable 25 Gram(s) IV Push once  enoxaparin Injectable 40 milliGRAM(s) SubCutaneous daily  escitalopram 5 milliGRAM(s) Oral daily  famotidine    Tablet 20 milliGRAM(s) Oral daily  insulin lispro (HumaLOG) corrective regimen sliding scale   SubCutaneous three times a day before meals  insulin lispro (HumaLOG) corrective regimen sliding scale   SubCutaneous at bedtime  levETIRAcetam  IVPB 750 milliGRAM(s) IV Intermittent every 12 hours  lisinopril 20 milliGRAM(s) Oral daily  metoprolol tartrate 25 milliGRAM(s) Oral every 8 hours  polyethylene glycol 3350 17 Gram(s) Oral daily  potassium chloride   Solution 20 milliEquivalent(s) Oral every 2 hours  senna 2 Tablet(s) Oral at bedtime  sodium chloride 0.9% Bolus 250 milliLiter(s) IV Bolus once      PHYSICAL EXAM:  T(C): 36.6 (11-23-19 @ 15:28), Max: 36.9 (11-23-19 @ 12:25)  HR: 55 (11-23-19 @ 15:28) (54 - 65)  BP: 138/68 (11-23-19 @ 15:28) (138/68 - 176/89)  RR: 18 (11-23-19 @ 15:28) (18 - 20)  SpO2: 97% (11-23-19 @ 15:28) (95% - 97%)  Wt(kg): --  I&O's Summary    22 Nov 2019 07:01  -  23 Nov 2019 07:00  --------------------------------------------------------  IN: 400 mL / OUT: 0 mL / NET: 400 mL    23 Nov 2019 07:01  -  23 Nov 2019 19:21  --------------------------------------------------------  IN: 220 mL / OUT: 0 mL / NET: 220 mL        Appearance: NAD, alert   HEENT:   Dry oral mucosa   Lymphatic: No lymphadenopathy  Cardiovascular: Normal S1 S2, No JVD, No murmurs, No edema  Respiratory: Decreased bs   Psychiatry: A & O x) 0  Gastrointestinal:  Soft, Non-tender, + BS	  Skin: No rashes, No ecchymoses, No cyanosis	  Extremities: Decreased  range of motion, No clubbing, cyanosis or edema  Vascular: Peripheral pulses palpable 2+ bilaterally  Neurological:  MS: calm, more alert , following commands, answers to orientation questions, [id number]: all extremities moving, pulling with force against restraints periodically  Sensation: withdraws to touch all four extremities          LABS:    CARDIAC MARKERS:                                13.1   15.23 )-----------( 305      ( 23 Nov 2019 10:49 )             40.8     11-23    143  |  107  |  26<H>  ----------------------------<  176<H>  3.4<L>   |  21<L>  |  1.00    Ca    8.9      23 Nov 2019 06:46      proBNP:   Lipid Profile:   HgA1c:   TSH:             TELEMETRY: SR	    ECG:  	  RADIOLOGY:   DIAGNOSTIC TESTING:  [ ] Echocardiogram:  [ ]  Catheterization:  [ ] Stress Test:    OTHER:

## 2019-11-24 LAB
ANION GAP SERPL CALC-SCNC: 13 MMOL/L — SIGNIFICANT CHANGE UP (ref 5–17)
BUN SERPL-MCNC: 23 MG/DL — SIGNIFICANT CHANGE UP (ref 7–23)
CALCIUM SERPL-MCNC: 8.9 MG/DL — SIGNIFICANT CHANGE UP (ref 8.4–10.5)
CHLORIDE SERPL-SCNC: 108 MMOL/L — SIGNIFICANT CHANGE UP (ref 96–108)
CO2 SERPL-SCNC: 22 MMOL/L — SIGNIFICANT CHANGE UP (ref 22–31)
CREAT SERPL-MCNC: 1.01 MG/DL — SIGNIFICANT CHANGE UP (ref 0.5–1.3)
GLUCOSE BLDC GLUCOMTR-MCNC: 183 MG/DL — HIGH (ref 70–99)
GLUCOSE BLDC GLUCOMTR-MCNC: 205 MG/DL — HIGH (ref 70–99)
GLUCOSE BLDC GLUCOMTR-MCNC: 207 MG/DL — HIGH (ref 70–99)
GLUCOSE BLDC GLUCOMTR-MCNC: 207 MG/DL — HIGH (ref 70–99)
GLUCOSE BLDC GLUCOMTR-MCNC: 217 MG/DL — HIGH (ref 70–99)
GLUCOSE SERPL-MCNC: 166 MG/DL — HIGH (ref 70–99)
HCT VFR BLD CALC: 39.9 % — SIGNIFICANT CHANGE UP (ref 39–50)
HGB BLD-MCNC: 12.9 G/DL — LOW (ref 13–17)
MCHC RBC-ENTMCNC: 29.9 PG — SIGNIFICANT CHANGE UP (ref 27–34)
MCHC RBC-ENTMCNC: 32.3 GM/DL — SIGNIFICANT CHANGE UP (ref 32–36)
MCV RBC AUTO: 92.4 FL — SIGNIFICANT CHANGE UP (ref 80–100)
PLATELET # BLD AUTO: 300 K/UL — SIGNIFICANT CHANGE UP (ref 150–400)
POTASSIUM SERPL-MCNC: 3.7 MMOL/L — SIGNIFICANT CHANGE UP (ref 3.5–5.3)
POTASSIUM SERPL-SCNC: 3.7 MMOL/L — SIGNIFICANT CHANGE UP (ref 3.5–5.3)
RBC # BLD: 4.32 M/UL — SIGNIFICANT CHANGE UP (ref 4.2–5.8)
RBC # FLD: 13.5 % — SIGNIFICANT CHANGE UP (ref 10.3–14.5)
SODIUM SERPL-SCNC: 143 MMOL/L — SIGNIFICANT CHANGE UP (ref 135–145)
WBC # BLD: 12.82 K/UL — HIGH (ref 3.8–10.5)
WBC # FLD AUTO: 12.82 K/UL — HIGH (ref 3.8–10.5)

## 2019-11-24 RX ADMIN — AMIODARONE HYDROCHLORIDE 400 MILLIGRAM(S): 400 TABLET ORAL at 18:14

## 2019-11-24 RX ADMIN — ENOXAPARIN SODIUM 40 MILLIGRAM(S): 100 INJECTION SUBCUTANEOUS at 11:41

## 2019-11-24 RX ADMIN — AMLODIPINE BESYLATE 10 MILLIGRAM(S): 2.5 TABLET ORAL at 05:27

## 2019-11-24 RX ADMIN — AMIODARONE HYDROCHLORIDE 400 MILLIGRAM(S): 400 TABLET ORAL at 05:27

## 2019-11-24 RX ADMIN — LEVETIRACETAM 400 MILLIGRAM(S): 250 TABLET, FILM COATED ORAL at 05:26

## 2019-11-24 RX ADMIN — Medication 4: at 08:33

## 2019-11-24 RX ADMIN — Medication 25 MILLIGRAM(S): at 14:31

## 2019-11-24 RX ADMIN — LEVETIRACETAM 400 MILLIGRAM(S): 250 TABLET, FILM COATED ORAL at 18:14

## 2019-11-24 RX ADMIN — POLYETHYLENE GLYCOL 3350 17 GRAM(S): 17 POWDER, FOR SOLUTION ORAL at 11:41

## 2019-11-24 RX ADMIN — FAMOTIDINE 20 MILLIGRAM(S): 10 INJECTION INTRAVENOUS at 11:42

## 2019-11-24 RX ADMIN — Medication 4 MILLIGRAM(S): at 05:27

## 2019-11-24 RX ADMIN — Medication 20 MILLIGRAM(S): at 09:04

## 2019-11-24 RX ADMIN — ESCITALOPRAM OXALATE 5 MILLIGRAM(S): 10 TABLET, FILM COATED ORAL at 11:42

## 2019-11-24 RX ADMIN — Medication 25 MILLIGRAM(S): at 05:27

## 2019-11-24 RX ADMIN — Medication 4: at 12:38

## 2019-11-24 RX ADMIN — LISINOPRIL 20 MILLIGRAM(S): 2.5 TABLET ORAL at 05:27

## 2019-11-24 RX ADMIN — Medication 4: at 18:18

## 2019-11-24 NOTE — PROGRESS NOTE ADULT - SUBJECTIVE AND OBJECTIVE BOX
Subjective: Patient seen and examined. No new events except as noted.   resting comfortably   no cp or sob       REVIEW OF SYSTEMS:    CONSTITUTIONAL:+ weakness, fevers or chills  EYES/ENT: No visual changes;  No vertigo or throat pain   NECK: No pain or stiffness  RESPIRATORY: No cough, wheezing, hemoptysis; No shortness of breath  CARDIOVASCULAR: No chest pain or palpitations  GASTROINTESTINAL: No abdominal or epigastric pain. No nausea, vomiting, or hematemesis; No diarrhea or constipation. No melena or hematochezia.  GENITOURINARY: No dysuria, frequency or hematuria  NEUROLOGICAL: No numbness or weakness  SKIN: No itching, burning, rashes, or lesions   All other review of systems is negative unless indicated above.    MEDICATIONS:  MEDICATIONS  (STANDING):  aMIOdarone    Tablet 400 milliGRAM(s) Oral two times a day  amLODIPine   Tablet 10 milliGRAM(s) Oral daily  atorvastatin 80 milliGRAM(s) Oral at bedtime  dexAMETHasone  Injectable 4 milliGRAM(s) IV Push daily  dextrose 5%. 1000 milliLiter(s) (50 mL/Hr) IV Continuous <Continuous>  dextrose 50% Injectable 25 Gram(s) IV Push once  dextrose 50% Injectable 12.5 Gram(s) IV Push once  dextrose 50% Injectable 25 Gram(s) IV Push once  dextrose 50% Injectable 25 Gram(s) IV Push once  enoxaparin Injectable 40 milliGRAM(s) SubCutaneous daily  escitalopram 5 milliGRAM(s) Oral daily  famotidine    Tablet 20 milliGRAM(s) Oral daily  insulin lispro (HumaLOG) corrective regimen sliding scale   SubCutaneous three times a day before meals  insulin lispro (HumaLOG) corrective regimen sliding scale   SubCutaneous at bedtime  levETIRAcetam  IVPB 750 milliGRAM(s) IV Intermittent every 12 hours  lisinopril 20 milliGRAM(s) Oral daily  metoprolol tartrate 25 milliGRAM(s) Oral every 8 hours  polyethylene glycol 3350 17 Gram(s) Oral daily  senna 2 Tablet(s) Oral at bedtime  sodium chloride 0.9% Bolus 250 milliLiter(s) IV Bolus once      PHYSICAL EXAM:  T(C): 36.6 (11-24-19 @ 07:50), Max: 36.9 (11-23-19 @ 12:25)  HR: 62 (11-24-19 @ 09:36) (53 - 62)  BP: 129/56 (11-24-19 @ 09:36) (129/56 - 169/90)  RR: 20 (11-24-19 @ 07:50) (18 - 20)  SpO2: 97% (11-24-19 @ 07:50) (95% - 97%)  Wt(kg): --  I&O's Summary    23 Nov 2019 07:01  -  24 Nov 2019 07:00  --------------------------------------------------------  IN: 340 mL / OUT: 0 mL / NET: 340 mL          Appearance: NAD, alert   HEENT:   Dry oral mucosa   Lymphatic: No lymphadenopathy  Cardiovascular: Normal S1 S2, No JVD, No murmurs, No edema  Respiratory: Decreased bs   Psychiatry: A & O x) 0  Gastrointestinal:  Soft, Non-tender, + BS	  Skin: No rashes, No ecchymoses, No cyanosis	  Extremities: Decreased  range of motion, No clubbing, cyanosis or edema  Vascular: Peripheral pulses palpable 2+ bilaterally  Neurological:  MS: calm, more alert , following commands, answers to orientation questions, [id number]: all extremities moving, pulling with force against restraints periodically  Sensation: withdraws to touch all four extremities        LABS:    CARDIAC MARKERS:                                12.9   12.82 )-----------( 300      ( 24 Nov 2019 09:10 )             39.9     11-24    143  |  108  |  23  ----------------------------<  166<H>  3.7   |  22  |  1.01    Ca    8.9      24 Nov 2019 06:21      proBNP:   Lipid Profile:   HgA1c:   TSH:             TELEMETRY: 	SR    ECG:  	  RADIOLOGY:   DIAGNOSTIC TESTING:  [ ] Echocardiogram:  [ ]  Catheterization:  [ ] Stress Test:    OTHER:

## 2019-11-25 LAB
ANION GAP SERPL CALC-SCNC: 9 MMOL/L — SIGNIFICANT CHANGE UP (ref 5–17)
BUN SERPL-MCNC: 27 MG/DL — HIGH (ref 7–23)
CALCIUM SERPL-MCNC: 9 MG/DL — SIGNIFICANT CHANGE UP (ref 8.4–10.5)
CHLORIDE SERPL-SCNC: 111 MMOL/L — HIGH (ref 96–108)
CO2 SERPL-SCNC: 21 MMOL/L — LOW (ref 22–31)
CREAT SERPL-MCNC: 1.08 MG/DL — SIGNIFICANT CHANGE UP (ref 0.5–1.3)
GLUCOSE BLDC GLUCOMTR-MCNC: 154 MG/DL — HIGH (ref 70–99)
GLUCOSE BLDC GLUCOMTR-MCNC: 196 MG/DL — HIGH (ref 70–99)
GLUCOSE BLDC GLUCOMTR-MCNC: 198 MG/DL — HIGH (ref 70–99)
GLUCOSE BLDC GLUCOMTR-MCNC: 201 MG/DL — HIGH (ref 70–99)
GLUCOSE SERPL-MCNC: 184 MG/DL — HIGH (ref 70–99)
HCT VFR BLD CALC: 41.2 % — SIGNIFICANT CHANGE UP (ref 39–50)
HGB BLD-MCNC: 12.8 G/DL — LOW (ref 13–17)
MCHC RBC-ENTMCNC: 29.3 PG — SIGNIFICANT CHANGE UP (ref 27–34)
MCHC RBC-ENTMCNC: 31.1 GM/DL — LOW (ref 32–36)
MCV RBC AUTO: 94.3 FL — SIGNIFICANT CHANGE UP (ref 80–100)
PLATELET # BLD AUTO: 271 K/UL — SIGNIFICANT CHANGE UP (ref 150–400)
POTASSIUM SERPL-MCNC: 3.8 MMOL/L — SIGNIFICANT CHANGE UP (ref 3.5–5.3)
POTASSIUM SERPL-SCNC: 3.8 MMOL/L — SIGNIFICANT CHANGE UP (ref 3.5–5.3)
RBC # BLD: 4.37 M/UL — SIGNIFICANT CHANGE UP (ref 4.2–5.8)
RBC # FLD: 13.7 % — SIGNIFICANT CHANGE UP (ref 10.3–14.5)
SODIUM SERPL-SCNC: 141 MMOL/L — SIGNIFICANT CHANGE UP (ref 135–145)
WBC # BLD: 11.54 K/UL — HIGH (ref 3.8–10.5)
WBC # FLD AUTO: 11.54 K/UL — HIGH (ref 3.8–10.5)

## 2019-11-25 PROCEDURE — 99232 SBSQ HOSP IP/OBS MODERATE 35: CPT

## 2019-11-25 RX ADMIN — AMIODARONE HYDROCHLORIDE 400 MILLIGRAM(S): 400 TABLET ORAL at 05:23

## 2019-11-25 RX ADMIN — SENNA PLUS 2 TABLET(S): 8.6 TABLET ORAL at 21:57

## 2019-11-25 RX ADMIN — Medication 25 MILLIGRAM(S): at 17:33

## 2019-11-25 RX ADMIN — Medication 4: at 08:59

## 2019-11-25 RX ADMIN — ESCITALOPRAM OXALATE 5 MILLIGRAM(S): 10 TABLET, FILM COATED ORAL at 11:50

## 2019-11-25 RX ADMIN — ENOXAPARIN SODIUM 40 MILLIGRAM(S): 100 INJECTION SUBCUTANEOUS at 11:50

## 2019-11-25 RX ADMIN — FAMOTIDINE 20 MILLIGRAM(S): 10 INJECTION INTRAVENOUS at 11:50

## 2019-11-25 RX ADMIN — ATORVASTATIN CALCIUM 80 MILLIGRAM(S): 80 TABLET, FILM COATED ORAL at 21:57

## 2019-11-25 RX ADMIN — POLYETHYLENE GLYCOL 3350 17 GRAM(S): 17 POWDER, FOR SOLUTION ORAL at 11:49

## 2019-11-25 RX ADMIN — LEVETIRACETAM 400 MILLIGRAM(S): 250 TABLET, FILM COATED ORAL at 17:52

## 2019-11-25 RX ADMIN — Medication 4 MILLIGRAM(S): at 05:22

## 2019-11-25 RX ADMIN — Medication 2: at 17:48

## 2019-11-25 RX ADMIN — Medication 25 MILLIGRAM(S): at 05:23

## 2019-11-25 RX ADMIN — AMIODARONE HYDROCHLORIDE 400 MILLIGRAM(S): 400 TABLET ORAL at 17:48

## 2019-11-25 RX ADMIN — Medication 20 MILLIGRAM(S): at 11:48

## 2019-11-25 RX ADMIN — Medication 2: at 12:37

## 2019-11-25 RX ADMIN — LEVETIRACETAM 400 MILLIGRAM(S): 250 TABLET, FILM COATED ORAL at 05:23

## 2019-11-25 RX ADMIN — LISINOPRIL 20 MILLIGRAM(S): 2.5 TABLET ORAL at 05:23

## 2019-11-25 RX ADMIN — Medication 25 MILLIGRAM(S): at 21:57

## 2019-11-25 RX ADMIN — AMLODIPINE BESYLATE 10 MILLIGRAM(S): 2.5 TABLET ORAL at 05:23

## 2019-11-25 NOTE — PROGRESS NOTE ADULT - SUBJECTIVE AND OBJECTIVE BOX
Subjective: Patient seen and examined. No new events except as noted.   resting comfortably     REVIEW OF SYSTEMS:    CONSTITUTIONAL: + weakness, fevers or chills  EYES/ENT: No visual changes;  No vertigo or throat pain   NECK: No pain or stiffness  RESPIRATORY: No cough, wheezing, hemoptysis; No shortness of breath  CARDIOVASCULAR: No chest pain or palpitations  GASTROINTESTINAL: No abdominal or epigastric pain. No nausea, vomiting, or hematemesis; No diarrhea or constipation. No melena or hematochezia.  GENITOURINARY: No dysuria, frequency or hematuria  NEUROLOGICAL: No numbness or weakness  SKIN: No itching, burning, rashes, or lesions   All other review of systems is negative unless indicated above.    MEDICATIONS:  MEDICATIONS  (STANDING):  aMIOdarone    Tablet 400 milliGRAM(s) Oral two times a day  amLODIPine   Tablet 10 milliGRAM(s) Oral daily  atorvastatin 80 milliGRAM(s) Oral at bedtime  dexAMETHasone  Injectable 4 milliGRAM(s) IV Push daily  dextrose 5%. 1000 milliLiter(s) (50 mL/Hr) IV Continuous <Continuous>  dextrose 50% Injectable 25 Gram(s) IV Push once  dextrose 50% Injectable 12.5 Gram(s) IV Push once  dextrose 50% Injectable 25 Gram(s) IV Push once  dextrose 50% Injectable 25 Gram(s) IV Push once  enoxaparin Injectable 40 milliGRAM(s) SubCutaneous daily  escitalopram 5 milliGRAM(s) Oral daily  famotidine    Tablet 20 milliGRAM(s) Oral daily  insulin lispro (HumaLOG) corrective regimen sliding scale   SubCutaneous three times a day before meals  insulin lispro (HumaLOG) corrective regimen sliding scale   SubCutaneous at bedtime  levETIRAcetam  IVPB 750 milliGRAM(s) IV Intermittent every 12 hours  lisinopril 20 milliGRAM(s) Oral daily  metoprolol tartrate 25 milliGRAM(s) Oral every 8 hours  polyethylene glycol 3350 17 Gram(s) Oral daily  senna 2 Tablet(s) Oral at bedtime  sodium chloride 0.9% Bolus 250 milliLiter(s) IV Bolus once      PHYSICAL EXAM:  T(C): 36.4 (11-25-19 @ 08:04), Max: 36.7 (11-24-19 @ 12:09)  HR: 60 (11-25-19 @ 08:04) (56 - 62)  BP: 169/88 (11-25-19 @ 08:04) (126/69 - 175/90)  RR: 18 (11-25-19 @ 08:04) (18 - 18)  SpO2: 95% (11-25-19 @ 08:04) (95% - 96%)  Wt(kg): --  I&O's Summary    24 Nov 2019 07:01  -  25 Nov 2019 07:00  --------------------------------------------------------  IN: 480 mL / OUT: 0 mL / NET: 480 mL    25 Nov 2019 07:01  -  25 Nov 2019 11:28  --------------------------------------------------------  IN: 240 mL / OUT: 0 mL / NET: 240 mL          Appearance: NAD, alert   HEENT:   Dry oral mucosa   Lymphatic: No lymphadenopathy  Cardiovascular: Normal S1 S2, No JVD, No murmurs, No edema  Respiratory: Decreased bs   Psychiatry: A & O x) 0  Gastrointestinal:  Soft, Non-tender, + BS	  Skin: No rashes, No ecchymoses, No cyanosis	  Extremities: Decreased  range of motion, No clubbing, cyanosis or edema  Vascular: Peripheral pulses palpable 2+ bilaterally  Neurological:  MS: calm, more alert , following commands, answers to orientation questions, [id number]: all extremities moving, pulling with force against restraints periodically  Sensation: withdraws to touch all four extremities        LABS:    CARDIAC MARKERS:                                12.8   11.54 )-----------( 271      ( 25 Nov 2019 09:10 )             41.2     11-25    141  |  111<H>  |  27<H>  ----------------------------<  184<H>  3.8   |  21<L>  |  1.08    Ca    9.0      25 Nov 2019 05:55      proBNP:   Lipid Profile:   HgA1c:   TSH:             TELEMETRY: 	    ECG:  	  RADIOLOGY:   DIAGNOSTIC TESTING:  [ ] Echocardiogram:  [ ]  Catheterization:  [ ] Stress Test:    OTHER:

## 2019-11-25 NOTE — PROGRESS NOTE ADULT - SUBJECTIVE AND OBJECTIVE BOX
CC: F/U for Leukocytosis    Saw/spoke to patient. No fevers, no chills. No new complaints. Unchanged.    Allergies  No Known Allergies    ANTIMICROBIALS:  Off    PE:    Vital Signs Last 24 Hrs  T(C): 36.7 (25 Nov 2019 11:44), Max: 36.7 (24 Nov 2019 19:46)  T(F): 98 (25 Nov 2019 11:44), Max: 98.1 (24 Nov 2019 19:46)  HR: 66 (25 Nov 2019 13:32) (56 - 66)  BP: 113/67 (25 Nov 2019 13:32) (113/67 - 175/90)  RR: 16 (25 Nov 2019 13:32) (16 - 20)  SpO2: 96% (25 Nov 2019 13:32) (95% - 96%)    Gen: AOx3, NAD, non-toxic, pleasant  CV: S1+S2 normal, nontachycardic  Resp: Clear bilat, no resp distress, no crackles/wheezes  Abd: Soft, nontender, +BS  Ext: No LE edema, no wounds    LABS:                        12.8   11.54 )-----------( 271      ( 25 Nov 2019 09:10 )             41.2     11-25    141  |  111<H>  |  27<H>  ----------------------------<  184<H>  3.8   |  21<L>  |  1.08    Ca    9.0      25 Nov 2019 05:55    MICROBIOLOGY:    .Blood Blood  11-08-19   No growth at 5 days.     .Urine Catheterized  11-08-19   >100,000 CFU/ml Proteus mirabilis ESBL  --  Proteus mirabilis ESBL    (otherwise reviewed)    RADIOLOGY:    11/13 XR:    FINDINGS/  IMPRESSION:       There is evidence of penetration without aspiration of the tested   consistencies.    For further information and recommendations, please refer to the speech   pathologist final report which is available for review in the electronic   medical record.

## 2019-11-25 NOTE — PROGRESS NOTE ADULT - ASSESSMENT
69 year old man with PMHx T2DM, stage IV glioblastoma multiforme (dx in 2018), HTN and PSHx appendectomy presents to the ED with 3 weeks of increased R hemiparesis, worsened aphasia and urinary incontinence, sent from his neuro-oncologist Dr. Cuba's office.  Leukocytosis, no fever  Poor mental status, not able to provide ROS  UA negative, UCX Proteus, ESBL  CXR clear  Given 7 day course ertapenem for aspiration vs UTI  Clinically improved (mental status markedly improved), WBC decreasing, no further signs infection  Presently low suspicion for infection  Overall, leukocytosis, aspiration, malignancy, altered mental status  - Continue monitor off abx for now  - Trend WBC, monitor for fevers, monitor for further signs infection  - Further workup if focal signs infection  - Signing off. Please call with further questions or change in status.    Praveen Mays MD  Pager [phone]  After 5pm and on weekends call [phone]

## 2019-11-26 LAB
ANION GAP SERPL CALC-SCNC: 10 MMOL/L — SIGNIFICANT CHANGE UP (ref 5–17)
BUN SERPL-MCNC: 27 MG/DL — HIGH (ref 7–23)
CALCIUM SERPL-MCNC: 9 MG/DL — SIGNIFICANT CHANGE UP (ref 8.4–10.5)
CHLORIDE SERPL-SCNC: 110 MMOL/L — HIGH (ref 96–108)
CO2 SERPL-SCNC: 23 MMOL/L — SIGNIFICANT CHANGE UP (ref 22–31)
CREAT SERPL-MCNC: 1.01 MG/DL — SIGNIFICANT CHANGE UP (ref 0.5–1.3)
GLUCOSE BLDC GLUCOMTR-MCNC: 168 MG/DL — HIGH (ref 70–99)
GLUCOSE BLDC GLUCOMTR-MCNC: 205 MG/DL — HIGH (ref 70–99)
GLUCOSE BLDC GLUCOMTR-MCNC: 224 MG/DL — HIGH (ref 70–99)
GLUCOSE BLDC GLUCOMTR-MCNC: 276 MG/DL — HIGH (ref 70–99)
GLUCOSE SERPL-MCNC: 196 MG/DL — HIGH (ref 70–99)
HCT VFR BLD CALC: 41.3 % — SIGNIFICANT CHANGE UP (ref 39–50)
HGB BLD-MCNC: 13.4 G/DL — SIGNIFICANT CHANGE UP (ref 13–17)
MCHC RBC-ENTMCNC: 29.8 PG — SIGNIFICANT CHANGE UP (ref 27–34)
MCHC RBC-ENTMCNC: 32.4 GM/DL — SIGNIFICANT CHANGE UP (ref 32–36)
MCV RBC AUTO: 92 FL — SIGNIFICANT CHANGE UP (ref 80–100)
PLATELET # BLD AUTO: 307 K/UL — SIGNIFICANT CHANGE UP (ref 150–400)
POTASSIUM SERPL-MCNC: 3.6 MMOL/L — SIGNIFICANT CHANGE UP (ref 3.5–5.3)
POTASSIUM SERPL-SCNC: 3.6 MMOL/L — SIGNIFICANT CHANGE UP (ref 3.5–5.3)
RBC # BLD: 4.49 M/UL — SIGNIFICANT CHANGE UP (ref 4.2–5.8)
RBC # FLD: 13.6 % — SIGNIFICANT CHANGE UP (ref 10.3–14.5)
SODIUM SERPL-SCNC: 143 MMOL/L — SIGNIFICANT CHANGE UP (ref 135–145)
WBC # BLD: 12.05 K/UL — HIGH (ref 3.8–10.5)
WBC # FLD AUTO: 12.05 K/UL — HIGH (ref 3.8–10.5)

## 2019-11-26 RX ADMIN — AMIODARONE HYDROCHLORIDE 400 MILLIGRAM(S): 400 TABLET ORAL at 17:29

## 2019-11-26 RX ADMIN — Medication 6: at 13:24

## 2019-11-26 RX ADMIN — ATORVASTATIN CALCIUM 80 MILLIGRAM(S): 80 TABLET, FILM COATED ORAL at 21:29

## 2019-11-26 RX ADMIN — FAMOTIDINE 20 MILLIGRAM(S): 10 INJECTION INTRAVENOUS at 12:00

## 2019-11-26 RX ADMIN — LEVETIRACETAM 400 MILLIGRAM(S): 250 TABLET, FILM COATED ORAL at 05:57

## 2019-11-26 RX ADMIN — ENOXAPARIN SODIUM 40 MILLIGRAM(S): 100 INJECTION SUBCUTANEOUS at 12:00

## 2019-11-26 RX ADMIN — Medication 25 MILLIGRAM(S): at 05:57

## 2019-11-26 RX ADMIN — AMIODARONE HYDROCHLORIDE 400 MILLIGRAM(S): 400 TABLET ORAL at 05:57

## 2019-11-26 RX ADMIN — AMLODIPINE BESYLATE 10 MILLIGRAM(S): 2.5 TABLET ORAL at 05:57

## 2019-11-26 RX ADMIN — SENNA PLUS 2 TABLET(S): 8.6 TABLET ORAL at 21:29

## 2019-11-26 RX ADMIN — LEVETIRACETAM 400 MILLIGRAM(S): 250 TABLET, FILM COATED ORAL at 17:29

## 2019-11-26 RX ADMIN — LISINOPRIL 20 MILLIGRAM(S): 2.5 TABLET ORAL at 05:57

## 2019-11-26 RX ADMIN — Medication 4: at 17:30

## 2019-11-26 RX ADMIN — ESCITALOPRAM OXALATE 5 MILLIGRAM(S): 10 TABLET, FILM COATED ORAL at 12:00

## 2019-11-26 RX ADMIN — Medication 4 MILLIGRAM(S): at 05:57

## 2019-11-26 NOTE — CHART NOTE - NSCHARTNOTEFT_GEN_A_CORE
This is to indicate  that Mr. Barber has a high grade GBM that associated to his poor functionality and age has a very poor prognosis. I would not be surprised if he has a life threatening event during the next six months and particularly during the upcoming weeks ( Prognosis likely of days to weeks).     Adam Wynne MD.   [phone]

## 2019-11-26 NOTE — HISTORY OF PRESENT ILLNESS
[FreeTextEntry1] : Julio Tyler is a 67 yo male who presented at this office for a neuro oncologic follow up \par In brief\par Jan/ 2018 - p/w headaches and expressive speech difficulty\par MRI - large left sided lesion \par Resection on 1/29 - pathology was GBM, IDH wt.\par MRI 2/17 - mass still large - measuring 8.6 x 3.1 cm - mass effect was reduced.\par Chemoradiation completed on 3/23/2018.\par 4/24/2018- MRI reviewed and first TMZ cycle was ordered ( Dose :325 mg). However future visits was transferred to emergency clinic due to insurance restrictions\par Patient only got 2 more maintenance cycle of TMZ since then. Third dose of TMZ 8/13-8/17.\par 9/6/2018- Patient transferred care to Dr Cuba. MRI done. \par 9/10-9/14- 4th cycle of TMZ\par 10/2/2018- MRI done. No TMZ ordered as patient BP was elevated. Patient sent to PCP.\par Patient cancelled the appointment for 10/9 as they never followed up with her PCP\par 10/17/18- Patient followed up with Dr Lou ( PCP). Started him on HCTZ along with Lisinopril 20 mg bid and Amlodipine 10 mg\par 5th cycle : Oct 29-Nov3)\par 11/20/2018 - MRI showed nodular enhancement . However no Rx was initiated as patient had hypertensive urgency. Patient since then following up with PCP and is currently on HCTZ 25 mg PO,Losartan 100 mg PO QD, Amlodipine 5 mg PO QD, Coreg 6.25 mg bid.\par 1/15/2019- Patient is here for a follow up. Reports that he feels his gait is getting affected and at times he feels his legs are getting weak. Admits compliance to all the medications . Plan was made to repeat MRI and also recommended to follow up with PCP as BP was elevated at the time of visit\par 1/18/19- Follow up with PCP and Amlodipine was increased to 10 mg daily\par 1/22/2019- MRI stable. Will reimage in 2 months. Recommended to follow up with PCP in the meantime for BP management\par 2/26/2019- Here for a follow up. Patient BP continues to be high. PCP managing the BP regimen and is currently on HCTZ 37.5 MG od, Coreg 6.25 mg bid, Amlodipine 10 mg od, Losartan 100 mg od. Plan was made with Dr Cuba to repeat MRI in a week , However family decided to postpone the MRI as there is noone to take him for appointments\par 3/19/2019- Patient here with a new MRI. Reports that for the past two - three days patient been sleeping a lot, poor appetite and is tired. Restarted dexamethasone 2 mg daily\par 4/16/2019- Clinically stable\par 5/14/2019- MRI reviewed. Dexamethasone decreased to 1 mg daily\par 8/28/2019- Patient here for a follow up. Reports that he is currently in a nursing home as he needed more closed supervision with his ADL's and medication management. MRI reviewed and plan was made to repeat MRI in 3 months.\par 11/5/2019- Clinically worse, send into ER to evaluate tumor progression\par Denies headaches, seizures, weakness, numbness, tingling.\par \par \par \par

## 2019-11-26 NOTE — PROGRESS NOTE ADULT - SUBJECTIVE AND OBJECTIVE BOX
Subjective: Patient seen and examined. No new events except as noted.     REVIEW OF SYSTEMS:    CONSTITUTIONAL: No weakness, fevers or chills  EYES/ENT: No visual changes;  No vertigo or throat pain   NECK: No pain or stiffness  RESPIRATORY: No cough, wheezing, hemoptysis; No shortness of breath  CARDIOVASCULAR: No chest pain or palpitations  GASTROINTESTINAL: No abdominal or epigastric pain. No nausea, vomiting, or hematemesis; No diarrhea or constipation. No melena or hematochezia.  GENITOURINARY: No dysuria, frequency or hematuria  NEUROLOGICAL: No numbness or weakness  SKIN: No itching, burning, rashes, or lesions   All other review of systems is negative unless indicated above.    MEDICATIONS:  MEDICATIONS  (STANDING):  aMIOdarone    Tablet 400 milliGRAM(s) Oral two times a day  amLODIPine   Tablet 10 milliGRAM(s) Oral daily  atorvastatin 80 milliGRAM(s) Oral at bedtime  dexAMETHasone  Injectable 4 milliGRAM(s) IV Push daily  dextrose 5%. 1000 milliLiter(s) (50 mL/Hr) IV Continuous <Continuous>  dextrose 50% Injectable 25 Gram(s) IV Push once  dextrose 50% Injectable 12.5 Gram(s) IV Push once  dextrose 50% Injectable 25 Gram(s) IV Push once  dextrose 50% Injectable 25 Gram(s) IV Push once  enoxaparin Injectable 40 milliGRAM(s) SubCutaneous daily  escitalopram 5 milliGRAM(s) Oral daily  famotidine    Tablet 20 milliGRAM(s) Oral daily  insulin lispro (HumaLOG) corrective regimen sliding scale   SubCutaneous three times a day before meals  insulin lispro (HumaLOG) corrective regimen sliding scale   SubCutaneous at bedtime  levETIRAcetam  IVPB 750 milliGRAM(s) IV Intermittent every 12 hours  lisinopril 20 milliGRAM(s) Oral daily  metoprolol tartrate 25 milliGRAM(s) Oral every 8 hours  polyethylene glycol 3350 17 Gram(s) Oral daily  senna 2 Tablet(s) Oral at bedtime  sodium chloride 0.9% Bolus 250 milliLiter(s) IV Bolus once      PHYSICAL EXAM:  T(C): 36.8 (11-26-19 @ 08:04), Max: 36.8 (11-26-19 @ 04:56)  HR: 50 (11-26-19 @ 08:04) (50 - 85)  BP: 174/88 (11-26-19 @ 08:04) (113/67 - 174/88)  RR: 19 (11-26-19 @ 08:04) (16 - 20)  SpO2: 96% (11-26-19 @ 08:04) (95% - 98%)  Wt(kg): --  I&O's Summary    25 Nov 2019 07:01  -  26 Nov 2019 07:00  --------------------------------------------------------  IN: 600 mL / OUT: 0 mL / NET: 600 mL    26 Nov 2019 07:01  -  26 Nov 2019 10:11  --------------------------------------------------------  IN: 120 mL / OUT: 0 mL / NET: 120 mL              Appearance: NAD, alert   HEENT:   Dry oral mucosa   Lymphatic: No lymphadenopathy  Cardiovascular: Normal S1 S2, No JVD, No murmurs, No edema  Respiratory: Decreased bs   Psychiatry: A & O x) 0  Gastrointestinal:  Soft, Non-tender, + BS	  Skin: No rashes, No ecchymoses, No cyanosis	  Extremities: Decreased  range of motion, No clubbing, cyanosis or edema  Vascular: Peripheral pulses palpable 2+ bilaterally  Neurological:  MS: calm, more alert , following commands, answers to orientation questions, [id number]: all extremities moving, pulling with force against restraints periodically  Sensation: withdraws to touch all four extremities        LABS:    CARDIAC MARKERS:                                13.4   12.05 )-----------( 307      ( 26 Nov 2019 09:06 )             41.3     11-26    143  |  110<H>  |  27<H>  ----------------------------<  196<H>  3.6   |  23  |  1.01    Ca    9.0      26 Nov 2019 05:39      proBNP:   Lipid Profile:   HgA1c:   TSH:             TELEMETRY: 	 SR   ECG:  	  RADIOLOGY:   DIAGNOSTIC TESTING:  [ ] Echocardiogram:  [ ]  Catheterization:  [ ] Stress Test:    OTHER:

## 2019-11-27 LAB
GLUCOSE BLDC GLUCOMTR-MCNC: 165 MG/DL — HIGH (ref 70–99)
GLUCOSE BLDC GLUCOMTR-MCNC: 187 MG/DL — HIGH (ref 70–99)
GLUCOSE BLDC GLUCOMTR-MCNC: 200 MG/DL — HIGH (ref 70–99)
GLUCOSE BLDC GLUCOMTR-MCNC: 262 MG/DL — HIGH (ref 70–99)

## 2019-11-27 RX ADMIN — Medication 2: at 08:49

## 2019-11-27 RX ADMIN — Medication 4 MILLIGRAM(S): at 05:03

## 2019-11-27 RX ADMIN — Medication 2: at 17:17

## 2019-11-27 RX ADMIN — Medication 6: at 13:00

## 2019-11-27 RX ADMIN — LEVETIRACETAM 400 MILLIGRAM(S): 250 TABLET, FILM COATED ORAL at 17:17

## 2019-11-27 RX ADMIN — FAMOTIDINE 20 MILLIGRAM(S): 10 INJECTION INTRAVENOUS at 13:04

## 2019-11-27 RX ADMIN — LEVETIRACETAM 400 MILLIGRAM(S): 250 TABLET, FILM COATED ORAL at 05:04

## 2019-11-27 NOTE — PROGRESS NOTE ADULT - SUBJECTIVE AND OBJECTIVE BOX
Subjective: Patient seen and examined. No new events except as noted.   Feels ok   no cp or sob     REVIEW OF SYSTEMS:    CONSTITUTIONAL: + weakness, fevers or chills  EYES/ENT: No visual changes;  No vertigo or throat pain   NECK: No pain or stiffness  RESPIRATORY: No cough, wheezing, hemoptysis; No shortness of breath  CARDIOVASCULAR: No chest pain or palpitations  GASTROINTESTINAL: No abdominal or epigastric pain. No nausea, vomiting, or hematemesis; No diarrhea or constipation. No melena or hematochezia.  GENITOURINARY: No dysuria, frequency or hematuria  NEUROLOGICAL: No numbness or weakness  SKIN: No itching, burning, rashes, or lesions   All other review of systems is negative unless indicated above.    MEDICATIONS:  MEDICATIONS  (STANDING):  aMIOdarone    Tablet 400 milliGRAM(s) Oral two times a day  amLODIPine   Tablet 10 milliGRAM(s) Oral daily  atorvastatin 80 milliGRAM(s) Oral at bedtime  dexAMETHasone  Injectable 4 milliGRAM(s) IV Push daily  dextrose 5%. 1000 milliLiter(s) (50 mL/Hr) IV Continuous <Continuous>  dextrose 50% Injectable 25 Gram(s) IV Push once  dextrose 50% Injectable 12.5 Gram(s) IV Push once  dextrose 50% Injectable 25 Gram(s) IV Push once  dextrose 50% Injectable 25 Gram(s) IV Push once  enoxaparin Injectable 40 milliGRAM(s) SubCutaneous daily  escitalopram 5 milliGRAM(s) Oral daily  famotidine    Tablet 20 milliGRAM(s) Oral daily  insulin lispro (HumaLOG) corrective regimen sliding scale   SubCutaneous three times a day before meals  insulin lispro (HumaLOG) corrective regimen sliding scale   SubCutaneous at bedtime  levETIRAcetam  IVPB 750 milliGRAM(s) IV Intermittent every 12 hours  lisinopril 20 milliGRAM(s) Oral daily  metoprolol tartrate 25 milliGRAM(s) Oral every 8 hours  polyethylene glycol 3350 17 Gram(s) Oral daily  senna 2 Tablet(s) Oral at bedtime  sodium chloride 0.9% Bolus 250 milliLiter(s) IV Bolus once      PHYSICAL EXAM:  T(C): 37 (11-27-19 @ 08:33), Max: 37 (11-27-19 @ 04:59)  HR: 53 (11-27-19 @ 08:33) (49 - 53)  BP: 172/89 (11-27-19 @ 08:33) (127/65 - 174/81)  RR: 18 (11-27-19 @ 08:33) (18 - 18)  SpO2: 96% (11-27-19 @ 08:33) (95% - 97%)  Wt(kg): --  I&O's Summary    26 Nov 2019 07:01  -  27 Nov 2019 07:00  --------------------------------------------------------  IN: 480 mL / OUT: 0 mL / NET: 480 mL          Appearance: NAD, alert   HEENT:   Dry oral mucosa   Lymphatic: No lymphadenopathy  Cardiovascular: Normal S1 S2, No JVD, No murmurs, No edema  Respiratory: Decreased bs   Psychiatry: A & O x) 0  Gastrointestinal:  Soft, Non-tender, + BS	  Skin: No rashes, No ecchymoses, No cyanosis	  Extremities: Decreased  range of motion, No clubbing, cyanosis or edema  Vascular: Peripheral pulses palpable 2+ bilaterally  Neurological:  MS: calm, more alert , following commands, answers to orientation questions, [id number]: all extremities moving, pulling with force against restraints periodically  Sensation: withdraws to touch all four extremities    LABS:    CARDIAC MARKERS:                                13.4   12.05 )-----------( 307      ( 26 Nov 2019 09:06 )             41.3     11-26    143  |  110<H>  |  27<H>  ----------------------------<  196<H>  3.6   |  23  |  1.01    Ca    9.0      26 Nov 2019 05:39      proBNP:   Lipid Profile:   HgA1c:   TSH:             TELEMETRY: 	    ECG:  	  RADIOLOGY:   DIAGNOSTIC TESTING:  [ ] Echocardiogram:  [ ]  Catheterization:  [ ] Stress Test:    OTHER:

## 2019-11-27 NOTE — CHART NOTE - NSCHARTNOTESELECT_GEN_ALL_CORE
Event Note/Palliative Care
Palliative Care/Event Note
Event Note
Event Note/Palliative Care
Event Note/constant
Hypertension/Event Note
ID consult/Event Note
Medicine NP/Event Note
Neurosurgery/Event Note
Nutrition Services
Palliative Care/Event Note

## 2019-11-27 NOTE — CHART NOTE - NSCHARTNOTEFT_GEN_A_CORE
I spoke with the patient's son I again d/w him about code status. We discussed about his father's poor prognosis and limited chances for surviving CPR as well as if surviving then having high chances for being dependent on life support and without any chances for meaningful recovery. His son indicated that he was for DNR/I; however, that he needed to discuss it with his sister. Will f/u with him tomorrow.     Adam Wynne MD.   [phone]

## 2019-11-28 LAB
ANION GAP SERPL CALC-SCNC: 14 MMOL/L — SIGNIFICANT CHANGE UP (ref 5–17)
BUN SERPL-MCNC: 32 MG/DL — HIGH (ref 7–23)
CALCIUM SERPL-MCNC: 8.9 MG/DL — SIGNIFICANT CHANGE UP (ref 8.4–10.5)
CHLORIDE SERPL-SCNC: 108 MMOL/L — SIGNIFICANT CHANGE UP (ref 96–108)
CO2 SERPL-SCNC: 23 MMOL/L — SIGNIFICANT CHANGE UP (ref 22–31)
CREAT SERPL-MCNC: 1.14 MG/DL — SIGNIFICANT CHANGE UP (ref 0.5–1.3)
GLUCOSE BLDC GLUCOMTR-MCNC: 157 MG/DL — HIGH (ref 70–99)
GLUCOSE BLDC GLUCOMTR-MCNC: 178 MG/DL — HIGH (ref 70–99)
GLUCOSE BLDC GLUCOMTR-MCNC: 211 MG/DL — HIGH (ref 70–99)
GLUCOSE BLDC GLUCOMTR-MCNC: 211 MG/DL — HIGH (ref 70–99)
GLUCOSE SERPL-MCNC: 164 MG/DL — HIGH (ref 70–99)
HCT VFR BLD CALC: 39.7 % — SIGNIFICANT CHANGE UP (ref 39–50)
HGB BLD-MCNC: 12.9 G/DL — LOW (ref 13–17)
MCHC RBC-ENTMCNC: 29.7 PG — SIGNIFICANT CHANGE UP (ref 27–34)
MCHC RBC-ENTMCNC: 32.5 GM/DL — SIGNIFICANT CHANGE UP (ref 32–36)
MCV RBC AUTO: 91.5 FL — SIGNIFICANT CHANGE UP (ref 80–100)
PLATELET # BLD AUTO: 283 K/UL — SIGNIFICANT CHANGE UP (ref 150–400)
POTASSIUM SERPL-MCNC: 3.6 MMOL/L — SIGNIFICANT CHANGE UP (ref 3.5–5.3)
POTASSIUM SERPL-SCNC: 3.6 MMOL/L — SIGNIFICANT CHANGE UP (ref 3.5–5.3)
RBC # BLD: 4.34 M/UL — SIGNIFICANT CHANGE UP (ref 4.2–5.8)
RBC # FLD: 13.8 % — SIGNIFICANT CHANGE UP (ref 10.3–14.5)
SODIUM SERPL-SCNC: 145 MMOL/L — SIGNIFICANT CHANGE UP (ref 135–145)
WBC # BLD: 12.63 K/UL — HIGH (ref 3.8–10.5)
WBC # FLD AUTO: 12.63 K/UL — HIGH (ref 3.8–10.5)

## 2019-11-28 RX ADMIN — Medication 2: at 08:41

## 2019-11-28 RX ADMIN — AMIODARONE HYDROCHLORIDE 400 MILLIGRAM(S): 400 TABLET ORAL at 22:16

## 2019-11-28 RX ADMIN — Medication 4: at 12:43

## 2019-11-28 RX ADMIN — AMIODARONE HYDROCHLORIDE 400 MILLIGRAM(S): 400 TABLET ORAL at 05:16

## 2019-11-28 RX ADMIN — POLYETHYLENE GLYCOL 3350 17 GRAM(S): 17 POWDER, FOR SOLUTION ORAL at 12:42

## 2019-11-28 RX ADMIN — ENOXAPARIN SODIUM 40 MILLIGRAM(S): 100 INJECTION SUBCUTANEOUS at 12:42

## 2019-11-28 RX ADMIN — Medication 4: at 17:29

## 2019-11-28 RX ADMIN — LISINOPRIL 20 MILLIGRAM(S): 2.5 TABLET ORAL at 05:16

## 2019-11-28 RX ADMIN — SENNA PLUS 2 TABLET(S): 8.6 TABLET ORAL at 22:13

## 2019-11-28 RX ADMIN — Medication 4 MILLIGRAM(S): at 05:16

## 2019-11-28 RX ADMIN — AMLODIPINE BESYLATE 10 MILLIGRAM(S): 2.5 TABLET ORAL at 05:16

## 2019-11-28 RX ADMIN — ATORVASTATIN CALCIUM 80 MILLIGRAM(S): 80 TABLET, FILM COATED ORAL at 22:13

## 2019-11-28 RX ADMIN — LEVETIRACETAM 400 MILLIGRAM(S): 250 TABLET, FILM COATED ORAL at 17:29

## 2019-11-28 RX ADMIN — ESCITALOPRAM OXALATE 5 MILLIGRAM(S): 10 TABLET, FILM COATED ORAL at 12:43

## 2019-11-28 RX ADMIN — FAMOTIDINE 20 MILLIGRAM(S): 10 INJECTION INTRAVENOUS at 12:43

## 2019-11-28 RX ADMIN — LEVETIRACETAM 400 MILLIGRAM(S): 250 TABLET, FILM COATED ORAL at 06:13

## 2019-11-28 NOTE — PROGRESS NOTE ADULT - SUBJECTIVE AND OBJECTIVE BOX
Subjective: Patient seen and examined. No new events except as noted.   Feels ok   no cp or sob     REVIEW OF SYSTEMS:    CONSTITUTIONAL: + weakness, fevers or chills  EYES/ENT: No visual changes;  No vertigo or throat pain   NECK: No pain or stiffness  RESPIRATORY: No cough, wheezing, hemoptysis; No shortness of breath  CARDIOVASCULAR: No chest pain or palpitations  GASTROINTESTINAL: No abdominal or epigastric pain. No nausea, vomiting, or hematemesis; No diarrhea or constipation. No melena or hematochezia.  GENITOURINARY: No dysuria, frequency or hematuria  NEUROLOGICAL: No numbness or weakness  SKIN: No itching, burning, rashes, or lesions   All other review of systems is negative unless indicated above.    MEDICATIONS:  MEDICATIONS  (STANDING):  aMIOdarone    Tablet 400 milliGRAM(s) Oral two times a day  amLODIPine   Tablet 10 milliGRAM(s) Oral daily  atorvastatin 80 milliGRAM(s) Oral at bedtime  dexAMETHasone  Injectable 4 milliGRAM(s) IV Push daily  dextrose 5%. 1000 milliLiter(s) (50 mL/Hr) IV Continuous <Continuous>  dextrose 50% Injectable 25 Gram(s) IV Push once  dextrose 50% Injectable 12.5 Gram(s) IV Push once  dextrose 50% Injectable 25 Gram(s) IV Push once  dextrose 50% Injectable 25 Gram(s) IV Push once  enoxaparin Injectable 40 milliGRAM(s) SubCutaneous daily  escitalopram 5 milliGRAM(s) Oral daily  famotidine    Tablet 20 milliGRAM(s) Oral daily  insulin lispro (HumaLOG) corrective regimen sliding scale   SubCutaneous three times a day before meals  insulin lispro (HumaLOG) corrective regimen sliding scale   SubCutaneous at bedtime  levETIRAcetam  IVPB 750 milliGRAM(s) IV Intermittent every 12 hours  lisinopril 20 milliGRAM(s) Oral daily  metoprolol tartrate 25 milliGRAM(s) Oral every 8 hours  polyethylene glycol 3350 17 Gram(s) Oral daily  senna 2 Tablet(s) Oral at bedtime  sodium chloride 0.9% Bolus 250 milliLiter(s) IV Bolus once      PHYSICAL EXAM:  T(C): 36.6 (11-28-19 @ 11:36), Max: 36.7 (11-27-19 @ 15:55)  HR: 58 (11-28-19 @ 11:36) (51 - 58)  BP: 166/89 (11-28-19 @ 11:36) (131/71 - 173/86)  RR: 20 (11-28-19 @ 11:36) (18 - 20)  SpO2: 95% (11-28-19 @ 11:36) (95% - 98%)  Wt(kg): --  I&O's Summary    27 Nov 2019 07:01  -  28 Nov 2019 07:00  --------------------------------------------------------  IN: 240 mL / OUT: 1 mL / NET: 239 mL    28 Nov 2019 07:01  -  28 Nov 2019 12:00  --------------------------------------------------------  IN: 140 mL / OUT: 0 mL / NET: 140 mL            Appearance: NAD, alert   HEENT:   Dry oral mucosa   Lymphatic: No lymphadenopathy  Cardiovascular: Normal S1 S2, No JVD, No murmurs, No edema  Respiratory: Decreased bs   Psychiatry: A & O x) 0  Gastrointestinal:  Soft, Non-tender, + BS	  Skin: No rashes, No ecchymoses, No cyanosis	  Extremities: Decreased  range of motion, No clubbing, cyanosis or edema  Vascular: Peripheral pulses palpable 2+ bilaterally  Neurological:  MS: calm, more alert , following commands, answers to orientation questions, [id number]: all extremities moving, pulling with force against restraints periodically  Sensation: withdraws to touch all four extremities      LABS:    CARDIAC MARKERS:                                12.9   12.63 )-----------( 283      ( 28 Nov 2019 08:59 )             39.7     11-28    145  |  108  |  32<H>  ----------------------------<  164<H>  3.6   |  23  |  1.14    Ca    8.9      28 Nov 2019 05:49      proBNP:   Lipid Profile:   HgA1c:   TSH:             TELEMETRY: 	 SR   ECG:  	  RADIOLOGY:   DIAGNOSTIC TESTING:  [ ] Echocardiogram:  [ ]  Catheterization:  [ ] Stress Test:    OTHER:

## 2019-11-29 LAB
GLUCOSE BLDC GLUCOMTR-MCNC: 137 MG/DL — HIGH (ref 70–99)
GLUCOSE BLDC GLUCOMTR-MCNC: 147 MG/DL — HIGH (ref 70–99)
GLUCOSE BLDC GLUCOMTR-MCNC: 149 MG/DL — HIGH (ref 70–99)
GLUCOSE BLDC GLUCOMTR-MCNC: 194 MG/DL — HIGH (ref 70–99)
GLUCOSE BLDC GLUCOMTR-MCNC: 194 MG/DL — HIGH (ref 70–99)
GLUCOSE BLDC GLUCOMTR-MCNC: 219 MG/DL — HIGH (ref 70–99)

## 2019-11-29 RX ORDER — CHLORHEXIDINE GLUCONATE 213 G/1000ML
1 SOLUTION TOPICAL DAILY
Refills: 0 | Status: DISCONTINUED | OUTPATIENT
Start: 2019-11-29 | End: 2019-12-06

## 2019-11-29 RX ADMIN — Medication 4 MILLIGRAM(S): at 05:24

## 2019-11-29 RX ADMIN — Medication 4: at 13:37

## 2019-11-29 RX ADMIN — AMIODARONE HYDROCHLORIDE 400 MILLIGRAM(S): 400 TABLET ORAL at 05:24

## 2019-11-29 RX ADMIN — ENOXAPARIN SODIUM 40 MILLIGRAM(S): 100 INJECTION SUBCUTANEOUS at 12:43

## 2019-11-29 RX ADMIN — POLYETHYLENE GLYCOL 3350 17 GRAM(S): 17 POWDER, FOR SOLUTION ORAL at 12:44

## 2019-11-29 RX ADMIN — Medication 0: at 22:07

## 2019-11-29 RX ADMIN — Medication 2: at 09:33

## 2019-11-29 RX ADMIN — LISINOPRIL 20 MILLIGRAM(S): 2.5 TABLET ORAL at 05:24

## 2019-11-29 RX ADMIN — AMIODARONE HYDROCHLORIDE 400 MILLIGRAM(S): 400 TABLET ORAL at 19:50

## 2019-11-29 RX ADMIN — ESCITALOPRAM OXALATE 5 MILLIGRAM(S): 10 TABLET, FILM COATED ORAL at 12:44

## 2019-11-29 RX ADMIN — LEVETIRACETAM 400 MILLIGRAM(S): 250 TABLET, FILM COATED ORAL at 05:24

## 2019-11-29 RX ADMIN — ATORVASTATIN CALCIUM 80 MILLIGRAM(S): 80 TABLET, FILM COATED ORAL at 21:55

## 2019-11-29 RX ADMIN — LEVETIRACETAM 400 MILLIGRAM(S): 250 TABLET, FILM COATED ORAL at 20:30

## 2019-11-29 RX ADMIN — Medication 25 MILLIGRAM(S): at 09:20

## 2019-11-29 RX ADMIN — SENNA PLUS 2 TABLET(S): 8.6 TABLET ORAL at 21:55

## 2019-11-29 RX ADMIN — FAMOTIDINE 20 MILLIGRAM(S): 10 INJECTION INTRAVENOUS at 12:44

## 2019-11-29 RX ADMIN — AMLODIPINE BESYLATE 10 MILLIGRAM(S): 2.5 TABLET ORAL at 05:24

## 2019-11-29 NOTE — PROGRESS NOTE ADULT - SUBJECTIVE AND OBJECTIVE BOX
Subjective: Patient seen and examined. No new events except as noted.   Resting comfortably   moved to 8 Lukas     REVIEW OF SYSTEMS:    CONSTITUTIONAL: + weakness, fevers or chills  EYES/ENT: No visual changes;  No vertigo or throat pain   NECK: No pain or stiffness  RESPIRATORY: No cough, wheezing, hemoptysis; No shortness of breath  CARDIOVASCULAR: No chest pain or palpitations  GASTROINTESTINAL: No abdominal or epigastric pain. No nausea, vomiting, or hematemesis; No diarrhea or constipation. No melena or hematochezia.  GENITOURINARY: No dysuria, frequency or hematuria  NEUROLOGICAL: No numbness or weakness  SKIN: No itching, burning, rashes, or lesions   All other review of systems is negative unless indicated above.    MEDICATIONS:  MEDICATIONS  (STANDING):  aMIOdarone    Tablet 400 milliGRAM(s) Oral two times a day  amLODIPine   Tablet 10 milliGRAM(s) Oral daily  atorvastatin 80 milliGRAM(s) Oral at bedtime  dexAMETHasone  Injectable 4 milliGRAM(s) IV Push daily  dextrose 5%. 1000 milliLiter(s) (50 mL/Hr) IV Continuous <Continuous>  dextrose 50% Injectable 25 Gram(s) IV Push once  dextrose 50% Injectable 12.5 Gram(s) IV Push once  dextrose 50% Injectable 25 Gram(s) IV Push once  dextrose 50% Injectable 25 Gram(s) IV Push once  enoxaparin Injectable 40 milliGRAM(s) SubCutaneous daily  escitalopram 5 milliGRAM(s) Oral daily  famotidine    Tablet 20 milliGRAM(s) Oral daily  insulin lispro (HumaLOG) corrective regimen sliding scale   SubCutaneous three times a day before meals  insulin lispro (HumaLOG) corrective regimen sliding scale   SubCutaneous at bedtime  levETIRAcetam  IVPB 750 milliGRAM(s) IV Intermittent every 12 hours  lisinopril 20 milliGRAM(s) Oral daily  metoprolol tartrate 25 milliGRAM(s) Oral every 8 hours  polyethylene glycol 3350 17 Gram(s) Oral daily  senna 2 Tablet(s) Oral at bedtime  sodium chloride 0.9% Bolus 250 milliLiter(s) IV Bolus once      PHYSICAL EXAM:  T(C): 36.7 (11-29-19 @ 09:06), Max: 36.8 (11-29-19 @ 00:29)  HR: 60 (11-29-19 @ 09:20) (52 - 60)  BP: 179/91 (11-29-19 @ 09:06) (126/72 - 195/92)  RR: 18 (11-29-19 @ 09:06) (18 - 20)  SpO2: 94% (11-29-19 @ 09:06) (94% - 97%)  Wt(kg): --  I&O's Summary    28 Nov 2019 07:01  -  29 Nov 2019 07:00  --------------------------------------------------------  IN: 260 mL / OUT: 350 mL / NET: -90 mL    29 Nov 2019 07:01  -  29 Nov 2019 12:53  --------------------------------------------------------  IN: 0 mL / OUT: 400 mL / NET: -400 mL            Appearance: NAD, alert   HEENT:   Dry oral mucosa   Lymphatic: No lymphadenopathy  Cardiovascular: Normal S1 S2, No JVD, No murmurs, No edema  Respiratory: Decreased bs   Psychiatry: A & O x) 0  Gastrointestinal:  Soft, Non-tender, + BS	  Skin: No rashes, No ecchymoses, No cyanosis	  Extremities: Decreased  range of motion, No clubbing, cyanosis or edema  Vascular: Peripheral pulses palpable 2+ bilaterally  Neurological:  MS: calm, more alert , following commands, answers to orientation questions, [id number]: all extremities moving, pulling with force against restraints periodically  Sensation: withdraws to touch all four extremities        LABS:    CARDIAC MARKERS:                                12.9   12.63 )-----------( 283      ( 28 Nov 2019 08:59 )             39.7     11-28    145  |  108  |  32<H>  ----------------------------<  164<H>  3.6   |  23  |  1.14    Ca    8.9      28 Nov 2019 05:49      proBNP:   Lipid Profile:   HgA1c:   TSH:             TELEMETRY: 	    ECG:  	  RADIOLOGY:   DIAGNOSTIC TESTING:  [ ] Echocardiogram:  [ ]  Catheterization:  [ ] Stress Test:    OTHER:

## 2019-11-30 LAB
GLUCOSE BLDC GLUCOMTR-MCNC: 153 MG/DL — HIGH (ref 70–99)
GLUCOSE BLDC GLUCOMTR-MCNC: 164 MG/DL — HIGH (ref 70–99)
GLUCOSE BLDC GLUCOMTR-MCNC: 217 MG/DL — HIGH (ref 70–99)
GLUCOSE BLDC GLUCOMTR-MCNC: 232 MG/DL — HIGH (ref 70–99)

## 2019-11-30 RX ADMIN — POLYETHYLENE GLYCOL 3350 17 GRAM(S): 17 POWDER, FOR SOLUTION ORAL at 11:14

## 2019-11-30 RX ADMIN — SENNA PLUS 2 TABLET(S): 8.6 TABLET ORAL at 22:16

## 2019-11-30 RX ADMIN — Medication 4: at 13:26

## 2019-11-30 RX ADMIN — LEVETIRACETAM 400 MILLIGRAM(S): 250 TABLET, FILM COATED ORAL at 18:06

## 2019-11-30 RX ADMIN — AMIODARONE HYDROCHLORIDE 400 MILLIGRAM(S): 400 TABLET ORAL at 18:05

## 2019-11-30 RX ADMIN — LISINOPRIL 20 MILLIGRAM(S): 2.5 TABLET ORAL at 05:14

## 2019-11-30 RX ADMIN — LEVETIRACETAM 400 MILLIGRAM(S): 250 TABLET, FILM COATED ORAL at 05:14

## 2019-11-30 RX ADMIN — FAMOTIDINE 20 MILLIGRAM(S): 10 INJECTION INTRAVENOUS at 11:13

## 2019-11-30 RX ADMIN — ENOXAPARIN SODIUM 40 MILLIGRAM(S): 100 INJECTION SUBCUTANEOUS at 11:13

## 2019-11-30 RX ADMIN — Medication 4: at 18:05

## 2019-11-30 RX ADMIN — ATORVASTATIN CALCIUM 80 MILLIGRAM(S): 80 TABLET, FILM COATED ORAL at 22:16

## 2019-11-30 RX ADMIN — AMLODIPINE BESYLATE 10 MILLIGRAM(S): 2.5 TABLET ORAL at 05:14

## 2019-11-30 RX ADMIN — Medication 4 MILLIGRAM(S): at 05:15

## 2019-11-30 RX ADMIN — AMIODARONE HYDROCHLORIDE 400 MILLIGRAM(S): 400 TABLET ORAL at 05:14

## 2019-11-30 RX ADMIN — ESCITALOPRAM OXALATE 5 MILLIGRAM(S): 10 TABLET, FILM COATED ORAL at 11:13

## 2019-11-30 RX ADMIN — CHLORHEXIDINE GLUCONATE 1 APPLICATION(S): 213 SOLUTION TOPICAL at 11:13

## 2019-11-30 RX ADMIN — Medication 2: at 08:53

## 2019-11-30 NOTE — PROGRESS NOTE ADULT - SUBJECTIVE AND OBJECTIVE BOX
Subjective: Patient seen and examined. No new events except as noted.     REVIEW OF SYSTEMS:    CONSTITUTIONAL: + weakness, fevers or chills  EYES/ENT: No visual changes;  No vertigo or throat pain   NECK: No pain or stiffness  RESPIRATORY: No cough, wheezing, hemoptysis; No shortness of breath  CARDIOVASCULAR: No chest pain or palpitations  GASTROINTESTINAL: No abdominal or epigastric pain. No nausea, vomiting, or hematemesis; No diarrhea or constipation. No melena or hematochezia.  GENITOURINARY: No dysuria, frequency or hematuria  NEUROLOGICAL: No numbness or weakness  SKIN: No itching, burning, rashes, or lesions   All other review of systems is negative unless indicated above.    MEDICATIONS:  MEDICATIONS  (STANDING):  aMIOdarone    Tablet 400 milliGRAM(s) Oral two times a day  amLODIPine   Tablet 10 milliGRAM(s) Oral daily  atorvastatin 80 milliGRAM(s) Oral at bedtime  chlorhexidine 2% Cloths 1 Application(s) Topical daily  dexAMETHasone  Injectable 4 milliGRAM(s) IV Push daily  dextrose 5%. 1000 milliLiter(s) (50 mL/Hr) IV Continuous <Continuous>  dextrose 50% Injectable 25 Gram(s) IV Push once  dextrose 50% Injectable 12.5 Gram(s) IV Push once  dextrose 50% Injectable 25 Gram(s) IV Push once  dextrose 50% Injectable 25 Gram(s) IV Push once  enoxaparin Injectable 40 milliGRAM(s) SubCutaneous daily  escitalopram 5 milliGRAM(s) Oral daily  famotidine    Tablet 20 milliGRAM(s) Oral daily  insulin lispro (HumaLOG) corrective regimen sliding scale   SubCutaneous three times a day before meals  insulin lispro (HumaLOG) corrective regimen sliding scale   SubCutaneous at bedtime  levETIRAcetam  IVPB 750 milliGRAM(s) IV Intermittent every 12 hours  lisinopril 20 milliGRAM(s) Oral daily  metoprolol tartrate 25 milliGRAM(s) Oral every 8 hours  polyethylene glycol 3350 17 Gram(s) Oral daily  senna 2 Tablet(s) Oral at bedtime  sodium chloride 0.9% Bolus 250 milliLiter(s) IV Bolus once      PHYSICAL EXAM:  T(C): 36.8 (11-30-19 @ 16:09), Max: 36.9 (11-29-19 @ 21:41)  HR: 57 (11-30-19 @ 16:09) (50 - 57)  BP: 158/61 (11-30-19 @ 16:09) (146/75 - 171/90)  RR: 18 (11-30-19 @ 16:09) (18 - 18)  SpO2: 94% (11-30-19 @ 16:09) (93% - 96%)  Wt(kg): --  I&O's Summary    29 Nov 2019 07:01  -  30 Nov 2019 07:00  --------------------------------------------------------  IN: 240 mL / OUT: 500 mL / NET: -260 mL    30 Nov 2019 07:01  -  30 Nov 2019 19:00  --------------------------------------------------------  IN: 380 mL / OUT: 150 mL / NET: 230 mL          Appearance: NAD, alert   HEENT:   Dry oral mucosa   Lymphatic: No lymphadenopathy  Cardiovascular: Normal S1 S2, No JVD, No murmurs, No edema  Respiratory: Decreased bs   Psychiatry: A & O x) 0  Gastrointestinal:  Soft, Non-tender, + BS	  Skin: No rashes, No ecchymoses, No cyanosis	  Extremities: Decreased  range of motion, No clubbing, cyanosis or edema  Vascular: Peripheral pulses palpable 2+ bilaterally  Neurological:  MS: calm, more alert , following commands, answers to orientation questions, [id number]: all extremities moving, pulling with force against restraints periodically  Sensation: withdraws to touch all four extremities        LABS:    CARDIAC MARKERS:                  proBNP:   Lipid Profile:   HgA1c:   TSH:             TELEMETRY: 	    ECG:  	  RADIOLOGY:   DIAGNOSTIC TESTING:  [ ] Echocardiogram:  [ ]  Catheterization:  [ ] Stress Test:    OTHER:

## 2019-12-01 LAB
GLUCOSE BLDC GLUCOMTR-MCNC: 142 MG/DL — HIGH (ref 70–99)
GLUCOSE BLDC GLUCOMTR-MCNC: 185 MG/DL — HIGH (ref 70–99)
GLUCOSE BLDC GLUCOMTR-MCNC: 199 MG/DL — HIGH (ref 70–99)
GLUCOSE BLDC GLUCOMTR-MCNC: 215 MG/DL — HIGH (ref 70–99)

## 2019-12-01 RX ADMIN — SENNA PLUS 2 TABLET(S): 8.6 TABLET ORAL at 21:24

## 2019-12-01 RX ADMIN — LISINOPRIL 20 MILLIGRAM(S): 2.5 TABLET ORAL at 05:27

## 2019-12-01 RX ADMIN — AMLODIPINE BESYLATE 10 MILLIGRAM(S): 2.5 TABLET ORAL at 05:27

## 2019-12-01 RX ADMIN — Medication 2: at 08:11

## 2019-12-01 RX ADMIN — FAMOTIDINE 20 MILLIGRAM(S): 10 INJECTION INTRAVENOUS at 11:09

## 2019-12-01 RX ADMIN — LEVETIRACETAM 400 MILLIGRAM(S): 250 TABLET, FILM COATED ORAL at 17:18

## 2019-12-01 RX ADMIN — AMIODARONE HYDROCHLORIDE 400 MILLIGRAM(S): 400 TABLET ORAL at 17:17

## 2019-12-01 RX ADMIN — Medication 2: at 17:17

## 2019-12-01 RX ADMIN — ATORVASTATIN CALCIUM 80 MILLIGRAM(S): 80 TABLET, FILM COATED ORAL at 21:24

## 2019-12-01 RX ADMIN — Medication 4: at 12:33

## 2019-12-01 RX ADMIN — ENOXAPARIN SODIUM 40 MILLIGRAM(S): 100 INJECTION SUBCUTANEOUS at 11:09

## 2019-12-01 RX ADMIN — LEVETIRACETAM 400 MILLIGRAM(S): 250 TABLET, FILM COATED ORAL at 05:28

## 2019-12-01 RX ADMIN — ESCITALOPRAM OXALATE 5 MILLIGRAM(S): 10 TABLET, FILM COATED ORAL at 11:09

## 2019-12-01 RX ADMIN — POLYETHYLENE GLYCOL 3350 17 GRAM(S): 17 POWDER, FOR SOLUTION ORAL at 11:09

## 2019-12-01 RX ADMIN — CHLORHEXIDINE GLUCONATE 1 APPLICATION(S): 213 SOLUTION TOPICAL at 11:09

## 2019-12-01 RX ADMIN — AMIODARONE HYDROCHLORIDE 400 MILLIGRAM(S): 400 TABLET ORAL at 05:28

## 2019-12-01 RX ADMIN — Medication 4 MILLIGRAM(S): at 05:27

## 2019-12-01 NOTE — PROGRESS NOTE ADULT - SUBJECTIVE AND OBJECTIVE BOX
Subjective: Patient seen and examined. No new events except as noted.   No cp or sob     REVIEW OF SYSTEMS:    CONSTITUTIONAL: + weakness, fevers or chills  EYES/ENT: No visual changes;  No vertigo or throat pain   NECK: No pain or stiffness  RESPIRATORY: No cough, wheezing, hemoptysis; No shortness of breath  CARDIOVASCULAR: No chest pain or palpitations  GASTROINTESTINAL: No abdominal or epigastric pain. No nausea, vomiting, or hematemesis; No diarrhea or constipation. No melena or hematochezia.  GENITOURINARY: No dysuria, frequency or hematuria  NEUROLOGICAL: No numbness or weakness  SKIN: No itching, burning, rashes, or lesions   All other review of systems is negative unless indicated above.    MEDICATIONS:  MEDICATIONS  (STANDING):  aMIOdarone    Tablet 400 milliGRAM(s) Oral two times a day  amLODIPine   Tablet 10 milliGRAM(s) Oral daily  atorvastatin 80 milliGRAM(s) Oral at bedtime  chlorhexidine 2% Cloths 1 Application(s) Topical daily  dexAMETHasone  Injectable 4 milliGRAM(s) IV Push daily  dextrose 5%. 1000 milliLiter(s) (50 mL/Hr) IV Continuous <Continuous>  dextrose 50% Injectable 25 Gram(s) IV Push once  dextrose 50% Injectable 12.5 Gram(s) IV Push once  dextrose 50% Injectable 25 Gram(s) IV Push once  dextrose 50% Injectable 25 Gram(s) IV Push once  enoxaparin Injectable 40 milliGRAM(s) SubCutaneous daily  escitalopram 5 milliGRAM(s) Oral daily  famotidine    Tablet 20 milliGRAM(s) Oral daily  insulin lispro (HumaLOG) corrective regimen sliding scale   SubCutaneous three times a day before meals  insulin lispro (HumaLOG) corrective regimen sliding scale   SubCutaneous at bedtime  levETIRAcetam  IVPB 750 milliGRAM(s) IV Intermittent every 12 hours  lisinopril 20 milliGRAM(s) Oral daily  metoprolol tartrate 25 milliGRAM(s) Oral every 8 hours  polyethylene glycol 3350 17 Gram(s) Oral daily  senna 2 Tablet(s) Oral at bedtime  sodium chloride 0.9% Bolus 250 milliLiter(s) IV Bolus once      PHYSICAL EXAM:  T(C): 36.6 (12-01-19 @ 08:41), Max: 37 (11-30-19 @ 23:55)  HR: 59 (12-01-19 @ 08:41) (53 - 62)  BP: 156/89 (12-01-19 @ 08:41) (147/78 - 165/88)  RR: 18 (12-01-19 @ 08:41) (18 - 18)  SpO2: 94% (12-01-19 @ 08:41) (93% - 94%)  Wt(kg): --  I&O's Summary    30 Nov 2019 07:01  -  01 Dec 2019 07:00  --------------------------------------------------------  IN: 380 mL / OUT: 150 mL / NET: 230 mL    01 Dec 2019 07:01  -  01 Dec 2019 12:15  --------------------------------------------------------  IN: 220 mL / OUT: 0 mL / NET: 220 mL            Appearance: NAD, alert   HEENT:   Dry oral mucosa   Lymphatic: No lymphadenopathy  Cardiovascular: Normal S1 S2, No JVD, No murmurs, No edema  Respiratory: Decreased bs   Psychiatry: A & O x) 0  Gastrointestinal:  Soft, Non-tender, + BS	  Skin: No rashes, No ecchymoses, No cyanosis	  Extremities: Decreased  range of motion, No clubbing, cyanosis or edema  Vascular: Peripheral pulses palpable 2+ bilaterally  Neurological:  MS: calm, more alert , following commands, answers to orientation questions, [id number]: all extremities moving, pulling with force against restraints periodically  Sensation: withdraws to touch all four extremities        LABS:    CARDIAC MARKERS:                  proBNP:   Lipid Profile:   HgA1c:   TSH:             TELEMETRY: 	    ECG:  	  RADIOLOGY:   DIAGNOSTIC TESTING:  [ ] Echocardiogram:  [ ]  Catheterization:  [ ] Stress Test:    OTHER:

## 2019-12-02 LAB
GLUCOSE BLDC GLUCOMTR-MCNC: 149 MG/DL — HIGH (ref 70–99)
GLUCOSE BLDC GLUCOMTR-MCNC: 184 MG/DL — HIGH (ref 70–99)
GLUCOSE BLDC GLUCOMTR-MCNC: 187 MG/DL — HIGH (ref 70–99)
GLUCOSE BLDC GLUCOMTR-MCNC: 188 MG/DL — HIGH (ref 70–99)

## 2019-12-02 RX ORDER — LISINOPRIL 2.5 MG/1
40 TABLET ORAL DAILY
Refills: 0 | Status: DISCONTINUED | OUTPATIENT
Start: 2019-12-02 | End: 2019-12-06

## 2019-12-02 RX ADMIN — CHLORHEXIDINE GLUCONATE 1 APPLICATION(S): 213 SOLUTION TOPICAL at 11:22

## 2019-12-02 RX ADMIN — LEVETIRACETAM 400 MILLIGRAM(S): 250 TABLET, FILM COATED ORAL at 05:33

## 2019-12-02 RX ADMIN — Medication 4 MILLIGRAM(S): at 05:32

## 2019-12-02 RX ADMIN — ATORVASTATIN CALCIUM 80 MILLIGRAM(S): 80 TABLET, FILM COATED ORAL at 21:38

## 2019-12-02 RX ADMIN — LISINOPRIL 20 MILLIGRAM(S): 2.5 TABLET ORAL at 05:32

## 2019-12-02 RX ADMIN — Medication 20 MILLIGRAM(S): at 08:30

## 2019-12-02 RX ADMIN — AMLODIPINE BESYLATE 10 MILLIGRAM(S): 2.5 TABLET ORAL at 05:32

## 2019-12-02 RX ADMIN — ENOXAPARIN SODIUM 40 MILLIGRAM(S): 100 INJECTION SUBCUTANEOUS at 11:22

## 2019-12-02 RX ADMIN — POLYETHYLENE GLYCOL 3350 17 GRAM(S): 17 POWDER, FOR SOLUTION ORAL at 11:22

## 2019-12-02 RX ADMIN — LEVETIRACETAM 400 MILLIGRAM(S): 250 TABLET, FILM COATED ORAL at 17:01

## 2019-12-02 RX ADMIN — Medication 25 MILLIGRAM(S): at 21:38

## 2019-12-02 RX ADMIN — FAMOTIDINE 20 MILLIGRAM(S): 10 INJECTION INTRAVENOUS at 11:21

## 2019-12-02 RX ADMIN — Medication 2: at 17:00

## 2019-12-02 RX ADMIN — Medication 2: at 08:29

## 2019-12-02 RX ADMIN — ESCITALOPRAM OXALATE 5 MILLIGRAM(S): 10 TABLET, FILM COATED ORAL at 11:22

## 2019-12-02 RX ADMIN — AMIODARONE HYDROCHLORIDE 400 MILLIGRAM(S): 400 TABLET ORAL at 05:32

## 2019-12-02 RX ADMIN — AMIODARONE HYDROCHLORIDE 400 MILLIGRAM(S): 400 TABLET ORAL at 17:00

## 2019-12-02 RX ADMIN — Medication 2: at 12:37

## 2019-12-02 NOTE — PROGRESS NOTE ADULT - SUBJECTIVE AND OBJECTIVE BOX
Subjective: Patient seen and examined. No new events except as noted.   Has been hypertensive     REVIEW OF SYSTEMS:    CONSTITUTIONAL: + weakness, fevers or chills  EYES/ENT: No visual changes;  No vertigo or throat pain   NECK: No pain or stiffness  RESPIRATORY: No cough, wheezing, hemoptysis; No shortness of breath  CARDIOVASCULAR: No chest pain or palpitations  GASTROINTESTINAL: No abdominal or epigastric pain. No nausea, vomiting, or hematemesis; No diarrhea or constipation. No melena or hematochezia.  GENITOURINARY: No dysuria, frequency or hematuria  NEUROLOGICAL: No numbness or weakness  SKIN: No itching, burning, rashes, or lesions   All other review of systems is negative unless indicated above.    MEDICATIONS:  MEDICATIONS  (STANDING):  aMIOdarone    Tablet 400 milliGRAM(s) Oral two times a day  amLODIPine   Tablet 10 milliGRAM(s) Oral daily  atorvastatin 80 milliGRAM(s) Oral at bedtime  chlorhexidine 2% Cloths 1 Application(s) Topical daily  dexAMETHasone  Injectable 4 milliGRAM(s) IV Push daily  dextrose 5%. 1000 milliLiter(s) (50 mL/Hr) IV Continuous <Continuous>  dextrose 50% Injectable 25 Gram(s) IV Push once  dextrose 50% Injectable 12.5 Gram(s) IV Push once  dextrose 50% Injectable 25 Gram(s) IV Push once  dextrose 50% Injectable 25 Gram(s) IV Push once  enoxaparin Injectable 40 milliGRAM(s) SubCutaneous daily  escitalopram 5 milliGRAM(s) Oral daily  famotidine    Tablet 20 milliGRAM(s) Oral daily  insulin lispro (HumaLOG) corrective regimen sliding scale   SubCutaneous three times a day before meals  insulin lispro (HumaLOG) corrective regimen sliding scale   SubCutaneous at bedtime  levETIRAcetam  IVPB 750 milliGRAM(s) IV Intermittent every 12 hours  lisinopril 20 milliGRAM(s) Oral daily  metoprolol tartrate 25 milliGRAM(s) Oral every 8 hours  polyethylene glycol 3350 17 Gram(s) Oral daily  senna 2 Tablet(s) Oral at bedtime  sodium chloride 0.9% Bolus 250 milliLiter(s) IV Bolus once      PHYSICAL EXAM:  T(C): 36.8 (12-02-19 @ 08:14), Max: 36.8 (12-02-19 @ 08:14)  HR: 60 (12-02-19 @ 08:14) (57 - 65)  BP: 185/98 (12-02-19 @ 08:14) (155/82 - 185/98)  RR: 18 (12-02-19 @ 08:14) (17 - 18)  SpO2: 94% (12-02-19 @ 08:14) (93% - 94%)  Wt(kg): --  I&O's Summary    01 Dec 2019 07:01  -  02 Dec 2019 07:00  --------------------------------------------------------  IN: 440 mL / OUT: 200 mL / NET: 240 mL            Appearance: NAD, alert   HEENT:   Dry oral mucosa   Lymphatic: No lymphadenopathy  Cardiovascular: Normal S1 S2, No JVD, No murmurs, No edema  Respiratory: Decreased bs   Psychiatry: A & O x) 0  Gastrointestinal:  Soft, Non-tender, + BS	  Skin: No rashes, No ecchymoses, No cyanosis	  Extremities: Decreased  range of motion, No clubbing, cyanosis or edema  Vascular: Peripheral pulses palpable 2+ bilaterally  Neurological:  MS: calm, more alert , following commands, answers to orientation questions, [id number]: all extremities moving, pulling with force against restraints periodically  Sensation: withdraws to touch all four extremities  a      LABS:    CARDIAC MARKERS:                  proBNP:   Lipid Profile:   HgA1c:   TSH:             TELEMETRY: 	    ECG:  	  RADIOLOGY:   DIAGNOSTIC TESTING:  [ ] Echocardiogram:  [ ]  Catheterization:  [ ] Stress Test:    OTHER:

## 2019-12-02 NOTE — PROGRESS NOTE ADULT - SUBJECTIVE AND OBJECTIVE BOX
Patient is a 69y old  Male who presents with a chief complaint of worsened R hemiparesis and aphasia (02 Dec 2019 11:19)      SUBJECTIVE / OVERNIGHT EVENTS:  No chest pain. No shortness of breath. No complaints. No events overnight.     MEDICATIONS  (STANDING):  aMIOdarone    Tablet 400 milliGRAM(s) Oral two times a day  amLODIPine   Tablet 10 milliGRAM(s) Oral daily  atorvastatin 80 milliGRAM(s) Oral at bedtime  chlorhexidine 2% Cloths 1 Application(s) Topical daily  dexAMETHasone  Injectable 4 milliGRAM(s) IV Push daily  dextrose 5%. 1000 milliLiter(s) (50 mL/Hr) IV Continuous <Continuous>  dextrose 50% Injectable 25 Gram(s) IV Push once  dextrose 50% Injectable 12.5 Gram(s) IV Push once  dextrose 50% Injectable 25 Gram(s) IV Push once  dextrose 50% Injectable 25 Gram(s) IV Push once  enoxaparin Injectable 40 milliGRAM(s) SubCutaneous daily  escitalopram 5 milliGRAM(s) Oral daily  famotidine    Tablet 20 milliGRAM(s) Oral daily  insulin lispro (HumaLOG) corrective regimen sliding scale   SubCutaneous three times a day before meals  insulin lispro (HumaLOG) corrective regimen sliding scale   SubCutaneous at bedtime  levETIRAcetam  IVPB 750 milliGRAM(s) IV Intermittent every 12 hours  lisinopril 40 milliGRAM(s) Oral daily  metoprolol tartrate 25 milliGRAM(s) Oral every 8 hours  polyethylene glycol 3350 17 Gram(s) Oral daily  senna 2 Tablet(s) Oral at bedtime  sodium chloride 0.9% Bolus 250 milliLiter(s) IV Bolus once    MEDICATIONS  (PRN):  acetaminophen  Suppository .. 650 milliGRAM(s) Rectal every 6 hours PRN Temp greater or equal to 38C (100.4F), Mild Pain (1 - 3)  bisacodyl Suppository 10 milliGRAM(s) Rectal daily PRN Constipation  dextrose 40% Gel 15 Gram(s) Oral once PRN Blood Glucose LESS THAN 70 milliGRAM(s)/deciliter  glucagon  Injectable 1 milliGRAM(s) IntraMuscular once PRN Glucose LESS THAN 70 milligrams/deciliter  glucagon  Injectable 1 milliGRAM(s) IntraMuscular once PRN Glucose LESS THAN 70 milligrams/deciliter  hydrALAZINE Injectable 20 milliGRAM(s) IV Push every 4 hours PRN SBP > 160  labetalol Injectable 20 milliGRAM(s) IV Push every 3 hours PRN Systolic blood pressure > 160  melatonin 3 milliGRAM(s) Oral at bedtime PRN Insomnia  nystatin Powder 1 Application(s) Topical every 8 hours PRN rash/itching  simethicone 80 milliGRAM(s) Chew two times a day PRN Gas  valproate sodium IVPB 250 milliGRAM(s) IV Intermittent every 8 hours PRN Agitation      Vital Signs Last 24 Hrs  T(C): 36.3 (02 Dec 2019 16:46), Max: 36.8 (02 Dec 2019 08:14)  T(F): 97.4 (02 Dec 2019 16:46), Max: 98.2 (02 Dec 2019 08:14)  HR: 65 (02 Dec 2019 16:46) (57 - 65)  BP: 160/75 (02 Dec 2019 16:46) (155/82 - 185/98)  BP(mean): --  RR: 18 (02 Dec 2019 16:46) (17 - 18)  SpO2: 95% (02 Dec 2019 16:46) (93% - 95%)  CAPILLARY BLOOD GLUCOSE      POCT Blood Glucose.: 187 mg/dL (02 Dec 2019 12:16)  POCT Blood Glucose.: 188 mg/dL (02 Dec 2019 07:53)  POCT Blood Glucose.: 142 mg/dL (01 Dec 2019 21:31)  POCT Blood Glucose.: 185 mg/dL (01 Dec 2019 17:11)    I&O's Summary    01 Dec 2019 07:01  -  02 Dec 2019 07:00  --------------------------------------------------------  IN: 440 mL / OUT: 200 mL / NET: 240 mL        PHYSICAL EXAM:  GENERAL: NAD, well-developed  HEAD:  Atraumatic, Normocephalic  EYES: EOMI, PERRLA, conjunctiva and sclera clear  NECK: Supple, No JVD  CHEST/LUNG: Clear to auscultation bilaterally; No wheeze  HEART: Regular rate and rhythm; No murmurs, rubs, or gallops  ABDOMEN: Soft, Nontender, Nondistended; Bowel sounds present  EXTREMITIES:  2+ Peripheral Pulses, No clubbing, cyanosis, or edema  PSYCH: AAOx1  NEUROLOGY: non-focal  SKIN: No rashes or lesions    LABS:                    RADIOLOGY & ADDITIONAL TESTS:    Imaging Personally Reviewed:    Consultant(s) Notes Reviewed:      Care Discussed with Consultants/Other Providers:

## 2019-12-02 NOTE — PROGRESS NOTE ADULT - ASSESSMENT
69 year old man with PMHx T2DM, stage IV glioblastoma multiforme (dx in 2018), HTN and PSHx appendectomy presents to the ED with 3 weeks of increased R hemiparesis, worsened aphasia and urinary incontinence, sent from his neruo-oncologist Dr. Cuba's office. Pt has had resection, chemo radiation, most recently was noted to be clinically worse in March 2019 with increased somnolence and steroids were increased. Per his neuro-onc, concern for progression of disease lessened given MRI results.  Patient's BP has been elevated and difficult to control.  Patient continues to be aphasic, possibly from partial/focal seizures emanating from tumor site of L temporal lobe vs. toxic metabolic encephalopathy.    Now with hypertensive urgency     Hypertensive urgency.    - Increase Lisinopril 40 mg daily   - Cont with Norvasc  - Replete K > 4 and Mg > 2  - DC planning to [facility].     Glioblastoma multiforme.   - Non operative  - off Abx  - Aspiration precautions   - c/w decadron  - c/w keppra    Type 2 diabetes mellitus.    -  RISS.     Afib.    -  Now in sinus   - amiodarone 200 daily   - Hold ALL AV jennifer blockers for HR < 60  - No AC secondary to GBM and high risk of bleed.     HLD  - c/w lipitor    bowl regimen  - senna and miralax

## 2019-12-03 ENCOUNTER — APPOINTMENT (OUTPATIENT)
Dept: MRI IMAGING | Facility: IMAGING CENTER | Age: 69
End: 2019-12-03

## 2019-12-03 ENCOUNTER — APPOINTMENT (OUTPATIENT)
Dept: NEUROLOGY | Facility: CLINIC | Age: 69
End: 2019-12-03

## 2019-12-03 LAB
ANION GAP SERPL CALC-SCNC: 14 MMOL/L — SIGNIFICANT CHANGE UP (ref 5–17)
BUN SERPL-MCNC: 30 MG/DL — HIGH (ref 7–23)
CALCIUM SERPL-MCNC: 8.9 MG/DL — SIGNIFICANT CHANGE UP (ref 8.4–10.5)
CHLORIDE SERPL-SCNC: 106 MMOL/L — SIGNIFICANT CHANGE UP (ref 96–108)
CO2 SERPL-SCNC: 23 MMOL/L — SIGNIFICANT CHANGE UP (ref 22–31)
CREAT SERPL-MCNC: 1.08 MG/DL — SIGNIFICANT CHANGE UP (ref 0.5–1.3)
GLUCOSE BLDC GLUCOMTR-MCNC: 156 MG/DL — HIGH (ref 70–99)
GLUCOSE BLDC GLUCOMTR-MCNC: 160 MG/DL — HIGH (ref 70–99)
GLUCOSE BLDC GLUCOMTR-MCNC: 192 MG/DL — HIGH (ref 70–99)
GLUCOSE BLDC GLUCOMTR-MCNC: 219 MG/DL — HIGH (ref 70–99)
GLUCOSE SERPL-MCNC: 142 MG/DL — HIGH (ref 70–99)
HCT VFR BLD CALC: 42.8 % — SIGNIFICANT CHANGE UP (ref 39–50)
HGB BLD-MCNC: 13.5 G/DL — SIGNIFICANT CHANGE UP (ref 13–17)
MCHC RBC-ENTMCNC: 29.3 PG — SIGNIFICANT CHANGE UP (ref 27–34)
MCHC RBC-ENTMCNC: 31.5 GM/DL — LOW (ref 32–36)
MCV RBC AUTO: 92.8 FL — SIGNIFICANT CHANGE UP (ref 80–100)
PLATELET # BLD AUTO: 255 K/UL — SIGNIFICANT CHANGE UP (ref 150–400)
POTASSIUM SERPL-MCNC: 3.2 MMOL/L — LOW (ref 3.5–5.3)
POTASSIUM SERPL-SCNC: 3.2 MMOL/L — LOW (ref 3.5–5.3)
RBC # BLD: 4.61 M/UL — SIGNIFICANT CHANGE UP (ref 4.2–5.8)
RBC # FLD: 14 % — SIGNIFICANT CHANGE UP (ref 10.3–14.5)
SODIUM SERPL-SCNC: 143 MMOL/L — SIGNIFICANT CHANGE UP (ref 135–145)
WBC # BLD: 11.72 K/UL — HIGH (ref 3.8–10.5)
WBC # FLD AUTO: 11.72 K/UL — HIGH (ref 3.8–10.5)

## 2019-12-03 RX ORDER — POTASSIUM CHLORIDE 20 MEQ
10 PACKET (EA) ORAL
Refills: 0 | Status: COMPLETED | OUTPATIENT
Start: 2019-12-03 | End: 2019-12-03

## 2019-12-03 RX ORDER — POTASSIUM CHLORIDE 20 MEQ
40 PACKET (EA) ORAL EVERY 4 HOURS
Refills: 0 | Status: COMPLETED | OUTPATIENT
Start: 2019-12-03 | End: 2019-12-03

## 2019-12-03 RX ADMIN — ENOXAPARIN SODIUM 40 MILLIGRAM(S): 100 INJECTION SUBCUTANEOUS at 12:21

## 2019-12-03 RX ADMIN — Medication 40 MILLIEQUIVALENT(S): at 14:22

## 2019-12-03 RX ADMIN — CHLORHEXIDINE GLUCONATE 1 APPLICATION(S): 213 SOLUTION TOPICAL at 12:27

## 2019-12-03 RX ADMIN — Medication 4: at 12:31

## 2019-12-03 RX ADMIN — LEVETIRACETAM 400 MILLIGRAM(S): 250 TABLET, FILM COATED ORAL at 17:19

## 2019-12-03 RX ADMIN — AMIODARONE HYDROCHLORIDE 400 MILLIGRAM(S): 400 TABLET ORAL at 06:09

## 2019-12-03 RX ADMIN — ATORVASTATIN CALCIUM 80 MILLIGRAM(S): 80 TABLET, FILM COATED ORAL at 21:54

## 2019-12-03 RX ADMIN — Medication 100 MILLIEQUIVALENT(S): at 20:34

## 2019-12-03 RX ADMIN — Medication 2: at 09:27

## 2019-12-03 RX ADMIN — Medication 100 MILLIEQUIVALENT(S): at 22:33

## 2019-12-03 RX ADMIN — AMLODIPINE BESYLATE 10 MILLIGRAM(S): 2.5 TABLET ORAL at 08:48

## 2019-12-03 RX ADMIN — Medication 2: at 17:24

## 2019-12-03 RX ADMIN — POLYETHYLENE GLYCOL 3350 17 GRAM(S): 17 POWDER, FOR SOLUTION ORAL at 12:28

## 2019-12-03 RX ADMIN — LEVETIRACETAM 400 MILLIGRAM(S): 250 TABLET, FILM COATED ORAL at 06:10

## 2019-12-03 RX ADMIN — Medication 4 MILLIGRAM(S): at 06:09

## 2019-12-03 RX ADMIN — Medication 40 MILLIEQUIVALENT(S): at 09:34

## 2019-12-03 RX ADMIN — Medication 25 MILLIGRAM(S): at 13:27

## 2019-12-03 RX ADMIN — Medication 25 MILLIGRAM(S): at 14:23

## 2019-12-03 RX ADMIN — Medication 100 MILLIEQUIVALENT(S): at 21:53

## 2019-12-03 RX ADMIN — LISINOPRIL 40 MILLIGRAM(S): 2.5 TABLET ORAL at 06:14

## 2019-12-03 RX ADMIN — ESCITALOPRAM OXALATE 5 MILLIGRAM(S): 10 TABLET, FILM COATED ORAL at 12:26

## 2019-12-03 RX ADMIN — FAMOTIDINE 20 MILLIGRAM(S): 10 INJECTION INTRAVENOUS at 12:26

## 2019-12-03 NOTE — PROGRESS NOTE ADULT - SUBJECTIVE AND OBJECTIVE BOX
Patient is a 69y old  Male who presents with a chief complaint of worsened R hemiparesis and aphasia (03 Dec 2019 11:34)      SUBJECTIVE / OVERNIGHT EVENTS:  No chest pain. No shortness of breath. No complaints. No events overnight.     MEDICATIONS  (STANDING):  aMIOdarone    Tablet 400 milliGRAM(s) Oral two times a day  amLODIPine   Tablet 10 milliGRAM(s) Oral daily  atorvastatin 80 milliGRAM(s) Oral at bedtime  chlorhexidine 2% Cloths 1 Application(s) Topical daily  dexAMETHasone  Injectable 4 milliGRAM(s) IV Push daily  dextrose 5%. 1000 milliLiter(s) (50 mL/Hr) IV Continuous <Continuous>  dextrose 50% Injectable 25 Gram(s) IV Push once  dextrose 50% Injectable 12.5 Gram(s) IV Push once  dextrose 50% Injectable 25 Gram(s) IV Push once  dextrose 50% Injectable 25 Gram(s) IV Push once  enoxaparin Injectable 40 milliGRAM(s) SubCutaneous daily  escitalopram 5 milliGRAM(s) Oral daily  famotidine    Tablet 20 milliGRAM(s) Oral daily  insulin lispro (HumaLOG) corrective regimen sliding scale   SubCutaneous three times a day before meals  insulin lispro (HumaLOG) corrective regimen sliding scale   SubCutaneous at bedtime  levETIRAcetam  IVPB 750 milliGRAM(s) IV Intermittent every 12 hours  lisinopril 40 milliGRAM(s) Oral daily  metoprolol tartrate 25 milliGRAM(s) Oral every 8 hours  polyethylene glycol 3350 17 Gram(s) Oral daily  potassium chloride    Tablet ER 40 milliEquivalent(s) Oral every 4 hours  senna 2 Tablet(s) Oral at bedtime  sodium chloride 0.9% Bolus 250 milliLiter(s) IV Bolus once    MEDICATIONS  (PRN):  acetaminophen  Suppository .. 650 milliGRAM(s) Rectal every 6 hours PRN Temp greater or equal to 38C (100.4F), Mild Pain (1 - 3)  bisacodyl Suppository 10 milliGRAM(s) Rectal daily PRN Constipation  dextrose 40% Gel 15 Gram(s) Oral once PRN Blood Glucose LESS THAN 70 milliGRAM(s)/deciliter  glucagon  Injectable 1 milliGRAM(s) IntraMuscular once PRN Glucose LESS THAN 70 milligrams/deciliter  glucagon  Injectable 1 milliGRAM(s) IntraMuscular once PRN Glucose LESS THAN 70 milligrams/deciliter  hydrALAZINE Injectable 20 milliGRAM(s) IV Push every 4 hours PRN SBP > 160  labetalol Injectable 20 milliGRAM(s) IV Push every 3 hours PRN Systolic blood pressure > 160  melatonin 3 milliGRAM(s) Oral at bedtime PRN Insomnia  nystatin Powder 1 Application(s) Topical every 8 hours PRN rash/itching  simethicone 80 milliGRAM(s) Chew two times a day PRN Gas  valproate sodium IVPB 250 milliGRAM(s) IV Intermittent every 8 hours PRN Agitation      Vital Signs Last 24 Hrs  T(C): 37.2 (03 Dec 2019 08:37), Max: 37.2 (03 Dec 2019 08:37)  T(F): 98.9 (03 Dec 2019 08:37), Max: 98.9 (03 Dec 2019 08:37)  HR: 52 (03 Dec 2019 08:37) (52 - 70)  BP: 161/77 (03 Dec 2019 08:37) (139/68 - 161/77)  BP(mean): --  RR: 18 (03 Dec 2019 08:37) (18 - 18)  SpO2: 94% (03 Dec 2019 08:37) (93% - 95%)  CAPILLARY BLOOD GLUCOSE      POCT Blood Glucose.: 219 mg/dL (03 Dec 2019 11:54)  POCT Blood Glucose.: 192 mg/dL (03 Dec 2019 09:10)  POCT Blood Glucose.: 149 mg/dL (02 Dec 2019 21:30)  POCT Blood Glucose.: 184 mg/dL (02 Dec 2019 16:56)    I&O's Summary    02 Dec 2019 07:01  -  03 Dec 2019 07:00  --------------------------------------------------------  IN: 100 mL / OUT: 0 mL / NET: 100 mL        PHYSICAL EXAM:  GENERAL: NAD, well-developed  HEAD:  Atraumatic, Normocephalic  EYES: EOMI, PERRLA, conjunctiva and sclera clear  NECK: Supple, No JVD  CHEST/LUNG: Clear to auscultation bilaterally; No wheeze  HEART: Regular rate and rhythm; No murmurs, rubs, or gallops  ABDOMEN: Soft, Nontender, Nondistended; Bowel sounds present  EXTREMITIES:  2+ Peripheral Pulses, No clubbing, cyanosis, or edema  PSYCH: AAOx3  NEUROLOGY: non-focal  SKIN: No rashes or lesions    LABS:                        13.5   11.72 )-----------( 255      ( 03 Dec 2019 09:23 )             42.8     12-03    143  |  106  |  30<H>  ----------------------------<  142<H>  3.2<L>   |  23  |  1.08    Ca    8.9      03 Dec 2019 07:25                RADIOLOGY & ADDITIONAL TESTS:    Imaging Personally Reviewed:    Consultant(s) Notes Reviewed:      Care Discussed with Consultants/Other Providers:

## 2019-12-03 NOTE — PROGRESS NOTE ADULT - ASSESSMENT
69 year old man with PMHx T2DM, stage IV glioblastoma multiforme (dx in 2018), HTN and PSHx appendectomy presents to the ED with 3 weeks of increased R hemiparesis, worsened aphasia and urinary incontinence, sent from his neruo-oncologist Dr. Cuba's office. Pt has had resection, chemo radiation, most recently was noted to be clinically worse in March 2019 with increased somnolence and steroids were increased. Per his neuro-onc, concern for progression of disease lessened given MRI results.  Patient's BP has been elevated and difficult to control.  Patient continues to be aphasic, possibly from partial/focal seizures emanating from tumor site of L temporal lobe vs. toxic metabolic encephalopathy.    Now with hypertensive urgency     Hypertensive urgency.    -  Lisinopril 40 mg daily   - Cont with Norvasc  - Replete K > 4 and Mg > 2  - DC planning to [facility].     Glioblastoma multiforme.   - Non operative  - Aspiration precautions   - c/w decadron  - c/w keppra    Type 2 diabetes mellitus.    -  RISS.     Afib.    -  Now in sinus   - amiodarone 200 daily   - Hold ALL AV jennifer blockers for HR < 60  - No AC secondary to GBM and high risk of bleed.     HLD  - c/w lipitor    bowl regimen  - senna and miralax

## 2019-12-03 NOTE — PROGRESS NOTE ADULT - SUBJECTIVE AND OBJECTIVE BOX
Subjective: Patient seen and examined. No new events except as noted.   appears confused this am     REVIEW OF SYSTEMS:  Unable to obtain       MEDICATIONS:  MEDICATIONS  (STANDING):  aMIOdarone    Tablet 400 milliGRAM(s) Oral two times a day  amLODIPine   Tablet 10 milliGRAM(s) Oral daily  atorvastatin 80 milliGRAM(s) Oral at bedtime  chlorhexidine 2% Cloths 1 Application(s) Topical daily  dexAMETHasone  Injectable 4 milliGRAM(s) IV Push daily  dextrose 5%. 1000 milliLiter(s) (50 mL/Hr) IV Continuous <Continuous>  dextrose 50% Injectable 25 Gram(s) IV Push once  dextrose 50% Injectable 12.5 Gram(s) IV Push once  dextrose 50% Injectable 25 Gram(s) IV Push once  dextrose 50% Injectable 25 Gram(s) IV Push once  enoxaparin Injectable 40 milliGRAM(s) SubCutaneous daily  escitalopram 5 milliGRAM(s) Oral daily  famotidine    Tablet 20 milliGRAM(s) Oral daily  insulin lispro (HumaLOG) corrective regimen sliding scale   SubCutaneous three times a day before meals  insulin lispro (HumaLOG) corrective regimen sliding scale   SubCutaneous at bedtime  levETIRAcetam  IVPB 750 milliGRAM(s) IV Intermittent every 12 hours  lisinopril 40 milliGRAM(s) Oral daily  metoprolol tartrate 25 milliGRAM(s) Oral every 8 hours  polyethylene glycol 3350 17 Gram(s) Oral daily  potassium chloride    Tablet ER 40 milliEquivalent(s) Oral every 4 hours  senna 2 Tablet(s) Oral at bedtime  sodium chloride 0.9% Bolus 250 milliLiter(s) IV Bolus once      PHYSICAL EXAM:  T(C): 37.2 (12-03-19 @ 08:37), Max: 37.2 (12-03-19 @ 08:37)  HR: 52 (12-03-19 @ 08:37) (52 - 70)  BP: 161/77 (12-03-19 @ 08:37) (139/68 - 161/77)  RR: 18 (12-03-19 @ 08:37) (18 - 18)  SpO2: 94% (12-03-19 @ 08:37) (93% - 95%)  Wt(kg): --  I&O's Summary    02 Dec 2019 07:01  -  03 Dec 2019 07:00  --------------------------------------------------------  IN: 100 mL / OUT: 0 mL / NET: 100 mL            Appearance: NAD  HEENT:   Dry oral mucosa   Lymphatic: No lymphadenopathy  Cardiovascular: Normal S1 S2, No JVD, No murmurs, No edema  Respiratory: Decreased bs   Psychiatry: A & O x) 0  Gastrointestinal:  Soft, Non-tender, + BS	  Skin: No rashes, No ecchymoses, No cyanosis	  Extremities: Decreased  range of motion, No clubbing, cyanosis or edema  Vascular: Peripheral pulses palpable 2+ bilaterally  Neurological:  MS: Confused AOx 0 Motor: all extremities moving, pulling with force against restraints periodically  Sensation: withdraws to touch all four extremities  a      LABS:    CARDIAC MARKERS:                                13.5   11.72 )-----------( 255      ( 03 Dec 2019 09:23 )             42.8     12-03    143  |  106  |  30<H>  ----------------------------<  142<H>  3.2<L>   |  23  |  1.08    Ca    8.9      03 Dec 2019 07:25      proBNP:   Lipid Profile:   HgA1c:   TSH:             TELEMETRY: 	    ECG:  	  RADIOLOGY:   DIAGNOSTIC TESTING:  [ ] Echocardiogram:  [ ]  Catheterization:  [ ] Stress Test:    OTHER:

## 2019-12-04 LAB
ANION GAP SERPL CALC-SCNC: 12 MMOL/L — SIGNIFICANT CHANGE UP (ref 5–17)
BUN SERPL-MCNC: 23 MG/DL — SIGNIFICANT CHANGE UP (ref 7–23)
CALCIUM SERPL-MCNC: 9.1 MG/DL — SIGNIFICANT CHANGE UP (ref 8.4–10.5)
CHLORIDE SERPL-SCNC: 108 MMOL/L — SIGNIFICANT CHANGE UP (ref 96–108)
CO2 SERPL-SCNC: 22 MMOL/L — SIGNIFICANT CHANGE UP (ref 22–31)
CREAT SERPL-MCNC: 0.9 MG/DL — SIGNIFICANT CHANGE UP (ref 0.5–1.3)
GLUCOSE BLDC GLUCOMTR-MCNC: 130 MG/DL — HIGH (ref 70–99)
GLUCOSE BLDC GLUCOMTR-MCNC: 154 MG/DL — HIGH (ref 70–99)
GLUCOSE BLDC GLUCOMTR-MCNC: 188 MG/DL — HIGH (ref 70–99)
GLUCOSE BLDC GLUCOMTR-MCNC: 189 MG/DL — HIGH (ref 70–99)
GLUCOSE SERPL-MCNC: 175 MG/DL — HIGH (ref 70–99)
HCT VFR BLD CALC: 41.2 % — SIGNIFICANT CHANGE UP (ref 39–50)
HGB BLD-MCNC: 13.7 G/DL — SIGNIFICANT CHANGE UP (ref 13–17)
MCHC RBC-ENTMCNC: 29.5 PG — SIGNIFICANT CHANGE UP (ref 27–34)
MCHC RBC-ENTMCNC: 33.3 GM/DL — SIGNIFICANT CHANGE UP (ref 32–36)
MCV RBC AUTO: 88.8 FL — SIGNIFICANT CHANGE UP (ref 80–100)
NRBC # BLD: 0 /100 WBCS — SIGNIFICANT CHANGE UP (ref 0–0)
PLATELET # BLD AUTO: 234 K/UL — SIGNIFICANT CHANGE UP (ref 150–400)
POTASSIUM SERPL-MCNC: 3.6 MMOL/L — SIGNIFICANT CHANGE UP (ref 3.5–5.3)
POTASSIUM SERPL-SCNC: 3.6 MMOL/L — SIGNIFICANT CHANGE UP (ref 3.5–5.3)
RBC # BLD: 4.64 M/UL — SIGNIFICANT CHANGE UP (ref 4.2–5.8)
RBC # FLD: 14.1 % — SIGNIFICANT CHANGE UP (ref 10.3–14.5)
SODIUM SERPL-SCNC: 142 MMOL/L — SIGNIFICANT CHANGE UP (ref 135–145)
WBC # BLD: 12.32 K/UL — HIGH (ref 3.8–10.5)
WBC # FLD AUTO: 12.32 K/UL — HIGH (ref 3.8–10.5)

## 2019-12-04 RX ADMIN — LEVETIRACETAM 400 MILLIGRAM(S): 250 TABLET, FILM COATED ORAL at 06:31

## 2019-12-04 RX ADMIN — POLYETHYLENE GLYCOL 3350 17 GRAM(S): 17 POWDER, FOR SOLUTION ORAL at 12:53

## 2019-12-04 RX ADMIN — Medication 25 MILLIGRAM(S): at 06:20

## 2019-12-04 RX ADMIN — CHLORHEXIDINE GLUCONATE 1 APPLICATION(S): 213 SOLUTION TOPICAL at 12:26

## 2019-12-04 RX ADMIN — Medication 2: at 08:42

## 2019-12-04 RX ADMIN — LISINOPRIL 40 MILLIGRAM(S): 2.5 TABLET ORAL at 06:20

## 2019-12-04 RX ADMIN — Medication 2: at 12:53

## 2019-12-04 RX ADMIN — SENNA PLUS 2 TABLET(S): 8.6 TABLET ORAL at 21:38

## 2019-12-04 RX ADMIN — ENOXAPARIN SODIUM 40 MILLIGRAM(S): 100 INJECTION SUBCUTANEOUS at 12:53

## 2019-12-04 RX ADMIN — AMIODARONE HYDROCHLORIDE 400 MILLIGRAM(S): 400 TABLET ORAL at 17:59

## 2019-12-04 RX ADMIN — ESCITALOPRAM OXALATE 5 MILLIGRAM(S): 10 TABLET, FILM COATED ORAL at 12:53

## 2019-12-04 RX ADMIN — ATORVASTATIN CALCIUM 80 MILLIGRAM(S): 80 TABLET, FILM COATED ORAL at 21:38

## 2019-12-04 RX ADMIN — AMLODIPINE BESYLATE 10 MILLIGRAM(S): 2.5 TABLET ORAL at 06:20

## 2019-12-04 RX ADMIN — Medication 4 MILLIGRAM(S): at 06:20

## 2019-12-04 RX ADMIN — LEVETIRACETAM 400 MILLIGRAM(S): 250 TABLET, FILM COATED ORAL at 18:08

## 2019-12-04 RX ADMIN — Medication 2: at 17:57

## 2019-12-04 RX ADMIN — FAMOTIDINE 20 MILLIGRAM(S): 10 INJECTION INTRAVENOUS at 12:53

## 2019-12-04 RX ADMIN — AMIODARONE HYDROCHLORIDE 400 MILLIGRAM(S): 400 TABLET ORAL at 06:20

## 2019-12-04 NOTE — PROGRESS NOTE ADULT - SUBJECTIVE AND OBJECTIVE BOX
Patient is a 69y old  Male who presents with a chief complaint of worsened R hemiparesis and aphasia (04 Dec 2019 11:29)      SUBJECTIVE / OVERNIGHT EVENTS:  No chest pain. No shortness of breath. No complaints. No events overnight.     MEDICATIONS  (STANDING):  aMIOdarone    Tablet 400 milliGRAM(s) Oral two times a day  amLODIPine   Tablet 10 milliGRAM(s) Oral daily  atorvastatin 80 milliGRAM(s) Oral at bedtime  chlorhexidine 2% Cloths 1 Application(s) Topical daily  dexAMETHasone  Injectable 4 milliGRAM(s) IV Push daily  dextrose 5%. 1000 milliLiter(s) (50 mL/Hr) IV Continuous <Continuous>  dextrose 50% Injectable 25 Gram(s) IV Push once  dextrose 50% Injectable 12.5 Gram(s) IV Push once  dextrose 50% Injectable 25 Gram(s) IV Push once  dextrose 50% Injectable 25 Gram(s) IV Push once  enoxaparin Injectable 40 milliGRAM(s) SubCutaneous daily  escitalopram 5 milliGRAM(s) Oral daily  famotidine    Tablet 20 milliGRAM(s) Oral daily  insulin lispro (HumaLOG) corrective regimen sliding scale   SubCutaneous three times a day before meals  insulin lispro (HumaLOG) corrective regimen sliding scale   SubCutaneous at bedtime  levETIRAcetam  IVPB 750 milliGRAM(s) IV Intermittent every 12 hours  lisinopril 40 milliGRAM(s) Oral daily  metoprolol tartrate 25 milliGRAM(s) Oral every 8 hours  polyethylene glycol 3350 17 Gram(s) Oral daily  senna 2 Tablet(s) Oral at bedtime  sodium chloride 0.9% Bolus 250 milliLiter(s) IV Bolus once    MEDICATIONS  (PRN):  acetaminophen  Suppository .. 650 milliGRAM(s) Rectal every 6 hours PRN Temp greater or equal to 38C (100.4F), Mild Pain (1 - 3)  bisacodyl Suppository 10 milliGRAM(s) Rectal daily PRN Constipation  dextrose 40% Gel 15 Gram(s) Oral once PRN Blood Glucose LESS THAN 70 milliGRAM(s)/deciliter  glucagon  Injectable 1 milliGRAM(s) IntraMuscular once PRN Glucose LESS THAN 70 milligrams/deciliter  glucagon  Injectable 1 milliGRAM(s) IntraMuscular once PRN Glucose LESS THAN 70 milligrams/deciliter  hydrALAZINE Injectable 20 milliGRAM(s) IV Push every 4 hours PRN SBP > 160  labetalol Injectable 20 milliGRAM(s) IV Push every 3 hours PRN Systolic blood pressure > 160  melatonin 3 milliGRAM(s) Oral at bedtime PRN Insomnia  nystatin Powder 1 Application(s) Topical every 8 hours PRN rash/itching  simethicone 80 milliGRAM(s) Chew two times a day PRN Gas  valproate sodium IVPB 250 milliGRAM(s) IV Intermittent every 8 hours PRN Agitation      Vital Signs Last 24 Hrs  T(C): 36.7 (04 Dec 2019 09:08), Max: 36.7 (04 Dec 2019 09:08)  T(F): 98.1 (04 Dec 2019 09:08), Max: 98.1 (04 Dec 2019 09:08)  HR: 54 (04 Dec 2019 09:08) (54 - 60)  BP: 175/93 (04 Dec 2019 09:08) (173/93 - 180/88)  BP(mean): --  RR: 18 (04 Dec 2019 09:08) (18 - 18)  SpO2: 93% (04 Dec 2019 09:08) (93% - 97%)  CAPILLARY BLOOD GLUCOSE      POCT Blood Glucose.: 188 mg/dL (04 Dec 2019 12:22)  POCT Blood Glucose.: 154 mg/dL (04 Dec 2019 08:13)  POCT Blood Glucose.: 160 mg/dL (03 Dec 2019 20:40)  POCT Blood Glucose.: 156 mg/dL (03 Dec 2019 17:15)    I&O's Summary    03 Dec 2019 07:01  -  04 Dec 2019 07:00  --------------------------------------------------------  IN: 320 mL / OUT: 300 mL / NET: 20 mL        PHYSICAL EXAM:  GENERAL: NAD, well-developed  HEAD:  Atraumatic, Normocephalic  EYES: EOMI, PERRLA, conjunctiva and sclera clear  NECK: Supple, No JVD  CHEST/LUNG: Clear to auscultation bilaterally; No wheeze  HEART: Regular rate and rhythm; No murmurs, rubs, or gallops  ABDOMEN: Soft, Nontender, Nondistended; Bowel sounds present  EXTREMITIES:  2+ Peripheral Pulses, No clubbing, cyanosis, or edema  PSYCH: AAOx3  NEUROLOGY: non-focal  SKIN: No rashes or lesions    LABS:                        13.7   12.32 )-----------( 234      ( 04 Dec 2019 09:08 )             41.2     12-04    142  |  108  |  23  ----------------------------<  175<H>  3.6   |  22  |  0.90    Ca    9.1      04 Dec 2019 09:08                RADIOLOGY & ADDITIONAL TESTS:    Imaging Personally Reviewed:    Consultant(s) Notes Reviewed:      Care Discussed with Consultants/Other Providers:

## 2019-12-04 NOTE — PROVIDER CONTACT NOTE (OTHER) - SITUATION
at 5:30 am pt pulled out his sainz. SUSI De Souza made aware. as per PA give report to day RN to call urology for new sainz insertion around 9 am. Day RN made aware.

## 2019-12-04 NOTE — PROGRESS NOTE ADULT - ASSESSMENT
69 year old man with PMHx T2DM, stage IV glioblastoma multiforme (dx in 2018), HTN and PSHx appendectomy presents to the ED with 3 weeks of increased R hemiparesis, worsened aphasia and urinary incontinence, sent from his neruo-oncologist Dr. Cuba's office. Pt has had resection, chemo radiation, most recently was noted to be clinically worse in March 2019 with increased somnolence and steroids were increased. Per his neuro-onc, concern for progression of disease lessened given MRI results.  Patient's BP has been elevated and difficult to control.  Patient continues to be aphasic, possibly from partial/focal seizures emanating from tumor site of L temporal lobe vs. toxic metabolic encephalopathy.    Now with hypertensive urgency     Hypertensive urgency.    -  Lisinopril 40 mg daily   - Cont with Norvasc  - Replete K > 4 and Mg > 2  - DC planning to [facility].     Glioblastoma multiforme.   - Non operative  - Aspiration precautions   - c/w decadron  - c/w keppra    Type 2 diabetes mellitus.    -  RISS.     Afib.    -  Now in sinus   - amiodarone 200 daily   - Hold ALL AV jennifer blockers for HR < 60  - No AC secondary to GBM and high risk of bleed.     HLD  - c/w lipitor    bowl regimen  - senna and miralax

## 2019-12-04 NOTE — PROGRESS NOTE ADULT - SUBJECTIVE AND OBJECTIVE BOX
Subjective: Patient seen and examined. No new events except as noted.     REVIEW OF SYSTEMS:  Unable to obtain     MEDICATIONS:  MEDICATIONS  (STANDING):  aMIOdarone    Tablet 400 milliGRAM(s) Oral two times a day  amLODIPine   Tablet 10 milliGRAM(s) Oral daily  atorvastatin 80 milliGRAM(s) Oral at bedtime  chlorhexidine 2% Cloths 1 Application(s) Topical daily  dexAMETHasone  Injectable 4 milliGRAM(s) IV Push daily  dextrose 5%. 1000 milliLiter(s) (50 mL/Hr) IV Continuous <Continuous>  dextrose 50% Injectable 25 Gram(s) IV Push once  dextrose 50% Injectable 12.5 Gram(s) IV Push once  dextrose 50% Injectable 25 Gram(s) IV Push once  dextrose 50% Injectable 25 Gram(s) IV Push once  enoxaparin Injectable 40 milliGRAM(s) SubCutaneous daily  escitalopram 5 milliGRAM(s) Oral daily  famotidine    Tablet 20 milliGRAM(s) Oral daily  insulin lispro (HumaLOG) corrective regimen sliding scale   SubCutaneous three times a day before meals  insulin lispro (HumaLOG) corrective regimen sliding scale   SubCutaneous at bedtime  levETIRAcetam  IVPB 750 milliGRAM(s) IV Intermittent every 12 hours  lisinopril 40 milliGRAM(s) Oral daily  metoprolol tartrate 25 milliGRAM(s) Oral every 8 hours  polyethylene glycol 3350 17 Gram(s) Oral daily  senna 2 Tablet(s) Oral at bedtime  sodium chloride 0.9% Bolus 250 milliLiter(s) IV Bolus once      PHYSICAL EXAM:  T(C): 36.7 (12-04-19 @ 09:08), Max: 36.7 (12-04-19 @ 09:08)  HR: 54 (12-04-19 @ 09:08) (54 - 61)  BP: 175/93 (12-04-19 @ 09:08) (167/86 - 180/88)  RR: 18 (12-04-19 @ 09:08) (18 - 18)  SpO2: 93% (12-04-19 @ 09:08) (92% - 97%)  Wt(kg): --  I&O's Summary    03 Dec 2019 07:01  -  04 Dec 2019 07:00  --------------------------------------------------------  IN: 320 mL / OUT: 300 mL / NET: 20 mL                Appearance: NAD  HEENT:   Dry oral mucosa   Lymphatic: No lymphadenopathy  Cardiovascular: Normal S1 S2, No JVD, No murmurs, No edema  Respiratory: Decreased bs   Psychiatry: A & O x) 0  Gastrointestinal:  Soft, Non-tender, + BS	  Skin: No rashes, No ecchymoses, No cyanosis	  Extremities: Decreased  range of motion, No clubbing, cyanosis or edema  Vascular: Peripheral pulses palpable 2+ bilaterally  Neurological:  MS: Confused AOx 0 Motor: all extremities moving, pulling with force against restraints periodically  Sensation: withdraws to touch all four extremities      LABS:    CARDIAC MARKERS:                                13.7   12.32 )-----------( 234      ( 04 Dec 2019 09:08 )             41.2     12-04    142  |  108  |  23  ----------------------------<  175<H>  3.6   |  22  |  0.90    Ca    9.1      04 Dec 2019 09:08      proBNP:   Lipid Profile:   HgA1c:   TSH:             TELEMETRY: 	    ECG:  	  RADIOLOGY:   DIAGNOSTIC TESTING:  [ ] Echocardiogram:  [ ]  Catheterization:  [ ] Stress Test:    OTHER:

## 2019-12-05 LAB
GLUCOSE BLDC GLUCOMTR-MCNC: 166 MG/DL — HIGH (ref 70–99)
GLUCOSE BLDC GLUCOMTR-MCNC: 168 MG/DL — HIGH (ref 70–99)
GLUCOSE BLDC GLUCOMTR-MCNC: 179 MG/DL — HIGH (ref 70–99)

## 2019-12-05 RX ADMIN — AMLODIPINE BESYLATE 10 MILLIGRAM(S): 2.5 TABLET ORAL at 05:28

## 2019-12-05 RX ADMIN — Medication 20 MILLIGRAM(S): at 14:59

## 2019-12-05 RX ADMIN — Medication 20 MILLIGRAM(S): at 09:43

## 2019-12-05 RX ADMIN — AMIODARONE HYDROCHLORIDE 400 MILLIGRAM(S): 400 TABLET ORAL at 05:27

## 2019-12-05 RX ADMIN — LEVETIRACETAM 400 MILLIGRAM(S): 250 TABLET, FILM COATED ORAL at 17:08

## 2019-12-05 RX ADMIN — ESCITALOPRAM OXALATE 5 MILLIGRAM(S): 10 TABLET, FILM COATED ORAL at 12:18

## 2019-12-05 RX ADMIN — Medication 25 MILLIGRAM(S): at 22:04

## 2019-12-05 RX ADMIN — SENNA PLUS 2 TABLET(S): 8.6 TABLET ORAL at 22:04

## 2019-12-05 RX ADMIN — Medication 4 MILLIGRAM(S): at 05:27

## 2019-12-05 RX ADMIN — ATORVASTATIN CALCIUM 80 MILLIGRAM(S): 80 TABLET, FILM COATED ORAL at 22:04

## 2019-12-05 RX ADMIN — FAMOTIDINE 20 MILLIGRAM(S): 10 INJECTION INTRAVENOUS at 12:18

## 2019-12-05 RX ADMIN — LEVETIRACETAM 400 MILLIGRAM(S): 250 TABLET, FILM COATED ORAL at 05:27

## 2019-12-05 RX ADMIN — ENOXAPARIN SODIUM 40 MILLIGRAM(S): 100 INJECTION SUBCUTANEOUS at 12:17

## 2019-12-05 RX ADMIN — CHLORHEXIDINE GLUCONATE 1 APPLICATION(S): 213 SOLUTION TOPICAL at 12:18

## 2019-12-05 RX ADMIN — LISINOPRIL 40 MILLIGRAM(S): 2.5 TABLET ORAL at 05:28

## 2019-12-05 RX ADMIN — Medication 2: at 18:39

## 2019-12-05 RX ADMIN — Medication 2: at 09:41

## 2019-12-05 NOTE — PROGRESS NOTE ADULT - SUBJECTIVE AND OBJECTIVE BOX
Subjective: Patient seen and examined. No new events except as noted.   resting comfortably       REVIEW OF SYSTEMS:    Unable to obtain       MEDICATIONS:  MEDICATIONS  (STANDING):  aMIOdarone    Tablet 400 milliGRAM(s) Oral two times a day  amLODIPine   Tablet 10 milliGRAM(s) Oral daily  atorvastatin 80 milliGRAM(s) Oral at bedtime  chlorhexidine 2% Cloths 1 Application(s) Topical daily  dexAMETHasone  Injectable 4 milliGRAM(s) IV Push daily  dextrose 5%. 1000 milliLiter(s) (50 mL/Hr) IV Continuous <Continuous>  dextrose 50% Injectable 25 Gram(s) IV Push once  dextrose 50% Injectable 12.5 Gram(s) IV Push once  dextrose 50% Injectable 25 Gram(s) IV Push once  dextrose 50% Injectable 25 Gram(s) IV Push once  enoxaparin Injectable 40 milliGRAM(s) SubCutaneous daily  escitalopram 5 milliGRAM(s) Oral daily  famotidine    Tablet 20 milliGRAM(s) Oral daily  insulin lispro (HumaLOG) corrective regimen sliding scale   SubCutaneous three times a day before meals  insulin lispro (HumaLOG) corrective regimen sliding scale   SubCutaneous at bedtime  levETIRAcetam  IVPB 750 milliGRAM(s) IV Intermittent every 12 hours  lisinopril 40 milliGRAM(s) Oral daily  metoprolol tartrate 25 milliGRAM(s) Oral every 8 hours  polyethylene glycol 3350 17 Gram(s) Oral daily  senna 2 Tablet(s) Oral at bedtime  sodium chloride 0.9% Bolus 250 milliLiter(s) IV Bolus once      PHYSICAL EXAM:  T(C): 36.6 (12-05-19 @ 08:32), Max: 36.9 (12-05-19 @ 00:29)  HR: 62 (12-05-19 @ 08:32) (53 - 62)  BP: 163/97 (12-05-19 @ 08:32) (145/82 - 172/89)  RR: 18 (12-05-19 @ 08:32) (18 - 18)  SpO2: 94% (12-05-19 @ 08:32) (94% - 95%)  Wt(kg): --  I&O's Summary    04 Dec 2019 07:01  -  05 Dec 2019 07:00  --------------------------------------------------------  IN: 100 mL / OUT: 0 mL / NET: 100 mL          Appearance: NAD  HEENT:   Dry oral mucosa   Lymphatic: No lymphadenopathy  Cardiovascular: Normal S1 S2, No JVD, No murmurs, No edema  Respiratory: Decreased bs   Psychiatry: A & O x) 0  Gastrointestinal:  Soft, Non-tender, + BS	  Skin: No rashes, No ecchymoses, No cyanosis	  Extremities: Decreased  range of motion, No clubbing, cyanosis or edema  Vascular: Peripheral pulses palpable 2+ bilaterally  Neurological:  MS: Confused AOx 0 Motor: all extremities moving, pulling with force against restraints periodically  Sensation: withdraws to touch all four extremities          LABS:    CARDIAC MARKERS:                                13.7   12.32 )-----------( 234      ( 04 Dec 2019 09:08 )             41.2     12-04    142  |  108  |  23  ----------------------------<  175<H>  3.6   |  22  |  0.90    Ca    9.1      04 Dec 2019 09:08      proBNP:   Lipid Profile:   HgA1c:   TSH:             TELEMETRY: 	    ECG:  	  RADIOLOGY:   DIAGNOSTIC TESTING:  [ ] Echocardiogram:  [ ]  Catheterization:  [ ] Stress Test:    OTHER:

## 2019-12-05 NOTE — PROGRESS NOTE ADULT - ASSESSMENT
69 year old man with PMHx T2DM, stage IV glioblastoma multiforme (dx in 2018), HTN and PSHx appendectomy presents to the ED with 3 weeks of increased R hemiparesis, worsened aphasia and urinary incontinence, sent from his neruo-oncologist Dr. Cuba's office. Pt has had resection, chemo radiation, most recently was noted to be clinically worse in March 2019 with increased somnolence and steroids were increased. Per his neuro-onc, concern for progression of disease lessened given MRI results.  Patient's BP has been elevated and difficult to control.  Patient continues to be aphasic, possibly from partial/focal seizures emanating from tumor site of L temporal lobe vs. toxic metabolic encephalopathy.    Now with hypertensive urgency     Hypertensive urgency.    -  Lisinopril 40 mg daily   - Cont with Norvasc  - Replete K > 4 and Mg > 2  - DC planning to [facility].     Glioblastoma multiforme.   - Non operative  - Aspiration precautions   - c/w decadron  - c/w keppra    Type 2 diabetes mellitus.    -  RISS.     Afib.    -  Now in sinus   - amiodarone 200 daily   - Hold ALL AV jennifer blockers for HR < 60  - No AC secondary to GBM and high risk of bleed.     HLD  - c/w lipitor    bowl regimen  - senna and miralax

## 2019-12-05 NOTE — PROGRESS NOTE ADULT - SUBJECTIVE AND OBJECTIVE BOX
Patient is a 69y old  Male who presents with a chief complaint of worsened R hemiparesis and aphasia (05 Dec 2019 11:18)      SUBJECTIVE / OVERNIGHT EVENTS:  No chest pain. No shortness of breath. No complaints. No events overnight. confused at times. poor po intake.    MEDICATIONS  (STANDING):  aMIOdarone    Tablet 400 milliGRAM(s) Oral two times a day  amLODIPine   Tablet 10 milliGRAM(s) Oral daily  atorvastatin 80 milliGRAM(s) Oral at bedtime  chlorhexidine 2% Cloths 1 Application(s) Topical daily  dexAMETHasone  Injectable 4 milliGRAM(s) IV Push daily  dextrose 5%. 1000 milliLiter(s) (50 mL/Hr) IV Continuous <Continuous>  dextrose 50% Injectable 25 Gram(s) IV Push once  dextrose 50% Injectable 12.5 Gram(s) IV Push once  dextrose 50% Injectable 25 Gram(s) IV Push once  dextrose 50% Injectable 25 Gram(s) IV Push once  enoxaparin Injectable 40 milliGRAM(s) SubCutaneous daily  escitalopram 5 milliGRAM(s) Oral daily  famotidine    Tablet 20 milliGRAM(s) Oral daily  insulin lispro (HumaLOG) corrective regimen sliding scale   SubCutaneous three times a day before meals  insulin lispro (HumaLOG) corrective regimen sliding scale   SubCutaneous at bedtime  levETIRAcetam  IVPB 750 milliGRAM(s) IV Intermittent every 12 hours  lisinopril 40 milliGRAM(s) Oral daily  metoprolol tartrate 25 milliGRAM(s) Oral every 8 hours  polyethylene glycol 3350 17 Gram(s) Oral daily  senna 2 Tablet(s) Oral at bedtime  sodium chloride 0.9% Bolus 250 milliLiter(s) IV Bolus once    MEDICATIONS  (PRN):  acetaminophen  Suppository .. 650 milliGRAM(s) Rectal every 6 hours PRN Temp greater or equal to 38C (100.4F), Mild Pain (1 - 3)  bisacodyl Suppository 10 milliGRAM(s) Rectal daily PRN Constipation  dextrose 40% Gel 15 Gram(s) Oral once PRN Blood Glucose LESS THAN 70 milliGRAM(s)/deciliter  glucagon  Injectable 1 milliGRAM(s) IntraMuscular once PRN Glucose LESS THAN 70 milligrams/deciliter  glucagon  Injectable 1 milliGRAM(s) IntraMuscular once PRN Glucose LESS THAN 70 milligrams/deciliter  hydrALAZINE Injectable 20 milliGRAM(s) IV Push every 4 hours PRN SBP > 160  labetalol Injectable 20 milliGRAM(s) IV Push every 3 hours PRN Systolic blood pressure > 160  melatonin 3 milliGRAM(s) Oral at bedtime PRN Insomnia  nystatin Powder 1 Application(s) Topical every 8 hours PRN rash/itching  simethicone 80 milliGRAM(s) Chew two times a day PRN Gas  valproate sodium IVPB 250 milliGRAM(s) IV Intermittent every 8 hours PRN Agitation      Vital Signs Last 24 Hrs  T(C): 36.6 (05 Dec 2019 08:32), Max: 36.9 (05 Dec 2019 00:29)  T(F): 97.8 (05 Dec 2019 08:32), Max: 98.4 (05 Dec 2019 00:29)  HR: 78 (05 Dec 2019 14:57) (53 - 78)  BP: 165/87 (05 Dec 2019 14:57) (145/82 - 165/87)  BP(mean): --  RR: 18 (05 Dec 2019 14:57) (18 - 18)  SpO2: 93% (05 Dec 2019 14:57) (93% - 95%)  CAPILLARY BLOOD GLUCOSE      POCT Blood Glucose.: 179 mg/dL (05 Dec 2019 09:41)  POCT Blood Glucose.: 130 mg/dL (04 Dec 2019 22:26)  POCT Blood Glucose.: 189 mg/dL (04 Dec 2019 17:26)    I&O's Summary    04 Dec 2019 07:01  -  05 Dec 2019 07:00  --------------------------------------------------------  IN: 100 mL / OUT: 0 mL / NET: 100 mL        PHYSICAL EXAM:  GENERAL: NAD, well-developed  HEAD:  Atraumatic, Normocephalic  EYES: EOMI, PERRLA, conjunctiva and sclera clear  NECK: Supple, No JVD  CHEST/LUNG: Clear to auscultation bilaterally; No wheeze  HEART: Regular rate and rhythm; No murmurs, rubs, or gallops  ABDOMEN: Soft, Nontender, Nondistended; Bowel sounds present  EXTREMITIES:  2+ Peripheral Pulses, No clubbing, cyanosis, or edema  PSYCH: AAOx3  NEUROLOGY: non-focal  SKIN: No rashes or lesions    LABS:                        13.7   12.32 )-----------( 234      ( 04 Dec 2019 09:08 )             41.2     12-04    142  |  108  |  23  ----------------------------<  175<H>  3.6   |  22  |  0.90    Ca    9.1      04 Dec 2019 09:08                RADIOLOGY & ADDITIONAL TESTS:    Imaging Personally Reviewed:    Consultant(s) Notes Reviewed:      Care Discussed with Consultants/Other Providers:

## 2019-12-06 ENCOUNTER — TRANSCRIPTION ENCOUNTER (OUTPATIENT)
Age: 69
End: 2019-12-06

## 2019-12-06 VITALS
OXYGEN SATURATION: 95 % | RESPIRATION RATE: 18 BRPM | SYSTOLIC BLOOD PRESSURE: 142 MMHG | TEMPERATURE: 98 F | DIASTOLIC BLOOD PRESSURE: 78 MMHG | HEART RATE: 56 BPM

## 2019-12-06 LAB — GLUCOSE BLDC GLUCOMTR-MCNC: 154 MG/DL — HIGH (ref 70–99)

## 2019-12-06 PROCEDURE — 82962 GLUCOSE BLOOD TEST: CPT

## 2019-12-06 PROCEDURE — 80162 ASSAY OF DIGOXIN TOTAL: CPT

## 2019-12-06 PROCEDURE — 87086 URINE CULTURE/COLONY COUNT: CPT

## 2019-12-06 PROCEDURE — 84443 ASSAY THYROID STIM HORMONE: CPT

## 2019-12-06 PROCEDURE — 87186 SC STD MICRODIL/AGAR DIL: CPT

## 2019-12-06 PROCEDURE — 80048 BASIC METABOLIC PNL TOTAL CA: CPT

## 2019-12-06 PROCEDURE — 81001 URINALYSIS AUTO W/SCOPE: CPT

## 2019-12-06 PROCEDURE — 84132 ASSAY OF SERUM POTASSIUM: CPT

## 2019-12-06 PROCEDURE — 70553 MRI BRAIN STEM W/O & W/DYE: CPT

## 2019-12-06 PROCEDURE — 82746 ASSAY OF FOLIC ACID SERUM: CPT

## 2019-12-06 PROCEDURE — 97530 THERAPEUTIC ACTIVITIES: CPT

## 2019-12-06 PROCEDURE — 87040 BLOOD CULTURE FOR BACTERIA: CPT

## 2019-12-06 PROCEDURE — 96374 THER/PROPH/DIAG INJ IV PUSH: CPT

## 2019-12-06 PROCEDURE — 95816 EEG AWAKE AND DROWSY: CPT

## 2019-12-06 PROCEDURE — 84100 ASSAY OF PHOSPHORUS: CPT

## 2019-12-06 PROCEDURE — 93005 ELECTROCARDIOGRAM TRACING: CPT

## 2019-12-06 PROCEDURE — 82565 ASSAY OF CREATININE: CPT

## 2019-12-06 PROCEDURE — 92610 EVALUATE SWALLOWING FUNCTION: CPT

## 2019-12-06 PROCEDURE — 96375 TX/PRO/DX INJ NEW DRUG ADDON: CPT

## 2019-12-06 PROCEDURE — 97167 OT EVAL HIGH COMPLEX 60 MIN: CPT

## 2019-12-06 PROCEDURE — 99285 EMERGENCY DEPT VISIT HI MDM: CPT | Mod: 25

## 2019-12-06 PROCEDURE — 84295 ASSAY OF SERUM SODIUM: CPT

## 2019-12-06 PROCEDURE — 80053 COMPREHEN METABOLIC PANEL: CPT

## 2019-12-06 PROCEDURE — 82435 ASSAY OF BLOOD CHLORIDE: CPT

## 2019-12-06 PROCEDURE — 97161 PT EVAL LOW COMPLEX 20 MIN: CPT

## 2019-12-06 PROCEDURE — 74230 X-RAY XM SWLNG FUNCJ C+: CPT

## 2019-12-06 PROCEDURE — 96372 THER/PROPH/DIAG INJ SC/IM: CPT | Mod: XU

## 2019-12-06 PROCEDURE — 82330 ASSAY OF CALCIUM: CPT

## 2019-12-06 PROCEDURE — 84436 ASSAY OF TOTAL THYROXINE: CPT

## 2019-12-06 PROCEDURE — 70450 CT HEAD/BRAIN W/O DYE: CPT

## 2019-12-06 PROCEDURE — 85014 HEMATOCRIT: CPT

## 2019-12-06 PROCEDURE — 82803 BLOOD GASES ANY COMBINATION: CPT

## 2019-12-06 PROCEDURE — 97112 NEUROMUSCULAR REEDUCATION: CPT

## 2019-12-06 PROCEDURE — A9585: CPT

## 2019-12-06 PROCEDURE — 82947 ASSAY GLUCOSE BLOOD QUANT: CPT

## 2019-12-06 PROCEDURE — 93970 EXTREMITY STUDY: CPT

## 2019-12-06 PROCEDURE — 82607 VITAMIN B-12: CPT

## 2019-12-06 PROCEDURE — 95951: CPT

## 2019-12-06 PROCEDURE — 84484 ASSAY OF TROPONIN QUANT: CPT

## 2019-12-06 PROCEDURE — 83605 ASSAY OF LACTIC ACID: CPT

## 2019-12-06 PROCEDURE — 85610 PROTHROMBIN TIME: CPT

## 2019-12-06 PROCEDURE — 85730 THROMBOPLASTIN TIME PARTIAL: CPT

## 2019-12-06 PROCEDURE — G0515: CPT

## 2019-12-06 PROCEDURE — 92611 MOTION FLUOROSCOPY/SWALLOW: CPT

## 2019-12-06 PROCEDURE — 71275 CT ANGIOGRAPHY CHEST: CPT

## 2019-12-06 PROCEDURE — 84480 ASSAY TRIIODOTHYRONINE (T3): CPT

## 2019-12-06 PROCEDURE — 83036 HEMOGLOBIN GLYCOSYLATED A1C: CPT

## 2019-12-06 PROCEDURE — 83735 ASSAY OF MAGNESIUM: CPT

## 2019-12-06 PROCEDURE — 85027 COMPLETE CBC AUTOMATED: CPT

## 2019-12-06 PROCEDURE — 97110 THERAPEUTIC EXERCISES: CPT

## 2019-12-06 PROCEDURE — 82010 KETONE BODYS QUAN: CPT

## 2019-12-06 PROCEDURE — 71045 X-RAY EXAM CHEST 1 VIEW: CPT

## 2019-12-06 PROCEDURE — 97116 GAIT TRAINING THERAPY: CPT

## 2019-12-06 RX ORDER — POLYETHYLENE GLYCOL 3350 17 G/17G
17 POWDER, FOR SOLUTION ORAL
Qty: 0 | Refills: 0 | DISCHARGE
Start: 2019-12-06

## 2019-12-06 RX ORDER — ESCITALOPRAM OXALATE 10 MG/1
1 TABLET, FILM COATED ORAL
Qty: 30 | Refills: 0
Start: 2019-12-06 | End: 2020-01-04

## 2019-12-06 RX ORDER — LEVETIRACETAM 250 MG/1
1 TABLET, FILM COATED ORAL
Qty: 60 | Refills: 0
Start: 2019-12-06 | End: 2020-01-04

## 2019-12-06 RX ORDER — METOPROLOL TARTRATE 50 MG
1 TABLET ORAL
Qty: 90 | Refills: 0
Start: 2019-12-06 | End: 2020-01-04

## 2019-12-06 RX ORDER — LOSARTAN POTASSIUM 100 MG/1
1 TABLET, FILM COATED ORAL
Qty: 0 | Refills: 0 | DISCHARGE

## 2019-12-06 RX ORDER — AMIODARONE HYDROCHLORIDE 400 MG/1
2 TABLET ORAL
Qty: 120 | Refills: 0
Start: 2019-12-06 | End: 2020-01-04

## 2019-12-06 RX ORDER — DEXAMETHASONE 0.5 MG/5ML
4 ELIXIR ORAL
Qty: 28 | Refills: 0
Start: 2019-12-06 | End: 2019-12-12

## 2019-12-06 RX ORDER — LISINOPRIL 2.5 MG/1
1 TABLET ORAL
Qty: 0 | Refills: 0 | DISCHARGE
Start: 2019-12-06

## 2019-12-06 RX ORDER — NYSTATIN CREAM 100000 [USP'U]/G
1 CREAM TOPICAL
Qty: 0 | Refills: 0 | DISCHARGE
Start: 2019-12-06

## 2019-12-06 RX ORDER — SIMETHICONE 80 MG/1
1 TABLET, CHEWABLE ORAL
Qty: 0 | Refills: 0 | DISCHARGE
Start: 2019-12-06

## 2019-12-06 RX ADMIN — LEVETIRACETAM 400 MILLIGRAM(S): 250 TABLET, FILM COATED ORAL at 06:16

## 2019-12-06 RX ADMIN — AMIODARONE HYDROCHLORIDE 400 MILLIGRAM(S): 400 TABLET ORAL at 06:20

## 2019-12-06 RX ADMIN — CHLORHEXIDINE GLUCONATE 1 APPLICATION(S): 213 SOLUTION TOPICAL at 13:03

## 2019-12-06 RX ADMIN — LISINOPRIL 40 MILLIGRAM(S): 2.5 TABLET ORAL at 06:20

## 2019-12-06 RX ADMIN — AMLODIPINE BESYLATE 10 MILLIGRAM(S): 2.5 TABLET ORAL at 06:20

## 2019-12-06 RX ADMIN — Medication 4 MILLIGRAM(S): at 06:21

## 2019-12-06 NOTE — PROGRESS NOTE ADULT - PROBLEM SELECTOR PROBLEM 1
Glioblastoma multiforme
Hypertensive urgency
Glioblastoma multiforme

## 2019-12-06 NOTE — DISCHARGE NOTE PROVIDER - CARE PROVIDER_API CALL
Zina Cuba)  Neurology  [facility], [address] [place] [address]  Phone: [phone]  Fax: [phone]  Follow Up Time: 2 weeks

## 2019-12-06 NOTE — PROGRESS NOTE ADULT - ASSESSMENT
69 year old man with PMHx T2DM, stage IV glioblastoma multiforme (dx in 2018), HTN and PSHx appendectomy presents to the ED with 3 weeks of increased R hemiparesis, worsened aphasia and urinary incontinence, sent from his neruo-oncologist Dr. Cuba's office. Pt has had resection, chemo radiation, most recently was noted to be clinically worse in March 2019 with increased somnolence and steroids were increased. Per his neuro-onc, concern for progression of disease lessened given MRI results.  Patient's BP has been elevated and difficult to control.  Patient continues to be aphasic, possibly from partial/focal seizures emanating from tumor site of L temporal lobe vs. toxic metabolic encephalopathy.    Now with hypertensive urgency     Hypertensive urgency.    -  Lisinopril 40 mg daily   - Cont with Norvasc  - Replete K > 4 and Mg > 2  - DC planning to [facility].     Glioblastoma multiforme.   - Non operative  - Aspiration precautions   - c/w decadron taper  - c/w keppra    Type 2 diabetes mellitus.    -  RISS.     Afib.    -  Now in sinus   - amiodarone 200 daily   - Hold ALL AV jennifer blockers for HR < 60  - No AC secondary to GBM and high risk of bleed.     HLD  - c/w lipitor    bowl regimen  - senna and miralax

## 2019-12-06 NOTE — DISCHARGE NOTE PROVIDER - HOSPITAL COURSE
69 year old man with PMHx T2DM, stage IV glioblastoma multiforme (dx in 2018), HTN and PSHx appendectomy presents to the ED with 3 weeks of increased R hemiparesis, worsened aphasia and urinary incontinence, sent from his neruo-oncologist Dr. Cuba's office. Pt has had resection, chemo radiation, most recently was noted to be clinically worse in March 2019 with increased somnolence and steroids were increased. Per his neuro-onc, concern for progression of disease lessened given MRI results.  Patient's BP has been elevated and difficult to control.  Patient continues to be aphasic, possibly from partial/focal seizures emanating from tumor site of L temporal lobe vs. toxic metabolic encephalopathy.      Now with hypertensive urgency 69 year old man with PMHx T2DM, stage IV glioblastoma multiforme (dx in 2018), HTN and PSHx appendectomy presents to the ED with 3 weeks of increased R hemiparesis, worsened aphasia and urinary incontinence, sent from his neruo-oncologist Dr. Cuba's office. Pt has had resection, chemo radiation, most recently was noted to be clinically worse in March 2019 with increased somnolence and steroids were increased. Per his neuro-onc, concern for progression of disease lessened given MRI results.  Patient's BP has been elevated and difficult to control.  Patient continues to be aphasic, possibly from partial/focal seizures emanating from tumor site of L temporal lobe vs. toxic metabolic encephalopathy.  Now with hypertensive urgencyHypertensive urgency.      -  Lisinopril 40 mg daily     - Cont with Norvasc    - Replete K > 4 and Mg > 2    - DC planning to [facility].         Glioblastoma multiforme.     - Non operative    - Aspiration precautions     - c/w decadron taper    - c/w keppra        Type 2 diabetes mellitus.      -  RISS.         Afib.      -  Now in sinus     - amiodarone 200 daily     - Hold ALL AV jennifer blockers for HR < 60    - No AC secondary to GBM and high risk of bleed.         HLD    - c/w lipitor        bowl regimen    - senna and miralax                                                    69 year old man with PMH T2DM, stage IV glioblastoma multiforme (dx in 2018), HTN and PMH appendectomy presents to the ED with 3 weeks of increased R hemiparesis, worsened aphasia and urinary incontinence, sent from his neruo-oncologist Dr. Cuba's office. Pt has had resection, chemo radiation, most recently was noted to be clinically worse in March 2019 with increased somnolence and steroids were increased. Per his neuro-onc, concern for progression of disease lessened given MRI results.  Patient's BP has been elevated and difficult to control.  Patient continues to be aphasic, possibly from partial/focal seizures emanating from tumor site of L temporal lobe vs. toxic metabolic encephalopathy.  Present with hypertensive urgency now better controlled on Lisinopril and Norvasc ,Glioblastoma multiforme Keppra 750 mg bid and Decadron taper to 1 mg, Diabetes mellitus continue Metformin and sliding scale, Afib Amiodarone 200 mg daily , hyperlipemia continue Lipitor and current bowel regimen. Medication reconciliation reviewed and revised with Dr. Coronado who cleared pt for Discharge 69 year old man with PMH T2DM, stage IV glioblastoma multiforme (dx in 2018), HTN and PMH appendectomy presents to the ED with 3 weeks of increased R hemiparesis, worsened aphasia and urinary incontinence, sent from his neruo-oncologist Dr. Cuba's office. Pt has had resection, chemo radiation, most recently was noted to be clinically worse in March 2019 with increased somnolence and steroids were increased. Per his neuro-onc, concern for progression of disease lessened given MRI results.  Patient's BP has been elevated and difficult to control.  Patient continues to be aphasic, possibly from partial/focal seizures emanating from tumor site of L temporal lobe vs. toxic metabolic encephalopathy.  Present with hypertensive urgency now better controlled on Lisinopril and Norvasc ,Glioblastoma multiforme Keppra 750 mg bid and Decadron taper to 1 mg, Diabetes mellitus continue Metformin and sliding scale, Afib Amiodarone 200 mg daily , hyperlipemia continue Lipitor and current bowel regimen. Medication reconciliation reviewed and revised with Dr. Coronado who cleared pt for Discharge. Follow with Dr. Cuba after [facility].

## 2019-12-06 NOTE — PROGRESS NOTE ADULT - REASON FOR ADMISSION
worsened R hemiparesis and aphasia

## 2019-12-06 NOTE — PROGRESS NOTE ADULT - SUBJECTIVE AND OBJECTIVE BOX
Patient is a 69y old  Male who presents with a chief complaint of worsened R hemiparesis and aphasia (06 Dec 2019 12:01)      SUBJECTIVE / OVERNIGHT EVENTS:  No chest pain. No shortness of breath. No complaints. No events overnight.     MEDICATIONS  (STANDING):  aMIOdarone    Tablet 400 milliGRAM(s) Oral two times a day  amLODIPine   Tablet 10 milliGRAM(s) Oral daily  atorvastatin 80 milliGRAM(s) Oral at bedtime  chlorhexidine 2% Cloths 1 Application(s) Topical daily  dexAMETHasone  Injectable 4 milliGRAM(s) IV Push daily  dextrose 5%. 1000 milliLiter(s) (50 mL/Hr) IV Continuous <Continuous>  dextrose 50% Injectable 25 Gram(s) IV Push once  dextrose 50% Injectable 12.5 Gram(s) IV Push once  dextrose 50% Injectable 25 Gram(s) IV Push once  dextrose 50% Injectable 25 Gram(s) IV Push once  enoxaparin Injectable 40 milliGRAM(s) SubCutaneous daily  escitalopram 5 milliGRAM(s) Oral daily  famotidine    Tablet 20 milliGRAM(s) Oral daily  insulin lispro (HumaLOG) corrective regimen sliding scale   SubCutaneous three times a day before meals  insulin lispro (HumaLOG) corrective regimen sliding scale   SubCutaneous at bedtime  levETIRAcetam  IVPB 750 milliGRAM(s) IV Intermittent every 12 hours  lisinopril 40 milliGRAM(s) Oral daily  metoprolol tartrate 25 milliGRAM(s) Oral every 8 hours  polyethylene glycol 3350 17 Gram(s) Oral daily  senna 2 Tablet(s) Oral at bedtime    MEDICATIONS  (PRN):  acetaminophen  Suppository .. 650 milliGRAM(s) Rectal every 6 hours PRN Temp greater or equal to 38C (100.4F), Mild Pain (1 - 3)  bisacodyl Suppository 10 milliGRAM(s) Rectal daily PRN Constipation  dextrose 40% Gel 15 Gram(s) Oral once PRN Blood Glucose LESS THAN 70 milliGRAM(s)/deciliter  glucagon  Injectable 1 milliGRAM(s) IntraMuscular once PRN Glucose LESS THAN 70 milligrams/deciliter  glucagon  Injectable 1 milliGRAM(s) IntraMuscular once PRN Glucose LESS THAN 70 milligrams/deciliter  hydrALAZINE Injectable 20 milliGRAM(s) IV Push every 4 hours PRN SBP > 160  labetalol Injectable 20 milliGRAM(s) IV Push every 3 hours PRN Systolic blood pressure > 160  melatonin 3 milliGRAM(s) Oral at bedtime PRN Insomnia  nystatin Powder 1 Application(s) Topical every 8 hours PRN rash/itching  simethicone 80 milliGRAM(s) Chew two times a day PRN Gas  valproate sodium IVPB 250 milliGRAM(s) IV Intermittent every 8 hours PRN Agitation      Vital Signs Last 24 Hrs  T(C): 36.5 (06 Dec 2019 10:42), Max: 36.8 (05 Dec 2019 23:52)  T(F): 97.7 (06 Dec 2019 10:42), Max: 98.3 (05 Dec 2019 23:52)  HR: 54 (06 Dec 2019 10:42) (52 - 78)  BP: 152/82 (06 Dec 2019 10:42) (135/81 - 165/87)  BP(mean): --  RR: 18 (06 Dec 2019 10:42) (18 - 18)  SpO2: 94% (06 Dec 2019 10:42) (93% - 96%)  CAPILLARY BLOOD GLUCOSE      POCT Blood Glucose.: 154 mg/dL (06 Dec 2019 08:18)  POCT Blood Glucose.: 168 mg/dL (05 Dec 2019 21:24)  POCT Blood Glucose.: 166 mg/dL (05 Dec 2019 18:26)    I&O's Summary    05 Dec 2019 07:01  -  06 Dec 2019 07:00  --------------------------------------------------------  IN: 120 mL / OUT: 0 mL / NET: 120 mL        PHYSICAL EXAM:  GENERAL: NAD, well-developed  HEAD:  Atraumatic, Normocephalic  EYES: EOMI, PERRLA, conjunctiva and sclera clear  NECK: Supple, No JVD  CHEST/LUNG: Clear to auscultation bilaterally; No wheeze  HEART: Regular rate and rhythm; No murmurs, rubs, or gallops  ABDOMEN: Soft, Nontender, Nondistended; Bowel sounds present  EXTREMITIES:  2+ Peripheral Pulses, No clubbing, cyanosis, or edema  PSYCH: AAOx3  NEUROLOGY: non-focal  SKIN: No rashes or lesions    LABS:                    RADIOLOGY & ADDITIONAL TESTS:    Imaging Personally Reviewed:    Consultant(s) Notes Reviewed:      Care Discussed with Consultants/Other Providers:

## 2019-12-06 NOTE — PROGRESS NOTE ADULT - SUBJECTIVE AND OBJECTIVE BOX
Subjective: Patient seen and examined. No new events except as noted.   appears comfortable       REVIEW OF SYSTEMS:  Unable to obtain     MEDICATIONS:  MEDICATIONS  (STANDING):  aMIOdarone    Tablet 400 milliGRAM(s) Oral two times a day  amLODIPine   Tablet 10 milliGRAM(s) Oral daily  atorvastatin 80 milliGRAM(s) Oral at bedtime  chlorhexidine 2% Cloths 1 Application(s) Topical daily  dexAMETHasone  Injectable 4 milliGRAM(s) IV Push daily  dextrose 5%. 1000 milliLiter(s) (50 mL/Hr) IV Continuous <Continuous>  dextrose 50% Injectable 25 Gram(s) IV Push once  dextrose 50% Injectable 12.5 Gram(s) IV Push once  dextrose 50% Injectable 25 Gram(s) IV Push once  dextrose 50% Injectable 25 Gram(s) IV Push once  enoxaparin Injectable 40 milliGRAM(s) SubCutaneous daily  escitalopram 5 milliGRAM(s) Oral daily  famotidine    Tablet 20 milliGRAM(s) Oral daily  insulin lispro (HumaLOG) corrective regimen sliding scale   SubCutaneous three times a day before meals  insulin lispro (HumaLOG) corrective regimen sliding scale   SubCutaneous at bedtime  levETIRAcetam  IVPB 750 milliGRAM(s) IV Intermittent every 12 hours  lisinopril 40 milliGRAM(s) Oral daily  metoprolol tartrate 25 milliGRAM(s) Oral every 8 hours  polyethylene glycol 3350 17 Gram(s) Oral daily  senna 2 Tablet(s) Oral at bedtime      PHYSICAL EXAM:  T(C): 36.5 (12-06-19 @ 10:42), Max: 36.8 (12-05-19 @ 23:52)  HR: 54 (12-06-19 @ 10:42) (52 - 78)  BP: 152/82 (12-06-19 @ 10:42) (135/81 - 165/87)  RR: 18 (12-06-19 @ 10:42) (18 - 18)  SpO2: 94% (12-06-19 @ 10:42) (93% - 96%)  Wt(kg): --  I&O's Summary    05 Dec 2019 07:01  -  06 Dec 2019 07:00  --------------------------------------------------------  IN: 120 mL / OUT: 0 mL / NET: 120 mL            Appearance: NAD  HEENT:   Dry oral mucosa   Lymphatic: No lymphadenopathy  Cardiovascular: Normal S1 S2, No JVD, No murmurs, No edema  Respiratory: Decreased bs   Psychiatry: A & O x) 0  Gastrointestinal:  Soft, Non-tender, + BS	  Skin: No rashes, No ecchymoses, No cyanosis	  Extremities: Decreased  range of motion, No clubbing, cyanosis or edema  Vascular: Peripheral pulses palpable 2+ bilaterally  Neurological:  MS: Confused AOx 0 Motor: all extremities moving, pulling with force against restraints periodically  Sensation: withdraws to touch all four extremities      LABS:    CARDIAC MARKERS:                  proBNP:   Lipid Profile:   HgA1c:   TSH:             TELEMETRY: 	    ECG:  	  RADIOLOGY:   DIAGNOSTIC TESTING:  [ ] Echocardiogram:  [ ]  Catheterization:  [ ] Stress Test:    OTHER:

## 2019-12-06 NOTE — PROGRESS NOTE ADULT - PROBLEM SELECTOR PROBLEM 3
Constipation, unspecified constipation type
Type 2 diabetes mellitus
Palliative care encounter

## 2019-12-06 NOTE — PROGRESS NOTE ADULT - PROBLEM SELECTOR PROBLEM 2
Delirium
Glioblastoma multiforme
Delirium

## 2019-12-06 NOTE — DISCHARGE NOTE PROVIDER - NSDCMRMEDTOKEN_GEN_ALL_CORE_FT
amiodarone 200 mg oral tablet: 2 tab(s) orally 2 times a day  amLODIPine 10 mg oral tablet: 1 tab(s) orally once a day  atorvastatin 80 mg oral tablet: 1 tab(s) orally once a day (at bedtime)  bisacodyl 10 mg rectal suppository: 1 suppository(ies) rectal once a day, As needed, Constipation  dexamethasone 1 mg oral tablet: 1 tab(s) orally once a day  docusate sodium 100 mg oral capsule: 1 cap(s) orally 3 times a day  escitalopram 5 mg oral tablet: 1 tab(s) orally once a day  famotidine 20 mg oral tablet: 1 tab(s) orally once a day  heparin: 5000 unit(s) subcutaneous every 8 hours  insulin glargine: 16 unit(s) subcutaneous once a day (at bedtime)  levETIRAcetam 500 mg oral tablet: 1 tab(s) orally 2 times a day  lisinopril 40 mg oral tablet: 1 tab(s) orally once a day  metFORMIN 850 mg oral tablet: 1 tab(s) orally 2 times a day  metoprolol tartrate 25 mg oral tablet: 1 tab(s) orally every 8 hours  nystatin 100,000 units/g topical powder: 1 application topically every 8 hours, As needed, rash/itching  polyethylene glycol 3350 oral powder for reconstitution: 17 gram(s) orally once a day  senna oral tablet: 2 tab(s) orally once a day (at bedtime)  simethicone 80 mg oral tablet, chewable: 1 tab(s) orally 2 times a day, As needed, Gas amiodarone 200 mg oral tablet: 2 tab(s) orally 2 times a day  amLODIPine 10 mg oral tablet: 1 tab(s) orally once a day  atorvastatin 80 mg oral tablet: 1 tab(s) orally once a day (at bedtime)  bisacodyl 10 mg rectal suppository: 1 suppository(ies) rectal once a day, As needed, Constipation  docusate sodium 100 mg oral capsule: 1 cap(s) orally 3 times a day  escitalopram 5 mg oral tablet: 1 tab(s) orally once a day  famotidine 20 mg oral tablet: 1 tab(s) orally once a day  heparin: 5000 unit(s) subcutaneous every 8 hours  lisinopril 40 mg oral tablet: 1 tab(s) orally once a day  metFORMIN 850 mg oral tablet: 1 tab(s) orally 2 times a day  metoprolol tartrate 25 mg oral tablet: 1 tab(s) orally every 8 hours  nystatin 100,000 units/g topical powder: 1 application topically every 8 hours, As needed, rash/itching  polyethylene glycol 3350 oral powder for reconstitution: 17 gram(s) orally once a day  senna oral tablet: 2 tab(s) orally once a day (at bedtime)  simethicone 80 mg oral tablet, chewable: 1 tab(s) orally 2 times a day, As needed, Gas

## 2019-12-06 NOTE — DISCHARGE NOTE PROVIDER - NSDCCPCAREPLAN_GEN_ALL_CORE_FT
PRINCIPAL DISCHARGE DIAGNOSIS  Diagnosis: Altered mental status  Assessment and Plan of Treatment: improved after UTI was treated      SECONDARY DISCHARGE DIAGNOSES  Diagnosis: Type 2 diabetes mellitus  Assessment and Plan of Treatment: HgA1C this admission.  Make sure you get your HgA1c checked every three months.  If you take oral diabetes medications, check your blood glucose two times a day.  If you take insulin, check your blood glucose before meals and at bedtime.  It's important not to skip any meals.  Keep a log of your blood glucose results and always take it with you to your doctor appointments.  Keep a list of your current medications including injectables and over the counter medications and bring this medication list with you to all your doctor appointments.  If you have not seen your ophthalmologist this year call for appointment.  Check your feet daily for redness, sores, or openings. Do not self treat. If no improvement in two days call your primary care physician for an appointment.  Low blood sugar (hypoglycemia) is a blood sugar below 70mg/dl. Check your blood sugar if you feel signs/symptoms of hypoglycemia. If your blood sugar is below 70 take 15 grams of carbohydrates (ex 4 oz of apple juice, 3-4 glucose tablets, or 4-6 oz of regular soda) wait 15 minutes and repeat blood sugar to make sure it comes up above 70.  If your blood sugar is above 70 and you are due for a meal, have a meal.  If you are not due for a meal have a snack.  This snack helps keeps your blood sugar at a safe range.      Diagnosis: Afib  Assessment and Plan of Treatment: Afib  Atrial fibrillation is the most common heart rhythm problem & has the risk of stroke & heart attack  It helps if you control your blood pressure, not drink more than 1-2 alcohol drinks per day, cut down on caffeine, getting treatment for over active thyroid gland, & getting exercise  Call your doctor if you feel your heart racing or beating unusually, chest tightness or pain, lightheaded, faint, shortness of breath especially with exercise  It is important to take your heart medication as prescribed  You may be on anticoagulation which is very important to take as directed - you may need blood work to monitor drug levels      Diagnosis: Constipation, unspecified constipation type  Assessment and Plan of Treatment: Resolved  Continue with aggressive bowel regimen    Diagnosis: Functional quadriplegia  Assessment and Plan of Treatment: Rehab care    Diagnosis: Hypertensive urgency  Assessment and Plan of Treatment: Resolved  Follow up with your medical doctor to establish long term blood pressure treatment goals.  Continue current medications      Diagnosis: Glioblastoma multiforme  Assessment and Plan of Treatment: Follow up with Dr. Cuba neuro-oncologist    Diagnosis: Encounter for palliative care  Assessment and Plan of Treatment: Goals of care discussed  Patient's son agreed with DNR/ DNI but needs to discuss further with family.

## 2019-12-06 NOTE — PROGRESS NOTE ADULT - PROBLEM SELECTOR PLAN 3
Improving   Dulcolax PRN x 1 dose today.   Senna 2 tabs HS.
RISS
Will try to reach out to the patient's family (3 children) or son in order to better understand ACP and GOC.   Will also wait for Dr. Cuba's input in order to better define GOC,

## 2019-12-26 ENCOUNTER — INBOUND DOCUMENT (OUTPATIENT)
Age: 69
End: 2019-12-26

## 2020-01-24 NOTE — DISCHARGE NOTE PROVIDER - NSDCADMDATE_GEN_ALL_CORE_FT
Next visit at 1 yrs of age  Start a Multivitamin daily  Electronics not more than 1 hr per day at this age      PATIENT INFORMATION    Anticipatory guidance discussed  Caution with possible poisons (including pills, plants, cosmetics)  Child-proof home with cabinet locks, outlet plugs, window guards, and stair safety gonsalez  Importance of varied diet  Poison Control phone number [phone]  Read together  Risk of child pulling down objects on him/herself  Toilet training only possible after 1 years old  Whole milk until 1 years old then taper to lowfat or skim    Follow-Up  - Return for your 1 year well child visit.    2 1/2 years old Health and Safety Tips - The following hyperlinks are available to access via [employer]    Parent Education from Healthy Parent    Educación para padres sobre niños sanos    Common dosing for acetaminophen and ibuprofen:   Acetaminophen (Tylenol) can be given every 4 hours.  · Infant or Children's Elixir: 160mg/5ml for  Weight 18-23 lbs 24-35 lbs 36-47 lbs   Dose 3.75 ml 5 ml 7.5 ml      Weight 48-59 lbs 60-71 lsb 72-95 lbs   Dose 10 ml 12.5 ml 15 ml     Ibuprofen (Motrin) can be given every 6 hours.  Safe for children 6 months and older.  · Infant Drops: 50mg/1.25ml  Weight 12-17 lbs 18-23 lbs   Dose 1.25 ml 1.875 ml     · Children's Elixir 100mg/5ml   Weight 12-17 lbs 18-23 lbs 24-35 lbs 36-47 lbs   Dose 2.5 ml 3.75 ml 5 ml 7.5 ml        Weight 48-59 lbs 60-71 lbs 72-95 lbs   Dose 10 ml 12.5 ml 15 ml     Additional Educational Resources:  For additional resources regarding your symptoms, diagnosis, or further health information, please visit the Discover a Healthier You section on [email or web address] or the Online Health Resources section in [employer].  
05-Nov-2019 19:59

## 2020-02-14 NOTE — ED ADULT NURSE REASSESSMENT NOTE - TEMPLATE LIST FOR HEAD TO TOE ASSESSMENT
Cardiac Pre-Surgery Instructions:   Medication Instructions    lisinopril (ZESTRIL) 20 mg tablet Instructed patient per Anesthesia Guidelines  Pre-op Showering Instructions for Surgery Patients    Before your operation, you play an important role in decreasing your risk for infection by washing with special antiseptic soap  This is an effective way to reduce bacteria on the skin which may help to prevent infections at the surgical site  Please read the following directions in advance  1  In the week before your operation, purchase a 4 ounce bottle of antiseptic soap containing chlorhexidine gluconate (CHG)  4%  Some brand names include: Aplicare®, Endure, and Hibiclens®  The cost is usually less than $5 00   For your convenience, the [employer] carries the soap   It may also be available at your doctors office or pre-admission testing center, and at most retail pharmacies   If you are allergic or sensitive to soaps containing CHG, please let your doctor know so another antiseptic can be suggested   CHG antiseptic soap is for external use only  2  The day before your operation, follow these instructions carefully to get ready   Please clean linens (sheets) on your bed; you should sleep on clean sheets after your evening shower   Get clean towels and washcloth ready  you need enough for 2 showers   Set aside clean underwear, pajamas, and clothing to wear after the showers     Reminders:   DO NOT use any other soap or body rinse on your skin during or after the antiseptic showers   DO NOT use lotion, powder, deodorant, or perfume/aftershave of any kind on your skin after your antiseptic shower   DO NOT shave any body parts in the 24 hours/day before your operation   DO NOT get the antiseptic soap in your eyes, ears, nose, mouth, or vaginal area    3   You will need to shower the night before AND the morning of your surgery  Shower 1:   The first evening before the operation, take the first shower   First, shampoo your hair with regular shampoo and rinse it completely before you use the antiseptic soap  After washing and rinsing your hair, rinse your body   Next, use a clean washcloth to apply the antiseptic soap and wash your body from the neck down to your toes using ½ bottle of the antiseptic soap   You will use the other ½ bottle for the second shower   Clean the area where your incision will be; lather this area well for about 2 minutes   If you are having head or neck surgery, wash areas with the antiseptic soap   Rinse yourself completely with running water   Use a clean towel to dry off   Wear clean underwear and clothing/pajamas  Shower 2   The morning of your operation, take the second shower following the same steps as Shower 1 using the second ½ of the bottle of antiseptic soap   Use clean cloths and towels to wash and dry yourself   Wear clean underwear and clothing

## 2021-06-15 NOTE — PROGRESS NOTE ADULT - PROBLEM SELECTOR PLAN 6
- fs controlled  - lantus 10U qhs, humalog 2U TID  - c/w ISS, titrate as needed [Negative] : Heme/Lymph

## 2022-01-20 NOTE — PRE-OP CHECKLIST - LATEX ALLERGY
89 M PMHx  prostate cancer s/p radiation, CAD s/p stent, CKD III, pleural and pericardial effusion, afib not on AC , recently admitted for sepsis secondary to UTI s/p indwelling sanchez,  who presents from [facility] rehab for hypotension and hypoxia on day of admission.     # sepsis 2/2 pseudomonas bacteremia/UTI  previously on fludrocortisone, may have autonomic dysregulation, can resume on DC  ID following  bactrim  cefepime 1gm q24h x14 day course through 1/26  repeat cx from 1/13 are negative  sp midline placemnet-IR and renal clearance noted  chronic sanchez, sanchez exchanged in ED, dc with sanchez    # 2019 novel coronavirus disease (COVID-19)  stable on RA  d dimer on admission 2000s, downtrending  doppler LE neg acute DVT, possible chronic DVT left femoral vein, pt not on AC due to hematuria history  can repeat doppler 1 week outpt  can dc on xarelto 10mg qd for 30 days for dvt ppx with covid    # JOSELITO (acute kidney injury)  likely ATN  sp IVF  continue flomax  renal following  Renal US no hydro  creat stable and mild improvement, dw renal, cleared for DC    DVT prophylaxis  sub q heparin change to xarelto 10mg qd for 30 days for covid dvt ppx    Advance care planning  DNR/DNI   MOLST in chart    wife updated on 1/18 1/19 - dc planning    PCP Dr. Castillo    [facility]  [phone] no

## 2022-04-13 NOTE — SWALLOW BEDSIDE ASSESSMENT ADULT - PHARYNGEAL PHASE
x2 multiple swallows, intermittent throat clears/Decreased laryngeal elevation Within functional limits O-T Advancement Flap Text: The defect edges were debeveled with a #15 scalpel blade.  Given the location of the defect, shape of the defect and the proximity to free margins an O-T advancement flap was deemed most appropriate.  Using a sterile surgical marker, an appropriate advancement flap was drawn incorporating the defect and placing the expected incisions within the relaxed skin tension lines where possible.    The area thus outlined was incised deep to adipose tissue with a #15 scalpel blade.  The skin margins were undermined to an appropriate distance in all directions utilizing iris scissors.

## 2022-06-02 NOTE — DISCHARGE NOTE PROVIDER - DISCHARGE DATE
06-Dec-2019 [facility],  [address]  Phone: [phone]  Fax [phone]        Date:  2022    Patient Name:  Callie Garcia    :  1981  MRN: [id number]  Restrictions/Precautions:    Medical/Treatment Diagnosis Information:  Diagnosis: Right shoulder pain (M25.511, G89.29)  Treatment Diagnosis: Right shoulder pain (I57.935)  Insurance/Certification information:  PT Insurance Information: [place]  Physician Information:   Marti Li  Has the plan of care been signed (Y/N):        []  Yes  [x]  No     Date of Patient follow up with Physician: not scheduled      Is this a Progress Report:     []  Yes  [x]  No        If Yes:  Date Range for reporting period:  Beginnin22  Ending    Progress report will be due (10 Rx or 30 days whichever is less): 57       Recertification will be due (POC Duration  / 90 days whichever is less):       Visit # Insurance Allowable Auth Required   In-person 1  []  Yes [x]  No    Telehealth   []  Yes []  No    Total            Functional Scale: FOTO 62/100 (62%)    Date assessed:  22      Therapy Diagnosis/Practice Pattern: D      Number of Comorbidities:  [x]0     []1-2    []3+    Latex Allergy:  [x]NO      []YES  Preferred Language for Healthcare:   [x]English       []other:      Pain level:  0-7/10     SUBJECTIVE:  See eval    OBJECTIVE: See eval   Observation:    Test measurements:      RESTRICTIONS/PRECAUTIONS:     Exercises/Interventions:     Therapeutic Ex (33331) Sets/sec Reps Notes/CUES   Right upper trap stretch 30\" 3x     T spine ext over foam roller 5\" 5x ea position 3 positions   Pec stretch in True stretch 15\" 4x 2 with ea foot forward   Scap squeezes 5\" 15x    SL ER 1# 3\" 2 x 10          GTB rows 3\"  2 x 10 Needs cueing   RTB ext 3\" 2 x 10 Needs cueing         Patient ed   HEP, POC, posture, shoulder mechanics, ice                           Manual Intervention (09445)      Post GH Joint mobilizations Gr III 2'     Seated T spine distraction mobilization 3'                             NMR re-education (79611)   CUES NEEDED                                                         Therapeutic Activity (52460)                                           Access Code: [email or web address]  URL: [email or web address]  Date: 06/02/2022  Prepared by: Shorty Reece        Therapeutic Exercise and NMR EXR  [x] (00595) Provided verbal/tactile cueing for activities related to strengthening, flexibility, endurance, ROM  for improvements in scapular, scapulothoracic and UE control with self care, reaching, carrying, lifting, house/yardwork, driving/computer work.    [] (80936) Provided verbal/tactile cueing for activities related to improving balance, coordination, kinesthetic sense, posture, motor skill, proprioception  to assist with  scapular, scapulothoracic and UE control with self care, reaching, carrying, lifting, house/yardwork, driving/computer work. Therapeutic Activities:    [] (35546 or 31010) Provided verbal/tactile cueing for activities related to improving balance, coordination, kinesthetic sense, posture, motor skill, proprioception and motor activation to allow for proper function of scapular, scapulothoracic and UE control with self care, carrying, lifting, driving/computer work.      Home Exercise Program:    [x] (58437) Reviewed/Progressed HEP activities related to strengthening, flexibility, endurance, ROM of scapular, scapulothoracic and UE control with self care, reaching, carrying, lifting, house/yardwork, driving/computer work  [] (11977) Reviewed/Progressed HEP activities related to improving balance, coordination, kinesthetic sense, posture, motor skill, proprioception of scapular, scapulothoracic and UE control with self care, reaching, carrying, lifting, house/yardwork, driving/computer work      Manual Treatments:  PROM / STM / Oscillations-Mobs:  MARY, II, III, IV (Josiah, Inf., Post.)  [x] (86173) Provided manual therapy to mobilize soft tissue/joints of cervical/CT, scapular GHJ and UE for the purpose of modulating pain, promoting relaxation,  increasing ROM, reducing/eliminating soft tissue swelling/inflammation/restriction, improving soft tissue extensibility and allowing for proper ROM for normal function with self care, reaching, carrying, lifting, house/yardwork, driving/computer work    Modalities:  None this date    Charges  Timed Code Treatment Minutes: 24'   Total Treatment Minutes: 45'     [x] EVAL (LOW) 77599   [] EVAL (MOD) 99591   [] EVAL (HIGH) 39625   [] RE-EVAL     [x] YY(65818) x 2   [] IONTO  [] NMR (63576) x     [] VASO  [] Manual (91380) x      [] Other:  [] TA x      [] Mech Traction (36132)  [] ES(attended) (51491)      [] ES (un) (54380):         GOALS:  Patient stated goal: \"To reduce pain. \"  [] Progressing: [] Met: [] Not Met: [] Adjusted    Therapist goals for Patient:   Short Term Goals: To be achieved in: 2 weeks  1. Independent in HEP and progression per patient tolerance, in order to prevent re-injury. [] Progressing: [] Met: [] Not Met: [] Adjusted  2. Patient will have a decrease in pain to facilitate improvement in movement, function, and ADLs as indicated by Functional Deficits. [] Progressing: [] Met: [] Not Met: [] Adjusted    Long Term Goals: To be achieved in: 8 weeks  1. Disability index score of 75% or more per FOTO to assist with reaching prior level of function. [] Progressing: [] Met: [] Not Met: [] Adjusted  2. Patient will demonstrate increased AROM of right shoulder flexion and abduction to 160 deg to allow for proper joint functioning as indicated by patients Functional Deficits. [] Progressing: [] Met: [] Not Met: [] Adjusted  3. Patient will demonstrate an increase in Strength in right shoulder to grossly 4+/5 to allow for proper functional mobility as indicated by patients Functional Deficits.    [] Progressing: [] Met: [] Not Met: [] Adjusted  4. Patient will be able to lift a gallon of milk above shoulder height without increased symptoms or restriction. [] Progressing: [] Met: [] Not Met: [] Adjusted  5. Patient will be able to return to a workout routine without increased symptoms or restrictions in his shoulder. (patient specific functional goal)    [] Progressing: [] Met: [] Not Met: [] Adjusted     Progression Towards Functional goals:  [] Patient is progressing as expected towards functional goals listed. [] Progression is slowed due to complexities listed. [] Progression has been slowed due to co-morbidities. [x] Plan just implemented, too soon to assess goals progression  [] Other:     ASSESSMENT:  See eval    Overall Progression Towards Functional goals/ Treatment Progress Update:  [] Patient is progressing as expected towards functional goals listed. [] Progression is slowed due to complexities/Impairments listed. [] Progression has been slowed due to co-morbidities. [x] Plan just implemented, too soon to assess goals progression <30days   [] Goals require adjustment due to lack of progress  [] Patient is not progressing as expected and requires additional follow up with physician  [] Other    Prognosis for POC: [x] Good [] Fair  [] Poor      Patient requires continued skilled intervention: [x] Yes  [] No    Treatment/Activity Tolerance:  [x] Patient able to complete treatment  [] Patient limited by fatigue  [] Patient limited by pain    [] Patient limited by other medical complications  [] Other:       PLAN: See eval  [] Continue per plan of care [] Alter current plan (see comments above)  [x] Plan of care initiated [] Hold pending MD visit [] Discharge      Electronically signed by:  Harsh Goins PT, DPT [id number]     Note: If patient does not return for scheduled/ recommended follow up visits, this note will serve as a discharge from care along with most recent update on progress.

## 2023-03-27 NOTE — PROGRESS NOTE ADULT - PROBLEM SELECTOR PROBLEM 2
[facility] - [facility]  Brief Operative Note    SUMMARY     Surgery Date: 3/27/2023     Surgeon(s) and Role:     * Sumit Hinds MD - Primary    Assisting Surgeon: DAY Parham    Pre-op Diagnosis:  Compression fracture of thoracic vertebra, unspecified thoracic vertebral level, initial encounter [S22.000A]    Post-op Diagnosis:  Post-Op Diagnosis Codes:     * Compression fracture of thoracic vertebra, unspecified thoracic vertebral level, initial encounter [S22.000A]    Procedure(s) (LRB):  LAMINECTOMY, SPINE, THORACIC, POSTERIOR APPROACH, USING COMPUTER-ASSISTED NAVIGATION (N/A)  REPAIR, LACERATION (N/A)    T8 decompression with subsequent T7-T9 fusion with Shin screws (T7: bilateral 4.5 x 35,) (T9: bilateral 4.5 x 45) with Medtronic Magnifuse putty. Microscope assisted dissection. O-arm assisted.    Anesthesia: General    Operative Findings: Dictated     Estimated Blood Loss: * No values recorded between 3/27/2023  4:22 PM and 3/27/2023  6:30 PM *    Estimated Blood Loss has been documented.         Specimens:   Specimen (24h ago, onward)      None            [id number]       Glioblastoma multiforme

## 2023-08-21 NOTE — PROGRESS NOTE ADULT - PROBLEM SELECTOR PROBLEM 4
HTN (hypertension) Bilobed Transposition Flap Text: The defect edges were debeveled with a #15 scalpel blade. Given the location of the defect and the proximity to free margins a bilobed transposition flap was deemed most appropriate. Using a sterile surgical marker, an appropriate bilobe flap drawn around the defect. The area thus outlined was incised deep to adipose tissue with a #15 scalpel blade. The skin margins were undermined to an appropriate distance in all directions utilizing iris scissors. Following this, the designed flap was carried over into the primary defect and sutured into place.

## 2023-12-05 NOTE — BEHAVIORAL HEALTH ASSESSMENT NOTE - MUSCLE TONE / STRENGTH
Date of Procedure: 12/05/23


Preoperative Diagnosis: 


Left breast invasive ductal carcinoma


Postoperative Diagnosis: 


Same


Procedure(s) Performed: 


Left breast needle localization lumpectomy, lymphatic mapping with methylene 

blue, sentinel node biopsy/deep axillary node dissection


Anesthesia: JED


Surgeon: Kerline Miller


Estimated Blood Loss (ml): 5


IV fluids (ml): 500


Pathology: other (Breast tissue)


Condition: stable


Disposition: same day


Indications for Procedure: 


Left breast invasive ductal carcinoma


Operative Findings: 


Very dense breast tissue


Description of Procedure: 


Procedure:


Needle localization left breast lumpectomy for invasive ductal carcinoma


Lymphatic mapping with methylene blue


deep sentinel node resection 





The patient was seen in the radiology Department preoperatively.  Needle 

localization of the tumor in the left breast was performed.  Additionally 

radiotracer was injected in the periareolar area.  The patient was brought to 

the operative suite.  Following induction of anesthesia the axilla was 

interrogated.  Minimal radioactivity was identified in the axilla.  Therefore 5 

mL of half percent methylene blue was injected in the periareolar area and the 

breast was massaged for 5 minutes.  Following this the left breast and axilla 

were prepped and draped in a sterile fashion.  


     An axillary incision was made.  This was carried through the skin and 

subcutaneous tissue into the axillary tissue.  Using the neoprobe minimal 

radioactivity was identified.  No blue lymph nodes were identified.  Palpation 

revealed a deep palpable node.  This was grasped using an Allis clamp.  This was

excised using the Harmonic scalpel.  After we had been excised interrogation 

with the neoprobe revealed a 10 second count of 284.  The background count was 

minimal.  No other palpable adenopathy, blue lymph nodes, or radioactive lymph 

nodes of concern were identified.  After assured that hemostasis was attained 

the wound was well irrigated.  The tissues were closed using 3-0 Vicryl suture. 




     The location of the incision was in proximity to the needle localization 

wire which had been placed for the tumor.  Therefore the same incision was used 

to remove the tumor.  Dissection was performed in just under the skin removed to

localize the needle.  This was brought into the area of concern.  The tissues 

were grasped using an Allis clamp.  Dissection was performed circumferentially 

around the area of the needle.  The anterior dissection was just under the skin 

and the posterior dissection was onto the muscle of the chest wall.  The 

specimen was removed.  It was painted for orientation.  Radiograph of the 

specimen was personally reviewed with Dr. Milan was felt that the area of 

concern had been removed. 


    Additional tissue was removed.  This was painted for orientation.  

Additional tissue was removed superiorly and this was painted for orientation as

well.  After assured that hemostasis was attained titanium clips were placed in 

the cavity.  The deep tissues were closed using 3-0 Vicryl suture.  The 

subcutaneous tissue was closed using 3-0 Vicryl suture.  The skin was closed 

using 4-0 Monocryl.  The patient tolerated the procedure in stable condition.  

All instrument and sponge counts were correct at the end of the case. Abnormal muscle tone/strength

## 2024-02-20 NOTE — H&P ADULT - NSHPLANGPREFER_GEN_A_CORE
HISTORY:  Lynda Arora is a pleasant 68 year old female who comes to clinic for 6 month follow-up status post L3-5 laminectomy, posterior spinal fusion with instrumentation and transforaminal lumbar interbody fusion.  She continues to make progress with her strength and ambulation.  She ambulates in her home without a cane but when she leaves the home she uses a cane.  She feels like her walking is better than it was prior to surgery.  She has better sensation than she had prior to surgery but she still has numbness in both of her feet from her toes to about mid foot.  It is symmetric even though it started on the right side before involving the left side.  The proximal numbness has been resolved essentially since surgery.  She does not have much back pain and does not have leg pain.  The footdrop that she would noted prior to surgery has also improved to resolved.    PAST MEDICAL HISTORY:  Past Medical History:   Diagnosis Date    Asthma     Endometrial cancer (CMD) 2011    HTN (hypertension)     PONV (postoperative nausea and vomiting)     Rosacea, acne        PAST SURGICAL HISTORY:  Past Surgical History:   Procedure Laterality Date    Breast biopsy  mid      delivery only       section, classic      Colonoscopy      OK X 5  fx hx    Colonoscopy  2013        Colonoscopy  10/02/2018        Colonoscopy diagnostic  2023    repeat 5-Dr Teague    Endometrial biopsy  2011    Eye surgery  age 1    Hysterectomy  2011    Dr. Charles Mcnulty,  Robot-assisted total laparoscopic hysterectomy, bilateral salpingo-oophorectomy, bilateral pelvic and periaortic lymphadenectomy, cancer staging.    Lumbar fusion  2023    L3-5 laminectomy, posterior spinal fusion with instrumentation and transforaminal lumbar interbody fusion  - Dr. Vega    Tonsillectomy and adenoidectomy         FAMILY HISTORY:  Family History   Problem Relation Age of Onset    Hearing  Loss Mother     Hypertension Father     Diabetes Father     Birth defects Brother     Heart disease Brother     Diabetes Brother     Cancer Brother         Leukemia    Cancer, Colon Brother     Other Brother         cardiac arrhythmia    Patient is unaware of any medical problems Brother     Hypertension Son     Patient is unaware of any medical problems Son     Heart Maternal Grandmother     Stroke Maternal Grandfather        SOCIAL HISTORY:  Social History     Tobacco Use    Smoking status: Never    Smokeless tobacco: Never   Substance Use Topics    Alcohol use: Yes     Alcohol/week: 3.0 - 5.0 standard drinks of alcohol     Types: 3 - 5 Standard drinks or equivalent per week     Comment: 1-2 drinks per night       MEDICATIONS:  Current Outpatient Medications   Medication Sig    fluticasone-salmeterol (Advair HFA) 45-21 MCG/ACT inhaler Inhale 2 puffs into the lungs in the morning and 2 puffs in the evening.    doxycycline monohydrate (MONODOX) 100 MG capsule Take 1 capsule by mouth daily with food.    sodium fluoride (SF 5000 Plus) 1.1 % dental cream At bedtime, brush with ribbon of paste. Spit excess, no rinsing or drinking afterwards.    alendronate (FOSAMAX) 70 MG tablet Take 1 tablet by mouth every 7 days.    benazepril-hydrochlorthiazide (LOTENSIN HCT) 5-6.25 MG per tablet Take 1 tablet by mouth daily.    HYDROcodone-acetaminophen (Norco) 5-325 MG per tablet Take 1 to 2 tablets by mouth every 4 to 6 hours as needed for pain.    azelastine (ASTELIN) 0.1 % nasal spray Spray 2 sprays in each nostril in the morning and 2 sprays in the evening. Use in each nostril as directed    furosemide (LASIX) 20 MG tablet Take a HALF tablet by mouth daily as needed for swelling. Max 3 times a week.    triamcinolone (KENALOG) 0.1 % dental paste Apply paste to affected areas on the mouth 2 times daily as needed    albuterol (ProAir HFA) 108 (90 Base) MCG/ACT inhaler Inhale 2 puffs into the lungs every 4 hours as needed for  [place]uvian Shortness of Breath or Wheezing.    metroNIDAZOLE (METROGEL) 1 % gel Apply thin layer to affected areas on face every morning.    clobetasol (TEMOVATE) 0.05 % ointment Apply topically 2 times daily.    hyoscyamine (LEVSIN SL) 0.125 MG sublingual tablet Place 1 tablet under the tongue every 4 hours as needed for Cramping or Diarrhea.    Multiple Vitamins-Minerals (STRESS B COMPLEX/ZINC PO) Take 1 tablet by mouth daily.    Cholecalciferol (VITAMIN D) 2000 units capsule Take 2,000 Units by mouth daily.     No current facility-administered medications for this visit.       ALLERGIES:  ALLERGIES:   Allergen Reactions    Augmentin [Amoxicillin-Pot Clavulanate] HIVES and RASH    Penicillins HIVES and RASH    Morphine Nausea & Vomiting       SMOKING HISTORY:  Social History     Tobacco Use   Smoking Status Never   Smokeless Tobacco Never       EXAM:  Height 5' 3\" (1.6 m), weight 93.6 kg (206 lb 3.9 oz).  She is pleasant and alert oriented x3.  She can stand and heel-walk and toe-walk and perform a squat.  Her sagittal balance is slightly forward.  He has diminished sensation in her toes bilaterally.  She has grossly appropriate strength all motor groups bilateral lower extremities.  He can stand and balance on each leg keeping her pelvis relatively level.  X-rays demonstrate no change in position of the instrumentation from L3-5.  She does have a degenerative disc at L5-S1.  She has a degenerative disc at L2-3 as well but it has not shown any progression with regards to degenerative change.    IMPRESSION:  Patient doing well with improvement of her numbness and weakness and her gait pattern.    PLAN:  Follow up with me on an as-needed basis.  Continue to use good body mechanics.  While I am supportive of the notion of getting rid of the cane, she is better off using it if she needs it for balance.  Preferable to use a cane than a fall and sustain a fracture

## 2024-06-05 NOTE — PHYSICAL THERAPY INITIAL EVALUATION ADULT - DISCHARGE DISPOSITION, PT EVAL
[Time Spent: ___ minutes] : I have spent [unfilled] minutes of time on the encounter. home w/ assist/home w/ home PT

## 2024-07-24 NOTE — ED ADULT TRIAGE NOTE - WEIGHT IN LBS
250
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   Mom brought pt to ED for second time in two days after 2-3 weeks of decreased need for sleep and flight of ideas. Per mom pt has few friends at school and one of these friends committed suicide 1.5 months ago, causing severe sadness for pt. Pt and mom report about 2-3 week hx of decreased need for sleep (0-4 hours/night), increased speech, flight of ideas, talkativeness, distractibility, elevated goal directed activity (solving mysteries on online video game, interest in politics).   Pt reports feeling very good and not needing any help, feels if he gets sleep he will feel better. Pt denies AVH and paranoia. Mom denies overt psychotic features, however pt has always talked to himself even as a child. Mom is concerned pt has bipolar 1 d/o like his bio father, who had severe bipolar 1 d/o with psychotic features.   Mother provided collateral information. She states that patient has been refusing to take medication, will only take melatonin for sleep (and not effective). Patient has been journaling and reading all night as mother will not let him use technology during the night. Discussed with mother psychiatric admission, mother consents to voluntary admission.

## 2024-09-16 NOTE — PROGRESS NOTE ADULT - PROBLEM SELECTOR PLAN 5
May be pre-renal in setting of reported decreased PO intake  However FeNA suggests obstruction: CT without reports of hydronephrosis  Check bladder scan  gentle diuresis. trend creat, monitor I/Os May be pre-renal in setting of reported decreased PO intake but BUN/Cr ratio is 15 . Patient had SHAWNA 2/2 nephrolitasis 2 weeks ago s/p stent and flomax. CT scan prelim reading shows no repeat hydronephrosis. Patient is taking metformin - AG was elevated on arrival possibly in the setting of lactic acidosis.   -trend bmp  -Check bladder scan  -gentle diuresis. trend creat, monitor I/Os May be pre-renal in setting of reported decreased PO intake but BUN/Cr ratio is 15 . Patient had SHAWNA 2/2 nephrolithiasis 2 weeks ago s/p stent and flomax. CT scan prelim reading shows no repeat hydronephrosis. Patient is taking metformin - AG was elevated on arrival possibly in the setting of lactic acidosis.   - hold losartan, hctz and metformin  -Check bladder scan to r/o urinary retention, as may cause post obstructive uropathy  -gentle hydration, trend creat, monitor I/Os--- repeat bmp in the am Hydroquinone Counseling:  Patient advised that medication may result in skin irritation, lightening (hypopigmentation), dryness, and burning.  In the event of skin irritation, the patient was advised to reduce the amount of the drug applied or use it less frequently.  Rarely, spots that are treated with hydroquinone can become darker (pseudoochronosis).  Should this occur, patient instructed to stop medication and call the office. The patient verbalized understanding of the proper use and possible adverse effects of hydroquinone.  All of the patient's questions and concerns were addressed. Hyrimoz Pregnancy And Lactation Text: This medication is Pregnancy Category B and is considered safe during pregnancy. It is unknown if this medication is excreted in breast milk. Erivedge Pregnancy And Lactation Text: This medication is Pregnancy Category X and is absolutely contraindicated during pregnancy. It is unknown if it is excreted in breast milk. Siliq Pregnancy And Lactation Text: The risk during pregnancy and breastfeeding is uncertain with this medication. Topical Metronidazole Counseling: Metronidazole is a topical antibiotic medication. You may experience burning, stinging, redness, or allergic reactions.  Please call our office if you develop any problems from using this medication. Qbrexza Pregnancy And Lactation Text: There is no available data on Qbrexza use in pregnant women.  There is no available data on Qbrexza use in lactation. Low Dose Naltrexone Counseling- I discussed with the patient the potential risks and side effects of low dose naltrexone including but not limited to: more vivid dreams, headaches, nausea, vomiting, abdominal pain, fatigue, dizziness, and anxiety. Doxepin Pregnancy And Lactation Text: This medication is Pregnancy Category C and it isn't known if it is safe during pregnancy. It is also excreted in breast milk and breast feeding isn't recommended. Bexarotene Counseling:  I discussed with the patient the risks of bexarotene including but not limited to hair loss, dry lips/skin/eyes, liver abnormalities, hyperlipidemia, pancreatitis, depression/suicidal ideation, photosensitivity, drug rash/allergic reactions, hypothyroidism, anemia, leukopenia, infection, cataracts, and teratogenicity.  Patient understands that they will need regular blood tests to check lipid profile, liver function tests, white blood cell count, thyroid function tests and pregnancy test if applicable. Rinvoq Counseling: I discussed with the patient the risks of Rinvoq therapy including but not limited to upper respiratory tract infections, shingles, cold sores, bronchitis, nausea, cough, fever, acne, and headache. Live vaccines should be avoided.  This medication has been linked to serious infections; higher rate of mortality; malignancy and lymphoproliferative disorders; major adverse cardiovascular events; thrombosis; thrombocytopenia, anemia, and neutropenia; lipid elevations; liver enzyme elevations; and gastrointestinal perforations. Cantharidin Pregnancy And Lactation Text: This medication has not been proven safe during pregnancy. It is unknown if this medication is excreted in breast milk. Azelaic Acid Counseling: Patient counseled that medicine may cause skin irritation and to avoid applying near the eyes.  In the event of skin irritation, the patient was advised to reduce the amount of the drug applied or use it less frequently.   The patient verbalized understanding of the proper use and possible adverse effects of azelaic acid.  All of the patient's questions and concerns were addressed. Dutasteride Female Counseling: Dutasteride Counseling:  I discussed with the patient the risks of use of dutasteride including but not limited to decreased libido and sexual dysfunction. Explained the teratogenic nature of the medication and stressed the importance of not getting pregnant during treatment. All of the patient's questions and concerns were addressed. Prednisone Counseling:  I discussed with the patient the risks of prolonged use of prednisone including but not limited to weight gain, insomnia, osteoporosis, mood changes, diabetes, susceptibility to infection, glaucoma and high blood pressure.  In cases where prednisone use is prolonged, patients should be monitored with blood pressure checks, serum glucose levels and an eye exam.  Additionally, the patient may need to be placed on GI prophylaxis, PCP prophylaxis, and calcium and vitamin D supplementation and/or a bisphosphonate.  The patient verbalized understanding of the proper use and the possible adverse effects of prednisone.  All of the patient's questions and concerns were addressed. Dutasteride Pregnancy And Lactation Text: This medication is absolutely contraindicated in women, especially during pregnancy and breast feeding. Feminization of male fetuses is possible if taking while pregnant. Ketoconazole Counseling:   Patient counseled regarding improving absorption with orange juice.  Adverse effects include but are not limited to breast enlargement, headache, diarrhea, nausea, upset stomach, liver function test abnormalities, taste disturbance, and stomach pain.  There is a rare possibility of liver failure that can occur when taking ketoconazole. The patient understands that monitoring of LFTs may be required, especially at baseline. The patient verbalized understanding of the proper use and possible adverse effects of ketoconazole.  All of the patient's questions and concerns were addressed. Taltz Counseling: I discussed with the patient the risks of ixekizumab including but not limited to immunosuppression, serious infections, worsening of inflammatory bowel disease and drug reactions.  The patient understands that monitoring is required including a PPD at baseline and must alert us or the primary physician if symptoms of infection or other concerning signs are noted. Azathioprine Counseling:  I discussed with the patient the risks of azathioprine including but not limited to myelosuppression, immunosuppression, hepatotoxicity, lymphoma, and infections.  The patient understands that monitoring is required including baseline LFTs, Creatinine, possible TPMP genotyping and weekly CBCs for the first month and then every 2 weeks thereafter.  The patient verbalized understanding of the proper use and possible adverse effects of azathioprine.  All of the patient's questions and concerns were addressed. Tranexamic Acid Counseling:  Patient advised of the small risk of bleeding problems with tranexamic acid. They were also instructed to call if they developed any nausea, vomiting or diarrhea. All of the patient's questions and concerns were addressed. Low Dose Naltrexone Pregnancy And Lactation Text: Naltrexone is pregnancy category C.  There have been no adequate and well-controlled studies in pregnant women.  It should be used in pregnancy only if the potential benefit justifies the potential risk to the fetus.   Limited data indicates that naltrexone is minimally excreted into breastmilk. Rhofade Counseling: Rhofade is a topical medication which can decrease superficial blood flow where applied. Side effects are uncommon and include stinging, redness and allergic reactions. Topical Retinoid counseling:  Patient advised to apply a pea-sized amount only at bedtime and wait 30 minutes after washing their face before applying.  If too drying, patient may add a non-comedogenic moisturizer. The patient verbalized understanding of the proper use and possible adverse effects of retinoids.  All of the patient's questions and concerns were addressed. Dapsone Counseling: I discussed with the patient the risks of dapsone including but not limited to hemolytic anemia, agranulocytosis, rashes, methemoglobinemia, kidney failure, peripheral neuropathy, headaches, GI upset, and liver toxicity.  Patients who start dapsone require monitoring including baseline LFTs and weekly CBCs for the first month, then every month thereafter.  The patient verbalized understanding of the proper use and possible adverse effects of dapsone.  All of the patient's questions and concerns were addressed. Tetracycline Counseling: Patient counseled regarding possible photosensitivity and increased risk for sunburn.  Patient instructed to avoid sunlight, if possible.  When exposed to sunlight, patients should wear protective clothing, sunglasses, and sunscreen.  The patient was instructed to call the office immediately if the following severe adverse effects occur:  hearing changes, easy bruising/bleeding, severe headache, or vision changes.  The patient verbalized understanding of the proper use and possible adverse effects of tetracycline.  All of the patient's questions and concerns were addressed. Patient understands to avoid pregnancy while on therapy due to potential birth defects. 5-Fu Counseling: 5-Fluorouracil Counseling:  I discussed with the patient the risks of 5-fluorouracil including but not limited to erythema, scaling, itching, weeping, crusting, and pain. Winlevi Pregnancy And Lactation Text: This medication is considered safe during pregnancy and breastfeeding. Mirvaso Pregnancy And Lactation Text: This medication has not been assigned a Pregnancy Risk Category. It is unknown if the medication is excreted in breast milk. Opioid Pregnancy And Lactation Text: These medications can lead to premature delivery and should be avoided during pregnancy. These medications are also present in breast milk in small amounts. Minocycline Counseling: Patient advised regarding possible photosensitivity and discoloration of the teeth, skin, lips, tongue and gums.  Patient instructed to avoid sunlight, if possible.  When exposed to sunlight, patients should wear protective clothing, sunglasses, and sunscreen.  The patient was instructed to call the office immediately if the following severe adverse effects occur:  hearing changes, easy bruising/bleeding, severe headache, or vision changes.  The patient verbalized understanding of the proper use and possible adverse effects of minocycline.  All of the patient's questions and concerns were addressed. Oral Minoxidil Pregnancy And Lactation Text: This medication should only be used when clearly needed if you are pregnant, attempting to become pregnant or breast feeding. Otezla Counseling: The side effects of Otezla were discussed with the patient, including but not limited to worsening or new depression, weight loss, diarrhea, nausea, upper respiratory tract infection, and headache. Patient instructed to call the office should any adverse effect occur.  The patient verbalized understanding of the proper use and possible adverse effects of Otezla.  All the patient's questions and concerns were addressed. Tetracycline Pregnancy And Lactation Text: This medication is Pregnancy Category D and not consider safe during pregnancy. It is also excreted in breast milk. Simponi Counseling:  I discussed with the patient the risks of golimumab including but not limited to myelosuppression, immunosuppression, autoimmune hepatitis, demyelinating diseases, lymphoma, and serious infections.  The patient understands that monitoring is required including a PPD at baseline and must alert us or the primary physician if symptoms of infection or other concerning signs are noted. Libtayo Counseling- I discussed with the patient the risks of Libtayo including but not limited to nausea, vomiting, diarrhea, and bone or muscle pain.  The patient verbalized understanding of the proper use and possible adverse effects of Libtayo.  All of the patient's questions and concerns were addressed. Topical Metronidazole Pregnancy And Lactation Text: This medication is Pregnancy Category B and considered safe during pregnancy.  It is also considered safe to use while breastfeeding. Hydroquinone Pregnancy And Lactation Text: This medication has not been assigned a Pregnancy Risk Category but animal studies failed to show danger with the topical medication. It is unknown if the medication is excreted in breast milk. Hydroxyzine Counseling: Patient advised that the medication is sedating and not to drive a car after taking this medication.  Patient informed of potential adverse effects including but not limited to dry mouth, urinary retention, and blurry vision.  The patient verbalized understanding of the proper use and possible adverse effects of hydroxyzine.  All of the patient's questions and concerns were addressed. Cephalexin Counseling: I counseled the patient regarding use of cephalexin as an antibiotic for prophylactic and/or therapeutic purposes. Cephalexin (commonly prescribed under brand name Keflex) is a cephalosporin antibiotic which is active against numerous classes of bacteria, including most skin bacteria. Side effects may include nausea, diarrhea, gastrointestinal upset, rash, hives, yeast infections, and in rare cases, hepatitis, kidney disease, seizures, fever, confusion, neurologic symptoms, and others. Patients with severe allergies to penicillin medications are cautioned that there is about a 10% incidence of cross-reactivity with cephalosporins. When possible, patients with penicillin allergies should use alternatives to cephalosporins for antibiotic therapy. Azelaic Acid Pregnancy And Lactation Text: This medication is considered safe during pregnancy and breast feeding. Bexarotene Pregnancy And Lactation Text: This medication is Pregnancy Category X and should not be given to women who are pregnant or may become pregnant. This medication should not be used if you are breast feeding. Prednisone Pregnancy And Lactation Text: This medication is Pregnancy Category C and it isn't know if it is safe during pregnancy. This medication is excreted in breast milk. Rinvoq Pregnancy And Lactation Text: Based on animal studies, Rinvoq may cause embryo-fetal harm when administered to pregnant women.  The medication should not be used in pregnancy.  Breastfeeding is not recommended during treatment and for 6 days after the last dose. Thalidomide Counseling: I discussed with the patient the risks of thalidomide including but not limited to birth defects, anxiety, weakness, chest pain, dizziness, cough and severe allergy. Finasteride Male Counseling: Finasteride Counseling:  I discussed with the patient the risks of use of finasteride including but not limited to decreased libido, decreased ejaculate volume, gynecomastia, and depression. Women should not handle medication.  All of the patient's questions and concerns were addressed. Topical Steroids Counseling: I discussed with the patient that prolonged use of topical steroids can result in the increased appearance of superficial blood vessels (telangiectasias), lightening (hypopigmentation) and thinning of the skin (atrophy).  Patient understands to avoid using high potency steroids in skin folds, the groin or the face.  The patient verbalized understanding of the proper use and possible adverse effects of topical steroids.  All of the patient's questions and concerns were addressed. Topical Retinoid Pregnancy And Lactation Text: This medication is Pregnancy Category C. It is unknown if this medication is excreted in breast milk. Azathioprine Pregnancy And Lactation Text: This medication is Pregnancy Category D and isn't considered safe during pregnancy. It is unknown if this medication is excreted in breast milk. Opzelura Counseling:  I discussed with the patient the risks of Opzelura including but not limited to nasopharngitis, bronchitis, ear infection, eosinophila, hives, diarrhea, folliculitis, tonsillitis, and rhinorrhea.  Taken orally, this medication has been linked to serious infections; higher rate of mortality; malignancy and lymphoproliferative disorders; major adverse cardiovascular events; thrombosis; thrombocytopenia, anemia, and neutropenia; and lipid elevations. Tranexamic Acid Pregnancy And Lactation Text: It is unknown if this medication is safe during pregnancy or breast feeding. Ilumya Counseling: I discussed with the patient the risks of tildrakizumab including but not limited to immunosuppression, malignancy, posterior leukoencephalopathy syndrome, and serious infections.  The patient understands that monitoring is required including a PPD at baseline and must alert us or the primary physician if symptoms of infection or other concerning signs are noted. Dupixent Counseling: I discussed with the patient the risks of dupilumab including but not limited to eye infection and irritation, cold sores, injection site reactions, worsening of asthma, allergic reactions and increased risk of parasitic infection.  Live vaccines should be avoided while taking dupilumab. Dupilumab will also interact with certain medications such as warfarin and cyclosporine. The patient understands that monitoring is required and they must alert us or the primary physician if symptoms of infection or other concerning signs are noted. Ketoconazole Pregnancy And Lactation Text: This medication is Pregnancy Category C and it isn't know if it is safe during pregnancy. It is also excreted in breast milk and breast feeding isn't recommended. Dapsone Pregnancy And Lactation Text: This medication is Pregnancy Category C and is not considered safe during pregnancy or breast feeding. 5-Fu Pregnancy And Lactation Text: This medication is Pregnancy Category X and contraindicated in pregnancy and in women who may become pregnant. It is unknown if this medication is excreted in breast milk. VTAMA Counseling: I discussed with the patient that VTAMA is not for use in the eyes, mouth or mouth. They should call the office if they develop any signs of allergic reactions to VTAMA. The patient verbalized understanding of the proper use and possible adverse effects of VTAMA.  All of the patient's questions and concerns were addressed. Vtama Pregnancy And Lactation Text: It is unknown if this medication can cause problems during pregnancy and breastfeeding. Otezla Pregnancy And Lactation Text: This medication is Pregnancy Category C and it isn't known if it is safe during pregnancy. It is unknown if it is excreted in breast milk. Hydroxyzine Pregnancy And Lactation Text: This medication is not safe during pregnancy and should not be taken. It is also excreted in breast milk and breast feeding isn't recommended. Libtayo Pregnancy And Lactation Text: This medication is contraindicated in pregnancy and when breast feeding. Imiquimod Counseling:  I discussed with the patient the risks of imiquimod including but not limited to erythema, scaling, itching, weeping, crusting, and pain.  Patient understands that the inflammatory response to imiquimod is variable from person to person and was educated regarded proper titration schedule.  If flu-like symptoms develop, patient knows to discontinue the medication and contact us. Benzoyl Peroxide Counseling: Patient counseled that medicine may cause skin irritation and bleach clothing.  In the event of skin irritation, the patient was advised to reduce the amount of the drug applied or use it less frequently.   The patient verbalized understanding of the proper use and possible adverse effects of benzoyl peroxide.  All of the patient's questions and concerns were addressed. Sotyktu Counseling:  I discussed the most common side effects of Sotyktu including: common cold, sore throat, sinus infections, cold sores, canker sores, folliculitis, and acne.? I also discussed more serious side effects of Sotyktu including but not limited to: serious allergic reactions; increased risk for infections such as TB; cancers such as lymphomas; rhabdomyolysis and elevated CPK; and elevated triglycerides and liver enzymes.? Isotretinoin Counseling: Patient should get monthly blood tests, not donate blood, not drive at night if vision affected, not share medication, and not undergo elective surgery for 6 months after tx completed. Side effects reviewed, pt to contact office should one occur. Cephalexin Pregnancy And Lactation Text: This medication is Pregnancy Category B and considered safe during pregnancy.  It is also excreted in breast milk but can be used safely for shorter doses. Arava Counseling:  Patient counseled regarding adverse effects of Arava including but not limited to nausea, vomiting, abnormalities in liver function tests. Patients may develop mouth sores, rash, diarrhea, and abnormalities in blood counts. The patient understands that monitoring is required including LFTs and blood counts.  There is a rare possibility of scarring of the liver and lung problems that can occur when taking methotrexate. Persistent nausea, loss of appetite, pale stools, dark urine, cough, and shortness of breath should be reported immediately. Patient advised to discontinue Arava treatment and consult with a physician prior to attempting conception. The patient will have to undergo a treatment to eliminate Arava from the body prior to conception. Niacinamide Counseling: I recommended taking niacin or niacinamide, also know as vitamin B3, twice daily. Recent evidence suggests that taking vitamin B3 (500 mg twice daily) can reduce the risk of actinic keratoses and non-melanoma skin cancers. Side effects of vitamin B3 include flushing and headache. Tremfya Counseling: I discussed with the patient the risks of guselkumab including but not limited to immunosuppression, serious infections, and drug reactions.  The patient understands that monitoring is required including a PPD at baseline and must alert us or the primary physician if symptoms of infection or other concerning signs are noted. Finasteride Female Counseling: Finasteride Counseling:  I discussed with the patient the risks of use of finasteride including but not limited to decreased libido and sexual dysfunction. Explained the teratogenic nature of the medication and stressed the importance of not getting pregnant during treatment. All of the patient's questions and concerns were addressed. Cellcept Counseling:  I discussed with the patient the risks of mycophenolate mofetil including but not limited to infection/immunosuppression, GI upset, hypokalemia, hypercholesterolemia, bone marrow suppression, lymphoproliferative disorders, malignancy, GI ulceration/bleed/perforation, colitis, interstitial lung disease, kidney failure, progressive multifocal leukoencephalopathy, and birth defects.  The patient understands that monitoring is required including a baseline creatinine and regular CBC testing. In addition, patient must alert us immediately if symptoms of infection or other concerning signs are noted. Adbry Pregnancy And Lactation Text: It is unknown if this medication will adversely affect pregnancy or breast feeding. Benzoyl Peroxide Pregnancy And Lactation Text: This medication is Pregnancy Category C. It is unknown if benzoyl peroxide is excreted in breast milk. Niacinamide Pregnancy And Lactation Text: These medications are considered safe during pregnancy. Fluconazole Counseling:  Patient counseled regarding adverse effects of fluconazole including but not limited to headache, diarrhea, nausea, upset stomach, liver function test abnormalities, taste disturbance, and stomach pain.  There is a rare possibility of liver failure that can occur when taking fluconazole.  The patient understands that monitoring of LFTs and kidney function test may be required, especially at baseline. The patient verbalized understanding of the proper use and possible adverse effects of fluconazole.  All of the patient's questions and concerns were addressed. Isotretinoin Pregnancy And Lactation Text: This medication is Pregnancy Category X and is considered extremely dangerous during pregnancy. It is unknown if it is excreted in breast milk. Tazorac Counseling:  Patient advised that medication is irritating and drying.  Patient may need to apply sparingly and wash off after an hour before eventually leaving it on overnight.  The patient verbalized understanding of the proper use and possible adverse effects of tazorac.  All of the patient's questions and concerns were addressed. Opzelura Pregnancy And Lactation Text: There is insufficient data to evaluate drug-associated risk for major birth defects, miscarriage, or other adverse maternal or fetal outcomes.  There is a pregnancy registry that monitors pregnancy outcomes in pregnant persons exposed to the medication during pregnancy.  It is unknown if this medication is excreted in breast milk.  Do not breastfeed during treatment and for about 4 weeks after the last dose. Drysol Counseling:  I discussed with the patient the risks of drysol/aluminum chloride including but not limited to skin rash, itching, irritation, burning. Quinolones Counseling:  I discussed with the patient the risks of fluoroquinolones including but not limited to GI upset, allergic reaction, drug rash, diarrhea, dizziness, photosensitivity, yeast infections, liver function test abnormalities, tendonitis/tendon rupture. Dupixent Pregnancy And Lactation Text: This medication likely crosses the placenta but the risk for the fetus is uncertain. This medication is excreted in breast milk. Terbinafine Counseling: Patient counseling regarding adverse effects of terbinafine including but not limited to headache, diarrhea, rash, upset stomach, liver function test abnormalities, itching, taste/smell disturbance, nausea, abdominal pain, and flatulence.  There is a rare possibility of liver failure that can occur when taking terbinafine.  The patient understands that a baseline LFT and kidney function test may be required. The patient verbalized understanding of the proper use and possible adverse effects of terbinafine.  All of the patient's questions and concerns were addressed. Valtrex Counseling: I discussed with the patient the risks of valacyclovir including but not limited to kidney damage, nausea, vomiting and severe allergy.  The patient understands that if the infection seems to be worsening or is not improving, they are to call. Cibinqo Counseling: I discussed with the patient the risks of Cibinqo therapy including but not limited to common cold, nausea, headache, cold sores, increased blood CPK levels, dizziness, UTIs, fatigue, acne, and vomitting. Live vaccines should be avoided.  This medication has been linked to serious infections; higher rate of mortality; malignancy and lymphoproliferative disorders; major adverse cardiovascular events; thrombosis; thrombocytopenia and lymphopenia; lipid elevations; and retinal detachment. Gabapentin Counseling: I discussed with the patient the risks of gabapentin including but not limited to dizziness, somnolence, fatigue and ataxia. Picato Counseling:  I discussed with the patient the risks of Picato including but not limited to erythema, scaling, itching, weeping, crusting, and pain. Gabapentin Pregnancy And Lactation Text: This medication is Pregnancy Category C and isn't considered safe during pregnancy. It is excreted in breast milk. Zoryve Counseling:  I discussed with the patient that Zoryve is not for use in the eyes, mouth or vagina. The most commonly reported side effects include diarrhea, headache, insomnia, application site pain, upper respiratory tract infections, and urinary tract infections.  All of the patient's questions and concerns were addressed. Cibinqo Pregnancy And Lactation Text: It is unknown if this medication will adversely affect pregnancy or breast feeding.  You should not take this medication if you are currently pregnant or planning a pregnancy or while breastfeeding. Oxybutynin Counseling:  I discussed with the patient the risks of oxybutynin including but not limited to skin rash, drowsiness, dry mouth, difficulty urinating, and blurred vision. Tazorac Pregnancy And Lactation Text: This medication is not safe during pregnancy. It is unknown if this medication is excreted in breast milk. Infliximab Counseling:  I discussed with the patient the risks of infliximab including but not limited to myelosuppression, immunosuppression, autoimmune hepatitis, demyelinating diseases, lymphoma, and serious infections.  The patient understands that monitoring is required including a PPD at baseline and must alert us or the primary physician if symptoms of infection or other concerning signs are noted. Sotyktu Pregnancy And Lactation Text: There is insufficient data to evaluate whether or not Sotyktu is safe to use during pregnancy.? ?It is not known if Sotyktu passes into breast milk and whether or not it is safe to use when breastfeeding.?? Clindamycin Counseling: I counseled the patient regarding use of clindamycin as an antibiotic for prophylactic and/or therapeutic purposes. Clindamycin is active against numerous classes of bacteria, including skin bacteria. Side effects may include nausea, diarrhea, gastrointestinal upset, rash, hives, yeast infections, and in rare cases, colitis. Skyrizi Counseling: I discussed with the patient the risks of risankizumab-rzaa including but not limited to immunosuppression, and serious infections.  The patient understands that monitoring is required including a PPD at baseline and must alert us or the primary physician if symptoms of infection or other concerning signs are noted. Odomzo Counseling- I discussed with the patient the risks of Odomzo including but not limited to nausea, vomiting, diarrhea, constipation, weight loss, changes in the sense of taste, decreased appetite, muscle spasms, and hair loss.  The patient verbalized understanding of the proper use and possible adverse effects of Odomzo.  All of the patient's questions and concerns were addressed. Topical Steroids Applications Pregnancy And Lactation Text: Most topical steroids are considered safe to use during pregnancy and lactation.  Any topical steroid applied to the breast or nipple should be washed off before breastfeeding. Clofazimine Counseling:  I discussed with the patient the risks of clofazimine including but not limited to skin and eye pigmentation, liver damage, nausea/vomiting, gastrointestinal bleeding and allergy. Bimzelx Counseling:  I discussed with the patient the risks of Bimzelx including but not limited to depression, immunosuppression, allergic reactions and infections.  The patient understands that monitoring is required including a PPD at baseline and must alert us or the primary physician if symptoms of infection or other concerning signs are noted. Finasteride Pregnancy And Lactation Text: This medication is absolutely contraindicated during pregnancy. It is unknown if it is excreted in breast milk. Fluconazole Pregnancy And Lactation Text: This medication is Pregnancy Category C and it isn't know if it is safe during pregnancy. It is also excreted in breast milk. Klisyri Counseling:  I discussed with the patient the risks of Klisyri including but not limited to erythema, scaling, itching, weeping, crusting, and pain. Clindamycin Pregnancy And Lactation Text: This medication can be used in pregnancy if certain situations. Clindamycin is also present in breast milk. Xeljanz Counseling: I discussed with the patient the risks of Xeljanz therapy including increased risk of infection, liver issues, headache, diarrhea, or cold symptoms. Live vaccines should be avoided. They were instructed to call if they have any problems. Topical Sulfur Applications Counseling: Topical Sulfur Counseling: Patient counseled that this medication may cause skin irritation or allergic reactions.  In the event of skin irritation, the patient was advised to reduce the amount of the drug applied or use it less frequently.   The patient verbalized understanding of the proper use and possible adverse effects of topical sulfur application.  All of the patient's questions and concerns were addressed. Carac Counseling:  I discussed with the patient the risks of Carac including but not limited to erythema, scaling, itching, weeping, crusting, and pain. Nsaids Counseling: NSAID Counseling: I discussed with the patient that NSAIDs should be taken with food. Prolonged use of NSAIDs can result in the development of stomach ulcers.  Patient advised to stop taking NSAIDs if abdominal pain occurs.  The patient verbalized understanding of the proper use and possible adverse effects of NSAIDs.  All of the patient's questions and concerns were addressed. High Dose Vitamin A Counseling: Side effects reviewed, pt to contact office should one occur. Enbrel Counseling:  I discussed with the patient the risks of etanercept including but not limited to myelosuppression, immunosuppression, autoimmune hepatitis, demyelinating diseases, lymphoma, and infections.  The patient understands that monitoring is required including a PPD at baseline and must alert us or the primary physician if symptoms of infection or other concerning signs are noted. Albendazole Counseling:  I discussed with the patient the risks of albendazole including but not limited to cytopenia, kidney damage, nausea/vomiting and severe allergy.  The patient understands that this medication is being used in an off-label manner. Valtrex Pregnancy And Lactation Text: this medication is Pregnancy Category B and is considered safe during pregnancy. This medication is not directly found in breast milk but it's metabolite acyclovir is present. Terbinafine Pregnancy And Lactation Text: This medication is Pregnancy Category B and is considered safe during pregnancy. It is also excreted in breast milk and breast feeding isn't recommended. Elidel Counseling: Patient may experience a mild burning sensation during topical application. Elidel is not approved in children less than 2 years of age. There have been case reports of hematologic and skin malignancies in patients using topical calcineurin inhibitors although causality is questionable. Albendazole Pregnancy And Lactation Text: This medication is Pregnancy Category C and it isn't known if it is safe during pregnancy. It is also excreted in breast milk. Glycopyrrolate Counseling:  I discussed with the patient the risks of glycopyrrolate including but not limited to skin rash, drowsiness, dry mouth, difficulty urinating, and blurred vision. Oxybutynin Pregnancy And Lactation Text: This medication is Pregnancy Category B and is considered safe during pregnancy. It is unknown if it is excreted in breast milk. Litfulo Counseling: I discussed with the patient the risks of Litfulo therapy including but not limited to upper respiratory tract infections, shingles, cold sores, and nausea. Live vaccines should be avoided.  This medication has been linked to serious infections; higher rate of mortality; malignancy and lymphoproliferative disorders; major adverse cardiovascular events; thrombosis; gastrointestinal perforations; neutropenia; lymphopenia; anemia; liver enzyme elevations; and lipid elevations. Topical Clindamycin Counseling: Patient counseled that this medication may cause skin irritation or allergic reactions.  In the event of skin irritation, the patient was advised to reduce the amount of the drug applied or use it less frequently.   The patient verbalized understanding of the proper use and possible adverse effects of clindamycin.  All of the patient's questions and concerns were addressed. Nsaids Pregnancy And Lactation Text: These medications are considered safe up to 30 weeks gestation. It is excreted in breast milk. Solaraze Counseling:  I discussed with the patient the risks of Solaraze including but not limited to erythema, scaling, itching, weeping, crusting, and pain. Bimzelx Pregnancy And Lactation Text: This medication crosses the placenta and the safety is uncertain during pregnancy. It is unknown if this medication is present in breast milk. Klisyri Pregnancy And Lactation Text: It is unknown if this medication can harm a developing fetus or if it is excreted in breast milk. Rifampin Counseling: I discussed with the patient the risks of rifampin including but not limited to liver damage, kidney damage, red-orange body fluids, nausea/vomiting and severe allergy. Griseofulvin Counseling:  I discussed with the patient the risks of griseofulvin including but not limited to photosensitivity, cytopenia, liver damage, nausea/vomiting and severe allergy.  The patient understands that this medication is best absorbed when taken with a fatty meal (e.g., ice cream or french fries). Xolair Counseling:  Patient informed of potential adverse effects including but not limited to fever, muscle aches, rash and allergic reactions.  The patient verbalized understanding of the proper use and possible adverse effects of Xolair.  All of the patient's questions and concerns were addressed. Xelanaliliaz Pregnancy And Lactation Text: This medication is Pregnancy Category D and is not considered safe during pregnancy.  The risk during breast feeding is also uncertain. Doxycycline Counseling:  Patient counseled regarding possible photosensitivity and increased risk for sunburn.  Patient instructed to avoid sunlight, if possible.  When exposed to sunlight, patients should wear protective clothing, sunglasses, and sunscreen.  The patient was instructed to call the office immediately if the following severe adverse effects occur:  hearing changes, easy bruising/bleeding, severe headache, or vision changes.  The patient verbalized understanding of the proper use and possible adverse effects of doxycycline.  All of the patient's questions and concerns were addressed. Topical Sulfur Applications Pregnancy And Lactation Text: This medication is considered safe during pregnancy and breast feeding secondary to limited systemic absorption. Use Enhanced Medication Counseling?: No High Dose Vitamin A Pregnancy And Lactation Text: High dose vitamin A therapy is contraindicated during pregnancy and breast feeding. Cyclophosphamide Counseling:  I discussed with the patient the risks of cyclophosphamide including but not limited to hair loss, hormonal abnormalities, decreased fertility, abdominal pain, diarrhea, nausea and vomiting, bone marrow suppression and infection. The patient understands that monitoring is required while taking this medication. Birth Control Pills Counseling: Birth Control Pill Counseling: I discussed with the patient the potential side effects of OCPs including but not limited to increased risk of stroke, heart attack, thrombophlebitis, deep venous thrombosis, hepatic adenomas, breast changes, GI upset, headaches, and depression.  The patient verbalized understanding of the proper use and possible adverse effects of OCPs. All of the patient's questions and concerns were addressed. Litfulo Pregnancy And Lactation Text: Based on animal studies, Lifulo may cause embryo-fetal harm when administered to pregnant women.  The medication should not be used in pregnancy.  Breastfeeding is not recommended during treatment. Rituxan Counseling:  I discussed with the patient the risks of Rituxan infusions. Side effects can include infusion reactions, severe drug rashes including mucocutaneous reactions, reactivation of latent hepatitis and other infections and rarely progressive multifocal leukoencephalopathy.  All of the patient's questions and concerns were addressed. Detail Level: Simple Glycopyrrolate Pregnancy And Lactation Text: This medication is Pregnancy Category B and is considered safe during pregnancy. It is unknown if it is excreted breast milk. Zyclara Counseling:  I discussed with the patient the risks of imiquimod including but not limited to erythema, scaling, itching, weeping, crusting, and pain.  Patient understands that the inflammatory response to imiquimod is variable from person to person and was educated regarded proper titration schedule.  If flu-like symptoms develop, patient knows to discontinue the medication and contact us. Ivermectin Counseling:  Patient instructed to take medication on an empty stomach with a full glass of water.  Patient informed of potential adverse effects including but not limited to nausea, diarrhea, dizziness, itching, and swelling of the extremities or lymph nodes.  The patient verbalized understanding of the proper use and possible adverse effects of ivermectin.  All of the patient's questions and concerns were addressed. Azithromycin Counseling:  I discussed with the patient the risks of azithromycin including but not limited to GI upset, allergic reaction, drug rash, diarrhea, and yeast infections. Spevigo Counseling: I discussed with the patient the risks of Spevigo including but not limited to fatigue, nasuea, vomiting, headache, pruritus, urinary tract infection, an infusion related reactions.  The patient understands that monitoring is required including screening for tuberculosis at baseline and yearly screening thereafter while continuing Spevigo therapy. They should contact us if symptoms of infection or other concerning signs are noted. Protopic Counseling: Patient may experience a mild burning sensation during topical application. Protopic is not approved in children less than 2 years of age. There have been case reports of hematologic and skin malignancies in patients using topical calcineurin inhibitors although causality is questionable. Cimetidine Counseling:  I discussed with the patient the risks of Cimetidine including but not limited to gynecomastia, headache, diarrhea, nausea, drowsiness, arrhythmias, pancreatitis, skin rashes, psychosis, bone marrow suppression and kidney toxicity. Propranolol Counseling:  I discussed with the patient the risks of propranolol including but not limited to low heart rate, low blood pressure, low blood sugar, restlessness and increased cold sensitivity. They should call the office if they experience any of these side effects. Solaraze Pregnancy And Lactation Text: This medication is Pregnancy Category B and is considered safe. There is some data to suggest avoiding during the third trimester. It is unknown if this medication is excreted in breast milk. Cimzia Counseling:  I discussed with the patient the risks of Cimzia including but not limited to immunosuppression, allergic reactions and infections.  The patient understands that monitoring is required including a PPD at baseline and must alert us or the primary physician if symptoms of infection or other concerning signs are noted. Calcipotriene Counseling:  I discussed with the patient the risks of calcipotriene including but not limited to erythema, scaling, itching, and irritation. Minoxidil Counseling: Minoxidil is a topical medication which can increase blood flow where it is applied. It is uncertain how this medication increases hair growth. Side effects are uncommon and include stinging and allergic reactions. Doxycycline Pregnancy And Lactation Text: This medication is Pregnancy Category D and not consider safe during pregnancy. It is also excreted in breast milk but is considered safe for shorter treatment courses. Griseofulvin Pregnancy And Lactation Text: This medication is Pregnancy Category X and is known to cause serious birth defects. It is unknown if this medication is excreted in breast milk but breast feeding should be avoided. Xolair Pregnancy And Lactation Text: This medication is Pregnancy Category B and is considered safe during pregnancy. This medication is excreted in breast milk. Rifampin Pregnancy And Lactation Text: This medication is Pregnancy Category C and it isn't know if it is safe during pregnancy. It is also excreted in breast milk and should not be used if you are breast feeding. Humira Counseling:  I discussed with the patient the risks of adalimumab including but not limited to myelosuppression, immunosuppression, autoimmune hepatitis, demyelinating diseases, lymphoma, and serious infections.  The patient understands that monitoring is required including a PPD at baseline and must alert us or the primary physician if symptoms of infection or other concerning signs are noted. Birth Control Pills Pregnancy And Lactation Text: This medication should be avoided if pregnant and for the first 30 days post-partum. Cyclophosphamide Pregnancy And Lactation Text: This medication is Pregnancy Category D and it isn't considered safe during pregnancy. This medication is excreted in breast milk. Olanzapine Counseling- I discussed with the patient the common side effects of olanzapine including but are not limited to: lack of energy, dry mouth, increased appetite, sleepiness, tremor, constipation, dizziness, changes in behavior, or restlessness.  Explained that teenagers are more likely to experience headaches, abdominal pain, pain in the arms or legs, tiredness, and sleepiness.  Serious side effects include but are not limited: increased risk of death in elderly patients who are confused, have memory loss, or dementia-related psychosis; hyperglycemia; increased cholesterol and triglycerides; and weight gain. Wartpeel Counseling:  I discussed with the patient the risks of Wartpeel including but not limited to erythema, scaling, itching, weeping, crusting, and pain. Colchicine Counseling:  Patient counseled regarding adverse effects including but not limited to stomach upset (nausea, vomiting, stomach pain, or diarrhea).  Patient instructed to limit alcohol consumption while taking this medication.  Colchicine may reduce blood counts especially with prolonged use.  The patient understands that monitoring of kidney function and blood counts may be required, especially at baseline. The patient verbalized understanding of the proper use and possible adverse effects of colchicine.  All of the patient's questions and concerns were addressed. Methotrexate Counseling:  Patient counseled regarding adverse effects of methotrexate including but not limited to nausea, vomiting, abnormalities in liver function tests. Patients may develop mouth sores, rash, diarrhea, and abnormalities in blood counts. The patient understands that monitoring is required including LFT's and blood counts.  There is a rare possibility of scarring of the liver and lung problems that can occur when taking methotrexate. Persistent nausea, loss of appetite, pale stools, dark urine, cough, and shortness of breath should be reported immediately. Patient advised to discontinue methotrexate treatment at least three months before attempting to become pregnant.  I discussed the need for folate supplements while taking methotrexate.  These supplements can decrease side effects during methotrexate treatment. The patient verbalized understanding of the proper use and possible adverse effects of methotrexate.  All of the patient's questions and concerns were addressed. Olumiant Counseling: I discussed with the patient the risks of Olumiant therapy including but not limited to upper respiratory tract infections, shingles, cold sores, and nausea. Live vaccines should be avoided.  This medication has been linked to serious infections; higher rate of mortality; malignancy and lymphoproliferative disorders; major adverse cardiovascular events; thrombosis; gastrointestinal perforations; neutropenia; lymphopenia; anemia; liver enzyme elevations; and lipid elevations. Aklief counseling:  Patient advised to apply a pea-sized amount only at bedtime and wait 30 minutes after washing their face before applying.  If too drying, patient may add a non-comedogenic moisturizer.  The most commonly reported side effects including irritation, redness, scaling, dryness, stinging, burning, itching, and increased risk of sunburn.  The patient verbalized understanding of the proper use and possible adverse effects of retinoids.  All of the patient's questions and concerns were addressed. Acitretin Counseling:  I discussed with the patient the risks of acitretin including but not limited to hair loss, dry lips/skin/eyes, liver damage, hyperlipidemia, depression/suicidal ideation, photosensitivity.  Serious rare side effects can include but are not limited to pancreatitis, pseudotumor cerebri, bony changes, clot formation/stroke/heart attack.  Patient understands that alcohol is contraindicated since it can result in liver toxicity and significantly prolong the elimination of the drug by many years. Cyclosporine Counseling:  I discussed with the patient the risks of cyclosporine including but not limited to hypertension, gingival hyperplasia,myelosuppression, immunosuppression, liver damage, kidney damage, neurotoxicity, lymphoma, and serious infections. The patient understands that monitoring is required including baseline blood pressure, CBC, CMP, lipid panel and uric acid, and then 1-2 times monthly CMP and blood pressure. Spironolactone Counseling: Patient advised regarding risks of diarrhea, abdominal pain, hyperkalemia, birth defects (for female patients), liver toxicity and renal toxicity. The patient may need blood work to monitor liver and kidney function and potassium levels while on therapy. The patient verbalized understanding of the proper use and possible adverse effects of spironolactone.  All of the patient's questions and concerns were addressed. Topical Ketoconazole Counseling: Patient counseled that this medication may cause skin irritation or allergic reactions.  In the event of skin irritation, the patient was advised to reduce the amount of the drug applied or use it less frequently.   The patient verbalized understanding of the proper use and possible adverse effects of ketoconazole.  All of the patient's questions and concerns were addressed. Hydroxychloroquine Counseling:  I discussed with the patient that a baseline ophthalmologic exam is needed at the start of therapy and every year thereafter while on therapy. A CBC may also be warranted for monitoring.  The side effects of this medication were discussed with the patient, including but not limited to agranulocytosis, aplastic anemia, seizures, rashes, retinopathy, and liver toxicity. Patient instructed to call the office should any adverse effect occur.  The patient verbalized understanding of the proper use and possible adverse effects of Plaquenil.  All the patient's questions and concerns were addressed. Propranolol Pregnancy And Lactation Text: This medication is Pregnancy Category C and it isn't known if it is safe during pregnancy. It is excreted in breast milk. Rituxan Pregnancy And Lactation Text: This medication is Pregnancy Category C and it isn't know if it is safe during pregnancy. It is unknown if this medication is excreted in breast milk but similar antibodies are known to be excreted. Protopic Pregnancy And Lactation Text: This medication is Pregnancy Category C. It is unknown if this medication is excreted in breast milk when applied topically. Spevigo Pregnancy And Lactation Text: The risk during pregnancy and breastfeeding is uncertain with this medication. This medication does cross the placenta. It is unknown if this medication is found in breast milk. Eucrisa Counseling: Patient may experience a mild burning sensation during topical application. Eucrisa is not approved in children less than 3 months of age. Azithromycin Pregnancy And Lactation Text: This medication is considered safe during pregnancy and is also secreted in breast milk. Metronidazole Counseling:  I discussed with the patient the risks of metronidazole including but not limited to seizures, nausea/vomiting, a metallic taste in the mouth, nausea/vomiting and severe allergy. Cimzia Pregnancy And Lactation Text: This medication crosses the placenta but can be considered safe in certain situations. Cimzia may be excreted in breast milk. Itraconazole Counseling:  I discussed with the patient the risks of itraconazole including but not limited to liver damage, nausea/vomiting, neuropathy, and severe allergy.  The patient understands that this medication is best absorbed when taken with acidic beverages such as non-diet cola or ginger ale.  The patient understands that monitoring is required including baseline LFTs and repeat LFTs at intervals.  The patient understands that they are to contact us or the primary physician if concerning signs are noted. SSKI Counseling:  I discussed with the patient the risks of SSKI including but not limited to thyroid abnormalities, metallic taste, GI upset, fever, headache, acne, arthralgias, paraesthesias, lymphadenopathy, easy bleeding, arrhythmias, and allergic reaction. Calcipotriene Pregnancy And Lactation Text: The use of this medication during pregnancy or lactation is not recommended as there is insufficient data. Erythromycin Counseling:  I discussed with the patient the risks of erythromycin including but not limited to GI upset, allergic reaction, drug rash, diarrhea, increase in liver enzymes, and yeast infections. Soolantra Counseling: I discussed with the patients the risks of topial Soolantra. This is a medicine which decreases the number of mites and inflammation in the skin. You experience burning, stinging, eye irritation or allergic reactions.  Please call our office if you develop any problems from using this medication. Sarecycline Counseling: Patient advised regarding possible photosensitivity and discoloration of the teeth, skin, lips, tongue and gums.  Patient instructed to avoid sunlight, if possible.  When exposed to sunlight, patients should wear protective clothing, sunglasses, and sunscreen.  The patient was instructed to call the office immediately if the following severe adverse effects occur:  hearing changes, easy bruising/bleeding, severe headache, or vision changes.  The patient verbalized understanding of the proper use and possible adverse effects of sarecycline.  All of the patient's questions and concerns were addressed. Olanzapine Pregnancy And Lactation Text: This medication is pregnancy category C.   There are no adequate and well controlled trials with olanzapine in pregnant females.  Olanzapine should be used during pregnancy only if the potential benefit justifies the potential risk to the fetus.   In a study in lactating healthy women, olanzapine was excreted in breast milk.  It is recommended that women taking olanzapine should not breast feed. Methotrexate Pregnancy And Lactation Text: This medication is Pregnancy Category X and is known to cause fetal harm. This medication is excreted in breast milk. Spironolactone Pregnancy And Lactation Text: This medication can cause feminization of the male fetus and should be avoided during pregnancy. The active metabolite is also found in breast milk. Erivedge Counseling- I discussed with the patient the risks of Erivedge including but not limited to nausea, vomiting, diarrhea, constipation, weight loss, changes in the sense of taste, decreased appetite, muscle spasms, and hair loss.  The patient verbalized understanding of the proper use and possible adverse effects of Erivedge.  All of the patient's questions and concerns were addressed. Siliq Counseling:  I discussed with the patient the risks of Siliq including but not limited to new or worsening depression, suicidal thoughts and behavior, immunosuppression, malignancy, posterior leukoencephalopathy syndrome, and serious infections.  The patient understands that monitoring is required including a PPD at baseline and must alert us or the primary physician if symptoms of infection or other concerning signs are noted. There is also a special program designed to monitor depression which is required with Siliq. Hyrimoz Counseling:  I discussed with the patient the risks of adalimumab including but not limited to myelosuppression, immunosuppression, autoimmune hepatitis, demyelinating diseases, lymphoma, and serious infections.  The patient understands that monitoring is required including a PPD at baseline and must alert us or the primary physician if symptoms of infection or other concerning signs are noted. Hydroxychloroquine Pregnancy And Lactation Text: This medication has been shown to cause fetal harm but it isn't assigned a Pregnancy Risk Category. There are small amounts excreted in breast milk. Qbrexza Counseling:  I discussed with the patient the risks of Qbrexza including but not limited to headache, mydriasis, blurred vision, dry eyes, nasal dryness, dry mouth, dry throat, dry skin, urinary hesitation, and constipation.  Local skin reactions including erythema, burning, stinging, and itching can also occur. Doxepin Counseling:  Patient advised that the medication is sedating and not to drive a car after taking this medication. Patient informed of potential adverse effects including but not limited to dry mouth, urinary retention, and blurry vision.  The patient verbalized understanding of the proper use and possible adverse effects of doxepin.  All of the patient's questions and concerns were addressed. Aklief Pregnancy And Lactation Text: It is unknown if this medication is safe to use during pregnancy.  It is unknown if this medication is excreted in breast milk.  Breastfeeding women should use the topical cream on the smallest area of the skin for the shortest time needed while breastfeeding.  Do not apply to nipple and areola. Dutasteride Male Counseling: Dustasteride Counseling:  I discussed with the patient the risks of use of dutasteride including but not limited to decreased libido, decreased ejaculate volume, and gynecomastia. Women who can become pregnant should not handle medication.  All of the patient's questions and concerns were addressed. Stelara Counseling:  I discussed with the patient the risks of ustekinumab including but not limited to immunosuppression, malignancy, posterior leukoencephalopathy syndrome, and serious infections.  The patient understands that monitoring is required including a PPD at baseline and must alert us or the primary physician if symptoms of infection or other concerning signs are noted. Olumiant Pregnancy And Lactation Text: Based on animal studies, Olumiant may cause embryo-fetal harm when administered to pregnant women.  The medication should not be used in pregnancy.  Breastfeeding is not recommended during treatment. Acitretin Pregnancy And Lactation Text: This medication is Pregnancy Category X and should not be given to women who are pregnant or may become pregnant in the future. This medication is excreted in breast milk. Bactrim Counseling:  I discussed with the patient the risks of sulfa antibiotics including but not limited to GI upset, allergic reaction, drug rash, diarrhea, dizziness, photosensitivity, and yeast infections.  Rarely, more serious reactions can occur including but not limited to aplastic anemia, agranulocytosis, methemoglobinemia, blood dyscrasias, liver or kidney failure, lung infiltrates or desquamative/blistering drug rashes. Metronidazole Pregnancy And Lactation Text: This medication is Pregnancy Category B and considered safe during pregnancy.  It is also excreted in breast milk. Adbry Counseling: I discussed with the patient the risks of tralokinumab including but not limited to eye infection and irritation, cold sores, injection site reactions, worsening of asthma, allergic reactions and increased risk of parasitic infection.  Live vaccines should be avoided while taking tralokinumab. The patient understands that monitoring is required and they must alert us or the primary physician if symptoms of infection or other concerning signs are noted. Sski Pregnancy And Lactation Text: This medication is Pregnancy Category D and isn't considered safe during pregnancy. It is excreted in breast milk. Erythromycin Pregnancy And Lactation Text: This medication is Pregnancy Category B and is considered safe during pregnancy. It is also excreted in breast milk. Bactrim Pregnancy And Lactation Text: This medication is Pregnancy Category D and is known to cause fetal risk.  It is also excreted in breast milk. Soolantra Pregnancy And Lactation Text: This medication is Pregnancy Category C. This medication is considered safe during breast feeding. Winlevi Counseling:  I discussed with the patient the risks of topical clascoterone including but not limited to erythema, scaling, itching, and stinging. Patient voiced their understanding. Cosentyx Counseling:  I discussed with the patient the risks of Cosentyx including but not limited to worsening of Crohn's disease, immunosuppression, allergic reactions and infections.  The patient understands that monitoring is required including a PPD at baseline and must alert us or the primary physician if symptoms of infection or other concerning signs are noted. Mirvaso Counseling: Mirvaso is a topical medication which can decrease superficial blood flow where applied. Side effects are uncommon and include stinging, redness and allergic reactions. Opioid Counseling: I discussed with the patient the potential side effects of opioids including but not limited to addiction, altered mental status, and depression. I stressed avoiding alcohol, benzodiazepines, muscle relaxants and sleep aids unless specifically okayed by a physician. The patient verbalized understanding of the proper use and possible adverse effects of opioids. All of the patient's questions and concerns were addressed. They were instructed to flush the remaining pills down the toilet if they did not need them for pain. Cantharidin Counseling:  I discussed with the patient the risks of Cantharidin including but not limited to pain, redness, burning, itching, and blistering. Oral Minoxidil Counseling- I discussed with the patient the risks of oral minoxidil including but not limited to shortness of breath, swelling of the feet or ankles, dizziness, lightheadedness, unwanted hair growth and allergic reaction.  The patient verbalized understanding of the proper use and possible adverse effects of oral minoxidil.  All of the patient's questions and concerns were addressed.

## 2024-09-24 NOTE — ED PROVIDER NOTE - NS ED MD DISPO ADMITTING SERVICE
Alert-The patient is alert, awake and responds to voice. The patient is oriented to time, place, and person. The triage nurse is able to obtain subjective information.
MED

## 2025-03-05 NOTE — PROVIDER CONTACT NOTE (OTHER) - SITUATION
Patient's blood pressure range in the last 24 hours was: BP  Min: 110/60  Max: 135/60.The patient's inpatient anti-hypertensive regimen is listed below:  Current Antihypertensives  metoprolol succinate (TOPROL-XL) 24 hr tablet 25 mg, Daily, Oral    Plan  - BP is controlled, no changes needed to their regimen     Pt spit PO meds